# Patient Record
Sex: FEMALE | Race: WHITE | NOT HISPANIC OR LATINO | Employment: OTHER | URBAN - METROPOLITAN AREA
[De-identification: names, ages, dates, MRNs, and addresses within clinical notes are randomized per-mention and may not be internally consistent; named-entity substitution may affect disease eponyms.]

---

## 2017-01-13 ENCOUNTER — TELEPHONE (OUTPATIENT)
Dept: PSYCHIATRY | Facility: CLINIC | Age: 57
End: 2017-01-13

## 2017-01-13 NOTE — TELEPHONE ENCOUNTER
Phone Note    Left message for patient requesting her to reschedule her appointment and to check in.     =============================================================================  Anisha Maciel M.D.  PGY-3 Psychiatry Resident

## 2017-02-15 ENCOUNTER — TELEPHONE (OUTPATIENT)
Dept: PSYCHIATRY | Facility: CLINIC | Age: 57
End: 2017-02-15

## 2017-02-15 DIAGNOSIS — F43.10 POSTTRAUMATIC STRESS DISORDER: ICD-10-CM

## 2017-02-15 DIAGNOSIS — F39 MOOD DISORDER (H): ICD-10-CM

## 2017-02-15 RX ORDER — PRAZOSIN HYDROCHLORIDE 1 MG/1
2 CAPSULE ORAL AT BEDTIME
Qty: 60 CAPSULE | Refills: 0 | Status: SHIPPED
Start: 2017-02-15 | End: 2017-06-27

## 2017-02-15 RX ORDER — QUETIAPINE FUMARATE 25 MG/1
25 TABLET, FILM COATED ORAL AT BEDTIME
Qty: 30 TABLET | Refills: 0 | Status: SHIPPED
Start: 2017-02-15 | End: 2017-06-27

## 2017-02-15 NOTE — TELEPHONE ENCOUNTER
Medication Requested: Prazosin 1 mg caps and Seroquel 25 mg tabs  Last Written RX Date: 11-11-16  Last Pharmacy Fill Date: 1-24-17  Last RX Quantity: 1 mo,   # refills: 2    Last Office Visit: 10-5-16  Recommended RTC: 1 mo  Next Scheduled Office Visit: 3-6-17    Since last Visit: # of CX 0 ,# of NOS 1    Last Visit Recommendations for:  Medications: - Continue Prazosin 1 mg QHS  - Continue Gabapentin 600 mg qAM, 300 mg q1400, 600 mg QHS   - Continue Quetiapine 25 mg QHS    **Prazosin changed to 2 mg at HS on 11-11-16    Labs: 0    Will route to provider due to NOS.        Kathleen M Doege RN Kathleen M Doege RN

## 2017-05-09 ENCOUNTER — TELEPHONE (OUTPATIENT)
Dept: PSYCHIATRY | Facility: CLINIC | Age: 57
End: 2017-05-09

## 2017-05-09 DIAGNOSIS — F39 MOOD DISORDER (H): ICD-10-CM

## 2017-05-09 NOTE — TELEPHONE ENCOUNTER
----- Message from Naila Brown sent at 5/9/2017  9:47 AM CDT -----  Regarding: Refill Request  Pt called requesting a refill on her Gabapentin, enough to get her through until her appt on 5/17. I requested that she contact her pharmacy and pt refused. She stated that she was specifically told to call and request the refill since it has been over 3 months. She verified it should be sent to this pharmacy [facility]/PHARMACY #[id number] - [place], [place] - [address].  Thanks!

## 2017-05-09 NOTE — TELEPHONE ENCOUNTER
Last appt: 11/11/16  RTC: 2 months  Can:  none  No show:  3/6/17, 1/13/17  Pen: 5/17/17    Per last office note:  Continue Gabapentin 600 mg qAM, 300 mg q1400, 600 mg QHS     Per [place]-, 30 d/s Gabapentin last filled: 3/22/17, 2/21/17    Routed to provider for auth to RF per  Protocol

## 2017-05-10 RX ORDER — GABAPENTIN 300 MG/1
CAPSULE ORAL
Qty: 150 CAPSULE | Refills: 0 | Status: SHIPPED | OUTPATIENT
Start: 2017-05-10 | End: 2017-06-14

## 2017-05-10 NOTE — TELEPHONE ENCOUNTER
Anisha Maciel MD Bove, Michelle, RN        Caller: Unspecified (Yesterday,  4:37 PM)                     Ok for 30 d/s         LVM for pt that refill was submitted

## 2017-06-14 ENCOUNTER — TELEPHONE (OUTPATIENT)
Dept: PSYCHIATRY | Facility: CLINIC | Age: 57
End: 2017-06-14

## 2017-06-14 DIAGNOSIS — F39 MOOD DISORDER (H): ICD-10-CM

## 2017-06-14 RX ORDER — GABAPENTIN 300 MG/1
CAPSULE ORAL
Qty: 150 CAPSULE | Refills: 0 | Status: SHIPPED | OUTPATIENT
Start: 2017-06-14 | End: 2017-06-27

## 2017-06-14 NOTE — TELEPHONE ENCOUNTER
Dr. Maciel approves of refill.    LVM for pt with update that refill was submitted.  Reminded of upcoming appt date/time.

## 2017-06-14 NOTE — TELEPHONE ENCOUNTER
----- Message from Tamie Paredes sent at 6/14/2017  8:25 AM CDT -----  Contact: [phone]  Janell/Rosemarie    Caller:  patient  Medication:  gabapentin  [facility] [address]  Have you contacted the pharmacy: yes  How much of medication do you have left:  out  Pending appt: 6/27  Okay to leave VM:  yes

## 2017-06-14 NOTE — TELEPHONE ENCOUNTER
Last appt: 11/11/16  RTC: 2 months  Can:   5/17/17  No show:  3/6/17, 1/13/17  Pen: 6/27/17     Per last office note:  Continue Gabapentin 600 mg qAM, 300 mg q1400, 600 mg QHS     30 d/s Gabapentin last refilled 5/10/17    Routed to provider for auth to RF per RF Protocol, d/t multiple missed appts.

## 2017-06-24 ENCOUNTER — HEALTH MAINTENANCE LETTER (OUTPATIENT)
Age: 57
End: 2017-06-24

## 2017-06-27 ENCOUNTER — OFFICE VISIT (OUTPATIENT)
Dept: PSYCHIATRY | Facility: CLINIC | Age: 57
End: 2017-06-27
Attending: PSYCHIATRY & NEUROLOGY
Payer: MEDICARE

## 2017-06-27 VITALS
SYSTOLIC BLOOD PRESSURE: 131 MMHG | OXYGEN SATURATION: 92 % | BODY MASS INDEX: 24.99 KG/M2 | WEIGHT: 145.6 LBS | HEART RATE: 107 BPM | DIASTOLIC BLOOD PRESSURE: 83 MMHG

## 2017-06-27 DIAGNOSIS — F43.10 POSTTRAUMATIC STRESS DISORDER: ICD-10-CM

## 2017-06-27 DIAGNOSIS — Z92.89 HX OF PSYCHIATRIC CARE: ICD-10-CM

## 2017-06-27 DIAGNOSIS — F41.0 SEVERE ANXIETY WITH PANIC: Primary | ICD-10-CM

## 2017-06-27 DIAGNOSIS — F39 MOOD DISORDER (H): ICD-10-CM

## 2017-06-27 PROCEDURE — 99212 OFFICE O/P EST SF 10 MIN: CPT | Mod: ZF

## 2017-06-27 RX ORDER — GABAPENTIN 100 MG/1
100 CAPSULE ORAL 3 TIMES DAILY PRN
Qty: 90 CAPSULE | Refills: 2 | Status: ON HOLD | OUTPATIENT
Start: 2017-06-27 | End: 2022-03-18

## 2017-06-27 RX ORDER — PRAZOSIN HYDROCHLORIDE 1 MG/1
1 CAPSULE ORAL AT BEDTIME
Qty: 30 CAPSULE | Refills: 2 | Status: ON HOLD | OUTPATIENT
Start: 2017-06-27 | End: 2022-03-18

## 2017-06-27 RX ORDER — QUETIAPINE FUMARATE 25 MG/1
25 TABLET, FILM COATED ORAL AT BEDTIME
Qty: 30 TABLET | Refills: 2 | Status: SHIPPED | OUTPATIENT
Start: 2017-06-27 | End: 2023-08-07

## 2017-06-27 NOTE — PROGRESS NOTES
"PSYCHIATRY CLINIC PROGRESS NOTE   30 minute medication management of Mood Disorder NEC and Chemical Dependency.  The initial DIAG EVAL was 2/27/15.  Date of most recent Transfer Evaluation is 07/08/2016.    INTERIM HISTORY                                                 PSYCH CRITICAL ITEM HISTORY:  psych hosp (<3) and CD: alcohol.     Jolynn Rivera is a 57 year old female who was last seen in clinic on 11/11/16 at which time tried to increase prazosin. The patient reports good treatment adherence.  History was provided by patient who was a good historian.  Since the last visit:  - Was doing well for a long time with mood so didn't come in for visits.  - Panic attacks have been worse recently. In the past prompts to panic attacks were due to hangovers.  - Has been sober for 9 months!  - Continues to find [employer] website to be quite helpful for ongoing sobriety.  - Is going to see Radha on Thursday.  - Has a job at a [employer] that she really enjoys. Doesn't want to lose job.  - Moved since last visit.  - Mom is in the hospital and has had to be more involved which cues her PTSD.   - Daytime prazosin and seroquel have not gone well in the past.   - Increase of prazosin to 2 mg didn't go well, prefers 1 mg.     PSYCH ROS:   PANIC ATTACK: difficult recently - has sx of trouble taking deep breath, palpitations, GI distresss, tremors and extremity paresthesia, difficult to leave her home  DEPRESSION:  depressed mood, difficulty sleeping, poor appetite, feeling down, anhedonia, low energy and poor concentration /memory;  DENIES- suicidal ideation     SUBSTANCE USE:     ALCOHOL-  Sober 9 months     TOBACCO- 1ppd          CAFFEINE- not discussed                      CANNABIS- not discussed     OTHER ILLICIT DRUGS- not discussed    Financial Support- On disability.  Living Situation- [place]ment [place] [facility] with dog and cat.              Children- None      MEDICAL ROS:  Endorses some \"restless legs\" with " Seroquel.     PSYCH and CD Critical Summary Points since July 2016           - July 2016:    Discontinue previously prescribed additional daytime Gabapentin as it was too sedating.   Increase Prazosin to BID dosing to target Sx of anxiety  - August 2016:    Decrease Prazosin to QHS dosing as BID caused dizziness  - September 2016:   Gabapentin increased following ED visit to target daytime sx of anxiety.  - November 2016:   Tried an increase of Prazosin to 2 mg    PAST PSYCH MED TRIALS   see EMR Problem List: Hx of psychiatric care    MEDICAL / SURGICAL HISTORY                                   CARE TEAM:          PCP- Dr. Byrne                    Therapist- Radha Long    Pregnant or breastfeeding:  NO      Contraception- Surgical sterilization    Patient Active Problem List   Diagnosis     Polysubstance abuse     Chronic hepatitis C (H)     COPD (chronic obstructive pulmonary disease) (H)     GERD (gastroesophageal reflux disease)     Mood disorder (H)     Chemical dependency (H)     Posttraumatic stress disorder     Nicotine abuse     Alcohol use disorder, mild, abuse     Hx of psychiatric care       ALLERGY                                Antihistamines, chlorpheniramine-type [alkylamines]; Compazine; Diphenhydramine; Penicillins; Prochlorperazine; Trazodone; and Wasps [hornets]  MEDICATIONS                               Current Outpatient Prescriptions   Medication Sig Dispense Refill     gabapentin (NEURONTIN) 300 MG capsule Take 2 capsules (600 mg) by mouth QAM, Take 1 capsule (300 mg) by mouth Qafternoon, and 2 capsules (600 mg) at bedtime. 150 capsule 0     prazosin (MINIPRESS) 1 MG capsule Take 2 capsules (2 mg) by mouth At Bedtime 60 capsule 0     QUEtiapine (SEROQUEL) 25 MG tablet Take 1 tablet (25 mg) by mouth At Bedtime 30 tablet 0     nicotine polacrilex (NICORETTE) 4 MG gum Place 1 each (4 mg) inside cheek as needed for smoking cessation 270 tablet 1     albuterol (PROAIR HFA, PROVENTIL HFA, VENTOLIN  "HFA) 108 (90 BASE) MCG/ACT inhaler Inhale 2 puffs into the lungs every 6 hours as needed for shortness of breath / dyspnea or wheezing 1 Inhaler 0     omeprazole 20 MG tablet Take 1 tablet (20 mg) by mouth daily Take 30-60 minutes before a meal. (Patient taking differently: Take 20 mg by mouth At Bedtime Take 30-60 minutes before a meal.) 30 tablet 1       VITALS   /83  Pulse 107  Wt 66 kg (145 lb 9.6 oz)  SpO2 92%  BMI 24.99 kg/m2   MENTAL STATUS EXAM                                                             Alertness: alert  and oriented  Appearance: adequately groomed  Behavior/Demeanor: cooperative and pleasant, with good  eye contact  Speech: regular rate and rhythm  Language: intact  Psychomotor: tremulous   Mood:  anxious  Affect: full range; was congruent to mood; was congruent to content  Thought Process/Associations: unremarkable  Thought Content:  Denies suicidal ideation and paranoid ideation  Perception:  Denies hallucinations  Insight: good  Judgment: good  Cognition:  does appear grossly intact; formal cognitive testing was not done    LABS and DATA     PHQ9 TODAY = 20  PHQ-9 SCORE 8/16/2016 10/5/2016 11/11/2016   Total Score - - -   Total Score 6 8 4   Total Score - - -     PSYCHIATRIC DIAGNOSES                                                                                                   Mood disorder NEC (bipolar II vs substance induced mood disorder)  Anxiety NEC (with panic attacks)  R/o Posttraumatic stress disorder  R/o Borderline personality disorder  History of substance use disorders (heroin (IV), cocaine (IV), amphetamines, marijuana, and hallucinogens)    ASSESSMENT                                   Pertinent background:   See most recent Transfer Evaluation as dated above.  Additionally, per prior provider: \"Use Seroquel with caution as higher doses contributed to akathisia (primarily used as a sleep aid and possibly mood stabilizer). Due to past report of akathisia " "(tingling in her shoulders at night) her description of her symptom sounded like it was related a paresthesia rather than the typical akathisia associated with neuroleptic use.\"    TODAY: Jolynn has been dealing with worsening anxiety and panic attacks for the past 2 months. She thinks this was prompted by her own illness, illness in her dog and increased needs of her parents. Would like to make medication changes to target ongoing symptoms. Will restart Prazosin to target sleep difficulties, she had run out of refills on this. Will try long acting Gabapentin in BID dosing to provide a more constant level and better match what she is taking as the afternoon dose is often missed. Additionally, will add 100 mg TID PRN gabapentin for heightened anxiety. In the future may consider a low dose SSRI. Encouraged reconnection with Radha as she has planned for this Thursday. Will f/u in a month.                        Psychotropic drug interactions: None.   Management:  N/A     PLAN                                                                                                       1) PSYCHOTROPIC MEDICATIONS:   Avoid use of BZDs      - Decrease Prazosin to 1 mg QHS       - Switch to Gabapentin encarbil 600 mg BID and gabapentin 100 mg TID PRN anxiety      - Continue Quetiapine 25 mg QHS    2) THERAPY:  Continue DBT with Radha Long    3) LABS NEXT DUE: none       RATING SCALES:    none    4) REFERRALS [CD, medical, other]:  none    5) : none    6) RTC: 1 month    7) CRISIS NUMBERS: Provided routinely in AVS     TREATMENT RISK STATEMENT:  The risks, benefits, alternatives and potential adverse effects have been discussed and are understood by the patient/ patient's guardian. The pt understands the risks of using street drugs or alcohol.  There are no medical contraindications, the pt agrees to treatment with the ability to do so.  The patient understands to call 911 or come to the nearest ED if life threatening or " urgent symptoms present.       RESIDENT:   Anisha Maciel MD    Patient staffed in clinic with Dr. Yadav who will sign the note.  Supervisor is Dr. Munoz.   I saw the patient with the resident, and participated in key portions of the service, including the mental status examination and developing the plan of care. I reviewed key portions of the history with the resident. I agree with the findings and plan as documented in this note. This pt was initially upset with the plan we first made, but calmed considerably when we arrived at the plan of trying extended release gabapentin. At the end of the visit she apologized for getting upset. There were no apparent ill feelings by the end of the appt.  No endorsement of potentially dangerous behavior.    Gill Yadav

## 2017-06-27 NOTE — MR AVS SNAPSHOT
After Visit Summary   6/27/2017    Jolynn Rivera    MRN: [id number]           Patient Information     Date Of Birth          1960        Visit Information        Provider Department      6/27/2017 8:55 AM Anisha Maciel MD Psychiatry Clinic        Today's Diagnoses     Severe anxiety with panic    -  1    Hx of psychiatric care        Mood disorder        Posttraumatic stress disorder          Care Instructions    - Restart Prazosin 1 mg at bedtime  - Gabapentin   - Will try 600 mg extended release twice a day   - Try 100 mg 3 times a day as needed for anxiety            Follow-ups after your visit        Follow-up notes from your care team     Return in about 4 weeks (around 7/25/2017) for 30 MFU.      Your next 10 appointments already scheduled     Jul 18, 2017  8:25 AM CDT   Adult Med Follow UP with Anisha Maciel MD   Psychiatry Clinic ([facility])    [address] [facility] [address] [phone]              Who to contact     Please call your clinic at [phone] to:    Ask questions about your health    Make or cancel appointments    Discuss your medicines    Learn about your test results    Speak to your doctor   If you have compliments or concerns about an experience at your clinic, or if you wish to file a complaint, please contact [facility] Patient Relations at [phone] or email us at [email or web address]         Additional Information About Your Visit        MyChart Information     [employer] gives you secure access to your electronic health record. If you see a primary care provider, you can also send messages to your care team and make appointments. If you have questions, please call your primary care clinic.  If you do not have a primary care provider, please call [phone] and they will assist you.      [employer] is an electronic gateway that provides easy, online  access to your medical records. With [employer], you can request a clinic appointment, read your test results, renew a prescription or communicate with your care team.     To access your existing account, please contact your [facility] or call [phone] for assistance.        Care EveryWhere ID     This is your Care EveryWhere ID. This could be used by other organizations to access your [facility] medical records  [id number]        Your Vitals Were     Pulse Pulse Oximetry BMI (Body Mass Index)             107 92% 24.99 kg/m2          Blood Pressure from Last 3 Encounters:   06/27/17 131/83   11/11/16 133/87   10/05/16 131/80    Weight from Last 3 Encounters:   06/27/17 66 kg (145 lb 9.6 oz)   11/11/16 68.8 kg (151 lb 9.6 oz)   10/05/16 69.6 kg (153 lb 6.4 oz)              Today, you had the following     No orders found for display         Today's Medication Changes          These changes are accurate as of: 6/27/17 11:59 PM.  If you have any questions, ask your nurse or doctor.               Start taking these medicines.        Dose/Directions    gabapentin enacarbil 600 MG tablet   Commonly known as:  HORIZANT   Used for:  Severe anxiety with panic, Mood disorder (H), Posttraumatic stress disorder   Started by:  Anisha Maciel MD        Dose:  600 mg   Take 1 tablet (600 mg) by mouth 2 times daily Do not split, crush or chew tablets.   Quantity:  60 tablet   Refills:  2         These medicines have changed or have updated prescriptions.        Dose/Directions    gabapentin 100 MG capsule   Commonly known as:  NEURONTIN   This may have changed:    - medication strength  - how much to take  - how to take this  - when to take this  - reasons to take this  - additional instructions   Used for:  Severe anxiety with panic   Changed by:  Anisha Maciel MD        Dose:  100 mg   Take 1 capsule (100 mg) by mouth 3 times daily as needed   Quantity:  90 capsule   Refills:  2        omeprazole 20 MG tablet   This may have changed:    - when to take this  - additional instructions   Used for:  GERD (gastroesophageal reflux disease)        Dose:  20 mg   Take 1 tablet (20 mg) by mouth daily Take 30-60 minutes before a meal.   Quantity:  30 tablet   Refills:  1       prazosin 1 MG capsule   Commonly known as:  MINIPRESS   This may have changed:  how much to take   Used for:  Posttraumatic stress disorder   Changed by:  Anisha Maciel MD        Dose:  1 mg   Take 1 capsule (1 mg) by mouth At Bedtime   Quantity:  30 capsule   Refills:  2            Where to get your medicines      These medications were sent to [facility]/pharmacy #[id number] - [place], [place] - [address], [place] [address]     Phone:  [phone]     gabapentin 100 MG capsule    gabapentin enacarbil 600 MG tablet    prazosin 1 MG capsule    QUEtiapine 25 MG tablet                Primary Care Provider Office Phone # Fax #    Yony Byrne [phone]       [facility] [place] [facility] [address]  [facility] [address]        Equal Access to Services     QUENTIN [facility] RegionDODIE AH: Hadii jose ku hadasho Soomaali, waaxda luqadaha, qaybta kaalmada adeegyada, waxay idiin haypetrn rachael duarte . So [facility] [phone].    ATENCIÓN: Si habla español, tiene a alvarez disposición servicios gratuitos de asistencia lingüística. [facility] [phone].    We comply with applicable federal civil rights laws and [place] laws. We do not discriminate on the basis of race, color, national origin, age, disability sex, sexual orientation or gender identity.            Thank you!     Thank you for choosing PSYCHIATRY CLINIC  for your care. Our goal is always to provide you with excellent care. Hearing back from our patients is one way we can continue to improve our services. Please take a few minutes to complete the written survey that you may receive in the mail after your visit with us. Thank you!              Your Updated Medication List - Protect others around you: Learn how to safely use, store and throw away your medicines at www.disposemymeds.org.          This list is accurate as of: 6/27/17 11:59 PM.  Always use your most recent med list.                   Brand Name Dispense Instructions for use Diagnosis    albuterol 108 (90 BASE) MCG/ACT Inhaler    PROAIR HFA/PROVENTIL HFA/VENTOLIN HFA    1 Inhaler    Inhale 2 puffs into the lungs every 6 hours as needed for shortness of breath / dyspnea or wheezing        gabapentin 100 MG capsule    NEURONTIN    90 capsule    Take 1 capsule (100 mg) by mouth 3 times daily as needed    Severe anxiety with panic       gabapentin enacarbil 600 MG tablet    HORIZANT    60 tablet    Take 1 tablet (600 mg) by mouth 2 times daily Do not split, crush or chew tablets.    Severe anxiety with panic, Mood disorder (H), Posttraumatic stress disorder       nicotine polacrilex 4 MG gum    NICORETTE    270 tablet    Place 1 each (4 mg) inside cheek as needed for smoking cessation    Nicotine abuse       omeprazole 20 MG tablet     30 tablet    Take 1 tablet (20 mg) by mouth daily Take 30-60 minutes before a meal.    GERD (gastroesophageal reflux disease)       prazosin 1 MG capsule    MINIPRESS    30 capsule    Take 1 capsule (1 mg) by mouth At Bedtime    Posttraumatic stress disorder       QUEtiapine 25 MG tablet    SEROquel    30 tablet    Take 1 tablet (25 mg) by mouth At Bedtime    Mood disorder (H), Posttraumatic stress disorder

## 2017-06-27 NOTE — NURSING NOTE
Chief Complaint   Patient presents with     Recheck Medication     Mood disorder    Reviewed allergies, smoking status, and pharmacy preference  Obtained weight, blood pressure and heart rate

## 2017-06-27 NOTE — PATIENT INSTRUCTIONS
- Restart Prazosin 1 mg at bedtime  - Gabapentin   - Will try 600 mg extended release twice a day   - Try 100 mg 3 times a day as needed for anxiety

## 2017-06-28 ASSESSMENT — PATIENT HEALTH QUESTIONNAIRE - PHQ9: SUM OF ALL RESPONSES TO PHQ QUESTIONS 1-9: 20

## 2017-06-29 ENCOUNTER — TELEPHONE (OUTPATIENT)
Dept: PSYCHIATRY | Facility: CLINIC | Age: 57
End: 2017-06-29

## 2017-06-29 DIAGNOSIS — F41.1 GAD (GENERALIZED ANXIETY DISORDER): Primary | ICD-10-CM

## 2017-06-29 NOTE — TELEPHONE ENCOUNTER
Rec'd fax from [facility] stating that a PA is needed for pt's Horizant prescription.    Called insurance at [phone] to request PA form.  Was informed that this will be received within 10 mins.

## 2017-07-05 NOTE — TELEPHONE ENCOUNTER
"Rec'd fax from [facility] that states \"no action taken\" as pt is enrolled with Medicare Part D.    Jennifer Alonso Michelle, RN        Phone Number: [phone] (Call me)                     The pt is caller.  States the Gabapentin 10mg was not approved by her insurance so she'd like the old Gabapentin renewed.  States she'll be out of the med by Monday.       [facility] [address] [facility]       LVM for pt with update that msg was rec'd and will be routed to Dr. Maciel, who is out of office until tomorrow, for recommendations.  "

## 2017-07-07 RX ORDER — GABAPENTIN 300 MG/1
CAPSULE ORAL
Qty: 140 CAPSULE | Refills: 1 | Status: ON HOLD | OUTPATIENT
Start: 2017-07-07 | End: 2022-03-18

## 2017-07-07 NOTE — TELEPHONE ENCOUNTER
Phone Note    Called pt re: Gabapentin PA. Will send refill for previous dose of IR Gabapentin until PA for long acting Gabapentin can be approved.    Had to quit her job due to panic which is frustrating. Did have a 2 day lapse on Alcohol and is concerned she will have another lapse due to level of panic.     Plan:  - Sent Rx for Gabapentin 600 mg QAM, 300 mg Qnoon, 600 mg QHS    =============================================================================  Anisha Maciel M.D.  PGY-4 Psychiatry Resident

## 2017-07-07 NOTE — TELEPHONE ENCOUNTER
Called [facility] and was redirected several times for a PA form.  After 16 minute hold, was informed that forms will be faxed to clinic momentarily.

## 2017-07-10 ENCOUNTER — CARE COORDINATION (OUTPATIENT)
Dept: PSYCHIATRY | Facility: CLINIC | Age: 57
End: 2017-07-10

## 2017-07-10 NOTE — PROGRESS NOTES
"Tamie Paredes Michelle, RN        Phone Number: [phone]                     Janell/Rosemarie     Patient is caller. She is following up on the conversation from Friday that she had with the Dr. She would like either a call back from the  or a My Chart message.       See note dated 6/29/17.      Writer contacted pt to discuss.  Pt reports that she is contacting clinic to update provider on her research for an EMDR therapist.  Mentions that she has geographical limitations as she does not do well on buses due to anxiety/panic.  Reports that she was able to find a provider in her neighborhood with [facility], named Mahesh Faria.  Reports that this therapist has experience with trauma, LGBTQ+, and co-occurring disorders.  Reports that she has contacted his clinic and is currently in the process of working with insurance to determine if he is a covered provider.  Pt reports that she is looking into alternative insurance plans.    Also discussed Horizant PA status, pt aware that PA needs to be resubmitted to insurance.  Mentions briefly that she believes Horizant will be more effective as she has \"done better on time-release meds\" in the past.  Writer informed her would keep her updated of PA status.    Pt understands that provider is not in clinic today.  Requests either a call or MyChart follow up with additional EMDR therapy recommendations from Dr. Maciel.  Also wonders if Dr. Maciel has any input on Mahesh Faria.  "

## 2017-07-12 NOTE — TELEPHONE ENCOUNTER
Rec'd fax from [employer] with update that PA for Horizant is approved from 4/13/2017- 7/12/2018.    Called [facility] and they were able to run prescription through insurance.  Will have to order medication in.    LVM for pt with update.    PA forms and response sent to scanning.  Copy temporarily retained in clinic.    Routed to provider as FYCHARLY

## 2017-07-12 NOTE — TELEPHONE ENCOUNTER
PA forms rec'd.  Completed PA and faxed to [facility] at [phone]  Marked as urgent review

## 2017-10-02 DIAGNOSIS — F43.10 POSTTRAUMATIC STRESS DISORDER: ICD-10-CM

## 2017-10-02 RX ORDER — PRAZOSIN HYDROCHLORIDE 1 MG/1
1 CAPSULE ORAL AT BEDTIME
Qty: 30 CAPSULE | Refills: 0 | Status: CANCELLED | OUTPATIENT
Start: 2017-10-02

## 2017-10-02 NOTE — TELEPHONE ENCOUNTER
Medication requested: prozosin  Last refilled: not on refill request but EMR=6/27  Qty: 30 refill x 2      Last seen: 6/27  RTC: 1 mo  Cancel: 3  No-show: 0  Next appt: no future appt    Refill decision: Refill pended and routed to the provider for review/determination due to cancelled appts and no pending appt

## 2017-10-09 NOTE — TELEPHONE ENCOUNTER
I do not approve of this as she is no longer followed in our clinic and has transferred care to a different provider.     Anisha Maciel

## 2019-12-16 ENCOUNTER — HEALTH MAINTENANCE LETTER (OUTPATIENT)
Age: 59
End: 2019-12-16

## 2021-01-15 ENCOUNTER — HEALTH MAINTENANCE LETTER (OUTPATIENT)
Age: 61
End: 2021-01-15

## 2021-09-05 ENCOUNTER — HEALTH MAINTENANCE LETTER (OUTPATIENT)
Age: 61
End: 2021-09-05

## 2021-12-26 ENCOUNTER — HEALTH MAINTENANCE LETTER (OUTPATIENT)
Age: 61
End: 2021-12-26

## 2022-02-20 ENCOUNTER — HEALTH MAINTENANCE LETTER (OUTPATIENT)
Age: 62
End: 2022-02-20

## 2022-03-16 ENCOUNTER — HOSPITAL ENCOUNTER (INPATIENT)
Facility: CLINIC | Age: 62
LOS: 2 days | Discharge: HOME | DRG: 897 | End: 2022-03-18
Attending: EMERGENCY MEDICINE | Admitting: PSYCHIATRY & NEUROLOGY
Payer: MEDICARE

## 2022-03-16 ENCOUNTER — TELEPHONE (OUTPATIENT)
Dept: BEHAVIORAL HEALTH | Facility: CLINIC | Age: 62
End: 2022-03-16

## 2022-03-16 DIAGNOSIS — F10.930 ALCOHOL WITHDRAWAL, UNCOMPLICATED (H): Primary | ICD-10-CM

## 2022-03-16 DIAGNOSIS — J32.9 SINUSITIS, UNSPECIFIED CHRONICITY, UNSPECIFIED LOCATION: ICD-10-CM

## 2022-03-16 DIAGNOSIS — F17.210 CIGARETTE SMOKER: ICD-10-CM

## 2022-03-16 DIAGNOSIS — Z20.822 CONTACT WITH AND (SUSPECTED) EXPOSURE TO COVID-19: ICD-10-CM

## 2022-03-16 DIAGNOSIS — E87.6 HYPOKALEMIA: ICD-10-CM

## 2022-03-16 DIAGNOSIS — K04.7 PERIAPICAL ABSCESS WITHOUT SINUS: ICD-10-CM

## 2022-03-16 LAB
ALBUMIN SERPL-MCNC: 3.3 G/DL (ref 3.4–5)
ALCOHOL BREATH TEST: 0.15 (ref 0–0.01)
ALP SERPL-CCNC: 43 U/L (ref 40–150)
ALT SERPL W P-5'-P-CCNC: 53 U/L (ref 0–50)
AMPHETAMINES UR QL SCN: ABNORMAL
ANION GAP SERPL CALCULATED.3IONS-SCNC: 9 MMOL/L (ref 3–14)
AST SERPL W P-5'-P-CCNC: 60 U/L (ref 0–45)
BARBITURATES UR QL: ABNORMAL
BASOPHILS # BLD AUTO: 0 10E3/UL (ref 0–0.2)
BASOPHILS NFR BLD AUTO: 1 %
BENZODIAZ UR QL: ABNORMAL
BILIRUB SERPL-MCNC: 0.5 MG/DL (ref 0.2–1.3)
BUN SERPL-MCNC: 8 MG/DL (ref 7–30)
CALCIUM SERPL-MCNC: 8.4 MG/DL (ref 8.5–10.1)
CANNABINOIDS UR QL SCN: ABNORMAL
CHLORIDE BLD-SCNC: 107 MMOL/L (ref 94–109)
CO2 SERPL-SCNC: 22 MMOL/L (ref 20–32)
COCAINE UR QL: ABNORMAL
CREAT SERPL-MCNC: 0.66 MG/DL (ref 0.52–1.04)
EOSINOPHIL # BLD AUTO: 0.1 10E3/UL (ref 0–0.7)
EOSINOPHIL NFR BLD AUTO: 1 %
ERYTHROCYTE [DISTWIDTH] IN BLOOD BY AUTOMATED COUNT: 13.5 % (ref 10–15)
GFR SERPL CREATININE-BSD FRML MDRD: >90 ML/MIN/1.73M2
GLUCOSE BLD-MCNC: 101 MG/DL (ref 70–99)
HCT VFR BLD AUTO: 36 % (ref 35–47)
HGB BLD-MCNC: 12.6 G/DL (ref 11.7–15.7)
IMM GRANULOCYTES # BLD: 0 10E3/UL
IMM GRANULOCYTES NFR BLD: 0 %
LYMPHOCYTES # BLD AUTO: 2.4 10E3/UL (ref 0.8–5.3)
LYMPHOCYTES NFR BLD AUTO: 45 %
MAGNESIUM SERPL-MCNC: 2 MG/DL (ref 1.6–2.3)
MCH RBC QN AUTO: 33.9 PG (ref 26.5–33)
MCHC RBC AUTO-ENTMCNC: 35 G/DL (ref 31.5–36.5)
MCV RBC AUTO: 97 FL (ref 78–100)
MONOCYTES # BLD AUTO: 0.6 10E3/UL (ref 0–1.3)
MONOCYTES NFR BLD AUTO: 11 %
NEUTROPHILS # BLD AUTO: 2.2 10E3/UL (ref 1.6–8.3)
NEUTROPHILS NFR BLD AUTO: 42 %
NRBC # BLD AUTO: 0 10E3/UL
NRBC BLD AUTO-RTO: 0 /100
OPIATES UR QL SCN: ABNORMAL
PLATELET # BLD AUTO: 172 10E3/UL (ref 150–450)
POTASSIUM BLD-SCNC: 3.3 MMOL/L (ref 3.4–5.3)
PROT SERPL-MCNC: 6.9 G/DL (ref 6.8–8.8)
RBC # BLD AUTO: 3.72 10E6/UL (ref 3.8–5.2)
SARS-COV-2 RNA RESP QL NAA+PROBE: NEGATIVE
SODIUM SERPL-SCNC: 138 MMOL/L (ref 133–144)
WBC # BLD AUTO: 5.3 10E3/UL (ref 4–11)

## 2022-03-16 PROCEDURE — 80053 COMPREHEN METABOLIC PANEL: CPT | Performed by: EMERGENCY MEDICINE

## 2022-03-16 PROCEDURE — 250N000011 HC RX IP 250 OP 636: Performed by: EMERGENCY MEDICINE

## 2022-03-16 PROCEDURE — 87635 SARS-COV-2 COVID-19 AMP PRB: CPT | Performed by: EMERGENCY MEDICINE

## 2022-03-16 PROCEDURE — 83735 ASSAY OF MAGNESIUM: CPT | Performed by: EMERGENCY MEDICINE

## 2022-03-16 PROCEDURE — 250N000011 HC RX IP 250 OP 636: Performed by: PSYCHIATRY & NEUROLOGY

## 2022-03-16 PROCEDURE — 250N000013 HC RX MED GY IP 250 OP 250 PS 637: Performed by: PSYCHIATRY & NEUROLOGY

## 2022-03-16 PROCEDURE — 80307 DRUG TEST PRSMV CHEM ANLYZR: CPT | Performed by: EMERGENCY MEDICINE

## 2022-03-16 PROCEDURE — C9803 HOPD COVID-19 SPEC COLLECT: HCPCS | Performed by: EMERGENCY MEDICINE

## 2022-03-16 PROCEDURE — 99285 EMERGENCY DEPT VISIT HI MDM: CPT | Performed by: EMERGENCY MEDICINE

## 2022-03-16 PROCEDURE — H2032 ACTIVITY THERAPY, PER 15 MIN: HCPCS

## 2022-03-16 PROCEDURE — HZ2ZZZZ DETOXIFICATION SERVICES FOR SUBSTANCE ABUSE TREATMENT: ICD-10-PCS | Performed by: PSYCHIATRY & NEUROLOGY

## 2022-03-16 PROCEDURE — 250N000013 HC RX MED GY IP 250 OP 250 PS 637: Performed by: EMERGENCY MEDICINE

## 2022-03-16 PROCEDURE — 82075 ASSAY OF BREATH ETHANOL: CPT | Performed by: EMERGENCY MEDICINE

## 2022-03-16 PROCEDURE — 128N000004 HC R&B CD ADULT

## 2022-03-16 PROCEDURE — 85025 COMPLETE CBC W/AUTO DIFF WBC: CPT | Performed by: EMERGENCY MEDICINE

## 2022-03-16 PROCEDURE — 36415 COLL VENOUS BLD VENIPUNCTURE: CPT | Performed by: EMERGENCY MEDICINE

## 2022-03-16 RX ORDER — QUETIAPINE FUMARATE 25 MG/1
25 TABLET, FILM COATED ORAL AT BEDTIME
Status: DISCONTINUED | OUTPATIENT
Start: 2022-03-16 | End: 2022-03-18 | Stop reason: HOSPADM

## 2022-03-16 RX ORDER — LOPERAMIDE HCL 2 MG
2 CAPSULE ORAL 4 TIMES DAILY PRN
Status: DISCONTINUED | OUTPATIENT
Start: 2022-03-16 | End: 2022-03-18 | Stop reason: HOSPADM

## 2022-03-16 RX ORDER — TRAZODONE HYDROCHLORIDE 50 MG/1
50 TABLET, FILM COATED ORAL
Status: DISCONTINUED | OUTPATIENT
Start: 2022-03-16 | End: 2022-03-18 | Stop reason: HOSPADM

## 2022-03-16 RX ORDER — IBUPROFEN 200 MG
200 TABLET ORAL EVERY 6 HOURS PRN
Status: DISCONTINUED | OUTPATIENT
Start: 2022-03-16 | End: 2022-03-18 | Stop reason: HOSPADM

## 2022-03-16 RX ORDER — ESZOPICLONE 2 MG/1
1 TABLET, FILM COATED ORAL AT BEDTIME
Status: ON HOLD | COMMUNITY
Start: 2022-03-03 | End: 2022-03-18

## 2022-03-16 RX ORDER — LEVOTHYROXINE SODIUM 100 UG/1
100 TABLET ORAL EVERY MORNING
COMMUNITY
Start: 2022-01-19

## 2022-03-16 RX ORDER — FOLIC ACID 1 MG/1
1 TABLET ORAL DAILY
Status: DISCONTINUED | OUTPATIENT
Start: 2022-03-16 | End: 2022-03-16

## 2022-03-16 RX ORDER — OMEPRAZOLE 40 MG/1
40 CAPSULE, DELAYED RELEASE ORAL EVERY MORNING
COMMUNITY
Start: 2022-01-12

## 2022-03-16 RX ORDER — GABAPENTIN 600 MG/1
900 TABLET ORAL 3 TIMES DAILY
COMMUNITY
Start: 2021-09-07

## 2022-03-16 RX ORDER — DIAZEPAM 5 MG
5-20 TABLET ORAL EVERY 30 MIN PRN
Status: DISCONTINUED | OUTPATIENT
Start: 2022-03-16 | End: 2022-03-16

## 2022-03-16 RX ORDER — ONDANSETRON 4 MG/1
4 TABLET, ORALLY DISINTEGRATING ORAL ONCE
Status: COMPLETED | OUTPATIENT
Start: 2022-03-16 | End: 2022-03-16

## 2022-03-16 RX ORDER — PROPRANOLOL HYDROCHLORIDE 10 MG/1
10 TABLET ORAL 2 TIMES DAILY
Status: DISCONTINUED | OUTPATIENT
Start: 2022-03-16 | End: 2022-03-18 | Stop reason: HOSPADM

## 2022-03-16 RX ORDER — HYDROXYZINE HYDROCHLORIDE 25 MG/1
25 TABLET, FILM COATED ORAL EVERY 4 HOURS PRN
Status: DISCONTINUED | OUTPATIENT
Start: 2022-03-16 | End: 2022-03-18 | Stop reason: HOSPADM

## 2022-03-16 RX ORDER — MAGNESIUM HYDROXIDE/ALUMINUM HYDROXICE/SIMETHICONE 120; 1200; 1200 MG/30ML; MG/30ML; MG/30ML
30 SUSPENSION ORAL EVERY 4 HOURS PRN
Status: DISCONTINUED | OUTPATIENT
Start: 2022-03-16 | End: 2022-03-18 | Stop reason: HOSPADM

## 2022-03-16 RX ORDER — DIAZEPAM 5 MG
5-20 TABLET ORAL EVERY 30 MIN PRN
Status: DISCONTINUED | OUTPATIENT
Start: 2022-03-16 | End: 2022-03-18 | Stop reason: HOSPADM

## 2022-03-16 RX ORDER — ONDANSETRON 4 MG/1
4 TABLET, ORALLY DISINTEGRATING ORAL PRN
Status: ON HOLD | COMMUNITY
Start: 2021-10-26 | End: 2022-04-22

## 2022-03-16 RX ORDER — GABAPENTIN 600 MG/1
600 TABLET ORAL 3 TIMES DAILY
Status: DISCONTINUED | OUTPATIENT
Start: 2022-03-16 | End: 2022-03-18 | Stop reason: HOSPADM

## 2022-03-16 RX ORDER — FOLIC ACID 1 MG/1
1 TABLET ORAL DAILY
Status: DISCONTINUED | OUTPATIENT
Start: 2022-03-17 | End: 2022-03-18 | Stop reason: HOSPADM

## 2022-03-16 RX ORDER — ATENOLOL 50 MG/1
50 TABLET ORAL DAILY PRN
Status: DISCONTINUED | OUTPATIENT
Start: 2022-03-16 | End: 2022-03-17

## 2022-03-16 RX ORDER — PROPRANOLOL HYDROCHLORIDE 20 MG/1
20 TABLET ORAL 3 TIMES DAILY
COMMUNITY
Start: 2022-01-19

## 2022-03-16 RX ORDER — AMOXICILLIN 250 MG
1 CAPSULE ORAL 2 TIMES DAILY PRN
Status: DISCONTINUED | OUTPATIENT
Start: 2022-03-16 | End: 2022-03-18 | Stop reason: HOSPADM

## 2022-03-16 RX ORDER — MULTIPLE VITAMINS W/ MINERALS TAB 9MG-400MCG
1 TAB ORAL DAILY
Status: DISCONTINUED | OUTPATIENT
Start: 2022-03-16 | End: 2022-03-16

## 2022-03-16 RX ORDER — ACETAMINOPHEN 325 MG/1
650 TABLET ORAL EVERY 4 HOURS PRN
Status: DISCONTINUED | OUTPATIENT
Start: 2022-03-16 | End: 2022-03-18 | Stop reason: HOSPADM

## 2022-03-16 RX ORDER — LEVOTHYROXINE SODIUM 100 UG/1
100 TABLET ORAL DAILY
Status: DISCONTINUED | OUTPATIENT
Start: 2022-03-17 | End: 2022-03-18 | Stop reason: HOSPADM

## 2022-03-16 RX ORDER — ONDANSETRON 4 MG/1
4 TABLET, ORALLY DISINTEGRATING ORAL EVERY 6 HOURS PRN
Status: DISCONTINUED | OUTPATIENT
Start: 2022-03-16 | End: 2022-03-18 | Stop reason: HOSPADM

## 2022-03-16 RX ORDER — CLINDAMYCIN HCL 150 MG
450 CAPSULE ORAL 3 TIMES DAILY
Qty: 90 CAPSULE | Refills: 0 | Status: SHIPPED | OUTPATIENT
Start: 2022-03-16 | End: 2022-03-26

## 2022-03-16 RX ORDER — MULTIPLE VITAMINS W/ MINERALS TAB 9MG-400MCG
1 TAB ORAL DAILY
Status: DISCONTINUED | OUTPATIENT
Start: 2022-03-17 | End: 2022-03-18 | Stop reason: HOSPADM

## 2022-03-16 RX ORDER — POTASSIUM CHLORIDE 20MEQ/15ML
20 LIQUID (ML) ORAL ONCE
Status: COMPLETED | OUTPATIENT
Start: 2022-03-16 | End: 2022-03-16

## 2022-03-16 RX ADMIN — NICOTINE POLACRILEX 4 MG: 2 GUM, CHEWING ORAL at 23:15

## 2022-03-16 RX ADMIN — QUETIAPINE FUMARATE 25 MG: 25 TABLET ORAL at 20:13

## 2022-03-16 RX ADMIN — DIAZEPAM 10 MG: 5 TABLET ORAL at 20:13

## 2022-03-16 RX ADMIN — POTASSIUM CHLORIDE 20 MEQ: 1.5 SOLUTION ORAL at 13:41

## 2022-03-16 RX ADMIN — THIAMINE HCL TAB 100 MG 100 MG: 100 TAB at 15:26

## 2022-03-16 RX ADMIN — ONDANSETRON 4 MG: 4 TABLET, ORALLY DISINTEGRATING ORAL at 14:15

## 2022-03-16 RX ADMIN — NICOTINE POLACRILEX 4 MG: 2 GUM, CHEWING BUCCAL at 14:15

## 2022-03-16 RX ADMIN — GABAPENTIN 600 MG: 600 TABLET, FILM COATED ORAL at 20:13

## 2022-03-16 RX ADMIN — LOPERAMIDE HYDROCHLORIDE 2 MG: 2 CAPSULE ORAL at 18:04

## 2022-03-16 RX ADMIN — ONDANSETRON 4 MG: 4 TABLET, ORALLY DISINTEGRATING ORAL at 20:15

## 2022-03-16 RX ADMIN — FOLIC ACID 1 MG: 1 TABLET ORAL at 15:26

## 2022-03-16 RX ADMIN — PROPRANOLOL HYDROCHLORIDE 10 MG: 10 TABLET ORAL at 20:13

## 2022-03-16 RX ADMIN — MULTIPLE VITAMINS W/ MINERALS TAB 1 TABLET: TAB at 15:26

## 2022-03-16 RX ADMIN — NICOTINE POLACRILEX 4 MG: 2 GUM, CHEWING ORAL at 21:12

## 2022-03-16 RX ADMIN — DIAZEPAM 10 MG: 5 TABLET ORAL at 17:13

## 2022-03-16 ASSESSMENT — ENCOUNTER SYMPTOMS
RHINORRHEA: 0
NECK PAIN: 0
HEADACHES: 0
VOMITING: 0
COUGH: 0
WEAKNESS: 0
DYSURIA: 0
ABDOMINAL PAIN: 0
FREQUENCY: 0
BACK PAIN: 0
FLANK PAIN: 0
HEMATURIA: 0
NUMBNESS: 0
WOUND: 0
DIARRHEA: 0
COLOR CHANGE: 0
CONSTIPATION: 0
LIGHT-HEADEDNESS: 0
PALPITATIONS: 0
NAUSEA: 0
FEVER: 0
SHORTNESS OF BREATH: 0
CONFUSION: 0
CHILLS: 0
FATIGUE: 0
BRUISES/BLEEDS EASILY: 0

## 2022-03-16 ASSESSMENT — ACTIVITIES OF DAILY LIVING (ADL)
DIFFICULTY_EATING/SWALLOWING: NO
TOILETING_ISSUES: NO
DRESSING/BATHING_DIFFICULTY: NO
CHANGE_IN_FUNCTIONAL_STATUS_SINCE_ONSET_OF_CURRENT_ILLNESS/INJURY: NO

## 2022-03-16 NOTE — ED PROVIDER NOTES
ED Provider Note  [facility]      History     Chief Complaint   Patient presents with     Addiction Problem     detox: etoh: 12 pack daily: hx of seizure: last drink .5 hour ago     HPI  Jolynn Rivera is a 61 year old female who has PMH alcohol abuse, alcohol withdrawal seizure, hepatitis C, COPD, anxiety.  Presents with desire to detox from alcohol, drinks a 12 pack per day, last drink was 30 minutes ago.  History of withdrawal seizures; last withdrawal seizure was last week when she was trying to stop drinking, she reports that she was evaluated at [facility] and on chart review they did CT head that showed no acute findings other than possible sinusitis/periapical abscess.  She denies any associated symptoms in regards to this.  In regards to her alcohol use, she notes that over the past 6 months she has had difficulty with relapsing with her alcohol abuse.  She denies any drug use.  She notes a history of PTSD, has baseline anxiety secondary to this but denies any suicidal ideation, homicidal ideation, or hallucinations.  She denies any physical concerns at this time, denies any current symptoms of withdrawal given that her last drink was 30 minutes ago. The patient denies any other physical concerns today.     Past Medical History  Past Medical History:   Diagnosis Date     Anxiety      COPD (chronic obstructive pulmonary disease) (H)      COPD (chronic obstructive pulmonary disease) (H)      Hepatitis C      PTSD (post-traumatic stress disorder)      Seizures (H)     second to ETOH     Substance abuse (H)      Past Surgical History:   Procedure Laterality Date     LAPAROSCOPIC TUBAL LIGATION       ORTHOPEDIC SURGERY      Left knee     TONSILLECTOMY       albuterol (PROAIR HFA, PROVENTIL HFA, VENTOLIN HFA) 108 (90 BASE) MCG/ACT inhaler  gabapentin (NEURONTIN) 100 MG capsule  gabapentin (NEURONTIN) 300 MG capsule  gabapentin enacarbil (HORIZANT) 600 MG tablet  nicotine polacrilex  "(NICORETTE) 4 MG gum  omeprazole 20 MG tablet  prazosin (MINIPRESS) 1 MG capsule  QUEtiapine (SEROQUEL) 25 MG tablet      Allergies   Allergen Reactions     Antihistamines, Chlorpheniramine-Type [Alkylamines]      Compazine      Lock jaw; all medications ending with -zine     Diphenhydramine      Muscle Cramps     Penicillins      Panic attack     Prochlorperazine      Muscle cramps     Trazodone Nausea and Vomiting     \" I ended up falling and feeling like I was drunk\"     Wasps [Hornets]      Swelling      Family History  Family History   Problem Relation Age of Onset     Anxiety Disorder Mother      Asthma Mother      Alcohol/Drug Father      Prostate Cancer Father      Arthritis Father      Alcohol/Drug Brother      Alcohol/Drug Brother      Hypertension Brother      Alcohol/Drug Brother      Depression Brother      Psychotic Disorder Brother      Social History   Social History     Tobacco Use     Smoking status: Current Every Day Smoker     Packs/day: 0.50     Smokeless tobacco: Never Used     Tobacco comment: Starting Chantix October 2014   Substance Use Topics     Alcohol use: Yes     Comment: drinks ETOH frequently; sometimes daily     Drug use: No     Comment: stopped 1986      Past medical history, past surgical history, medications, allergies, family history, and social history were reviewed with the patient. No additional pertinent items.       Review of Systems   Constitutional: Negative for chills, fatigue and fever.   HENT: Negative for congestion, ear pain and rhinorrhea.    Eyes: Negative for visual disturbance.   Respiratory: Negative for cough and shortness of breath.    Cardiovascular: Negative for chest pain and palpitations.   Gastrointestinal: Negative for abdominal pain, constipation, diarrhea, nausea and vomiting.   Genitourinary: Negative for dysuria, flank pain, frequency, hematuria, vaginal bleeding and vaginal discharge.   Musculoskeletal: Negative for back pain and neck pain.   Skin: " Negative for color change, rash and wound.   Allergic/Immunologic: Negative for immunocompromised state.   Neurological: Negative for syncope, weakness, light-headedness, numbness and headaches.   Hematological: Does not bruise/bleed easily.   Psychiatric/Behavioral: Negative for confusion.   All other systems reviewed and are negative.    A complete review of systems was performed with pertinent positives and negatives noted in the HPI, and all other systems negative.    Physical Exam   BP: 116/76  Pulse: 82  Temp: 98.2  F (36.8  C)  Resp: 16  SpO2: 96 %     Physical Exam  Vitals and nursing note reviewed.   Constitutional:       General: She is not in acute distress.     Appearance: She is normal weight. She is not ill-appearing, toxic-appearing or diaphoretic.   HENT:      Head: Normocephalic and atraumatic.      Right Ear: External ear normal.      Left Ear: External ear normal.      Nose: Nose normal.      Mouth/Throat:      Mouth: Mucous membranes are moist.   Eyes:      Extraocular Movements: Extraocular movements intact.      Conjunctiva/sclera: Conjunctivae normal.      Pupils: Pupils are equal, round, and reactive to light.   Cardiovascular:      Rate and Rhythm: Normal rate and regular rhythm.      Pulses: Normal pulses.      Heart sounds: Normal heart sounds. No murmur heard.    No friction rub. No gallop.   Pulmonary:      Effort: Pulmonary effort is normal. No respiratory distress.      Breath sounds: Normal breath sounds. No stridor. No wheezing, rhonchi or rales.   Abdominal:      General: Bowel sounds are normal. There is no distension.      Palpations: Abdomen is soft.      Tenderness: There is no abdominal tenderness. There is no right CVA tenderness, left CVA tenderness, guarding or rebound.   Musculoskeletal:         General: Normal range of motion.      Cervical back: Normal range of motion and neck supple. No rigidity or tenderness.      Right lower leg: No edema.      Left lower leg: No  edema.   Skin:     General: Skin is warm.      Capillary Refill: Capillary refill takes less than 2 seconds.   Neurological:      General: No focal deficit present.      Mental Status: She is alert and oriented to person, place, and time.      Motor: No weakness.      Gait: Gait normal.   Psychiatric:         Mood and Affect: Mood normal.         Behavior: Behavior normal.         ED Course     ED Course as of 03/16/22 1726   Wed Mar 16, 2022   1115 Alcohol Breath Test(!): 0.148   1157 Benzodiazepine Qual Ur(!): Screen Positive   1251 WBC: 5.3   1251 Hemoglobin: 12.6   1251 Platelet Count: 172   1251 Magnesium: 2.0   1251 Sodium: 138   1251 Potassium(!): 3.3   1251 Creatinine: 0.66   1251 Glucose(!): 101   1251 AST(!): 60  On chart review, this is at baseline.    1251 ALT(!): 53  On chart review, this is at baseline.    1257 SARS CoV2 PCR: Negative     Procedures: none.        The medical record was reviewed and interpreted.  Current labs reviewed and interpreted.  Medications   gabapentin (NEURONTIN) tablet 600 mg (has no administration in time range)   levothyroxine (SYNTHROID/LEVOTHROID) tablet 100 mcg (has no administration in time range)   omeprazole (priLOSEC) CR capsule 40 mg (has no administration in time range)   ondansetron (ZOFRAN-ODT) ODT tab 4 mg (has no administration in time range)   propranolol (INDERAL) tablet 10 mg (has no administration in time range)   QUEtiapine (SEROquel) tablet 25 mg (has no administration in time range)   nicotine (NICORETTE) gum 4 mg (has no administration in time range)   loperamide (IMODIUM) capsule 2 mg (has no administration in time range)   ibuprofen (ADVIL/MOTRIN) tablet 200 mg (has no administration in time range)   diazepam (VALIUM) tablet 5-20 mg (10 mg Oral Given 3/16/22 1713)   atenolol (TENORMIN) tablet 50 mg (has no administration in time range)   thiamine (B-1) tablet 100 mg (has no administration in time range)   folic acid (FOLVITE) tablet 1 mg (has no  administration in time range)   multivitamin w/minerals (THERA-VIT-M) tablet 1 tablet (has no administration in time range)   acetaminophen (TYLENOL) tablet 650 mg (has no administration in time range)   alum & mag hydroxide-simethicone (MAALOX) suspension 30 mL (has no administration in time range)   senna-docusate (SENOKOT-S/PERICOLACE) 8.6-50 MG per tablet 1 tablet (has no administration in time range)   traZODone (DESYREL) tablet 50 mg (has no administration in time range)   hydrOXYzine (ATARAX) tablet 25 mg (has no administration in time range)   potassium chloride (KAYCIEL) solution 20 mEq (20 mEq Oral Given 3/16/22 1341)   ondansetron (ZOFRAN-ODT) ODT tab 4 mg (4 mg Oral Given 3/16/22 1415)        Assessments & Plan (with Medical Decision Making)   Nursing notes and vital signs reviewed.     Presentation, exam, and workup is most consistent with alcohol withdrawal, sinusitis/periapical abscess.    Please see HPI, ROS, exam, and ED course above for pertinent history and findings. In short, the patient presented to the ED for evaluation of alcohol intoxication desiring to stop drinking.  Here, currently not having any symptoms of withdrawal on initial presentation, withdrawal protocol was ordered with p.o. Valium.  Labs with slight elevation of AST and ALT at her baseline consistent with her history of alcohol abuse, slight hypokalemia 3.3 given p.o. replacement.    The patient has a history of withdrawal seizures; last withdrawal seizure was last week when she was trying to stop drinking, she reports that she was evaluated at [facility] and on chart review they did CT head that showed no acute findings other than right maxillary sinus mucosal thickening and underlying right maxillary molar periapical abscess, concerning for odontogenic sinusitis. Will send prescription for clindamycin for treatment of periapical abscess to her outpatient pharmacy, given her penicillin allergy, for her to take once discharge from  detox.     Discussed the patient with the detox unit, who will admit the patient for further monitoring, evaluation, and treatment. Rechecked the patient, findings and plan explained to the patient, who consents to admission.     Disposition: The patient was admitted to the detox unit in stable condition.      Final diagnoses:   Alcohol withdrawal, uncomplicated (H)   Sinusitis, unspecified chronicity, unspecified location       --  Marii Hanna MD   [facility] EMERGENCY DEPARTMENT  3/16/2022     Marii Hanna MD  03/16/22 1922

## 2022-03-16 NOTE — PLAN OF CARE
"Goal Outcome Evaluation:      ADMIT: this is a 62 yo female arriving on 3a for detox at 1645. She states she was here 5 years ago.   She drinks up to a 12 pack daily. She gave no triggers. She lives alone with her cat & dog. She states she was sober for a while,  \"2.5 years\".  She is positive for hep C, COPD, anxiety along with some depression.    MSSA was 8 at 1710 for which she received valium 10 mg po.    She has some tremors, elevated systolic BP, nausea.  Will continue to assess and continue WD protocol.  She has contracted for safety & has settled well on the  unit.                "

## 2022-03-16 NOTE — TELEPHONE ENCOUNTER
S:  1 PM  Call from provider in  ED requesting IP Detox for a 60 YO F    B:  Hx of COPD, PTSD, anxiety, mood disorder alcohol use disorder, chronic hep C presented to ED requesting detox.  Patient was sober for unknown length of time, then began drinking  Recently, was admitted to a local hospital for alcohol WD and had WD seizures during stay.  She was discharged a little over a week ago and began drinking again, a 12 pack/beer a day, last drink @ 10 AM today.    Breathalizer at 11 AM  0.148  K-3.3 replaced  Calcium 8.4  AST 60 and ALT 55    Albumin 3.3  Utox POS for Benzos.  Not prescribed  VSS  Had one episode of n/v-resolved with zofran    No urgent medical needs    A:  Voluntary    R:  Patient cleared and ready for behavioral bed placement: Yes     R:  2:45 PM  Dr Quintanilla accepted patient on 3A.  Placed in 3A work queue.  Disposition given to charge nurse and ED updated.

## 2022-03-16 NOTE — PROGRESS NOTES
03/16/22 4969   Patient Belongings   Did you bring any home meds/supplements to the hospital?  No   Patient Belongings other (see comments)   Patient Belongings Remaining with Patient other (see comments)   Patient Belongings Put in Hospital Secure Location (Security or Locker, etc.) other (see comments)   Belongings Search Yes   Clothing Search Yes   Second Staff Jaz Downey     STORAGE BIN:   Coat, backpack, restricted clothes, cosmetics, ipad,  cords, cigarettes, lighter, keys, wallet, gum    MED ROOM BIN:   Cell phone    SECURITY:   Id, ebt, MC, $30  A             Admission:  I am responsible for any personal items that are not sent to the safe or pharmacy.  [facility] is not responsible for loss, theft or damage of any property in my possession.    Signature:  _________________________________ Date: _______  Time: _____                                              Staff Signature:  ____________________________ Date: ________  Time: _____      2nd Staff person, if patient is unable/unwilling to sign:    Signature: ________________________________ Date: ________  Time: _____   Discharge:  [facility] has returned all of my personal belongings:    Signature: _________________________________ Date: ________  Time: _____                                          Staff Signature:  ____________________________ Date: ________  Time: _____

## 2022-03-17 LAB
ALBUMIN SERPL-MCNC: 3.3 G/DL (ref 3.4–5)
ALP SERPL-CCNC: 44 U/L (ref 40–150)
ALT SERPL W P-5'-P-CCNC: 48 U/L (ref 0–50)
ANION GAP SERPL CALCULATED.3IONS-SCNC: 6 MMOL/L (ref 3–14)
AST SERPL W P-5'-P-CCNC: 59 U/L (ref 0–45)
BASOPHILS # BLD AUTO: 0 10E3/UL (ref 0–0.2)
BASOPHILS NFR BLD AUTO: 1 %
BILIRUB SERPL-MCNC: 1 MG/DL (ref 0.2–1.3)
BUN SERPL-MCNC: 7 MG/DL (ref 7–30)
CALCIUM SERPL-MCNC: 8.6 MG/DL (ref 8.5–10.1)
CHLORIDE BLD-SCNC: 107 MMOL/L (ref 94–109)
CHOLEST SERPL-MCNC: 145 MG/DL
CO2 SERPL-SCNC: 26 MMOL/L (ref 20–32)
CREAT SERPL-MCNC: 0.63 MG/DL (ref 0.52–1.04)
EOSINOPHIL # BLD AUTO: 0.1 10E3/UL (ref 0–0.7)
EOSINOPHIL NFR BLD AUTO: 2 %
ERYTHROCYTE [DISTWIDTH] IN BLOOD BY AUTOMATED COUNT: 13 % (ref 10–15)
GFR SERPL CREATININE-BSD FRML MDRD: >90 ML/MIN/1.73M2
GLUCOSE BLD-MCNC: 84 MG/DL (ref 70–99)
HCT VFR BLD AUTO: 36.9 % (ref 35–47)
HDLC SERPL-MCNC: 94 MG/DL
HGB BLD-MCNC: 13.1 G/DL (ref 11.7–15.7)
IMM GRANULOCYTES # BLD: 0 10E3/UL
IMM GRANULOCYTES NFR BLD: 0 %
LDLC SERPL CALC-MCNC: 42 MG/DL
LYMPHOCYTES # BLD AUTO: 2.7 10E3/UL (ref 0.8–5.3)
LYMPHOCYTES NFR BLD AUTO: 57 %
MCH RBC QN AUTO: 33.8 PG (ref 26.5–33)
MCHC RBC AUTO-ENTMCNC: 35.5 G/DL (ref 31.5–36.5)
MCV RBC AUTO: 95 FL (ref 78–100)
MONOCYTES # BLD AUTO: 0.6 10E3/UL (ref 0–1.3)
MONOCYTES NFR BLD AUTO: 12 %
NEUTROPHILS # BLD AUTO: 1.3 10E3/UL (ref 1.6–8.3)
NEUTROPHILS NFR BLD AUTO: 28 %
NONHDLC SERPL-MCNC: 51 MG/DL
NRBC # BLD AUTO: 0 10E3/UL
NRBC BLD AUTO-RTO: 0 /100
PLATELET # BLD AUTO: 144 10E3/UL (ref 150–450)
POTASSIUM BLD-SCNC: 3.3 MMOL/L (ref 3.4–5.3)
PROT SERPL-MCNC: 7 G/DL (ref 6.8–8.8)
RBC # BLD AUTO: 3.88 10E6/UL (ref 3.8–5.2)
SODIUM SERPL-SCNC: 139 MMOL/L (ref 133–144)
T4 FREE SERPL-MCNC: 0.75 NG/DL (ref 0.76–1.46)
TRIGL SERPL-MCNC: 43 MG/DL
TSH SERPL DL<=0.005 MIU/L-ACNC: 12.33 MU/L (ref 0.4–4)
WBC # BLD AUTO: 4.7 10E3/UL (ref 4–11)

## 2022-03-17 PROCEDURE — 99232 SBSQ HOSP IP/OBS MODERATE 35: CPT | Performed by: PHYSICIAN ASSISTANT

## 2022-03-17 PROCEDURE — 99223 1ST HOSP IP/OBS HIGH 75: CPT | Mod: AI | Performed by: PSYCHIATRY & NEUROLOGY

## 2022-03-17 PROCEDURE — 85025 COMPLETE CBC W/AUTO DIFF WBC: CPT | Performed by: PSYCHIATRY & NEUROLOGY

## 2022-03-17 PROCEDURE — 250N000013 HC RX MED GY IP 250 OP 250 PS 637: Performed by: PHYSICIAN ASSISTANT

## 2022-03-17 PROCEDURE — 36415 COLL VENOUS BLD VENIPUNCTURE: CPT | Performed by: PSYCHIATRY & NEUROLOGY

## 2022-03-17 PROCEDURE — 80053 COMPREHEN METABOLIC PANEL: CPT | Performed by: PSYCHIATRY & NEUROLOGY

## 2022-03-17 PROCEDURE — 80061 LIPID PANEL: CPT | Performed by: PSYCHIATRY & NEUROLOGY

## 2022-03-17 PROCEDURE — 250N000013 HC RX MED GY IP 250 OP 250 PS 637: Performed by: PSYCHIATRY & NEUROLOGY

## 2022-03-17 PROCEDURE — 128N000004 HC R&B CD ADULT

## 2022-03-17 PROCEDURE — 84439 ASSAY OF FREE THYROXINE: CPT | Performed by: PSYCHIATRY & NEUROLOGY

## 2022-03-17 PROCEDURE — 84443 ASSAY THYROID STIM HORMONE: CPT | Performed by: PSYCHIATRY & NEUROLOGY

## 2022-03-17 PROCEDURE — 99207 PR CONSULT E&M CHANGED TO INITIAL LEVEL: CPT | Performed by: PHYSICIAN ASSISTANT

## 2022-03-17 RX ORDER — POTASSIUM CHLORIDE 750 MG/1
40 TABLET, EXTENDED RELEASE ORAL ONCE
Status: COMPLETED | OUTPATIENT
Start: 2022-03-17 | End: 2022-03-17

## 2022-03-17 RX ORDER — CLONIDINE HYDROCHLORIDE 0.1 MG/1
0.1 TABLET ORAL 2 TIMES DAILY PRN
Status: DISCONTINUED | OUTPATIENT
Start: 2022-03-17 | End: 2022-03-18 | Stop reason: HOSPADM

## 2022-03-17 RX ADMIN — POTASSIUM CHLORIDE 40 MEQ: 750 TABLET, EXTENDED RELEASE ORAL at 10:42

## 2022-03-17 RX ADMIN — NICOTINE POLACRILEX 4 MG: 2 GUM, CHEWING ORAL at 20:10

## 2022-03-17 RX ADMIN — NICOTINE POLACRILEX 4 MG: 2 GUM, CHEWING ORAL at 07:33

## 2022-03-17 RX ADMIN — GABAPENTIN 600 MG: 600 TABLET, FILM COATED ORAL at 20:10

## 2022-03-17 RX ADMIN — NICOTINE POLACRILEX 4 MG: 2 GUM, CHEWING ORAL at 14:42

## 2022-03-17 RX ADMIN — GABAPENTIN 600 MG: 600 TABLET, FILM COATED ORAL at 09:02

## 2022-03-17 RX ADMIN — GABAPENTIN 600 MG: 600 TABLET, FILM COATED ORAL at 14:40

## 2022-03-17 RX ADMIN — NICOTINE POLACRILEX 4 MG: 2 GUM, CHEWING ORAL at 10:57

## 2022-03-17 RX ADMIN — DIAZEPAM 10 MG: 5 TABLET ORAL at 06:38

## 2022-03-17 RX ADMIN — OMEPRAZOLE 40 MG: 20 CAPSULE, DELAYED RELEASE ORAL at 09:02

## 2022-03-17 RX ADMIN — LEVOTHYROXINE SODIUM 100 MCG: 100 TABLET ORAL at 07:33

## 2022-03-17 RX ADMIN — THIAMINE HCL TAB 100 MG 100 MG: 100 TAB at 09:02

## 2022-03-17 RX ADMIN — PROPRANOLOL HYDROCHLORIDE 10 MG: 10 TABLET ORAL at 20:10

## 2022-03-17 RX ADMIN — NICOTINE POLACRILEX 4 MG: 2 GUM, CHEWING ORAL at 16:35

## 2022-03-17 RX ADMIN — PROPRANOLOL HYDROCHLORIDE 10 MG: 10 TABLET ORAL at 09:02

## 2022-03-17 RX ADMIN — QUETIAPINE FUMARATE 25 MG: 25 TABLET ORAL at 20:43

## 2022-03-17 RX ADMIN — DIAZEPAM 10 MG: 5 TABLET ORAL at 02:42

## 2022-03-17 RX ADMIN — DIAZEPAM 10 MG: 5 TABLET ORAL at 00:20

## 2022-03-17 RX ADMIN — FOLIC ACID 1 MG: 1 TABLET ORAL at 09:02

## 2022-03-17 RX ADMIN — MULTIPLE VITAMINS W/ MINERALS TAB 1 TABLET: TAB at 09:02

## 2022-03-17 NOTE — PROGRESS NOTES
03/16/22 2100   Group Therapy Session   Group Attendance attended group session   Total Time patient participated (minutes) 45   Total # Attendees 8   Group Type recreation   Group Topic Covered coping skills/lifestyle management   Patient Response/Contribution cooperative with task     Pt participated in Therapeutic Recreation group utilizing a group discussion with focus on leisure education and healthy coping strategies. Pt was fully engaged and cooperative in the group discussion and added to the benefits of healthy daily recreation. Pt participated through the entire duration of the group. Pt discussed many healthy interests enjoyed during free time during group discussion.  Pt shared with the group a positive coping strategy that was helpful with sobriety.

## 2022-03-17 NOTE — H&P
"Jolynn Rivera is a 61 year old female    History was provided by MICHELINE who was a good historian.   CHIEF COMPLAINT: Alcohol    HISTORY OF PRESENT ILLNESS:    Patient is a 61-year-old  female with alcoholism and hepatitis C anxiety disorder bipolar affective disorder PTSD disorder.  She has new onset seizures for which she is being worked up.  This is as per the emergency room report    \"    Addiction Problem       detox: etoh: 12 pack daily: hx of seizure: last drink .5 hour ago      HPI  Jolynn Rivera is a 61 year old female who has PMH alcohol abuse, alcohol withdrawal seizure, hepatitis C, COPD, anxiety.  Presents with desire to detox from alcohol, drinks a 12 pack per day, last drink was 30 minutes ago.  History of withdrawal seizures; last withdrawal seizure was last week when she was trying to stop drinking, she reports that she was evaluated at [facility] and on chart review they did CT head that showed no acute findings other than possible sinusitis/periapical abscess.  She denies any associated symptoms in regards to this.  In regards to her alcohol use, she notes that over the past 6 months she has had difficulty with relapsing with her alcohol abuse.  She denies any drug use.  She notes a history of PTSD, has baseline anxiety secondary to this but denies any suicidal ideation, homicidal ideation, or hallucinations.  She denies any physical concerns at this time, denies any current symptoms of withdrawal given that her last drink was 30 minutes ago. The patient denies any other physical concerns today. \"      During my interview with the patient patient reports that this relapse is for 2 weeks she has been drinking 12 pack she was sober for 2 and half years before that  Patient has been using the following substances: alcohol   Started at age19 , became a problem at 40's      Patient has tolerance, withdrawal, progressive use, loss of control, spending more time and more amount than intended. " Patient has made attempts to quit, is experiencing cravings, and reports negative consequences.            Patient does  have a history of seizures.  Patient does not have a history of delirium tremens.           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    Denies thoughts of suicide or harming others.      Denies auditory or visual hallucinations.     Patient smokes 1/2 ppd    Patient denied any gambling    Substance Age first use First became regular or problematic Most recent use            Cannabis none     Cocaine NONE       Stimulants NONE       Opioids NONE       Sedatives NONE       Hallucinogens NONE       Inhalants NONE       Other         OTC drugs NONE       Nicotine         Patient does have a history of overdose.  Patient does have a history of IV use.  Patient does have a history of hepatitis, HIV,  PSYCHIATRIC REVIEW OF SYSTEMS:         Psychiatric Review of Systems:   Depression: feels isolated she reports her depression is better      When she gets depressed, she does not want to leave the house, sleep either goes up or down, energy goes down, motivation goes down, interest goes down.   Denied any suicidal ideation   Olivia:       Denies: sleeplessness, increased goal-directed activities, abrupt increase in energy, pressured speech   impulsiveness, racing thoughts, increased goal-directed activities, pressured speech, increase in energy  Olivia Feeling euphoric,Distractible,Impulsive,Grandiose,Talking excessively,Have energy without sleeping,Mood swings,Irritability  Psychosis:     Denies: visual hallucinations, auditory hallucinations, paranoia  Anxiety: her symptoms antionette are better  Reports: excessive worries that are difficult to control for the past 6 months,   Chronic anxiety , not able to stop worrying impacting sleep, poor conc, irritable , muscle tension fatigue    denies any Panic attacks  Pt has following s/o of anxiety  When she has panic attacks, she  has shortness of breath, she throws up.  She has a fear and she also has agoraphobia    Come out of the blue      PTSD: Patient was exposed to a traumatic event--patient reports her PTSD symptoms are better now  Reports: re-experiencing past trauma, nightmares, trust issues, flashbacks,increased arousal, avoidance of traumatic stimuli, impaired function.  Negative cognition  hypervigilance    OCD:   denies obsessions, patient has compulsions checking, counting symmetry, cleaning, skin picking.    ED:     Denies: restriction, binging, purging.         patient denies :symptoms of attention deficit disorder include a failure to pay attention to detail, a pattern of careless mistakes, a pattern of inattentive listening, a failure to follow through with projects, poor personal organization, losing necessary objects, distractibility, forgetfulness.       patient denies Symptoms of borderline personality disorder include a fear of abandonment, unstable self-image, impulsive behavior, affective instability due to marked reactivity and mood lasting hours dissociative feeling, intense anger, unstable personal relationships, chronic feelings of boredom, periods of intense depressed mood.  Transient stressed related paranoid ideation severe dissociative symptoms              PSYCHIATRIC HISTORY     Previous diagnoses:     Symptoms in relation to substance use (symptoms present without use present absent    Past court commitments: none  SIB /SUICIDE ATTEMPTS NONE  Psych Hosp :2  Outpatient Programs dbt  Inpatient cd trt 4  Out pt cd trt6  Detoxes 12  PAST PSYCH MED TRIALS    seroquel    SOCIAL HISTORY                                                                             Patient is   Patient has no   children  Pt is diabaled  Patient[facility]         Family History:   FAMILY HISTORY:   Family History   Problem Relation Age of Onset     Anxiety Disorder Mother      Asthma Mother      Alcohol/Drug Father       Prostate Cancer Father      Arthritis Father      Alcohol/Drug Brother      Alcohol/Drug Brother      Hypertension Brother      Alcohol/Drug Brother      Depression Brother      Psychotic Disorder Brother          Mother has anxiety.  Father has alcoholism and brother has alcoholism.  Both of them are sober now.  Father's side has significant alcoholism, but majority of them are sober         PTA Medications:     Medications Prior to Admission   Medication Sig Dispense Refill Last Dose     eszopiclone (LUNESTA) 2 MG tablet Take 1 tablet by mouth At Bedtime   3/15/2022 at Unknown time     gabapentin (NEURONTIN) 600 MG tablet Take 600 mg by mouth 3 times daily   3/16/2022 at am; 1 of 3 doses     levothyroxine (SYNTHROID/LEVOTHROID) 100 MCG tablet Take 1 tablet by mouth daily   Past Week at Unknown time     nicotine polacrilex (NICORETTE) 4 MG gum Place 1 each (4 mg) inside cheek as needed for smoking cessation 270 tablet 1 3/16/2022 at Unknown time     omeprazole (PRILOSEC) 40 MG DR capsule Take 1 capsule by mouth daily   3/16/2022 at Unknown time     ondansetron (ZOFRAN-ODT) 4 MG ODT tab Take 4 mg by mouth as needed   3/16/2022 at Unknown time     propranolol (INDERAL) 10 MG tablet Take 1 tablet by mouth 2 times daily   3/16/2022 at am; 1 of 2 doses     QUEtiapine (SEROQUEL) 25 MG tablet Take 1 tablet (25 mg) by mouth At Bedtime 30 tablet 2 3/15/2022 at Unknown time     albuterol (PROAIR HFA, PROVENTIL HFA, VENTOLIN HFA) 108 (90 BASE) MCG/ACT inhaler Inhale 2 puffs into the lungs every 6 hours as needed for shortness of breath / dyspnea or wheezing 1 Inhaler 0      gabapentin (NEURONTIN) 100 MG capsule Take 1 capsule (100 mg) by mouth 3 times daily as needed 90 capsule 2      gabapentin (NEURONTIN) 300 MG capsule Take 600 mg in the morning, take 300 mg in the afternoon, and 600 mg in the evening 140 capsule 1      gabapentin enacarbil (HORIZANT) 600 MG tablet Take 1 tablet (600 mg) by mouth 2 times daily Do not  "split, crush or chew tablets. 60 tablet 2      omeprazole 20 MG tablet Take 1 tablet (20 mg) by mouth daily Take 30-60 minutes before a meal. (Patient taking differently: Take 20 mg by mouth At Bedtime Take 30-60 minutes before a meal.) 30 tablet 1      prazosin (MINIPRESS) 1 MG capsule Take 1 capsule (1 mg) by mouth At Bedtime 30 capsule 2           Allergies:     Allergies   Allergen Reactions     Antihistamines, Chlorpheniramine-Type [Alkylamines]      Compazine      Lock jaw; all medications ending with -zine     Diphenhydramine      Muscle Cramps     Penicillins      Panic attack     Prochlorperazine      Muscle cramps     Trazodone Nausea and Vomiting     \" I ended up falling and feeling like I was drunk\"     Wasps [Hornets]      Swelling           Labs:     Recent Results (from the past 48 hour(s))   Alcohol breath test POCT    Collection Time: 03/16/22 11:14 AM   Result Value Ref Range    Alcohol Breath Test 0.148 (A) 0.00 - 0.01   Drug abuse screen 1 urine (ED)    Collection Time: 03/16/22 11:27 AM   Result Value Ref Range    Amphetamines Urine Screen Negative Screen Negative    Barbiturates Urine Screen Negative Screen Negative    Benzodiazepines Urine Screen Positive (A) Screen Negative    Cannabinoids Urine Screen Negative Screen Negative    Cocaine Urine Screen Negative Screen Negative    Opiates Urine Screen Negative Screen Negative   Comprehensive metabolic panel    Collection Time: 03/16/22 12:23 PM   Result Value Ref Range    Sodium 138 133 - 144 mmol/L    Potassium 3.3 (L) 3.4 - 5.3 mmol/L    Chloride 107 94 - 109 mmol/L    Carbon Dioxide (CO2) 22 20 - 32 mmol/L    Anion Gap 9 3 - 14 mmol/L    Urea Nitrogen 8 7 - 30 mg/dL    Creatinine 0.66 0.52 - 1.04 mg/dL    Calcium 8.4 (L) 8.5 - 10.1 mg/dL    Glucose 101 (H) 70 - 99 mg/dL    Alkaline Phosphatase 43 40 - 150 U/L    AST 60 (H) 0 - 45 U/L    ALT 53 (H) 0 - 50 U/L    Protein Total 6.9 6.8 - 8.8 g/dL    Albumin 3.3 (L) 3.4 - 5.0 g/dL    Bilirubin " Total 0.5 0.2 - 1.3 mg/dL    GFR Estimate >90 >60 mL/min/1.73m2   Asymptomatic COVID-19 Virus (Coronavirus) by PCR Nasopharyngeal    Collection Time: 03/16/22 12:23 PM    Specimen: Nasopharyngeal; Swab   Result Value Ref Range    SARS CoV2 PCR Negative Negative   Magnesium    Collection Time: 03/16/22 12:23 PM   Result Value Ref Range    Magnesium 2.0 1.6 - 2.3 mg/dL   CBC with platelets and differential    Collection Time: 03/16/22 12:23 PM   Result Value Ref Range    WBC Count 5.3 4.0 - 11.0 10e3/uL    RBC Count 3.72 (L) 3.80 - 5.20 10e6/uL    Hemoglobin 12.6 11.7 - 15.7 g/dL    Hematocrit 36.0 35.0 - 47.0 %    MCV 97 78 - 100 fL    MCH 33.9 (H) 26.5 - 33.0 pg    MCHC 35.0 31.5 - 36.5 g/dL    RDW 13.5 10.0 - 15.0 %    Platelet Count 172 150 - 450 10e3/uL    % Neutrophils 42 %    % Lymphocytes 45 %    % Monocytes 11 %    % Eosinophils 1 %    % Basophils 1 %    % Immature Granulocytes 0 %    NRBCs per 100 WBC 0 <1 /100    Absolute Neutrophils 2.2 1.6 - 8.3 10e3/uL    Absolute Lymphocytes 2.4 0.8 - 5.3 10e3/uL    Absolute Monocytes 0.6 0.0 - 1.3 10e3/uL    Absolute Eosinophils 0.1 0.0 - 0.7 10e3/uL    Absolute Basophils 0.0 0.0 - 0.2 10e3/uL    Absolute Immature Granulocytes 0.0 <=0.4 10e3/uL    Absolute NRBCs 0.0 10e3/uL   Comprehensive metabolic panel    Collection Time: 03/17/22  6:22 AM   Result Value Ref Range    Sodium 139 133 - 144 mmol/L    Potassium 3.3 (L) 3.4 - 5.3 mmol/L    Chloride 107 94 - 109 mmol/L    Carbon Dioxide (CO2) 26 20 - 32 mmol/L    Anion Gap 6 3 - 14 mmol/L    Urea Nitrogen 7 7 - 30 mg/dL    Creatinine 0.63 0.52 - 1.04 mg/dL    Calcium 8.6 8.5 - 10.1 mg/dL    Glucose 84 70 - 99 mg/dL    Alkaline Phosphatase 44 40 - 150 U/L    AST 59 (H) 0 - 45 U/L    ALT 48 0 - 50 U/L    Protein Total 7.0 6.8 - 8.8 g/dL    Albumin 3.3 (L) 3.4 - 5.0 g/dL    Bilirubin Total 1.0 0.2 - 1.3 mg/dL    GFR Estimate >90 >60 mL/min/1.73m2   Lipid panel    Collection Time: 03/17/22  6:22 AM   Result Value Ref Range     "Cholesterol 145 <200 mg/dL    Triglycerides 43 <150 mg/dL    Direct Measure HDL 94 >=50 mg/dL    LDL Cholesterol Calculated 42 <=100 mg/dL    Non HDL Cholesterol 51 <130 mg/dL   TSH with free T4 reflex and/or T3 as indicated    Collection Time: 03/17/22  6:22 AM   Result Value Ref Range    TSH 12.33 (H) 0.40 - 4.00 mU/L   CBC with platelets and differential    Collection Time: 03/17/22  6:22 AM   Result Value Ref Range    WBC Count 4.7 4.0 - 11.0 10e3/uL    RBC Count 3.88 3.80 - 5.20 10e6/uL    Hemoglobin 13.1 11.7 - 15.7 g/dL    Hematocrit 36.9 35.0 - 47.0 %    MCV 95 78 - 100 fL    MCH 33.8 (H) 26.5 - 33.0 pg    MCHC 35.5 31.5 - 36.5 g/dL    RDW 13.0 10.0 - 15.0 %    Platelet Count 144 (L) 150 - 450 10e3/uL    % Neutrophils 28 %    % Lymphocytes 57 %    % Monocytes 12 %    % Eosinophils 2 %    % Basophils 1 %    % Immature Granulocytes 0 %    NRBCs per 100 WBC 0 <1 /100    Absolute Neutrophils 1.3 (L) 1.6 - 8.3 10e3/uL    Absolute Lymphocytes 2.7 0.8 - 5.3 10e3/uL    Absolute Monocytes 0.6 0.0 - 1.3 10e3/uL    Absolute Eosinophils 0.1 0.0 - 0.7 10e3/uL    Absolute Basophils 0.0 0.0 - 0.2 10e3/uL    Absolute Immature Granulocytes 0.0 <=0.4 10e3/uL    Absolute NRBCs 0.0 10e3/uL   T4 free    Collection Time: 03/17/22  6:22 AM   Result Value Ref Range    Free T4 0.75 (L) 0.76 - 1.46 ng/dL         BP (!) 169/93   Pulse 60   Temp 97.7  F (36.5  C) (Temporal)   Resp 16   Ht 1.651 m (5' 5\")   Wt 68 kg (150 lb)   SpO2 96%   BMI 24.96 kg/m    Weight is 150 lbs 0 oz  Body mass index is 24.96 kg/m .    Physical Exam:     ROS: 10 point ROS neg other than the symptoms noted above in the HPI.            Past Medical History:   PAST MEDICAL HISTORY:   Past Medical History:   Diagnosis Date     Anxiety      COPD (chronic obstructive pulmonary disease) (H)      COPD (chronic obstructive pulmonary disease) (H)      Hepatitis C      PTSD (post-traumatic stress disorder)      Seizures (H)     second to ETOH     Substance abuse " (H)        PAST SURGICAL HISTORY:   Past Surgical History:   Procedure Laterality Date     LAPAROSCOPIC TUBAL LIGATION       ORTHOPEDIC SURGERY      Left knee     TONSILLECTOMY         -    -           MENTAL STATUS EXAM:      Constitutional: General appearance of patient:  Appearance:  awake, alert, appeared as age stated, adequate groomed and slightly unkempt  Attitude:  cooperative  Eye Contact:  good  Mood:   Euthymic  Affect:  congruent   Speech:  clear, coherent normal rate   Psychomotor Behavior:  no evidence of tardive dyskinesia, dystonia, or tics  Thought Process:  logical, linear and goal oriented  Associations:  no loose associations  Thought Content:  no evidence of psychotic thought and active suicidal ideation present  Denied any active suicidal /homicidation ideation plan intent   Insight:  fair  Judgment:  fair  Oriented to:  time, person, and place  Attention Span and Concentration:  intact  Recent and Remote Memory:  intact  Language:  english with appropriate syntax and vocabulary  Fund of Knowledge: appropriate  Muscle Strength and Tone: normal  Gait and Station: Normal     There are no abnormal or psychotic thoughts, no preoccupations, no overvalued ideas, no rumination, no obsessions, no compulsions, no somatic concerns, no hypochrondriasis, no ideas of reference, and no delusions.  Patient denies homicidal thoughts.   Patient denies suicidal thoughts.  Patient appears to have good judgment and good insight.     Musculoskeletal: Patient shows no abnormalities of motor activity: there is no tremor, no tic, and no dystonia.  There is no apparent muscle atrophy, strength and tone appear normal, and there are no abnormal movements.  Patient has normal gait and stance.    DISCUSSION:         Assessment:       Patient has a biological predisposition with family history positive for mental illness chemical dependency  Psychologically patient is experiencing abusing alcohol she experiences depression  anxiety PTSD is better  Patient has these particular stressors loneliness  Patient has chronic illness exacerbation leading to hospitalization progression as described.     Patient has been unable to stop using drugs in the community due to both physical and psychological symptoms.  Continued use will put the patient at risk for medical and/or psychiatric complications.      Inpatient psychiatric hospitalization is warranted at this time for safety, stabilization, and possible adjustment in medications.       Diagnoses:    Alcohol use disorder severe  Alcohol withdrawal severe  Nicotine abuse  Generalized anxiety disorder  PTSD  Bipolar type II  Seizures          Plan:   Problem list  1#alcohol use disorder severe alcohol withdrawal severe     - MSSA protocol using Valium for management of alcohol withdrawal    - Continue thiamine, folate, and multivitamin daily    MSSA    Eating Disturbances: ate and enjoyed all of it or not applicable  Tremor: 2  Sleep Disturbance: 2  Clouding of Sensorium: no evidence  Hallucinations: 0 - none  Quality of Contact: 0 - awareness of examiner and people around him/her  Agitation: 1 - somewhat more than normal activity  Paroxysmal Sweats: 2  Temperature: 99.5 or below  Pulse: 0 - 69 or below  Total MSSA Score: 8     Patient received 50 mg of Valium since admission  2#patient has elevated blood pressure 172/100 we will contact medicine ASAP  Current Facility-Administered Medications   Medication     acetaminophen (TYLENOL) tablet 650 mg     alum & mag hydroxide-simethicone (MAALOX) suspension 30 mL     diazepam (VALIUM) tablet 5-20 mg     folic acid (FOLVITE) tablet 1 mg     gabapentin (NEURONTIN) tablet 600 mg     hydrOXYzine (ATARAX) tablet 25 mg     ibuprofen (ADVIL/MOTRIN) tablet 200 mg     levothyroxine (SYNTHROID/LEVOTHROID) tablet 100 mcg     loperamide (IMODIUM) capsule 2 mg     multivitamin w/minerals (THERA-VIT-M) tablet 1 tablet     nicotine (NICORETTE) gum 4 mg      omeprazole (priLOSEC) CR capsule 40 mg     ondansetron (ZOFRAN-ODT) ODT tab 4 mg     propranolol (INDERAL) tablet 10 mg     QUEtiapine (SEROquel) tablet 25 mg     senna-docusate (SENOKOT-S/PERICOLACE) 8.6-50 MG per tablet 1 tablet     thiamine (B-1) tablet 100 mg     traZODone (DESYREL) tablet 50 mg     3#patient will continue Seroquel gabapentin for her mood  4#patient is on levothyroxine but does not take her medications consistently she has TSH elevated at 1.33 T4 0.75 we will put internal medicine consult  5#thrombocytopenia platelets of 144  Patient has elevated AST 59 from alcoholism  Patient has hypokalemia potassium is 3.3 we will put internal medicine consult    Patient has absolute neutrophils low at 1.3  - Consider anti-craving medications prior to discharge. Pt willing to review additional information about both naltrexone and Antabuse.    Alcohol withdrawal nausea prn Zofran as needed for nausea     hydroxyzine 25 mg q4h prn for acute anxiety  Trazodone 50 mg at bedtime prn for sleep disturbances       Patient has been unable to stop using drugs in the community due to both physical and psychological symptoms.  Continued use will put the patient at risk for medical and/or psychiatric complications.    I HAVE REVIEWED LABS WITH PT AND TALKED ABOUT RESULTS WITH PT  I HAVE REVIEWED AND SUMMARIZED OLD RECORDS including his medication reconcilation of his home medications  and PDMP   I HAVE SPOKEN WITH RN ABOUT MEDICATIONS AND DETOX SCORES  I HAVE SPOKEN WITH CM ABOUT PTS TREATMENT OPTIONS     Discussed in detail about patient's smoking patient was advised to quit patient was told about the impact of smoking.  Patient's willingness to quit was assessed.  I provided methods and skills for cessation including medication management nicotine gum patch.  Patient did not set a quit date.  Patient is interested in quitting .we discussed pharmacotherapy options .patient agreed to take nicotine gum patch lozenge.  We  discussed behavioral change techniques when craving nicotine including deep breathing drinking glass of water, taking a walk.            Laboratory/Imaging:    Liver Function Studies -   Recent Labs   Lab Test 03/17/22  0622   PROTTOTAL 7.0   ALBUMIN 3.3*   BILITOTAL 1.0   ALKPHOS 44   AST 59*   ALT 48      Last Comprehensive Metabolic Panel:  Sodium   Date Value Ref Range Status   03/17/2022 139 133 - 144 mmol/L Final   09/03/2016 137 133 - 144 mmol/L Final     Potassium   Date Value Ref Range Status   03/17/2022 3.3 (L) 3.4 - 5.3 mmol/L Final   09/03/2016 3.8 3.4 - 5.3 mmol/L Final     Chloride   Date Value Ref Range Status   03/17/2022 107 94 - 109 mmol/L Final   09/03/2016 104 94 - 109 mmol/L Final     Carbon Dioxide   Date Value Ref Range Status   09/03/2016 25 20 - 32 mmol/L Final     Carbon Dioxide (CO2)   Date Value Ref Range Status   03/17/2022 26 20 - 32 mmol/L Final     Anion Gap   Date Value Ref Range Status   03/17/2022 6 3 - 14 mmol/L Final   09/03/2016 8 3 - 14 mmol/L Final     Glucose   Date Value Ref Range Status   03/17/2022 84 70 - 99 mg/dL Final   09/03/2016 86 70 - 99 mg/dL Final     Urea Nitrogen   Date Value Ref Range Status   03/17/2022 7 7 - 30 mg/dL Final   09/03/2016 7 7 - 30 mg/dL Final     Creatinine   Date Value Ref Range Status   03/17/2022 0.63 0.52 - 1.04 mg/dL Final   09/03/2016 0.61 0.52 - 1.04 mg/dL Final     GFR Estimate   Date Value Ref Range Status   03/17/2022 >90 >60 mL/min/1.73m2 Final     Comment:     Effective December 21, 2021 eGFRcr in adults is calculated using the 2021 CKD-EPI creatinine equation which includes age and gender (Temo et al., NEJM, DOI: 10.1056/BMNShd4176239)   09/03/2016 >90  Non  GFR Calc   >60 mL/min/1.7m2 Final     Calcium   Date Value Ref Range Status   03/17/2022 8.6 8.5 - 10.1 mg/dL Final   09/03/2016 8.8 8.5 - 10.1 mg/dL Final     Bilirubin Total   Date Value Ref Range Status   03/17/2022 1.0 0.2 - 1.3 mg/dL Final   09/03/2016 0.4  "0.2 - 1.3 mg/dL Final     Alkaline Phosphatase   Date Value Ref Range Status   03/17/2022 44 40 - 150 U/L Final   09/03/2016 61 40 - 150 U/L Final     ALT   Date Value Ref Range Status   03/17/2022 48 0 - 50 U/L Final   09/03/2016 80 (H) 0 - 50 U/L Final     AST   Date Value Ref Range Status   03/17/2022 59 (H) 0 - 45 U/L Final   09/03/2016 114 (H) 0 - 45 U/L Final                   Medical treatment/interventions:  Medical concerns: As above    - Consults: IM consult placed. Appreciate assistance.     Legal Status: Voluntary     Safety Assessment:   Checks: Status 15  Pt has not required locked seclusion or restraints in the past 24 hours to maintain safety, please refer to RN documentation for further details.    The risks, benefits, alternatives and side effects have been discussed and are understood by the patient.       Patient will be treated in therapeutic milieu with appropriate individual and group therapies as described.  Disposition: Pending clinical stabilization. Pt does  appear interested in COMPLETE DETOX AND DO TRT  Length of stay 3-5 days        \"Much or all of the text in this note was generated through the use of Dragon Dictate voice to text software. Errors in spelling or words which appear to be out of contact are unintentional, may be present due having escaped editing\"     "

## 2022-03-17 NOTE — PROGRESS NOTES
[place] Prescription Drug Control Monitoring Note:      Narx Scores  Narcotic  100  Sedative  251  Stimulant  000  Explanation and Guidance  Overdose Risk Score  000  (Range 000-999)  Explanation and Guidance  State Indicators (0)  Details  RX Graph   Narcotic   Buprenorphine   Sedative   Stimulant   Other  Learn how to use graph  All Prescribers  Prescribers  2 - Lorena Lozoya MD  1 - Luciano RUFF Tracy,  Timeline  03/17  2m  6m  1y  2y  Disclaimer  Morphine Milligram Equivalent Prescribed Over Time  Last 30 Days  Last 60 Days  Last 90 Days  Last 1 Year  Last 2 Years  MME  Timeframe  0  1  2  2/16/22  3/3/22  3/17/22  0  MME per Day Avg.  0  MME per RX  Disclaimer  Lorazepam MgEq (LME) Prescribed Over Time  Last 30 Days  Last 60 Days  Last 90 Days  Last 1 Year  Last 2 Years  LME  Timeframe  0  1  2  2/16/22  3/3/22  3/17/22  0.8  LME Per Day Avg.  11.9  LME mg Per Rx  Disclaimer  Buprenorphine (mg) Prescribed Over Time  Last 30 Days  Last 60 Days  Last 90 Days  Last 1 Year  Last 2 Years  mg  Timeframe  0  1  2  2/16/22  3/3/22  3/17/22  0  mg Per Day Avg.  0  Avg mg Per Rx  Disclaimer  RX Summary  Summary  Total Prescriptions 15  Total Private Pay 0  Total Prescribers 2  Total Pharmacies 2  Opioids* (excluding Buprenorphine)  Current Qty 0  Current MME/day 0.00  30 Day Avg MME/day 0.00  Buprenorphine*  Current Qty 0  Current mg/day 0.00  30 Day Avg mg/day 0.00  RX Summary Expanded  Narcotics (excluding Buprenorphine)  30 Day Avg. MME 0.00  90 Day Avg. MME 0.00  Rx Count/12 Months 0  Prescriber #/6 Months 0  Pharmacy #/6 Months 0  Current Quantity 0  Buprenorphine  30 Day Avg. mg/day 0.00  90 Day Avg. mg/day 0.00  Rx Count/12 Months 0  Prescriber #/6 Months 0  Pharmacy #/6 Months 0  Current Quantity 0  Sedatives  30 Day Avg. LME 0.79  90 Day Avg. LME 0.55  Rx Count/12 Months 11  Prescriber #/6 Months 1  Pharmacy #/6 Months 1  Current Quantity 15  Stimulants  30 Day Avg. mg/day 0.00  90 Day Avg. mg/day 0.00  Rx  Count/12 Months 0  Prescriber #/6 Months 0  Pharmacy #/6 Months 0  Current Quantity 0  Prescriptions  Total: 15  Private Pay: 0  Showing 1-15 of 15 Items View 15 Items  1 of 1   Filled  Written  Sold  ID  Drug  QTY  Days  Prescriber  RX #  Dispenser  Refill  Daily Dose*  Pymt Type     03/03/2022 03/03/2022  1   Eszopiclone 2 Mg Tablet  30.00 30 Ng Wam [id number] Gen (2588) 0/0 0.66 LME [place]  02/07/2022 12/01/2021  1   Eszopiclone 2 Mg Tablet  30.00 30 Ng Wam [id number] Gen (2588) 2/2 0.66 [place]  01/19/2022 09/07/2021  1   Gabapentin 600 Mg Tablet  270.00 90 Ng Wam [id number] Gen (2588) 0/0  [place]  01/12/2022 12/01/2021  1   Eszopiclone 2 Mg Tablet  30.00 30 Ng Wam [id number] Gen (2588) 1/2 0.66 [place]  11/16/2021 09/07/2021  1   Eszopiclone 2 Mg Tablet  30.00 30 Ng Wam [id number] Gen (2588) 2/2 0.66 [place]  10/12/2021 09/07/2021  1   Eszopiclone 2 Mg Tablet  30.00 30 Ng Wam [id number] Gen (2588) 1/2 0.66 [place]  09/09/2021 09/07/2021  1   Eszopiclone 2 Mg Tablet  30.00 30 Ng Wam [id number] Gen (2588) 0/2 0.66 [place]  09/07/2021 04/13/2021  1   Gabapentin 600 Mg Tablet  270.00 90 Ng Wam [id number] Gen (2588) 0/2  [place]  08/16/2021 05/11/2021  1   Eszopiclone 2 Mg Tablet  30.00 30 Ng Wam [id number] Gen (2588) 2/2 0.66 [place]  07/14/2021 05/11/2021  1   Eszopiclone 2 Mg Tablet  30.00 30 Ng Wam [id number] Gen (2588) 1/2 0.66 [place]  07/09/2021 07/09/2021 07/09/2021 2   Gabapentin 300 Mg Capsule  30.00 5 Ro Tiffanie [phone] (2149) 0/0  [place]  06/15/2021 05/11/2021  1   Eszopiclone 2 Mg Tablet  30.00 30 Ng Wam [id number] Gen (2588) 0/2 0.66 [place]  05/10/2021 02/11/2021  1   Eszopiclone 2 Mg Tablet  30.00 30 Ng Wam [id number] Gen (2588) 2/2 0.66 [place]  04/12/2021 11/04/2020  1   Gabapentin 600 Mg Tablet  270.00 90 Ng Wam [id number] Gen (2588) 1/2  [place]  04/12/2021 02/11/2021  1   Eszopiclone 2 Mg Tablet  30.00 30 Ng Wam [id number] Gen (2588) 1/2 0.66  LME [place]  Disclaimer  Showing 1-15 of 15 Items View 15 Items  1 of 1   Providers  Total: 2  Showing 1-2 of 2 Items View 15 Items  1 of 1   Name  Address  City  State  Zipcode  Phone   Lorena Lozoya MD [address] [facility] [address] [phone]  Luciano Molina  Dian[place] Wolffe [facility] [address] [phone]  Showing 1-2 of 2 Items View 15 Items  1 of 1   Pharmacies  Total: 2  Showing 1-2 of 2 Items View 15 Items  1 of 1   Name  Address  City  State  Zipcode  Phone   [employer] [phone]) [phone] [facility] [address] [phone]  [employer] (6055) [address] [facility] [address] [facility] [address] [phone]  Showing 1-2 of 2 Items View 15 Items  1 of 1   The report provided is based upon the search criteria entered and the corresponding data as it has been reported by dispenser(s). If erroneous information is identified or additional information is needed, please contact the dispenser or the prescriber provided on the report. Date Sold signifies the date the prescription was sold (left the pharmacy). The absence of Date Sold does not necessarily indicate the prescription was not dispensed. Fill Date represents the date the medication was filled or prepared by the pharmacy. Note, federal regulation (CFR Title 42: Part 2) requires patient consent prior to releasing certain patient data from federally funded opioid treatment programs (OTPs). As such, controlled substances dispensed from OTPs for medication-assisted treatment may not appear in the [place]  report. Morphine milligram equivalent (MME) conversion factors published by the CDC are used in the MME calculation. Per the CDC, the MME conversion factor is intended only for analytic purposes where prescription data are used to retrospectively calculate daily MME to inform analyses of risks associated with opioid prescribing. This value does not constitute clinical guidance or  recommendations for converting patients from one form of opioid analgesic to another. Per the CDC, the conversion factors for drugs prescribed or provided, as part of medication-assisted treatment for opioid use disorder should not be used to benchmark against MME dosage thresholds meant for opioids prescribed for pain. Buprenorphine products listed in the CDC s MME file do not have an associated conversion factor. Lastly, the CDC notes, in clinical practice, calculating MME for methadone often involves a sliding-scale approach, whereby the conversion factor increases with increasing dose. The conversion factor of 3 for methadone presented in this file could underestimate MME for a given patient. This report contains confidential information, including patient identifiers, and is not a public record. The information on this report must be treated as protected health information and is only to be disclosed to others as authorized by applicable state and Federal regulations.

## 2022-03-17 NOTE — PROGRESS NOTES
"Met with Pt for discharge planning.  Pt reports being heavily involved in [facility] [facility].  She recognizes the importance of keeping \"air in all of her tires\" for balance and acknowledges she became complacent and weary over Covid.  Pt identified many interests and hobbies and supports.  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  She sees an EMDR therapist.  SHe is an avid rock climber and is looking forward to returning to the activity in person.  She requested information for Yoga and LGBTQ recovery meetings and this was provided.  Pt is declining referral to treatment at this time.  Pt reports she has an understanding of how to access treatment if she decides she needs it.  AVS completed.  "

## 2022-03-17 NOTE — PLAN OF CARE
"  Problem: Substance Withdrawal  Goal: Substance Withdrawal  Description: Signs and symptoms of listed problems will be absent or manageable.  3/17/2022 1112 by Katharine Song RN  Outcome: Ongoing, Progressing   Goal Outcome Evaluation:    Patient has been visible in the milieu. She is AOx4. She has been tense and irritable this morning.     MSSA=6 and 5. Patient reported having a baseline tremor. Patient was upset that she wasn't getting valium during the initial check. \"Why am I here if you're not medicating me?\" The assessment process was explained to her and she was accepting.     BP has been elevated during this shift. Scheduled propanolol 10mg was  administered; BP rechecked at 172/100. Patient believed that her blood pressure was up because she was irritated after talking to her doctor. BP was rechecked at 11:45am---158/96 (which is her baseline). Patient plans to buy a BP cuff to monitor her BP more closely at home.    Patient endorsed anxiety 6/10 due to her BP being elevated. Depression rated 6/10.    Appetite is fair: ate about 50% of breakfast and lunch.    We'll continue to monitor.  "

## 2022-03-17 NOTE — CONSULTS
"[facility]  Consult Note - Hospitalist Service  Date of Admission:  3/16/2022  Consult Requested by: Dr. Quintanilla   Reason for Consult: Medical evaluation     Assessment & Plan   Jolynn Rivera is a 61 year old female admitted on 3/16/2022. She has a history significant for alcohol use disorder with hx of withdrawal seizure, HTN, hepatitis C, COPD, anxiety who is admitted to station 3A for detox from alcohol.    # Alcohol use disorder   # Alcohol withdrawal   Hx of withdrawal seizure, last being one week ago. Blood pressures elevated on admission as below. Mild elevation in LFTs as below.    - Management per psychiatry   - Agree with multivitamin, thiamine and folic acid     # Seizure disorder   Patient reports she may be having \"small seizure\" when she is not in withdrawal. Reports last seizure was one week ago 2/2 withdrawal. She notes she is follow up with neurology at [facility] and declined referral to neurology or neuro workup at [facility].   - Follow up with neurology with [facility] as previously planned   - Seizure precautions     # HTN   PTA only on propranolol for blood pressure management. BP elevated on admission, likely secondary to withdrawal.   - Continue propranolol   - Clonidine 0.1 mg BID PRN for SBP > 180 or DBP > 110   - Notify medicine if BP remains > 170/100 on completion of withdrawal     # Sinusitis   # Maxillary molar periapical abscess   CT head at OSH 2/16/22 revealed right maxillary sinus mucosal thickening and underlying right maxillary molar periapical abscess concerning for odontogenic sinusitis. Patient denies any rhinorrhea, sinus pain or headache.  - Prescription for clindamycin sent to patient's outpatient pharmacy per ED physician    #Transaminitis   AST 60, ALT 53, alk phos and bilirubin within normal limits.  LFTs slightly downtrending today. Hx of hep C which patient reports completing treatment for. Patient denies any abdominal pain nausea or " "vomiting.   -Close follow-up with PCP on discharge for repeat CMP    #Hypokalemia  Potassium 3.3 in the ED replaced with 20 mEq.  Repeat potassium today remains 3.3.  -Replaced with 40 mEq K  -Repeat K in AM    #Hypothyroidism  TSH 12.33, free T4 0.75 (mildly low).  PTA on levothyroxine 100 mcg daily.  Patient reports she has not been compliant with levothyroxine.  -Continue levothyroxine 100 mcg daily  -Follow-up with PCP for repeat thyroid function test within 4 weeks    #COPD   No evidence of bronchospasm on exam.   - Continue PTA albuterol PRN     #GERD   - Continue PTA omeprazole 40 mg daily     Medicine will follow up potassium in AM and blood pressures peripherally. Please contact with any other questions or concerns.     YIFAN Lopez  [facility]  Securely message with the Vocera Web Console (learn more here)  Text page via [facility] Paging/Directory       Hospitalist Service    Clinically Significant Risk Factors Present on Admission                     ______________________________________________________________________    Chief Complaint   Alcohol use     History is obtained from the patient and review of medical records.     History of Present Illness   Jolynn Rivera is a 61 year old female who has a history significant for alcohol use disorder with hx of withdrawal seizure, HTN, hepatitis C, COPD, anxiety who is admitted to station 3A for detox from alcohol. Patient reports she has been drinking daily for the last two weeks. Notes during the pandemic she has been drinking more. She reports she has had seizures during withdrawal, last being about one week ago for which she was evaluated at [facility] ED. She feels she may be having \"Small seizures\" outside was withdrawal and is working with her care team at [facility]. Reports she is going to see neurology at [facility]. Denies further discussion about seizures, work up or referral to neurology at [facility]. She reports hx " of COPD which has been well managed recently after starting Zofran for nausea/vomiting. Denies any SOB, cough, wheezing. She also reports hx of hypothyroidism and is non compliant with levothyroxine.     Denies sinus pain or pressure. Denies tooth pain. Denies chest pain, abdominal pain, nausea, vomiting, dysuria, bowel issues, numbness, tingling, rash.       Review of Systems   The 10 point Review of Systems is negative other than noted in the HPI.    Past Medical History    I have reviewed this patient's medical history and updated it with pertinent information if needed.   Past Medical History:   Diagnosis Date     Anxiety      COPD (chronic obstructive pulmonary disease) (H)      COPD (chronic obstructive pulmonary disease) (H)      Hepatitis C      PTSD (post-traumatic stress disorder)      Seizures (H)     second to ETOH     Substance abuse (H)        Past Surgical History   I have reviewed this patient's surgical history and updated it with pertinent information if needed.  Past Surgical History:   Procedure Laterality Date     LAPAROSCOPIC TUBAL LIGATION       ORTHOPEDIC SURGERY      Left knee     TONSILLECTOMY         Social History   I have reviewed this patient's social history and updated it with pertinent information if needed.  Social History     Tobacco Use     Smoking status: Current Every Day Smoker     Packs/day: 0.50     Smokeless tobacco: Never Used     Tobacco comment: Starting Chantix October 2014   Substance Use Topics     Alcohol use: Yes     Comment: drinks ETOH frequently; sometimes daily     Drug use: No     Comment: stopped 1986       Family History   I have reviewed this patient's family history and updated it with pertinent information if needed.  Family History   Problem Relation Age of Onset     Anxiety Disorder Mother      Asthma Mother      Alcohol/Drug Father      Prostate Cancer Father      Arthritis Father      Alcohol/Drug Brother      Alcohol/Drug Brother      Hypertension  Brother      Alcohol/Drug Brother      Depression Brother      Psychotic Disorder Brother        Medications   Medications Prior to Admission   Medication Sig Dispense Refill Last Dose     eszopiclone (LUNESTA) 2 MG tablet Take 1 tablet by mouth At Bedtime   3/15/2022 at Unknown time     gabapentin (NEURONTIN) 600 MG tablet Take 600 mg by mouth 3 times daily   3/16/2022 at am; 1 of 3 doses     levothyroxine (SYNTHROID/LEVOTHROID) 100 MCG tablet Take 1 tablet by mouth daily   Past Week at Unknown time     nicotine polacrilex (NICORETTE) 4 MG gum Place 1 each (4 mg) inside cheek as needed for smoking cessation 270 tablet 1 3/16/2022 at Unknown time     omeprazole (PRILOSEC) 40 MG DR capsule Take 1 capsule by mouth daily   3/16/2022 at Unknown time     ondansetron (ZOFRAN-ODT) 4 MG ODT tab Take 4 mg by mouth as needed   3/16/2022 at Unknown time     propranolol (INDERAL) 10 MG tablet Take 1 tablet by mouth 2 times daily   3/16/2022 at am; 1 of 2 doses     QUEtiapine (SEROQUEL) 25 MG tablet Take 1 tablet (25 mg) by mouth At Bedtime 30 tablet 2 3/15/2022 at Unknown time     albuterol (PROAIR HFA, PROVENTIL HFA, VENTOLIN HFA) 108 (90 BASE) MCG/ACT inhaler Inhale 2 puffs into the lungs every 6 hours as needed for shortness of breath / dyspnea or wheezing 1 Inhaler 0      gabapentin (NEURONTIN) 100 MG capsule Take 1 capsule (100 mg) by mouth 3 times daily as needed 90 capsule 2      gabapentin (NEURONTIN) 300 MG capsule Take 600 mg in the morning, take 300 mg in the afternoon, and 600 mg in the evening 140 capsule 1      gabapentin enacarbil (HORIZANT) 600 MG tablet Take 1 tablet (600 mg) by mouth 2 times daily Do not split, crush or chew tablets. 60 tablet 2      omeprazole 20 MG tablet Take 1 tablet (20 mg) by mouth daily Take 30-60 minutes before a meal. (Patient taking differently: Take 20 mg by mouth At Bedtime Take 30-60 minutes before a meal.) 30 tablet 1      prazosin (MINIPRESS) 1 MG capsule Take 1 capsule (1 mg)  "by mouth At Bedtime 30 capsule 2        Allergies   Allergies   Allergen Reactions     Antihistamines, Chlorpheniramine-Type [Alkylamines]      Compazine      Lock jaw; all medications ending with -zine     Diphenhydramine      Muscle Cramps     Penicillins      Panic attack     Prochlorperazine      Muscle cramps     Trazodone Nausea and Vomiting     \" I ended up falling and feeling like I was drunk\"     Wasps [Hornets]      Swelling        Physical Exam   Vital Signs: Temp: 97.7  F (36.5  C) Temp src: Temporal BP: (!) 169/93 Pulse: 60   Resp: 16 SpO2: 96 % O2 Device: None (Room air)    Weight: 150 lbs 0 oz  GENERAL: Alert and oriened x 3. NAD.   HEENT: Anicteric sclera. PERRL. Mucous membranes moist and without lesions.   CV: RRR. S1, S2. No murmurs appreciated.   RESPIRATORY: Effort normal on RA. Lungs CTAB with no wheezing, rales, rhonchi.   GI: Abdomen soft and non distended, bowel sounds present. No tenderness, rebound, guarding.   MUSCULOSKELETAL: No joint swelling or tenderness. Moves all extremities.   NEUROLOGICAL: No focal deficits. Gait steady.  EXTREMITIES: No peripheral edema. Intact bilateral pedal pulses.   SKIN: No jaundice. No rashes.       Data   Results for orders placed or performed during the hospital encounter of 03/16/22 (from the past 24 hour(s))   Alcohol breath test POCT   Result Value Ref Range    Alcohol Breath Test 0.148 (A) 0.00 - 0.01   Urine Drugs of Abuse Screen    Narrative    The following orders were created for panel order Urine Drugs of Abuse Screen.  Procedure                               Abnormality         Status                     ---------                               -----------         ------                     Drug abuse screen 1 urin...[[id number]]  Abnormal            Final result                 Please view results for these tests on the individual orders.   Drug abuse screen 1 urine (ED)   Result Value Ref Range    Amphetamines Urine Screen Negative Screen " Negative    Barbiturates Urine Screen Negative Screen Negative    Benzodiazepines Urine Screen Positive (A) Screen Negative    Cannabinoids Urine Screen Negative Screen Negative    Cocaine Urine Screen Negative Screen Negative    Opiates Urine Screen Negative Screen Negative   CBC with platelets differential    Narrative    The following orders were created for panel order CBC with platelets differential.  Procedure                               Abnormality         Status                     ---------                               -----------         ------                     CBC with platelets and d...[[id number]]  Abnormal            Final result                 Please view results for these tests on the individual orders.   Comprehensive metabolic panel   Result Value Ref Range    Sodium 138 133 - 144 mmol/L    Potassium 3.3 (L) 3.4 - 5.3 mmol/L    Chloride 107 94 - 109 mmol/L    Carbon Dioxide (CO2) 22 20 - 32 mmol/L    Anion Gap 9 3 - 14 mmol/L    Urea Nitrogen 8 7 - 30 mg/dL    Creatinine 0.66 0.52 - 1.04 mg/dL    Calcium 8.4 (L) 8.5 - 10.1 mg/dL    Glucose 101 (H) 70 - 99 mg/dL    Alkaline Phosphatase 43 40 - 150 U/L    AST 60 (H) 0 - 45 U/L    ALT 53 (H) 0 - 50 U/L    Protein Total 6.9 6.8 - 8.8 g/dL    Albumin 3.3 (L) 3.4 - 5.0 g/dL    Bilirubin Total 0.5 0.2 - 1.3 mg/dL    GFR Estimate >90 >60 mL/min/1.73m2   Asymptomatic COVID-19 Virus (Coronavirus) by PCR Nasopharyngeal    Specimen: Nasopharyngeal; Swab   Result Value Ref Range    SARS CoV2 PCR Negative Negative    Narrative    Testing was performed using the joe  SARS-CoV-2 & Influenza A/B Assay on the joe  [employer]  System.  This test should be ordered for the detection of SARS-COV-2 in individuals who meet SARS-CoV-2 clinical and/or epidemiological criteria. Test performance is unknown in asymptomatic patients.  This test is for in vitro diagnostic use under the FDA EUA for laboratories certified under CLIA to perform moderate and/or high complexity  testing. This test has not been FDA cleared or approved.  A negative test does not rule out the presence of PCR inhibitors in the specimen or target RNA in concentration below the limit of detection for the assay. The possibility of a false negative should be considered if the patient's recent exposure or clinical presentation suggests COVID-19.  [facility] are certified under the Clinical Laboratory Improvement Amendments of 1988 (CLIA-88) as qualified to perform moderate and/or high complexity laboratory testing.   Magnesium   Result Value Ref Range    Magnesium 2.0 1.6 - 2.3 mg/dL   CBC with platelets and differential   Result Value Ref Range    WBC Count 5.3 4.0 - 11.0 10e3/uL    RBC Count 3.72 (L) 3.80 - 5.20 10e6/uL    Hemoglobin 12.6 11.7 - 15.7 g/dL    Hematocrit 36.0 35.0 - 47.0 %    MCV 97 78 - 100 fL    MCH 33.9 (H) 26.5 - 33.0 pg    MCHC 35.0 31.5 - 36.5 g/dL    RDW 13.5 10.0 - 15.0 %    Platelet Count 172 150 - 450 10e3/uL    % Neutrophils 42 %    % Lymphocytes 45 %    % Monocytes 11 %    % Eosinophils 1 %    % Basophils 1 %    % Immature Granulocytes 0 %    NRBCs per 100 WBC 0 <1 /100    Absolute Neutrophils 2.2 1.6 - 8.3 10e3/uL    Absolute Lymphocytes 2.4 0.8 - 5.3 10e3/uL    Absolute Monocytes 0.6 0.0 - 1.3 10e3/uL    Absolute Eosinophils 0.1 0.0 - 0.7 10e3/uL    Absolute Basophils 0.0 0.0 - 0.2 10e3/uL    Absolute Immature Granulocytes 0.0 <=0.4 10e3/uL    Absolute NRBCs 0.0 10e3/uL   CBC with platelets differential    Narrative    The following orders were created for panel order CBC with platelets differential.  Procedure                               Abnormality         Status                     ---------                               -----------         ------                     CBC with platelets and d...[[id number]]  Abnormal            Final result                 Please view results for these tests on the individual orders.   Comprehensive metabolic panel   Result Value  Ref Range    Sodium 139 133 - 144 mmol/L    Potassium 3.3 (L) 3.4 - 5.3 mmol/L    Chloride 107 94 - 109 mmol/L    Carbon Dioxide (CO2) 26 20 - 32 mmol/L    Anion Gap 6 3 - 14 mmol/L    Urea Nitrogen 7 7 - 30 mg/dL    Creatinine 0.63 0.52 - 1.04 mg/dL    Calcium 8.6 8.5 - 10.1 mg/dL    Glucose 84 70 - 99 mg/dL    Alkaline Phosphatase 44 40 - 150 U/L    AST 59 (H) 0 - 45 U/L    ALT 48 0 - 50 U/L    Protein Total 7.0 6.8 - 8.8 g/dL    Albumin 3.3 (L) 3.4 - 5.0 g/dL    Bilirubin Total 1.0 0.2 - 1.3 mg/dL    GFR Estimate >90 >60 mL/min/1.73m2   Lipid panel   Result Value Ref Range    Cholesterol 145 <200 mg/dL    Triglycerides 43 <150 mg/dL    Direct Measure HDL 94 >=50 mg/dL    LDL Cholesterol Calculated 42 <=100 mg/dL    Non HDL Cholesterol 51 <130 mg/dL    Narrative    Cholesterol  Desirable:  <200 mg/dL    Triglycerides  Normal:  Less than 150 mg/dL  Borderline High:  150-199 mg/dL  High:  200-499 mg/dL  Very High:  Greater than or equal to 500 mg/dL    Direct Measure HDL  Female:  Greater than or equal to 50 mg/dL   Male:  Greater than or equal to 40 mg/dL    LDL Cholesterol  Desirable:  <100mg/dL  Above Desirable:  100-129 mg/dL   Borderline High:  130-159 mg/dL   High:  160-189 mg/dL   Very High:  >= 190 mg/dL    Non HDL Cholesterol  Desirable:  130 mg/dL  Above Desirable:  130-159 mg/dL  Borderline High:  160-189 mg/dL  High:  190-219 mg/dL  Very High:  Greater than or equal to 220 mg/dL   TSH with free T4 reflex and/or T3 as indicated   Result Value Ref Range    TSH 12.33 (H) 0.40 - 4.00 mU/L   CBC with platelets and differential   Result Value Ref Range    WBC Count 4.7 4.0 - 11.0 10e3/uL    RBC Count 3.88 3.80 - 5.20 10e6/uL    Hemoglobin 13.1 11.7 - 15.7 g/dL    Hematocrit 36.9 35.0 - 47.0 %    MCV 95 78 - 100 fL    MCH 33.8 (H) 26.5 - 33.0 pg    MCHC 35.5 31.5 - 36.5 g/dL    RDW 13.0 10.0 - 15.0 %    Platelet Count 144 (L) 150 - 450 10e3/uL    % Neutrophils 28 %    % Lymphocytes 57 %    % Monocytes 12 %    %  Eosinophils 2 %    % Basophils 1 %    % Immature Granulocytes 0 %    NRBCs per 100 WBC 0 <1 /100    Absolute Neutrophils 1.3 (L) 1.6 - 8.3 10e3/uL    Absolute Lymphocytes 2.7 0.8 - 5.3 10e3/uL    Absolute Monocytes 0.6 0.0 - 1.3 10e3/uL    Absolute Eosinophils 0.1 0.0 - 0.7 10e3/uL    Absolute Basophils 0.0 0.0 - 0.2 10e3/uL    Absolute Immature Granulocytes 0.0 <=0.4 10e3/uL    Absolute NRBCs 0.0 10e3/uL   T4 free   Result Value Ref Range    Free T4 0.75 (L) 0.76 - 1.46 ng/dL

## 2022-03-17 NOTE — PLAN OF CARE
Behavioral Team Discussion: (3/17/2022)    Continued Stay Criteria/Rationale: Patient admitted for Chemical Use Issues.  Plan: The following services will be provided to the patient; psychiatric assessment, medication management, therapeutic milieu, individual and group support, and skills groups.   Participants: 3A Provider: Dr. Aly Quintanilla MD; 3A RN: Katharine Song, RN; 3A CM's: Cande Erickson  and Jennifer Yanes.  Summary/Recommendation: Providers will assess today for treatment recommendations, discharge planning, and aftercare plans. CM will meet with pt for discharge planning.   Medical/Physical: Per ED note:  PMH alcohol abuse, alcohol withdrawal seizure, hepatitis C, COPD, anxiety.   Precautions:   Behavioral Orders   Procedures     Code 1 - Restrict to Unit     Routine Programming     As clinically indicated     Seizure precautions     Status 15     Every 15 minutes.     Withdrawal precautions     Rationale for change in precautions or plan: N/A  Progress: Initial.

## 2022-03-18 VITALS
TEMPERATURE: 97.5 F | HEIGHT: 65 IN | HEART RATE: 64 BPM | OXYGEN SATURATION: 97 % | WEIGHT: 150 LBS | SYSTOLIC BLOOD PRESSURE: 157 MMHG | DIASTOLIC BLOOD PRESSURE: 97 MMHG | RESPIRATION RATE: 16 BRPM | BODY MASS INDEX: 24.99 KG/M2

## 2022-03-18 LAB
HOLD SPECIMEN: NORMAL
POTASSIUM BLD-SCNC: 3.6 MMOL/L (ref 3.4–5.3)

## 2022-03-18 PROCEDURE — 84132 ASSAY OF SERUM POTASSIUM: CPT | Performed by: PHYSICIAN ASSISTANT

## 2022-03-18 PROCEDURE — 36415 COLL VENOUS BLD VENIPUNCTURE: CPT | Performed by: PHYSICIAN ASSISTANT

## 2022-03-18 PROCEDURE — 99239 HOSP IP/OBS DSCHRG MGMT >30: CPT | Performed by: PSYCHIATRY & NEUROLOGY

## 2022-03-18 PROCEDURE — 250N000013 HC RX MED GY IP 250 OP 250 PS 637: Performed by: PSYCHIATRY & NEUROLOGY

## 2022-03-18 RX ORDER — MULTIPLE VITAMINS W/ MINERALS TAB 9MG-400MCG
1 TAB ORAL DAILY
Qty: 30 TABLET | Refills: 0 | Status: SHIPPED | OUTPATIENT
Start: 2022-03-19 | End: 2022-10-07

## 2022-03-18 RX ORDER — TRAZODONE HYDROCHLORIDE 50 MG/1
50 TABLET, FILM COATED ORAL
Qty: 30 TABLET | Refills: 0 | Status: SHIPPED | OUTPATIENT
Start: 2022-03-18 | End: 2022-03-18

## 2022-03-18 RX ORDER — LANOLIN ALCOHOL/MO/W.PET/CERES
100 CREAM (GRAM) TOPICAL DAILY
Qty: 30 TABLET | Refills: 0 | Status: SHIPPED | OUTPATIENT
Start: 2022-03-19 | End: 2022-10-07

## 2022-03-18 RX ADMIN — OMEPRAZOLE 40 MG: 20 CAPSULE, DELAYED RELEASE ORAL at 08:11

## 2022-03-18 RX ADMIN — GABAPENTIN 600 MG: 600 TABLET, FILM COATED ORAL at 08:11

## 2022-03-18 RX ADMIN — LEVOTHYROXINE SODIUM 100 MCG: 100 TABLET ORAL at 07:01

## 2022-03-18 RX ADMIN — NICOTINE POLACRILEX 4 MG: 2 GUM, CHEWING ORAL at 01:34

## 2022-03-18 RX ADMIN — NICOTINE POLACRILEX 4 MG: 2 GUM, CHEWING ORAL at 06:46

## 2022-03-18 RX ADMIN — FOLIC ACID 1 MG: 1 TABLET ORAL at 08:11

## 2022-03-18 RX ADMIN — NICOTINE POLACRILEX 4 MG: 2 GUM, CHEWING ORAL at 09:15

## 2022-03-18 RX ADMIN — PROPRANOLOL HYDROCHLORIDE 10 MG: 10 TABLET ORAL at 08:11

## 2022-03-18 RX ADMIN — NICOTINE POLACRILEX 4 MG: 2 GUM, CHEWING ORAL at 04:36

## 2022-03-18 RX ADMIN — NICOTINE POLACRILEX 4 MG: 2 GUM, CHEWING ORAL at 11:08

## 2022-03-18 RX ADMIN — MULTIPLE VITAMINS W/ MINERALS TAB 1 TABLET: TAB at 08:11

## 2022-03-18 RX ADMIN — THIAMINE HCL TAB 100 MG 100 MG: 100 TAB at 08:11

## 2022-03-18 NOTE — PLAN OF CARE
Goal Outcome Evaluation:             Patient is out of detox, last had Valium yesterday 3/16 at 06:38 am

## 2022-03-18 NOTE — PLAN OF CARE
Problem: Substance Withdrawal  Goal: Substance Withdrawal  Description: Signs and symptoms of listed problems will be absent or manageable.  Outcome: Ongoing, Progressing     Pt is monitored for alcohol withdrawal, MSSA of 4, 2. Pt was not medicated for withdrawal symptoms this shift. Pt last received valium 3/17 at 06:40.     Pt states she will need to discharge on Friday 3/18 due to her dogsitter being unavailable after 16:00.     Pt endorsed anxiety, seems to perseverate on her blood pressure. Denied SI

## 2022-03-18 NOTE — PLAN OF CARE
Problem: Substance Withdrawal  Goal: Substance Withdrawal  Description: Signs and symptoms of listed problems will be absent or manageable.  Outcome: Adequate for Care Transition   Goal Outcome Evaluation:        Patient is discharged, medications and instructions read and explained to patient. Patient verbalized understanding and signed AVS.  Patient given all her belongings.  Patient escorted by Psych associate Mckinney and left at 13:15

## 2022-03-18 NOTE — DISCHARGE INSTRUCTIONS
Behavioral Discharge Planning and Instructions  THANK YOU FOR CHOOSING THE [facility]  3AW  [phone]    Summary: You were admitted to [facility] on 3/16/22 for detoxification from alcohol.  A medical exam was performed that included lab work. You have met with a  and opted to return to [facility] and working your program.  Please take care and make your recovery a priority, Jolynn!    Recommendation:  If you need more support to maintain sobriety call [phone] to schedule a chemical assessment and follow the recommendations of that assessment.  [facility] ([facility])  [facility] connects people seeking recovery to resources that help foster and sustain long-term recovery.  Whether you are seeking resources for treatment, transportation, housing, job training, education, health care or other pathways to recovery, [facility] is a great place to start.    Phone: [phone].  www.[place]."[employer] (Great listing of all types of recovery and non-recovery related resources)    Main Diagnosis: Per Dr. Aly Quintanilla MD  Alcohol use disorder severe  Alcohol withdrawal severe  Nicotine abuse  Generalized anxiety disorder  PTSD  Bipolar type II  Seizures      Major Treatments, Procedures and Findings:  You were detoxed from alcohol with the Modified Selective Severity Protocol using Valium. You have met with a  to develop a treatment plan for discharge.  You have had labs drawn and a copy of those labs will be sent home with you.  Please bring your lab results with to your follow up doctor appointment.    Symptoms to Report:  If you experience more anxiety, confusion, sleeplessness, deep sadness or thoughts of suicide, notify your treatment team or notify your primary care physician. IF ANY OF THE SYMPTOMS YOU ARE EXPERIENCING ARE A MEDICAL EMERGENCY CALL 911 IMMEDIATELY.     Lifestyle Adjustment: Adjust your lifestyle to get enough sleep,  "relaxation, exercise and  good nutrition. Continue to develop healthy coping skills to decrease stress and promote a sober living environment. Do not use alcohol, illegal drugs or addictive medications other than what is currently prescribed. AA, NA, and  Sponsor are excellent resources for support.     Primary Provider:    Disposition: Home      Facts about COVID19 at www.cdc.gov/COVID19 and www.[place].gov/covid19    Keeping hands clean is one of the most important steps we can take to avoid getting sick and spreading germs to others.  Please wash your hands frequently and lather with soap for at least 20 seconds!    Follow-up Appointment:   Appointment Date/Time:Thursday March 24 th 2022 at 1 pm.   Psychiatrist/Primary Care Giver:Dr. Tomas Brown   Address: [facility] [address], [place] [address]                                                                                                                                             Phone Number: [phone]    Resources:     Resources for on line recovery meetings:      *due to covid-19 AA/NA meetings are being held online*      AA meetings can be found online; search for them at: http://aa-intergroup.org/directory.php  AA meetings via [place] for [place] area can be found online at: https://a[place].org/find-a-meeting/holiday-closings/  NA meetings via [place] for [place] area can be found online at: https://sites.Infogram.com/view/[place]regionofnarcoticsanonymous/home?authuser=2    Www.JournalDoc  has online resources for meeting and recovery care including Podcast \"Let's Talk:Addiction & Recovery Podcasts    Www.[place]rec[employer].org     DISCHARGE RESOURCES:  -SMART Recovery - self management for addiction recovery:  www.smartrecovery.org    -Pathways ~ A Health Crisis Resource & Support Center: " 243.152.4657.  -[facility] [phone]   -[facility] Behavioral Intake 376-098-2945 or 101-202-0803.  -Suicide Awareness Voices of Education (SAVE) (www.save.org): 117-298-MTMJ (1106)  -National Suicide Prevention Line (www.mentalhealthmn.org): 828-174-FWCN (5839)  -National [place] on Mental Illness (www.[place].sera.org): 166.166.4407 or 920-802-2653.  -S[employer]: text the word LIFE to 41471 for immediate support and crisis intervention  -Mental Health Consumer/Survivor Network of [place] (www.mhcsn.net): 511.404.5694 or 234-646-6149  -Mental Health Association of [place] (www.mentalhealth.org): 735.762.9061 or 769-790-0723     -Substance Abuse and Mental Health Services (www.samhsa.gov)  -Harm Reduction Coalition (www. Harmreduction.org)  -www.prescribetoprevent.org or http://prescribetoprevent.org/video  -Poison control 4-191-931-7678   **[place] Opioid Prevention Coalition: www.opioidcoalition.org    Sober Support Group Information:  AA/NA & Sponsor/Support  -Alcoholics Anonymous (www.alcoholics-anonymous.org): for local information 24 hours/day  -AA Intergroup service office in [place] (http://www.aastpaul.org/) 957.916.1867  -AA Intergroup service office in [facility]: 130.323.9046. (http://www.a[place].org/)  -Narcotics Anonymous (www.nam[place].org) (745) 208-1396   **Sober Fun Activities: www.sober-activities.[employer].com/[facility]/[place]          General Medication Instructions:   See your medication sheet(s) for instructions.   Take all medicines as directed.  Make no changes unless your doctor suggests them.   Go to all your doctor visits.  Be sure to have all your required lab tests. This way, your medicines can be refilled on time.  Do not use any drugs not prescribed by your provider.  AA/NA and Sponsors are excellent resources for support  Avoid alcohol.    Any follow up concerns:  Nursing questions call the [facility] [phone]  Medical  Record call [phone]  Outpatient Behavioral Intake call [phone]  LP+ Wait List/Bed Availability call [phone]    The entire treatment team has appreciated the opportunity to work with you Jolynn.  We wish you the best in the future and with your lifelong recovery goals. Please bring this discharge folder with you to all follow up appointments.  It contains your lab results, diagnosis, medication list and discharge recommendations.    THANK YOU FOR CHOOSING THE [facility]

## 2022-03-18 NOTE — PLAN OF CARE
Goal Outcome Evaluation:    Problem: Substance Withdrawal  Goal: Substance Withdrawal  Description: Signs and symptoms of listed problems will be absent or manageable.  Outcome: Ongoing, Progressing     MSSA=4; patient endorsed mild sweats and tremors. Poor sleep; refused sleeping aide. We'll continue to monitor.

## 2022-03-18 NOTE — DISCHARGE SUMMARY
Jolynn Rivera MRN# [id number]   Age: 61 year old YOB: 1960     Date of Admission:  3/16/2022  Date of Discharge:  3/18/2022  Admitting Physician:  Aly Quintanilla MD  Discharge Physician:  Aly Quintanilla MD      DISCHARGE  DX    Alcohol use disorder severe    Nicotine abuse  Generalized anxiety disorder  PTSD  Bipolar type II  Seizures         Event Leading to Hospitalization:     See Admission note by admitting provider for patient encounter. for additional details.          Hospital Course:   PATIENT was admitted to [facility] attending  under DR quintanilla, please review the detailed admit note on 3/17/22   The patient was placed under status 15 (15 minute checks) to ensure patient safety.   MSSA protocol was initiated due to the patient's history of alcohol abuse and concern for withdrawal symptoms.  CBC, BMP and utox obtained.    All outpatient medications were continued    PATIENTdid participate in groups and was visible in the milieu.     The patient's symptoms of alochol withdrawal  improved.     Patients energy motivation , sleep appetite improved.  Pt completed detox . It was un eventful.      Discussed with patient medications for craving.  Spoke with patient about triggers coping skills relapse prevention.    CONSULTS DONE DURING PATIENTS HOSPITALIZATION.  Patient was seen by medicine on date3/17/22    This as per their medical consult  Jolynn Rivera is a 61 year old female admitted on 3/16/2022. She has a history significant for alcohol use disorder with hx of withdrawal seizure, HTN, hepatitis C, COPD, anxiety who is admitted to [facility] for detox from alcohol.     # Alcohol use disorder   # Alcohol withdrawal   Hx of withdrawal seizure, last being one week ago. Blood pressures elevated on admission as below. Mild elevation in LFTs as below.    - Management per psychiatry   - Agree with multivitamin, thiamine and folic acid      # Seizure disorder   Patient reports  "she may be having \"small seizure\" when she is not in withdrawal. Reports last seizure was one week ago 2/2 withdrawal. She notes she is follow up with [place] [facility] and declined referral to neurology or neuro workup at [facility].   - Follow up with neurology with [facility] as previously planned   - Seizure precautions      # HTN   PTA only on propranolol for blood pressure management. BP elevated on admission, likely secondary to withdrawal.   - Continue propranolol   - Clonidine 0.1 mg BID PRN for SBP > 180 or DBP > 110   - Notify medicine if BP remains > 170/100 on completion of withdrawal      # Sinusitis   # Maxillary molar periapical abscess   CT head at OSH 2/16/22 revealed right maxillary sinus mucosal thickening and underlying right maxillary molar periapical abscess concerning for odontogenic sinusitis. Patient denies any rhinorrhea, sinus pain or headache.  - Prescription for clindamycin sent to patient's outpatient pharmacy per ED physician     #Transaminitis   AST 60, ALT 53, alk phos and bilirubin within normal limits.  LFTs slightly downtrending today. Hx of hep C which patient reports completing treatment for. Patient denies any abdominal pain nausea or vomiting.   -Close follow-up with PCP on discharge for repeat CMP     #Hypokalemia  Potassium 3.3 in the ED replaced with 20 mEq.  Repeat potassium today remains 3.3.  -Replaced with 40 mEq K  -Repeat K in AM     #Hypothyroidism  TSH 12.33, free T4 0.75 (mildly low).  PTA on levothyroxine 100 mcg daily.  Patient reports she has not been compliant with levothyroxine.  -Continue levothyroxine 100 mcg daily  -Follow-up with PCP for repeat thyroid function test within 4 weeks     #COPD   No evidence of bronchospasm on exam.   - Continue PTA albuterol PRN      #GERD   - Continue PTA omeprazole 40 mg daily      Medicine will follow up potassium in AM and blood pressures peripherally. Please contact with any other questions or concerns.           Pt was seen by " "cm  As per recommendations from cm  Met with Pt for discharge planning.  Pt reports being heavily involved in [facility].  She recognizes the importance of keeping \"air in all of her tires\" for balance and acknowledges she became complacent and weary over Covid.  Pt identified many interests and hobbies and supports.  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  She sees an EMDR therapist.  SHe is an avid rock climber and is looking forward to returning to the activity in person.  She requested information for Yoga and LGBTQ recovery meetings and this was provided.  Pt is declining referral to treatment at this time.  Pt reports she has an understanding of how to access treatment if she decides she needs it.  AVS completed.                     Labs:reviewed with patient       Recent Results (from the past 48 hour(s))   Alcohol breath test POCT    Collection Time: 03/16/22 11:14 AM   Result Value Ref Range    Alcohol Breath Test 0.148 (A) 0.00 - 0.01   Drug abuse screen 1 urine (ED)    Collection Time: 03/16/22 11:27 AM   Result Value Ref Range    Amphetamines Urine Screen Negative Screen Negative    Barbiturates Urine Screen Negative Screen Negative    Benzodiazepines Urine Screen Positive (A) Screen Negative    Cannabinoids Urine Screen Negative Screen Negative    Cocaine Urine Screen Negative Screen Negative    Opiates Urine Screen Negative Screen Negative   Comprehensive metabolic panel    Collection Time: 03/16/22 12:23 PM   Result Value Ref Range    Sodium 138 133 - 144 mmol/L    Potassium 3.3 (L) 3.4 - 5.3 mmol/L    Chloride 107 94 - 109 mmol/L    Carbon Dioxide (CO2) 22 20 - 32 mmol/L    Anion Gap 9 3 - 14 mmol/L    Urea Nitrogen 8 7 - 30 mg/dL    Creatinine 0.66 0.52 - 1.04 mg/dL    Calcium 8.4 (L) 8.5 - 10.1 mg/dL    Glucose 101 (H) 70 - 99 mg/dL    Alkaline Phosphatase 43 40 - 150 U/L    AST 60 (H) 0 - 45 U/L    ALT 53 (H) 0 - 50 U/L    Protein Total 6.9 " 6.8 - 8.8 g/dL    Albumin 3.3 (L) 3.4 - 5.0 g/dL    Bilirubin Total 0.5 0.2 - 1.3 mg/dL    GFR Estimate >90 >60 mL/min/1.73m2   Asymptomatic COVID-19 Virus (Coronavirus) by PCR Nasopharyngeal    Collection Time: 03/16/22 12:23 PM    Specimen: Nasopharyngeal; Swab   Result Value Ref Range    SARS CoV2 PCR Negative Negative   Magnesium    Collection Time: 03/16/22 12:23 PM   Result Value Ref Range    Magnesium 2.0 1.6 - 2.3 mg/dL   CBC with platelets and differential    Collection Time: 03/16/22 12:23 PM   Result Value Ref Range    WBC Count 5.3 4.0 - 11.0 10e3/uL    RBC Count 3.72 (L) 3.80 - 5.20 10e6/uL    Hemoglobin 12.6 11.7 - 15.7 g/dL    Hematocrit 36.0 35.0 - 47.0 %    MCV 97 78 - 100 fL    MCH 33.9 (H) 26.5 - 33.0 pg    MCHC 35.0 31.5 - 36.5 g/dL    RDW 13.5 10.0 - 15.0 %    Platelet Count 172 150 - 450 10e3/uL    % Neutrophils 42 %    % Lymphocytes 45 %    % Monocytes 11 %    % Eosinophils 1 %    % Basophils 1 %    % Immature Granulocytes 0 %    NRBCs per 100 WBC 0 <1 /100    Absolute Neutrophils 2.2 1.6 - 8.3 10e3/uL    Absolute Lymphocytes 2.4 0.8 - 5.3 10e3/uL    Absolute Monocytes 0.6 0.0 - 1.3 10e3/uL    Absolute Eosinophils 0.1 0.0 - 0.7 10e3/uL    Absolute Basophils 0.0 0.0 - 0.2 10e3/uL    Absolute Immature Granulocytes 0.0 <=0.4 10e3/uL    Absolute NRBCs 0.0 10e3/uL   Comprehensive metabolic panel    Collection Time: 03/17/22  6:22 AM   Result Value Ref Range    Sodium 139 133 - 144 mmol/L    Potassium 3.3 (L) 3.4 - 5.3 mmol/L    Chloride 107 94 - 109 mmol/L    Carbon Dioxide (CO2) 26 20 - 32 mmol/L    Anion Gap 6 3 - 14 mmol/L    Urea Nitrogen 7 7 - 30 mg/dL    Creatinine 0.63 0.52 - 1.04 mg/dL    Calcium 8.6 8.5 - 10.1 mg/dL    Glucose 84 70 - 99 mg/dL    Alkaline Phosphatase 44 40 - 150 U/L    AST 59 (H) 0 - 45 U/L    ALT 48 0 - 50 U/L    Protein Total 7.0 6.8 - 8.8 g/dL    Albumin 3.3 (L) 3.4 - 5.0 g/dL    Bilirubin Total 1.0 0.2 - 1.3 mg/dL    GFR Estimate >90 >60 mL/min/1.73m2   Lipid panel     Collection Time: 03/17/22  6:22 AM   Result Value Ref Range    Cholesterol 145 <200 mg/dL    Triglycerides 43 <150 mg/dL    Direct Measure HDL 94 >=50 mg/dL    LDL Cholesterol Calculated 42 <=100 mg/dL    Non HDL Cholesterol 51 <130 mg/dL   TSH with free T4 reflex and/or T3 as indicated    Collection Time: 03/17/22  6:22 AM   Result Value Ref Range    TSH 12.33 (H) 0.40 - 4.00 mU/L   CBC with platelets and differential    Collection Time: 03/17/22  6:22 AM   Result Value Ref Range    WBC Count 4.7 4.0 - 11.0 10e3/uL    RBC Count 3.88 3.80 - 5.20 10e6/uL    Hemoglobin 13.1 11.7 - 15.7 g/dL    Hematocrit 36.9 35.0 - 47.0 %    MCV 95 78 - 100 fL    MCH 33.8 (H) 26.5 - 33.0 pg    MCHC 35.5 31.5 - 36.5 g/dL    RDW 13.0 10.0 - 15.0 %    Platelet Count 144 (L) 150 - 450 10e3/uL    % Neutrophils 28 %    % Lymphocytes 57 %    % Monocytes 12 %    % Eosinophils 2 %    % Basophils 1 %    % Immature Granulocytes 0 %    NRBCs per 100 WBC 0 <1 /100    Absolute Neutrophils 1.3 (L) 1.6 - 8.3 10e3/uL    Absolute Lymphocytes 2.7 0.8 - 5.3 10e3/uL    Absolute Monocytes 0.6 0.0 - 1.3 10e3/uL    Absolute Eosinophils 0.1 0.0 - 0.7 10e3/uL    Absolute Basophils 0.0 0.0 - 0.2 10e3/uL    Absolute Immature Granulocytes 0.0 <=0.4 10e3/uL    Absolute NRBCs 0.0 10e3/uL   T4 free    Collection Time: 03/17/22  6:22 AM   Result Value Ref Range    Free T4 0.75 (L) 0.76 - 1.46 ng/dL   Potassium    Collection Time: 03/18/22  6:36 AM   Result Value Ref Range    Potassium 3.6 3.4 - 5.3 mmol/L   Extra Purple Top Tube    Collection Time: 03/18/22  6:36 AM   Result Value Ref Range    Hold Specimen           Recent Results (from the past 240 hour(s))   Alcohol breath test POCT    Collection Time: 03/16/22 11:14 AM   Result Value Ref Range    Alcohol Breath Test 0.148 (A) 0.00 - 0.01   Drug abuse screen 1 urine (ED)    Collection Time: 03/16/22 11:27 AM   Result Value Ref Range    Amphetamines Urine Screen Negative Screen Negative    Barbiturates Urine  Screen Negative Screen Negative    Benzodiazepines Urine Screen Positive (A) Screen Negative    Cannabinoids Urine Screen Negative Screen Negative    Cocaine Urine Screen Negative Screen Negative    Opiates Urine Screen Negative Screen Negative   Comprehensive metabolic panel    Collection Time: 03/16/22 12:23 PM   Result Value Ref Range    Sodium 138 133 - 144 mmol/L    Potassium 3.3 (L) 3.4 - 5.3 mmol/L    Chloride 107 94 - 109 mmol/L    Carbon Dioxide (CO2) 22 20 - 32 mmol/L    Anion Gap 9 3 - 14 mmol/L    Urea Nitrogen 8 7 - 30 mg/dL    Creatinine 0.66 0.52 - 1.04 mg/dL    Calcium 8.4 (L) 8.5 - 10.1 mg/dL    Glucose 101 (H) 70 - 99 mg/dL    Alkaline Phosphatase 43 40 - 150 U/L    AST 60 (H) 0 - 45 U/L    ALT 53 (H) 0 - 50 U/L    Protein Total 6.9 6.8 - 8.8 g/dL    Albumin 3.3 (L) 3.4 - 5.0 g/dL    Bilirubin Total 0.5 0.2 - 1.3 mg/dL    GFR Estimate >90 >60 mL/min/1.73m2   Asymptomatic COVID-19 Virus (Coronavirus) by PCR Nasopharyngeal    Collection Time: 03/16/22 12:23 PM    Specimen: Nasopharyngeal; Swab   Result Value Ref Range    SARS CoV2 PCR Negative Negative   Magnesium    Collection Time: 03/16/22 12:23 PM   Result Value Ref Range    Magnesium 2.0 1.6 - 2.3 mg/dL   CBC with platelets and differential    Collection Time: 03/16/22 12:23 PM   Result Value Ref Range    WBC Count 5.3 4.0 - 11.0 10e3/uL    RBC Count 3.72 (L) 3.80 - 5.20 10e6/uL    Hemoglobin 12.6 11.7 - 15.7 g/dL    Hematocrit 36.0 35.0 - 47.0 %    MCV 97 78 - 100 fL    MCH 33.9 (H) 26.5 - 33.0 pg    MCHC 35.0 31.5 - 36.5 g/dL    RDW 13.5 10.0 - 15.0 %    Platelet Count 172 150 - 450 10e3/uL    % Neutrophils 42 %    % Lymphocytes 45 %    % Monocytes 11 %    % Eosinophils 1 %    % Basophils 1 %    % Immature Granulocytes 0 %    NRBCs per 100 WBC 0 <1 /100    Absolute Neutrophils 2.2 1.6 - 8.3 10e3/uL    Absolute Lymphocytes 2.4 0.8 - 5.3 10e3/uL    Absolute Monocytes 0.6 0.0 - 1.3 10e3/uL    Absolute Eosinophils 0.1 0.0 - 0.7 10e3/uL    Absolute  Basophils 0.0 0.0 - 0.2 10e3/uL    Absolute Immature Granulocytes 0.0 <=0.4 10e3/uL    Absolute NRBCs 0.0 10e3/uL   Comprehensive metabolic panel    Collection Time: 03/17/22  6:22 AM   Result Value Ref Range    Sodium 139 133 - 144 mmol/L    Potassium 3.3 (L) 3.4 - 5.3 mmol/L    Chloride 107 94 - 109 mmol/L    Carbon Dioxide (CO2) 26 20 - 32 mmol/L    Anion Gap 6 3 - 14 mmol/L    Urea Nitrogen 7 7 - 30 mg/dL    Creatinine 0.63 0.52 - 1.04 mg/dL    Calcium 8.6 8.5 - 10.1 mg/dL    Glucose 84 70 - 99 mg/dL    Alkaline Phosphatase 44 40 - 150 U/L    AST 59 (H) 0 - 45 U/L    ALT 48 0 - 50 U/L    Protein Total 7.0 6.8 - 8.8 g/dL    Albumin 3.3 (L) 3.4 - 5.0 g/dL    Bilirubin Total 1.0 0.2 - 1.3 mg/dL    GFR Estimate >90 >60 mL/min/1.73m2   Lipid panel    Collection Time: 03/17/22  6:22 AM   Result Value Ref Range    Cholesterol 145 <200 mg/dL    Triglycerides 43 <150 mg/dL    Direct Measure HDL 94 >=50 mg/dL    LDL Cholesterol Calculated 42 <=100 mg/dL    Non HDL Cholesterol 51 <130 mg/dL   TSH with free T4 reflex and/or T3 as indicated    Collection Time: 03/17/22  6:22 AM   Result Value Ref Range    TSH 12.33 (H) 0.40 - 4.00 mU/L   CBC with platelets and differential    Collection Time: 03/17/22  6:22 AM   Result Value Ref Range    WBC Count 4.7 4.0 - 11.0 10e3/uL    RBC Count 3.88 3.80 - 5.20 10e6/uL    Hemoglobin 13.1 11.7 - 15.7 g/dL    Hematocrit 36.9 35.0 - 47.0 %    MCV 95 78 - 100 fL    MCH 33.8 (H) 26.5 - 33.0 pg    MCHC 35.5 31.5 - 36.5 g/dL    RDW 13.0 10.0 - 15.0 %    Platelet Count 144 (L) 150 - 450 10e3/uL    % Neutrophils 28 %    % Lymphocytes 57 %    % Monocytes 12 %    % Eosinophils 2 %    % Basophils 1 %    % Immature Granulocytes 0 %    NRBCs per 100 WBC 0 <1 /100    Absolute Neutrophils 1.3 (L) 1.6 - 8.3 10e3/uL    Absolute Lymphocytes 2.7 0.8 - 5.3 10e3/uL    Absolute Monocytes 0.6 0.0 - 1.3 10e3/uL    Absolute Eosinophils 0.1 0.0 - 0.7 10e3/uL    Absolute Basophils 0.0 0.0 - 0.2 10e3/uL    Absolute  Immature Granulocytes 0.0 <=0.4 10e3/uL    Absolute NRBCs 0.0 10e3/uL   T4 free    Collection Time: 03/17/22  6:22 AM   Result Value Ref Range    Free T4 0.75 (L) 0.76 - 1.46 ng/dL   Potassium    Collection Time: 03/18/22  6:36 AM   Result Value Ref Range    Potassium 3.6 3.4 - 5.3 mmol/L   Extra Purple Top Tube    Collection Time: 03/18/22  6:36 AM   Result Value Ref Range    Hold Specimen              Because this patient meets criteria for an Alcohol Use Disorder, I performed the following brief intervention on the date of this note:              1) Expressed concern that the patient is drinking at unhealthy levels known to increase their risk of alcohol related problems              2) Gave feedback linking alcohol use and health, including personalized feedback explaining how alcohol use can interact with their medical and/or psychiatric problems, and with prescribed medications.              3) Advised patient to abstain.    PT counseled on nicotine cessation and nicotine replacement provided    Discussed with patient many issues of addiction,triggers, relapse, and establishing a solid recovery program.    DISCHARGE MENTAL STATUS EXAMINATION:  The patient is alert, oriented x3.  Good fund of knowledge.  Good use of language.  Recent and remote memory, language, fund of knowledge are all adequate.  Euthymic mood congruent affect  Speech normal rate/rhythm linear tp no loose asso,The patient does not have any active suicidal or homicidal ideation.  Does not have any auditory or visual hallucination.  Fair insight/judgment At this time, the patient was stable to be discharged.        Pt was not determined to not be a danger to himself or others. At the current time of discharge, the patient does not meet criteria for involuntary hospitalization. On the day of discharge, the patient reports that they do not have suicidal or homicidal ideation and would never hurt themselves or others. Steps taken to minimize risk  include: assessing patient s behavior and thought process daily during hospital stay, discharging patient with adequate plan for follow up for mental and physical health and discussing safety plan of returning to the hospital should the patient ever have thoughts of harming themselves or others. Therefore, based on all available evidence including the factors cited above, the patient does not appear to be at imminent risk for self-harm, and is appropriate for outpatient level of care.     Educated about side effects/risk vs benefits /alternative including non treatment.Pt consented to be on medication.     .Total time spent on discharge summary more than 35 min  More than  20 min  planning, coordination of care, medication reconciliation and performance of physical exam on day of discharge.Care was coordinated with unit RN and unit therapist         Review of your medicines      START taking      Dose / Directions   clindamycin 150 MG capsule  Commonly known as: CLEOCIN      Dose: 450 mg  Take 3 capsules (450 mg) by mouth 3 times daily for 10 days  Quantity: 90 capsule  Refills: 0     multivitamin w/minerals tablet      Dose: 1 tablet  Start taking on: March 19, 2022  Take 1 tablet by mouth daily  Quantity: 30 tablet  Refills: 0     thiamine 100 MG tablet  Commonly known as: B-1      Dose: 100 mg  Start taking on: March 19, 2022  Take 1 tablet (100 mg) by mouth daily  Quantity: 30 tablet  Refills: 0     traZODone 50 MG tablet  Commonly known as: DESYREL      Dose: 50 mg  Take 1 tablet (50 mg) by mouth nightly as needed for sleep (may repeat after 60 minutes)  Quantity: 30 tablet  Refills: 0        CONTINUE these medicines which may have CHANGED, or have new prescriptions. If we are uncertain of the size of tablets/capsules you have at home, strength may be listed as something that might have changed.      Dose / Directions   * omeprazole 20 MG tablet  This may have changed: when to take this  Used for: GERD  (gastroesophageal reflux disease)      Dose: 20 mg  Take 1 tablet (20 mg) by mouth daily Take 30-60 minutes before a meal.  Quantity: 30 tablet  Refills: 1     * omeprazole 40 MG DR capsule  Commonly known as: priLOSEC  This may have changed: Another medication with the same name was changed. Make sure you understand how and when to take each.      Dose: 1 capsule  Take 1 capsule by mouth daily  Refills: 0         * This list has 2 medication(s) that are the same as other medications prescribed for you. Read the directions carefully, and ask your doctor or other care provider to review them with you.            CONTINUE these medicines which have NOT CHANGED      Dose / Directions   albuterol 108 (90 Base) MCG/ACT inhaler  Commonly known as: PROAIR HFA/PROVENTIL HFA/VENTOLIN HFA      Dose: 2 puff  Inhale 2 puffs into the lungs every 6 hours as needed for shortness of breath / dyspnea or wheezing  Quantity: 1 Inhaler  Refills: 0     * gabapentin 100 MG capsule  Commonly known as: Neurontin  Used for: Severe anxiety with panic      Dose: 100 mg  Take 1 capsule (100 mg) by mouth 3 times daily as needed  Quantity: 90 capsule  Refills: 2     * gabapentin 300 MG capsule  Commonly known as: Neurontin  Used for: OMARI (generalized anxiety disorder)      Take 600 mg in the morning, take 300 mg in the afternoon, and 600 mg in the evening  Quantity: 140 capsule  Refills: 1     * gabapentin 600 MG tablet  Commonly known as: NEURONTIN      Dose: 600 mg  Take 600 mg by mouth 3 times daily  Refills: 0     levothyroxine 100 MCG tablet  Commonly known as: SYNTHROID/LEVOTHROID      Dose: 1 tablet  Take 1 tablet by mouth daily  Refills: 0     nicotine polacrilex 4 MG gum  Commonly known as: NICORETTE  Used for: Nicotine abuse      Dose: 4 mg  Place 1 each (4 mg) inside cheek as needed for smoking cessation  Quantity: 270 tablet  Refills: 1     ondansetron 4 MG ODT tab  Commonly known as: ZOFRAN-ODT      Dose: 4 mg  Take 4 mg by mouth as  needed  Refills: 0     propranolol 10 MG tablet  Commonly known as: INDERAL      Dose: 1 tablet  Take 1 tablet by mouth 2 times daily  Refills: 0     QUEtiapine 25 MG tablet  Commonly known as: SEROquel  Used for: Mood disorder, Posttraumatic stress disorder      Dose: 25 mg  Take 1 tablet (25 mg) by mouth At Bedtime  Quantity: 30 tablet  Refills: 2         * This list has 3 medication(s) that are the same as other medications prescribed for you. Read the directions carefully, and ask your doctor or other care provider to review them with you.            STOP taking    eszopiclone 2 MG tablet  Commonly known as: LUNESTA        gabapentin enacarbil 600 MG tablet  Commonly known as: HORIZANT        prazosin 1 MG capsule  Commonly known as: MINIPRESS              Where to get your medicines      These medications were sent to [facility] [place], [place] - [address]  [address] [address]    Phone: [phone]     clindamycin 150 MG capsule    multivitamin w/minerals tablet    thiamine 100 MG tablet    traZODone 50 MG tablet          Disposition: home     Facts about COVID19 at www.cdc.gov/COVID19 and www.[place].gov/covid19     Keeping hands clean is one of the most important steps we can take to avoid getting sick and spreading germs to others.  Please wash your hands frequently and lather with soap for at least 20 seconds!     Medical Follow-Up:  Follow-up Appointment:   Appointment Date/Time:Thursday March 24 th 2022 at 1 pm.   Psychiatrist/Primary Care Giver:Dr. Tomas Brown   Address: [facility] [address], [place] [address]                                                                                                                                              Phone Number: [phone]       Treatment  "Follow-Up:[facility]  .        \"Much or all of the text in this note was generated through the use of Dragon Dictate voice to text software. Errors in spelling or words which appear to be out of contact are unintentional, may be present due having escaped editing\"     "

## 2022-03-19 DIAGNOSIS — Z71.89 OTHER SPECIFIED COUNSELING: ICD-10-CM

## 2022-03-21 ENCOUNTER — PATIENT OUTREACH (OUTPATIENT)
Dept: CARE COORDINATION | Facility: CLINIC | Age: 62
End: 2022-03-21
Payer: MEDICARE

## 2022-03-21 NOTE — PROGRESS NOTES
Clinic Care Coordination Contact  [facility]: Post-Discharge Note  SITUATION                                                      Admission:    Admission Date: 03/16/22   Reason for Admission: Alcohol withdrawal, uncomplicated (H)Sinusitis, unspecified chronicity, unspecified location  Discharge:   Discharge Date: 03/18/22  Discharge Diagnosis: Alcohol withdrawal, uncomplicated (H)Sinusitis, unspecified chronicity, unspecified location    BACKGROUND                                                      Per hospital discharge summary and inpatient provider notes:  PATIENT was admitted to Station 3Awith attending  under DR mukherjee, please review the detailed admit note on 3/17/22   The patient was placed under status 15 (15 minute checks) to ensure patient safety.   MSSA protocol was initiated due to the patient's history of alcohol abuse and concern for withdrawal symptoms.  CBC, BMP and utox obtained.     All outpatient medications were continued     PATIENTdid participate in groups and was visible in the milieu.      The patient's symptoms of alochol withdrawal  improved.     Patients energy motivation , sleep appetite improved.  Pt completed detox . It was un eventful.        Discussed with patient medications for craving.  Spoke with patient about triggers coping skills relapse prevention.     CONSULTS DONE DURING PATIENTS HOSPITALIZATION.  Patient was seen by medicine on date3/17/22     This as per their medical consult  Jolynn Rivera is a 61 year old female admitted on 3/16/2022. She has a history significant for alcohol use disorder with hx of withdrawal seizure, HTN, hepatitis C, COPD, anxiety who is admitted to station 3A for detox from alcohol.     # Alcohol use disorder   # Alcohol withdrawal   Hx of withdrawal seizure, last being one week ago. Blood pressures elevated on admission as below. Mild elevation in LFTs as below.    - Management per psychiatry   - Agree with multivitamin, thiamine and  "folic acid      # Seizure disorder   Patient reports she may be having \"small seizure\" when she is not in withdrawal. Reports last seizure was one week ago 2/2 withdrawal. She notes she is follow up with neurology at [facility] and declined referral to neurology or neuro workup at [facility].   - Follow up with neurology with [facility] as previously planned   - Seizure precautions      # HTN   PTA only on propranolol for blood pressure management. BP elevated on admission, likely secondary to withdrawal.   - Continue propranolol   - Clonidine 0.1 mg BID PRN for SBP > 180 or DBP > 110   - Notify medicine if BP remains > 170/100 on completion of withdrawal      # Sinusitis   # Maxillary molar periapical abscess   CT head at OSH 2/16/22 revealed right maxillary sinus mucosal thickening and underlying right maxillary molar periapical abscess concerning for odontogenic sinusitis. Patient denies any rhinorrhea, sinus pain or headache.  - Prescription for clindamycin sent to patient's outpatient pharmacy per ED physician     #Transaminitis   AST 60, ALT 53, alk phos and bilirubin within normal limits.  LFTs slightly downtrending today. Hx of hep C which patient reports completing treatment for. Patient denies any abdominal pain nausea or vomiting.   -Close follow-up with PCP on discharge for repeat CMP     #Hypokalemia  Potassium 3.3 in the ED replaced with 20 mEq.  Repeat potassium today remains 3.3.  -Replaced with 40 mEq K  -Repeat K in AM     #Hypothyroidism  TSH 12.33, free T4 0.75 (mildly low).  PTA on levothyroxine 100 mcg daily.  Patient reports she has not been compliant with levothyroxine.  -Continue levothyroxine 100 mcg daily  -Follow-up with PCP for repeat thyroid function test within 4 weeks     #COPD   No evidence of bronchospasm on exam.   - Continue PTA albuterol PRN      #GERD   - Continue PTA omeprazole 40 mg daily      Medicine will follow up potassium in AM and blood pressures peripherally. Please contact with any " other questions or concerns.     ASSESSMENT      Enrollment  Primary Care Care Coordination Status: Not a Candidate    Discharge Assessment  How are you doing now that you are home?: Pt said she is feeling okay. No current questions or concerns about discharge instructions or anything. Pt is reconnecting with community activities and is feeling a lot better.  How are your symptoms? (Red Flag symptoms escalate to triage hotline per guidelines): Improved  Do you feel your condition is stable enough to be safe at home until your provider visit?: Yes  Does the patient have their discharge instructions? : Yes  Does the patient have questions regarding their discharge instructions? : No  Were you started on any new medications or were there changes to any of your previous medications? : Yes  Does the patient have all of their medications?: Yes  Do you have questions regarding any of your medications? : No  Do you have all of your needed medical supplies or equipment (DME)?  (i.e. oxygen tank, CPAP, cane, etc.): Yes  Discharge follow-up appointment scheduled within 14 calendar days? : Yes  Discharge Follow Up Appointment Date: 04/13/22  Discharge Follow Up Appointment Scheduled with?: Specialty Care Provider              PLAN                                                      Outpatient Plan:  Follow-up Appointment:   Appointment Date/Time:Thursday March 24 th 2022 at 1 pm.   Psychiatrist/Primary Care Giver:Dr. Tomas Brown   Address: [facility]    No future appointments.      For any urgent concerns, please contact our 24 hour nurse triage line: [phone]         COLLIN Eldridge  [phone]  [facility]

## 2022-04-20 ENCOUNTER — TELEPHONE (OUTPATIENT)
Dept: BEHAVIORAL HEALTH | Facility: CLINIC | Age: 62
End: 2022-04-20

## 2022-04-20 ENCOUNTER — HOSPITAL ENCOUNTER (INPATIENT)
Facility: CLINIC | Age: 62
LOS: 2 days | Discharge: HOME | DRG: 897 | End: 2022-04-22
Attending: EMERGENCY MEDICINE | Admitting: PSYCHIATRY & NEUROLOGY
Payer: MEDICARE

## 2022-04-20 DIAGNOSIS — Z11.52 ENCOUNTER FOR SCREENING LABORATORY TESTING FOR SEVERE ACUTE RESPIRATORY SYNDROME CORONAVIRUS 2 (SARS-COV-2): ICD-10-CM

## 2022-04-20 DIAGNOSIS — F10.930 ALCOHOL WITHDRAWAL SYNDROME WITHOUT COMPLICATION (H): ICD-10-CM

## 2022-04-20 LAB
ALBUMIN SERPL-MCNC: 4.1 G/DL (ref 3.4–5)
ALCOHOL BREATH TEST: 0.04 (ref 0–0.01)
ALP SERPL-CCNC: 50 U/L (ref 40–150)
ALT SERPL W P-5'-P-CCNC: 23 U/L (ref 0–50)
AMPHETAMINES UR QL SCN: ABNORMAL
ANION GAP SERPL CALCULATED.3IONS-SCNC: 17 MMOL/L (ref 3–14)
AST SERPL W P-5'-P-CCNC: 36 U/L (ref 0–45)
BARBITURATES UR QL: ABNORMAL
BASOPHILS # BLD AUTO: 0.1 10E3/UL (ref 0–0.2)
BASOPHILS NFR BLD AUTO: 1 %
BENZODIAZ UR QL: ABNORMAL
BILIRUB SERPL-MCNC: 0.6 MG/DL (ref 0.2–1.3)
BUN SERPL-MCNC: 12 MG/DL (ref 7–30)
CALCIUM SERPL-MCNC: 9.6 MG/DL (ref 8.5–10.1)
CANNABINOIDS UR QL SCN: ABNORMAL
CHLORIDE BLD-SCNC: 101 MMOL/L (ref 94–109)
CO2 SERPL-SCNC: 21 MMOL/L (ref 20–32)
COCAINE UR QL: ABNORMAL
CREAT SERPL-MCNC: 0.64 MG/DL (ref 0.52–1.04)
EOSINOPHIL # BLD AUTO: 0 10E3/UL (ref 0–0.7)
EOSINOPHIL NFR BLD AUTO: 0 %
ERYTHROCYTE [DISTWIDTH] IN BLOOD BY AUTOMATED COUNT: 12.4 % (ref 10–15)
GFR SERPL CREATININE-BSD FRML MDRD: >90 ML/MIN/1.73M2
GLUCOSE BLD-MCNC: 75 MG/DL (ref 70–99)
HCT VFR BLD AUTO: 42 % (ref 35–47)
HGB BLD-MCNC: 14.6 G/DL (ref 11.7–15.7)
HOLD SPECIMEN: NORMAL
HOLD SPECIMEN: NORMAL
IMM GRANULOCYTES # BLD: 0 10E3/UL
IMM GRANULOCYTES NFR BLD: 0 %
LYMPHOCYTES # BLD AUTO: 2 10E3/UL (ref 0.8–5.3)
LYMPHOCYTES NFR BLD AUTO: 25 %
MCH RBC QN AUTO: 33.3 PG (ref 26.5–33)
MCHC RBC AUTO-ENTMCNC: 34.8 G/DL (ref 31.5–36.5)
MCV RBC AUTO: 96 FL (ref 78–100)
MONOCYTES # BLD AUTO: 0.9 10E3/UL (ref 0–1.3)
MONOCYTES NFR BLD AUTO: 11 %
NEUTROPHILS # BLD AUTO: 5 10E3/UL (ref 1.6–8.3)
NEUTROPHILS NFR BLD AUTO: 63 %
NRBC # BLD AUTO: 0 10E3/UL
NRBC BLD AUTO-RTO: 0 /100
OPIATES UR QL SCN: ABNORMAL
PLATELET # BLD AUTO: 214 10E3/UL (ref 150–450)
POTASSIUM BLD-SCNC: 3.8 MMOL/L (ref 3.4–5.3)
PROT SERPL-MCNC: 8.6 G/DL (ref 6.8–8.8)
RBC # BLD AUTO: 4.38 10E6/UL (ref 3.8–5.2)
SARS-COV-2 RNA RESP QL NAA+PROBE: NEGATIVE
SODIUM SERPL-SCNC: 139 MMOL/L (ref 133–144)
WBC # BLD AUTO: 7.9 10E3/UL (ref 4–11)

## 2022-04-20 PROCEDURE — 96374 THER/PROPH/DIAG INJ IV PUSH: CPT

## 2022-04-20 PROCEDURE — 80053 COMPREHEN METABOLIC PANEL: CPT | Performed by: EMERGENCY MEDICINE

## 2022-04-20 PROCEDURE — 80307 DRUG TEST PRSMV CHEM ANLYZR: CPT | Performed by: EMERGENCY MEDICINE

## 2022-04-20 PROCEDURE — 96361 HYDRATE IV INFUSION ADD-ON: CPT

## 2022-04-20 PROCEDURE — 250N000013 HC RX MED GY IP 250 OP 250 PS 637: Performed by: PSYCHIATRY & NEUROLOGY

## 2022-04-20 PROCEDURE — 250N000011 HC RX IP 250 OP 636: Performed by: PSYCHIATRY & NEUROLOGY

## 2022-04-20 PROCEDURE — HZ2ZZZZ DETOXIFICATION SERVICES FOR SUBSTANCE ABUSE TREATMENT: ICD-10-PCS | Performed by: EMERGENCY MEDICINE

## 2022-04-20 PROCEDURE — 99285 EMERGENCY DEPT VISIT HI MDM: CPT | Performed by: EMERGENCY MEDICINE

## 2022-04-20 PROCEDURE — 96376 TX/PRO/DX INJ SAME DRUG ADON: CPT

## 2022-04-20 PROCEDURE — 258N000003 HC RX IP 258 OP 636: Performed by: EMERGENCY MEDICINE

## 2022-04-20 PROCEDURE — 99285 EMERGENCY DEPT VISIT HI MDM: CPT | Mod: 25

## 2022-04-20 PROCEDURE — 85025 COMPLETE CBC W/AUTO DIFF WBC: CPT | Performed by: EMERGENCY MEDICINE

## 2022-04-20 PROCEDURE — 250N000013 HC RX MED GY IP 250 OP 250 PS 637: Performed by: EMERGENCY MEDICINE

## 2022-04-20 PROCEDURE — 128N000004 HC R&B CD ADULT

## 2022-04-20 PROCEDURE — U0005 INFEC AGEN DETEC AMPLI PROBE: HCPCS | Performed by: EMERGENCY MEDICINE

## 2022-04-20 PROCEDURE — 82075 ASSAY OF BREATH ETHANOL: CPT

## 2022-04-20 PROCEDURE — C9803 HOPD COVID-19 SPEC COLLECT: HCPCS

## 2022-04-20 PROCEDURE — 36415 COLL VENOUS BLD VENIPUNCTURE: CPT | Performed by: EMERGENCY MEDICINE

## 2022-04-20 PROCEDURE — 250N000011 HC RX IP 250 OP 636: Performed by: EMERGENCY MEDICINE

## 2022-04-20 RX ORDER — FOLIC ACID 1 MG/1
1 TABLET ORAL DAILY
Status: DISCONTINUED | OUTPATIENT
Start: 2022-04-20 | End: 2022-04-20

## 2022-04-20 RX ORDER — OLANZAPINE 10 MG/2ML
10 INJECTION, POWDER, FOR SOLUTION INTRAMUSCULAR 3 TIMES DAILY PRN
Status: DISCONTINUED | OUTPATIENT
Start: 2022-04-20 | End: 2022-04-21

## 2022-04-20 RX ORDER — ACETAMINOPHEN 325 MG/1
650 TABLET ORAL EVERY 4 HOURS PRN
Status: DISCONTINUED | OUTPATIENT
Start: 2022-04-20 | End: 2022-04-22 | Stop reason: HOSPADM

## 2022-04-20 RX ORDER — FOLIC ACID 1 MG/1
1 TABLET ORAL DAILY
Status: DISCONTINUED | OUTPATIENT
Start: 2022-04-20 | End: 2022-04-22 | Stop reason: HOSPADM

## 2022-04-20 RX ORDER — MULTIPLE VITAMINS W/ MINERALS TAB 9MG-400MCG
1 TAB ORAL DAILY
Status: DISCONTINUED | OUTPATIENT
Start: 2022-04-20 | End: 2022-04-22 | Stop reason: HOSPADM

## 2022-04-20 RX ORDER — AMOXICILLIN 250 MG
1 CAPSULE ORAL 2 TIMES DAILY PRN
Status: DISCONTINUED | OUTPATIENT
Start: 2022-04-20 | End: 2022-04-22 | Stop reason: HOSPADM

## 2022-04-20 RX ORDER — ESZOPICLONE 2 MG/1
2 TABLET, FILM COATED ORAL AT BEDTIME
COMMUNITY
End: 2022-04-20

## 2022-04-20 RX ORDER — MULTIPLE VITAMINS W/ MINERALS TAB 9MG-400MCG
1 TAB ORAL DAILY
Status: DISCONTINUED | OUTPATIENT
Start: 2022-04-20 | End: 2022-04-20

## 2022-04-20 RX ORDER — ONDANSETRON 4 MG/1
4 TABLET, ORALLY DISINTEGRATING ORAL ONCE
Status: COMPLETED | OUTPATIENT
Start: 2022-04-20 | End: 2022-04-20

## 2022-04-20 RX ORDER — QUETIAPINE FUMARATE 25 MG/1
25 TABLET, FILM COATED ORAL AT BEDTIME
Status: DISCONTINUED | OUTPATIENT
Start: 2022-04-20 | End: 2022-04-22 | Stop reason: HOSPADM

## 2022-04-20 RX ORDER — ESZOPICLONE 2 MG/1
2 TABLET, FILM COATED ORAL AT BEDTIME
Status: ON HOLD | COMMUNITY
Start: 2022-04-25 | End: 2022-04-22

## 2022-04-20 RX ORDER — DIAZEPAM 5 MG
5 TABLET ORAL ONCE
Status: COMPLETED | OUTPATIENT
Start: 2022-04-20 | End: 2022-04-20

## 2022-04-20 RX ORDER — TRAZODONE HYDROCHLORIDE 50 MG/1
50 TABLET, FILM COATED ORAL
Status: DISCONTINUED | OUTPATIENT
Start: 2022-04-20 | End: 2022-04-22 | Stop reason: HOSPADM

## 2022-04-20 RX ORDER — ESZOPICLONE 2 MG/1
2 TABLET, FILM COATED ORAL AT BEDTIME
Status: DISCONTINUED | OUTPATIENT
Start: 2022-04-25 | End: 2022-04-21

## 2022-04-20 RX ORDER — AMLODIPINE BESYLATE 10 MG/1
10 TABLET ORAL DAILY
Status: DISCONTINUED | OUTPATIENT
Start: 2022-04-20 | End: 2022-04-21

## 2022-04-20 RX ORDER — GABAPENTIN 600 MG/1
600 TABLET ORAL 3 TIMES DAILY
Status: DISCONTINUED | OUTPATIENT
Start: 2022-04-20 | End: 2022-04-22 | Stop reason: HOSPADM

## 2022-04-20 RX ORDER — ACETAMINOPHEN 325 MG/1
650 TABLET ORAL ONCE
Status: COMPLETED | OUTPATIENT
Start: 2022-04-20 | End: 2022-04-20

## 2022-04-20 RX ORDER — ONDANSETRON 4 MG/1
4 TABLET, ORALLY DISINTEGRATING ORAL EVERY 6 HOURS PRN
Status: DISCONTINUED | OUTPATIENT
Start: 2022-04-20 | End: 2022-04-22 | Stop reason: HOSPADM

## 2022-04-20 RX ORDER — SODIUM CHLORIDE 9 MG/ML
INJECTION, SOLUTION INTRAVENOUS CONTINUOUS
Status: DISCONTINUED | OUTPATIENT
Start: 2022-04-20 | End: 2022-04-22 | Stop reason: HOSPADM

## 2022-04-20 RX ORDER — ALBUTEROL SULFATE 90 UG/1
2 AEROSOL, METERED RESPIRATORY (INHALATION) EVERY 6 HOURS PRN
Status: DISCONTINUED | OUTPATIENT
Start: 2022-04-20 | End: 2022-04-22 | Stop reason: HOSPADM

## 2022-04-20 RX ORDER — ATENOLOL 50 MG/1
50 TABLET ORAL DAILY PRN
Status: DISCONTINUED | OUTPATIENT
Start: 2022-04-20 | End: 2022-04-21

## 2022-04-20 RX ORDER — HYDROXYZINE HYDROCHLORIDE 25 MG/1
25 TABLET, FILM COATED ORAL EVERY 4 HOURS PRN
Status: DISCONTINUED | OUTPATIENT
Start: 2022-04-20 | End: 2022-04-22 | Stop reason: HOSPADM

## 2022-04-20 RX ORDER — LOPERAMIDE HCL 2 MG
2 CAPSULE ORAL 4 TIMES DAILY PRN
Status: DISCONTINUED | OUTPATIENT
Start: 2022-04-20 | End: 2022-04-22 | Stop reason: HOSPADM

## 2022-04-20 RX ORDER — OLANZAPINE 10 MG/1
10 TABLET ORAL 3 TIMES DAILY PRN
Status: DISCONTINUED | OUTPATIENT
Start: 2022-04-20 | End: 2022-04-21

## 2022-04-20 RX ORDER — LORAZEPAM 1 MG/1
1-4 TABLET ORAL EVERY 30 MIN PRN
Status: DISCONTINUED | OUTPATIENT
Start: 2022-04-20 | End: 2022-04-21

## 2022-04-20 RX ORDER — LEVOTHYROXINE SODIUM 100 UG/1
100 TABLET ORAL DAILY
Status: DISCONTINUED | OUTPATIENT
Start: 2022-04-20 | End: 2022-04-22 | Stop reason: HOSPADM

## 2022-04-20 RX ORDER — PROPRANOLOL HYDROCHLORIDE 20 MG/1
20 TABLET ORAL 2 TIMES DAILY
Status: DISCONTINUED | OUTPATIENT
Start: 2022-04-20 | End: 2022-04-22 | Stop reason: HOSPADM

## 2022-04-20 RX ORDER — AMLODIPINE BESYLATE 10 MG/1
10 TABLET ORAL DAILY
COMMUNITY
End: 2022-04-22

## 2022-04-20 RX ORDER — MAGNESIUM HYDROXIDE/ALUMINUM HYDROXICE/SIMETHICONE 120; 1200; 1200 MG/30ML; MG/30ML; MG/30ML
30 SUSPENSION ORAL EVERY 4 HOURS PRN
Status: DISCONTINUED | OUTPATIENT
Start: 2022-04-20 | End: 2022-04-22 | Stop reason: HOSPADM

## 2022-04-20 RX ORDER — ONDANSETRON 2 MG/ML
4 INJECTION INTRAMUSCULAR; INTRAVENOUS EVERY 30 MIN PRN
Status: DISCONTINUED | OUTPATIENT
Start: 2022-04-20 | End: 2022-04-20

## 2022-04-20 RX ORDER — DIAZEPAM 5 MG
5-20 TABLET ORAL EVERY 30 MIN PRN
Status: DISCONTINUED | OUTPATIENT
Start: 2022-04-20 | End: 2022-04-20 | Stop reason: ALTCHOICE

## 2022-04-20 RX ADMIN — LORAZEPAM 2 MG: 1 TABLET ORAL at 21:07

## 2022-04-20 RX ADMIN — OMEPRAZOLE 40 MG: 20 CAPSULE, DELAYED RELEASE ORAL at 21:06

## 2022-04-20 RX ADMIN — ONDANSETRON 4 MG: 4 TABLET, ORALLY DISINTEGRATING ORAL at 18:25

## 2022-04-20 RX ADMIN — ONDANSETRON 4 MG: 2 INJECTION INTRAMUSCULAR; INTRAVENOUS at 15:08

## 2022-04-20 RX ADMIN — GABAPENTIN 600 MG: 600 TABLET, FILM COATED ORAL at 21:07

## 2022-04-20 RX ADMIN — DIAZEPAM 10 MG: 5 TABLET ORAL at 14:07

## 2022-04-20 RX ADMIN — DIAZEPAM 10 MG: 5 TABLET ORAL at 12:24

## 2022-04-20 RX ADMIN — ACETAMINOPHEN 650 MG: 325 TABLET ORAL at 15:02

## 2022-04-20 RX ADMIN — DIAZEPAM 5 MG: 5 TABLET ORAL at 16:21

## 2022-04-20 RX ADMIN — NICOTINE POLACRILEX 4 MG: 4 GUM, CHEWING ORAL at 18:32

## 2022-04-20 RX ADMIN — SODIUM CHLORIDE 1000 ML: 9 INJECTION, SOLUTION INTRAVENOUS at 15:46

## 2022-04-20 RX ADMIN — FOLIC ACID 1 MG: 1 TABLET ORAL at 12:24

## 2022-04-20 RX ADMIN — DIAZEPAM 5 MG: 5 TABLET ORAL at 15:18

## 2022-04-20 RX ADMIN — ONDANSETRON 4 MG: 2 INJECTION INTRAMUSCULAR; INTRAVENOUS at 14:07

## 2022-04-20 RX ADMIN — MULTIPLE VITAMINS W/ MINERALS TAB 1 TABLET: TAB at 12:24

## 2022-04-20 RX ADMIN — LORAZEPAM 1 MG: 1 TABLET ORAL at 18:25

## 2022-04-20 RX ADMIN — LOPERAMIDE HYDROCHLORIDE 2 MG: 2 CAPSULE ORAL at 21:06

## 2022-04-20 RX ADMIN — QUETIAPINE FUMARATE 25 MG: 25 TABLET ORAL at 21:07

## 2022-04-20 RX ADMIN — LEVOTHYROXINE SODIUM 100 MCG: 100 TABLET ORAL at 18:32

## 2022-04-20 RX ADMIN — PROPRANOLOL HYDROCHLORIDE 20 MG: 20 TABLET ORAL at 21:07

## 2022-04-20 RX ADMIN — NICOTINE POLACRILEX 4 MG: 4 GUM, CHEWING ORAL at 21:57

## 2022-04-20 RX ADMIN — ONDANSETRON 4 MG: 4 TABLET, ORALLY DISINTEGRATING ORAL at 12:24

## 2022-04-20 RX ADMIN — THIAMINE HCL TAB 100 MG 100 MG: 100 TAB at 12:24

## 2022-04-20 ASSESSMENT — ACTIVITIES OF DAILY LIVING (ADL)
TOILETING_ISSUES: NO
CONCENTRATING,_REMEMBERING_OR_MAKING_DECISIONS_DIFFICULTY: NO
DIFFICULTY_COMMUNICATING: NO
DOING_ERRANDS_INDEPENDENTLY_DIFFICULTY: NO
DRESSING/BATHING_DIFFICULTY: NO
NUMBER_OF_TIMES_PATIENT_HAS_FALLEN_WITHIN_LAST_SIX_MONTHS: 4
FALL_HISTORY_WITHIN_LAST_SIX_MONTHS: YES
CHANGE_IN_FUNCTIONAL_STATUS_SINCE_ONSET_OF_CURRENT_ILLNESS/INJURY: NO
WALKING_OR_CLIMBING_STAIRS_DIFFICULTY: NO
DIFFICULTY_EATING/SWALLOWING: NO

## 2022-04-20 ASSESSMENT — ENCOUNTER SYMPTOMS
FEVER: 0
SHORTNESS OF BREATH: 0
NAUSEA: 1
ABDOMINAL PAIN: 0

## 2022-04-20 NOTE — PLAN OF CARE
Problem: Alcohol Withdrawal  Goal: Alcohol Withdrawal Symptom Control  Outcome: Ongoing, Progressing   Goal Outcome Evaluation:

## 2022-04-20 NOTE — TELEPHONE ENCOUNTER
Pt presents in [facility] ed seeking detox.  B: Pt had etoh seizure in March prior to last detox admit.  Pt has been drinkinig for several weeks daily, states 8 beers daily.  Pt ast use 430am, breathalyzer at 11am was 0.043.  Pt is nauseous, anxious, shakes. No history DT's, no MH symptoms, no acute medical history.   A: etoh detox, calm, coopereative, caring for self.   R: katieuvali/3a CD  Patient cleared and ready for behavioral bed placement: Yes

## 2022-04-20 NOTE — PROGRESS NOTES
"Admission Note:  Patient arrived in the unit at 17:20 from [place] ED. Patient is seeking detox. \"I'm here to detox, [place] [facility] [place] 5:30 in the morning, but they don't have detox, a friend gave me a ride here.\"  Patient has a history of alcohol seizures. History of 4 falls the last 6 months. Seizure pads on floor beside patient's bed.    Patient reports that she has been drinking \"a lot.\" At least 8 beers a day.    At the time of admission, MSSA was 9, Ativan 1 mg PRN given.  At bedtime MSSA: 16,  Ativan 2 mg PRN given.  Recheck after an hour MSSA: 6.    Patient reports anxiety 8/10, depression 8/10 adding, \"I'm frustrated to come back here.\" Patient denies suicidal ideation, \"I never usually have thoughts to kill my self.\"  Patient states \"Feeling agitate with myself for coming back.\"    Patient reports, \"\"I'm padilla, I don't know if this is ok with having a roommate.\"     Nausea, prn Zofran given. Patient has a poor appetite. Currently unable \"to keep anything down.\" Patient can drink soups.    PRN Imodium given for reported diarrhea.     Elevate BP, other vital signs stable, denies pain.    Declined Amlodipine, states, \"It gives me nightmares!\"  Patient does not want trazodone prn at bedtime, says medication should be discontinued.    Staff will continue to monitor patient and support as needed.     04/20/22 2003   Vital Signs   Temp 97.5  F (36.4  C)   Temp src Temporal   Resp 16   Pulse 93   Pulse Rate Source Monitor   BP (!) 164/78   BP Location Left arm     "

## 2022-04-20 NOTE — ED NOTES
Nurse to nurse report given to Mini RN taking over Pt care on 3A.  Transport being arranged to take Pt to the unit.

## 2022-04-20 NOTE — ED NOTES
ED to Behavioral Floor Handoff    SITUATION  Jolynn Rivera is a 62 year old female who speaks English and lives in a home alone The patient arrived in the ED by private car from home with a complaint of Addiction Problem (At [facility] at 530 THIS am.Left there and her friend brought her here for Detox. Last beer 430 AM. Drinks 8 beers a day.)  .The patient's current symptoms started/worsened 1 day(s) ago and during this time the symptoms have remained the same.   In the ED, pt was diagnosed with   Final diagnoses:   None        Initial vitals were: BP: 135/77  Pulse: 93  Temp: 98.6  F (37  C)  Resp: 16  SpO2: 96 %   --------  Is the patient diabetic? No   If yes, last blood glucose? --     If yes, was this treated in the ED? --  --------  Is the patient inebriated (ETOH) No or Impaired on other substances? No  MSSA done? Yes  Last MSSA score: 12    Were withdrawal symptoms treated? N/A  Does the patient have a seizure history? Yes. If yes, date of most recent seizure: a little over a month ago  --------  Is the patient patient experiencing suicidal ideation? denies current or recent suicidal ideation     Homicidal ideation? denies current or recent homicidal ideation or behaviors.    Self-injurious behavior/urges? denies current or recent self injurious behavior or ideation.  ------  Was pt aggressive in the ED No  Was a code called No  Is the pt now cooperative? Yes  -------  Meds given in ED:   Medications   diazepam (VALIUM) tablet 5-20 mg (10 mg Oral Given 4/20/22 1224)   thiamine (B-1) tablet 100 mg (100 mg Oral Given 4/20/22 1224)   folic acid (FOLVITE) tablet 1 mg (1 mg Oral Given 4/20/22 1224)   multivitamin w/minerals (THERA-VIT-M) tablet 1 tablet (1 tablet Oral Given 4/20/22 1224)   ondansetron (ZOFRAN-ODT) ODT tab 4 mg (4 mg Oral Given 4/20/22 1224)      Family present during ED course? No  Family currently present? No    BACKGROUND  Does the patient have a cognitive impairment or developmental disability?  "No  Allergies:   Allergies   Allergen Reactions     Antihistamines, Chlorpheniramine-Type [Alkylamines]      Compazine      Lock jaw; all medications ending with -zine     Diphenhydramine      Muscle Cramps     Penicillins      Panic attack     Prochlorperazine      Muscle cramps     Trazodone Nausea and Vomiting     \" I ended up falling and feeling like I was drunk\"     Wasps [Hornets]      Swelling    .   Social demographics are   Social History     Socioeconomic History     Marital status:    Tobacco Use     Smoking status: Current Every Day Smoker     Packs/day: 0.50     Smokeless tobacco: Never Used     Tobacco comment: Starting Chantix October 2014   Substance and Sexual Activity     Alcohol use: Yes     Comment: drinks ETOH frequently; sometimes daily     Drug use: No     Comment: stopped 1986     Sexual activity: Yes     Birth control/protection: Surgical        ASSESSMENT  Labs results   Labs Ordered and Resulted from Time of ED Arrival to Time of ED Departure   ALCOHOL BREATH TEST POCT - Abnormal       Result Value    Alcohol Breath Test 0.043 (*)    COMPREHENSIVE METABOLIC PANEL   COVID-19 VIRUS (CORONAVIRUS) BY PCR   DRUG ABUSE SCREEN 1 URINE (ED)   CBC WITH PLATELETS AND DIFFERENTIAL      Imaging Studies: No results found for this or any previous visit (from the past 24 hour(s)).   Most recent vital signs /77   Pulse 93   Temp 98.6  F (37  C) (Oral)   Resp 16   SpO2 96%    Abnormal labs/tests/findings requiring intervention:---   Pain control: pt had none  Nausea control: pt had none    RECOMMENDATION  Are any infection precautions needed (MRSA, VRE, etc.)? No If yes, what infection? --  ---  Does the patient have mobility issues? independently. If yes, what device does the pt use? ---  ---  Is patient on 72 hour hold or commitment? No If on 72 hour hold, have hold and rights been given to patient? N/A  Are admitting orders written if after 10 p.m. ?N/A  Tasks needing to be completed: " see orders    Velvet Schmitt RN    [phone]   [phone] [facility] ED

## 2022-04-20 NOTE — ED NOTES
Pt reports that she has a hx of ETOH withdrawal seizures and in the past has been on a ventilator due to the severity of her DT's.

## 2022-04-20 NOTE — ED NOTES
Attempted to call report on Pt and was yovani that a nurse was going to call back at 5pm. ED CN made aware.

## 2022-04-20 NOTE — ED PROVIDER NOTES
[facility] EMERGENCY DEPARTMENT ([facility])    4/20/22          History     Chief Complaint   Patient presents with     Addiction Problem     At [facility] at 530 THIS am.Left there and her friend brought her here for Detox. Last beer 430 AM. Drinks 8 beers a day.     HPI  Jolynn Rivera is a 62 year old female with past medical history significant for alcohol use disorder with hx of withdrawal seizure, HTN, hepatitis C, COPD, anxiety who presents to the ED for evaluation of EtOH. Patient was seen at [facility] and discharged home after oral valium and IVF. Patient states she has been drinking since 4:30am this morning. She states she has been drinking for a couple weeks but has been sober for 5 years out of the last 5 and a half years. States she drinks 8 beers daily. She states she has a history of alcohol withdrawal seizures the last time that she was here for detox which was 3/16/22. The last time she was here she was shaky as well, no hallucinations. Endorses nausea. No SI.  No other drug use.     Past Medical History  Past Medical History:   Diagnosis Date     Anxiety      COPD (chronic obstructive pulmonary disease) (H)      COPD (chronic obstructive pulmonary disease) (H)      Hepatitis C      PTSD (post-traumatic stress disorder)      Seizures (H)     second to ETOH     Substance abuse (H)      Past Surgical History:   Procedure Laterality Date     LAPAROSCOPIC TUBAL LIGATION       ORTHOPEDIC SURGERY      Left knee     TONSILLECTOMY       albuterol (PROAIR HFA, PROVENTIL HFA, VENTOLIN HFA) 108 (90 BASE) MCG/ACT inhaler  gabapentin (NEURONTIN) 600 MG tablet  levothyroxine (SYNTHROID/LEVOTHROID) 100 MCG tablet  multivitamin w/minerals (THERA-VIT-M) tablet  omeprazole 20 MG tablet  ondansetron (ZOFRAN-ODT) 4 MG ODT tab  propranolol (INDERAL) 10 MG tablet  QUEtiapine (SEROQUEL) 25 MG tablet  thiamine (B-1) 100 MG tablet  nicotine polacrilex (NICORETTE) 4 MG gum  omeprazole (PRILOSEC) 40 MG   "capsule      Allergies   Allergen Reactions     Antihistamines, Chlorpheniramine-Type [Alkylamines]      Compazine      Lock jaw; all medications ending with -zine     Diphenhydramine      Muscle Cramps     Penicillins      Panic attack     Prochlorperazine      Muscle cramps     Trazodone Nausea and Vomiting     \" I ended up falling and feeling like I was drunk\"     Wasps [Hornets]      Swelling      Family History  Family History   Problem Relation Age of Onset     Anxiety Disorder Mother      Asthma Mother      Alcohol/Drug Father      Prostate Cancer Father      Arthritis Father      Alcohol/Drug Brother      Alcohol/Drug Brother      Hypertension Brother      Alcohol/Drug Brother      Depression Brother      Psychotic Disorder Brother      Social History   Social History     Tobacco Use     Smoking status: Current Every Day Smoker     Packs/day: 0.50     Smokeless tobacco: Never Used     Tobacco comment: Starting Chantix October 2014   Substance Use Topics     Alcohol use: Yes     Comment: drinks ETOH frequently; sometimes daily     Drug use: No     Comment: stopped 1986      Past medical history, past surgical history, medications, allergies, family history, and social history were reviewed with the patient. No additional pertinent items.       Review of Systems   Constitutional: Negative for fever.   Respiratory: Negative for shortness of breath.    Cardiovascular: Negative for chest pain.   Gastrointestinal: Positive for nausea. Negative for abdominal pain.   Psychiatric/Behavioral: Negative for suicidal ideas.   All other systems reviewed and are negative.    A complete review of systems was performed with pertinent positives and negatives noted in the HPI, and all other systems negative.    Physical Exam   BP: 135/77  Pulse: 93  Temp: 98.6  F (37  C)  Resp: 16  SpO2: 96 %  Physical Exam  Vitals and nursing note reviewed.   Constitutional:       General: She is not in acute distress.     Appearance: She is " well-developed. She is not ill-appearing.   HENT:      Head: Normocephalic and atraumatic.      Right Ear: External ear normal.      Left Ear: External ear normal.      Nose: Nose normal.   Eyes:      Extraocular Movements: Extraocular movements intact.      Conjunctiva/sclera: Conjunctivae normal.   Pulmonary:      Effort: Pulmonary effort is normal. No respiratory distress.   Abdominal:      General: There is no distension.   Musculoskeletal:         General: No swelling or deformity.      Cervical back: Normal range of motion and neck supple.   Skin:     General: Skin is warm and dry.   Neurological:      Mental Status: Mental status is at baseline.      Comments: Alert, oriented   Psychiatric:         Mood and Affect: Mood normal.         Behavior: Behavior normal.      Comments: Anxiety         ED Course     11:28 AM  The patient was seen and examined by Gorge Ibarra DO in Room HW02.     Procedures       Results for orders placed or performed during the hospital encounter of 04/20/22   Comprehensive metabolic panel     Status: Abnormal   Result Value Ref Range    Sodium 139 133 - 144 mmol/L    Potassium 3.8 3.4 - 5.3 mmol/L    Chloride 101 94 - 109 mmol/L    Carbon Dioxide (CO2) 21 20 - 32 mmol/L    Anion Gap 17 (H) 3 - 14 mmol/L    Urea Nitrogen 12 7 - 30 mg/dL    Creatinine 0.64 0.52 - 1.04 mg/dL    Calcium 9.6 8.5 - 10.1 mg/dL    Glucose 75 70 - 99 mg/dL    Alkaline Phosphatase 50 40 - 150 U/L    AST 36 0 - 45 U/L    ALT 23 0 - 50 U/L    Protein Total 8.6 6.8 - 8.8 g/dL    Albumin 4.1 3.4 - 5.0 g/dL    Bilirubin Total 0.6 0.2 - 1.3 mg/dL    GFR Estimate >90 >60 mL/min/1.73m2   Asymptomatic COVID-19 Virus (Coronavirus) by PCR Nasopharyngeal     Status: Normal    Specimen: Nasopharyngeal; Swab   Result Value Ref Range    SARS CoV2 PCR Negative Negative    Narrative    Testing was performed using the joe  SARS-CoV-2 & Influenza A/B Assay on the joe  Paula  System.  This test should be ordered for the  detection of SARS-COV-2 in individuals who meet SARS-CoV-2 clinical and/or epidemiological criteria. Test performance is unknown in asymptomatic patients.  This test is for in vitro diagnostic use under the FDA EUA for laboratories certified under CLIA to perform moderate and/or high complexity testing. This test has not been FDA cleared or approved.  A negative test does not rule out the presence of PCR inhibitors in the specimen or target RNA in concentration below the limit of detection for the assay. The possibility of a false negative should be considered if the patient's recent exposure or clinical presentation suggests COVID-19.  [facility] are certified under the Clinical Laboratory Improvement Amendments of 1988 (CLIA-88) as qualified to perform moderate and/or high complexity laboratory testing.   Drug abuse screen 1 urine (ED)     Status: Abnormal   Result Value Ref Range    Amphetamines Urine Screen Negative Screen Negative    Barbiturates Urine Screen Negative Screen Negative    Benzodiazepines Urine Screen Positive (A) Screen Negative    Cannabinoids Urine Screen Negative Screen Negative    Cocaine Urine Screen Negative Screen Negative    Opiates Urine Screen Negative Screen Negative   CBC with platelets and differential     Status: Abnormal   Result Value Ref Range    WBC Count 7.9 4.0 - 11.0 10e3/uL    RBC Count 4.38 3.80 - 5.20 10e6/uL    Hemoglobin 14.6 11.7 - 15.7 g/dL    Hematocrit 42.0 35.0 - 47.0 %    MCV 96 78 - 100 fL    MCH 33.3 (H) 26.5 - 33.0 pg    MCHC 34.8 31.5 - 36.5 g/dL    RDW 12.4 10.0 - 15.0 %    Platelet Count 214 150 - 450 10e3/uL    % Neutrophils 63 %    % Lymphocytes 25 %    % Monocytes 11 %    % Eosinophils 0 %    % Basophils 1 %    % Immature Granulocytes 0 %    NRBCs per 100 WBC 0 <1 /100    Absolute Neutrophils 5.0 1.6 - 8.3 10e3/uL    Absolute Lymphocytes 2.0 0.8 - 5.3 10e3/uL    Absolute Monocytes 0.9 0.0 - 1.3 10e3/uL    Absolute Eosinophils 0.0 0.0 -  0.7 10e3/uL    Absolute Basophils 0.1 0.0 - 0.2 10e3/uL    Absolute Immature Granulocytes 0.0 <=0.4 10e3/uL    Absolute NRBCs 0.0 10e3/uL   [place] Draw     Status: None    Narrative    The following orders were created for panel order [place] Draw.  Procedure                               Abnormality         Status                     ---------                               -----------         ------                     Extra Blue Top Tube[[id number]]                              Final result               Extra Red Top Tube[[id number]]                               Final result                 Please view results for these tests on the individual orders.   Extra Blue Top Tube     Status: None   Result Value Ref Range    Hold Specimen JIC    Extra Red Top Tube     Status: None   Result Value Ref Range    Hold Specimen JIC    Alcohol breath test POCT     Status: Abnormal   Result Value Ref Range    Alcohol Breath Test 0.043 (A) 0.00 - 0.01   Urine Drugs of Abuse Screen     Status: Abnormal    Narrative    The following orders were created for panel order Urine Drugs of Abuse Screen.  Procedure                               Abnormality         Status                     ---------                               -----------         ------                     Drug abuse screen 1 urin...[[id number]]  Abnormal            Final result                 Please view results for these tests on the individual orders.   CBC with platelets differential     Status: Abnormal    Narrative    The following orders were created for panel order CBC with platelets differential.  Procedure                               Abnormality         Status                     ---------                               -----------         ------                     CBC with platelets and d...[[id number]]  Abnormal            Final result                 Please view results for these tests on the individual orders.              Results for orders placed or  performed during the hospital encounter of 04/20/22   Comprehensive metabolic panel     Status: Abnormal   Result Value Ref Range    Sodium 139 133 - 144 mmol/L    Potassium 3.8 3.4 - 5.3 mmol/L    Chloride 101 94 - 109 mmol/L    Carbon Dioxide (CO2) 21 20 - 32 mmol/L    Anion Gap 17 (H) 3 - 14 mmol/L    Urea Nitrogen 12 7 - 30 mg/dL    Creatinine 0.64 0.52 - 1.04 mg/dL    Calcium 9.6 8.5 - 10.1 mg/dL    Glucose 75 70 - 99 mg/dL    Alkaline Phosphatase 50 40 - 150 U/L    AST 36 0 - 45 U/L    ALT 23 0 - 50 U/L    Protein Total 8.6 6.8 - 8.8 g/dL    Albumin 4.1 3.4 - 5.0 g/dL    Bilirubin Total 0.6 0.2 - 1.3 mg/dL    GFR Estimate >90 >60 mL/min/1.73m2   Asymptomatic COVID-19 Virus (Coronavirus) by PCR Nasopharyngeal     Status: Normal    Specimen: Nasopharyngeal; Swab   Result Value Ref Range    SARS CoV2 PCR Negative Negative    Narrative    Testing was performed using the joe  SARS-CoV-2 & Influenza A/B Assay on the joe  Paula  System.  This test should be ordered for the detection of SARS-COV-2 in individuals who meet SARS-CoV-2 clinical and/or epidemiological criteria. Test performance is unknown in asymptomatic patients.  This test is for in vitro diagnostic use under the FDA EUA for laboratories certified under CLIA to perform moderate and/or high complexity testing. This test has not been FDA cleared or approved.  A negative test does not rule out the presence of PCR inhibitors in the specimen or target RNA in concentration below the limit of detection for the assay. The possibility of a false negative should be considered if the patient's recent exposure or clinical presentation suggests COVID-19.  [facility] are certified under the Clinical Laboratory Improvement Amendments of 1988 (CLIA-88) as qualified to perform moderate and/or high complexity laboratory testing.   Drug abuse screen 1 urine (ED)     Status: Abnormal   Result Value Ref Range    Amphetamines Urine Screen Negative  Screen Negative    Barbiturates Urine Screen Negative Screen Negative    Benzodiazepines Urine Screen Positive (A) Screen Negative    Cannabinoids Urine Screen Negative Screen Negative    Cocaine Urine Screen Negative Screen Negative    Opiates Urine Screen Negative Screen Negative   CBC with platelets and differential     Status: Abnormal   Result Value Ref Range    WBC Count 7.9 4.0 - 11.0 10e3/uL    RBC Count 4.38 3.80 - 5.20 10e6/uL    Hemoglobin 14.6 11.7 - 15.7 g/dL    Hematocrit 42.0 35.0 - 47.0 %    MCV 96 78 - 100 fL    MCH 33.3 (H) 26.5 - 33.0 pg    MCHC 34.8 31.5 - 36.5 g/dL    RDW 12.4 10.0 - 15.0 %    Platelet Count 214 150 - 450 10e3/uL    % Neutrophils 63 %    % Lymphocytes 25 %    % Monocytes 11 %    % Eosinophils 0 %    % Basophils 1 %    % Immature Granulocytes 0 %    NRBCs per 100 WBC 0 <1 /100    Absolute Neutrophils 5.0 1.6 - 8.3 10e3/uL    Absolute Lymphocytes 2.0 0.8 - 5.3 10e3/uL    Absolute Monocytes 0.9 0.0 - 1.3 10e3/uL    Absolute Eosinophils 0.0 0.0 - 0.7 10e3/uL    Absolute Basophils 0.1 0.0 - 0.2 10e3/uL    Absolute Immature Granulocytes 0.0 <=0.4 10e3/uL    Absolute NRBCs 0.0 10e3/uL   [place] Draw     Status: None    Narrative    The following orders were created for panel order [place] Draw.  Procedure                               Abnormality         Status                     ---------                               -----------         ------                     Extra Blue Top Tube[[id number]]                              Final result               Extra Red Top Tube[[id number]]                               Final result                 Please view results for these tests on the individual orders.   Extra Blue Top Tube     Status: None   Result Value Ref Range    Hold Specimen JIC    Extra Red Top Tube     Status: None   Result Value Ref Range    Hold Specimen JIC    Alcohol breath test POCT     Status: Abnormal   Result Value Ref Range    Alcohol Breath Test 0.043 (A) 0.00 - 0.01    Urine Drugs of Abuse Screen     Status: Abnormal    Narrative    The following orders were created for panel order Urine Drugs of Abuse Screen.  Procedure                               Abnormality         Status                     ---------                               -----------         ------                     Drug abuse screen 1 urin...[[id number]]  Abnormal            Final result                 Please view results for these tests on the individual orders.   CBC with platelets differential     Status: Abnormal    Narrative    The following orders were created for panel order CBC with platelets differential.  Procedure                               Abnormality         Status                     ---------                               -----------         ------                     CBC with platelets and d...[[id number]]  Abnormal            Final result                 Please view results for these tests on the individual orders.     Medications   diazepam (VALIUM) tablet 5-20 mg (10 mg Oral Given 4/20/22 1407)   thiamine (B-1) tablet 100 mg (100 mg Oral Given 4/20/22 1224)   folic acid (FOLVITE) tablet 1 mg (1 mg Oral Given 4/20/22 1224)   multivitamin w/minerals (THERA-VIT-M) tablet 1 tablet (1 tablet Oral Given 4/20/22 1224)   ondansetron (ZOFRAN) injection 4 mg (4 mg Intravenous Given 4/20/22 1407)   ondansetron (ZOFRAN-ODT) ODT tab 4 mg (4 mg Oral Given 4/20/22 1224)        Assessments & Plan (with Medical Decision Making)   Patient presents to us with a chief complaint of alcohol intoxication requesting detox.  Patient is not actively suicidal or homicidal and has no significant withdrawal symptoms at this time.  Patient has previously had alcohol withdrawal seizures the most recent one was a few months ago.  No history of DTs.  Previous records were reviewed, labs and vital signs were also reviewed.  Patient is medically cleared and clinical report was given to mental health intake.  We do have a bed  available on the detox unit.  Patient will be placed on the alcohol withdrawal protocol and treated with Valium.    I have reviewed the nursing notes. I have reviewed the findings, diagnosis, plan and need for follow up with the patient.    New Prescriptions    No medications on file       Final diagnoses:   Alcohol withdrawal syndrome without complication (H)     I, Carlotta Jameson, am serving as a trained medical scribe to document services personally performed by Gorge Ibarra DO based on the provider's statements to me on April 20, 2022.  This document has been checked and approved by the attending provider.    I, Gorge Ibarra DO, was physically present and have reviewed and verified the accuracy of this note documented by Carlotta Jameson, medical scribe.      Gorge Ibarra DO      --  Gorge ZHANG [facility] EMERGENCY DEPARTMENT  4/20/2022     Gorge Ibarra DO  04/20/22 1442

## 2022-04-20 NOTE — PROGRESS NOTES
04/20/22 3751   Patient Belongings   Did you bring any home meds/supplements to the hospital?  No   Patient Belongings other (see comments);remains with patient   Patient Belongings Remaining with Patient clothing;glasses;shoes   Patient Belongings Put in Hospital Secure Location (Security or Locker, etc.) cell phone/electronics;clothing;keys;purse/wallet;tote;money (see comment);cash/credit card   Belongings Search Yes   Clothing Search Yes   Second Staff Berto NUNEZ   Comment Items placed in bin, clothing items stayed with patient after search   MED BIN:  Key, cell-phone  SECURITY: [id number]  Debit Card... [id number]  EBT Card.... [id number]  $10 cash     BIN:   Backpack, Hat, Pj pants, wallet, paperwork, bags, book, and jacket     A               Admission:  I am responsible for any personal items that are not sent to the safe or pharmacy.  [facility] is not responsible for loss, theft or damage of any property in my possession.    Signature:  _________________________________ Date: _______  Time: _____                                              Staff Signature:  ____________________________ Date: ________  Time: _____      2nd Staff person, if patient is unable/unwilling to sign:    Signature: ________________________________ Date: ________  Time: _____     Discharge:  [facility] has returned all of my personal belongings:    Signature: _________________________________ Date: ________  Time: _____                                          Staff Signature:  ____________________________ Date: ________  Time: _____

## 2022-04-20 NOTE — ED NOTES
Received report on the Pt from Betty ROSARIO RN. Pt resting in chair and started on IV fluids.  Pt states feeling better since getting the meds for the withdrawal.

## 2022-04-20 NOTE — ED TRIAGE NOTES
At [facility] at 530 THIS am.Left there and her friend brought her here for Detox. Last beer 430 AM. Drinks 8 beers a day. Pt states H/O seizures.

## 2022-04-21 LAB
ANION GAP SERPL CALCULATED.3IONS-SCNC: 10 MMOL/L (ref 3–14)
BUN SERPL-MCNC: 8 MG/DL (ref 7–30)
CALCIUM SERPL-MCNC: 9.1 MG/DL (ref 8.5–10.1)
CHLORIDE BLD-SCNC: 103 MMOL/L (ref 94–109)
CO2 SERPL-SCNC: 22 MMOL/L (ref 20–32)
CREAT SERPL-MCNC: 0.78 MG/DL (ref 0.52–1.04)
GFR SERPL CREATININE-BSD FRML MDRD: 86 ML/MIN/1.73M2
GLUCOSE BLD-MCNC: 147 MG/DL (ref 70–99)
HOLD SPECIMEN: NORMAL
POTASSIUM BLD-SCNC: 3.4 MMOL/L (ref 3.4–5.3)
SODIUM SERPL-SCNC: 135 MMOL/L (ref 133–144)

## 2022-04-21 PROCEDURE — 250N000013 HC RX MED GY IP 250 OP 250 PS 637: Performed by: PSYCHIATRY & NEUROLOGY

## 2022-04-21 PROCEDURE — H2032 ACTIVITY THERAPY, PER 15 MIN: HCPCS

## 2022-04-21 PROCEDURE — 128N000004 HC R&B CD ADULT

## 2022-04-21 PROCEDURE — 36415 COLL VENOUS BLD VENIPUNCTURE: CPT | Performed by: PHYSICIAN ASSISTANT

## 2022-04-21 PROCEDURE — 99223 1ST HOSP IP/OBS HIGH 75: CPT | Mod: AI | Performed by: PSYCHIATRY & NEUROLOGY

## 2022-04-21 PROCEDURE — 250N000013 HC RX MED GY IP 250 OP 250 PS 637: Performed by: EMERGENCY MEDICINE

## 2022-04-21 PROCEDURE — 99221 1ST HOSP IP/OBS SF/LOW 40: CPT | Performed by: PHYSICIAN ASSISTANT

## 2022-04-21 PROCEDURE — 80048 BASIC METABOLIC PNL TOTAL CA: CPT | Performed by: PHYSICIAN ASSISTANT

## 2022-04-21 RX ORDER — DIAZEPAM 5 MG
5-20 TABLET ORAL EVERY 30 MIN PRN
Status: DISCONTINUED | OUTPATIENT
Start: 2022-04-21 | End: 2022-04-22 | Stop reason: HOSPADM

## 2022-04-21 RX ADMIN — LORAZEPAM 1 MG: 1 TABLET ORAL at 00:40

## 2022-04-21 RX ADMIN — THIAMINE HCL TAB 100 MG 100 MG: 100 TAB at 08:01

## 2022-04-21 RX ADMIN — LEVOTHYROXINE SODIUM 100 MCG: 100 TABLET ORAL at 08:01

## 2022-04-21 RX ADMIN — NICOTINE POLACRILEX 4 MG: 4 GUM, CHEWING ORAL at 17:56

## 2022-04-21 RX ADMIN — GABAPENTIN 600 MG: 600 TABLET, FILM COATED ORAL at 20:21

## 2022-04-21 RX ADMIN — NICOTINE POLACRILEX 4 MG: 4 GUM, CHEWING ORAL at 10:23

## 2022-04-21 RX ADMIN — NICOTINE POLACRILEX 4 MG: 4 GUM, CHEWING ORAL at 11:25

## 2022-04-21 RX ADMIN — NICOTINE POLACRILEX 4 MG: 4 GUM, CHEWING ORAL at 04:47

## 2022-04-21 RX ADMIN — NICOTINE POLACRILEX 4 MG: 4 GUM, CHEWING ORAL at 14:57

## 2022-04-21 RX ADMIN — PROPRANOLOL HYDROCHLORIDE 20 MG: 20 TABLET ORAL at 20:20

## 2022-04-21 RX ADMIN — GABAPENTIN 600 MG: 600 TABLET, FILM COATED ORAL at 08:01

## 2022-04-21 RX ADMIN — NICOTINE POLACRILEX 4 MG: 4 GUM, CHEWING ORAL at 14:03

## 2022-04-21 RX ADMIN — OMEPRAZOLE 40 MG: 20 CAPSULE, DELAYED RELEASE ORAL at 08:58

## 2022-04-21 RX ADMIN — NICOTINE POLACRILEX 4 MG: 4 GUM, CHEWING ORAL at 21:22

## 2022-04-21 RX ADMIN — NICOTINE POLACRILEX 4 MG: 4 GUM, CHEWING ORAL at 00:50

## 2022-04-21 RX ADMIN — QUETIAPINE FUMARATE 25 MG: 25 TABLET ORAL at 20:20

## 2022-04-21 RX ADMIN — MULTIPLE VITAMINS W/ MINERALS TAB 1 TABLET: TAB at 08:01

## 2022-04-21 RX ADMIN — NICOTINE POLACRILEX 4 MG: 4 GUM, CHEWING ORAL at 16:07

## 2022-04-21 RX ADMIN — NICOTINE POLACRILEX 4 MG: 4 GUM, CHEWING ORAL at 08:01

## 2022-04-21 RX ADMIN — DIAZEPAM 5 MG: 5 TABLET ORAL at 09:19

## 2022-04-21 RX ADMIN — PROPRANOLOL HYDROCHLORIDE 20 MG: 20 TABLET ORAL at 08:01

## 2022-04-21 RX ADMIN — GABAPENTIN 600 MG: 600 TABLET, FILM COATED ORAL at 13:59

## 2022-04-21 RX ADMIN — NICOTINE POLACRILEX 4 MG: 4 GUM, CHEWING ORAL at 06:09

## 2022-04-21 RX ADMIN — NICOTINE POLACRILEX 4 MG: 4 GUM, CHEWING ORAL at 20:21

## 2022-04-21 RX ADMIN — LORAZEPAM 1 MG: 1 TABLET ORAL at 04:23

## 2022-04-21 RX ADMIN — FOLIC ACID 1 MG: 1 TABLET ORAL at 08:01

## 2022-04-21 NOTE — CONSULTS
[facility]  Consult Note - Hospitalist Service     Date of Admission:  4/20/2022  Consult Requested by: Psychiatry  Reason for Consult: Medical evaluation    Assessment & Plan   Jolynn Rivera is a 62 year old female with PMH alcohol use disorder, Hypothyroidism, Essential HTN, Hepatitis C s/p treatment, COPD, Anxiety, Bipolar disorder and PTSD admitted to [address]  on 4/20/2022 alcohol detox. Internal Medicine is consulted for medical evaluation.     #Acute alcohol withdrawal with history of withdrawal seizure  #Alcohol use disorder   The patient reports she was sober for 2 1/2 years followed by a brief relapse and then sober for 2 years. She relapsed in November 2021 and has been drinking on and off since that time.  Currently she reports drinking  8 beers daily for the past 1-2 weeks. Last drink yesterday. Alcohol breath test on arrival to ED 0.043. MSSA scores since admission range from 8-12.  She has a hx of withdrawal seizures, last occurring March 2022.  She currently denies visual or auditory hallucinations. She has received treatment multiple times in the past and is active in [facility].    - Continue MSSA   - Cont multivitamin, folic acid and thiamine supplementation   - Further management per Psychiatry     #High anion gap metabolic acidosis, resolved   Anion gap mildly elevated at 17. Likely due to alcoholic ketoacidosis. Repeat anion gap level normal.   -Encouraged fluid intake with patient  -Alcohol cessation    #Essential HTN  Recently started on amlodipine but could not tolerate due to nightmares so she stopped taking prior to arrival. Checks BP at home and reports well controlled. Mildly elevated now due to acute withdrawal.   -Cont PTA propanolol  -F/U outpatient after discharge    #Hypothyroidism  -Cont PTA levothyroxine    #COPD  No wheezing on exam. Not on home inhalers    #Anxiety  #Bipolar  #PTSD  -Cont PTA Seroquel      #Hepatitis C s/p  treatment    Medicine will sign off. Recommendations relayed to primary team via this note.  Please feel free to reconsult if any new medical issues or concerns.  Thank you for the opportunity to care for this patient.     The patient's care was discussed with the Bedside Nurse and Patient.    Dominique Meyer PA-C  Internal Medicine [facility] Hospitalist  [facility]  Pager [phone]      ______________________________________________________________________    Chief Complaint   Request for alcohol detox    History obtained from patient and review of previous medical records      History of Present Illness   Jolynn Rivera is a 62 year old female with PMH alcohol use disorder, Hypothyroidism, Essential HTN, Hepatitis C s/p treatment, COPD, Anxiety, Bipolar disorder and PTSD admitted to [address]  on 4/20/2022 alcohol detox. Internal Medicine is consulted for medical evaluation.     The patient reports she was sober for 2 1/2 years followed by a brief relapse and then sober for 2 years after that. She most recently relapsed in November 2021 and has been drinking on and off since that time.  Currently she reports drinking  8 beers daily for the past 1-2 weeks. Last drink yesterday. She has a hx of withdrawal seizures, last occurring March 2022. She has received treatment multiple times in the past and is active in [facility].      Currently she denies visual or auditory hallucinations. She complains of shakiness but has no other complaints. She denies headache, visual changes, shortness of breath, chest pain, abdominal pain, nausea, vomiting or urinary issues. She refused amlodipine this morning which was recently started for her HTN. She reports getting nightmares and stopped taking it prior to arrival. She checks her blood pressure at home and reports it is well controlled.     Review of Systems   The 10 point Review of Systems is negative other than noted in the HPI     Past Medical History    I have  "reviewed this patient's medical history   Past Medical History:   Diagnosis Date     Anxiety      COPD (chronic obstructive pulmonary disease) (H)      COPD (chronic obstructive pulmonary disease) (H)      Hepatitis C      PTSD (post-traumatic stress disorder)      Seizures (H)     second to ETOH     Substance abuse (H)    Hypothyroidism  Bipolar disorder  Essential HTN    Past Surgical History   I have reviewed this patient's surgical history   Past Surgical History:   Procedure Laterality Date     LAPAROSCOPIC TUBAL LIGATION       ORTHOPEDIC SURGERY      Left knee     TONSILLECTOMY         Social History   She lives alone and has a dog. She reports tobacco use of 10 cigarettes per day, down from 3 packs per day. She has alcohol use per HPI and no illicit drug use.     Family History   I have reviewed this patient's family history and updated it with pertinent information if needed.  Family History   Problem Relation Age of Onset     Anxiety Disorder Mother      Asthma Mother      Alcohol/Drug Father      Prostate Cancer Father      Arthritis Father      Alcohol/Drug Brother      Alcohol/Drug Brother      Hypertension Brother      Alcohol/Drug Brother      Depression Brother      Psychotic Disorder Brother        Medications   I have reviewed this patient's current medications    Allergies   Allergies   Allergen Reactions     Antihistamines, Chlorpheniramine-Type [Alkylamines]      Compazine      Lock jaw; all medications ending with -zine     Diphenhydramine      Muscle Cramps     Penicillins      Panic attack     Prochlorperazine      Muscle cramps     Trazodone Nausea and Vomiting     \" I ended up falling and feeling like I was drunk\"     Wasps [Hornets]      Swelling        Physical Exam   Vital Signs: Temp: 97.1  F (36.2  C) Temp src: Temporal BP: (!) 141/91 Pulse: 66   Resp: 16 SpO2: 98 % O2 Device: None (Room air)    Weight: 155 lbs 0 oz    General: Alert and oriented x 3. Appears stated age. NAD. Appears " comfortable   HEENT: Head normocephalic, atraumatic. Anicteric sclera. PERRL. EOMI. Oral mucosa moist. Tongue midline. Pharnyx without exudates. Neck supple. No JVD.  Resp: No signs of respiratory distress. Lungs clear to ausculation bilaterally without rales, rhonchi or wheezes.   Cardiac: RRR. S1 and S2 normal intensity. No murmurs, rubs or gallops.   GI: Abdomen soft, non-tender, non-distended.  Bowel sounds present. No masses, hepatomegaly or splenomegaly.   : No velazco  MSK: No joint tenderness or edema. Moves all extremities.   Neuro: No focal deficits. Gait not tested.   Psych: Appropriate mood and affect.  Derm: Skin warm and dry. No rashes or skin breakdown. No jaundice.   Extremities: No cyanosis, clubbing or edema    Data   Results for orders placed or performed during the hospital encounter of 04/20/22 (from the past 24 hour(s))   Basic metabolic panel   Result Value Ref Range    Sodium 135 133 - 144 mmol/L    Potassium 3.4 3.4 - 5.3 mmol/L    Chloride 103 94 - 109 mmol/L    Carbon Dioxide (CO2) 22 20 - 32 mmol/L    Anion Gap 10 3 - 14 mmol/L    Urea Nitrogen 8 7 - 30 mg/dL    Creatinine 0.78 0.52 - 1.04 mg/dL    Calcium 9.1 8.5 - 10.1 mg/dL    Glucose 147 (H) 70 - 99 mg/dL    GFR Estimate 86 >60 mL/min/1.73m2   Extra Tube    Narrative    The following orders were created for panel order Extra Tube.  Procedure                               Abnormality         Status                     ---------                               -----------         ------                     Extra Purple Top Tube[[id number]]                            Final result                 Please view results for these tests on the individual orders.   Extra Purple Top Tube   Result Value Ref Range    Hold Specimen JI

## 2022-04-21 NOTE — H&P
"Identifying information  Patient is a 61-year-old  female    Chief complaint  Alcohol    This is as per emergency room note      \"   Addiction Problem       At [facility] at 530 THIS am.Left there and her friend brought her here for Detox. Last beer 430 AM. Drinks 8 beers a day.      HPI  Jolynn Rivera is a 62 year old female with past medical history significant for alcohol use disorder with hx of withdrawal seizure, HTN, hepatitis C, COPD, anxiety who presents to the ED for evaluation of EtOH. Patient was seen at [facility] and discharged home after oral valium and IVF. Patient states she has been drinking since 4:30am this morning. She states she has been drinking for a couple weeks but has been sober for 5 years out of the last 5 and a half years. States she drinks 8 beers daily. She states she has a history of alcohol withdrawal seizures the last time that she was here for detox which was 3/16/22. The last time she was here she was shaky as well, no hallucinations. Endorses nausea. No SI.  No other drug use\"      During my interview with the patient patient reports that this relapse is for 2 weeks she has been drinking 12 pack.  Patient went to the emergency room at [facility] yesterday she was observed given IV fluids she was concerned about having a seizure and brought herself here.  She wants to go to treatment  Patient has been using the following substances: alcohol   Started at age19 , became a problem at 40's        Patient has tolerance, withdrawal, progressive use, loss of control, spending more time and more amount than intended. Patient has made attempts to quit, is experiencing cravings, and reports negative consequences.                 Patient does  have a history of seizures.  Patient does not have a history of delirium tremens.              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     Denies thoughts of suicide or harming others.       Denies auditory or visual " "hallucinations.      Patient smokes 1/2 ppd     Patient denied any gambling     Substance Age first use First became regular or problematic Most recent use             Cannabis none       Cocaine NONE       Stimulants NONE       Opioids NONE       Sedatives NONE       Hallucinogens NONE       Inhalants NONE       Other         OTC drugs NONE       Nicotine            Patient does have a history of overdose.  Patient does have a history of IV use.  Patient does have a history of hepatitis, HIV,  PSYCHIATRIC REVIEW OF SYSTEMS:          Psychiatric Review of Systems:   Depression: feels isolated she reports her depression is better       When she gets depressed, she does not want to leave the house, sleep either goes up or down, energy goes down, motivation goes down, interest goes down.   Denied any suicidal ideation   Olivia:         Denies: sleeplessness, increased goal-directed activities, abrupt increase in energy, pressured speech   impulsiveness, racing thoughts, increased goal-directed activities, pressured speech, increase in energy  Olivia Feeling euphoric,Distractible,Impulsive,Grandiose,Talking excessively,Have energy without sleeping,Mood swings,Irritability  Psychosis:     Denies: visual hallucinations, auditory hallucinations, paranoia  Anxiety: her symptoms antionette are better\"chiiled out \"  Reports: excessive worries that are difficult to control for the past 6 months,   Chronic anxiety , not able to stop worrying impacting sleep, poor conc, irritable , muscle tension fatigue     denies any Panic attacks  Pt has following s/o of anxiety  When she has panic attacks, she has shortness of breath, she throws up.  She has a fear and she also has agoraphobia     Come out of the blue       PTSD: Patient was exposed to a traumatic event--patient reports her PTSD symptoms are better now  Reports: re-experiencing past trauma, nightmares, trust issues, flashbacks,increased arousal, avoidance of traumatic stimuli, " impaired function.  Negative cognition  hypervigilance     OCD:   denies obsessions, patient has compulsions checking, counting symmetry, cleaning, skin picking.     ED:      Denies: restriction, binging, purging.            patient denies :symptoms of attention deficit disorder include a failure to pay attention to detail, a pattern of careless mistakes, a pattern of inattentive listening, a failure to follow through with projects, poor personal organization, losing necessary objects, distractibility, forgetfulness.         patient denies Symptoms of borderline personality disorder include a fear of abandonment, unstable self-image, impulsive behavior, affective instability due to marked reactivity and mood lasting hours dissociative feeling, intense anger, unstable personal relationships, chronic feelings of boredom, periods of intense depressed mood.  Transient stressed related paranoid ideation severe dissociative symptoms                  PSYCHIATRIC HISTORY      Previous diagnoses:      Symptoms in relation to substance use (symptoms present without use present absent     Past court commitments: none  SIB /SUICIDE ATTEMPTS NONE  Psych Hosp :2  Outpatient Programs dbt  Inpatient cd trt 4  Out pt cd trt6  Detoxes 12  PAST PSYCH MED TRIALS    seroquel     SOCIAL HISTORY                                                                               Patient is   Patient has no   children  Pt is diabaled  Patient's housing is [facility]          Family History:   FAMILY HISTORY:         Family History   Problem Relation Age of Onset     Anxiety Disorder Mother       Asthma Mother       Alcohol/Drug Father       Prostate Cancer Father       Arthritis Father       Alcohol/Drug Brother       Alcohol/Drug Brother       Hypertension Brother       Alcohol/Drug Brother       Depression Brother       Psychotic Disorder Brother              Mother has anxiety.  Father has alcoholism and brother has alcoholism.  Both of them  are sober now.  Father's side has significant alcoholism, but majority of them are sober        Medical h/o   A 10-point review of systems is reviewed and is negative except for psychiatric symptoms above.       Allergies reviewed  Blood pressure (!) 148/81, pulse 77, temperature 97.4  F (36.3  C), temperature source Temporal, resp. rate 16, weight 70.3 kg (155 lb), SpO2 98 %, not currently breastfeeding.      vitals  Appearance:  awake, alert, appeared as age stated, adequate groomed and slightly unkempt  Attitude:  cooperative  Eye Contact:  good  Mood:   Euthymic  Affect:  congruent   Speech:  clear, coherent normal rate   Psychomotor Behavior:  no evidence of tardive dyskinesia, dystonia, or tics  Thought Process:  logical, linear and goal oriented  Associations:  no loose associations  Thought Content:  no evidence of psychotic thought and active suicidal ideation present  Denied any active suicidal /homicidation ideation plan intent   Insight:  fair  Judgment:  fair  Oriented to:  time, person, and place  Attention Span and Concentration:  intact  Recent and Remote Memory:  intact  Language:  english with appropriate syntax and vocabulary  Fund of Knowledge: appropriate  Muscle Strength and Tone: normal  Gait and Station: Normal    Assessment    Patient has a biological predisposition family history of mental illness chemical dependency psychologically patient is abusing alcohol she does have depression and anxiety PTSD's.  Her stressors include relapse prone  Patient has severe exacerbation of chronic alcoholism  ,  been unable to stop using  in the community due to both physical and psychological symptoms.  Continued use will put the patient at risk for medical and/or psychiatric complications.           Inpatient psychiatric hospitalization is warranted at this time for safety, stabilization, and possible adjustment in medications.    Diagnosis  Alcohol use disorder severe  Alcohol withdrawal  severe  Nicotine abuse  Generalized anxiety disorder  PTSD  Bipolar type II  Seizures      Plan  Alcohol use disorder severe alcohol withdrawal severe  Patient will be detoxed off alcohol using [facility] protocol on Valium  Patient has pulse of 92 blood pressure 157/97 tremor agitation sweats  Patient received 30 mg of diazepam and 3 mg of Ativan    Patient [facility]       Patient has an elevated anion gap 17 we will put internal medicine consult    For her mood patient will continue Seroquel gabapentin    Patient has elevated blood pressure 148/81, she is prescribed amlodipine and she believes that her doctor asked her to stop it  We will ask medicine to evaluate this  She does take propranolol 20 mg twice a day  She was previously on 10 mg twice a day        I HAVE REVIEWED LABS WITH PT AND TALKED ABOUT RESULTS WITH PT  I HAVE REVIEWED AND SUMMARIZED OLD RECORDS including his medication reconcilation of his home medications  and PDMP   I HAVE SPOKEN WITH RN ABOUT MEDICATIONS AND withdrawl SCORES  I HAVE SPOKEN WITH CM ABOUT PTS TREATMETN OPTIONS     Discussed in detail about patient's smoking patient was advised to quit patient was told about the impact of smoking.  Patient's willingness to quit was assessed.  I provided methods and skills for cessation including medication management nicotine gum patch.  Patient did not set a quit date.  Patient is interested in quitting .we discussed pharmacotherapy options .patient agreed to take nicotine gum patch lozenge.  We discussed behavioral change techniques when craving nicotine including deep breathing drinking glass of water, taking a walk.

## 2022-04-21 NOTE — PROGRESS NOTES
Met with Pt and completed assessment.  Pt signed JUANY and referral was made to [facility].  Will follow up with Pt and program tomorrow for scheduling.

## 2022-04-21 NOTE — PROGRESS NOTES
Met with Pt for discharge planning.  Pt is interested in IOP treatment with [facility].  Pt has Medicare and [place].  Options may be limited due to Medicare.  Coached Pt to complete assessment paperwork.  Pt acknowledged.

## 2022-04-21 NOTE — PLAN OF CARE
Problem: Alcohol Withdrawal  Goal: Alcohol Withdrawal Symptom Control  Outcome: Ongoing, Progressing   Goal Outcome Evaluation:      Continues in detox status on alcohol withdrawal protocol. MSSA scores 8 and 5. Medicated x1 with Valium 5 mg. po Appetite good. Fluids taken well. Offers no complaints of physical discomfort. Denies thoughts of self harm. Spent most of the day in TV lounge socializing with peers and staff.Will continue to monitor.

## 2022-04-21 NOTE — PLAN OF CARE
Behavioral Team Discussion: (4/21/2022)    Continued Stay Criteria/Rationale: Patient admitted for alcohol withdrawal and Use Disorder.  Plan: The following services will be provided to the patient; psychiatric assessment, medication management, therapeutic milieu, individual and group support, and skills groups.   Participants: 3A Provider: Dr. Aly Quintanilla MD; 3A RN: Codi Marr, RN; 3A CM's: Cande Erickson .  Summary/Recommendation: Providers will assess today for treatment recommendations, discharge planning, and aftercare plans. CM will meet with pt for discharge planning.   Medical/Physical: hx of withdrawal seizure, HTN, hepatitis C, COPD  Precautions:   Behavioral Orders   Procedures     Code 1 - Restrict to Unit     Routine Programming     As clinically indicated     Status 15     Every 15 minutes.     Withdrawal precautions     Rationale for change in precautions or plan: N/A  Progress: Initial.

## 2022-04-21 NOTE — PLAN OF CARE
The patient was in bed at the beginning of the shift breathing quietly and unlabored. Pt slept 4.75 hours. MASSA score of  8 and 8. Nursing Will continue to monitor.    PRN; Ativan 1 mg  x2, Nicotine gum 4 mg x2

## 2022-04-21 NOTE — PROGRESS NOTES
[place] Prescription Monitoring Program  Jolynn Rivera, 62F  Refine Search  Contact the [facility]  YOB: 1960  Recent Address:  [address] [place] [address]  View Linked Records (2)  Other Tools/Metrics  Narx[facility]  Report generated on 04/21/2022. Report Date Range: 04/21/2021 - 04/21/2022  PDF Report  [place]  Narx Scores  Narcotic  140  Sedative  362  Stimulant  000  Explanation and Guidance  Overdose Risk Score  030  (Range 000-999)  Explanation and Guidance  State Indicators (0)  Details  RX Graph   Narcotic   Buprenorphine   Sedative   Stimulant   Other  Learn how to use graph  All Prescribers  Prescribers  2 - Lorena Lozoya MD  1 - Luciano Molina,  Timeline  04/21  2m  6m  1y  2y  Disclaimer  Morphine Milligram Equivalent Prescribed Over Time  Last 30 Days  Last 60 Days  Last 90 Days  Last 1 Year  Last 2 Years  MME  Timeframe  0  1  2  3/23/22  4/7/22 4/21/22  0  MME per Day Avg.  0  MME per RX  Disclaimer  Lorazepam MgEq (LME) Prescribed Over Time  Last 30 Days  Last 60 Days  Last 90 Days  Last 1 Year  Last 2 Years  LME  Timeframe  0  1  2  3/23/22  4/7/22 4/21/22  0.8  LME Per Day Avg.  11.6  LME mg Per Rx  Disclaimer  Buprenorphine (mg) Prescribed Over Time  Last 30 Days  Last 60 Days  Last 90 Days  Last 1 Year  Last 2 Years  mg  Timeframe  0  1  2  3/23/22  4/7/22  4/21/22  0  mg Per Day Avg.  0  Avg mg Per Rx  Disclaimer  RX Summary  Summary  Total Prescriptions 15  Total Private Pay 0  Total Prescribers 2  Total Pharmacies 2  Opioids* (excluding Buprenorphine)  Current Qty 0  Current MME/day 0.00  30 Day Avg MME/day 0.00  Buprenorphine*  Current Qty 0  Current mg/day 0.00  30 Day Avg mg/day 0.00  RX Summary Expanded  Narcotics (excluding Buprenorphine)  30 Day Avg. MME 0.00  90 Day Avg. MME 0.00  Rx Count/12 Months 0  Prescriber #/6 Months 0  Pharmacy #/6 Months 0  Current Quantity 0  Buprenorphine  30 Day Avg. mg/day 0.00  90 Day Avg.  mg/day 0.00  Rx Count/12 Months 0  Prescriber #/6 Months 0  Pharmacy #/6 Months 0  Current Quantity 0  Sedatives  30 Day Avg. LME 0.77  90 Day Avg. LME 0.77  Rx Count/12 Months 11  Prescriber #/6 Months 1  Pharmacy #/6 Months 1  Current Quantity 5  Stimulants  30 Day Avg. mg/day 0.00  90 Day Avg. mg/day 0.00  Rx Count/12 Months 0  Prescriber #/6 Months 0  Pharmacy #/6 Months 0  Current Quantity 0  Prescriptions  Total: 15  Private Pay: 0  Showing 1-15 of 15 Items View 15 Items  1 of 1   Filled  Written  Sold  ID  Drug  QTY  Days  Prescriber  RX #  Dispenser  Refill  Daily Dose*  Pymt Type     04/12/2022 04/07/2022  1   Gabapentin 600 Mg Tablet  270.00 90 Ng Wam [id number] Gen (2588) 0/0  [place]  03/28/2022 03/25/2022  1   Eszopiclone 2 Mg Tablet  30.00 30 Ng Wam [id number] Gen (2588) 0/0 0.66 [place]  03/03/2022 03/03/2022  1   Eszopiclone 2 Mg Tablet  30.00 30 Ng Wam [id number] Gen (2588) 0/0 0.66 [place]  02/07/2022 12/01/2021  1   Eszopiclone 2 Mg Tablet  30.00 30 Ng Wam [id number] Gen (2588) 2/2 0.66 [place]  01/19/2022 09/07/2021  1   Gabapentin 600 Mg Tablet  270.00 90 Ng Wam [id number] Gen (2588) 0/0  [place]  01/12/2022 12/01/2021  1   Eszopiclone 2 Mg Tablet  30.00 30 Ng Wam [id number] Gen (2588) 1/2 0.66 [place]  11/16/2021 09/07/2021  1   Eszopiclone 2 Mg Tablet  30.00 30 Ng Wam [id number] Gen (2588) 2/2 0.66 [place]  10/12/2021 09/07/2021  1   Eszopiclone 2 Mg Tablet  30.00 30 Ng Wam [id number] Gen (2588) 1/2 0.66 [place]  09/09/2021 09/07/2021  1   Eszopiclone 2 Mg Tablet  30.00 30 Ng Wam [id number] Gen (2588) 0/2 0.66 [place]  09/07/2021 04/13/2021  1   Gabapentin 600 Mg Tablet  270.00 90 Ng Wam [id number] Gen (2588) 0/2  [place]  08/16/2021 05/11/2021  1   Eszopiclone 2 Mg Tablet  30.00 30 Ng [facility] [id number] Gen (2588) 2/2 0.66 [place]  07/14/2021 05/11/2021  1   Eszopiclone 2 Mg Tablet  30.00 30 Ng Wam [id number] Gen (2588) 1/2 0.66  [place]  07/09/2021 07/09/2021 07/09/2021 2   Gabapentin 300 Mg Capsule  30.00 5 Ro [place] [phone] Hen (2149) 0/0  [place]  06/15/2021 05/11/2021  1   Eszopiclone 2 Mg Tablet  30.00 30 Ng [facility] [id number] Gen (2588) 0/2 0.66 [place]  05/10/2021 02/11/2021  1   Eszopiclone 2 Mg Tablet  30.00 30 Ng [facility] [id number] Gen (2588) 2/2 0.66 LME [place]  Disclaimer  Showing 1-15 of 15 Items View 15 Items  1 of 1   Providers  Total: 2  Showing 1-2 of 2 Items View 15 Items  1 of 1   Name  Address  City  State  Zipcode  Phone   Lorena [facility]ozoya MD [address] [facility] [address] [phone]  Luciano Molina  [facility] [address] [phone]  Showing 1-2 of 2 Items View 15 Items  1 of 1   Pharmacies  Total: 2  Showing 1-2 of 2 Items View 15 Items  1 of 1   Name  Address  City  State  Zipcode  Phone   [employer] (2680) [address] [facility] [address] [phone]  [place] [facility] (2668) [address] [facility] [address] [facility] [address] [phone]  Showing 1-2 of 2 Items View 15 Items  1 of 1   The report provided is based upon the search criteria entered and the corresponding data as it has been reported by dispenser(s). If erroneous information is identified or additional information is needed, please contact the dispenser or the prescriber provided on the report. Date Sold signifies the date the prescription was sold (left the pharmacy). The absence of Date Sold does not necessarily indicate the prescription was not dispensed. Fill Date represents the date the medication was filled or prepared by the pharmacy. Note, federal regulation (CFR Title 42: Part 2) requires patient consent prior to releasing certain patient data from federally funded opioid treatment programs (OTPs). As such, controlled substances dispensed from OTPs for medication-assisted treatment may not appear in the [place]  report. Morphine milligram equivalent (MME)  conversion factors published by the CDC are used in the MME calculation. Per the CDC, the MME conversion factor is intended only for analytic purposes where prescription data are used to retrospectively calculate daily MME to inform analyses of risks associated with opioid prescribing. This value does not constitute clinical guidance or recommendations for converting patients from one form of opioid analgesic to another. Per the CDC, the conversion factors for drugs prescribed or provided, as part of medication-assisted treatment for opioid use disorder should not be used to benchmark against MME dosage thresholds meant for opioids prescribed for pain. Buprenorphine products listed in the CDC s MME file do not have an associated conversion factor. Lastly, the CDC notes, in clinical practice, calculating MME for methadone often involves a sliding-scale approach, whereby the conversion factor increases with increasing dose. The conversion factor of 3 for methadone presented in this file could underestimate MME for a given patient. This report contains confidential information, including patient identifiers, and is not a public record. The information on this report must be treated as protected health information and is only to be disclosed to others as authorized by applicable state and Federal regulations.

## 2022-04-21 NOTE — DISCHARGE INSTRUCTIONS
Behavioral Discharge Planning and Instructions  THANK YOU FOR CHOOSING THE [facility]  3A  [phone]    Summary: You were admitted to Station 3A on 4/20/22 for detoxification from alcohol.  A medical exam was performed that included lab work. You have met with a  and opted to pursue IOP treatment with [facility].  Your assessment has been faxed and they are ready to schedule you for an intake.  Please take care and make your recovery a priority, Jolynn!    [facility]  Trauma-informed Intensive Outpatient Program for co-occurring disorders.  The Intensive Outpatient Program at [facility] is a client-centered, trauma-informed, full-service co-occurring programming for individuals struggling with mental health and substance use concerns. We provide the high-quality, confidential and compassionate care for individuals and families facing addiction, co-occurring disorders, eating disorders, and other addictive/compulsive behaviors. Flexible schedules are offered to provide the least disruption possible as you move toward all your recovery goals. For a confidential consultation, please call [phone] and one of our admission specialists can answer any questions you might have.     Intensive Outpatient Programs (IOP):    [place] Location: [address] [place] [address]    [place] Location: [address] [facility], [place] [address]    Phone: [phone]       Fax: [phone]    Main Diagnosis: Per Dr. Aly Quintanilla MD  303.90 (F10.20) Alcohol Use Disorder Severe      Major Treatments, Procedures and Findings:  You were detoxed from alcohol with the Modified Selective Severity Protocol using Valium. You have met with a  to develop a treatment plan for discharge.  You have had labs drawn and a copy of those labs will be sent home with you.  Please bring your lab results with to your follow up doctor  "appointment.    Symptoms to Report:  If you experience more anxiety, confusion, sleeplessness, deep sadness or thoughts of suicide, notify your treatment team or notify your primary care physician. IF ANY OF THE SYMPTOMS YOU ARE EXPERIENCING ARE A MEDICAL EMERGENCY CALL 911 IMMEDIATELY.     Lifestyle Adjustment: Adjust your lifestyle to get enough sleep, relaxation, exercise and  good nutrition. Continue to develop healthy coping skills to decrease stress and promote a sober living environment. Do not use alcohol, illegal drugs or addictive medications other than what is currently prescribed. AA, NA, and  Sponsor are excellent resources for support.     Disposition: Home      Facts about COVID19 at www.cdc.gov/COVID19 and www.[place].gov/covid[place]    Keeping hands clean is one of the most important steps we can take to avoid getting sick and spreading germs to others.  Please wash your hands frequently and lather with soap for at least 20 seconds!    Follow-up Appointment:     Dr. Tomas Brown  [facility] [address], [place] [address]  Phone Number: [phone]    Appointment Date/Time: May 17th 10AM        Resources:     Resources for on line recovery meetings:      *due to covid-19 AA/NA meetings are being held online*      AA meetings can be found online; search for them at: http://aa-intergroup.org/directory.php  AA meetings via [place] for [place] area can be found online at: https://a[place].org/find-a-meeting/holiday-closings/  NA meetings via Z[place]OM for [place] area can be found online at: https://sites.google.com/view/[place]regionofnarcoticsanonymous/home?authuser=2    Www.CurTran  has online resources for meeting and recovery care including Podcast \"Let's Talk:Addiction & Recovery Podcasts    Www.BlueMessaging.org     DISCHARGE RESOURCES:  -SMART Recovery - self management for addiction recovery:  www.smartrecWorld Energy Labsy.org    -Pathways ~ A Health Crisis Resource & Support " Center: 314.246.5899.  -[facility] [phone]   -[facility] [phone] or 396-000-3663.  -Suicide Awareness Voices of Education (SAVE) (www.save.org): 919-612-SHVK (6106)  -National Suicide Prevention Line (www.mentalhealthmn.org): 707-032-SXMO (6831)  -National [place] on Mental Illness (www.[place].sera.org): 914.608.3640 or 122-329-8403.  -Mmix7pota: text the word LIFE to 16773 for immediate support and crisis intervention  -Mental Health Consumer/Survivor Network of [place] (www.mhcsn.net): 705.951.1662 or 283-095-7957  -Mental Health Association of [place] (www.mentalhealth.org): 649.320.7780 or 404-420-4221     -Substance Abuse and Mental Health Services (www.samhsa.gov)  -Harm Reduction Coalition (www. Harmreduction.org)  -www.prescribetoprevent.org or http://prescribetoprevent.org/video  -Poison control 9-285-892-9719   **[place] Opioid Prevention Coalition: www.opioidcoalition.org    Sober Support Group Information:  AA/NA & Sponsor/Support  -Alcoholics Anonymous (www.alcoholics-anonymous.org): for local information 24 hours/day  -AA Intergroup service office in [place] (http://www.aastpaul.org/) 936.280.5298  -AA Intergroup service office in [facility]: 338.318.6582. (http://www.a[place].org/)  -Narcotics Anonymous (www.naminn[place].org) (321) 108-3644   **Sober Fun Activities: www.sober-activities.[place]/[facility]//[facility] Recovery Connection (MRC)  [facility] connects people seeking recovery to resources that help foster and sustain long-term recovery.  Whether you are seeking resources for treatment, transportation, housing, job training, education, health care or other pathways to recovery, [facility] is a great place to start.    Phone: (189) [phone].  www.[facility].org (Great listing of all types of recovery and non-recovery related resources)      General Medication Instructions:   See your medication sheet(s) for instructions.    Take all medicines as directed.  Make no changes unless your doctor suggests them.   Go to all your doctor visits.  Be sure to have all your required lab tests. This way, your medicines can be refilled on time.  Do not use any drugs not prescribed by your provider.  AA/NA and Sponsors are excellent resources for support  Avoid alcohol.    Any follow up concerns:  Nursing questions call the Unit -[facility] [phone]  Medical Record call [phone]  Outpatient Behavioral Intake call [phone]  LP+ Wait List/Bed Availability call [phone]    The entire treatment team has appreciated the opportunity to work with you Jolynn.  We wish you the best in the future and with your lifelong recovery goals. Please bring this discharge folder with you to all follow up appointments.  It contains your lab results, diagnosis, medication list and discharge recommendations.    THANK YOU FOR CHOOSING THE [facility]
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
## 2022-04-22 VITALS
SYSTOLIC BLOOD PRESSURE: 157 MMHG | RESPIRATION RATE: 16 BRPM | WEIGHT: 155 LBS | DIASTOLIC BLOOD PRESSURE: 90 MMHG | TEMPERATURE: 97.7 F | BODY MASS INDEX: 25.79 KG/M2 | OXYGEN SATURATION: 97 % | HEART RATE: 74 BPM

## 2022-04-22 PROCEDURE — 250N000013 HC RX MED GY IP 250 OP 250 PS 637: Performed by: PSYCHIATRY & NEUROLOGY

## 2022-04-22 PROCEDURE — 99239 HOSP IP/OBS DSCHRG MGMT >30: CPT | Performed by: PSYCHIATRY & NEUROLOGY

## 2022-04-22 RX ADMIN — GABAPENTIN 600 MG: 600 TABLET, FILM COATED ORAL at 07:45

## 2022-04-22 RX ADMIN — NICOTINE POLACRILEX 4 MG: 4 GUM, CHEWING ORAL at 06:36

## 2022-04-22 RX ADMIN — NICOTINE POLACRILEX 4 MG: 4 GUM, CHEWING ORAL at 10:19

## 2022-04-22 RX ADMIN — LEVOTHYROXINE SODIUM 100 MCG: 100 TABLET ORAL at 07:45

## 2022-04-22 RX ADMIN — NICOTINE POLACRILEX 4 MG: 4 GUM, CHEWING ORAL at 07:59

## 2022-04-22 RX ADMIN — LOPERAMIDE HYDROCHLORIDE 2 MG: 2 CAPSULE ORAL at 09:46

## 2022-04-22 RX ADMIN — PROPRANOLOL HYDROCHLORIDE 20 MG: 20 TABLET ORAL at 07:44

## 2022-04-22 RX ADMIN — NICOTINE POLACRILEX 4 MG: 4 GUM, CHEWING ORAL at 02:57

## 2022-04-22 RX ADMIN — OMEPRAZOLE 40 MG: 20 CAPSULE, DELAYED RELEASE ORAL at 07:45

## 2022-04-22 NOTE — PLAN OF CARE
Goal Outcome Evaluation:    Plan of Care Reviewed With: patient     Overall Patient Progress: improving       Patient scored less than 8 for greater than 24 hours. She has required no medication for withdrawal for > 24 hours. Patient removed from detox status per unit Protocol.

## 2022-04-22 NOTE — DISCHARGE SUMMARY
"    Jolynn Rivera MRN# [id number]   Age: 62 year old YOB: 1960     Date of Admission:  4/20/2022  Date of Discharge:  4/22/2022  Admitting Physician:  Abdullahi Beltran MD  Discharge Physician:  Aly Quintanilla MD      DISCHARGE  DX      Alcohol use disorder severe    Nicotine abuse  Generalized anxiety disorder  PTSD  Bipolar type II  Seizures         Event Leading to Hospitalization:     See Admission note by admitting provider for patient encounter. for additional details.          Hospital Course:   PATIENT was admitted to [facility] attending  under DR quintanilla, please review the detailed admit note on 4/21/2022   The patient was placed under status 15 (15 minute checks) to ensure patient safety.   MSSA protocol was initiated due to the patient's history of alcohol abuse and concern for withdrawal symptoms.  CBC, BMP and utox obtained.    All outpatient medications were continued    PATIENTdid participate in groups and was visible in the milieu.     The patient's symptoms of alcohol withdrawal improved.     Patients energy motivation , sleep appetite improved.  Pt completed detox . It was un eventful.    Patient signed a neuroleptic consent  Patient makes statements such as  \"My therapist  looks like you, all you people look the same\"  Discussed with patient medications for craving.  Spoke with patient about triggers coping skills relapse prevention.    CONSULTS DONE DURING PATIENTS HOSPITALIZATION.  Patient was seen by medicine on date4/20/22    This as per their medical consult    Assessment & Plan     Jolynn Rivera is a 62 year old female with PMH alcohol use disorder, Hypothyroidism, Essential HTN, Hepatitis C s/p treatment, COPD, Anxiety, Bipolar disorder and PTSD admitted to [facility]  on 4/20/2022 alcohol detox. Internal Medicine is consulted for medical evaluation.      #Acute alcohol withdrawal with history of withdrawal seizure  #Alcohol use disorder   The patient " reports she was sober for 2 1/2 years followed by a brief relapse and then sober for 2 years. She relapsed in November 2021 and has been drinking on and off since that time.  Currently she reports drinking  8 beers daily for the past 1-2 weeks. Last drink yesterday. Alcohol breath test on arrival to ED 0.043. MSSA scores since admission range from 8-12.  She has a hx of withdrawal seizures, last occurring March 2022.  She currently denies visual or auditory hallucinations. She has received treatment multiple times in the past and is active in [facility].    - Continue MSSA   - Cont multivitamin, folic acid and thiamine supplementation   - Further management per Psychiatry      #High anion gap metabolic acidosis, resolved   Anion gap mildly elevated at 17. Likely due to alcoholic ketoacidosis. Repeat anion gap level normal.   -Encouraged fluid intake with patient  -Alcohol cessation     #Essential HTN  Recently started on amlodipine but could not tolerate due to nightmares so she stopped taking prior to arrival. Checks BP at home and reports well controlled. Mildly elevated now due to acute withdrawal.   -Cont PTA propanolol  -F/U outpatient after discharge     #Hypothyroidism  -Cont PTA levothyroxine     #COPD  No wheezing on exam. Not on home inhalers     #Anxiety  #Bipolar  #PTSD  -Cont PTA Seroquel        #Hepatitis C s/p treatment     Medicine will sign off. Recommendations relayed to primary team via this note.  Please feel free to reconsult if any new medical issues or concerns.  Thank you for the opportunity to care for this patient.      The patient's care was discussed with the Bedside Nurse and Patient.        Pt was seen by cm  As per recommendations from cm    Met with Pt and completed assessment.  Pt signed JUANY and referral was made to [facility].  Will follow up with Pt and program tomorrow for scheduling         Labs:reviewed with patient       Recent Results (from the past 48 hour(s))   Alcohol  breath test POCT    Collection Time: 04/20/22 11:04 AM   Result Value Ref Range    Alcohol Breath Test 0.043 (A) 0.00 - 0.01   Comprehensive metabolic panel    Collection Time: 04/20/22 11:55 AM   Result Value Ref Range    Sodium 139 133 - 144 mmol/L    Potassium 3.8 3.4 - 5.3 mmol/L    Chloride 101 94 - 109 mmol/L    Carbon Dioxide (CO2) 21 20 - 32 mmol/L    Anion Gap 17 (H) 3 - 14 mmol/L    Urea Nitrogen 12 7 - 30 mg/dL    Creatinine 0.64 0.52 - 1.04 mg/dL    Calcium 9.6 8.5 - 10.1 mg/dL    Glucose 75 70 - 99 mg/dL    Alkaline Phosphatase 50 40 - 150 U/L    AST 36 0 - 45 U/L    ALT 23 0 - 50 U/L    Protein Total 8.6 6.8 - 8.8 g/dL    Albumin 4.1 3.4 - 5.0 g/dL    Bilirubin Total 0.6 0.2 - 1.3 mg/dL    GFR Estimate >90 >60 mL/min/1.73m2   Asymptomatic COVID-19 Virus (Coronavirus) by PCR Nasopharyngeal    Collection Time: 04/20/22 11:55 AM    Specimen: Nasopharyngeal; Swab   Result Value Ref Range    SARS CoV2 PCR Negative Negative   Drug abuse screen 1 urine (ED)    Collection Time: 04/20/22 11:55 AM   Result Value Ref Range    Amphetamines Urine Screen Negative Screen Negative    Barbiturates Urine Screen Negative Screen Negative    Benzodiazepines Urine Screen Positive (A) Screen Negative    Cannabinoids Urine Screen Negative Screen Negative    Cocaine Urine Screen Negative Screen Negative    Opiates Urine Screen Negative Screen Negative   CBC with platelets and differential    Collection Time: 04/20/22 11:55 AM   Result Value Ref Range    WBC Count 7.9 4.0 - 11.0 10e3/uL    RBC Count 4.38 3.80 - 5.20 10e6/uL    Hemoglobin 14.6 11.7 - 15.7 g/dL    Hematocrit 42.0 35.0 - 47.0 %    MCV 96 78 - 100 fL    MCH 33.3 (H) 26.5 - 33.0 pg    MCHC 34.8 31.5 - 36.5 g/dL    RDW 12.4 10.0 - 15.0 %    Platelet Count 214 150 - 450 10e3/uL    % Neutrophils 63 %    % Lymphocytes 25 %    % Monocytes 11 %    % Eosinophils 0 %    % Basophils 1 %    % Immature Granulocytes 0 %    NRBCs per 100 WBC 0 <1 /100    Absolute Neutrophils 5.0  1.6 - 8.3 10e3/uL    Absolute Lymphocytes 2.0 0.8 - 5.3 10e3/uL    Absolute Monocytes 0.9 0.0 - 1.3 10e3/uL    Absolute Eosinophils 0.0 0.0 - 0.7 10e3/uL    Absolute Basophils 0.1 0.0 - 0.2 10e3/uL    Absolute Immature Granulocytes 0.0 <=0.4 10e3/uL    Absolute NRBCs 0.0 10e3/uL   Extra Blue Top Tube    Collection Time: 04/20/22 11:56 AM   Result Value Ref Range    Hold Specimen JI    Extra Red Top Tube    Collection Time: 04/20/22 11:56 AM   Result Value Ref Range    Hold Specimen [facility]    Basic metabolic panel    Collection Time: 04/21/22  8:58 AM   Result Value Ref Range    Sodium 135 133 - 144 mmol/L    Potassium 3.4 3.4 - 5.3 mmol/L    Chloride 103 94 - 109 mmol/L    Carbon Dioxide (CO2) 22 20 - 32 mmol/L    Anion Gap 10 3 - 14 mmol/L    Urea Nitrogen 8 7 - 30 mg/dL    Creatinine 0.78 0.52 - 1.04 mg/dL    Calcium 9.1 8.5 - 10.1 mg/dL    Glucose 147 (H) 70 - 99 mg/dL    GFR Estimate 86 >60 mL/min/1.73m2   Extra Purple Top Tube    Collection Time: 04/21/22  8:58 AM   Result Value Ref Range    Hold Specimen [facility]          Recent Results (from the past 240 hour(s))   Alcohol breath test POCT    Collection Time: 04/20/22 11:04 AM   Result Value Ref Range    Alcohol Breath Test 0.043 (A) 0.00 - 0.01   Comprehensive metabolic panel    Collection Time: 04/20/22 11:55 AM   Result Value Ref Range    Sodium 139 133 - 144 mmol/L    Potassium 3.8 3.4 - 5.3 mmol/L    Chloride 101 94 - 109 mmol/L    Carbon Dioxide (CO2) 21 20 - 32 mmol/L    Anion Gap 17 (H) 3 - 14 mmol/L    Urea Nitrogen 12 7 - 30 mg/dL    Creatinine 0.64 0.52 - 1.04 mg/dL    Calcium 9.6 8.5 - 10.1 mg/dL    Glucose 75 70 - 99 mg/dL    Alkaline Phosphatase 50 40 - 150 U/L    AST 36 0 - 45 U/L    ALT 23 0 - 50 U/L    Protein Total 8.6 6.8 - 8.8 g/dL    Albumin 4.1 3.4 - 5.0 g/dL    Bilirubin Total 0.6 0.2 - 1.3 mg/dL    GFR Estimate >90 >60 mL/min/1.73m2   Asymptomatic COVID-19 Virus (Coronavirus) by PCR Nasopharyngeal    Collection Time: 04/20/22 11:55 AM     Specimen: Nasopharyngeal; Swab   Result Value Ref Range    SARS CoV2 PCR Negative Negative   Drug abuse screen 1 urine (ED)    Collection Time: 04/20/22 11:55 AM   Result Value Ref Range    Amphetamines Urine Screen Negative Screen Negative    Barbiturates Urine Screen Negative Screen Negative    Benzodiazepines Urine Screen Positive (A) Screen Negative    Cannabinoids Urine Screen Negative Screen Negative    Cocaine Urine Screen Negative Screen Negative    Opiates Urine Screen Negative Screen Negative   CBC with platelets and differential    Collection Time: 04/20/22 11:55 AM   Result Value Ref Range    WBC Count 7.9 4.0 - 11.0 10e3/uL    RBC Count 4.38 3.80 - 5.20 10e6/uL    Hemoglobin 14.6 11.7 - 15.7 g/dL    Hematocrit 42.0 35.0 - 47.0 %    MCV 96 78 - 100 fL    MCH 33.3 (H) 26.5 - 33.0 pg    MCHC 34.8 31.5 - 36.5 g/dL    RDW 12.4 10.0 - 15.0 %    Platelet Count 214 150 - 450 10e3/uL    % Neutrophils 63 %    % Lymphocytes 25 %    % Monocytes 11 %    % Eosinophils 0 %    % Basophils 1 %    % Immature Granulocytes 0 %    NRBCs per 100 WBC 0 <1 /100    Absolute Neutrophils 5.0 1.6 - 8.3 10e3/uL    Absolute Lymphocytes 2.0 0.8 - 5.3 10e3/uL    Absolute Monocytes 0.9 0.0 - 1.3 10e3/uL    Absolute Eosinophils 0.0 0.0 - 0.7 10e3/uL    Absolute Basophils 0.1 0.0 - 0.2 10e3/uL    Absolute Immature Granulocytes 0.0 <=0.4 10e3/uL    Absolute NRBCs 0.0 10e3/uL   Extra Blue Top Tube    Collection Time: 04/20/22 11:56 AM   Result Value Ref Range    Hold Specimen [facility]    Extra Red Top Tube    Collection Time: 04/20/22 11:56 AM   Result Value Ref Range    Hold Specimen [facility]    Basic metabolic panel    Collection Time: 04/21/22  8:58 AM   Result Value Ref Range    Sodium 135 133 - 144 mmol/L    Potassium 3.4 3.4 - 5.3 mmol/L    Chloride 103 94 - 109 mmol/L    Carbon Dioxide (CO2) 22 20 - 32 mmol/L    Anion Gap 10 3 - 14 mmol/L    Urea Nitrogen 8 7 - 30 mg/dL    Creatinine 0.78 0.52 - 1.04 mg/dL    Calcium 9.1 8.5 - 10.1 mg/dL     Glucose 147 (H) 70 - 99 mg/dL    GFR Estimate 86 >60 mL/min/1.73m2   Extra Purple Top Tube    Collection Time: 04/21/22  8:58 AM   Result Value Ref Range    Hold Specimen [facility]             Because this patient meets criteria for an Alcohol Use Disorder, I performed the following brief intervention on the date of this note:              1) Expressed concern that the patient is drinking at unhealthy levels known to increase their risk of alcohol related problems              2) Gave feedback linking alcohol use and health, including personalized feedback explaining how alcohol use can interact with their medical and/or psychiatric problems, and with prescribed medications.              3) Advised patient to abstain.    PT counseled on nicotine cessation and nicotine replacement provided    Discussed with patient many issues of addiction,triggers, relapse, and establishing a solid recovery program.    DISCHARGE MENTAL STATUS EXAMINATION:  The patient is alert, oriented x3.  Good fund of knowledge.  Good use of language.  Recent and remote memory, language, fund of knowledge are all adequate.  Euthymic mood congruent affect  Speech normal rate/rhythm linear tp no loose asso,The patient does not have any active suicidal or homicidal ideation.  Does not have any auditory or visual hallucination.  Fair insight/judgment At this time, the patient was stable to be discharged.        Pt was not determined to not be a danger to himself or others. At the current time of discharge, the patient does not meet criteria for involuntary hospitalization. On the day of discharge, the patient reports that they do not have suicidal or homicidal ideation and would never hurt themselves or others. Steps taken to minimize risk include: assessing patient s behavior and thought process daily during hospital stay, discharging patient with adequate plan for follow up for mental and physical health and discussing safety plan of returning to the  hospital should the patient ever have thoughts of harming themselves or others. Therefore, based on all available evidence including the factors cited above, the patient does not appear to be at imminent risk for self-harm, and is appropriate for outpatient level of care.     Educated about side effects/risk vs benefits /alternative including non treatment.Pt consented to be on medication.     .Total time spent on discharge summary more than 35 min  More than  20 min  planning, coordination of care, medication reconciliation and performance of physical exam on day of discharge.Care was coordinated with unit RN and unit therapist         Review of your medicines      UNREVIEWED medicines. Ask your doctor about these medicines      Dose / Directions   albuterol 108 (90 Base) MCG/ACT inhaler  Commonly known as: PROAIR HFA/PROVENTIL HFA/VENTOLIN HFA      Dose: 2 puff  Inhale 2 puffs into the lungs every 6 hours as needed for shortness of breath / dyspnea or wheezing  Quantity: 1 Inhaler  Refills: 0     amLODIPine 10 MG tablet  Commonly known as: NORVASC      Dose: 10 mg  Take 10 mg by mouth daily  Refills: 0     eszopiclone 2 MG tablet  Commonly known as: LUNESTA  Ask about: Which instructions should I use?      Dose: 2 mg  Start taking on: April 25, 2022  Take 2 mg by mouth At Bedtime  Refills: 0     gabapentin 600 MG tablet  Commonly known as: NEURONTIN      Dose: 600 mg  Take 600 mg by mouth 3 times daily  Refills: 0     levothyroxine 100 MCG tablet  Commonly known as: SYNTHROID/LEVOTHROID      Dose: 100 mcg  Take 100 mcg by mouth daily  Refills: 0     multivitamin w/minerals tablet  Used for: Alcohol withdrawal, uncomplicated (H)      Dose: 1 tablet  Take 1 tablet by mouth daily  Quantity: 30 tablet  Refills: 0     nicotine polacrilex 4 MG gum  Commonly known as: NICORETTE  Used for: Nicotine abuse      Dose: 4 mg  Place 1 each (4 mg) inside cheek as needed for smoking cessation  Quantity: 270 tablet  Refills: 1    "  omeprazole 40 MG DR capsule  Commonly known as: priLOSEC  Ask about: Which instructions should I use?      Dose: 1 capsule  Take 1 capsule by mouth At Bedtime  Refills: 0     ondansetron 4 MG ODT tab  Commonly known as: ZOFRAN-ODT      Dose: 4 mg  Take 4 mg by mouth as needed  Refills: 0     propranolol 20 MG tablet  Commonly known as: INDERAL      Dose: 20 mg  Take 20 mg by mouth 2 times daily  Refills: 0     QUEtiapine 25 MG tablet  Commonly known as: SEROquel  Used for: Mood disorder, Posttraumatic stress disorder      Dose: 25 mg  Take 1 tablet (25 mg) by mouth At Bedtime  Quantity: 30 tablet  Refills: 2     thiamine 100 MG tablet  Commonly known as: B-1  Used for: Alcohol withdrawal, uncomplicated (H)      Dose: 100 mg  Take 1 tablet (100 mg) by mouth daily  Quantity: 30 tablet  Refills: 0             Disposition: home     Facts about COVID19 at www.cdc.gov/COVID19 and www.[place].gov/covid19     Keeping hands clean is one of the most important steps we can take to avoid getting sick and spreading germs to others.  Please wash your hands frequently and lather with soap for at least 20 seconds!     Medical Follow-Up:pcp 5/11/22        Follow-up Appointment:  [facility] outpatient     Dr. [facility] [address], [place] [address]  Phone Number: [phone]     Appointment Date/Time: May 17th 10AM   Treatment Follow-Up:  .  Intensive Outpatient Programs (IOP):     [place] Location: [address] [id number]     [place] Location: [address] [id number]     Phone: [phone]       Fax: [phone]        \"Much or all of the text in this note was generated through the use of Dragon Dictate voice to text software. Errors in spelling or words which appear to be out of contact are unintentional, may be present due having escaped editing\"     "

## 2022-04-22 NOTE — PLAN OF CARE
Problem: Alcohol Withdrawal  Goal: Alcohol Withdrawal Symptom Control  Outcome: Ongoing, Progressing     Pt monitored for alcohol withdrawal, MSSA of 1, 4. Pt did not receive withdrawal medication this shift. Pt last received valium 4/21 at 09:20. Pt would like to discharge on 4/22.    Pt visible in milieu and social with peers. Denied SI

## 2022-04-22 NOTE — PLAN OF CARE
Problem: Plan of Care - These are the overarching goals to be used throughout the patient stay.    Goal: Optimal Comfort and Wellbeing  Outcome: Ongoing, Progressing      Behavioral  Pt appeared sleeping comfortably overnight; breathing was even and unlabored.      Medical  Pt continues in alcohol withdrawal; MSSA  3, no valium administered this shift; Pt last received valium 4/21 @ 0919. Pt has received a total of 35 mg of valium and 5 mg of ativan since admission to the hospital.     No new medical concerns noted.

## 2022-04-22 NOTE — PROGRESS NOTES
Patient discharged to home. Reports being picked up by friend. Patient ecorted off the unit by Hank FRAGA. .        All patient belongings from room, locked bin and security were sent with patient. This RN went over discharge instructions, teachings, patient's labs, and discharge  recommendations with patient. This RN went over patient's prescribed medications. Patient verbalizes and demonstrates understanding of all teachings. Patient denies thoughts of harm towards self or others.  Pt denies any current medical issues at this time. Patient denies any further questions and is now discharged at this time

## 2022-04-23 DIAGNOSIS — Z71.89 OTHER SPECIFIED COUNSELING: ICD-10-CM

## 2022-04-24 ENCOUNTER — PATIENT OUTREACH (OUTPATIENT)
Dept: CARE COORDINATION | Facility: CLINIC | Age: 62
End: 2022-04-24
Payer: MEDICARE

## 2022-04-24 NOTE — PROGRESS NOTES
Clinic Care Coordination Contact  [facility]/Voicemail       Clinical Data: Care Coordinator Outreach  Outreach attempted x 1.  Left message on patient's voicemail with call back information and requested return call.  Plan: Care Coordinator will try to reach patient again in 1-2 business days.    COLLIN Eldridge  [phone]  [facility]

## 2022-04-25 NOTE — PROGRESS NOTES
"Clinic Care Coordination Contact  [facility]: Post-Discharge Note  SITUATION                                                      Admission:    Admission Date: 04/20/22   Reason for Admission: Alcohol use disorder severe  Discharge:   Discharge Date: 04/22/22  Discharge Diagnosis: Alcohol use disorder severe    BACKGROUND                                                      PATIENT was [facility] attending  under DR mukherjee, please review the detailed admit note on 4/21/2022   The patient was placed under status 15 (15 minute checks) to ensure patient safety.   MSSA protocol was initiated due to the patient's history of alcohol abuse and concern for withdrawal symptoms.  CBC, BMP and utox obtained.     All outpatient medications were continued     PATIENTdid participate in groups and was visible in the milieu.      The patient's symptoms of alcohol withdrawal improved.     Patients energy motivation , sleep appetite improved.  Pt completed detox . It was un eventful.     Patient signed a neuroleptic consent  Patient makes statements such as  \"My therapist  looks like you, all you people look the same\"  Discussed with patient medications for craving.  Spoke with patient about triggers coping skills relapse prevention.     CONSULTS DONE DURING PATIENTS HOSPITALIZATION.  Patient was seen by medicine on date4/20/22     This as per their medical consult     Assessment & Plan     Jolynn Rivera is a 62 year old female with PMH alcohol use disorder, Hypothyroidism, Essential HTN, Hepatitis C s/p treatment, COPD, Anxiety, Bipolar disorder and PTSD admitted to [facility]  on 4/20/2022 alcohol detox. Internal Medicine is consulted for medical evaluation.      #Acute alcohol withdrawal with history of withdrawal seizure  #Alcohol use disorder   The patient reports she was sober for 2 1/2 years followed by a brief relapse and then sober for 2 years. She relapsed in November 2021 and has been drinking " on and off since that time.  Currently she reports drinking  8 beers daily for the past 1-2 weeks. Last drink yesterday. Alcohol breath test on arrival to ED 0.043. MSSA scores since admission range from 8-12.  She has a hx of withdrawal seizures, last occurring March 2022.  She currently denies visual or auditory hallucinations. She has received treatment multiple times in the past and is active in [facility].    - Continue MSSA   - Cont multivitamin, folic acid and thiamine supplementation   - Further management per Psychiatry      #High anion gap metabolic acidosis, resolved   Anion gap mildly elevated at 17. Likely due to alcoholic ketoacidosis. Repeat anion gap level normal.   -Encouraged fluid intake with patient  -Alcohol cessation     #Essential HTN  Recently started on amlodipine but could not tolerate due to nightmares so she stopped taking prior to arrival. Checks BP at home and reports well controlled. Mildly elevated now due to acute withdrawal.   -Cont PTA propanolol  -F/U outpatient after discharge     #Hypothyroidism  -Cont PTA levothyroxine     #COPD  No wheezing on exam. Not on home inhalers     #Anxiety  #Bipolar  #PTSD  -Cont PTA Seroquel        #Hepatitis C s/p treatment     Medicine will sign off. Recommendations relayed to primary team via this note.  Please feel free to reconsult if any new medical issues or concerns.  Thank you for the opportunity to care for this patient.      The patient's care was discussed with the Bedside Nurse and Patient.       ASSESSMENT           Discharge Assessment  How are you doing now that you are home?: Patient shares that she is doing well and is looking forward to starting outpatient treatment soon. Patient talked a great deal about some of the things she likes to do that really help her sobriety such as gardening and volunteering at [employer]. Patient thanks  for the call and shares that she had a wonderful experience during her last hospital  visit.  How are your symptoms? (Red Flag symptoms escalate to triage hotline per guidelines): Improved  Do you feel your condition is stable enough to be safe at home until your provider visit?: Yes  Does the patient have their discharge instructions? : Yes  Does the patient have questions regarding their discharge instructions? : No  Were you started on any new medications or were there changes to any of your previous medications? : Yes  Does the patient have all of their medications?: Yes  Do you have questions regarding any of your medications? : No  Do you have all of your needed medical supplies or equipment (DME)?  (i.e. oxygen tank, CPAP, cane, etc.): No - What equipment or supplies are needed?  Discharge follow-up appointment scheduled within 14 calendar days? : Yes  Discharge Follow Up Appointment Date: 05/11/22  Discharge Follow Up Appointment Scheduled with?: Primary Care Provider    Post-op ([facility] Only)  If the patient had a surgery or procedure, do they have any questions for a nurse?: No           PLAN                                                      Outpatient Plan:  Medical Follow-Up:pcp 5/11/22         Follow-up Appointment:  [facility] [address], [place] [address]  Phone Number: [phone]     Appointment Date/Time: May 17th 10AM   Treatment Follow-Up:  .  Intensive Outpatient Programs (IOP):     [place] Location: [address] [place], [place] [id number] [place] [address] Location: [address] [facility], [address] [address]     Phone: [phone]       Fax: [phone]    No future appointments.      For any urgent concerns, please contact our 24 hour nurse triage line: [phone] ([phone])         COLLIN Zarate

## 2022-07-20 ENCOUNTER — HOSPITAL ENCOUNTER (EMERGENCY)
Facility: CLINIC | Age: 62
Discharge: LEFT BEFORE DISCHARGE | DRG: 894 | End: 2022-07-20
Attending: EMERGENCY MEDICINE | Admitting: EMERGENCY MEDICINE
Payer: MEDICARE

## 2022-07-20 VITALS
DIASTOLIC BLOOD PRESSURE: 72 MMHG | TEMPERATURE: 98.5 F | RESPIRATION RATE: 16 BRPM | OXYGEN SATURATION: 98 % | HEART RATE: 77 BPM | SYSTOLIC BLOOD PRESSURE: 121 MMHG

## 2022-07-20 DIAGNOSIS — R79.89 ELEVATED LFTS: ICD-10-CM

## 2022-07-20 DIAGNOSIS — F10.10 ALCOHOL ABUSE: ICD-10-CM

## 2022-07-20 LAB
ALBUMIN SERPL-MCNC: 3.5 G/DL (ref 3.4–5)
ALCOHOL BREATH TEST: 0.23 (ref 0–0.01)
ALP SERPL-CCNC: 50 U/L (ref 40–150)
ALT SERPL W P-5'-P-CCNC: 576 U/L (ref 0–50)
AMPHETAMINES UR QL SCN: NORMAL
ANION GAP SERPL CALCULATED.3IONS-SCNC: 8 MMOL/L (ref 3–14)
AST SERPL W P-5'-P-CCNC: 963 U/L (ref 0–45)
BARBITURATES UR QL: NORMAL
BASOPHILS # BLD AUTO: 0.1 10E3/UL (ref 0–0.2)
BASOPHILS NFR BLD AUTO: 1 %
BENZODIAZ UR QL: NORMAL
BILIRUB SERPL-MCNC: 0.5 MG/DL (ref 0.2–1.3)
BUN SERPL-MCNC: 10 MG/DL (ref 7–30)
CALCIUM SERPL-MCNC: 8.4 MG/DL (ref 8.5–10.1)
CANNABINOIDS UR QL SCN: NORMAL
CHLORIDE BLD-SCNC: 109 MMOL/L (ref 94–109)
CO2 SERPL-SCNC: 25 MMOL/L (ref 20–32)
COCAINE UR QL: NORMAL
CREAT SERPL-MCNC: 0.77 MG/DL (ref 0.52–1.04)
EOSINOPHIL # BLD AUTO: 0.1 10E3/UL (ref 0–0.7)
EOSINOPHIL NFR BLD AUTO: 2 %
ERYTHROCYTE [DISTWIDTH] IN BLOOD BY AUTOMATED COUNT: 12 % (ref 10–15)
GFR SERPL CREATININE-BSD FRML MDRD: 87 ML/MIN/1.73M2
GLUCOSE BLD-MCNC: 99 MG/DL (ref 70–99)
HCT VFR BLD AUTO: 40.6 % (ref 35–47)
HGB BLD-MCNC: 14.6 G/DL (ref 11.7–15.7)
IMM GRANULOCYTES # BLD: 0 10E3/UL
IMM GRANULOCYTES NFR BLD: 0 %
LYMPHOCYTES # BLD AUTO: 2 10E3/UL (ref 0.8–5.3)
LYMPHOCYTES NFR BLD AUTO: 50 %
MCH RBC QN AUTO: 33.6 PG (ref 26.5–33)
MCHC RBC AUTO-ENTMCNC: 36 G/DL (ref 31.5–36.5)
MCV RBC AUTO: 93 FL (ref 78–100)
MONOCYTES # BLD AUTO: 0.5 10E3/UL (ref 0–1.3)
MONOCYTES NFR BLD AUTO: 12 %
NEUTROPHILS # BLD AUTO: 1.4 10E3/UL (ref 1.6–8.3)
NEUTROPHILS NFR BLD AUTO: 35 %
NRBC # BLD AUTO: 0 10E3/UL
NRBC BLD AUTO-RTO: 0 /100
OPIATES UR QL SCN: NORMAL
PLATELET # BLD AUTO: 177 10E3/UL (ref 150–450)
POTASSIUM BLD-SCNC: 3.4 MMOL/L (ref 3.4–5.3)
PROT SERPL-MCNC: 7.4 G/DL (ref 6.8–8.8)
RBC # BLD AUTO: 4.35 10E6/UL (ref 3.8–5.2)
SARS-COV-2 RNA RESP QL NAA+PROBE: NEGATIVE
SODIUM SERPL-SCNC: 142 MMOL/L (ref 133–144)
WBC # BLD AUTO: 4 10E3/UL (ref 4–11)

## 2022-07-20 PROCEDURE — 36415 COLL VENOUS BLD VENIPUNCTURE: CPT | Performed by: EMERGENCY MEDICINE

## 2022-07-20 PROCEDURE — 80307 DRUG TEST PRSMV CHEM ANLYZR: CPT | Performed by: EMERGENCY MEDICINE

## 2022-07-20 PROCEDURE — 82075 ASSAY OF BREATH ETHANOL: CPT | Performed by: EMERGENCY MEDICINE

## 2022-07-20 PROCEDURE — 80053 COMPREHEN METABOLIC PANEL: CPT | Performed by: EMERGENCY MEDICINE

## 2022-07-20 PROCEDURE — 87340 HEPATITIS B SURFACE AG IA: CPT | Performed by: INTERNAL MEDICINE

## 2022-07-20 PROCEDURE — 250N000013 HC RX MED GY IP 250 OP 250 PS 637: Performed by: EMERGENCY MEDICINE

## 2022-07-20 PROCEDURE — 86803 HEPATITIS C AB TEST: CPT | Performed by: INTERNAL MEDICINE

## 2022-07-20 PROCEDURE — 85025 COMPLETE CBC W/AUTO DIFF WBC: CPT | Performed by: EMERGENCY MEDICINE

## 2022-07-20 PROCEDURE — U0003 INFECTIOUS AGENT DETECTION BY NUCLEIC ACID (DNA OR RNA); SEVERE ACUTE RESPIRATORY SYNDROME CORONAVIRUS 2 (SARS-COV-2) (CORONAVIRUS DISEASE [COVID-19]), AMPLIFIED PROBE TECHNIQUE, MAKING USE OF HIGH THROUGHPUT TECHNOLOGIES AS DESCRIBED BY CMS-2020-01-R: HCPCS | Performed by: EMERGENCY MEDICINE

## 2022-07-20 PROCEDURE — 86704 HEP B CORE ANTIBODY TOTAL: CPT | Performed by: INTERNAL MEDICINE

## 2022-07-20 PROCEDURE — 99283 EMERGENCY DEPT VISIT LOW MDM: CPT | Performed by: EMERGENCY MEDICINE

## 2022-07-20 PROCEDURE — C9803 HOPD COVID-19 SPEC COLLECT: HCPCS | Performed by: EMERGENCY MEDICINE

## 2022-07-20 PROCEDURE — 99284 EMERGENCY DEPT VISIT MOD MDM: CPT | Performed by: EMERGENCY MEDICINE

## 2022-07-20 RX ORDER — DIAZEPAM 5 MG
5-20 TABLET ORAL EVERY 30 MIN PRN
Status: DISCONTINUED | OUTPATIENT
Start: 2022-07-20 | End: 2022-07-21 | Stop reason: HOSPADM

## 2022-07-20 RX ADMIN — NICOTINE POLACRILEX 4 MG: 2 GUM, CHEWING BUCCAL at 21:21

## 2022-07-21 ENCOUNTER — HOSPITAL ENCOUNTER (INPATIENT)
Facility: CLINIC | Age: 62
LOS: 1 days | Discharge: LEFT BEFORE DISCHARGE | DRG: 894 | End: 2022-07-22
Attending: FAMILY MEDICINE | Admitting: INTERNAL MEDICINE
Payer: MEDICARE

## 2022-07-21 DIAGNOSIS — R79.89 ELEVATED LFTS: ICD-10-CM

## 2022-07-21 DIAGNOSIS — Z11.52 ENCOUNTER FOR SCREENING LABORATORY TESTING FOR SEVERE ACUTE RESPIRATORY SYNDROME CORONAVIRUS 2 (SARS-COV-2): ICD-10-CM

## 2022-07-21 DIAGNOSIS — R56.9 DRUG WITHDRAWAL SEIZURE WITH COMPLICATION (H): ICD-10-CM

## 2022-07-21 DIAGNOSIS — F10.229 ALCOHOL DEPENDENCE WITH INTOXICATION WITH COMPLICATION (H): ICD-10-CM

## 2022-07-21 DIAGNOSIS — F19.939 DRUG WITHDRAWAL SEIZURE WITH COMPLICATION (H): ICD-10-CM

## 2022-07-21 LAB
ALBUMIN SERPL-MCNC: 3.4 G/DL (ref 3.4–5)
ALCOHOL BREATH TEST: 0.22 (ref 0–0.01)
ALP SERPL-CCNC: 47 U/L (ref 40–150)
ALT SERPL W P-5'-P-CCNC: 422 U/L (ref 0–50)
AMPHETAMINES UR QL SCN: NORMAL
ANION GAP SERPL CALCULATED.3IONS-SCNC: 12 MMOL/L (ref 3–14)
AST SERPL W P-5'-P-CCNC: 451 U/L (ref 0–45)
BARBITURATES UR QL: NORMAL
BENZODIAZ UR QL: NORMAL
BILIRUB SERPL-MCNC: 0.4 MG/DL (ref 0.2–1.3)
BUN SERPL-MCNC: 12 MG/DL (ref 7–30)
CALCIUM SERPL-MCNC: 8.1 MG/DL (ref 8.5–10.1)
CANNABINOIDS UR QL SCN: NORMAL
CHLORIDE BLD-SCNC: 110 MMOL/L (ref 94–109)
CO2 SERPL-SCNC: 20 MMOL/L (ref 20–32)
COCAINE UR QL: NORMAL
CREAT SERPL-MCNC: 0.85 MG/DL (ref 0.52–1.04)
GFR SERPL CREATININE-BSD FRML MDRD: 78 ML/MIN/1.73M2
GLUCOSE BLD-MCNC: 113 MG/DL (ref 70–99)
HCG UR QL: NEGATIVE
LIPASE SERPL-CCNC: 207 U/L (ref 73–393)
MAGNESIUM SERPL-MCNC: 1.8 MG/DL (ref 1.6–2.3)
OPIATES UR QL SCN: NORMAL
PHOSPHATE SERPL-MCNC: 3.8 MG/DL (ref 2.5–4.5)
POTASSIUM BLD-SCNC: 3.6 MMOL/L (ref 3.4–5.3)
PROT SERPL-MCNC: 7.2 G/DL (ref 6.8–8.8)
SARS-COV-2 RNA RESP QL NAA+PROBE: NEGATIVE
SODIUM SERPL-SCNC: 142 MMOL/L (ref 133–144)

## 2022-07-21 PROCEDURE — 96375 TX/PRO/DX INJ NEW DRUG ADDON: CPT

## 2022-07-21 PROCEDURE — 250N000013 HC RX MED GY IP 250 OP 250 PS 637: Performed by: INTERNAL MEDICINE

## 2022-07-21 PROCEDURE — C9803 HOPD COVID-19 SPEC COLLECT: HCPCS

## 2022-07-21 PROCEDURE — 120N000002 HC R&B MED SURG/OB UMMC

## 2022-07-21 PROCEDURE — 96374 THER/PROPH/DIAG INJ IV PUSH: CPT

## 2022-07-21 PROCEDURE — 99222 1ST HOSP IP/OBS MODERATE 55: CPT | Mod: AI | Performed by: INTERNAL MEDICINE

## 2022-07-21 PROCEDURE — 250N000013 HC RX MED GY IP 250 OP 250 PS 637: Performed by: FAMILY MEDICINE

## 2022-07-21 PROCEDURE — 84100 ASSAY OF PHOSPHORUS: CPT | Performed by: INTERNAL MEDICINE

## 2022-07-21 PROCEDURE — 83735 ASSAY OF MAGNESIUM: CPT | Performed by: INTERNAL MEDICINE

## 2022-07-21 PROCEDURE — 80053 COMPREHEN METABOLIC PANEL: CPT | Performed by: FAMILY MEDICINE

## 2022-07-21 PROCEDURE — 36415 COLL VENOUS BLD VENIPUNCTURE: CPT | Performed by: FAMILY MEDICINE

## 2022-07-21 PROCEDURE — 87635 SARS-COV-2 COVID-19 AMP PRB: CPT | Performed by: FAMILY MEDICINE

## 2022-07-21 PROCEDURE — 99285 EMERGENCY DEPT VISIT HI MDM: CPT | Mod: 25

## 2022-07-21 PROCEDURE — 258N000003 HC RX IP 258 OP 636: Performed by: FAMILY MEDICINE

## 2022-07-21 PROCEDURE — 81025 URINE PREGNANCY TEST: CPT | Performed by: FAMILY MEDICINE

## 2022-07-21 PROCEDURE — 83690 ASSAY OF LIPASE: CPT | Performed by: FAMILY MEDICINE

## 2022-07-21 PROCEDURE — HZ2ZZZZ DETOXIFICATION SERVICES FOR SUBSTANCE ABUSE TREATMENT: ICD-10-PCS | Performed by: INTERNAL MEDICINE

## 2022-07-21 PROCEDURE — 99285 EMERGENCY DEPT VISIT HI MDM: CPT | Mod: GC | Performed by: FAMILY MEDICINE

## 2022-07-21 PROCEDURE — 82075 ASSAY OF BREATH ETHANOL: CPT

## 2022-07-21 PROCEDURE — 96361 HYDRATE IV INFUSION ADD-ON: CPT

## 2022-07-21 PROCEDURE — 250N000011 HC RX IP 250 OP 636: Performed by: INTERNAL MEDICINE

## 2022-07-21 PROCEDURE — 96376 TX/PRO/DX INJ SAME DRUG ADON: CPT

## 2022-07-21 PROCEDURE — 80307 DRUG TEST PRSMV CHEM ANLYZR: CPT | Performed by: FAMILY MEDICINE

## 2022-07-21 RX ORDER — PROPRANOLOL HYDROCHLORIDE 20 MG/1
20 TABLET ORAL 2 TIMES DAILY
Status: DISCONTINUED | OUTPATIENT
Start: 2022-07-21 | End: 2022-07-23 | Stop reason: HOSPADM

## 2022-07-21 RX ORDER — PANTOPRAZOLE SODIUM 40 MG/1
40 TABLET, DELAYED RELEASE ORAL
Status: DISCONTINUED | OUTPATIENT
Start: 2022-07-22 | End: 2022-07-23 | Stop reason: HOSPADM

## 2022-07-21 RX ORDER — ONDANSETRON 2 MG/ML
4 INJECTION INTRAMUSCULAR; INTRAVENOUS EVERY 6 HOURS PRN
Status: DISCONTINUED | OUTPATIENT
Start: 2022-07-21 | End: 2022-07-23 | Stop reason: HOSPADM

## 2022-07-21 RX ORDER — QUETIAPINE FUMARATE 25 MG/1
25 TABLET, FILM COATED ORAL AT BEDTIME
Status: DISCONTINUED | OUTPATIENT
Start: 2022-07-21 | End: 2022-07-23 | Stop reason: HOSPADM

## 2022-07-21 RX ORDER — FOLIC ACID 1 MG/1
1 TABLET ORAL DAILY
Status: DISCONTINUED | OUTPATIENT
Start: 2022-07-22 | End: 2022-07-23 | Stop reason: HOSPADM

## 2022-07-21 RX ORDER — ESZOPICLONE 2 MG/1
TABLET, FILM COATED ORAL
COMMUNITY
Start: 2022-06-21 | End: 2022-10-07

## 2022-07-21 RX ORDER — ONDANSETRON 4 MG/1
4 TABLET, ORALLY DISINTEGRATING ORAL EVERY 6 HOURS PRN
Status: DISCONTINUED | OUTPATIENT
Start: 2022-07-21 | End: 2022-07-23 | Stop reason: HOSPADM

## 2022-07-21 RX ORDER — HALOPERIDOL 5 MG/ML
2.5-5 INJECTION INTRAMUSCULAR EVERY 6 HOURS PRN
Status: DISCONTINUED | OUTPATIENT
Start: 2022-07-21 | End: 2022-07-23 | Stop reason: HOSPADM

## 2022-07-21 RX ORDER — DIAZEPAM 10 MG
10 TABLET ORAL EVERY 30 MIN PRN
Status: DISCONTINUED | OUTPATIENT
Start: 2022-07-21 | End: 2022-07-23 | Stop reason: HOSPADM

## 2022-07-21 RX ORDER — OLANZAPINE 5 MG/1
5-10 TABLET, ORALLY DISINTEGRATING ORAL EVERY 6 HOURS PRN
Status: DISCONTINUED | OUTPATIENT
Start: 2022-07-21 | End: 2022-07-23 | Stop reason: HOSPADM

## 2022-07-21 RX ORDER — HYDRALAZINE HYDROCHLORIDE 20 MG/ML
10 INJECTION INTRAMUSCULAR; INTRAVENOUS EVERY 6 HOURS PRN
Status: DISCONTINUED | OUTPATIENT
Start: 2022-07-21 | End: 2022-07-23 | Stop reason: HOSPADM

## 2022-07-21 RX ORDER — FLUMAZENIL 0.1 MG/ML
0.2 INJECTION, SOLUTION INTRAVENOUS
Status: DISCONTINUED | OUTPATIENT
Start: 2022-07-21 | End: 2022-07-23 | Stop reason: HOSPADM

## 2022-07-21 RX ORDER — LIDOCAINE 40 MG/G
CREAM TOPICAL
Status: DISCONTINUED | OUTPATIENT
Start: 2022-07-21 | End: 2022-07-23 | Stop reason: HOSPADM

## 2022-07-21 RX ORDER — DIAZEPAM 5 MG
5-20 TABLET ORAL EVERY 30 MIN PRN
Status: DISCONTINUED | OUTPATIENT
Start: 2022-07-21 | End: 2022-07-21

## 2022-07-21 RX ORDER — ACETAMINOPHEN 325 MG/1
650 TABLET ORAL EVERY 4 HOURS PRN
Status: DISCONTINUED | OUTPATIENT
Start: 2022-07-21 | End: 2022-07-23 | Stop reason: HOSPADM

## 2022-07-21 RX ORDER — GABAPENTIN 600 MG/1
600 TABLET ORAL 3 TIMES DAILY
Status: DISCONTINUED | OUTPATIENT
Start: 2022-07-22 | End: 2022-07-22

## 2022-07-21 RX ORDER — CLONIDINE HYDROCHLORIDE 0.1 MG/1
0.1 TABLET ORAL EVERY 8 HOURS
Status: DISCONTINUED | OUTPATIENT
Start: 2022-07-21 | End: 2022-07-21

## 2022-07-21 RX ORDER — SODIUM CHLORIDE 9 MG/ML
INJECTION, SOLUTION INTRAVENOUS CONTINUOUS
Status: DISCONTINUED | OUTPATIENT
Start: 2022-07-21 | End: 2022-07-23 | Stop reason: HOSPADM

## 2022-07-21 RX ORDER — LEVOTHYROXINE SODIUM 100 UG/1
100 TABLET ORAL DAILY
Status: DISCONTINUED | OUTPATIENT
Start: 2022-07-22 | End: 2022-07-23 | Stop reason: HOSPADM

## 2022-07-21 RX ORDER — DIAZEPAM 10 MG/2ML
5-10 INJECTION, SOLUTION INTRAMUSCULAR; INTRAVENOUS EVERY 30 MIN PRN
Status: DISCONTINUED | OUTPATIENT
Start: 2022-07-21 | End: 2022-07-23 | Stop reason: HOSPADM

## 2022-07-21 RX ORDER — MULTIPLE VITAMINS W/ MINERALS TAB 9MG-400MCG
1 TAB ORAL DAILY
Status: DISCONTINUED | OUTPATIENT
Start: 2022-07-22 | End: 2022-07-23 | Stop reason: HOSPADM

## 2022-07-21 RX ADMIN — SODIUM CHLORIDE 1000 ML: 9 INJECTION, SOLUTION INTRAVENOUS at 21:00

## 2022-07-21 RX ADMIN — QUETIAPINE FUMARATE 25 MG: 25 TABLET ORAL at 22:50

## 2022-07-21 RX ADMIN — SODIUM CHLORIDE: 9 INJECTION, SOLUTION INTRAVENOUS at 22:46

## 2022-07-21 RX ADMIN — NICOTINE POLACRILEX 4 MG: 2 GUM, CHEWING BUCCAL at 20:42

## 2022-07-21 RX ADMIN — ONDANSETRON 4 MG: 2 INJECTION INTRAMUSCULAR; INTRAVENOUS at 23:44

## 2022-07-21 RX ADMIN — PROPRANOLOL HYDROCHLORIDE 20 MG: 20 TABLET ORAL at 23:56

## 2022-07-21 RX ADMIN — NICOTINE POLACRILEX 4 MG: 2 GUM, CHEWING BUCCAL at 22:46

## 2022-07-21 RX ADMIN — DIAZEPAM 5 MG: 5 TABLET ORAL at 22:19

## 2022-07-21 ASSESSMENT — ACTIVITIES OF DAILY LIVING (ADL)
ADLS_ACUITY_SCORE: 37
ADLS_ACUITY_SCORE: 37

## 2022-07-21 NOTE — ED TRIAGE NOTES
Pt looking for detox off of alcohol. Pt states her last drink was around 4pm. Drinks a 6 pack of beer daily for the past two weeks. Pt states she does have a withdrawal seizure history, states last one was a few days ago. Pt states she also does have a history of epilepsy that she is supposed to see a neurologist for. She states she has had 2-3 seizures today, lives alone, so unsure.      Triage Assessment     Row Name 07/21/22 8962       Triage Assessment (Adult)    Airway WDL WDL       Respiratory WDL    Respiratory WDL WDL       Skin Circulation/Temperature WDL    Skin Circulation/Temperature WDL WDL       Cardiac WDL    Cardiac WDL WDL       Peripheral/Neurovascular WDL    Peripheral Neurovascular WDL WDL       Cognitive/Neuro/Behavioral WDL    Cognitive/Neuro/Behavioral WDL WDL

## 2022-07-21 NOTE — ED NOTES
"During med pass pt has irritated tone asking, \"so what am I just doing here if you guys arent doing anything, should I just go home?\" pt informed labs were still pending and the plan was for her to go to detox, pt was asked to clarify. Pt states well, \"well I am just sitting here and you guys aren't doing anything (pointing at IV).\"  Pt then states, \"when I come in I always get valium and IV fluids.\" pt informed pt has not displayed sx of withdraw to warrant valium and at this time does not appear to be needing it. Pt has been sitting up in bed talking to staff making frequent requests for food and snacks and without tremors. Pt then states, \"well then I just want to go home.\"   MD informed.   "

## 2022-07-21 NOTE — ED PROVIDER NOTES
"     [facility] EMERGENCY DEPARTMENT ([facility])  7/21/22    History     Chief Complaint   Patient presents with     Drug / Alcohol Assessment     Looking for detox off of alcohol. Last drink around 4pm, 4-5 beers. Typically drinks a 6 pack of beer daily.     HPI  Jolynn Rivera is a 62 year old female with a past medical history significant for alcohol use disorder with withdrawal seizures, polysubstance abuse, COPD, HTN, hepatitis C s/p treatment, mood disorder, chemical dependency, GERD, who presents to the Emergency Department seeking detox from alcohol.    Per chart review, patient has presented to ED 4 times in the past few days for alcohol use disorder with withdrawal seizures, most recently yesterday. Patients reported \"a bunch\" of withdrawal seizures. When the patient was assessed yesterday they found that her LFTs were elevated. The patient left against medical advice due to not having someone able to take care of her dog. Patient was also seen twice at [facility] 7/19/2022.  Patient returns today stating she is now ready to be admitted she is care of her dog and is wanting to try and stop drinking in order to prevent any further seizures or any other complications from her alcohol use.     Per medical records, patient's last detox was on 4/20/2022-4/22/2022.    Past Medical History  Past Medical History:   Diagnosis Date     Anxiety      COPD (chronic obstructive pulmonary disease) (H)      COPD (chronic obstructive pulmonary disease) (H)      Hepatitis C      PTSD (post-traumatic stress disorder)      Seizures (H)     second to ETOH     Substance abuse (H)      Past Surgical History:   Procedure Laterality Date     LAPAROSCOPIC TUBAL LIGATION       ORTHOPEDIC SURGERY      Left knee     TONSILLECTOMY       eszopiclone (LUNESTA) 2 MG tablet  gabapentin (NEURONTIN) 600 MG tablet  levothyroxine (SYNTHROID/LEVOTHROID) 100 MCG tablet  multivitamin w/minerals (THERA-VIT-M) tablet  nicotine polacrilex " "(NICORETTE) 4 MG gum  omeprazole (PRILOSEC) 40 MG DR capsule  propranolol (INDERAL) 20 MG tablet  QUEtiapine (SEROQUEL) 25 MG tablet  thiamine (B-1) 100 MG tablet      Allergies   Allergen Reactions     Antihistamines, Chlorpheniramine-Type [Alkylamines]      Compazine      Lock jaw; all medications ending with -zine     Diphenhydramine      Muscle Cramps     Penicillins      Panic attack     Prochlorperazine      Muscle cramps     Trazodone Nausea and Vomiting     \" I ended up falling and feeling like I was drunk\"     Wasps [Hornets]      Swelling      Family History  Family History   Problem Relation Age of Onset     Anxiety Disorder Mother      Asthma Mother      Alcohol/Drug Father      Prostate Cancer Father      Arthritis Father      Alcohol/Drug Brother      Alcohol/Drug Brother      Hypertension Brother      Alcohol/Drug Brother      Depression Brother      Psychotic Disorder Brother      Social History   Social History     Tobacco Use     Smoking status: Current Every Day Smoker     Packs/day: 0.50     Smokeless tobacco: Never Used     Tobacco comment: Starting Chantix October 2014   Substance Use Topics     Alcohol use: Yes     Comment: 6 pack of beer daily     Drug use: No     Comment: stopped 1986      Past medical history, past surgical history, medications, allergies, family history, and social history were reviewed with the patient. No additional pertinent items.       Review of Systems  A complete review of systems was performed with pertinent positives and negatives noted in the HPI, and all other systems negative.    Physical Exam   BP: 130/85  Pulse: 65  Temp: 98.4  F (36.9  C)  Resp: 16  Height: 165.1 cm (5' 5\")  Weight: 68 kg (150 lb)  SpO2: 96 %  Physical Exam  Constitutional:       General: She is not in acute distress.     Appearance: She is not diaphoretic.   HENT:      Head: Atraumatic.      Mouth/Throat:      Pharynx: No oropharyngeal exudate.   Eyes:      General: No scleral icterus.     " Pupils: Pupils are equal, round, and reactive to light.   Cardiovascular:      Heart sounds: Normal heart sounds.   Pulmonary:      Effort: No respiratory distress.      Breath sounds: Normal breath sounds.   Abdominal:      General: Bowel sounds are normal.      Palpations: Abdomen is soft.      Tenderness: There is no abdominal tenderness.   Musculoskeletal:         General: No tenderness.   Skin:     General: Skin is warm.      Findings: No rash.   Neurological:      General: No focal deficit present.      Mental Status: She is oriented to person, place, and time.      Sensory: No sensory deficit.      Motor: No weakness.      Coordination: Coordination normal.   Psychiatric:         Mood and Affect: Mood is anxious.         Speech: Speech is slurred.         Behavior: Behavior is cooperative.         Thought Content: Thought content does not include homicidal or suicidal ideation.         ED Course      Procedures    The medical record was reviewed and interpreted.  Current labs reviewed and interpreted.         Results for orders placed or performed during the hospital encounter of 07/21/22   HCG qualitative urine     Status: Normal   Result Value Ref Range    hCG Urine Qualitative Negative Negative   Drug abuse screen 1 urine (ED)     Status: Normal   Result Value Ref Range    Amphetamines Urine Screen Negative Screen Negative    Barbiturates Urine Screen Negative Screen Negative    Benzodiazepines Urine Screen Negative Screen Negative    Cannabinoids Urine Screen Negative Screen Negative    Cocaine Urine Screen Negative Screen Negative    Opiates Urine Screen Negative Screen Negative   Asymptomatic COVID-19 Virus (Coronavirus) by PCR Nasopharyngeal     Status: Normal    Specimen: Nasopharyngeal; Swab   Result Value Ref Range    SARS CoV2 PCR Negative Negative    Narrative    Testing was performed using the joe  SARS-CoV-2 & Influenza A/B Assay on the joe  Paula  System.  This test should be ordered for the  detection of SARS-COV-2 in individuals who meet SARS-CoV-2 clinical and/or epidemiological criteria. Test performance is unknown in asymptomatic patients.  This test is for in vitro diagnostic use under the FDA EUA for laboratories certified under CLIA to perform moderate and/or high complexity testing. This test has not been FDA cleared or approved.  A negative test does not rule out the presence of PCR inhibitors in the specimen or target RNA in concentration below the limit of detection for the assay. The possibility of a false negative should be considered if the patient's recent exposure or clinical presentation suggests COVID-19.  [facility] are certified under the Clinical Laboratory Improvement Amendments of 1988 (CLIA-88) as qualified to perform moderate and/or high complexity laboratory testing.   Comprehensive metabolic panel     Status: Abnormal   Result Value Ref Range    Sodium 142 133 - 144 mmol/L    Potassium 3.6 3.4 - 5.3 mmol/L    Chloride 110 (H) 94 - 109 mmol/L    Carbon Dioxide (CO2) 20 20 - 32 mmol/L    Anion Gap 12 3 - 14 mmol/L    Urea Nitrogen 12 7 - 30 mg/dL    Creatinine 0.85 0.52 - 1.04 mg/dL    Calcium 8.1 (L) 8.5 - 10.1 mg/dL    Glucose 113 (H) 70 - 99 mg/dL    Alkaline Phosphatase 47 40 - 150 U/L     (H) 0 - 45 U/L     (H) 0 - 50 U/L    Protein Total 7.2 6.8 - 8.8 g/dL    Albumin 3.4 3.4 - 5.0 g/dL    Bilirubin Total 0.4 0.2 - 1.3 mg/dL    GFR Estimate 78 >60 mL/min/1.73m2   Lipase     Status: Normal   Result Value Ref Range    Lipase 207 73 - 393 U/L   Alcohol breath test POCT     Status: Abnormal   Result Value Ref Range    Alcohol Breath Test 0.221 (A) 0.00 - 0.01   Urine Drugs of Abuse Screen     Status: Normal    Narrative    The following orders were created for panel order Urine Drugs of Abuse Screen.  Procedure                               Abnormality         Status                     ---------                               -----------          ------                     Drug abuse screen 1 urin...[[id number]]  Normal              Final result                 Please view results for these tests on the individual orders.        Results for orders placed or performed during the hospital encounter of 07/21/22   HCG qualitative urine     Status: Normal   Result Value Ref Range    hCG Urine Qualitative Negative Negative   Drug abuse screen 1 urine (ED)     Status: Normal   Result Value Ref Range    Amphetamines Urine Screen Negative Screen Negative    Barbiturates Urine Screen Negative Screen Negative    Benzodiazepines Urine Screen Negative Screen Negative    Cannabinoids Urine Screen Negative Screen Negative    Cocaine Urine Screen Negative Screen Negative    Opiates Urine Screen Negative Screen Negative   Asymptomatic COVID-19 Virus (Coronavirus) by PCR Nasopharyngeal     Status: Normal    Specimen: Nasopharyngeal; Swab   Result Value Ref Range    SARS CoV2 PCR Negative Negative    Narrative    Testing was performed using the joe  SARS-CoV-2 & Influenza A/B Assay on the joe  Paula  System.  This test should be ordered for the detection of SARS-COV-2 in individuals who meet SARS-CoV-2 clinical and/or epidemiological criteria. Test performance is unknown in asymptomatic patients.  This test is for in vitro diagnostic use under the FDA EUA for laboratories certified under CLIA to perform moderate and/or high complexity testing. This test has not been FDA cleared or approved.  A negative test does not rule out the presence of PCR inhibitors in the specimen or target RNA in concentration below the limit of detection for the assay. The possibility of a false negative should be considered if the patient's recent exposure or clinical presentation suggests COVID-19.  [facility] are certified under the Clinical Laboratory Improvement Amendments of 1988 (CLIA-88) as qualified to perform moderate and/or high complexity laboratory testing.    Comprehensive metabolic panel     Status: Abnormal   Result Value Ref Range    Sodium 142 133 - 144 mmol/L    Potassium 3.6 3.4 - 5.3 mmol/L    Chloride 110 (H) 94 - 109 mmol/L    Carbon Dioxide (CO2) 20 20 - 32 mmol/L    Anion Gap 12 3 - 14 mmol/L    Urea Nitrogen 12 7 - 30 mg/dL    Creatinine 0.85 0.52 - 1.04 mg/dL    Calcium 8.1 (L) 8.5 - 10.1 mg/dL    Glucose 113 (H) 70 - 99 mg/dL    Alkaline Phosphatase 47 40 - 150 U/L     (H) 0 - 45 U/L     (H) 0 - 50 U/L    Protein Total 7.2 6.8 - 8.8 g/dL    Albumin 3.4 3.4 - 5.0 g/dL    Bilirubin Total 0.4 0.2 - 1.3 mg/dL    GFR Estimate 78 >60 mL/min/1.73m2   Lipase     Status: Normal   Result Value Ref Range    Lipase 207 73 - 393 U/L   Alcohol breath test POCT     Status: Abnormal   Result Value Ref Range    Alcohol Breath Test 0.221 (A) 0.00 - 0.01   Urine Drugs of Abuse Screen     Status: Normal    Narrative    The following orders were created for panel order Urine Drugs of Abuse Screen.  Procedure                               Abnormality         Status                     ---------                               -----------         ------                     Drug abuse screen 1 urin...[[id number]]  Normal              Final result                 Please view results for these tests on the individual orders.     Medications   0.9% sodium chloride BOLUS (0 mLs Intravenous Stopped 7/21/22 2204)     Followed by   sodium chloride 0.9% infusion (has no administration in time range)   nicotine (NICORETTE) gum 4 mg (4 mg Buccal Given 7/21/22 2042)   diazepam (VALIUM) tablet 5-20 mg (has no administration in time range)        Assessments & Plan (with Medical Decision Making)       I have reviewed the nursing notes. I have reviewed the findings, diagnosis, plan and need for follow up with the patient.    Patient with history of alcohol dependence and elevated LFTs past history of withdrawal seizures at this time will be admitted medically for further  stabilization and evaluation.  Unfortunately there are no beds available at this moment time and will remain in the emergency room until a medical bed becomes available.    Final diagnoses:   Alcohol dependence with intoxication with complication (H)   Elevated LFTs   Drug withdrawal seizure with complication (H)     IJessi, am serving as a trained medical scribe to document services personally performed by Zander Mcneal MD, based on the provider's statements to me.      IZander MD, was physically present and have reviewed and verified the accuracy of this note documented by Jessi Tracy  --  Zander Mcneal MD  [facility] EMERGENCY DEPARTMENT  7/21/2022     Zander Mcneal MD  07/21/22 0299

## 2022-07-21 NOTE — DISCHARGE INSTRUCTIONS
You have signs of liver disease caused by your alcohol drinking.     You need to stop drinking alcohol to prevent end stage liver disease.     Please return to the ER if you decide you want help with your liver disease or to stop drinking.     Please make an appointment to follow up with GI Clinic (phone: [phone]) in 3-5 days even if entirely better.

## 2022-07-21 NOTE — ED PROVIDER NOTES
"ED Provider Note  [facility] EMERGENCY DEPARTMENT ([facility])    7/20/22          History     Chief Complaint   Patient presents with     Alcohol Problem     Detox from alcohol, last drink one hour ago, drinking beer, has withdrawal seizure history.     HPI  Jolynn Rivera is a 62 year old female with past medical history significant for alcohol use disorder with withdrawal seizures, COPD, HTN, polysubstance abuse, hepatitis C s/p treatment, GERD who presents to the ED seeking detox.  Patient states that at 6:30 PM tonight she woke up with her dog pawing at her.  She states that this dog alerts her of seizures.  She could not remember her dog's name and believes she had a seizure.  Patient states that since November 2021 she has been in and out of detox.  She was sober for 5 years prior to this but states that COVID triggered her to relapse.  She states she has been drinking 6 packs of beer daily for the last 2 weeks.  Her last drink was 1 hour prior to arrival. Yesterday she reports having \"a bunch\" of seizures.  She states the seizures only happen when she drinks.  No other substance use.  No fall or injury.  She states she is in an IOP right now.    Per chart review, yesterday, patient presented to [facility] ED twice reporting seizures.  The first time she reported an alcohol withdrawal seizures at home, received symptomatic management and went home feeling improved, however, she returned reporting another seizure after more EtOH consumption.  She had mildly elevated lactate to 3.4.  She was not tachycardic and had minimal tremors.  Patient received 10 mg p.o. Valium, zofran, and 1 L IV fluid bolus.  She reported she had intensive outpatient therapy of her alcohol use disorder lined up for the next day.  She was comfortable discharging home with strict return precautions.    Per medical records, patient's last detox was at [facility] 4/20/2022 - 4/22/2022.  [facility] protocol " "was initiated.  Anion gap was mildly elevated at 17 likely due to alcoholic ketoacidosis.  Repeat anion gap normal was normal.  Patient's symptoms of alcohol withdrawal improved and she was discharged.   Past Medical History  Past Medical History:   Diagnosis Date     Anxiety      COPD (chronic obstructive pulmonary disease) (H)      COPD (chronic obstructive pulmonary disease) (H)      Hepatitis C      PTSD (post-traumatic stress disorder)      Seizures (H)     second to ETOH     Substance abuse (H)      Past Surgical History:   Procedure Laterality Date     LAPAROSCOPIC TUBAL LIGATION       ORTHOPEDIC SURGERY      Left knee     TONSILLECTOMY       gabapentin (NEURONTIN) 600 MG tablet  levothyroxine (SYNTHROID/LEVOTHROID) 100 MCG tablet  multivitamin w/minerals (THERA-VIT-M) tablet  nicotine polacrilex (NICORETTE) 4 MG gum  omeprazole (PRILOSEC) 40 MG DR capsule  propranolol (INDERAL) 20 MG tablet  QUEtiapine (SEROQUEL) 25 MG tablet  thiamine (B-1) 100 MG tablet      Allergies   Allergen Reactions     Antihistamines, Chlorpheniramine-Type [Alkylamines]      Compazine      Lock jaw; all medications ending with -zine     Diphenhydramine      Muscle Cramps     Penicillins      Panic attack     Prochlorperazine      Muscle cramps     Trazodone Nausea and Vomiting     \" I ended up falling and feeling like I was drunk\"     Wasps [Hornets]      Swelling      Family History  Family History   Problem Relation Age of Onset     Anxiety Disorder Mother      Asthma Mother      Alcohol/Drug Father      Prostate Cancer Father      Arthritis Father      Alcohol/Drug Brother      Alcohol/Drug Brother      Hypertension Brother      Alcohol/Drug Brother      Depression Brother      Psychotic Disorder Brother      Social History   Social History     Tobacco Use     Smoking status: Current Every Day Smoker     Packs/day: 0.50     Smokeless tobacco: Never Used     Tobacco comment: Starting Chantix October 2014   Substance Use Topics     " Alcohol use: Yes     Comment: drinks ETOH frequently; sometimes daily     Drug use: No     Comment: stopped 1986      Past medical history, past surgical history, medications, allergies, family history, and social history were reviewed with the patient. No additional pertinent items.       Review of Systems   All other systems reviewed and are negative.    A complete review of systems was performed with pertinent positives and negatives noted in the HPI, and all other systems negative.    Physical Exam   BP: 134/82  Pulse: 99  Temp: 98.5  F (36.9  C)  Resp: 16  SpO2: 96 %  Physical Exam  Constitutional:       General: She is not in acute distress.     Appearance: She is well-developed. She is not ill-appearing or toxic-appearing.      Comments: Talkative; calm,    HENT:      Head: Normocephalic and atraumatic.      Comments: No signs of trauma  Eyes:      Pupils: Pupils are equal, round, and reactive to light.   Cardiovascular:      Rate and Rhythm: Normal rate and regular rhythm.      Heart sounds: Normal heart sounds.   Pulmonary:      Effort: Pulmonary effort is normal. No respiratory distress.      Breath sounds: Normal breath sounds. No stridor.   Abdominal:      General: There is no distension.      Palpations: Abdomen is soft.      Tenderness: There is no abdominal tenderness. There is no rebound.   Musculoskeletal:         General: No tenderness.      Cervical back: Normal range of motion.   Skin:     General: Skin is warm and dry.   Neurological:      General: No focal deficit present.      Mental Status: She is alert and oriented to person, place, and time.   Psychiatric:         Mood and Affect: Mood normal.         Behavior: Behavior normal.         Thought Content: Thought content normal.         ED Course     8:20 PM  The patient was seen and examined by Bhakti Campo MD in Room hw01.     Procedures       The medical record was reviewed and interpreted.  Current labs reviewed and  interpreted.  Previous labs reviewed and interpreted.       Results for orders placed or performed during the hospital encounter of 07/20/22   Comprehensive metabolic panel     Status: Abnormal   Result Value Ref Range    Sodium 142 133 - 144 mmol/L    Potassium 3.4 3.4 - 5.3 mmol/L    Chloride 109 94 - 109 mmol/L    Carbon Dioxide (CO2) 25 20 - 32 mmol/L    Anion Gap 8 3 - 14 mmol/L    Urea Nitrogen 10 7 - 30 mg/dL    Creatinine 0.77 0.52 - 1.04 mg/dL    Calcium 8.4 (L) 8.5 - 10.1 mg/dL    Glucose 99 70 - 99 mg/dL    Alkaline Phosphatase 50 40 - 150 U/L     (HH) 0 - 45 U/L     (HH) 0 - 50 U/L    Protein Total 7.4 6.8 - 8.8 g/dL    Albumin 3.5 3.4 - 5.0 g/dL    Bilirubin Total 0.5 0.2 - 1.3 mg/dL    GFR Estimate 87 >60 mL/min/1.73m2   Asymptomatic COVID-19 Virus (Coronavirus) by PCR Nasopharyngeal     Status: Normal    Specimen: Nasopharyngeal; Swab   Result Value Ref Range    SARS CoV2 PCR Negative Negative    Narrative    Testing was performed using the joe  SARS-CoV-2 & Influenza A/B Assay on the joe  Paula  System.  This test should be ordered for the detection of SARS-COV-2 in individuals who meet SARS-CoV-2 clinical and/or epidemiological criteria. Test performance is unknown in asymptomatic patients.  This test is for in vitro diagnostic use under the FDA EUA for laboratories certified under CLIA to perform moderate and/or high complexity testing. This test has not been FDA cleared or approved.  A negative test does not rule out the presence of PCR inhibitors in the specimen or target RNA in concentration below the limit of detection for the assay. The possibility of a false negative should be considered if the patient's recent exposure or clinical presentation suggests COVID-19.  [facility] are certified under the Clinical Laboratory Improvement Amendments of 1988 (CLIA-88) as qualified to perform moderate and/or high complexity laboratory testing.   CBC with platelets  and differential     Status: Abnormal   Result Value Ref Range    WBC Count 4.0 4.0 - 11.0 10e3/uL    RBC Count 4.35 3.80 - 5.20 10e6/uL    Hemoglobin 14.6 11.7 - 15.7 g/dL    Hematocrit 40.6 35.0 - 47.0 %    MCV 93 78 - 100 fL    MCH 33.6 (H) 26.5 - 33.0 pg    MCHC 36.0 31.5 - 36.5 g/dL    RDW 12.0 10.0 - 15.0 %    Platelet Count 177 150 - 450 10e3/uL    % Neutrophils 35 %    % Lymphocytes 50 %    % Monocytes 12 %    % Eosinophils 2 %    % Basophils 1 %    % Immature Granulocytes 0 %    NRBCs per 100 WBC 0 <1 /100    Absolute Neutrophils 1.4 (L) 1.6 - 8.3 10e3/uL    Absolute Lymphocytes 2.0 0.8 - 5.3 10e3/uL    Absolute Monocytes 0.5 0.0 - 1.3 10e3/uL    Absolute Eosinophils 0.1 0.0 - 0.7 10e3/uL    Absolute Basophils 0.1 0.0 - 0.2 10e3/uL    Absolute Immature Granulocytes 0.0 <=0.4 10e3/uL    Absolute NRBCs 0.0 10e3/uL   Drug abuse screen 1 urine (ED)     Status: Normal   Result Value Ref Range    Amphetamines Urine Screen Negative Screen Negative    Barbiturates Urine Screen Negative Screen Negative    Benzodiazepines Urine Screen Negative Screen Negative    Cannabinoids Urine Screen Negative Screen Negative    Cocaine Urine Screen Negative Screen Negative    Opiates Urine Screen Negative Screen Negative   Alcohol breath test POCT     Status: Abnormal   Result Value Ref Range    Alcohol Breath Test 0.226 (A) 0.00 - 0.01   CBC with platelets differential     Status: Abnormal    Narrative    The following orders were created for panel order CBC with platelets differential.  Procedure                               Abnormality         Status                     ---------                               -----------         ------                     CBC with platelets and d...[[id number]]  Abnormal            Final result                 Please view results for these tests on the individual orders.   Urine Drugs of Abuse Screen     Status: Normal    Narrative    The following orders were created for panel order Urine  Drugs of Abuse Screen.  Procedure                               Abnormality         Status                     ---------                               -----------         ------                     Drug abuse screen 1 urin...[[id number]]  Normal              Final result                 Please view results for these tests on the individual orders.     Medications - No data to display         Results for orders placed or performed during the hospital encounter of 07/20/22   Alcohol breath test POCT     Status: Abnormal   Result Value Ref Range    Alcohol Breath Test 0.226 (A) 0.00 - 0.01     Medications - No data to display     Assessments & Plan (with Medical Decision Making)   Patient is a 62-year-old female who presents to the ER seeking detox admission.  Patient was initially placed on the 3 a list to get a detox admitted bed.  We check labs and found patient have severely elevated LFTs.  Patient's AST is elevated to almost thousand.  I told the patient that I recommend she go to a medical bed since her severe elevation of her AST and ALT.  Patient however said that she has no one to take care of her dog and wants to be discharged home.  Patient said that she did not want to be admitted to either the hospital for a detox bed or for medical bed.  Patient says that her LFTs have been similarly elevated in the past she feels safe going home with these numbers.  Patient was watched in the ER for several hours.  She is clinically sober.  Since patient does not want to be admitted patient will be discharged AGAINST MEDICAL ADVICE and I recommend she come in due to her severely elevated LFTs.  Patient most recently was seen at [facility] twice yesterday.  Evaluated the patient but did not check any liver function test.  Patient is recent LFTs was in June and they were normal at that time.  Patient however has been drinking substantially sounds possible that she developed alcoholic hepatitis is causing her is to be  significantly high.  Patient recommended return to the hospital if her symptoms worsen or if he changes her mind and wants to be admitted.    I have reviewed the nursing notes. I have reviewed the findings, diagnosis, plan and need for follow up with the patient.    New Prescriptions    No medications on file       Final diagnoses:   Elevated LFTs   Alcohol abuse     I, Carlotta Jameson, am serving as a trained medical scribe to document services personally performed by Bhakti Campo MD based on the provider's statements to me on July 20, 2022.  This document has been checked and approved by the attending provider.    I, Bhakti Campo MD, was physically present and have reviewed and verified the accuracy of this note documented by Carlotta Jameson, medical scribe.      Bhakti Campo MD      --  Bhakti Campo  [facility] EMERGENCY DEPARTMENT  7/20/2022     Bhakti Campo MD  07/21/22 0033

## 2022-07-22 ENCOUNTER — APPOINTMENT (OUTPATIENT)
Dept: ULTRASOUND IMAGING | Facility: CLINIC | Age: 62
DRG: 894 | End: 2022-07-22
Attending: INTERNAL MEDICINE
Payer: MEDICARE

## 2022-07-22 VITALS
SYSTOLIC BLOOD PRESSURE: 170 MMHG | HEIGHT: 65 IN | DIASTOLIC BLOOD PRESSURE: 88 MMHG | TEMPERATURE: 98.5 F | HEART RATE: 51 BPM | WEIGHT: 150 LBS | RESPIRATION RATE: 18 BRPM | OXYGEN SATURATION: 98 % | BODY MASS INDEX: 24.99 KG/M2

## 2022-07-22 LAB
ALBUMIN SERPL-MCNC: 3.3 G/DL (ref 3.4–5)
ALP SERPL-CCNC: 48 U/L (ref 40–150)
ALT SERPL W P-5'-P-CCNC: 287 U/L (ref 0–50)
ANION GAP SERPL CALCULATED.3IONS-SCNC: 6 MMOL/L (ref 3–14)
AST SERPL W P-5'-P-CCNC: 209 U/L (ref 0–45)
BILIRUB SERPL-MCNC: 1.3 MG/DL (ref 0.2–1.3)
BUN SERPL-MCNC: 11 MG/DL (ref 7–30)
CALCIUM SERPL-MCNC: 8.5 MG/DL (ref 8.5–10.1)
CHLORIDE BLD-SCNC: 108 MMOL/L (ref 94–109)
CO2 SERPL-SCNC: 25 MMOL/L (ref 20–32)
CREAT SERPL-MCNC: 0.95 MG/DL (ref 0.52–1.04)
GFR SERPL CREATININE-BSD FRML MDRD: 68 ML/MIN/1.73M2
GLUCOSE BLD-MCNC: 103 MG/DL (ref 70–99)
HBV CORE AB SERPL QL IA: NONREACTIVE
HBV SURFACE AG SERPL QL IA: NONREACTIVE
HCV AB SERPL QL IA: REACTIVE
POTASSIUM BLD-SCNC: 3.7 MMOL/L (ref 3.4–5.3)
PROT SERPL-MCNC: 7 G/DL (ref 6.8–8.8)
SODIUM SERPL-SCNC: 139 MMOL/L (ref 133–144)

## 2022-07-22 PROCEDURE — 250N000013 HC RX MED GY IP 250 OP 250 PS 637: Performed by: INTERNAL MEDICINE

## 2022-07-22 PROCEDURE — 80053 COMPREHEN METABOLIC PANEL: CPT | Performed by: INTERNAL MEDICINE

## 2022-07-22 PROCEDURE — 36415 COLL VENOUS BLD VENIPUNCTURE: CPT | Performed by: INTERNAL MEDICINE

## 2022-07-22 PROCEDURE — 250N000013 HC RX MED GY IP 250 OP 250 PS 637: Performed by: FAMILY MEDICINE

## 2022-07-22 PROCEDURE — 250N000013 HC RX MED GY IP 250 OP 250 PS 637: Performed by: STUDENT IN AN ORGANIZED HEALTH CARE EDUCATION/TRAINING PROGRAM

## 2022-07-22 PROCEDURE — 76705 ECHO EXAM OF ABDOMEN: CPT

## 2022-07-22 PROCEDURE — 99233 SBSQ HOSP IP/OBS HIGH 50: CPT | Performed by: INTERNAL MEDICINE

## 2022-07-22 PROCEDURE — 87522 HEPATITIS C REVRS TRNSCRPJ: CPT | Performed by: INTERNAL MEDICINE

## 2022-07-22 PROCEDURE — 250N000011 HC RX IP 250 OP 636: Performed by: INTERNAL MEDICINE

## 2022-07-22 RX ORDER — ESZOPICLONE 2 MG/1
2 TABLET, FILM COATED ORAL AT BEDTIME
Status: ON HOLD | COMMUNITY
Start: 2022-06-07 | End: 2022-10-10

## 2022-07-22 RX ORDER — GABAPENTIN 600 MG/1
600 TABLET ORAL 3 TIMES DAILY
Status: DISCONTINUED | OUTPATIENT
Start: 2022-07-22 | End: 2022-07-23 | Stop reason: HOSPADM

## 2022-07-22 RX ORDER — ESZOPICLONE 1 MG/1
2 TABLET, FILM COATED ORAL
Status: DISCONTINUED | OUTPATIENT
Start: 2022-07-22 | End: 2022-07-23 | Stop reason: HOSPADM

## 2022-07-22 RX ADMIN — DIAZEPAM 10 MG: 10 TABLET ORAL at 02:16

## 2022-07-22 RX ADMIN — DIAZEPAM 10 MG: 10 TABLET ORAL at 11:07

## 2022-07-22 RX ADMIN — NICOTINE POLACRILEX 4 MG: 2 GUM, CHEWING BUCCAL at 08:43

## 2022-07-22 RX ADMIN — GABAPENTIN 600 MG: 600 TABLET, FILM COATED ORAL at 08:33

## 2022-07-22 RX ADMIN — DIAZEPAM 10 MG: 10 TABLET ORAL at 08:54

## 2022-07-22 RX ADMIN — NICOTINE POLACRILEX 4 MG: 2 GUM, CHEWING BUCCAL at 20:10

## 2022-07-22 RX ADMIN — PROPRANOLOL HYDROCHLORIDE 20 MG: 20 TABLET ORAL at 20:11

## 2022-07-22 RX ADMIN — THIAMINE HCL TAB 100 MG 100 MG: 100 TAB at 08:32

## 2022-07-22 RX ADMIN — GABAPENTIN 600 MG: 600 TABLET, FILM COATED ORAL at 00:55

## 2022-07-22 RX ADMIN — NICOTINE POLACRILEX 4 MG: 2 GUM, CHEWING BUCCAL at 14:16

## 2022-07-22 RX ADMIN — PROPRANOLOL HYDROCHLORIDE 20 MG: 20 TABLET ORAL at 08:32

## 2022-07-22 RX ADMIN — NICOTINE POLACRILEX 4 MG: 2 GUM, CHEWING BUCCAL at 03:06

## 2022-07-22 RX ADMIN — NICOTINE POLACRILEX 4 MG: 2 GUM, CHEWING BUCCAL at 16:33

## 2022-07-22 RX ADMIN — NICOTINE POLACRILEX 4 MG: 2 GUM, CHEWING BUCCAL at 00:31

## 2022-07-22 RX ADMIN — MULTIPLE VITAMINS W/ MINERALS TAB 1 TABLET: TAB at 08:33

## 2022-07-22 RX ADMIN — DIAZEPAM 10 MG: 10 TABLET ORAL at 00:55

## 2022-07-22 RX ADMIN — DIAZEPAM 10 MG: 10 TABLET ORAL at 00:19

## 2022-07-22 RX ADMIN — FOLIC ACID 1 MG: 1 TABLET ORAL at 08:33

## 2022-07-22 RX ADMIN — ONDANSETRON 4 MG: 2 INJECTION INTRAMUSCULAR; INTRAVENOUS at 10:20

## 2022-07-22 RX ADMIN — OLANZAPINE 10 MG: 5 TABLET, ORALLY DISINTEGRATING ORAL at 04:26

## 2022-07-22 RX ADMIN — NICOTINE POLACRILEX 4 MG: 2 GUM, CHEWING BUCCAL at 12:19

## 2022-07-22 RX ADMIN — LEVOTHYROXINE SODIUM 100 MCG: 100 TABLET ORAL at 08:39

## 2022-07-22 RX ADMIN — NICOTINE POLACRILEX 4 MG: 2 GUM, CHEWING BUCCAL at 22:27

## 2022-07-22 RX ADMIN — PANTOPRAZOLE SODIUM 40 MG: 40 TABLET, DELAYED RELEASE ORAL at 08:32

## 2022-07-22 RX ADMIN — GABAPENTIN 600 MG: 600 TABLET, FILM COATED ORAL at 20:11

## 2022-07-22 RX ADMIN — GABAPENTIN 600 MG: 600 TABLET, FILM COATED ORAL at 14:13

## 2022-07-22 RX ADMIN — ESZOPICLONE 2 MG: 1 TABLET, FILM COATED ORAL at 01:49

## 2022-07-22 RX ADMIN — DIAZEPAM 10 MG: 5 INJECTION, SOLUTION INTRAMUSCULAR; INTRAVENOUS at 03:53

## 2022-07-22 RX ADMIN — PANTOPRAZOLE SODIUM 40 MG: 40 TABLET, DELAYED RELEASE ORAL at 16:32

## 2022-07-22 RX ADMIN — QUETIAPINE FUMARATE 25 MG: 25 TABLET ORAL at 22:26

## 2022-07-22 ASSESSMENT — ACTIVITIES OF DAILY LIVING (ADL)
ADLS_ACUITY_SCORE: 37

## 2022-07-22 NOTE — H&P
[facility]    History and Physical - Hospitalist Service, GOLD TEAM        Date of Admission:  7/21/2022    Assessment & Plan   A: Patient is 63 y/o woman who has alcohol dependence, hypertension, PTSD, anxiety and COPD. Patient presented with alcohol withdrawal.    P:  1.) Alcohol dependence with alcohol withdrawal: Patient to be on CIWA protocol. Chemical dependency to see. Holding lunesta for now.  2.) Hypertension: IV hydralazine for now. Will resume home antihypertensives once medication list is confirmed.  3.) Hypothyroidism: Patient on levothyroxine.  4.) Nicotine dependence, cigarette smoking, ongoing: Nicotine gum as needed.  5.) Anxiety: Patient on propranolol.  6.) COPD: Albuterol as needed.  7.) GERD: Patient on omeprazole.  8.) PTSD: Patient on seroquel.   9.) Transaminitis secondary to alcohol, improving: Will recheck labs tomorrow.  10.) Hypocalcemia: Labs to be rechecked tomorrow.   11.) Patient presents with alcohol withdrawal and will need close monitoring on CIWA protocol given past history of alcohol withdrawal seizures. I anticipate that patient will require at least 2 midnights of inpatient hospital services.      Baudilio Toribio MD  Hospitalist Service, GOLD TEAM   [facility]  Securely message with the Vocera Web Console (learn more here)  Text page via [employer] Paging/Directory   Please see signed in provider for up to date coverage information    ______________________________________________________________________    Chief Complaint     Alcohol withdrawal    History of Present Illness   Patient is 63 y/o woman who has alcohol dependence, hypertension, PTSD, anxiety and COPD. Patient presented yesterday with alcohol withdrawal but left against medical advice as she needed to ask someone to take care of her dog. Patient has now returned.    Patient stated that she drinks about 4-6 beers a day and stated  that her last drink was about 1500 hrs. Patient noted some sweats at present. Patient reported having a headache. Patient noted no hallucinations today but stated that she had hallucinations yesterday evening. Patient reportedly has had alcohol withdrawal seizures in the past.    Patient notes no fever and no chills. Patient notes nausea but no vomiting. Patient notes feeling anxious. Patient notes no other problems at this time.    Review of Systems    - 10 point review of systems unremarkable aside from what was mentioned in HPI    Past Medical History    I have reviewed this patient's medical history and updated it with pertinent information if needed.   Past Medical History:   Diagnosis Date     Anxiety      COPD (chronic obstructive pulmonary disease) (H)      COPD (chronic obstructive pulmonary disease) (H)      Hepatitis C      PTSD (post-traumatic stress disorder)      Seizures (H)     second to ETOH     Substance abuse (H)    - Hypertension  - GERD  - Hypothyroidism    Past Surgical History   I have reviewed this patient's surgical history and updated it with pertinent information if needed.  Past Surgical History:   Procedure Laterality Date     LAPAROSCOPIC TUBAL LIGATION       ORTHOPEDIC SURGERY      Left knee     TONSILLECTOMY         Social History   I have reviewed this patient's social history and updated it with pertinent information if needed.  Social History     Tobacco Use     Smoking status: Current Every Day Smoker     Packs/day: 0.50     Smokeless tobacco: Never Used     Tobacco comment: Starting Chantix October 2014   Substance Use Topics     Alcohol use: Yes     Comment: 6 pack of beer daily     Drug use: No     Comment: stopped 1986       Family History   I have reviewed this patient's family history and updated it with pertinent information if needed.  Family History   Problem Relation Age of Onset     Anxiety Disorder Mother      Asthma Mother      Alcohol/Drug Father      Prostate Cancer  "Father      Arthritis Father      Alcohol/Drug Brother      Alcohol/Drug Brother      Hypertension Brother      Alcohol/Drug Brother      Depression Brother      Psychotic Disorder Brother        Prior to Admission Medications   Prior to Admission Medications   Prescriptions Last Dose Informant Patient Reported? Taking?   QUEtiapine (SEROQUEL) 25 MG tablet 7/20/2022 at pm Self No Yes   Sig: Take 1 tablet (25 mg) by mouth At Bedtime   eszopiclone (LUNESTA) 2 MG tablet 7/20/2022 at pm Self Yes Yes   gabapentin (NEURONTIN) 600 MG tablet 7/21/2022 at 1/3 Self Yes Yes   Sig: Take 600 mg by mouth 3 times daily   levothyroxine (SYNTHROID/LEVOTHROID) 100 MCG tablet 7/21/2022 at am Self Yes Yes   Sig: Take 100 mcg by mouth daily   multivitamin w/minerals (THERA-VIT-M) tablet Unknown at Unknown time Self No Yes   Sig: Take 1 tablet by mouth daily   nicotine polacrilex (NICORETTE) 4 MG gum 7/20/2022 at pm Self No Yes   Sig: Place 1 each (4 mg) inside cheek as needed for smoking cessation   omeprazole (PRILOSEC) 40 MG DR capsule 7/21/2022 at am Self Yes Yes   Sig: Take 1 capsule by mouth At Bedtime   propranolol (INDERAL) 20 MG tablet 7/21/2022 at 1/2 Self Yes Yes   Sig: Take 20 mg by mouth 2 times daily   thiamine (B-1) 100 MG tablet 7/20/2022 at am Self No Yes   Sig: Take 1 tablet (100 mg) by mouth daily      Facility-Administered Medications: None     Allergies   Allergies   Allergen Reactions     Antihistamines, Chlorpheniramine-Type [Alkylamines]      Compazine      Lock jaw; all medications ending with -zine     Diphenhydramine      Muscle Cramps     Penicillins      Panic attack     Prochlorperazine      Muscle cramps     Trazodone Nausea and Vomiting     \" I ended up falling and feeling like I was drunk\"     Wasps [Hornets]      Swelling        Physical Exam   Vital Signs: Temp: 98.4  F (36.9  C) Temp src: Oral BP: 130/85 Pulse: 65   Resp: 16 SpO2: 96 % O2 Device: None (Room air)    Weight: 150 lbs 0 oz    General: " Patient comfortable, NAD. Slight tremor present.  Eyes: No scleral icterus or conjunctival injection.  Cardiovascular: Heart RRR, S1 S2 w/o murmurs. Legs nonswollen.  Lungs: Breath sounds present. No crackles/wheeze heard.  Gastrointestinal: Abdomen soft, nontender.  Skin: Warm, dry.  Musculoskeletal: No visible joint swelling or erythema.  Neuro: Cranial nerves II-XII grossly intact.  Psych: Affect pleasant.    Data   Labs noted.   Sodium 142; Chloride 110; Creatinine 0.85; Calcium 8.1  ; ; Total bilirubin 0.4

## 2022-07-22 NOTE — ED NOTES
Writer contacted and informed the Hospitalist team about patient's reporting she had reaction from Clonidine in the past. Medications discontinued.

## 2022-07-22 NOTE — ED NOTES
Patient informed about 10 min ago that MD will most likely not be able to review the Ultrasound results with her until tomorrow. Patient is not happy--angry, and considering leaving.

## 2022-07-22 NOTE — PROGRESS NOTES
7/22/2022      CD Consult acknowledged.  If pt is admitted to a hospital unit over the weekend, GIRMA team will meet with pt Monday 7/25.   If pt discharges this weekend, pt can call Mental Health and Addiction Services Line: [phone] and make an appt through [facility] for an evaluation.    BRITTNEE Lacy  Phone: [phone]  Email: [email or web address]

## 2022-07-22 NOTE — PROGRESS NOTES
[facility]    Medicine Progress Note - Hospitalist Service, GOLD TEAM 18    Date of Admission:  7/21/2022    Assessment & Plan          Patient is 61 y/o woman who has alcohol dependence, hypertension, PTSD, anxiety and COPD. . Patient presented day prior to admission  with alcohol withdrawal but left against medical advice as she needed to ask someone to take care of her dog. Patient has now returned..        Alcohol use disorder, severe, dependence with alcohol withdrawal  - continue  CIWA protocol, vitamins   - history of alcohol withdrawal seizure    -US 7/22 shows hepatomegaly has simple cyst no mass mentioned  -Recommend Esophageal/Gastric Varices screening and  Repeat EGD every 1-2 years based on ongoing alcohol use as well as yearly   Hepatocellular carcinoma screen      Elevated LFT   - ALT up to 576  improving , t bili normal, check serum acetaminophen , hepatitis B C  US as numbers seem to be too high just to be explained by alcohol  - states has history hepatitis C and was treated about 5 years ago  , hepatitis C Ab + , checj HCV RNA   - hepatitis B negative   - no biliary ductal dilation, liver US as above     GERD  - Patient on omeprazole.     Hypertension  - suspect due to withdrawal, has not tolerated clonidine in past, PRN IV hydralazine for now    COPD  - Albuterol as needed.     Hypothyroidism   continue levothyroxine.    Nicotine dependence  - nicotine replacement as needed.    Generalized Anxiety  PTSD , bipolar dosprder   -continue  Propranolol   - continue seroquel  - follow up  As out patient  .     Hypocalcemia   - mild,        Diet:  as tolerated   DVT Prophylaxis: ambulate   Morales Catheter: Not present  Central Lines: None  Cardiac Monitoring: None  Code Status: Full Code      Disposition Plan    Discharge  Once LFT better and shows no signs of withdrawal      The patient's care was discussed with  Er RN .    Crys Up,  MD  Hospitalist Service, GOLD TEAM 18  [facility]  Securely message with the [employer] Web Console (learn more here)  Text page via [facility] Paging/Directory   Please see signed in provider for up to date coverage information      Clinically Significant Risk Factors Present on Admission          # Hypocalcemia: Ca = 8.1 mg/dL (Ref range: 8.5 - 10.1 mg/dL) and/or iCa = N/A on admission, will replace as needed                 ______________________________________________________________________    Interval History      Doing ok, still having some withdrawal, eating fine, no nausea vomiting no chest pain  No shortness of breath      Data reviewed today: I reviewed all medications, new labs and imaging results over the last 24 hours.     Physical Exam   Vital Signs: Temp: 98.6  F (37  C) Temp src: Oral BP: 129/76 Pulse: 70   Resp: 18 SpO2: 95 % O2 Device: None (Room air)    Weight: 150 lbs 0 oz     General appearence: awake alert in  no apparent distress    HEENT: EOMI, PEARLA, sclera nonicteric,  moist,  mucus membranes,   NECK : supple  RESPIRATORY: lungs clear to auscultation bilateral,  no wheezing or crackles   CARDIOVASCULAR:S1 S2 regular rate and rhythm, no rubs gallops or murmurs appreciated  GASTROINTESTINAL:soft, non-distended , non-tender , + bowel sounds, no masses felt   SKIN: warm and dry, no mottling noted   NEUROLOGIC; awake alert and oriented, no focal deficits found  EXTREMITIES: no clubbing, cyanosis or edema , moves all extremity, good pedal pulses   MUSCULOSKELETAL: without deformity       Data   Recent Labs   Lab 07/21/22 2058 07/20/22 2038   WBC  --  4.0   HGB  --  14.6   MCV  --  93   PLT  --  177    142   POTASSIUM 3.6 3.4   CHLORIDE 110* 109   CO2 20 25   BUN 12 10   CR 0.85 0.77   ANIONGAP 12 8   ELVIRA 8.1* 8.4*   * 99   ALBUMIN 3.4 3.5   PROTTOTAL 7.2 7.4   BILITOTAL 0.4 0.5   ALKPHOS 47 50   * 576*   * 963*   LIPASE 207  --       Recent Results (from the past 24 hour(s))   US Abdomen Limited ([facility] only)    Narrative    ULTRASOUND ABDOMEN LIMITED  7/22/2022 9:28 AM    CLINICAL HISTORY: Elevated LFT, history of alcohol use.    TECHNIQUE: Limited abdominal ultrasound.    COMPARISON: None.    FINDINGS:  GALLBLADDER: The gallbladder is normal. No gallstones, wall  thickening, or pericholecystic fluid. Negative sonographic Canseco's  sign.    BILE DUCTS: There is no biliary dilatation. The common duct measures 4  mm.    LIVER: Hepatomegaly measuring 18 cm. Simple cyst is 0.6 cm at the  right liver.    RIGHT KIDNEY: No hydronephrosis.    PANCREAS: The visualized portions of the pancreas are normal.    No ascites.      Impression    IMPRESSION:  1.  No acute abnormality. No gallstones. Negative sonographic Canseco  sign. No biliary dilatation.  2.  Hepatomegaly.    KASSIDY FRENCH MD         SYSTEM ID:  [id number]

## 2022-07-22 NOTE — PROGRESS NOTES
Clonidine ordered for withdrawal, patient reports previous reaction. Discontinued medication.    She is also requesting her home gabapentin and lunesta which were ordered.     Amy Stallworth MD  Internal Medicine-Pediatrics   [facility]

## 2022-07-23 NOTE — DISCHARGE SUMMARY
[facility]  Hospitalist Discharge Summary      Date of Admission:  21-Jul-2022  Date Patient Left Against Medical Advice:  22-Jul-2022  Discharging Provider: Baudilio Toribio MD    Discharge Diagnoses   1.) Alcohol dependence with alcohol withdrawal  2.) Hypertension  3.) Hypothyroidism  4.) Nicotine dependence, cigarette smoking, ongoing  5.) Anxiety  6.) COPD  7.) PTSD  8.) GERD  9.) Transaminitis secondary to alcohol  10.) Hypocalcemia  11.) Bipolar disorder      Discharge Disposition   Patient left against medical advice on 22-Jul-2022    Hospital Course   Patient is 63 y/o woman who has alcohol dependence, hypertension, PTSD, anxiety and COPD. Patient presented with alcohol withdrawal and was also found to have transaminitis secondary to alcohol. Patient was started on CIWA protocol. Transaminitis was being monitored and was starting to improve. Patient asked to leave against medical advice on 22-Jul-2022 and expressed understanding of the risks of doing so.    Consultations This Hospital Stay   CHEMICAL DEPENDENCY IP CONSULT    Code Status   Full Code      Baudilio Toribio MD  [facility] EMERGENCY DEPARTMENT  [address] [address]  Phone: [phone]  Fax: [phone]  ______________________________________________________________________      Primary Care Physician   Stephanie Marin    Discharge Orders   No discharge procedures on file.    Significant Results and Procedures   - None    Discharge Medications   Current Discharge Medication List      CONTINUE these medications which have NOT CHANGED    Details   !! eszopiclone (LUNESTA) 2 MG tablet Take 2 mg by mouth      !! eszopiclone (LUNESTA) 2 MG tablet       gabapentin (NEURONTIN) 600 MG tablet Take 600 mg by mouth 3 times daily      levothyroxine (SYNTHROID/LEVOTHROID) 100 MCG tablet Take 100 mcg by mouth daily      multivitamin w/minerals (THERA-VIT-M) tablet Take 1 tablet by mouth  "daily  Qty: 30 tablet, Refills: 0    Associated Diagnoses: Alcohol withdrawal, uncomplicated (H)      nicotine polacrilex (NICORETTE) 4 MG gum Place 1 each (4 mg) inside cheek as needed for smoking cessation  Qty: 270 tablet, Refills: 1    Associated Diagnoses: Nicotine abuse      omeprazole (PRILOSEC) 40 MG DR capsule Take 1 capsule by mouth At Bedtime      propranolol (INDERAL) 20 MG tablet Take 20 mg by mouth 2 times daily      QUEtiapine (SEROQUEL) 25 MG tablet Take 1 tablet (25 mg) by mouth At Bedtime  Qty: 30 tablet, Refills: 2    Associated Diagnoses: Mood disorder (H); Posttraumatic stress disorder      thiamine (B-1) 100 MG tablet Take 1 tablet (100 mg) by mouth daily  Qty: 30 tablet, Refills: 0    Associated Diagnoses: Alcohol withdrawal, uncomplicated (H)       !! - Potential duplicate medications found. Please discuss with provider.        Allergies   Allergies   Allergen Reactions     Clonidine Unknown     Antihistamines, Chlorpheniramine-Type [Alkylamines]      Compazine      Lock jaw; all medications ending with -zine     Diphenhydramine      Muscle Cramps     Penicillins      Panic attack     Prochlorperazine      Muscle cramps     Trazodone Nausea and Vomiting     \" I ended up falling and feeling like I was drunk\"     Wasps [Hornets]      Swelling      "

## 2022-07-25 LAB — HCV RNA SERPL NAA+PROBE-ACNC: NOT DETECTED IU/ML

## 2022-10-07 ENCOUNTER — TELEPHONE (OUTPATIENT)
Dept: BEHAVIORAL HEALTH | Facility: CLINIC | Age: 62
End: 2022-10-07

## 2022-10-07 ENCOUNTER — HOSPITAL ENCOUNTER (INPATIENT)
Facility: CLINIC | Age: 62
LOS: 3 days | Discharge: HOME | DRG: 897 | End: 2022-10-10
Attending: EMERGENCY MEDICINE | Admitting: PSYCHIATRY & NEUROLOGY
Payer: MEDICARE

## 2022-10-07 DIAGNOSIS — F10.230 ALCOHOL DEPENDENCE WITH UNCOMPLICATED WITHDRAWAL (H): ICD-10-CM

## 2022-10-07 DIAGNOSIS — F10.229 ALCOHOL DEPENDENCE WITH INTOXICATION WITH COMPLICATION (H): Primary | ICD-10-CM

## 2022-10-07 DIAGNOSIS — F10.930 ALCOHOL WITHDRAWAL SYNDROME WITHOUT COMPLICATION (H): ICD-10-CM

## 2022-10-07 DIAGNOSIS — Z11.52 ENCOUNTER FOR SCREENING LABORATORY TESTING FOR SEVERE ACUTE RESPIRATORY SYNDROME CORONAVIRUS 2 (SARS-COV-2): ICD-10-CM

## 2022-10-07 LAB
ALBUMIN SERPL-MCNC: 3.8 G/DL (ref 3.4–5)
ALCOHOL BREATH TEST: 0.11 (ref 0–0.01)
ALP SERPL-CCNC: 65 U/L (ref 40–150)
ALT SERPL W P-5'-P-CCNC: 83 U/L (ref 0–50)
ANION GAP SERPL CALCULATED.3IONS-SCNC: 13 MMOL/L (ref 3–14)
AST SERPL W P-5'-P-CCNC: 269 U/L (ref 0–45)
BASOPHILS # BLD AUTO: 0 10E3/UL (ref 0–0.2)
BASOPHILS NFR BLD AUTO: 1 %
BILIRUB SERPL-MCNC: 0.6 MG/DL (ref 0.2–1.3)
BUN SERPL-MCNC: 9 MG/DL (ref 7–30)
CALCIUM SERPL-MCNC: 8.1 MG/DL (ref 8.5–10.1)
CHLORIDE BLD-SCNC: 106 MMOL/L (ref 94–109)
CO2 SERPL-SCNC: 21 MMOL/L (ref 20–32)
CREAT SERPL-MCNC: 0.49 MG/DL (ref 0.52–1.04)
EOSINOPHIL # BLD AUTO: 0 10E3/UL (ref 0–0.7)
EOSINOPHIL NFR BLD AUTO: 0 %
ERYTHROCYTE [DISTWIDTH] IN BLOOD BY AUTOMATED COUNT: 13.6 % (ref 10–15)
GFR SERPL CREATININE-BSD FRML MDRD: >90 ML/MIN/1.73M2
GLUCOSE BLD-MCNC: 65 MG/DL (ref 70–99)
HCT VFR BLD AUTO: 42.1 % (ref 35–47)
HGB BLD-MCNC: 14.3 G/DL (ref 11.7–15.7)
HOLD SPECIMEN: NORMAL
HOLD SPECIMEN: NORMAL
IMM GRANULOCYTES # BLD: 0 10E3/UL
IMM GRANULOCYTES NFR BLD: 0 %
LYMPHOCYTES # BLD AUTO: 1 10E3/UL (ref 0.8–5.3)
LYMPHOCYTES NFR BLD AUTO: 24 %
MCH RBC QN AUTO: 33.7 PG (ref 26.5–33)
MCHC RBC AUTO-ENTMCNC: 34 G/DL (ref 31.5–36.5)
MCV RBC AUTO: 99 FL (ref 78–100)
MONOCYTES # BLD AUTO: 0.4 10E3/UL (ref 0–1.3)
MONOCYTES NFR BLD AUTO: 9 %
NEUTROPHILS # BLD AUTO: 2.6 10E3/UL (ref 1.6–8.3)
NEUTROPHILS NFR BLD AUTO: 66 %
NRBC # BLD AUTO: 0 10E3/UL
NRBC BLD AUTO-RTO: 0 /100
PLATELET # BLD AUTO: 169 10E3/UL (ref 150–450)
POTASSIUM BLD-SCNC: 3.8 MMOL/L (ref 3.4–5.3)
PROT SERPL-MCNC: 8.1 G/DL (ref 6.8–8.8)
RBC # BLD AUTO: 4.24 10E6/UL (ref 3.8–5.2)
SARS-COV-2 RNA RESP QL NAA+PROBE: NEGATIVE
SODIUM SERPL-SCNC: 140 MMOL/L (ref 133–144)
WBC # BLD AUTO: 4 10E3/UL (ref 4–11)

## 2022-10-07 PROCEDURE — 82040 ASSAY OF SERUM ALBUMIN: CPT | Performed by: EMERGENCY MEDICINE

## 2022-10-07 PROCEDURE — 258N000003 HC RX IP 258 OP 636: Performed by: EMERGENCY MEDICINE

## 2022-10-07 PROCEDURE — 250N000011 HC RX IP 250 OP 636: Performed by: EMERGENCY MEDICINE

## 2022-10-07 PROCEDURE — 250N000013 HC RX MED GY IP 250 OP 250 PS 637: Performed by: PSYCHIATRY & NEUROLOGY

## 2022-10-07 PROCEDURE — 250N000013 HC RX MED GY IP 250 OP 250 PS 637: Performed by: EMERGENCY MEDICINE

## 2022-10-07 PROCEDURE — 250N000011 HC RX IP 250 OP 636: Performed by: PSYCHIATRY & NEUROLOGY

## 2022-10-07 PROCEDURE — 99285 EMERGENCY DEPT VISIT HI MDM: CPT | Performed by: EMERGENCY MEDICINE

## 2022-10-07 PROCEDURE — 96372 THER/PROPH/DIAG INJ SC/IM: CPT | Performed by: EMERGENCY MEDICINE

## 2022-10-07 PROCEDURE — 99285 EMERGENCY DEPT VISIT HI MDM: CPT | Mod: 25 | Performed by: EMERGENCY MEDICINE

## 2022-10-07 PROCEDURE — 96361 HYDRATE IV INFUSION ADD-ON: CPT | Performed by: EMERGENCY MEDICINE

## 2022-10-07 PROCEDURE — 128N000004 HC R&B CD ADULT

## 2022-10-07 PROCEDURE — 96375 TX/PRO/DX INJ NEW DRUG ADDON: CPT | Performed by: EMERGENCY MEDICINE

## 2022-10-07 PROCEDURE — U0005 INFEC AGEN DETEC AMPLI PROBE: HCPCS | Performed by: EMERGENCY MEDICINE

## 2022-10-07 PROCEDURE — 96374 THER/PROPH/DIAG INJ IV PUSH: CPT | Performed by: EMERGENCY MEDICINE

## 2022-10-07 PROCEDURE — 999N000285 HC STATISTIC VASC ACCESS LAB DRAW WITH PIV START

## 2022-10-07 PROCEDURE — 82075 ASSAY OF BREATH ETHANOL: CPT | Performed by: EMERGENCY MEDICINE

## 2022-10-07 PROCEDURE — 80053 COMPREHEN METABOLIC PANEL: CPT | Performed by: EMERGENCY MEDICINE

## 2022-10-07 PROCEDURE — 999N000127 HC STATISTIC PERIPHERAL IV START W US GUIDANCE

## 2022-10-07 PROCEDURE — C9803 HOPD COVID-19 SPEC COLLECT: HCPCS | Performed by: EMERGENCY MEDICINE

## 2022-10-07 PROCEDURE — 85025 COMPLETE CBC W/AUTO DIFF WBC: CPT | Performed by: EMERGENCY MEDICINE

## 2022-10-07 PROCEDURE — 96376 TX/PRO/DX INJ SAME DRUG ADON: CPT | Performed by: EMERGENCY MEDICINE

## 2022-10-07 RX ORDER — ONDANSETRON 4 MG/1
4 TABLET, ORALLY DISINTEGRATING ORAL EVERY 6 HOURS PRN
Status: DISCONTINUED | OUTPATIENT
Start: 2022-10-07 | End: 2022-10-07

## 2022-10-07 RX ORDER — METOCLOPRAMIDE HYDROCHLORIDE 5 MG/ML
10 INJECTION INTRAMUSCULAR; INTRAVENOUS ONCE
Status: COMPLETED | OUTPATIENT
Start: 2022-10-07 | End: 2022-10-07

## 2022-10-07 RX ORDER — PROPRANOLOL HYDROCHLORIDE 20 MG/1
20 TABLET ORAL 3 TIMES DAILY
Status: DISCONTINUED | OUTPATIENT
Start: 2022-10-07 | End: 2022-10-10 | Stop reason: HOSPADM

## 2022-10-07 RX ORDER — LORAZEPAM 1 MG/1
1-4 TABLET ORAL EVERY 30 MIN PRN
Status: DISCONTINUED | OUTPATIENT
Start: 2022-10-07 | End: 2022-10-10 | Stop reason: HOSPADM

## 2022-10-07 RX ORDER — SODIUM CHLORIDE 9 MG/ML
INJECTION, SOLUTION INTRAVENOUS CONTINUOUS
Status: DISCONTINUED | OUTPATIENT
Start: 2022-10-07 | End: 2022-10-08

## 2022-10-07 RX ORDER — ESZOPICLONE 2 MG/1
2 TABLET, FILM COATED ORAL AT BEDTIME
Status: DISCONTINUED | OUTPATIENT
Start: 2022-10-07 | End: 2022-10-08

## 2022-10-07 RX ORDER — ONDANSETRON 2 MG/ML
4 INJECTION INTRAMUSCULAR; INTRAVENOUS EVERY 30 MIN PRN
Status: DISCONTINUED | OUTPATIENT
Start: 2022-10-07 | End: 2022-10-07

## 2022-10-07 RX ORDER — FOLIC ACID 1 MG/1
1 TABLET ORAL DAILY
Status: DISCONTINUED | OUTPATIENT
Start: 2022-10-07 | End: 2022-10-10 | Stop reason: HOSPADM

## 2022-10-07 RX ORDER — ATENOLOL 50 MG/1
50 TABLET ORAL DAILY PRN
Status: DISCONTINUED | OUTPATIENT
Start: 2022-10-07 | End: 2022-10-10 | Stop reason: HOSPADM

## 2022-10-07 RX ORDER — NALTREXONE HYDROCHLORIDE 50 MG/1
50 TABLET, FILM COATED ORAL DAILY
Status: ON HOLD | COMMUNITY
End: 2022-10-10

## 2022-10-07 RX ORDER — ONDANSETRON 4 MG/1
4 TABLET, ORALLY DISINTEGRATING ORAL EVERY 6 HOURS PRN
Status: DISCONTINUED | OUTPATIENT
Start: 2022-10-07 | End: 2022-10-10 | Stop reason: HOSPADM

## 2022-10-07 RX ORDER — GABAPENTIN 600 MG/1
600 TABLET ORAL 3 TIMES DAILY
Status: DISCONTINUED | OUTPATIENT
Start: 2022-10-07 | End: 2022-10-10 | Stop reason: HOSPADM

## 2022-10-07 RX ORDER — MULTIPLE VITAMINS W/ MINERALS TAB 9MG-400MCG
1 TAB ORAL DAILY
Status: DISCONTINUED | OUTPATIENT
Start: 2022-10-07 | End: 2022-10-10 | Stop reason: HOSPADM

## 2022-10-07 RX ORDER — QUETIAPINE FUMARATE 25 MG/1
25 TABLET, FILM COATED ORAL AT BEDTIME
Status: DISCONTINUED | OUTPATIENT
Start: 2022-10-07 | End: 2022-10-10 | Stop reason: HOSPADM

## 2022-10-07 RX ORDER — LORAZEPAM 2 MG/ML
2 INJECTION INTRAMUSCULAR ONCE
Status: COMPLETED | OUTPATIENT
Start: 2022-10-07 | End: 2022-10-07

## 2022-10-07 RX ORDER — PROPRANOLOL HYDROCHLORIDE 20 MG/1
20 TABLET ORAL 2 TIMES DAILY
Status: DISCONTINUED | OUTPATIENT
Start: 2022-10-07 | End: 2022-10-07

## 2022-10-07 RX ORDER — AMLODIPINE BESYLATE 5 MG/1
5 TABLET ORAL DAILY
COMMUNITY
End: 2022-10-07

## 2022-10-07 RX ORDER — ONDANSETRON 4 MG/1
4 TABLET, ORALLY DISINTEGRATING ORAL EVERY 8 HOURS PRN
Status: ON HOLD | COMMUNITY
End: 2023-04-15

## 2022-10-07 RX ORDER — LEVOTHYROXINE SODIUM 100 UG/1
100 TABLET ORAL DAILY
Status: DISCONTINUED | OUTPATIENT
Start: 2022-10-07 | End: 2022-10-10 | Stop reason: HOSPADM

## 2022-10-07 RX ORDER — LORAZEPAM 2 MG/ML
1 INJECTION INTRAMUSCULAR ONCE
Status: COMPLETED | OUTPATIENT
Start: 2022-10-07 | End: 2022-10-07

## 2022-10-07 RX ORDER — DIAZEPAM 5 MG
5-20 TABLET ORAL EVERY 30 MIN PRN
Status: DISCONTINUED | OUTPATIENT
Start: 2022-10-07 | End: 2022-10-07

## 2022-10-07 RX ADMIN — SODIUM CHLORIDE 1000 ML: 9 INJECTION, SOLUTION INTRAVENOUS at 10:14

## 2022-10-07 RX ADMIN — DIAZEPAM 10 MG: 5 TABLET ORAL at 14:46

## 2022-10-07 RX ADMIN — ESZOPICLONE 2 MG: 2 TABLET, FILM COATED ORAL at 23:09

## 2022-10-07 RX ADMIN — LORAZEPAM 1 MG: 2 INJECTION INTRAMUSCULAR; INTRAVENOUS at 10:15

## 2022-10-07 RX ADMIN — METOCLOPRAMIDE HYDROCHLORIDE 10 MG: 5 INJECTION INTRAMUSCULAR; INTRAVENOUS at 15:02

## 2022-10-07 RX ADMIN — LORAZEPAM 2 MG: 1 TABLET ORAL at 20:41

## 2022-10-07 RX ADMIN — MULTIPLE VITAMINS W/ MINERALS TAB 1 TABLET: TAB at 20:32

## 2022-10-07 RX ADMIN — LORAZEPAM 2 MG: 2 INJECTION INTRAMUSCULAR at 15:03

## 2022-10-07 RX ADMIN — ONDANSETRON 4 MG: 2 INJECTION INTRAMUSCULAR; INTRAVENOUS at 14:45

## 2022-10-07 RX ADMIN — SODIUM CHLORIDE: 9 INJECTION, SOLUTION INTRAVENOUS at 12:53

## 2022-10-07 RX ADMIN — DIAZEPAM 10 MG: 5 TABLET ORAL at 11:26

## 2022-10-07 RX ADMIN — PROPRANOLOL HYDROCHLORIDE 20 MG: 20 TABLET ORAL at 20:31

## 2022-10-07 RX ADMIN — OMEPRAZOLE 40 MG: 20 CAPSULE, DELAYED RELEASE ORAL at 14:46

## 2022-10-07 RX ADMIN — METOCLOPRAMIDE HYDROCHLORIDE 10 MG: 5 INJECTION INTRAMUSCULAR; INTRAVENOUS at 16:54

## 2022-10-07 RX ADMIN — THIAMINE HCL TAB 100 MG 100 MG: 100 TAB at 20:31

## 2022-10-07 RX ADMIN — GABAPENTIN 600 MG: 600 TABLET, FILM COATED ORAL at 20:31

## 2022-10-07 RX ADMIN — DIAZEPAM 10 MG: 5 TABLET ORAL at 16:14

## 2022-10-07 RX ADMIN — OMEPRAZOLE 40 MG: 20 CAPSULE, DELAYED RELEASE ORAL at 23:03

## 2022-10-07 RX ADMIN — ONDANSETRON 4 MG: 2 INJECTION INTRAMUSCULAR; INTRAVENOUS at 13:25

## 2022-10-07 RX ADMIN — PROPRANOLOL HYDROCHLORIDE 20 MG: 20 TABLET ORAL at 15:04

## 2022-10-07 RX ADMIN — FOLIC ACID 1 MG: 1 TABLET ORAL at 20:32

## 2022-10-07 RX ADMIN — LEVOTHYROXINE SODIUM 100 MCG: 100 TABLET ORAL at 18:28

## 2022-10-07 RX ADMIN — ONDANSETRON 4 MG: 2 INJECTION INTRAMUSCULAR; INTRAVENOUS at 10:15

## 2022-10-07 RX ADMIN — QUETIAPINE FUMARATE 25 MG: 25 TABLET ORAL at 23:03

## 2022-10-07 RX ADMIN — DIAZEPAM 10 MG: 5 TABLET ORAL at 12:54

## 2022-10-07 RX ADMIN — ONDANSETRON 4 MG: 4 TABLET, ORALLY DISINTEGRATING ORAL at 20:22

## 2022-10-07 ASSESSMENT — ACTIVITIES OF DAILY LIVING (ADL)
ADLS_ACUITY_SCORE: 57
CONCENTRATING,_REMEMBERING_OR_MAKING_DECISIONS_DIFFICULTY: NO
ADLS_ACUITY_SCORE: 37
WALKING_OR_CLIMBING_STAIRS_DIFFICULTY: YES
DOING_ERRANDS_INDEPENDENTLY_DIFFICULTY: NO
TOILETING_ISSUES: NO
ADLS_ACUITY_SCORE: 37
CHANGE_IN_FUNCTIONAL_STATUS_SINCE_ONSET_OF_CURRENT_ILLNESS/INJURY: NO
FALL_HISTORY_WITHIN_LAST_SIX_MONTHS: YES
ADLS_ACUITY_SCORE: 54
NUMBER_OF_TIMES_PATIENT_HAS_FALLEN_WITHIN_LAST_SIX_MONTHS: 4
WALKING_OR_CLIMBING_STAIRS: AMBULATION DIFFICULTY, REQUIRES EQUIPMENT
EQUIPMENT_CURRENTLY_USED_AT_HOME: WALKER, ROLLING
ADLS_ACUITY_SCORE: 37
DRESSING/BATHING_DIFFICULTY: NO
TRANSFERRING: 0-->INDEPENDENT
ADLS_ACUITY_SCORE: 54
TRANSFERRING: 0-->INDEPENDENT
DIFFICULTY_EATING/SWALLOWING: NO
WEAR_GLASSES_OR_BLIND: YES
ADLS_ACUITY_SCORE: 37

## 2022-10-07 ASSESSMENT — ENCOUNTER SYMPTOMS
NAUSEA: 1
SEIZURES: 1
TREMORS: 1
VOMITING: 0

## 2022-10-07 NOTE — PHARMACY-ADMISSION MEDICATION HISTORY
Admission Medication History Completed by Pharmacy    See [facility] Admission Navigator for prior to admission medications.     Medication History Sources:     Jolynn    Dispense report    [facility] Everywhere    Changes made to PTA medication list (reason):    Added: ondansetron ODT, naltrexone    Deleted: multivitamin, thiamine    Changed: propranolol -> 20 mg PO TID    Additional Information:    Jolynn verified her home medications.    Dispense report:  o Eszopiclone 2 mg last filled 09/22/2022 for quantity 30 for 30 day supply  o Gabapentin 600 mg last filled 09/22/2022 for quantity 270 for 90 day supply    Prior to Admission medications    Medication Sig Last Dose Taking? Auth Provider Long Term End Date   eszopiclone (LUNESTA) 2 MG tablet Take 2 mg by mouth At Bedtime 10/6/2022 at Unknown time Yes Reported, Patient No    gabapentin (NEURONTIN) 600 MG tablet Take 600 mg by mouth 3 times daily 10/6/2022 at Unknown time Yes Reported, Patient Yes    levothyroxine (SYNTHROID/LEVOTHROID) 100 MCG tablet Take 100 mcg by mouth daily 10/6/2022 at Unknown time Yes Reported, Patient Yes    naltrexone (DEPADE/REVIA) 50 MG tablet Take 50 mg by mouth daily Past Month at Unknown time Yes Unknown, Entered By History Yes    nicotine polacrilex (NICORETTE) 4 MG gum Place 1 each (4 mg) inside cheek as needed for smoking cessation  Yes Anisha Maciel MD     omeprazole (PRILOSEC) 40 MG DR capsule Take 40 mg by mouth At Bedtime 10/6/2022 at Unknown time Yes Reported, Patient     ondansetron (ZOFRAN ODT) 4 MG ODT tab Take 4 mg by mouth every 8 hours as needed for nausea  Yes Unknown, Entered By History     propranolol (INDERAL) 20 MG tablet Take 20 mg by mouth 3 times daily 10/6/2022 at Unknown time Yes Reported, Patient Yes    QUEtiapine (SEROQUEL) 25 MG tablet Take 1 tablet (25 mg) by mouth At Bedtime 10/6/2022 at Unknown time Yes Gill Yadav MD Yes       The information provided in this note is only as  accurate as the sources available at the time of the update(s).    Date completed: 10/07/22    Medication history completed by: Mary Jesus RP

## 2022-10-07 NOTE — TELEPHONE ENCOUNTER
Patient cleared and ready for behavioral bed placement: Yes     S:  12:48 PM Stacey presented 62/F at [facility] for Detox    B:  Pt requesting ETOH Detox.  Pt reports to normally drinking about 6-8 beers daily for a long time.  Pt reports to increasing to approximately a half a bottle a gin daily.  Pt reports trigger being her Mom and cat  and other people dying.  Pt reports posible Hx of seizures but no dts.    Pt reports last drink at midnight.  Pt went to [facility] ED and they gave Valium but no detox beds so came to [facility].      Pt is on MSSA and getting Valium.  Pt denies substances.  Pt reports passive SI and depression.  Medically cleared    Covid - Negative  ETOH - .109  Utox - Ordered    AST 0 - 45 U/L 269 High      ALT 0 - 50 U/L 83 High      A:  Voluntary    R:  3A/Veluvali/CD    Added to the worklist    3:10 PM Reminded [facility] to get Utox    5:13 PM Paged unit 3A on call Provider    5:18 PM Luisito approved Pt for 3A/Veluvali/CD    Updated worklist; did Indicia and added to the admit board    5:23 PM Updated 3A    5:26 PMUpdated [facility]

## 2022-10-07 NOTE — ED NOTES
Pt is currently resting in the recliner chair comfortably. Pt is not tremorous and is dozing on/off.

## 2022-10-07 NOTE — ED TRIAGE NOTES
"Patient comes in today for detox. States that she drinks beer and gin, last drink was at midnight. When asked how much she drinks, she stated \"I usually just drink all day\". Reports having history of withdrawal seizures, states that she thinks she had one a few days ago, unable to recall if it was a seizure but she woke up on the floor with bruises on her face. Bruises on face still present.     "

## 2022-10-07 NOTE — ED PROVIDER NOTES
ED Provider Note  [facility]      History     Chief Complaint   Patient presents with     Alcohol Problem     The history is provided by the patient and medical records.     Jolynn Rivera is a 62 year old female with history of alcohol use disorder, withdrawal seizures, bipolar disorder, PTSD, and OMARI presenting to the ED seeking alcohol detox. 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 She states that this was due to the fact that most of the things that she did to stay sober were closed during the pandemic and she did not replace them. She was drinking 6-8 beers daily, but in the past 2 weeks both her mother and cat have  and her best friend and dog are now dying which have led her to drinking gin again. She is drinking approximately a 750 mL bottle every 2 days. Last drink was at 12 am. She denies any coingestions. She is now feeling very nauseous and tremulous. She denies vomiting. She has a history of withdrawal seizures. She is unsure of the last one. She is noted to have bruising on the forehead and chin and states that she believes that she may have had a seizure, but has no recollection of having a seizure or falling. She reports having passive suicidal ideations. This is the first time she has felt this way.     Past Medical History  Past Medical History:   Diagnosis Date     Anxiety      COPD (chronic obstructive pulmonary disease) (H)      COPD (chronic obstructive pulmonary disease) (H)      Hepatitis C      PTSD (post-traumatic stress disorder)      Seizures (H)     second to ETOH     Substance abuse (H)      Past Surgical History:   Procedure Laterality Date     LAPAROSCOPIC TUBAL LIGATION       ORTHOPEDIC SURGERY      Left knee     TONSILLECTOMY       eszopiclone (LUNESTA) 2 MG tablet  eszopiclone (LUNESTA) 2 MG tablet  gabapentin (NEURONTIN) 600 MG tablet  levothyroxine (SYNTHROID/LEVOTHROID)  "100 MCG tablet  multivitamin w/minerals (THERA-VIT-M) tablet  nicotine polacrilex (NICORETTE) 4 MG gum  omeprazole (PRILOSEC) 40 MG DR capsule  propranolol (INDERAL) 20 MG tablet  QUEtiapine (SEROQUEL) 25 MG tablet  thiamine (B-1) 100 MG tablet      Allergies   Allergen Reactions     Clonidine Unknown     Antihistamines, Chlorpheniramine-Type [Alkylamines]      Compazine      Lock jaw; all medications ending with -zine     Diphenhydramine      Muscle Cramps     Penicillins      Panic attack     Prochlorperazine      Muscle cramps     Trazodone Nausea and Vomiting     \" I ended up falling and feeling like I was drunk\"     Wasps [Hornets]      Swelling      Family History  Family History   Problem Relation Age of Onset     Anxiety Disorder Mother      Asthma Mother      Alcohol/Drug Father      Prostate Cancer Father      Arthritis Father      Alcohol/Drug Brother      Alcohol/Drug Brother      Hypertension Brother      Alcohol/Drug Brother      Depression Brother      Psychotic Disorder Brother      Social History   Social History     Tobacco Use     Smoking status: Current Every Day Smoker     Packs/day: 0.50     Smokeless tobacco: Never Used     Tobacco comment: Starting Chantix October 2014   Substance Use Topics     Alcohol use: Yes     Comment: 6 pack of beer daily     Drug use: No     Comment: stopped 1986      Past medical history, past surgical history, medications, allergies, family history, and social history were reviewed with the patient. No additional pertinent items.       Review of Systems   Gastrointestinal: Positive for nausea. Negative for vomiting.   Neurological: Positive for tremors and seizures (possible reported per pt).   Psychiatric/Behavioral: Positive for suicidal ideas.   All other systems reviewed and are negative.    A complete review of systems was performed with pertinent positives and negatives noted in the HPI, and all other systems negative.    Physical Exam   BP: (!) 155/93  Pulse: " 81  Temp: 97.2  F (36.2  C)  Resp: 18  Weight: 69.3 kg (152 lb 12.8 oz)  SpO2: 95 %  Physical Exam  Vitals and nursing note reviewed.   Constitutional:       General: She is not in acute distress.     Appearance: She is well-developed. She is ill-appearing.   HENT:      Head: Normocephalic and atraumatic.      Right Ear: External ear normal.      Left Ear: External ear normal.      Nose: Nose normal.   Eyes:      Extraocular Movements: Extraocular movements intact.      Conjunctiva/sclera: Conjunctivae normal.   Pulmonary:      Effort: Pulmonary effort is normal. No respiratory distress.   Abdominal:      General: There is no distension.   Musculoskeletal:         General: No swelling or deformity.      Cervical back: Normal range of motion and neck supple.   Skin:     General: Skin is warm and dry.   Neurological:      Mental Status: Mental status is at baseline.      Comments: Alert, oriented, tremulous   Psychiatric:         Behavior: Behavior normal.      Comments: Anxious         ED Course      Procedures        Results for orders placed or performed during the hospital encounter of 10/07/22   Comprehensive metabolic panel     Status: Abnormal   Result Value Ref Range    Sodium 140 133 - 144 mmol/L    Potassium 3.8 3.4 - 5.3 mmol/L    Chloride 106 94 - 109 mmol/L    Carbon Dioxide (CO2) 21 20 - 32 mmol/L    Anion Gap 13 3 - 14 mmol/L    Urea Nitrogen 9 7 - 30 mg/dL    Creatinine 0.49 (L) 0.52 - 1.04 mg/dL    Calcium 8.1 (L) 8.5 - 10.1 mg/dL    Glucose 65 (L) 70 - 99 mg/dL    Alkaline Phosphatase 65 40 - 150 U/L     (H) 0 - 45 U/L    ALT 83 (H) 0 - 50 U/L    Protein Total 8.1 6.8 - 8.8 g/dL    Albumin 3.8 3.4 - 5.0 g/dL    Bilirubin Total 0.6 0.2 - 1.3 mg/dL    GFR Estimate >90 >60 mL/min/1.73m2   Asymptomatic COVID-19 Virus (Coronavirus) by PCR Nasopharyngeal     Status: Normal    Specimen: Nasopharyngeal; Swab   Result Value Ref Range    SARS CoV2 PCR Negative Negative    Narrative    Testing was  performed using the Xpert Xpress SARS-CoV-2 Assay on the   Cepheid Gene-Xpert Instrument Systems. Additional information about   this Emergency Use Authorization (EUA) assay can be found via the Lab   Guide. This test should be ordered for the detection of SARS-CoV-2 in   individuals who meet SARS-CoV-2 clinical and/or epidemiological   criteria. Test performance is unknown in asymptomatic patients. This   test is for in vitro diagnostic use under the FDA EUA for   laboratories certified under CLIA to perform high complexity testing.   This test has not been FDA cleared or approved. A negative result   does not rule out the presence of PCR inhibitors in the specimen or   target RNA in concentration below the limit of detection for the   assay. The possibility of a false negative should be considered if   the patient's recent exposure or clinical presentation suggests   COVID-19. This test was validated by the [facility]. This laboratory is certified under the Clinical Laboratory Improvement Amendments of 1988 (CLIA-88) as qualified to perform high complexity laboratory testing.     CBC with platelets and differential     Status: Abnormal   Result Value Ref Range    WBC Count 4.0 4.0 - 11.0 10e3/uL    RBC Count 4.24 3.80 - 5.20 10e6/uL    Hemoglobin 14.3 11.7 - 15.7 g/dL    Hematocrit 42.1 35.0 - 47.0 %    MCV 99 78 - 100 fL    MCH 33.7 (H) 26.5 - 33.0 pg    MCHC 34.0 31.5 - 36.5 g/dL    RDW 13.6 10.0 - 15.0 %    Platelet Count 169 150 - 450 10e3/uL    % Neutrophils 66 %    % Lymphocytes 24 %    % Monocytes 9 %    % Eosinophils 0 %    % Basophils 1 %    % Immature Granulocytes 0 %    NRBCs per 100 WBC 0 <1 /100    Absolute Neutrophils 2.6 1.6 - 8.3 10e3/uL    Absolute Lymphocytes 1.0 0.8 - 5.3 10e3/uL    Absolute Monocytes 0.4 0.0 - 1.3 10e3/uL    Absolute Eosinophils 0.0 0.0 - 0.7 10e3/uL    Absolute Basophils 0.0 0.0 - 0.2 10e3/uL    Absolute Immature Granulocytes 0.0 <=0.4  10e3/uL    Absolute NRBCs 0.0 10e3/uL   Extra Tube     Status: None    Narrative    The following orders were created for panel order Extra Tube.  Procedure                               Abnormality         Status                     ---------                               -----------         ------                     Extra Blue Top Tube[[id number]]                              Final result               Extra Red Top Tube[[id number]]                               Final result                 Please view results for these tests on the individual orders.   Extra Blue Top Tube     Status: None   Result Value Ref Range    Hold Specimen JIC    Extra Red Top Tube     Status: None   Result Value Ref Range    Hold Specimen JIC    Alcohol breath test POCT     Status: Abnormal   Result Value Ref Range    Alcohol Breath Test 0.109 (A) 0.00 - 0.01   CBC with platelets differential     Status: Abnormal    Narrative    The following orders were created for panel order CBC with platelets differential.  Procedure                               Abnormality         Status                     ---------                               -----------         ------                     CBC with platelets and d...[[id number]]  Abnormal            Final result                 Please view results for these tests on the individual orders.     Medications   0.9% sodium chloride BOLUS (0 mLs Intravenous Stopped 10/7/22 1245)     Followed by   sodium chloride 0.9% infusion ( Intravenous New Bag 10/7/22 1253)   ondansetron (ZOFRAN) injection 4 mg (4 mg Intravenous Given 10/7/22 1015)   diazepam (VALIUM) tablet 5-20 mg (10 mg Oral Given 10/7/22 1254)   propranolol (INDERAL) tablet 20 mg (has no administration in time range)   LORazepam (ATIVAN) injection 1 mg (1 mg Intravenous Given 10/7/22 1015)        Assessments & Plan (with Medical Decision Making)   Patient presents to us with a chief complaint of alcohol intoxication requesting detox.  She is  currently in alcohol withdrawal.  She has suicidal thoughts but no active suicidal plan.  Previous records were reviewed, labs and vital signs were also reviewed.  Patient is medically cleared and clinical report was given to mental health intake.  We do have a bed available on the detox unit.  Patient will be placed on the alcohol withdrawal protocol and treated with Valium.      I have reviewed the nursing notes. I have reviewed the findings, diagnosis, plan and need for follow up with the patient.    New Prescriptions    No medications on file       Final diagnoses:   Alcohol withdrawal syndrome without complication (H)   I, Jenna García, am serving as a trained medical scribe to document services personally performed by Gorge Ibarra DO, based on the provider's statements to me.     I, Gorge Ibarra DO, was physically present and have reviewed and verified the accuracy of this note documented by Jenna García.      --  Gorge Ibarra DO  [facility] EMERGENCY DEPARTMENT  10/7/2022     Gorge Ibarra DO  10/07/22 1256

## 2022-10-08 LAB
ALBUMIN SERPL-MCNC: 3.5 G/DL (ref 3.4–5)
ALP SERPL-CCNC: 60 U/L (ref 40–150)
ALT SERPL W P-5'-P-CCNC: 64 U/L (ref 0–50)
ANION GAP SERPL CALCULATED.3IONS-SCNC: 10 MMOL/L (ref 3–14)
AST SERPL W P-5'-P-CCNC: 142 U/L (ref 0–45)
BASOPHILS # BLD AUTO: 0 10E3/UL (ref 0–0.2)
BASOPHILS NFR BLD AUTO: 1 %
BILIRUB SERPL-MCNC: 1.1 MG/DL (ref 0.2–1.3)
BUN SERPL-MCNC: 5 MG/DL (ref 7–30)
CALCIUM SERPL-MCNC: 8 MG/DL (ref 8.5–10.1)
CHLORIDE BLD-SCNC: 103 MMOL/L (ref 94–109)
CHOLEST SERPL-MCNC: 179 MG/DL
CO2 SERPL-SCNC: 24 MMOL/L (ref 20–32)
CREAT SERPL-MCNC: 0.64 MG/DL (ref 0.52–1.04)
EOSINOPHIL # BLD AUTO: 0 10E3/UL (ref 0–0.7)
EOSINOPHIL NFR BLD AUTO: 1 %
ERYTHROCYTE [DISTWIDTH] IN BLOOD BY AUTOMATED COUNT: 13.4 % (ref 10–15)
FOLATE SERPL-MCNC: 18.4 NG/ML (ref 4.6–34.8)
GFR SERPL CREATININE-BSD FRML MDRD: >90 ML/MIN/1.73M2
GGT SERPL-CCNC: 93 U/L (ref 0–40)
GLUCOSE BLD-MCNC: 82 MG/DL (ref 70–99)
HBA1C MFR BLD: 5.6 % (ref 0–5.6)
HCT VFR BLD AUTO: 37.7 % (ref 35–47)
HDLC SERPL-MCNC: 113 MG/DL
HGB BLD-MCNC: 13.1 G/DL (ref 11.7–15.7)
IMM GRANULOCYTES # BLD: 0 10E3/UL
IMM GRANULOCYTES NFR BLD: 1 %
LDLC SERPL CALC-MCNC: 52 MG/DL
LYMPHOCYTES # BLD AUTO: 1.3 10E3/UL (ref 0.8–5.3)
LYMPHOCYTES NFR BLD AUTO: 36 %
MCH RBC QN AUTO: 33.6 PG (ref 26.5–33)
MCHC RBC AUTO-ENTMCNC: 34.7 G/DL (ref 31.5–36.5)
MCV RBC AUTO: 97 FL (ref 78–100)
MONOCYTES # BLD AUTO: 0.5 10E3/UL (ref 0–1.3)
MONOCYTES NFR BLD AUTO: 14 %
NEUTROPHILS # BLD AUTO: 1.7 10E3/UL (ref 1.6–8.3)
NEUTROPHILS NFR BLD AUTO: 47 %
NONHDLC SERPL-MCNC: 66 MG/DL
NRBC # BLD AUTO: 0 10E3/UL
NRBC BLD AUTO-RTO: 0 /100
PLATELET # BLD AUTO: 145 10E3/UL (ref 150–450)
POTASSIUM BLD-SCNC: 3.1 MMOL/L (ref 3.4–5.3)
PROT SERPL-MCNC: 7.5 G/DL (ref 6.8–8.8)
RBC # BLD AUTO: 3.9 10E6/UL (ref 3.8–5.2)
SODIUM SERPL-SCNC: 137 MMOL/L (ref 133–144)
T4 FREE SERPL-MCNC: 0.83 NG/DL (ref 0.76–1.46)
TRIGL SERPL-MCNC: 69 MG/DL
TSH SERPL DL<=0.005 MIU/L-ACNC: 7.55 MU/L (ref 0.4–4)
VIT B12 SERPL-MCNC: 476 PG/ML (ref 232–1245)
WBC # BLD AUTO: 3.6 10E3/UL (ref 4–11)

## 2022-10-08 PROCEDURE — 250N000013 HC RX MED GY IP 250 OP 250 PS 637: Performed by: EMERGENCY MEDICINE

## 2022-10-08 PROCEDURE — 82607 VITAMIN B-12: CPT | Performed by: PSYCHIATRY & NEUROLOGY

## 2022-10-08 PROCEDURE — 80061 LIPID PANEL: CPT | Performed by: PSYCHIATRY & NEUROLOGY

## 2022-10-08 PROCEDURE — 85025 COMPLETE CBC W/AUTO DIFF WBC: CPT | Performed by: PSYCHIATRY & NEUROLOGY

## 2022-10-08 PROCEDURE — 250N000011 HC RX IP 250 OP 636: Performed by: PSYCHIATRY & NEUROLOGY

## 2022-10-08 PROCEDURE — 82746 ASSAY OF FOLIC ACID SERUM: CPT | Performed by: PSYCHIATRY & NEUROLOGY

## 2022-10-08 PROCEDURE — 250N000013 HC RX MED GY IP 250 OP 250 PS 637: Performed by: PSYCHIATRY & NEUROLOGY

## 2022-10-08 PROCEDURE — 80053 COMPREHEN METABOLIC PANEL: CPT | Performed by: PSYCHIATRY & NEUROLOGY

## 2022-10-08 PROCEDURE — 83036 HEMOGLOBIN GLYCOSYLATED A1C: CPT | Performed by: PSYCHIATRY & NEUROLOGY

## 2022-10-08 PROCEDURE — 250N000013 HC RX MED GY IP 250 OP 250 PS 637: Performed by: PHYSICIAN ASSISTANT

## 2022-10-08 PROCEDURE — HZ2ZZZZ DETOXIFICATION SERVICES FOR SUBSTANCE ABUSE TREATMENT: ICD-10-PCS | Performed by: PSYCHIATRY & NEUROLOGY

## 2022-10-08 PROCEDURE — 99222 1ST HOSP IP/OBS MODERATE 55: CPT | Performed by: PHYSICIAN ASSISTANT

## 2022-10-08 PROCEDURE — 84439 ASSAY OF FREE THYROXINE: CPT | Performed by: PSYCHIATRY & NEUROLOGY

## 2022-10-08 PROCEDURE — 82977 ASSAY OF GGT: CPT | Performed by: PSYCHIATRY & NEUROLOGY

## 2022-10-08 PROCEDURE — 36415 COLL VENOUS BLD VENIPUNCTURE: CPT | Performed by: PSYCHIATRY & NEUROLOGY

## 2022-10-08 PROCEDURE — 128N000004 HC R&B CD ADULT

## 2022-10-08 PROCEDURE — 99221 1ST HOSP IP/OBS SF/LOW 40: CPT | Mod: AI | Performed by: PSYCHIATRY & NEUROLOGY

## 2022-10-08 PROCEDURE — 84443 ASSAY THYROID STIM HORMONE: CPT | Performed by: PSYCHIATRY & NEUROLOGY

## 2022-10-08 RX ORDER — HYDRALAZINE HCL 10 MG
5 TABLET ORAL 4 TIMES DAILY PRN
Status: DISCONTINUED | OUTPATIENT
Start: 2022-10-08 | End: 2022-10-10 | Stop reason: HOSPADM

## 2022-10-08 RX ORDER — ALBUTEROL SULFATE 90 UG/1
2 AEROSOL, METERED RESPIRATORY (INHALATION) EVERY 6 HOURS PRN
Status: DISCONTINUED | OUTPATIENT
Start: 2022-10-08 | End: 2022-10-10 | Stop reason: HOSPADM

## 2022-10-08 RX ORDER — CALCIUM CARBONATE 500 MG/1
500 TABLET, CHEWABLE ORAL 3 TIMES DAILY PRN
Status: DISCONTINUED | OUTPATIENT
Start: 2022-10-08 | End: 2022-10-10 | Stop reason: HOSPADM

## 2022-10-08 RX ORDER — LOPERAMIDE HCL 2 MG
2 CAPSULE ORAL 4 TIMES DAILY PRN
Status: DISCONTINUED | OUTPATIENT
Start: 2022-10-08 | End: 2022-10-10 | Stop reason: HOSPADM

## 2022-10-08 RX ORDER — POTASSIUM CHLORIDE 20MEQ/15ML
20 LIQUID (ML) ORAL ONCE
Status: COMPLETED | OUTPATIENT
Start: 2022-10-08 | End: 2022-10-08

## 2022-10-08 RX ADMIN — PROPRANOLOL HYDROCHLORIDE 20 MG: 20 TABLET ORAL at 20:12

## 2022-10-08 RX ADMIN — GABAPENTIN 600 MG: 600 TABLET, FILM COATED ORAL at 14:07

## 2022-10-08 RX ADMIN — THIAMINE HCL TAB 100 MG 100 MG: 100 TAB at 08:24

## 2022-10-08 RX ADMIN — PROPRANOLOL HYDROCHLORIDE 20 MG: 20 TABLET ORAL at 14:07

## 2022-10-08 RX ADMIN — MULTIPLE VITAMINS W/ MINERALS TAB 1 TABLET: TAB at 11:55

## 2022-10-08 RX ADMIN — POTASSIUM CHLORIDE 20 MEQ: 20 SOLUTION ORAL at 11:55

## 2022-10-08 RX ADMIN — LORAZEPAM 2 MG: 1 TABLET ORAL at 20:12

## 2022-10-08 RX ADMIN — OMEPRAZOLE 40 MG: 20 CAPSULE, DELAYED RELEASE ORAL at 21:02

## 2022-10-08 RX ADMIN — PROPRANOLOL HYDROCHLORIDE 20 MG: 20 TABLET ORAL at 08:24

## 2022-10-08 RX ADMIN — NICOTINE POLACRILEX 4 MG: 4 GUM, CHEWING ORAL at 16:10

## 2022-10-08 RX ADMIN — FOLIC ACID 1 MG: 1 TABLET ORAL at 08:24

## 2022-10-08 RX ADMIN — LORAZEPAM 2 MG: 1 TABLET ORAL at 11:55

## 2022-10-08 RX ADMIN — QUETIAPINE FUMARATE 25 MG: 25 TABLET ORAL at 21:02

## 2022-10-08 RX ADMIN — GABAPENTIN 600 MG: 600 TABLET, FILM COATED ORAL at 20:12

## 2022-10-08 RX ADMIN — GABAPENTIN 600 MG: 600 TABLET, FILM COATED ORAL at 08:24

## 2022-10-08 RX ADMIN — LORAZEPAM 2 MG: 1 TABLET ORAL at 00:44

## 2022-10-08 RX ADMIN — CALCIUM CARBONATE 600 MG (1,500 MG)-VITAMIN D3 400 UNIT TABLET 1 TABLET: at 17:49

## 2022-10-08 RX ADMIN — NICOTINE POLACRILEX 4 MG: 4 GUM, CHEWING ORAL at 20:12

## 2022-10-08 RX ADMIN — NICOTINE POLACRILEX 4 MG: 4 GUM, CHEWING ORAL at 19:18

## 2022-10-08 RX ADMIN — LORAZEPAM 2 MG: 1 TABLET ORAL at 04:14

## 2022-10-08 RX ADMIN — ONDANSETRON 4 MG: 4 TABLET, ORALLY DISINTEGRATING ORAL at 02:44

## 2022-10-08 RX ADMIN — LORAZEPAM 2 MG: 1 TABLET ORAL at 16:10

## 2022-10-08 RX ADMIN — NICOTINE POLACRILEX 4 MG: 4 GUM, CHEWING ORAL at 17:31

## 2022-10-08 RX ADMIN — NICOTINE POLACRILEX 4 MG: 4 GUM, CHEWING ORAL at 14:12

## 2022-10-08 RX ADMIN — LEVOTHYROXINE SODIUM 100 MCG: 100 TABLET ORAL at 08:24

## 2022-10-08 RX ADMIN — LORAZEPAM 2 MG: 1 TABLET ORAL at 08:25

## 2022-10-08 RX ADMIN — CALCIUM CARBONATE 600 MG (1,500 MG)-VITAMIN D3 400 UNIT TABLET 1 TABLET: at 11:08

## 2022-10-08 ASSESSMENT — ACTIVITIES OF DAILY LIVING (ADL)
ADLS_ACUITY_SCORE: 48
ADLS_ACUITY_SCORE: 54
HYGIENE/GROOMING: INDEPENDENT
ADLS_ACUITY_SCORE: 54
ADLS_ACUITY_SCORE: 48
ADLS_ACUITY_SCORE: 54
ORAL_HYGIENE: INDEPENDENT
ADLS_ACUITY_SCORE: 48
ADLS_ACUITY_SCORE: 54
ADLS_ACUITY_SCORE: 54
ADLS_ACUITY_SCORE: 48
ADLS_ACUITY_SCORE: 54
DRESS: INDEPENDENT

## 2022-10-08 NOTE — PLAN OF CARE
"Problem: Alcohol Withdrawal  Goal: Alcohol Withdrawal Symptom Control  Outcome: Ongoing, Progressing     MSSAs were 11 and 11; total of 4mg of ativan was administered. Patient was sweaty, nauseous, and tremulous. Zofran 4mg ODT was administered at 0244.    At around 6am, patient informed staff that she has been having diarrhea \"hourly.\" Day shift staff was informed; Imodium to be ordered.    Patient slept for a total of 4.5 hours.    We'll continue to monitor.  "
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
## 2022-10-08 NOTE — PLAN OF CARE
Problem: Alcohol Withdrawal  Goal: Alcohol Withdrawal Symptom Control  Outcome: Ongoing, Progressing   Goal Outcome Evaluation:  Patient continues in detox status on alcohol withdrawal protocols. MSSA scores 10 and 7. Medicated x2 with Ativan 2mg. Appetite fair. Fluids encouraged. Offers no complaints of physical discomfort. Denies thoughts of self harm. Ambulates with assist of walker. Will continue to monitor.

## 2022-10-08 NOTE — CONSULTS
[facility]  Consult Note - Hospitalist Service  Date of Admission:  10/7/2022  Consult Requested by: Rosy Barrios MD  Reason for Consult: new patient    Assessment & Plan   Jolynn Rivera is a 62 year old female admitted on 10/7/2022. She has a PMH significant for alcohol dependence, HTN, hypothyroid, PTSD, anxiety, tobacco use disorder and COPD; she presented to the ED 10/7 seeking alcohol detox.      Alcohol use disorder  Alcohol withdrawal  Reports a long history of remission, ~5 years. Has been struggling for a few months, bad over past week d/t numerous stressors. Has plan in place for ongoing outpatient management.  -Management per primary psychiatry team    Hypertension  Previously on amlodipine 5mg daily, stopped 2/2 having nightmares. Propranolol was increased by PCP.  -Continue PTA propranolol  -Hydralazine 5mg PO QID PRN SBP >160    COPD  Does not use inhalers PTA, reports they increase her anxiety. Able to go rock climbing, ride her bike and garden without difficulties.  -Albuterol Inhaler QID PRN SOB/wheezing    GERD  -Continue PTA omeprazole 40mg daily  -TUMS TID PRN    Hypokalemia  K 3.1 this AM, reports h/o issues w/ hypokalemia.  -KCl 20mEq PO x1  -Recheck BMP in AM    Elevated LFTs, improving   (269), ALT 64 (83), GGT 93. Likely 2/2 alcohol use.  -Repeat LFTs in AM    Hypocalcemia  Ca 8.0 (8.1). Appears chronic, was present on previous admission for detox 7/2022.  -Should be on daily calcium + Vitamin D supplementation (ordered)  -BMP in AM    Hypothyroidism  TSH 7.55 on admission, FT4 0.83  -Continue PTA levothyroxine 100mcg    Anxiety  PTSD  On propranolol, Seroquel PTA  -Management per primary team    Tobacco use disorder  -Nicorette gum PRN    Unsteady gait  Likely 2/2 EtOH and withdrawal.  -Walker for ambulation on the unit  -Recommend outpatient PT after discharge if persists       The patient's care was discussed with the  "Bedside Nurse and Patient.    Primary team will be notified of the above via this note.     Thank you for involving the hospital medicine service in the care of this patient. Please do not hesitate to contact us with an questions or concerns.      GREG SOOD PA M [facility]  Securely message with the Vocera Web Console (learn more here)  Text page via [facility] Paging/Directory       Hospitalist Service    Clinically Significant Risk Factors Present on Admission          # Hypocalcemia: Ca = 8.0 mg/dL (Ref range: 8.5 - 10.1 mg/dL) and/or iCa = N/A on admission, will replace as needed                 ______________________________________________________________________    Chief Complaint   Alcohol detox    History is obtained from the patient and electronic health record    History of Present Illness   Jolynn Rivera is a 62 year old female who  has a PMH significant for alcohol dependence, HTN, hypothyroid, PTSD, anxiety, tobacco use disorder and COPD; she presented to the ED 10/7 seeking alcohol detox. Patient reports a period of sobriety x 5 years prior to last November. She reports having multiple social stressors in the past week which exacerbated the issue. She has numerous outpatient resources in place and knows she needs to make a plan for \"if/then\" situations. She notes her gait has been really unsteady since she came in, better today. She is using a walker for support; she does not normally require the use of mobility aids. She is wondering about PT for help with her gait. She has no additional concerns at this time.    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 She states that this was due to the fact that most of the things that she did to stay sober were closed during the pandemic and she did not replace them. She was drinking 6-8 beers daily, but in the past 2 " weeks both her mother and cat have  and her best friend and dog are now dying which have led her to drinking gin again. She is drinking approximately a 750 mL bottle every 2 days. Last drink was at 12 am. She denies any coingestions. She is now feeling very nauseous and tremulous. She denies vomiting. She has a history of withdrawal seizures. She is unsure of the last one. She is noted to have bruising on the forehead and chin and states that she believes that she may have had a seizure, but has no recollection of having a seizure or falling. She reports having passive suicidal ideations. This is the first time she has felt this way.     She was previously hospitalized at [facility] -2022 for alcohol withdrawal.    Review of Systems   The 10 point Review of Systems is negative other than noted in the HPI or here.     Past Medical History    I have reviewed this patient's medical history and updated it with pertinent information if needed.   Past Medical History:   Diagnosis Date     Anxiety      COPD (chronic obstructive pulmonary disease) (H)      COPD (chronic obstructive pulmonary disease) (H)      Hepatitis C      PTSD (post-traumatic stress disorder)      Seizures (H)     second to ETOH     Substance abuse (H)        Past Surgical History   I have reviewed this patient's surgical history and updated it with pertinent information if needed.  Past Surgical History:   Procedure Laterality Date     LAPAROSCOPIC TUBAL LIGATION       ORTHOPEDIC SURGERY      Left knee     TONSILLECTOMY         Social History   I have reviewed this patient's social history and updated it with pertinent information if needed.  Social History     Tobacco Use     Smoking status: Current Every Day Smoker     Packs/day: 0.50     Smokeless tobacco: Never Used     Tobacco comment: Starting Chantix 2014   Substance Use Topics     Alcohol use: Yes     Comment: 6 pack of beer daily     Drug use: No     Comment: stopped  "1986       Family History   I have reviewed this patient's family history and updated it with pertinent information if needed.  Family History   Problem Relation Age of Onset     Anxiety Disorder Mother      Asthma Mother      Alcohol/Drug Father      Prostate Cancer Father      Arthritis Father      Alcohol/Drug Brother      Alcohol/Drug Brother      Hypertension Brother      Alcohol/Drug Brother      Depression Brother      Psychotic Disorder Brother        Medications   Medications Prior to Admission   Medication Sig Dispense Refill Last Dose     eszopiclone (LUNESTA) 2 MG tablet Take 2 mg by mouth At Bedtime   10/6/2022 at Unknown time     gabapentin (NEURONTIN) 600 MG tablet Take 600 mg by mouth 3 times daily   10/6/2022 at Unknown time     levothyroxine (SYNTHROID/LEVOTHROID) 100 MCG tablet Take 100 mcg by mouth daily   10/6/2022 at Unknown time     naltrexone (DEPADE/REVIA) 50 MG tablet Take 50 mg by mouth daily   Past Month at Unknown time     nicotine polacrilex (NICORETTE) 4 MG gum Place 1 each (4 mg) inside cheek as needed for smoking cessation 270 tablet 1      omeprazole (PRILOSEC) 40 MG DR capsule Take 40 mg by mouth At Bedtime   10/6/2022 at Unknown time     ondansetron (ZOFRAN ODT) 4 MG ODT tab Take 4 mg by mouth every 8 hours as needed for nausea        propranolol (INDERAL) 20 MG tablet Take 20 mg by mouth 3 times daily   10/6/2022 at Unknown time     QUEtiapine (SEROQUEL) 25 MG tablet Take 1 tablet (25 mg) by mouth At Bedtime 30 tablet 2 10/6/2022 at Unknown time       Allergies   Allergies   Allergen Reactions     Clonidine Unknown     Antihistamines, Chlorpheniramine-Type [Alkylamines]      Compazine      Lock jaw; all medications ending with -zine     Diphenhydramine      Muscle Cramps     Penicillins      Panic attack     Prochlorperazine      Muscle cramps     Trazodone Nausea and Vomiting     \" I ended up falling and feeling like I was drunk\"     Wasps [Hornets]      Swelling        Physical " Exam   Vital Signs: Temp: 97.6  F (36.4  C) Temp src: Temporal BP: (!) 178/96 Pulse: 75   Resp: 16 SpO2: 99 % O2 Device: None (Room air)    Weight: 152 lbs 12.8 oz    General: Pleasant, nontoxic appearance, no acute distress   Eyes: PERRL, EOMI, sclerae nonicteric   ENT: Oral mucosa pink and moist   Respiratory: Normal effort on room air, no audible wheezing or cough  Cardiovascular: Extremities warm and well-perfused, no edema  Skin: Warm, dry, no pallor, no rash   Musculoskeletal: Moves all extremities equally, no tenderness, no swelling  Neurologic: Alert and oriented x 4, CN II-XII grossly intact, no focal weakness, speech normal   Psychiatric: Appropriate mood and affect. Thoughts logical and goal-oriented       Data   Results for orders placed or performed during the hospital encounter of 10/07/22 (from the past 24 hour(s))   CBC with platelets differential    Narrative    The following orders were created for panel order CBC with platelets differential.  Procedure                               Abnormality         Status                     ---------                               -----------         ------                     CBC with platelets and d...[[id number]]  Abnormal            Final result                 Please view results for these tests on the individual orders.   GGT   Result Value Ref Range    GGT 93 (H) 0 - 40 U/L   Comprehensive metabolic panel   Result Value Ref Range    Sodium 137 133 - 144 mmol/L    Potassium 3.1 (L) 3.4 - 5.3 mmol/L    Chloride 103 94 - 109 mmol/L    Carbon Dioxide (CO2) 24 20 - 32 mmol/L    Anion Gap 10 3 - 14 mmol/L    Urea Nitrogen 5 (L) 7 - 30 mg/dL    Creatinine 0.64 0.52 - 1.04 mg/dL    Calcium 8.0 (L) 8.5 - 10.1 mg/dL    Glucose 82 70 - 99 mg/dL    Alkaline Phosphatase 60 40 - 150 U/L     (H) 0 - 45 U/L    ALT 64 (H) 0 - 50 U/L    Protein Total 7.5 6.8 - 8.8 g/dL    Albumin 3.5 3.4 - 5.0 g/dL    Bilirubin Total 1.1 0.2 - 1.3 mg/dL    GFR Estimate >90 >60  mL/min/1.73m2   Hemoglobin A1c   Result Value Ref Range    Hemoglobin A1C 5.6 0.0 - 5.6 %   Lipid panel   Result Value Ref Range    Cholesterol 179 <200 mg/dL    Triglycerides 69 <150 mg/dL    Direct Measure  >=50 mg/dL    LDL Cholesterol Calculated 52 <=100 mg/dL    Non HDL Cholesterol 66 <130 mg/dL    Narrative    Cholesterol  Desirable:  <200 mg/dL    Triglycerides  Normal:  Less than 150 mg/dL  Borderline High:  150-199 mg/dL  High:  200-499 mg/dL  Very High:  Greater than or equal to 500 mg/dL    Direct Measure HDL  Female:  Greater than or equal to 50 mg/dL   Male:  Greater than or equal to 40 mg/dL    LDL Cholesterol  Desirable:  <100mg/dL  Above Desirable:  100-129 mg/dL   Borderline High:  130-159 mg/dL   High:  160-189 mg/dL   Very High:  >= 190 mg/dL    Non HDL Cholesterol  Desirable:  130 mg/dL  Above Desirable:  130-159 mg/dL  Borderline High:  160-189 mg/dL  High:  190-219 mg/dL  Very High:  Greater than or equal to 220 mg/dL   TSH with free T4 reflex and/or T3 as indicated   Result Value Ref Range    TSH 7.55 (H) 0.40 - 4.00 mU/L   Vitamin B12   Result Value Ref Range    Vitamin B12 476 232 - 1,245 pg/mL   Folate   Result Value Ref Range    Folic Acid 18.4 4.6 - 34.8 ng/mL   CBC with platelets and differential   Result Value Ref Range    WBC Count 3.6 (L) 4.0 - 11.0 10e3/uL    RBC Count 3.90 3.80 - 5.20 10e6/uL    Hemoglobin 13.1 11.7 - 15.7 g/dL    Hematocrit 37.7 35.0 - 47.0 %    MCV 97 78 - 100 fL    MCH 33.6 (H) 26.5 - 33.0 pg    MCHC 34.7 31.5 - 36.5 g/dL    RDW 13.4 10.0 - 15.0 %    Platelet Count 145 (L) 150 - 450 10e3/uL    % Neutrophils 47 %    % Lymphocytes 36 %    % Monocytes 14 %    % Eosinophils 1 %    % Basophils 1 %    % Immature Granulocytes 1 %    NRBCs per 100 WBC 0 <1 /100    Absolute Neutrophils 1.7 1.6 - 8.3 10e3/uL    Absolute Lymphocytes 1.3 0.8 - 5.3 10e3/uL    Absolute Monocytes 0.5 0.0 - 1.3 10e3/uL    Absolute Eosinophils 0.0 0.0 - 0.7 10e3/uL    Absolute Basophils  0.0 0.0 - 0.2 10e3/uL    Absolute Immature Granulocytes 0.0 <=0.4 10e3/uL    Absolute NRBCs 0.0 10e3/uL   T4 free   Result Value Ref Range    Free T4 0.83 0.76 - 1.46 ng/dL

## 2022-10-08 NOTE — PLAN OF CARE
Problem: Plan of Care - These are the overarching goals to be used throughout the patient stay.    Goal: Absence of Hospital-Acquired Illness or Injury  Intervention: Identify and Manage Fall Risk  Recent Flowsheet Documentation  Taken 10/7/2022 2003 by Tai March, RN  Safety Promotion/Fall Prevention: activity supervised   Goal Outcome Evaluation:    Plan of Care Reviewed With: patient     WANDER Rivera is a 62 year old year old female with a chief complaint of Alcohol Problem        S = Situation:   Pt came to the ED for detox after drink approximately 750 ml alcohol every two days and last drink was on 12 am today. Pt mom and cat recently passed away and her best friend and dog actively dying. Pt stated she had suicide thought when drinking but without a plan.            B  = Background:   History of alcohol use disorder, withdrawal seizures, bipolar disorder, PTSD, and OMARI presenting to the ED seeking alcohol detox. 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 She states that this was due to the fact that most of the things that she did to stay sober were closed during the pandemic and she did not replace them.       A  =  Assessment:   Pt admitted into [address] [place] [address] at 1830. Pt alert and oriented x4, able to make needs known and cooperated with assessment. Pt presently denies all mental health psych symptoms and contracted for safety. Pt was nauseated and received zofran with effectiveness and scored 12 for MSSA and received 2 mg ativan. Pt scoring high on tremors and presently using rolling walker. Normal breathing pattern and denies nor expressed shortness of breath and vital stable except BP which is elevated.  BP (!) 147/93 (BP Location: Left arm, Patient Position: Supine)   Pulse 84   Temp 98.8  F (37.1  C) (Oral)   Resp 18   Wt 69.3 kg (152 lb 12.8 oz)   SpO2 97%   BMI 25.43 kg/m        R =   Request or  Recommendation:   Internal medicine and  follow up. Psychiatry following pt and plan for pt to go to treatment after discharge.

## 2022-10-08 NOTE — PROGRESS NOTES
10/07/22 1908   Patient Belongings   Did you bring any home meds/supplements to the hospital?  No   Patient Belongings other (see comments);locker   Patient Belongings Put in Hospital Secure Location (Security or Locker, etc.) other (see comments)   Belongings Search Yes   Clothing Search Yes   Second Staff Rashad Link   Comment see note   [facility]  Medical Room Bin: Ipad, Iphone, wallet, keys   A             Admission:  I am responsible for any personal items that are not sent to the safe or pharmacy.  [facility] is not responsible for loss, theft or damage of any property in my possession.  Signature:  _________________________________ Date: _______  Time: _____                                              Staff Signature:  ____________________________ Date: ________  Time: _____      2nd Staff person, if patient is unable/unwilling to sign:    Signature: ________________________________ Date: ________  Time: _____   Discharge:  [facility] has returned all of my personal belongings:  Signature: _________________________________ Date: ________  Time: _____                                          Staff Signature:  ____________________________ Date: ________  Time: _____

## 2022-10-09 LAB
ALBUMIN SERPL-MCNC: 3.6 G/DL (ref 3.4–5)
ALP SERPL-CCNC: 60 U/L (ref 40–150)
ALT SERPL W P-5'-P-CCNC: 59 U/L (ref 0–50)
ANION GAP SERPL CALCULATED.3IONS-SCNC: 7 MMOL/L (ref 3–14)
AST SERPL W P-5'-P-CCNC: 112 U/L (ref 0–45)
BILIRUB DIRECT SERPL-MCNC: 0.3 MG/DL (ref 0–0.2)
BILIRUB SERPL-MCNC: 1.1 MG/DL (ref 0.2–1.3)
BUN SERPL-MCNC: 1 MG/DL (ref 7–30)
CALCIUM SERPL-MCNC: 8.9 MG/DL (ref 8.5–10.1)
CHLORIDE BLD-SCNC: 100 MMOL/L (ref 94–109)
CO2 SERPL-SCNC: 23 MMOL/L (ref 20–32)
CREAT SERPL-MCNC: 0.54 MG/DL (ref 0.52–1.04)
GFR SERPL CREATININE-BSD FRML MDRD: >90 ML/MIN/1.73M2
GLUCOSE BLD-MCNC: 114 MG/DL (ref 70–99)
POTASSIUM BLD-SCNC: 3.2 MMOL/L (ref 3.4–5.3)
PROT SERPL-MCNC: 8.2 G/DL (ref 6.8–8.8)
SODIUM SERPL-SCNC: 130 MMOL/L (ref 133–144)

## 2022-10-09 PROCEDURE — 250N000013 HC RX MED GY IP 250 OP 250 PS 637: Performed by: EMERGENCY MEDICINE

## 2022-10-09 PROCEDURE — 250N000013 HC RX MED GY IP 250 OP 250 PS 637: Performed by: PHYSICIAN ASSISTANT

## 2022-10-09 PROCEDURE — 250N000013 HC RX MED GY IP 250 OP 250 PS 637: Performed by: PSYCHIATRY & NEUROLOGY

## 2022-10-09 PROCEDURE — H2035 A/D TX PROGRAM, PER HOUR: HCPCS | Mod: HQ

## 2022-10-09 PROCEDURE — 36415 COLL VENOUS BLD VENIPUNCTURE: CPT | Performed by: PHYSICIAN ASSISTANT

## 2022-10-09 PROCEDURE — 80053 COMPREHEN METABOLIC PANEL: CPT | Performed by: PHYSICIAN ASSISTANT

## 2022-10-09 PROCEDURE — 128N000004 HC R&B CD ADULT

## 2022-10-09 PROCEDURE — H2032 ACTIVITY THERAPY, PER 15 MIN: HCPCS

## 2022-10-09 PROCEDURE — 82248 BILIRUBIN DIRECT: CPT | Performed by: PHYSICIAN ASSISTANT

## 2022-10-09 RX ORDER — POTASSIUM CHLORIDE 1500 MG/1
40 TABLET, EXTENDED RELEASE ORAL ONCE
Status: COMPLETED | OUTPATIENT
Start: 2022-10-09 | End: 2022-10-09

## 2022-10-09 RX ADMIN — GABAPENTIN 600 MG: 600 TABLET, FILM COATED ORAL at 08:11

## 2022-10-09 RX ADMIN — LORAZEPAM 2 MG: 1 TABLET ORAL at 20:23

## 2022-10-09 RX ADMIN — OMEPRAZOLE 40 MG: 20 CAPSULE, DELAYED RELEASE ORAL at 22:16

## 2022-10-09 RX ADMIN — POTASSIUM CHLORIDE 40 MEQ: 1500 TABLET, EXTENDED RELEASE ORAL at 11:53

## 2022-10-09 RX ADMIN — NICOTINE POLACRILEX 4 MG: 4 GUM, CHEWING ORAL at 10:27

## 2022-10-09 RX ADMIN — NICOTINE POLACRILEX 4 MG: 4 GUM, CHEWING ORAL at 04:42

## 2022-10-09 RX ADMIN — LORAZEPAM 2 MG: 1 TABLET ORAL at 12:01

## 2022-10-09 RX ADMIN — MULTIPLE VITAMINS W/ MINERALS TAB 1 TABLET: TAB at 11:53

## 2022-10-09 RX ADMIN — LEVOTHYROXINE SODIUM 100 MCG: 100 TABLET ORAL at 08:11

## 2022-10-09 RX ADMIN — NICOTINE POLACRILEX 4 MG: 4 GUM, CHEWING ORAL at 10:14

## 2022-10-09 RX ADMIN — NICOTINE POLACRILEX 4 MG: 4 GUM, CHEWING ORAL at 18:16

## 2022-10-09 RX ADMIN — GABAPENTIN 600 MG: 600 TABLET, FILM COATED ORAL at 20:23

## 2022-10-09 RX ADMIN — QUETIAPINE FUMARATE 25 MG: 25 TABLET ORAL at 22:16

## 2022-10-09 RX ADMIN — GABAPENTIN 600 MG: 600 TABLET, FILM COATED ORAL at 14:08

## 2022-10-09 RX ADMIN — LOPERAMIDE HYDROCHLORIDE 2 MG: 2 CAPSULE ORAL at 10:26

## 2022-10-09 RX ADMIN — LORAZEPAM 2 MG: 1 TABLET ORAL at 16:47

## 2022-10-09 RX ADMIN — Medication 5 MG: at 22:16

## 2022-10-09 RX ADMIN — CALCIUM CARBONATE 600 MG (1,500 MG)-VITAMIN D3 400 UNIT TABLET 1 TABLET: at 18:01

## 2022-10-09 RX ADMIN — NICOTINE POLACRILEX 4 MG: 4 GUM, CHEWING ORAL at 20:23

## 2022-10-09 RX ADMIN — PROPRANOLOL HYDROCHLORIDE 20 MG: 20 TABLET ORAL at 08:11

## 2022-10-09 RX ADMIN — PROPRANOLOL HYDROCHLORIDE 20 MG: 20 TABLET ORAL at 14:08

## 2022-10-09 RX ADMIN — THIAMINE HCL TAB 100 MG 100 MG: 100 TAB at 08:11

## 2022-10-09 RX ADMIN — NICOTINE POLACRILEX 4 MG: 4 GUM, CHEWING ORAL at 08:29

## 2022-10-09 RX ADMIN — LORAZEPAM 2 MG: 1 TABLET ORAL at 08:11

## 2022-10-09 RX ADMIN — NICOTINE POLACRILEX 4 MG: 4 GUM, CHEWING ORAL at 14:09

## 2022-10-09 RX ADMIN — PROPRANOLOL HYDROCHLORIDE 20 MG: 20 TABLET ORAL at 20:23

## 2022-10-09 RX ADMIN — NICOTINE POLACRILEX 4 MG: 4 GUM, CHEWING ORAL at 07:18

## 2022-10-09 RX ADMIN — NICOTINE POLACRILEX 4 MG: 4 GUM, CHEWING ORAL at 12:01

## 2022-10-09 RX ADMIN — FOLIC ACID 1 MG: 1 TABLET ORAL at 08:11

## 2022-10-09 RX ADMIN — CALCIUM CARBONATE 600 MG (1,500 MG)-VITAMIN D3 400 UNIT TABLET 1 TABLET: at 08:11

## 2022-10-09 ASSESSMENT — ACTIVITIES OF DAILY LIVING (ADL)
ADLS_ACUITY_SCORE: 48
DRESS: INDEPENDENT
ADLS_ACUITY_SCORE: 48
ORAL_HYGIENE: INDEPENDENT
ADLS_ACUITY_SCORE: 48
DRESS: INDEPENDENT
ADLS_ACUITY_SCORE: 48
HYGIENE/GROOMING: INDEPENDENT
ADLS_ACUITY_SCORE: 48
ADLS_ACUITY_SCORE: 48
HYGIENE/GROOMING: INDEPENDENT
ADLS_ACUITY_SCORE: 48
ORAL_HYGIENE: INDEPENDENT
ADLS_ACUITY_SCORE: 48

## 2022-10-09 NOTE — PLAN OF CARE
10/09/22 1408   Group Therapy Session   Group Attendance attended group session   Time Session Began 1315   Time Session Ended 1350   Total Time (minutes) 35   Total # Attendees 6   Group Type psychotherapeutic   Group Topic Covered coping skills/lifestyle management   Group Session Detail Group participated in a structured therapeutic group with a focus on insight, positive change, choices and problem solving via questions and verbal interactions.   Patient Response/Contribution cooperative with task;discussed personal experience with topic   Patient Participation Detail Pt shared her disappointment in herself for relapse, not living her best life. Also shared the recent death of her mom and its impact on her.

## 2022-10-09 NOTE — PROGRESS NOTES
CLINICAL NUTRITION SERVICES - ASSESSMENT NOTE     Nutrition Prescription    RECOMMENDATIONS FOR MDs/PROVIDERS TO ORDER:  None at this time    Malnutrition Status:    Unable to assess d/t lack of NFPE - RD covering remotely    Recommendations already ordered by Registered Dietitian (RD):  Will provide Ensure enlive BID (any flavor) with breakfast and dinner  RD put in order for standing scale weight as able - update - order in to weigh pt every Tues, Thurs, and Sat - did not put in another order.     Future/Additional Recommendations:  Will follow to monitor oral intake, weight changes, and nutritional supplement tolerance     REASON FOR ASSESSMENT  Jolynn Rivera is a/an 62 year old female assessed by the dietitian for: identified at risk via screening criteria     MST score of 4 related to recent weight loss of 24-33 lbs d/t poor appetite    NUTRITION HISTORY  Pt admitted with alcohol withdrawal and is seeking alcohol detox. 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 Felt that she did not replace coping skills that she used while sober that were unavailable during COVID.     Drinks approx. A 750 mL bottle every 2 days. Reports drinking a 12 pack and gin each day in her 40s. Was feeling nauseous and tremulous upon admission but denied any vomiting. States she may have had a seizure, but no recollection of falling - bruising present on forehead. Also recalls passive suicidal ideations - first time she has felt this.      Dxhx of withdrawal seizures, alcohol use disorder, bipolar disorder, PTSD, OMARI    CURRENT NUTRITION ORDERS  Diet: Orders Placed This Encounter      Regular Diet Adult    Intake/Tolerance: Eating about 50% of meals, appetite is marginal, adequate fluid per chart review of RN note. No intake recorded in flow sheets.    LABS  Labs reviewed - sodium low, potassium low    MEDICATIONS  Medications reviewed - folvite, multivit,  "thiamine    ANTHROPOMETRICS  Height: 165.1 cm (5' 5\")  Most Recent Weight: 69.3 kg (152 lb 12.8 oz) - method unknown  IBW: 125 lbs  ADJ BW: 132 lbs  BMI: Overweight BMI 25-29.9  Weight History:   Wt Readings from Last 10 Encounters:   10/07/22 69.3 kg (152 lb 12.8 oz)   07/21/22 68 kg (150 lb)   04/20/22 70.3 kg (155 lb)   03/16/22 68 kg (150 lb)   06/27/17 66 kg (145 lb 9.6 oz)   11/11/16 68.8 kg (151 lb 9.6 oz)   10/05/16 69.6 kg (153 lb 6.4 oz)   09/25/16 68.9 kg (152 lb)   09/03/16 68 kg (150 lb)   08/16/16 68 kg (150 lb)   No recent weight loss noted. Current weight may be pulled from previous weight as method is unknown.     Dosing Weight: 60 kg using ADJ BW    ASSESSED NUTRITION NEEDS  Estimated Energy Needs: 1,500-1,800 kcals/day (25 - 30 kcals/kg)  Justification: Increased needs and Repletion  Estimated Protein Needs: 60-72 grams protein/day (1 - 1.2 grams of pro/kg)  Justification: Repletion  Estimated Fluid Needs: 1,800 mL/day (30 mL/kg)   Justification: Maintenance    PHYSICAL FINDINGS  See malnutrition section below.  Unable to determine physical appearance d/t RD covering remotely    MALNUTRITION  % Intake: Decreased intake does not meet criteria  % Weight Loss: None noted - may be error  Subcutaneous Fat Loss: Unable to assess  Muscle Loss: Unable to assess  Fluid Accumulation/Edema: None noted  Malnutrition Diagnosis: Patient does not meet two of the established criteria necessary for diagnosing malnutrition but is at risk for malnutrition    NUTRITION DIAGNOSIS  Inadequate oral intake related to poor appetite as evidenced by self-reported weight loss and intake of 50% during admission    INTERVENTIONS  Implementation  Nutrition Education: Will be provided if education needs arise   Collaboration with other providers  Medical food supplement therapy - will provide Ensure enlive (any flavor) BID with breakfast and dinner    Order for standing scale weight as pt is able - see update above, no order put " in.    Goals  Patient to consume % of nutritionally adequate meal trays TID, or the equivalent with supplements/snacks  Pt weight will remain stable during admission  Pt will tolerate nutritional supplement     Monitoring/Evaluation  Progress toward goals will be monitored and evaluated per protocol.    Ksenia Alba RD, LD  Clinical Dietitian  Office: [phone]  Weekend pager: [phone]

## 2022-10-09 NOTE — PROGRESS NOTES
10/09/22 1700   Group Therapy Session   Group Attendance attended group session   Time Session Began 1645   Time Session Ended 1735   Total Time (minutes) 45   Total # Attendees 14   Group Type recreation   Group Topic Covered coping skills/lifestyle management   Group Session Detail healthy coping skills   Patient Response/Contribution cooperative with task   Patient Participation Detail Pt participated in Therapeutic Recreation group with focus on leisure education and acquisition of knowledge and skills. Pt was fully engaged and cooperative in group recreational intervention; leisure inventory. Pt was focused on the written portion for the first part of group and then contributed to the group discussion following. Pt discussed several recreational interests and positive coping skills that are healthy outlets. Pt mood was calm and was appropriate with interactions.

## 2022-10-09 NOTE — PLAN OF CARE
Problem: Alcohol Withdrawal  Goal: Alcohol Withdrawal Symptom Control  Outcome: Progressing   Goal Outcome Evaluation:  Patient continues in detox status on alcohol withdrawal protocols. MSSA scores 8 and 8.Medicated x2 with Ativan 2 mg. Appetite good. Fluids encouraged. Denies thoughts of self harm. Social with peers on unit. Offers no complaints of physical discomfort. Ambulates with assist of walker. Will continue to monitor.

## 2022-10-09 NOTE — PLAN OF CARE
Problem: Alcohol Withdrawal  Goal: Alcohol Withdrawal Symptom Control  Outcome: Progressing   Goal Outcome Evaluation:    Plan of Care Reviewed With: patient        Pt continues in withdrawal.  She has a considerable tremor, improved but marginal appetite, she is drinking fluids and is still experiencing periods of chills and diaphoresis.  Pt has expressed concern about her ability to sleep.  Pt reports that she has so many problems with antihistamines, trazodone et cetera that has great difficulty sleeping without the lunesta.  Pt reports that she would like to get back to her life where she was connected to people in recovery, went to meetings with friends and was more generally a social person.  Pt reports that when she is drinking she isolates herself and becomes even more disconnected  Will continue to assist with discharge planning.

## 2022-10-09 NOTE — PROGRESS NOTES
Brief Medicine Note:    IM following up on CMP which notes stable LFTs, ongoing mild hypokalemia (3.2), and new hyponatremia with Na 130 (137). Appears as if appetite remained low 10/8  - Encourage oral intake, repeat BMP 10/10. May need further workup if Na not normalized  - 40 mEq Kdur. Repeat BMP 10/10  - Follow up OP for LFTs with PCP  - Medicine will continue to follow    TONIE CervantesC  Internal Medicine [facility]  [phone]

## 2022-10-09 NOTE — PLAN OF CARE
Problem: Alcohol Withdrawal  Goal: Alcohol Withdrawal Symptom Control  Outcome: Ongoing, Progressing   Goal Outcome Evaluation:    Plan of Care Reviewed With: patient      Pt has been awake and alert all evening.  Her appetite is marginal, eating about 50% of her meals, her fluid intake is adequate> 500 mls this evening.  Pt is uses the walker most of the time, she makes short trips < ten steps to tables in her surrounding area without it.  She reports tripping in her room on the seizure mat and catching her elbow on the window box or she would have fell to the ground.  Pt has a bell at her bedside and is instructed to use it once again.  She says that she does better and feels safer when she is up using the walker.  Pt continues in detox.  She has a significant tremor, her temperature is variable and has had multiple complaints of feeling either too warm or too cool.  Pt expresses concern over sleep--she would like to taker her lunestra.  Pt is allergic to multiple antihistamines and larger doses of seroquel cause restless legs--she isn't able to take ropinerol either.   Will continue to monitor withdrawal.   Assist with discharge planning.

## 2022-10-09 NOTE — PLAN OF CARE
Problem: Alcohol Withdrawal  Goal: Alcohol Withdrawal Symptom Control  Outcome: Progressing     MSSA=6 and 7; pt is tremulous. She appeared to be startled when she was woken up for her first assessment. She was unable to fall back asleep. She was given sleepy time tea. We'll continue to monitor.

## 2022-10-10 VITALS
HEART RATE: 87 BPM | DIASTOLIC BLOOD PRESSURE: 94 MMHG | TEMPERATURE: 98.6 F | OXYGEN SATURATION: 100 % | WEIGHT: 152.8 LBS | BODY MASS INDEX: 25.43 KG/M2 | SYSTOLIC BLOOD PRESSURE: 142 MMHG | RESPIRATION RATE: 18 BRPM

## 2022-10-10 LAB
ANION GAP SERPL CALCULATED.3IONS-SCNC: 5 MMOL/L (ref 3–14)
BUN SERPL-MCNC: 4 MG/DL (ref 7–30)
CALCIUM SERPL-MCNC: 9.2 MG/DL (ref 8.5–10.1)
CHLORIDE BLD-SCNC: 102 MMOL/L (ref 94–109)
CO2 SERPL-SCNC: 29 MMOL/L (ref 20–32)
CREAT SERPL-MCNC: 0.59 MG/DL (ref 0.52–1.04)
GFR SERPL CREATININE-BSD FRML MDRD: >90 ML/MIN/1.73M2
GLUCOSE BLD-MCNC: 99 MG/DL (ref 70–99)
POTASSIUM BLD-SCNC: 3.6 MMOL/L (ref 3.4–5.3)
SODIUM SERPL-SCNC: 136 MMOL/L (ref 133–144)

## 2022-10-10 PROCEDURE — 250N000013 HC RX MED GY IP 250 OP 250 PS 637: Performed by: EMERGENCY MEDICINE

## 2022-10-10 PROCEDURE — 80048 BASIC METABOLIC PNL TOTAL CA: CPT | Performed by: PHYSICIAN ASSISTANT

## 2022-10-10 PROCEDURE — 36415 COLL VENOUS BLD VENIPUNCTURE: CPT | Performed by: PHYSICIAN ASSISTANT

## 2022-10-10 PROCEDURE — 99239 HOSP IP/OBS DSCHRG MGMT >30: CPT | Performed by: PSYCHIATRY & NEUROLOGY

## 2022-10-10 PROCEDURE — 250N000013 HC RX MED GY IP 250 OP 250 PS 637: Performed by: PSYCHIATRY & NEUROLOGY

## 2022-10-10 PROCEDURE — 250N000013 HC RX MED GY IP 250 OP 250 PS 637: Performed by: PHYSICIAN ASSISTANT

## 2022-10-10 PROCEDURE — 99207 PR NO BILLABLE SERVICE THIS VISIT: CPT

## 2022-10-10 PROCEDURE — H2035 A/D TX PROGRAM, PER HOUR: HCPCS | Mod: HQ

## 2022-10-10 RX ORDER — LANOLIN ALCOHOL/MO/W.PET/CERES
100 CREAM (GRAM) TOPICAL DAILY
Qty: 30 TABLET | Refills: 0 | Status: SHIPPED | OUTPATIENT
Start: 2022-10-11

## 2022-10-10 RX ORDER — MULTIPLE VITAMINS W/ MINERALS TAB 9MG-400MCG
1 TAB ORAL DAILY
Qty: 30 TABLET | Refills: 0 | Status: SHIPPED | OUTPATIENT
Start: 2022-10-11

## 2022-10-10 RX ADMIN — NICOTINE POLACRILEX 4 MG: 4 GUM, CHEWING ORAL at 14:00

## 2022-10-10 RX ADMIN — NICOTINE POLACRILEX 4 MG: 4 GUM, CHEWING ORAL at 18:04

## 2022-10-10 RX ADMIN — CALCIUM CARBONATE 600 MG (1,500 MG)-VITAMIN D3 400 UNIT TABLET 1 TABLET: at 08:29

## 2022-10-10 RX ADMIN — THIAMINE HCL TAB 100 MG 100 MG: 100 TAB at 08:29

## 2022-10-10 RX ADMIN — GABAPENTIN 600 MG: 600 TABLET, FILM COATED ORAL at 14:00

## 2022-10-10 RX ADMIN — PROPRANOLOL HYDROCHLORIDE 20 MG: 20 TABLET ORAL at 14:00

## 2022-10-10 RX ADMIN — PROPRANOLOL HYDROCHLORIDE 20 MG: 20 TABLET ORAL at 08:32

## 2022-10-10 RX ADMIN — LOPERAMIDE HYDROCHLORIDE 2 MG: 2 CAPSULE ORAL at 09:07

## 2022-10-10 RX ADMIN — CALCIUM CARBONATE 600 MG (1,500 MG)-VITAMIN D3 400 UNIT TABLET 1 TABLET: at 18:03

## 2022-10-10 RX ADMIN — NICOTINE POLACRILEX 4 MG: 4 GUM, CHEWING ORAL at 06:41

## 2022-10-10 RX ADMIN — NICOTINE POLACRILEX 4 MG: 4 GUM, CHEWING ORAL at 16:02

## 2022-10-10 RX ADMIN — LEVOTHYROXINE SODIUM 100 MCG: 100 TABLET ORAL at 08:29

## 2022-10-10 RX ADMIN — NICOTINE POLACRILEX 4 MG: 4 GUM, CHEWING ORAL at 10:46

## 2022-10-10 RX ADMIN — GABAPENTIN 600 MG: 600 TABLET, FILM COATED ORAL at 08:29

## 2022-10-10 RX ADMIN — FOLIC ACID 1 MG: 1 TABLET ORAL at 08:29

## 2022-10-10 RX ADMIN — NICOTINE POLACRILEX 4 MG: 4 GUM, CHEWING ORAL at 08:32

## 2022-10-10 RX ADMIN — MULTIPLE VITAMINS W/ MINERALS TAB 1 TABLET: TAB at 12:29

## 2022-10-10 RX ADMIN — NICOTINE POLACRILEX 4 MG: 4 GUM, CHEWING ORAL at 04:34

## 2022-10-10 RX ADMIN — NICOTINE POLACRILEX 4 MG: 4 GUM, CHEWING ORAL at 12:31

## 2022-10-10 ASSESSMENT — ACTIVITIES OF DAILY LIVING (ADL)
ADLS_ACUITY_SCORE: 48
HYGIENE/GROOMING: INDEPENDENT
ADLS_ACUITY_SCORE: 48

## 2022-10-10 NOTE — PROGRESS NOTES
"Met with pt to discuss discharge planning. Pt reports a goal to return home, engage with her sober supports including [employer], a dual-diagnosis support group through [facility], her therapist, and her psychiatrist. Pt reports she went to [place] after her last 3A admission in April 2022 but it was all \"[id number]\". Pt open to information on treatment programs only, but wants older-adult groups only. Pt given info on [facility] and [facility] with Flory Gaitan. Pt also given information on [facility] and [facility] at her request. Pt given a letter confirming her hospital admission as pt was not able to pay her rent while here and is concerned about being penalized.     AVS updated.     Annette Fernando, LPCC, LADC   "

## 2022-10-10 NOTE — PLAN OF CARE
10/10/22 1800   Group Therapy Session   Group Attendance attended group session   Time Session Began 0445   Time Session Ended 0545   Total Time (minutes) 60   Total # Attendees 7   Group Type expressive therapy   Group Topic Covered balanced lifestyle   Patient Response/Contribution able to recall/repeat info presented;cooperative with task;listened actively   Patient Participation Detail See Note     Pt independently attended topic group today and was cooperative.  Pt demonstrated a good understanding of the topic covered and good insight into their own specific issues related to topic. Pt reports her main concern moving forward is finding a sober group of people to surround herself with.    Pt was appropriately social with peers and staff. Pt's affect was appropriate to situation and attention span was good.  Pt will continue to be encouraged to attend groups for ongoing assessment and to work toward goals identified on plan of care.

## 2022-10-10 NOTE — PLAN OF CARE
"  Problem: Alcohol Withdrawal  Goal: Alcohol Withdrawal Symptom Control  Outcome: Progressing   Goal Outcome Evaluation:       [facility] and ate her breakfast. She was medication compliant. Through out the shift pt asked when she could leave. Pt stated \"I have to leave and take care of a rent issue.\" Pt was given her phone to try and resolve the issue. Pt was relieved to find out that she could discharge at 8:24pm. This afternoon pt ate her lunch and socialized with select peers. During check in pt denies SI, SIB, AH, VH, and HI. Will continue to monitor.                   "

## 2022-10-10 NOTE — PLAN OF CARE
Problem: Alcohol Withdrawal  Goal: Alcohol Withdrawal Symptom Control  Outcome: Progressing     Patient slept for a total of 6.5 hours.    MSSA's were 4 and 6. Patient continues to be tremulous, but denied all other withdrawal symptoms.    We'll continue to monitor.

## 2022-10-10 NOTE — PLAN OF CARE
Behavioral Team Discussion: (10/10/2022)    Continued Stay Criteria/Rationale: Patient admitted for alcohol withdrawal, complicated.  Plan: The following services will be provided to the patient; psychiatric assessment, medication management, therapeutic milieu, individual and group support, and skills groups.   Participants: 3A Provider: Dr. Aly Quintanilla MD; 3A RN: Suzie Herndon RN; 3A CM's: Annette Fernando.  Summary/Recommendation: Providers will assess today for treatment recommendations, discharge planning, and aftercare plans. CM will meet with pt for discharge planning.   Medical/Physical: Internal medicine consult completed 10/8/22.  Precautions:   Behavioral Orders   Procedures     Code 1 - Restrict to Unit     Discontinue 1:1 attendant for suicide risk     Order Specific Question:   I have performed an in person assessment of the patient     Answer:   Based on this assessment the patient no longer requires a one on one attendant at this point in time.     Order Specific Question:   Rationale     Answer:   Patient States able to remain safe in hospital     Fall precautions     Routine Programming     As clinically indicated     Seizure precautions     Status 15     Every 15 minutes.     Withdrawal precautions     Rationale for change in precautions or plan: N/A  Progress: Improving.    ASAM Dimension Scale Ratings:  Dimension 1: 2 Client has some difficulty tolerating and coping with withdrawal discomfort. Client's intoxication may be severe, but responds to support and treatment such that the client does not immediately endanger self or others. Client displays moderate signs and symptoms with moderate risk of severe withdrawal.  Dimension 2: 2 Client has difficulty tolerating and coping with physical problems or has other biomedical problems that interfere with recovery and treatment. Client neglects or does not seek care for serious biomedical problems.  Dimension 3: 2 Client has difficulty with  impulse control and lacks coping skills. Client has thoughts of suicide or harm to others without means; however, the thoughts may interfere with participation in some treatment activities. Client has difficulty functioning in significant life areas. Client has moderate symptoms of emotional, behavioral, or cognitive problems. Client is able to participate in most treatment activities.  Dimension 4: 1 Client is motivated with active reinforcement, to explore treatment and strategies for change, but ambivalent about illness or need for change.  Dimension 5: 3 Client has poor recognition and understanding of relapse and recidivism issues and displays moderately high vulnerability for further substance use or mental health problems. Client has few coping skills and rarely applies coping skills.  Dimension 6: 3 Client is not engaged in structured, meaningful activity and the client's peers, family, significant other, and living environment are unsupportive, or there is significant criminal justice system involvement.     PT SEEN   AGREE WITH ASSESSMENT AND PLAN

## 2022-10-10 NOTE — DISCHARGE INSTRUCTIONS
Behavioral Discharge Planning and Instructions  THANK YOU FOR CHOOSING [facility]  3A  [phone]    Summary: You were admitted to [facility] on 10/7/2022 for detoxification from alcohol.  A medical exam was performed that included lab work. You have met with a  and opted to return home, continue working with your recovery supports.  Please take care and make your recovery a daily priority, Jolynn!  It was a pleasure working with you and the entire treatment team here wishes you the very best in your recovery!     Recommendation:  Remain abstinent from all mood-altering chemicals except those prescribed by a medical provider. If problems persist, consider obtaining a chemical dependency assessment (call [phone] to schedule a virtual assessment with [facility]) and following all recommendations.      Main Diagnoses:  Per Dr. Aly Quintanilla MD;  303.90 (F10.20) Alcohol Use Disorder Severe    Major Treatments, Procedures and Findings:  You were treated for alcohol detoxification using ***. As an outpatient you will be prescribed ***, please take this medication as prescribed until it is gone. You did not complete a chemical dependency assessment. You had labs drawn and those results were reviewed with you. Please take a copy of your lab work with you to your next primary care provider appointment.    Symptoms to Report:  If you experience more anxiety, confusion, sleeplessness, deep sadness or thoughts of suicide, notify your treatment team or notify your primary care provider. IF ANY OF THE SYMPTOMS YOU ARE EXPERIENCING ARE A MEDICAL EMERGENCY CALL 911 IMMEDIATELY.     Lifestyle Adjustment: Adjust your lifestyle to get enough sleep, relaxation, exercise and good nutrition. Continue to develop healthy coping skills to decrease stress and promote a sober living environment. Do not use mood altering substances including alcohol, illegal drugs or addictive medications other than what is  "currently prescribed.     Disposition: Home    [facility]  https://refugerecovery.org/  \"[facility] is a practice, a process, a set of tools, a treatment and a path to healing addiction and the suffering caused by addiction. The main inspiration and guiding philosophy for the [facility] program are the teachings of Anais Shakeel, a man who lived in [facility] twenty-five hundred years ago. Anais was a radical psychologist and spiritual revolutionary. Through his own efforts and practices he came to understand why human beings experience and cause so much suffering. Eventually he came to understand and experience a way of living that ended all forms of addiction. He did this through a practice and process that includes meditation, wise actions, and compassion. After freeing himself from the suffering caused by craving, he spent the rest of his life teaching others how to live a life of well-being and freedom, a life free from suffering. [facility] recognizes a non-theistic approach to spiritual practice. Our program does not ask anyone to believe anything, only to trust the process and to do the hard work of recovery. Our program is a systematic approach to treating and recovering from all forms of addiction using the traditional Roman Catholic practices of the Four Noble Truths and the Eightfold Path. It has been our experience, that when sincerely practiced, the program will ensure a full recovery from addiction and a lifelong sense of well-being and happiness.\"  [facility] Meeting: Tuesday, 7:30 pm to 8:30 pm @ [facility]  Address: [address] [place] [id number]  Please use the back door.  [email or web address]     [facility]  About: \"[facility] is a peer-led movement and community that is unified by our trust in the potential of each of us to recover and find freedom from the suffering of addiction. We believe that the traditional " "Oriental orthodox teachings, often referred to as the Dillan, offer a powerful approach to healing from addiction and living a life of true freedom. We believe that recovery means empowerment, and we support each other as partners walking the path together. We believe that recovery is rooted in finding our own inner wisdom and individual journeys.\"  Contact for [facility] In-Person Meeting information: [email or web address]    Facts about COVID19 at www.cdc.gov/COVID19 and www.[place].gov/covid19    Keeping hands clean is one of the most important steps we can take to avoid getting sick and spreading germs to others.  Please wash your hands frequently and lather with soap for at least 20 seconds!    Follow-up Appointment:   Appointment Date/Time: ***    Psychiatrist/Primary Care Giver: ***    Address: ***    Phone Number: ***      Recovery apps for your phone to locate current in person and zoom recovery meetings  [place] - meeting mahnaz  AA  - meeting mahnaz  Meeting guide - meeting mahnaz  Quick NA meeting - meeting mahnza  Estela- has various apps    Resources:  *due to covid-19 most AA/NA meetings are being held online however some are in-person or a hybrid combination please check online to verify*  Need Support Now? If you or someone you know is struggling or in crisis, help is available. Call or text [employer] or chat [employer]  AA meetings search for them at: https://aa-intergroup.org (worldwide meeting listings)  AA meetings for [place] area can be found online at: https://a[place].org (click local online meetings listings)  NA meetings for [place] area can be found online at: https://www.nam[place].org  (click find a meeting)  AA and NA Sponsors are excellent resources for support and you can find one at any support group meeting.   Alcoholics Anonymous (https://aa.org/): for information 24 hours/day  AA Intergroup service office in [place] (http://www.aastpa[place].org/) [phone]  AA Intergroup service office in " [facility]: [phone]. (http://www.a[place].org/)  Narcotics Anonymous (www.nam[place].org) (264) 242-1046  https://aafairviewriverside.org/meetings  SMART Recovery - self management for addiction recovery:  www.smartrecovery.org  Pathways ~ A Health Crisis Resource & Support Center:  638.563.9394.  https://prescribetoprevent.org/patient-education/videos/  http://www.harmreduction.org  [facility] [phone]  Support Group:  AA/NA and Sponsor/support.  National [place] on Mental Illness (www.[place].sera.org): 230.389.9737 or 753-866-2773.  Alcoholics Anonymous (www.alcoholics-anonymous.org): Check your phone book for your local chapter.  Suicide Awareness Voices of Education (SAVE) (www.save.org): 834-376-OHIT (3397)  National Suicide Prevention Line (www.mentalhealthmn.org): 762-135-SMKR (2156)  Mental Health Consumer/Survivor Network of [place] (www.mhcsn.net): 141.986.2685 or 511-428-1242  Mental Health Association of [place] (www.mentalhealth.org): 987.563.9780 or 576-416-2180   Substance Abuse and Mental Health Services (www.samhsa.gov)  [place] Opioid Prevention Coalition: www.opioidcoalition.org    [place] Recovery Connection ([facility])  [facility] connects people seeking recovery to resources that help foster and sustain long-term recovery.  Whether you are seeking resources for treatment, transportation, housing, job training, education, health care or other pathways to recovery, [facility] is a great place to start.  [phone].  www.[place].org    Great Pod casts for nutrition and wellness  Listen on Apple Podcasts  Dishing Up Nutrition   [employer]   Nutrition       Understand the connection between what you eat and how you feel. Hosted by licensed nutritionists and dietitians from [employer] we share practical, real-life solutions for healthier living through nutrition.     General Medication Instructions:   See your medication sheet(s) for  instructions.   Take all medications as prescribed.  Make no changes unless your primary care provider suggests them.   Go to all your primary care provider visits.  Be sure to have all your required lab tests. This way, your medicines can be refilled on time.  Do not use any forms of alcohol.    Please Note:  If you have any questions at anytime after you are discharged please call [facility] detox unit 3AW at [phone].  [facility], Behavioral Intake [phone]  Medical Records call [phone]  Outpatient Behavioral Intake call [phone]  LP+ Wait List/Bed Availability call [phone]    Please remember to take all of your behavioral discharge planning and lab paperwork to any follow up appointments, it contains your lab results, diagnosis, medication list and discharge recommendations.      THANK YOU FOR CHOOSING [facility]

## 2022-10-10 NOTE — PROGRESS NOTES
Brief Medicine Note:     Medicine is following peripherally for hypokalemia, hyponatremia.     # Hypokalemia-resolved  Trends:  Potassium   Date Value Ref Range Status   10/10/2022 3.6 3.4 - 5.3 mmol/L Final   10/09/2022 3.2 (L) 3.4 - 5.3 mmol/L Final   10/08/2022 3.1 (L) 3.4 - 5.3 mmol/L Final   09/03/2016 3.8 3.4 - 5.3 mmol/L Final   11/03/2015 3.8 3.4 - 5.3 mmol/L Final   10/18/2015 3.7 3.4 - 5.3 mmol/L Final   Thought to be d/t alcohol intake/poor appetite. Resolved.  - No need to clinically monitor.  - Encourage good intake    # Hyponatremia-resolved  Trends:  Sodium   Date Value Ref Range Status   10/10/2022 136 133 - 144 mmol/L Final   10/09/2022 130 (L) 133 - 144 mmol/L Final   10/08/2022 137 133 - 144 mmol/L Final   09/03/2016 137 133 - 144 mmol/L Final   11/03/2015 140 133 - 144 mmol/L Final   10/18/2015 141 133 - 144 mmol/L Final   Thought to be d/t alcohol intake/poor appetite. Resolved.  - No need to clinically monitor.  - Encourage good intake    Discharge recommendations: Check CMP in 1-2 weeks OP to assess Na/K/LFTs.     Medicine will sign off, please contact us for any acute changes.     Malina Amador, CNP, APRN  Internal Medicine MEGGAN Hospitalist  [facility]

## 2022-10-10 NOTE — DISCHARGE SUMMARY
Jolynn Rivera MRN# [id number]   Age: 62 year old YOB: 1960     Date of Admission:  10/7/2022  Date of Discharge:  10/10/2022  Admitting Physician:  Aly Quintanilla MD  Discharge Physician:  Aly Quintanilla MD      DISCHARGE  DX    Alcohol use disorder severe         Event Leading to Hospitalization:     See Admission note by admitting provider for patient encounter. for additional details.          Hospital Course:   PATIENT was admitted to [facility] attending  under Rosy Dumont MD    please review the detailed admit note on 10/8/2022   The patient was placed under status 15 (15 minute checks) to ensure patient safety.   MSSA protocol was initiated due to the patient's history of alcohol abuse and concern for withdrawal symptoms.    All outpatient medications were continued    PATIENTdid participate in groups and was visible in the milieu.     The patient's symptoms of alcohol withdrawal  improved.     Patients energy motivation , sleep appetite improved.  Pt completed detox . It was un eventful.      Discussed with patient medications for craving.  Spoke with patient about triggers coping skills relapse prevention.    CONSULTS DONE DURING PATIENTS HOSPITALIZATION.  Patient was seen by medicine on date    This as per their medical consult    Jolynn Rivera is a 62 year old female admitted on 10/7/2022. She has a PMH significant for alcohol dependence, HTN, hypothyroid, PTSD, anxiety, tobacco use disorder and COPD; she presented to the ED 10/7 seeking alcohol detox.        Alcohol use disorder  Alcohol withdrawal  Reports a long history of remission, ~5 years. Has been struggling for a few months, bad over past week d/t numerous stressors. Has plan in place for ongoing outpatient management.  -Management per primary psychiatry team     Hypertension  Previously on amlodipine 5mg daily, stopped 2/2 having nightmares. Propranolol was increased by PCP.  -Continue PTA  propranolol  -Hydralazine 5mg PO QID PRN SBP >160     COPD  Does not use inhalers PTA, reports they increase her anxiety. Able to go rock climbing, ride her bike and garden without difficulties.  -Albuterol Inhaler QID PRN SOB/wheezing     GERD  -Continue PTA omeprazole 40mg daily  -TUMS TID PRN     Hypokalemia  K 3.1 this AM, reports h/o issues w/ hypokalemia.  -KCl 20mEq PO x1  -Recheck BMP in AM     Elevated LFTs, improving   (269), ALT 64 (83), GGT 93. Likely 2/2 alcohol use.  -Repeat LFTs in AM     Hypocalcemia  Ca 8.0 (8.1). Appears chronic, was present on previous admission for detox 7/2022.  -Should be on daily calcium + Vitamin D supplementation (ordered)  -BMP in AM     Hypothyroidism  TSH 7.55 on admission, FT4 0.83  -Continue PTA levothyroxine 100mcg     Anxiety  PTSD  On propranolol, Seroquel PTA  -Management per primary team     Tobacco use disorder  -Nicorette gum PRN     Unsteady gait  Likely 2/2 EtOH and withdrawal.  -Walker for ambulation on the unit  -Recommend outpatient PT after discharge if persists   pt seen 10/9/22   IM following up on CMP which notes stable LFTs, ongoing mild hypokalemia (3.2), and new hyponatremia with Na 130 (137). Appears as if appetite remained low 10/8  - Encourage oral intake, repeat BMP 10/10. May need further workup if Na not normalized  - 40 mEq Kdur. Repeat BMP 10/10  - Follow up OP for LFTs with PCP  - Medicine will continue to follow     The patient's care was discussed with the Bedside Nurse and Patient.     Primary team will be notified of the above via this note.     Thank you for involving the hospital medicine service in the care of this patient. Please do not hesitate to contact us with an questions or concerns.     Medicine is following peripherally for hypokalemia, hyponatremia.      # Hypokalemia-resolved  Trends:        Potassium   Date Value Ref Range Status   10/10/2022 3.6 3.4 - 5.3 mmol/L Final   10/09/2022 3.2 (L) 3.4 - 5.3 mmol/L Final    10/08/2022 3.1 (L) 3.4 - 5.3 mmol/L Final   09/03/2016 3.8 3.4 - 5.3 mmol/L Final   11/03/2015 3.8 3.4 - 5.3 mmol/L Final   10/18/2015 3.7 3.4 - 5.3 mmol/L Final   Thought to be d/t alcohol intake/poor appetite. Resolved.  - No need to clinically monitor.  - Encourage good intake     # Hyponatremia-resolved  Trends:        Sodium   Date Value Ref Range Status   10/10/2022 136 133 - 144 mmol/L Final   10/09/2022 130 (L) 133 - 144 mmol/L Final   10/08/2022 137 133 - 144 mmol/L Final   09/03/2016 137 133 - 144 mmol/L Final   11/03/2015 140 133 - 144 mmol/L Final   10/18/2015 141 133 - 144 mmol/L Final   Thought to be d/t alcohol intake/poor appetite. Resolved.  - No need to clinically monitor.  - Encourage good intake     Discharge recommendations: Check CMP in 1-2 weeks OP to assess Na/K/LFTs.      Medicine will sign off, please contact us for any acute changes.             CLINICAL NUTRITION SERVICES - ASSESSMENT NOTE      Nutrition Prescription     RECOMMENDATIONS FOR MDs/PROVIDERS TO ORDER:  None at this time     Malnutrition Status:    Unable to assess d/t lack of NFPE - RD covering remotely     Recommendations already ordered by Registered Dietitian (RD):  Will provide Ensure enlive BID (any flavor) with breakfast and dinner  RD put in order for standing scale weight as able - update - order in to weigh pt every Tues, Thurs, and Sat - did not put in another order.      Future/Additional Recommendations:  Will follow to monitor oral intake, weight changes, and nutritional supplement tolerance            Labs:reviewed with patient       Recent Results (from the past 48 hour(s))   Basic metabolic panel    Collection Time: 10/09/22  8:23 AM   Result Value Ref Range    Sodium 130 (L) 133 - 144 mmol/L    Potassium 3.2 (L) 3.4 - 5.3 mmol/L    Chloride 100 94 - 109 mmol/L    Carbon Dioxide (CO2) 23 20 - 32 mmol/L    Anion Gap 7 3 - 14 mmol/L    Urea Nitrogen 1 (L) 7 - 30 mg/dL    Creatinine 0.54 0.52 - 1.04 mg/dL     Calcium 8.9 8.5 - 10.1 mg/dL    Glucose 114 (H) 70 - 99 mg/dL    GFR Estimate >90 >60 mL/min/1.73m2   Hepatic panel    Collection Time: 10/09/22  8:23 AM   Result Value Ref Range    Bilirubin Total 1.1 0.2 - 1.3 mg/dL    Bilirubin Direct 0.3 (H) 0.0 - 0.2 mg/dL    Protein Total 8.2 6.8 - 8.8 g/dL    Albumin 3.6 3.4 - 5.0 g/dL    Alkaline Phosphatase 60 40 - 150 U/L     (H) 0 - 45 U/L    ALT 59 (H) 0 - 50 U/L   Basic metabolic panel    Collection Time: 10/10/22  6:58 AM   Result Value Ref Range    Sodium 136 133 - 144 mmol/L    Potassium 3.6 3.4 - 5.3 mmol/L    Chloride 102 94 - 109 mmol/L    Carbon Dioxide (CO2) 29 20 - 32 mmol/L    Anion Gap 5 3 - 14 mmol/L    Urea Nitrogen 4 (L) 7 - 30 mg/dL    Creatinine 0.59 0.52 - 1.04 mg/dL    Calcium 9.2 8.5 - 10.1 mg/dL    Glucose 99 70 - 99 mg/dL    GFR Estimate >90 >60 mL/min/1.73m2         Recent Results (from the past 240 hour(s))   Alcohol breath test POCT    Collection Time: 10/07/22  9:09 AM   Result Value Ref Range    Alcohol Breath Test 0.109 (A) 0.00 - 0.01   Comprehensive metabolic panel    Collection Time: 10/07/22 10:00 AM   Result Value Ref Range    Sodium 140 133 - 144 mmol/L    Potassium 3.8 3.4 - 5.3 mmol/L    Chloride 106 94 - 109 mmol/L    Carbon Dioxide (CO2) 21 20 - 32 mmol/L    Anion Gap 13 3 - 14 mmol/L    Urea Nitrogen 9 7 - 30 mg/dL    Creatinine 0.49 (L) 0.52 - 1.04 mg/dL    Calcium 8.1 (L) 8.5 - 10.1 mg/dL    Glucose 65 (L) 70 - 99 mg/dL    Alkaline Phosphatase 65 40 - 150 U/L     (H) 0 - 45 U/L    ALT 83 (H) 0 - 50 U/L    Protein Total 8.1 6.8 - 8.8 g/dL    Albumin 3.8 3.4 - 5.0 g/dL    Bilirubin Total 0.6 0.2 - 1.3 mg/dL    GFR Estimate >90 >60 mL/min/1.73m2   CBC with platelets and differential    Collection Time: 10/07/22 10:00 AM   Result Value Ref Range    WBC Count 4.0 4.0 - 11.0 10e3/uL    RBC Count 4.24 3.80 - 5.20 10e6/uL    Hemoglobin 14.3 11.7 - 15.7 g/dL    Hematocrit 42.1 35.0 - 47.0 %    MCV 99 78 - 100 fL    MCH 33.7  (H) 26.5 - 33.0 pg    MCHC 34.0 31.5 - 36.5 g/dL    RDW 13.6 10.0 - 15.0 %    Platelet Count 169 150 - 450 10e3/uL    % Neutrophils 66 %    % Lymphocytes 24 %    % Monocytes 9 %    % Eosinophils 0 %    % Basophils 1 %    % Immature Granulocytes 0 %    NRBCs per 100 WBC 0 <1 /100    Absolute Neutrophils 2.6 1.6 - 8.3 10e3/uL    Absolute Lymphocytes 1.0 0.8 - 5.3 10e3/uL    Absolute Monocytes 0.4 0.0 - 1.3 10e3/uL    Absolute Eosinophils 0.0 0.0 - 0.7 10e3/uL    Absolute Basophils 0.0 0.0 - 0.2 10e3/uL    Absolute Immature Granulocytes 0.0 <=0.4 10e3/uL    Absolute NRBCs 0.0 10e3/uL   Extra Blue Top Tube    Collection Time: 10/07/22 10:00 AM   Result Value Ref Range    Hold Specimen [facility]    Extra Red Top Tube    Collection Time: 10/07/22 10:00 AM   Result Value Ref Range    Hold Specimen [facility]    Asymptomatic COVID-19 Virus (Coronavirus) by PCR Nasopharyngeal    Collection Time: 10/07/22 10:23 AM    Specimen: Nasopharyngeal; Swab   Result Value Ref Range    SARS CoV2 PCR Negative Negative   GGT    Collection Time: 10/08/22  5:28 AM   Result Value Ref Range    GGT 93 (H) 0 - 40 U/L   Comprehensive metabolic panel    Collection Time: 10/08/22  5:28 AM   Result Value Ref Range    Sodium 137 133 - 144 mmol/L    Potassium 3.1 (L) 3.4 - 5.3 mmol/L    Chloride 103 94 - 109 mmol/L    Carbon Dioxide (CO2) 24 20 - 32 mmol/L    Anion Gap 10 3 - 14 mmol/L    Urea Nitrogen 5 (L) 7 - 30 mg/dL    Creatinine 0.64 0.52 - 1.04 mg/dL    Calcium 8.0 (L) 8.5 - 10.1 mg/dL    Glucose 82 70 - 99 mg/dL    Alkaline Phosphatase 60 40 - 150 U/L     (H) 0 - 45 U/L    ALT 64 (H) 0 - 50 U/L    Protein Total 7.5 6.8 - 8.8 g/dL    Albumin 3.5 3.4 - 5.0 g/dL    Bilirubin Total 1.1 0.2 - 1.3 mg/dL    GFR Estimate >90 >60 mL/min/1.73m2   Hemoglobin A1c    Collection Time: 10/08/22  5:28 AM   Result Value Ref Range    Hemoglobin A1C 5.6 0.0 - 5.6 %   Lipid panel    Collection Time: 10/08/22  5:28 AM   Result Value Ref Range    Cholesterol 179 <200  mg/dL    Triglycerides 69 <150 mg/dL    Direct Measure  >=50 mg/dL    LDL Cholesterol Calculated 52 <=100 mg/dL    Non HDL Cholesterol 66 <130 mg/dL   TSH with free T4 reflex and/or T3 as indicated    Collection Time: 10/08/22  5:28 AM   Result Value Ref Range    TSH 7.55 (H) 0.40 - 4.00 mU/L   Vitamin B12    Collection Time: 10/08/22  5:28 AM   Result Value Ref Range    Vitamin B12 476 232 - 1,245 pg/mL   Folate    Collection Time: 10/08/22  5:28 AM   Result Value Ref Range    Folic Acid 18.4 4.6 - 34.8 ng/mL   CBC with platelets and differential    Collection Time: 10/08/22  5:28 AM   Result Value Ref Range    WBC Count 3.6 (L) 4.0 - 11.0 10e3/uL    RBC Count 3.90 3.80 - 5.20 10e6/uL    Hemoglobin 13.1 11.7 - 15.7 g/dL    Hematocrit 37.7 35.0 - 47.0 %    MCV 97 78 - 100 fL    MCH 33.6 (H) 26.5 - 33.0 pg    MCHC 34.7 31.5 - 36.5 g/dL    RDW 13.4 10.0 - 15.0 %    Platelet Count 145 (L) 150 - 450 10e3/uL    % Neutrophils 47 %    % Lymphocytes 36 %    % Monocytes 14 %    % Eosinophils 1 %    % Basophils 1 %    % Immature Granulocytes 1 %    NRBCs per 100 WBC 0 <1 /100    Absolute Neutrophils 1.7 1.6 - 8.3 10e3/uL    Absolute Lymphocytes 1.3 0.8 - 5.3 10e3/uL    Absolute Monocytes 0.5 0.0 - 1.3 10e3/uL    Absolute Eosinophils 0.0 0.0 - 0.7 10e3/uL    Absolute Basophils 0.0 0.0 - 0.2 10e3/uL    Absolute Immature Granulocytes 0.0 <=0.4 10e3/uL    Absolute NRBCs 0.0 10e3/uL   T4 free    Collection Time: 10/08/22  5:28 AM   Result Value Ref Range    Free T4 0.83 0.76 - 1.46 ng/dL   Basic metabolic panel    Collection Time: 10/09/22  8:23 AM   Result Value Ref Range    Sodium 130 (L) 133 - 144 mmol/L    Potassium 3.2 (L) 3.4 - 5.3 mmol/L    Chloride 100 94 - 109 mmol/L    Carbon Dioxide (CO2) 23 20 - 32 mmol/L    Anion Gap 7 3 - 14 mmol/L    Urea Nitrogen 1 (L) 7 - 30 mg/dL    Creatinine 0.54 0.52 - 1.04 mg/dL    Calcium 8.9 8.5 - 10.1 mg/dL    Glucose 114 (H) 70 - 99 mg/dL    GFR Estimate >90 >60 mL/min/1.73m2    Hepatic panel    Collection Time: 10/09/22  8:23 AM   Result Value Ref Range    Bilirubin Total 1.1 0.2 - 1.3 mg/dL    Bilirubin Direct 0.3 (H) 0.0 - 0.2 mg/dL    Protein Total 8.2 6.8 - 8.8 g/dL    Albumin 3.6 3.4 - 5.0 g/dL    Alkaline Phosphatase 60 40 - 150 U/L     (H) 0 - 45 U/L    ALT 59 (H) 0 - 50 U/L   Basic metabolic panel    Collection Time: 10/10/22  6:58 AM   Result Value Ref Range    Sodium 136 133 - 144 mmol/L    Potassium 3.6 3.4 - 5.3 mmol/L    Chloride 102 94 - 109 mmol/L    Carbon Dioxide (CO2) 29 20 - 32 mmol/L    Anion Gap 5 3 - 14 mmol/L    Urea Nitrogen 4 (L) 7 - 30 mg/dL    Creatinine 0.59 0.52 - 1.04 mg/dL    Calcium 9.2 8.5 - 10.1 mg/dL    Glucose 99 70 - 99 mg/dL    GFR Estimate >90 >60 mL/min/1.73m2            Because this patient meets criteria for an Alcohol Use Disorder, I performed the following brief intervention on the date of this note:              1) Expressed concern that the patient is drinking at unhealthy levels known to increase their risk of alcohol related problems              2) Gave feedback linking alcohol use and health, including personalized feedback explaining how alcohol use can interact with their medical and/or psychiatric problems, and with prescribed medications.              3) Advised patient to abstain.    PT counseled on nicotine cessation and nicotine replacement provided    Discussed with patient many issues of addiction,triggers, relapse, and establishing a solid recovery program.    DISCHARGE MENTAL STATUS EXAMINATION:  The patient is alert, oriented x3.  Good fund of knowledge.  Good use of language.  Recent and remote memory, language, fund of knowledge are all adequate.  Euthymic mood congruent affect  Speech normal rate/rhythm linear tp no loose asso,The patient does not have any active suicidal or homicidal ideation.  Does not have any auditory or visual hallucination.  Fair insight/judgment At this time, the patient was stable to be  discharged.        Pt was not determined to not be a danger to himself or others. At the current time of discharge, the patient does not meet criteria for involuntary hospitalization. On the day of discharge, the patient reports that they do not have suicidal or homicidal ideation and would never hurt themselves or others. Steps taken to minimize risk include: assessing patient s behavior and thought process daily during hospital stay, discharging patient with adequate plan for follow up for mental and physical health and discussing safety plan of returning to the hospital should the patient ever have thoughts of harming themselves or others. Therefore, based on all available evidence including the factors cited above, the patient does not appear to be at imminent risk for self-harm, and is appropriate for outpatient level of care.     Educated about side effects/risk vs benefits /alternative including non treatment.Pt consented to be on medication.     .Total time spent on discharge summary more than 35 min  More than  20 min  planning, coordination of care, medication reconciliation and performance of physical exam on day of discharge.Care was coordinated with unit RN and unit therapist         Review of your medicines      UNREVIEWED medicines. Ask your doctor about these medicines      Dose / Directions   eszopiclone 2 MG tablet  Commonly known as: LUNESTA  Ask about: Which instructions should I use?      Dose: 2 mg  Take 2 mg by mouth At Bedtime  Refills: 0     gabapentin 600 MG tablet  Commonly known as: NEURONTIN      Dose: 600 mg  Take 600 mg by mouth 3 times daily  Refills: 0     levothyroxine 100 MCG tablet  Commonly known as: SYNTHROID/LEVOTHROID      Dose: 100 mcg  Take 100 mcg by mouth daily  Refills: 0     naltrexone 50 MG tablet  Commonly known as: DEPADE/REVIA      Dose: 50 mg  Take 50 mg by mouth daily  Refills: 0     nicotine polacrilex 4 MG gum  Commonly known as: NICORETTE  Used for: Nicotine  "abuse      Dose: 4 mg  Place 1 each (4 mg) inside cheek as needed for smoking cessation  Quantity: 270 tablet  Refills: 1     omeprazole 40 MG DR capsule  Commonly known as: priLOSEC      Dose: 40 mg  Take 40 mg by mouth At Bedtime  Refills: 0     ondansetron 4 MG ODT tab  Commonly known as: ZOFRAN ODT      Dose: 4 mg  Take 4 mg by mouth every 8 hours as needed for nausea  Refills: 0     propranolol 20 MG tablet  Commonly known as: INDERAL      Dose: 20 mg  Take 20 mg by mouth 3 times daily  Refills: 0     QUEtiapine 25 MG tablet  Commonly known as: SEROquel  Used for: Mood disorder, Posttraumatic stress disorder      Dose: 25 mg  Take 1 tablet (25 mg) by mouth At Bedtime  Quantity: 30 tablet  Refills: 2             Disposition: Patient going home   Facts about COVID19 at www.cdc.gov/COVID19 and www.[place].gov/covid19     Keeping hands clean is one of the most important steps we can take to avoid getting sick and spreading germs to others.  Please wash your hands frequently and lather with soap for at least 20 seconds!     Medical Follow-Up:dr chapin 10/12/22       Treatment Follow-Up:[facility] recovery meeting   .        \"Much or all of the text in this note was generated through the use of Dragon Dictate voice to text software. Errors in spelling or words which appear to be out of contact are unintentional, may be present due having escaped editing\"     "

## 2022-10-11 ENCOUNTER — PATIENT OUTREACH (OUTPATIENT)
Dept: CARE COORDINATION | Facility: CLINIC | Age: 62
End: 2022-10-11

## 2022-10-11 NOTE — PROGRESS NOTES
"Pt was taken out of detox at 2030. MSSA score 4. No valium needed. Has not needed any valium since 10/9  2023.   Pt denies all withdrawal symptoms. No tremors from withdrawal, no sweats or nausea. Ate dinner. Socialized with peers.   Discharge education done. Pt verbalized understanding. All her belongings returned. Stats she will get home via [employer], declined help. \" I have I on my phone. I can set it up.\"     Discharged at about 2045.  "

## 2022-10-11 NOTE — PROGRESS NOTES
Clinic Care Coordination Contact  [facility]: Post-Discharge Note  SITUATION                                                      Admission:    Admission Date: 10/07/22   Reason for Admission: alcohol dependence  Discharge:   Discharge Date: 10/10/22  Discharge Diagnosis: alcohol dependence    BACKGROUND                                                    PATIENT was admitted to [facility] attending  under Rosy Dumont MD    please review the detailed admit note on 10/8/2022   The patient was placed under status 15 (15 minute checks) to ensure patient safety.   MSSA protocol was initiated due to the patient's history of alcohol abuse and concern for withdrawal symptoms.     All outpatient medications were continued     PATIENTdid participate in groups and was visible in the milieu.      The patient's symptoms of alcohol withdrawal  improved.     Patients energy motivation , sleep appetite improved.  Pt completed detox . It was un eventful.        Discussed with patient medications for craving.  Spoke with patient about triggers coping skills relapse prevention.     CONSULTS DONE DURING PATIENTS HOSPITALIZATION.  Patient was seen by medicine on date     This as per their medical consult     Jolynn Rivera is a 62 year old female admitted on 10/7/2022. She has a PMH significant for alcohol dependence, HTN, hypothyroid, PTSD, anxiety, tobacco use disorder and COPD; she presented to the ED 10/7 seeking alcohol detox.        Alcohol use disorder  Alcohol withdrawal  Reports a long history of remission, ~5 years. Has been struggling for a few months, bad over past week d/t numerous stressors. Has plan in place for ongoing outpatient management.  -Management per primary psychiatry team     Hypertension  Previously on amlodipine 5mg daily, stopped 2/2 having nightmares. Propranolol was increased by PCP.  -Continue PTA propranolol  -Hydralazine 5mg PO QID PRN SBP >160     COPD  Does not use inhalers  PTA, reports they increase her anxiety. Able to go rock climbing, ride her bike and garden without difficulties.  -Albuterol Inhaler QID PRN SOB/wheezing     GERD  -Continue PTA omeprazole 40mg daily  -TUMS TID PRN     Hypokalemia  K 3.1 this AM, reports h/o issues w/ hypokalemia.  -KCl 20mEq PO x1  -Recheck BMP in AM     Elevated LFTs, improving   (269), ALT 64 (83), GGT 93. Likely 2/2 alcohol use.  -Repeat LFTs in AM  ASSESSMENT           Discharge Assessment  How are your symptoms? (Red Flag symptoms escalate to triage hotline per guidelines): Improved  Do you feel your condition is stable enough to be safe at home until your provider visit?: Yes  Does the patient have their discharge instructions? : Yes                  PLAN                                                      Outpatient Plan:    Medical Follow-Up:dr chapin 10/12/22        Treatment Follow-Up:[facility]   .      No future appointments.      For any urgent concerns, please contact our 24 hour nurse triage line: [phone] ([phone])         COLLIN Zarate

## 2022-10-23 ENCOUNTER — HEALTH MAINTENANCE LETTER (OUTPATIENT)
Age: 62
End: 2022-10-23

## 2022-12-09 ENCOUNTER — HOSPITAL ENCOUNTER (INPATIENT)
Facility: CLINIC | Age: 62
LOS: 2 days | Discharge: HOME | DRG: 897 | End: 2022-12-11
Attending: FAMILY MEDICINE | Admitting: PSYCHIATRY & NEUROLOGY
Payer: MEDICARE

## 2022-12-09 DIAGNOSIS — F10.220 ALCOHOL DEPENDENCE WITH UNCOMPLICATED INTOXICATION (H): ICD-10-CM

## 2022-12-09 DIAGNOSIS — Z11.52 ENCOUNTER FOR SCREENING LABORATORY TESTING FOR SEVERE ACUTE RESPIRATORY SYNDROME CORONAVIRUS 2 (SARS-COV-2): ICD-10-CM

## 2022-12-09 DIAGNOSIS — F10.229 ALCOHOL DEPENDENCE WITH INTOXICATION WITH COMPLICATION (H): ICD-10-CM

## 2022-12-09 LAB
ALBUMIN SERPL-MCNC: 4.5 G/DL (ref 3.4–5)
ALCOHOL BREATH TEST: 0.27 (ref 0–0.01)
ALP SERPL-CCNC: 54 U/L (ref 40–150)
ALT SERPL W P-5'-P-CCNC: 33 U/L (ref 0–50)
AMPHETAMINES UR QL SCN: NORMAL
ANION GAP SERPL CALCULATED.3IONS-SCNC: 13 MMOL/L (ref 3–14)
AST SERPL W P-5'-P-CCNC: 53 U/L (ref 0–45)
BARBITURATES UR QL: NORMAL
BASOPHILS # BLD AUTO: 0 10E3/UL (ref 0–0.2)
BASOPHILS NFR BLD AUTO: 0 %
BENZODIAZ UR QL: NORMAL
BILIRUB SERPL-MCNC: 0.6 MG/DL (ref 0.2–1.3)
BUN SERPL-MCNC: 6 MG/DL (ref 7–30)
CALCIUM SERPL-MCNC: 9 MG/DL (ref 8.5–10.1)
CANNABINOIDS UR QL SCN: NORMAL
CHLORIDE BLD-SCNC: 102 MMOL/L (ref 94–109)
CO2 SERPL-SCNC: 20 MMOL/L (ref 20–32)
COCAINE UR QL: NORMAL
CREAT SERPL-MCNC: 0.56 MG/DL (ref 0.52–1.04)
EOSINOPHIL # BLD AUTO: 0 10E3/UL (ref 0–0.7)
EOSINOPHIL NFR BLD AUTO: 0 %
ERYTHROCYTE [DISTWIDTH] IN BLOOD BY AUTOMATED COUNT: 12.9 % (ref 10–15)
GFR SERPL CREATININE-BSD FRML MDRD: >90 ML/MIN/1.73M2
GLUCOSE BLD-MCNC: 113 MG/DL (ref 70–99)
HCT VFR BLD AUTO: 39.5 % (ref 35–47)
HGB BLD-MCNC: 14.5 G/DL (ref 11.7–15.7)
IMM GRANULOCYTES # BLD: 0 10E3/UL
IMM GRANULOCYTES NFR BLD: 0 %
LIPASE SERPL-CCNC: 196 U/L (ref 73–393)
LYMPHOCYTES # BLD AUTO: 2.9 10E3/UL (ref 0.8–5.3)
LYMPHOCYTES NFR BLD AUTO: 41 %
MCH RBC QN AUTO: 33.6 PG (ref 26.5–33)
MCHC RBC AUTO-ENTMCNC: 36.7 G/DL (ref 31.5–36.5)
MCV RBC AUTO: 92 FL (ref 78–100)
MONOCYTES # BLD AUTO: 0.8 10E3/UL (ref 0–1.3)
MONOCYTES NFR BLD AUTO: 12 %
NEUTROPHILS # BLD AUTO: 3.2 10E3/UL (ref 1.6–8.3)
NEUTROPHILS NFR BLD AUTO: 47 %
NRBC # BLD AUTO: 0 10E3/UL
NRBC BLD AUTO-RTO: 0 /100
OPIATES UR QL SCN: NORMAL
PLATELET # BLD AUTO: 178 10E3/UL (ref 150–450)
POTASSIUM BLD-SCNC: 3.6 MMOL/L (ref 3.4–5.3)
PROT SERPL-MCNC: 8.4 G/DL (ref 6.8–8.8)
RBC # BLD AUTO: 4.31 10E6/UL (ref 3.8–5.2)
SARS-COV-2 RNA RESP QL NAA+PROBE: NEGATIVE
SODIUM SERPL-SCNC: 135 MMOL/L (ref 133–144)
WBC # BLD AUTO: 7 10E3/UL (ref 4–11)

## 2022-12-09 PROCEDURE — C9803 HOPD COVID-19 SPEC COLLECT: HCPCS

## 2022-12-09 PROCEDURE — 99285 EMERGENCY DEPT VISIT HI MDM: CPT | Performed by: FAMILY MEDICINE

## 2022-12-09 PROCEDURE — 250N000011 HC RX IP 250 OP 636: Performed by: PSYCHIATRY & NEUROLOGY

## 2022-12-09 PROCEDURE — 128N000004 HC R&B CD ADULT

## 2022-12-09 PROCEDURE — 250N000013 HC RX MED GY IP 250 OP 250 PS 637: Performed by: PSYCHIATRY & NEUROLOGY

## 2022-12-09 PROCEDURE — 250N000013 HC RX MED GY IP 250 OP 250 PS 637: Performed by: FAMILY MEDICINE

## 2022-12-09 PROCEDURE — HZ2ZZZZ DETOXIFICATION SERVICES FOR SUBSTANCE ABUSE TREATMENT: ICD-10-PCS | Performed by: FAMILY MEDICINE

## 2022-12-09 PROCEDURE — 83690 ASSAY OF LIPASE: CPT | Performed by: FAMILY MEDICINE

## 2022-12-09 PROCEDURE — U0005 INFEC AGEN DETEC AMPLI PROBE: HCPCS | Performed by: EMERGENCY MEDICINE

## 2022-12-09 PROCEDURE — 36415 COLL VENOUS BLD VENIPUNCTURE: CPT | Performed by: FAMILY MEDICINE

## 2022-12-09 PROCEDURE — 82075 ASSAY OF BREATH ETHANOL: CPT

## 2022-12-09 PROCEDURE — 85004 AUTOMATED DIFF WBC COUNT: CPT | Performed by: FAMILY MEDICINE

## 2022-12-09 PROCEDURE — 80053 COMPREHEN METABOLIC PANEL: CPT | Performed by: FAMILY MEDICINE

## 2022-12-09 PROCEDURE — 80307 DRUG TEST PRSMV CHEM ANLYZR: CPT | Performed by: EMERGENCY MEDICINE

## 2022-12-09 PROCEDURE — 99285 EMERGENCY DEPT VISIT HI MDM: CPT | Mod: 25

## 2022-12-09 RX ORDER — QUETIAPINE FUMARATE 25 MG/1
25 TABLET, FILM COATED ORAL AT BEDTIME
Status: DISCONTINUED | OUTPATIENT
Start: 2022-12-09 | End: 2022-12-11 | Stop reason: HOSPADM

## 2022-12-09 RX ORDER — LOPERAMIDE HCL 2 MG
2 CAPSULE ORAL 4 TIMES DAILY PRN
Status: DISCONTINUED | OUTPATIENT
Start: 2022-12-09 | End: 2022-12-11 | Stop reason: HOSPADM

## 2022-12-09 RX ORDER — MULTIPLE VITAMINS W/ MINERALS TAB 9MG-400MCG
1 TAB ORAL DAILY
Status: DISCONTINUED | OUTPATIENT
Start: 2022-12-09 | End: 2022-12-11 | Stop reason: HOSPADM

## 2022-12-09 RX ORDER — PANTOPRAZOLE SODIUM 40 MG/1
40 TABLET, DELAYED RELEASE ORAL AT BEDTIME
Status: DISCONTINUED | OUTPATIENT
Start: 2022-12-09 | End: 2022-12-11 | Stop reason: HOSPADM

## 2022-12-09 RX ORDER — LEVOTHYROXINE SODIUM 100 UG/1
100 TABLET ORAL DAILY
Status: DISCONTINUED | OUTPATIENT
Start: 2022-12-09 | End: 2022-12-11 | Stop reason: HOSPADM

## 2022-12-09 RX ORDER — AMOXICILLIN 250 MG
1 CAPSULE ORAL 2 TIMES DAILY PRN
Status: DISCONTINUED | OUTPATIENT
Start: 2022-12-09 | End: 2022-12-11 | Stop reason: HOSPADM

## 2022-12-09 RX ORDER — ATENOLOL 50 MG/1
50 TABLET ORAL DAILY PRN
Status: DISCONTINUED | OUTPATIENT
Start: 2022-12-09 | End: 2022-12-09

## 2022-12-09 RX ORDER — PROPRANOLOL HYDROCHLORIDE 20 MG/1
20 TABLET ORAL 3 TIMES DAILY
Status: DISCONTINUED | OUTPATIENT
Start: 2022-12-09 | End: 2022-12-11 | Stop reason: HOSPADM

## 2022-12-09 RX ORDER — GABAPENTIN 100 MG/1
100 CAPSULE ORAL EVERY 6 HOURS PRN
Status: DISCONTINUED | OUTPATIENT
Start: 2022-12-09 | End: 2022-12-09

## 2022-12-09 RX ORDER — ACETAMINOPHEN 325 MG/1
650 TABLET ORAL EVERY 4 HOURS PRN
Status: DISCONTINUED | OUTPATIENT
Start: 2022-12-09 | End: 2022-12-11 | Stop reason: HOSPADM

## 2022-12-09 RX ORDER — MAGNESIUM HYDROXIDE/ALUMINUM HYDROXICE/SIMETHICONE 120; 1200; 1200 MG/30ML; MG/30ML; MG/30ML
30 SUSPENSION ORAL EVERY 4 HOURS PRN
Status: DISCONTINUED | OUTPATIENT
Start: 2022-12-09 | End: 2022-12-11 | Stop reason: HOSPADM

## 2022-12-09 RX ORDER — DIAZEPAM 5 MG
5-20 TABLET ORAL EVERY 30 MIN PRN
Status: DISCONTINUED | OUTPATIENT
Start: 2022-12-09 | End: 2022-12-11 | Stop reason: HOSPADM

## 2022-12-09 RX ORDER — FOLIC ACID 1 MG/1
1 TABLET ORAL DAILY
Status: DISCONTINUED | OUTPATIENT
Start: 2022-12-09 | End: 2022-12-11 | Stop reason: HOSPADM

## 2022-12-09 RX ORDER — LANOLIN ALCOHOL/MO/W.PET/CERES
3 CREAM (GRAM) TOPICAL
Status: DISCONTINUED | OUTPATIENT
Start: 2022-12-09 | End: 2022-12-09

## 2022-12-09 RX ORDER — ONDANSETRON 4 MG/1
4 TABLET, ORALLY DISINTEGRATING ORAL EVERY 6 HOURS PRN
Status: DISCONTINUED | OUTPATIENT
Start: 2022-12-09 | End: 2022-12-11 | Stop reason: HOSPADM

## 2022-12-09 RX ORDER — DIAZEPAM 5 MG
5-20 TABLET ORAL EVERY 30 MIN PRN
Status: DISCONTINUED | OUTPATIENT
Start: 2022-12-09 | End: 2022-12-09

## 2022-12-09 RX ORDER — GABAPENTIN 600 MG/1
600 TABLET ORAL 3 TIMES DAILY
Status: DISCONTINUED | OUTPATIENT
Start: 2022-12-09 | End: 2022-12-11 | Stop reason: HOSPADM

## 2022-12-09 RX ADMIN — NICOTINE POLACRILEX 2 MG: 2 GUM, CHEWING BUCCAL at 17:01

## 2022-12-09 RX ADMIN — PANTOPRAZOLE SODIUM 40 MG: 40 TABLET, DELAYED RELEASE ORAL at 22:09

## 2022-12-09 RX ADMIN — DIAZEPAM 10 MG: 5 TABLET ORAL at 20:08

## 2022-12-09 RX ADMIN — Medication 5 MG: at 22:09

## 2022-12-09 RX ADMIN — LEVOTHYROXINE SODIUM 100 MCG: 100 TABLET ORAL at 20:59

## 2022-12-09 RX ADMIN — NICOTINE POLACRILEX 2 MG: 2 GUM, CHEWING BUCCAL at 15:08

## 2022-12-09 RX ADMIN — ONDANSETRON 4 MG: 4 TABLET, ORALLY DISINTEGRATING ORAL at 20:08

## 2022-12-09 RX ADMIN — DIAZEPAM 10 MG: 5 TABLET ORAL at 16:47

## 2022-12-09 RX ADMIN — QUETIAPINE FUMARATE 25 MG: 25 TABLET ORAL at 22:09

## 2022-12-09 RX ADMIN — PROPRANOLOL HYDROCHLORIDE 20 MG: 20 TABLET ORAL at 20:59

## 2022-12-09 RX ADMIN — NICOTINE POLACRILEX 4 MG: 4 GUM, CHEWING BUCCAL at 20:28

## 2022-12-09 RX ADMIN — NICOTINE POLACRILEX 4 MG: 4 GUM, CHEWING BUCCAL at 22:42

## 2022-12-09 RX ADMIN — GABAPENTIN 600 MG: 600 TABLET, FILM COATED ORAL at 20:59

## 2022-12-09 ASSESSMENT — ACTIVITIES OF DAILY LIVING (ADL)
ORAL_HYGIENE: INDEPENDENT
CONCENTRATING,_REMEMBERING_OR_MAKING_DECISIONS_DIFFICULTY: NO
ADLS_ACUITY_SCORE: 48
CHANGE_IN_FUNCTIONAL_STATUS_SINCE_ONSET_OF_CURRENT_ILLNESS/INJURY: NO
HEARING_DIFFICULTY_OR_DEAF: NO
ADLS_ACUITY_SCORE: 37
DIFFICULTY_COMMUNICATING: NO
ADLS_ACUITY_SCORE: 63
DOING_ERRANDS_INDEPENDENTLY_DIFFICULTY: NO
FALL_HISTORY_WITHIN_LAST_SIX_MONTHS: YES
WALKING_OR_CLIMBING_STAIRS_DIFFICULTY: NO
HYGIENE/GROOMING: INDEPENDENT
NUMBER_OF_TIMES_PATIENT_HAS_FALLEN_WITHIN_LAST_SIX_MONTHS: 4
TOILETING_ISSUES: NO
ADLS_ACUITY_SCORE: 38
LAUNDRY: WITH SUPERVISION
VISION_MANAGEMENT: GLASSES
DRESS: INDEPENDENT
DIFFICULTY_EATING/SWALLOWING: NO
DRESSING/BATHING_DIFFICULTY: NO
ADLS_ACUITY_SCORE: 48
WEAR_GLASSES_OR_BLIND: YES

## 2022-12-09 NOTE — ED PROVIDER NOTES
[facility] EMERGENCY DEPARTMENT ([facility])    12/09/22      ED PROVIDER NOTE  ED 16A  History     Chief Complaint   Patient presents with     Drug / Alcohol Assessment     Seeking detox off of alcohol, drinks about a 6 pack of beer and 1/2 pint  of whiskey a day. Last withdrawal seizure 2 days ago     The history is provided by the patient and medical records.     Jolynn Rivera is a 62 year old female with history of bipolar disorder, PTSD, nightmares, hypertension, hypothyroidism, COPD, alcohol use disorder who presents seeking detox from alcohol.  She has been drinking approximately a sixpack of beer and half a pint of whiskey daily, sometimes more. She has anxiety, passive suicidal ideation. No active suicidal ideation. She notes losing a friend and this is very distressing for her.  She notes prior history of alcohol withdrawal seizures, last withdrawal seizure was 2 days ago.  No history of delirium tremens. She is here voluntarily.     Epic records reviewed, she had prior attempted detox here in October having relapsed after a long period of remission (5 years).  She also had abused heroin when she was in her 20s, is sober from this.    Past Medical History  Past Medical History:   Diagnosis Date     Anxiety      COPD (chronic obstructive pulmonary disease) (H)      COPD (chronic obstructive pulmonary disease) (H)      Hepatitis C      PTSD (post-traumatic stress disorder)      Seizures (H)     second to ETOH     Substance abuse (H)      Past Surgical History:   Procedure Laterality Date     LAPAROSCOPIC TUBAL LIGATION       ORTHOPEDIC SURGERY      Left knee     TONSILLECTOMY       calcium carbonate 600 mg-vitamin D 400 units (CALTRATE) 600-400 MG-UNIT per tablet  gabapentin (NEURONTIN) 600 MG tablet  levothyroxine (SYNTHROID/LEVOTHROID) 100 MCG tablet  melatonin 5 MG tablet  multivitamin w/minerals (THERA-VIT-M) tablet  nicotine polacrilex (NICORETTE) 4 MG gum  omeprazole (PRILOSEC) 40 MG   "capsule  ondansetron (ZOFRAN ODT) 4 MG ODT tab  propranolol (INDERAL) 20 MG tablet  QUEtiapine (SEROQUEL) 25 MG tablet  thiamine (B-1) 100 MG tablet      Allergies   Allergen Reactions     Clonidine Unknown     Antihistamines, Chlorpheniramine-Type [Alkylamines]      Compazine      Lock jaw; all medications ending with -zine     Diphenhydramine      Muscle Cramps     Penicillins      Panic attack     Prochlorperazine      Muscle cramps     Trazodone Nausea and Vomiting     \" I ended up falling and feeling like I was drunk\"     Wasps [Hornets]      Swelling      Family History  Family History   Problem Relation Age of Onset     Anxiety Disorder Mother      Asthma Mother      Alcohol/Drug Father      Prostate Cancer Father      Arthritis Father      Alcohol/Drug Brother      Alcohol/Drug Brother      Hypertension Brother      Alcohol/Drug Brother      Depression Brother      Psychotic Disorder Brother      Social History   Social History     Tobacco Use     Smoking status: Every Day     Packs/day: 0.50     Types: Cigarettes     Smokeless tobacco: Never     Tobacco comments:     Starting Chantix October 2014   Substance Use Topics     Alcohol use: Yes     Comment: 6 pack of beer daily and 1/2 pint of whiskey     Drug use: No     Comment: stopped 1986      Past medical history, past surgical history, medications, allergies, family history, and social history were reviewed with the patient. No additional pertinent items.       Review of Systems   All other systems reviewed and are negative.    A complete review of systems was performed with pertinent positives and negatives noted in the HPI, and all other systems negative.    Physical Exam   BP: 100/71  Pulse: 76  Temp: 98  F (36.7  C)  Resp: 16  Weight: 68 kg (150 lb)  SpO2: 95 %  Physical Exam  Constitutional:       General: She is not in acute distress.     Appearance: She is not diaphoretic.   HENT:      Head: Atraumatic.      Mouth/Throat:      Pharynx: No " oropharyngeal exudate.   Eyes:      General: No scleral icterus.     Pupils: Pupils are equal, round, and reactive to light.   Cardiovascular:      Heart sounds: Normal heart sounds.   Pulmonary:      Effort: No respiratory distress.      Breath sounds: Normal breath sounds.   Abdominal:      General: Bowel sounds are normal.      Palpations: Abdomen is soft.      Tenderness: There is no abdominal tenderness.   Musculoskeletal:         General: No tenderness.   Skin:     General: Skin is warm.      Findings: No rash.       ED Course      Procedures    The medical record was reviewed and interpreted.  Current labs reviewed and interpreted.       Results for orders placed or performed during the hospital encounter of 12/09/22   Asymptomatic COVID-19 Virus (Coronavirus) by PCR Nasopharyngeal     Status: Normal    Specimen: Nasopharyngeal; Swab   Result Value Ref Range    SARS CoV2 PCR Negative Negative    Narrative    Testing was performed using the Xpert Xpress SARS-CoV-2 Assay on the Cepheid Gene-Xpert Instrument Systems. Additional information about this Emergency Use Authorization (EUA) assay can be found via the Lab Guide. This test should be ordered for the detection of SARS-CoV-2 in individuals who meet SARS-CoV-2 clinical and/or epidemiological criteria as well as from individuals without symptoms or other reasons to suspect COVID-19. Test performance for asymptomatic patients has only been established in anterior nasal swab specimens. This test is for in vitro diagnostic use under the FDA EUA for laboratories certified under CLIA to perform high complexity testing. This test has not been FDA cleared or approved. A negative result does not rule out the presence of PCR inhibitors in the specimen or target RNA concentration below the limit of detection for the assay. The possibility of a false negative should be considered if the patient's recent exposure or clinical presentation suggests COVID-19. This test was  validated by the [facility]. This laboratory is certified under the Clinical Laboratory Improvement Amendments (CLIA) as qualified to perform high complexity laboratory testing.     Comprehensive metabolic panel     Status: Abnormal   Result Value Ref Range    Sodium 135 133 - 144 mmol/L    Potassium 3.6 3.4 - 5.3 mmol/L    Chloride 102 94 - 109 mmol/L    Carbon Dioxide (CO2) 20 20 - 32 mmol/L    Anion Gap 13 3 - 14 mmol/L    Urea Nitrogen 6 (L) 7 - 30 mg/dL    Creatinine 0.56 0.52 - 1.04 mg/dL    Calcium 9.0 8.5 - 10.1 mg/dL    Glucose 113 (H) 70 - 99 mg/dL    Alkaline Phosphatase 54 40 - 150 U/L    AST 53 (H) 0 - 45 U/L    ALT 33 0 - 50 U/L    Protein Total 8.4 6.8 - 8.8 g/dL    Albumin 4.5 3.4 - 5.0 g/dL    Bilirubin Total 0.6 0.2 - 1.3 mg/dL    GFR Estimate >90 >60 mL/min/1.73m2   Lipase     Status: Normal   Result Value Ref Range    Lipase 196 73 - 393 U/L   CBC with platelets and differential     Status: Abnormal   Result Value Ref Range    WBC Count 7.0 4.0 - 11.0 10e3/uL    RBC Count 4.31 3.80 - 5.20 10e6/uL    Hemoglobin 14.5 11.7 - 15.7 g/dL    Hematocrit 39.5 35.0 - 47.0 %    MCV 92 78 - 100 fL    MCH 33.6 (H) 26.5 - 33.0 pg    MCHC 36.7 (H) 31.5 - 36.5 g/dL    RDW 12.9 10.0 - 15.0 %    Platelet Count 178 150 - 450 10e3/uL    % Neutrophils 47 %    % Lymphocytes 41 %    % Monocytes 12 %    % Eosinophils 0 %    % Basophils 0 %    % Immature Granulocytes 0 %    NRBCs per 100 WBC 0 <1 /100    Absolute Neutrophils 3.2 1.6 - 8.3 10e3/uL    Absolute Lymphocytes 2.9 0.8 - 5.3 10e3/uL    Absolute Monocytes 0.8 0.0 - 1.3 10e3/uL    Absolute Eosinophils 0.0 0.0 - 0.7 10e3/uL    Absolute Basophils 0.0 0.0 - 0.2 10e3/uL    Absolute Immature Granulocytes 0.0 <=0.4 10e3/uL    Absolute NRBCs 0.0 10e3/uL   Drug abuse screen 1 urine (ED)     Status: Normal   Result Value Ref Range    Amphetamines Urine Screen Negative Screen Negative    Barbiturates Urine Screen Negative Screen Negative     Benzodiazepines Urine Screen Negative Screen Negative    Cannabinoids Urine Screen Negative Screen Negative    Cocaine Urine Screen Negative Screen Negative    Opiates Urine Screen Negative Screen Negative   Alcohol breath test POCT     Status: Abnormal   Result Value Ref Range    Alcohol Breath Test 0.274 (A) 0.00 - 0.01   Urine Drugs of Abuse Screen     Status: Normal    Narrative    The following orders were created for panel order Urine Drugs of Abuse Screen.  Procedure                               Abnormality         Status                     ---------                               -----------         ------                     Drug abuse screen 1 urin...[[id number]]  Normal              Final result                 Please view results for these tests on the individual orders.   CBC with platelets differential     Status: Abnormal    Narrative    The following orders were created for panel order CBC with platelets differential.  Procedure                               Abnormality         Status                     ---------                               -----------         ------                     CBC with platelets and d...[[id number]]  Abnormal            Final result                 Please view results for these tests on the individual orders.     Medications   diazepam (VALIUM) tablet 5-20 mg (10 mg Oral Given 12/9/22 1647)   nicotine (NICORETTE) gum 2 mg (2 mg Buccal Given 12/9/22 1701)        Assessments & Plan (with Medical Decision Making)     On [facility] with valium. Labwork is normal. Breathalyzer is 0.274.   I have reviewed the nursing notes. I have reviewed the findings, diagnosis, plan and need for follow up with the patient.    Patient with chronic alcohol dependence acute intoxication with history of previous withdrawal syndrome symptoms including possible seizure I believe that she needs medically supervised detox and will be admitted to station 3A     Final diagnoses:   Alcohol dependence with  intoxication with complication (H)       --  Zander Mcneal MD  [facility] EMERGENCY DEPARTMENT  12/9/2022     Zander Mcneal MD  12/09/22 [id number]

## 2022-12-09 NOTE — ED TRIAGE NOTES
Seeking detox off of alcohol, drinks about a 6 pack of beer and 1/2 pint  of whiskey a day. Last withdrawal seizure 2 days ago.     Triage Assessment     Row Name 12/09/22 132       Triage Assessment (Adult)    Airway WDL WDL       Respiratory WDL    Respiratory WDL WDL       Skin Circulation/Temperature WDL    Skin Circulation/Temperature WDL WDL       Cardiac WDL    Cardiac WDL WDL       Peripheral/Neurovascular WDL    Peripheral Neurovascular WDL WDL       Cognitive/Neuro/Behavioral WDL    Cognitive/Neuro/Behavioral WDL WDL

## 2022-12-09 NOTE — PHARMACY-ADMISSION MEDICATION HISTORY
Admission Medication History Completed by Pharmacy    See [facility] Admission Navigator for prior to admission medications.     Medication History Sources:     Jolynn Mays report    [facility] discharge summary 10/10/2022    Changes made to PTA medication list (reason):    Added: None    Deleted: None    Changed: None    Additional Information:    Jolynn verified her home medications.    Prior to Admission medications    Medication Sig Last Dose Taking? Auth Provider Long Term End Date   calcium carbonate 600 mg-vitamin D 400 units (CALTRATE) 600-400 MG-UNIT per tablet Take 1 tablet by mouth 2 times daily (with meals)  Yes Aly Quintanilla MD     gabapentin (NEURONTIN) 600 MG tablet Take 600 mg by mouth 3 times daily  Yes Reported, Patient Yes    levothyroxine (SYNTHROID/LEVOTHROID) 100 MCG tablet Take 100 mcg by mouth daily  Yes Reported, Patient Yes    melatonin 5 MG tablet Take 1 tablet (5 mg) by mouth nightly as needed for sleep  Yes Aly Quintanilla MD     multivitamin w/minerals (THERA-VIT-M) tablet Take 1 tablet by mouth daily  Yes Aly Quintanilla MD     nicotine polacrilex (NICORETTE) 4 MG gum Place 1 each (4 mg) inside cheek as needed for smoking cessation  Yes Anisha Maciel MD     omeprazole (PRILOSEC) 40 MG DR capsule Take 40 mg by mouth At Bedtime  Yes Reported, Patient     ondansetron (ZOFRAN ODT) 4 MG ODT tab Take 4 mg by mouth every 8 hours as needed for nausea  Yes Unknown, Entered By History     propranolol (INDERAL) 20 MG tablet Take 20 mg by mouth 3 times daily  Yes Reported, Patient Yes    QUEtiapine (SEROQUEL) 25 MG tablet Take 1 tablet (25 mg) by mouth At Bedtime  Yes Gill Yadav MD Yes    thiamine (B-1) 100 MG tablet Take 1 tablet (100 mg) by mouth daily  Yes Aly Quintanilla MD        The information provided in this note is only as accurate as the sources available at the time of the update(s).    Date completed: 12/09/22    Medication history completed  by: Mary Jesus [facility]

## 2022-12-10 LAB — GGT SERPL-CCNC: 35 U/L (ref 0–40)

## 2022-12-10 PROCEDURE — 99222 1ST HOSP IP/OBS MODERATE 55: CPT | Mod: AI | Performed by: PSYCHIATRY & NEUROLOGY

## 2022-12-10 PROCEDURE — 250N000011 HC RX IP 250 OP 636: Performed by: PSYCHIATRY & NEUROLOGY

## 2022-12-10 PROCEDURE — 82977 ASSAY OF GGT: CPT | Performed by: PSYCHIATRY & NEUROLOGY

## 2022-12-10 PROCEDURE — 36415 COLL VENOUS BLD VENIPUNCTURE: CPT | Performed by: PSYCHIATRY & NEUROLOGY

## 2022-12-10 PROCEDURE — H2035 A/D TX PROGRAM, PER HOUR: HCPCS | Mod: HQ

## 2022-12-10 PROCEDURE — 250N000013 HC RX MED GY IP 250 OP 250 PS 637: Performed by: PSYCHIATRY & NEUROLOGY

## 2022-12-10 PROCEDURE — 99232 SBSQ HOSP IP/OBS MODERATE 35: CPT | Performed by: STUDENT IN AN ORGANIZED HEALTH CARE EDUCATION/TRAINING PROGRAM

## 2022-12-10 PROCEDURE — 128N000004 HC R&B CD ADULT

## 2022-12-10 RX ORDER — HYDRALAZINE HYDROCHLORIDE 10 MG/1
10 TABLET, FILM COATED ORAL EVERY 6 HOURS PRN
Status: DISCONTINUED | OUTPATIENT
Start: 2022-12-10 | End: 2022-12-11 | Stop reason: HOSPADM

## 2022-12-10 RX ADMIN — NICOTINE POLACRILEX 4 MG: 4 GUM, CHEWING BUCCAL at 10:52

## 2022-12-10 RX ADMIN — NICOTINE POLACRILEX 4 MG: 4 GUM, CHEWING BUCCAL at 18:07

## 2022-12-10 RX ADMIN — NICOTINE POLACRILEX 8 MG: 4 GUM, CHEWING BUCCAL at 21:14

## 2022-12-10 RX ADMIN — NICOTINE POLACRILEX 4 MG: 4 GUM, CHEWING BUCCAL at 08:28

## 2022-12-10 RX ADMIN — PANTOPRAZOLE SODIUM 40 MG: 40 TABLET, DELAYED RELEASE ORAL at 21:10

## 2022-12-10 RX ADMIN — NICOTINE POLACRILEX 4 MG: 4 GUM, CHEWING BUCCAL at 12:12

## 2022-12-10 RX ADMIN — NICOTINE POLACRILEX 4 MG: 4 GUM, CHEWING BUCCAL at 16:22

## 2022-12-10 RX ADMIN — GABAPENTIN 600 MG: 600 TABLET, FILM COATED ORAL at 08:18

## 2022-12-10 RX ADMIN — ONDANSETRON 4 MG: 4 TABLET, ORALLY DISINTEGRATING ORAL at 05:13

## 2022-12-10 RX ADMIN — LOPERAMIDE HYDROCHLORIDE 2 MG: 2 CAPSULE ORAL at 09:39

## 2022-12-10 RX ADMIN — NICOTINE POLACRILEX 4 MG: 4 GUM, CHEWING BUCCAL at 20:13

## 2022-12-10 RX ADMIN — THIAMINE HCL TAB 100 MG 100 MG: 100 TAB at 08:18

## 2022-12-10 RX ADMIN — FOLIC ACID 1 MG: 1 TABLET ORAL at 08:18

## 2022-12-10 RX ADMIN — PROPRANOLOL HYDROCHLORIDE 20 MG: 20 TABLET ORAL at 14:11

## 2022-12-10 RX ADMIN — QUETIAPINE FUMARATE 25 MG: 25 TABLET ORAL at 21:10

## 2022-12-10 RX ADMIN — NICOTINE POLACRILEX 4 MG: 4 GUM, CHEWING BUCCAL at 05:13

## 2022-12-10 RX ADMIN — GABAPENTIN 600 MG: 600 TABLET, FILM COATED ORAL at 14:11

## 2022-12-10 RX ADMIN — PROPRANOLOL HYDROCHLORIDE 20 MG: 20 TABLET ORAL at 19:03

## 2022-12-10 RX ADMIN — DIAZEPAM 10 MG: 5 TABLET ORAL at 05:50

## 2022-12-10 RX ADMIN — GABAPENTIN 600 MG: 600 TABLET, FILM COATED ORAL at 19:03

## 2022-12-10 RX ADMIN — Medication 5 MG: at 21:10

## 2022-12-10 RX ADMIN — LEVOTHYROXINE SODIUM 100 MCG: 100 TABLET ORAL at 08:18

## 2022-12-10 RX ADMIN — MULTIPLE VITAMINS W/ MINERALS TAB 1 TABLET: TAB at 08:18

## 2022-12-10 RX ADMIN — NICOTINE POLACRILEX 4 MG: 4 GUM, CHEWING BUCCAL at 14:12

## 2022-12-10 RX ADMIN — NICOTINE POLACRILEX 4 MG: 4 GUM, CHEWING BUCCAL at 19:03

## 2022-12-10 RX ADMIN — PROPRANOLOL HYDROCHLORIDE 20 MG: 20 TABLET ORAL at 08:18

## 2022-12-10 RX ADMIN — DIAZEPAM 10 MG: 5 TABLET ORAL at 01:32

## 2022-12-10 ASSESSMENT — ACTIVITIES OF DAILY LIVING (ADL)
ORAL_HYGIENE: INDEPENDENT
ADLS_ACUITY_SCORE: 48
LAUNDRY: WITH SUPERVISION
LAUNDRY: WITH SUPERVISION
ORAL_HYGIENE: INDEPENDENT
ADLS_ACUITY_SCORE: 48
HYGIENE/GROOMING: INDEPENDENT
DRESS: INDEPENDENT
HYGIENE/GROOMING: INDEPENDENT
ADLS_ACUITY_SCORE: 48
DRESS: INDEPENDENT
ADLS_ACUITY_SCORE: 48
ADLS_ACUITY_SCORE: 48

## 2022-12-10 NOTE — H&P
"    Chief Complaint:     \"Alcohol\"        HPI:     62 female with alcohol dependence.    Patient first used alcohol age 18 and she quickly advanced to daily use. Her longest sobriety was 5 years until 2021. Since then she has had several detox admission last year and this year    Patient has in treatment numerous times in the past. Her most recently here around 2 months ago    Patient also has a remote history of heroin use but she has not used drugs in 35 years.    Patient reports that she drinks a 6 pack of beer and a half pint of gin a day. She has had withdrawal seizures in the past and states that she had one 2 days ago.     Patient denies depression today and denies suicidal or homicidal thoughts and is now showing evidence of psychosis or kim    According to ER report by Zander Mcneal MD  [facility] EMERGENCY DEPARTMENT  12/9/2022  Jolynn Rivera is a 62 year old female with history of bipolar disorder, PTSD, nightmares, hypertension, hypothyroidism, COPD, alcohol use disorder who presents seeking detox from alcohol.  She has been drinking approximately a sixpack of beer and half a pint of whiskey daily, sometimes more. She has anxiety, passive suicidal ideation. No active suicidal ideation. She notes losing a friend and this is very distressing for her.  She notes prior history of alcohol withdrawal seizures, last withdrawal seizure was 2 days ago.  No history of delirium tremens. She is here voluntarily.      Epic records reviewed, she had prior attempted detox here in October having relapsed after a long period of remission (5 years).  She also had abused heroin when she was in her 20s, is sober from this.                 Past Psychiatric History:     Patient states she is diagnosed with PTSD and bipolar 2 disorder. She has one mental health admission many years ago. She currently has a therapist and psychiatrist is Dr. Macdonald at [facility]. She is currently not on psychotropic " medications except for Gabapentin.              Past Medical History:   PAST MEDICAL HISTORY:   Past Medical History:   Diagnosis Date     Anxiety      COPD (chronic obstructive pulmonary disease) (H)      COPD (chronic obstructive pulmonary disease) (H)      Hepatitis C      PTSD (post-traumatic stress disorder)      Seizures (H)     second to ETOH     Substance abuse (H)      Patient has history of osteoarthritis    PAST SURGICAL HISTORY:   Past Surgical History:   Procedure Laterality Date     LAPAROSCOPIC TUBAL LIGATION       ORTHOPEDIC SURGERY      Left knee     TONSILLECTOMY               Family History:   FAMILY HISTORY:   Family History   Problem Relation Age of Onset     Anxiety Disorder Mother      Asthma Mother      Alcohol/Drug Father      Prostate Cancer Father      Arthritis Father      Alcohol/Drug Brother      Alcohol/Drug Brother      Hypertension Brother      Alcohol/Drug Brother      Depression Brother      Psychotic Disorder Brother            Social History:   Please see the full psychosocial profile from the clinical treatment coordinator.   SOCIAL HISTORY:   Social History     Tobacco Use     Smoking status: Every Day     Packs/day: 0.50     Types: Cigarettes     Smokeless tobacco: Never     Tobacco comments:     Starting Chantix October 2014   Substance Use Topics     Alcohol use: Yes     Comment: 6 pack of beer daily and 1/2 pint of whiskey     Patient lives alone in rented apartment  Patient survives on social security disability  Patient is  and has no current romantic involvement  She has no children  She has regular communication with her father         PTA Medications:     Medications Prior to Admission   Medication Sig Dispense Refill Last Dose     calcium carbonate 600 mg-vitamin D 400 units (CALTRATE) 600-400 MG-UNIT per tablet Take 1 tablet by mouth 2 times daily (with meals) 60 tablet 0 12/8/2022 at PM     gabapentin (NEURONTIN) 600 MG tablet Take 600 mg by mouth 3 times  "daily   12/8/2022 at PM     levothyroxine (SYNTHROID/LEVOTHROID) 100 MCG tablet Take 100 mcg by mouth daily   12/8/2022 at AM     melatonin 5 MG tablet Take 1 tablet (5 mg) by mouth nightly as needed for sleep 30 tablet 0 12/8/2022 at PM     multivitamin w/minerals (THERA-VIT-M) tablet Take 1 tablet by mouth daily 30 tablet 0 12/8/2022 at AM     nicotine polacrilex (NICORETTE) 4 MG gum Place 1 each (4 mg) inside cheek as needed for smoking cessation 270 tablet 1 12/9/2022     omeprazole (PRILOSEC) 40 MG DR capsule Take 40 mg by mouth At Bedtime   12/8/2022 at AM     ondansetron (ZOFRAN ODT) 4 MG ODT tab Take 4 mg by mouth every 8 hours as needed for nausea   Past Week     propranolol (INDERAL) 20 MG tablet Take 20 mg by mouth 3 times daily   12/8/2022 at PM     QUEtiapine (SEROQUEL) 25 MG tablet Take 1 tablet (25 mg) by mouth At Bedtime 30 tablet 2 12/8/2022 at PM     thiamine (B-1) 100 MG tablet Take 1 tablet (100 mg) by mouth daily 30 tablet 0 12/8/2022 at AM            Current Medications:       folic acid  1 mg Oral Daily     gabapentin  600 mg Oral TID     levothyroxine  100 mcg Oral Daily     multivitamin w/minerals  1 tablet Oral Daily     pantoprazole  40 mg Oral At Bedtime     propranolol  20 mg Oral TID     QUEtiapine  25 mg Oral At Bedtime     thiamine  100 mg Oral Daily     acetaminophen, alum & mag hydroxide-simethicone, diazepam, hydrALAZINE, loperamide, melatonin, nicotine polacrilex, ondansetron, senna-docusate         Allergies:     Allergies   Allergen Reactions     Clonidine Unknown     Antihistamines, Chlorpheniramine-Type [Alkylamines]      Compazine      Lock jaw; all medications ending with -zine     Diphenhydramine      Muscle Cramps     Penicillins      Panic attack     Prochlorperazine      Muscle cramps     Trazodone Nausea and Vomiting     \" I ended up falling and feeling like I was drunk\"     Wasps [Hornets]      Swelling           Labs:     Recent Results (from the past 48 hour(s)) "   Alcohol breath test POCT    Collection Time: 12/09/22  1:32 PM   Result Value Ref Range    Alcohol Breath Test 0.274 (A) 0.00 - 0.01   Asymptomatic COVID-19 Virus (Coronavirus) by PCR Nasopharyngeal    Collection Time: 12/09/22  1:48 PM    Specimen: Nasopharyngeal; Swab   Result Value Ref Range    SARS CoV2 PCR Negative Negative   Comprehensive metabolic panel    Collection Time: 12/09/22  1:56 PM   Result Value Ref Range    Sodium 135 133 - 144 mmol/L    Potassium 3.6 3.4 - 5.3 mmol/L    Chloride 102 94 - 109 mmol/L    Carbon Dioxide (CO2) 20 20 - 32 mmol/L    Anion Gap 13 3 - 14 mmol/L    Urea Nitrogen 6 (L) 7 - 30 mg/dL    Creatinine 0.56 0.52 - 1.04 mg/dL    Calcium 9.0 8.5 - 10.1 mg/dL    Glucose 113 (H) 70 - 99 mg/dL    Alkaline Phosphatase 54 40 - 150 U/L    AST 53 (H) 0 - 45 U/L    ALT 33 0 - 50 U/L    Protein Total 8.4 6.8 - 8.8 g/dL    Albumin 4.5 3.4 - 5.0 g/dL    Bilirubin Total 0.6 0.2 - 1.3 mg/dL    GFR Estimate >90 >60 mL/min/1.73m2   Lipase    Collection Time: 12/09/22  1:56 PM   Result Value Ref Range    Lipase 196 73 - 393 U/L   CBC with platelets and differential    Collection Time: 12/09/22  1:56 PM   Result Value Ref Range    WBC Count 7.0 4.0 - 11.0 10e3/uL    RBC Count 4.31 3.80 - 5.20 10e6/uL    Hemoglobin 14.5 11.7 - 15.7 g/dL    Hematocrit 39.5 35.0 - 47.0 %    MCV 92 78 - 100 fL    MCH 33.6 (H) 26.5 - 33.0 pg    MCHC 36.7 (H) 31.5 - 36.5 g/dL    RDW 12.9 10.0 - 15.0 %    Platelet Count 178 150 - 450 10e3/uL    % Neutrophils 47 %    % Lymphocytes 41 %    % Monocytes 12 %    % Eosinophils 0 %    % Basophils 0 %    % Immature Granulocytes 0 %    NRBCs per 100 WBC 0 <1 /100    Absolute Neutrophils 3.2 1.6 - 8.3 10e3/uL    Absolute Lymphocytes 2.9 0.8 - 5.3 10e3/uL    Absolute Monocytes 0.8 0.0 - 1.3 10e3/uL    Absolute Eosinophils 0.0 0.0 - 0.7 10e3/uL    Absolute Basophils 0.0 0.0 - 0.2 10e3/uL    Absolute Immature Granulocytes 0.0 <=0.4 10e3/uL    Absolute NRBCs 0.0 10e3/uL   Drug abuse  "screen 1 urine (ED)    Collection Time: 12/09/22  3:02 PM   Result Value Ref Range    Amphetamines Urine Screen Negative Screen Negative    Barbiturates Urine Screen Negative Screen Negative    Benzodiazepines Urine Screen Negative Screen Negative    Cannabinoids Urine Screen Negative Screen Negative    Cocaine Urine Screen Negative Screen Negative    Opiates Urine Screen Negative Screen Negative   GGT    Collection Time: 12/10/22  7:00 AM   Result Value Ref Range    GGT 35 0 - 40 U/L          Physical Exam:     /79   Pulse 71   Temp 97.6  F (36.4  C) (Temporal)   Resp 16   Ht 1.651 m (5' 5\")   Wt 68 kg (150 lb)   SpO2 95%   BMI 24.96 kg/m    Weight is 150 lbs 0 oz  Body mass index is 24.96 kg/m .    Physical Exam:  Gen: No acute distress  HEENT: EOMI, no nystagmus or scleral icterus, moist mucous membranes  Skin: No diaphoresis or rash  Full physical exam to be done by Internal Medicine         Physical ROS:   The remainder of 10-point review of systems was negative except as noted in HPI.    Review of Systems is otherwise negative including HEENT, CV, Respiratory, GI, , Musculoskeletal, Neurologic, Dermatologic, Endocrine, Immunological, Constitutional systems             Mental Status Exam:     Mental Status  Patient is casually dressed  Hygiene good  Speech fluent  Thought Process logical  Thought Content:  No suicidal ideation,    No homicidal ideation,   No ideas of reference,    No loose associations,    No auditory hallucinations,     No visual hallucinations   No delusions  Psychomotor: No agitation or slowing  Cognition:  Alert and oriented to time place and person  Attention good  Concentration good  Memory normal including recent and remote memory  Mood:  euthymic  Affect: mood congruent  Judgement: normal  Eye contact good  Cooperation good  Language normal  Fund of knowledge normal  Musculoskeletal normal gait with no abnormal movements         Diagnoses:   Alcohol dependence with " intoxication with complication (H)    Patient Active Problem List   Diagnosis     Polysubstance abuse (H)     Chronic hepatitis C (H)     COPD (chronic obstructive pulmonary disease) (H)     GERD (gastroesophageal reflux disease)     Mood disorder (H)     Chemical dependency (H)     Posttraumatic stress disorder     Nicotine abuse     Alcohol use disorder, mild, abuse     Hx of psychiatric care     Alcohol withdrawal, uncomplicated (H)     Sinusitis, unspecified chronicity, unspecified location     Alcohol withdrawal syndrome without complication (H)     Elevated LFTs     Drug withdrawal seizure with complication (H)     Alcohol dependence with intoxication with complication (H)              Assessment:     Patient admitted for alcohol detox         Plan:         Medication:    folic acid  1 mg Oral Daily     gabapentin  600 mg Oral TID     levothyroxine  100 mcg Oral Daily     multivitamin w/minerals  1 tablet Oral Daily     pantoprazole  40 mg Oral At Bedtime     propranolol  20 mg Oral TID     QUEtiapine  25 mg Oral At Bedtime     thiamine  100 mg Oral Daily     Valium detox protocal      Consults: Hospitalist will be consulted to manage medical issues      Multidisciplinary Interventions:  to gather collateral information, coordinate care with outpatient providers and begin follow up planning      Disposition: home with follow up        More than 30 minutes spent on this visit with more than 50% time spent on coordination of care with staff, reviewing medical record, psychoeducation, providing supportive therapy regarding coping with chronic mental illness, entering orders and preparing documentation for the visit    Scott Alex MD    Initial Certification I certify that the inpatient psychiatric facility admission was medically necessary for treatment which could reasonably be expected to improve the patient s condition.        I estimate 3 days of hospitalization is necessary for proper  treatment of the patient. My plans for post-hospital care this patient are home with follow up and outpatient CD treatment     Scott Alex MD     -     12/10/2022     -

## 2022-12-10 NOTE — PLAN OF CARE
Goal Outcome Evaluation:       Reported x4 loose stools since after breakfast. Encouraged patient to notify a staff to assess the stool when she goes again. Imodium PRN was given. Patient denied any cramp or burning sensation. Staff will monitor her.

## 2022-12-10 NOTE — PROGRESS NOTES
Case Management:     Writer checked-in and introduced role to pt's care and how to assist pt during their stay. Inquired with pt about what they are needing during their stay and what they are considering for their aftercare plans. Pt processed how she has a lot of support and has a understanding on her aftercare plans. Pt processed that she is getting CADI waiver and has a CADI CM, discussed that her CADI CM is setting her up with a  CM through [facility]. Pt dicussed that she has an IHS worker that meets with her 8 hrs a week and takes her out on outings. Pt shared that she also does EMDR therapy and has a pyschiatrist that are very supportive. Pt shared that she has had almost close to 6 years of sobriety. Pt elaborates that she has had a string of losses in the past couple of months and has been struggling to how to cope with the grief and loss. Pt shared that she is apart of [facility] program which has been a huge support. Pt denied needing further CM support at this time and will continue with her current providers knowing that she will be starting more support services through the [facility].

## 2022-12-10 NOTE — PLAN OF CARE
"Pt scored MSSA 9, 7 this shift. Pt received a total of 10 mg valium 1 time this shift.   Pt endorsed anxiety 6/10 and depression 8/10. Pt denied SI and SIB - Pt contracts for safety. Pt denied pain.   Pleasant, cooperative, anxious. Pt visible in milieu - engaged with peers, watching TV.  Pt reports nausea and had 1 emesis this shift. PRN zofran 4 mg given at 2008.   Pt is compliant with scheduled medications. Pt requested PRN nicotine gum 4 mg 2 times this shift. Pt requested PRN melatonin 5 mg at 2209 for sleep aid.   Latest VS: Blood pressure (!) 143/83, pulse 75, temperature 97.7  F (36.5  C), temperature source Temporal, resp. rate 16, height 1.651 m (5' 5\"), weight 68 kg (150 lb), SpO2 94 %, not currently breastfeeding.   Precautions: seizure, falls  Will continue to monitor.     Problem: Plan of Care - These are the overarching goals to be used throughout the patient stay.    Goal: Plan of Care Review  12/9/2022 2042 by Otf Figueroa RN  Outcome: Progressing     Problem: Plan of Care - These are the overarching goals to be used throughout the patient stay.    Goal: Optimal Comfort and Wellbeing  Outcome: Progressing     Problem: Adult Behavioral Health Plan of Care  Goal: Develops/Participates in Therapeutic [place] to Support Successful Transition  Intervention: Foster Therapeutic [place]  Recent Flowsheet Documentation  Taken 12/9/2022 2002 by Otf Figueroa RN  Trust Relationship/Rapport:    care explained    choices provided    emotional support provided    empathic listening provided    questions answered    reassurance provided    thoughts/feelings acknowledged    questions encouraged     Problem: Alcohol Withdrawal  Goal: Alcohol Withdrawal Symptom Control  12/9/2022 2233 by Otf Figueroa RN  Outcome: Progressing     "

## 2022-12-10 NOTE — PROGRESS NOTES
12/09/22 1931   Patient Belongings   Did you bring any home meds/supplements to the hospital?  No   Patient Belongings other (see comments)  (storage bin, med room bin)   Belongings Search Yes   Clothing Search Yes   Second Staff TAM Vanessa   Storage bin: backpack, coat, tobacco, 3 lighters, keys, computer tablet  Med room bin: cell phone, wallet, env # [id number]: $1 cash, mastercard, check # [phone]  A               Admission:  I am responsible for any personal items that are not sent to the safe or pharmacy.  [facility] is not responsible for loss, theft or damage of any property in my possession.    Signature:  _________________________________ Date: _______  Time: _____                                              Staff Signature:  ____________________________ Date: ________  Time: _____      2nd Staff person, if patient is unable/unwilling to sign:    Signature: ________________________________ Date: ________  Time: _____     Discharge:  [facility] has returned all of my personal belongings:    Signature: _________________________________ Date: ________  Time: _____                                          Staff Signature:  ____________________________ Date: ________  Time: _____

## 2022-12-10 NOTE — PLAN OF CARE
Problem: Adult Behavioral Health Plan of Care  Goal: Optimized Coping Skills in Response to Life Stressors  Outcome: Progressing     Problem: Alcohol Withdrawal  Goal: Alcohol Withdrawal Symptom Control  Outcome: Progressing   Goal Outcome Evaluation:    Plan of Care Reviewed With: patient        Patient reported a good night sleep, was out and visible on the unit. She is social with peers and pleasant at approach. Patient reported anxiety 5/10, stated that her best anxiety med is propranolol which is scheduled. She took her medications, ate her meals and appeared appropriate on the unit. Patient stated that she will be discharging home tomorrow morning to take care of her pets.  Vital signs were checked and documented, Blood pressure was a little elevated at noon 143/86. Patient reported it was within her normal range mostly.  Patient denied depression, si/hi/hallucinations and verbalized understanding of education on withdrawal symptoms.   Patient reported x4 loose stools after breakfast, Imodium was given with good effect. Staff will monitor for loose stools.

## 2022-12-10 NOTE — CONSULTS
[facility]  Internal Medicine Consult     Jolynn Rivera MRN# [id number]   Age: 62 year old YOB: 1960     Date of Admission: 12/9/2022  Date of Consult: 12/10/2022    Primary Care Provider: Cris Ref-Primary, Physician    Requesting Service: Behavioral Health - Aly Quintanilla MD  Reason for Consult: General Medical Evaluation      SUBJECTIVE   CC:   Alcohol Withdrawal    Assessment and Plan/Recommendations:     Jolynn Rivera is a 62 year old female with PMHx significant for significant for alcohol dependence, HTN, hypothyroid, PTSD, anxiety, tobacco use disorder and COPD. Last admitted 10/7 to detox unit. She presented for EtOH detox and was admitted to station 3A.     # Alcohol withdrawal, hx of alcohol use disorder   # Alcohol withdrawal seizure history   MSSA 4 this shift.  + hx of withdrawal seizures, last occurring 2 days PTA. She reports getting frequent withdrawal seizures. Blood EtOH on arrival was 0.274. She reports having multiple avenues of support following discharge.   - Continue MSSA   - Folvite, multi-vites, thiamine supplementation   - Further management per Psychiatry   - Seizures precautions     - Continue PTA gabapentin for seizure prophylaxis      # Hypertension  - Continue PTA propranolol  - Hydralazine 10mg PO QID PRN SBP >170     # COPD  Does not use inhalers PTA, reports they increase her anxiety.   - Albuterol Inhaler QID PRN if she develops SOB/wheezing      # GERD  -Continue PTA omeprazole 40mg daily  -TUMS TID PRN      # Elevated AST, mild   AST 53, remaining LFTs normal. GGT normal.   - No need to repeat      # Hypothyroidism  -Continue PTA levothyroxine 100mcg     # Anxiety  # PTSD  On propranolol, Seroquel PTA  -Management per primary team     # Tobacco use disorder  -Nicorette gum PRN     Unsteady gait  Likely 2/2 EtOH and withdrawal.  - Walker if needed for ambulation on the unit  - Recommend outpatient PT after discharge if  persists       Currently, medically stable and internal medicine will sign off. Please contact if future questions or concerns arise. Thank you for the opportunity to be a part of this patient's care.        Kamaljit Lott PA-C  Internal Medicine [facility] Hospitalist  Page job code [phone] (), [phone] or [phone] ([facility] and )  Text paging via [employer] is appreciated  December 10, 2022         HPI:    Jolynn Rivera is a 62 year old female with PMHx significant for significant for alcohol dependence, HTN, hypothyroid, PTSD, anxiety, tobacco use disorder and COPD. She presented to the ED seeking EtOH detox. She reports drinking a pint of whisky and a 6 pack of beer daily, sometimes more. Last admitted to detox unit in October. She had a withdrawal seizure 2 days PTA. In the ED, VSS. Blood EtOH level was 0.272. CBC and CMP relatively unremarkable besides minimal AST elevation. Lipase normal. She was admitted to station 3A for detox.      On my discussion with the pt she reports feeling mostly fine this morning, says she would go home today if she could and will not stay past tomorrow. She reports prior 5 year period of sobriety but has relapsed and had a rough few years since COVID hit. She reports long hx of mental health issues including PTSD.     Pt otherwise denies fevers, chills or night sweats. Denies chest pain, SOB, or palpitations. Denies abdominal pain, vomiting, diarrhea, or change to bowel habits. No dysuria or flank pain.         Past Medical History:     Past Medical History:   Diagnosis Date     Anxiety      COPD (chronic obstructive pulmonary disease) (H)      COPD (chronic obstructive pulmonary disease) (H)      Hepatitis C      PTSD (post-traumatic stress disorder)      Seizures (H)     second to ETOH     Substance abuse (H)         Reviewed and updated in [employer].     Past Surgical History:      Past Surgical History:   Procedure Laterality Date     LAPAROSCOPIC TUBAL LIGATION       ORTHOPEDIC SURGERY   "    Left knee     TONSILLECTOMY           Social History:     Social History     Tobacco Use     Smoking status: Every Day     Packs/day: 0.50     Types: Cigarettes     Smokeless tobacco: Never     Tobacco comments:     Starting Chantix October 2014   Substance Use Topics     Alcohol use: Yes     Comment: 6 pack of beer daily and 1/2 pint of whiskey     Drug use: No     Comment: stopped 1986        Family History:     Family History   Problem Relation Age of Onset     Anxiety Disorder Mother      Asthma Mother      Alcohol/Drug Father      Prostate Cancer Father      Arthritis Father      Alcohol/Drug Brother      Alcohol/Drug Brother      Hypertension Brother      Alcohol/Drug Brother      Depression Brother      Psychotic Disorder Brother          Allergies:     Allergies   Allergen Reactions     Clonidine Unknown     Antihistamines, Chlorpheniramine-Type [Alkylamines]      Compazine      Lock jaw; all medications ending with -zine     Diphenhydramine      Muscle Cramps     Penicillins      Panic attack     Prochlorperazine      Muscle cramps     Trazodone Nausea and Vomiting     \" I ended up falling and feeling like I was drunk\"     Wasps [Hornets]      Swelling          Medications:   Reviewed. Please see MAR     Review of Systems:   10 point ROS of systems including Constitutional, Eyes, Respiratory, Cardiovascular, Gastroenterology, Genitourinary, Integumentary, Muscularskeletal, Psychiatric were all negative except for pertinent positives noted in my HPI.    OBJECTIVE   Physical Exam:   Vitals were reviewed  Blood pressure 138/79, pulse 71, temperature 97.6  F (36.4  C), temperature source Temporal, resp. rate 16, height 1.651 m (5' 5\"), weight 68 kg (150 lb), SpO2 95 %, not currently breastfeeding.  General: Alert and oriented x3. Somewhat tremulous    HEENT: Anicteric sclera, MMM  Cardiovascular: RRR, S1S2. No murmur noted  Lungs: CTAB without wheezing or crackles   GI: Abdomen soft, non-tender without " rebound or guarding. + Bowel sounds.  Vascular: No peripheral edema, distal pulses palpable  Neurologic: No focal deficits, CN II-XII grossly intact  Skin: No jaundice, rashes, or lesions        Data:        Lab Results   Component Value Date     12/09/2022     09/03/2016    Lab Results   Component Value Date    CHLORIDE 102 12/09/2022    CHLORIDE 104 09/03/2016    Lab Results   Component Value Date    BUN 6 12/09/2022    BUN 7 09/03/2016      Lab Results   Component Value Date    POTASSIUM 3.6 12/09/2022    POTASSIUM 3.8 09/03/2016    Lab Results   Component Value Date    CO2 20 12/09/2022    CO2 25 09/03/2016    Lab Results   Component Value Date    CR 0.56 12/09/2022    CR 0.61 09/03/2016        Lab Results   Component Value Date    WBC 7.0 12/09/2022    HGB 14.5 12/09/2022    HCT 39.5 12/09/2022    MCV 92 12/09/2022     12/09/2022     Lab Results   Component Value Date    WBC 7.0 12/09/2022

## 2022-12-10 NOTE — PLAN OF CARE
"STEPHANEJOHN Rivera is a 62 year old female    S = Situation:     Pt voluntarily admitted from [place] ED seeking alcohol detox    B  = Background:     Pt has history of past admissions for detox.   Pt states drinking 6 pack of beer and 0.5 - 1 pint of gin daily. Pt unable to determine for how long - Pt states \"on and off\". Pt does report history of drinking started at age 19 y/o.  Pt endorses history of withdrawal seizures and reports last episode was 2 days ago. Pt denies history of delirium tremens.   Pt has recent history of passive SI with no plan that typically arises when drinking. Pt denies current SI and SIB - Pt contracts to safety.  Labs: ALT 33, AST 53  Alcohol Breath Test: 0.274  UTOX negative, COVID negative    A  =  Assessment:     Pt scored MSSA 9 and received 10 mg of valium one time upon admission.   Pt endorses nausea and had 1 emesis during admission interview.  Pt endorses anxiety 6/10 and depression 8/10. Pt currently denies SI and SIB - Pt contracts to safety. Pt denies pain.   Pt verbalized she is dealing with grief due to recent, multiple deaths in her personal life.   Medical Concerns: Pt reports a history of PTSD, hypothyroidism, tinnitus, and COPD. Pt reports allergies to trazodone. Pt also reported she has a stress-test scheduled through her primary care provider.   PTA medications reviewed with Pt.     R =   Request or Recommendation:     MSSA protocol with valium.  Pt to meet with psychiatry, internal medicine, and case management.   Precautions: fall, seizure  "

## 2022-12-11 VITALS
WEIGHT: 150 LBS | OXYGEN SATURATION: 98 % | TEMPERATURE: 97.3 F | DIASTOLIC BLOOD PRESSURE: 103 MMHG | RESPIRATION RATE: 16 BRPM | SYSTOLIC BLOOD PRESSURE: 160 MMHG | BODY MASS INDEX: 24.99 KG/M2 | HEIGHT: 65 IN | HEART RATE: 71 BPM

## 2022-12-11 PROCEDURE — 250N000013 HC RX MED GY IP 250 OP 250 PS 637: Performed by: PSYCHIATRY & NEUROLOGY

## 2022-12-11 PROCEDURE — 99238 HOSP IP/OBS DSCHRG MGMT 30/<: CPT | Performed by: PSYCHIATRY & NEUROLOGY

## 2022-12-11 RX ORDER — BUPRENORPHINE 2 MG/1
2 TABLET SUBLINGUAL
Status: CANCELLED | OUTPATIENT
Start: 2022-12-11 | End: 2022-12-12

## 2022-12-11 RX ORDER — BUPRENORPHINE 2 MG/1
2 TABLET SUBLINGUAL 4 TIMES DAILY
Status: CANCELLED | OUTPATIENT
Start: 2022-12-12 | End: 2022-12-12

## 2022-12-11 RX ADMIN — NICOTINE POLACRILEX 4 MG: 4 GUM, CHEWING BUCCAL at 05:10

## 2022-12-11 RX ADMIN — FOLIC ACID 1 MG: 1 TABLET ORAL at 08:13

## 2022-12-11 RX ADMIN — MULTIPLE VITAMINS W/ MINERALS TAB 1 TABLET: TAB at 08:13

## 2022-12-11 RX ADMIN — NICOTINE POLACRILEX 4 MG: 4 GUM, CHEWING BUCCAL at 08:13

## 2022-12-11 RX ADMIN — PROPRANOLOL HYDROCHLORIDE 20 MG: 20 TABLET ORAL at 08:12

## 2022-12-11 RX ADMIN — THIAMINE HCL TAB 100 MG 100 MG: 100 TAB at 08:13

## 2022-12-11 RX ADMIN — NICOTINE POLACRILEX 4 MG: 4 GUM, CHEWING BUCCAL at 03:48

## 2022-12-11 RX ADMIN — LEVOTHYROXINE SODIUM 100 MCG: 100 TABLET ORAL at 08:13

## 2022-12-11 RX ADMIN — GABAPENTIN 600 MG: 600 TABLET, FILM COATED ORAL at 08:12

## 2022-12-11 RX ADMIN — NICOTINE POLACRILEX 4 MG: 4 GUM, CHEWING BUCCAL at 06:06

## 2022-12-11 RX ADMIN — NICOTINE POLACRILEX 4 MG: 4 GUM, CHEWING BUCCAL at 09:43

## 2022-12-11 ASSESSMENT — ACTIVITIES OF DAILY LIVING (ADL)
LAUNDRY: WITH SUPERVISION
ADLS_ACUITY_SCORE: 48
DRESS: INDEPENDENT
ADLS_ACUITY_SCORE: 48
ADLS_ACUITY_SCORE: 48
HYGIENE/GROOMING: INDEPENDENT
ORAL_HYGIENE: INDEPENDENT
ADLS_ACUITY_SCORE: 48

## 2022-12-11 NOTE — PROGRESS NOTES
12/10/22 1800   Group Therapy Session   Group Attendance attended group session   Time Session Began 1645   Time Session Ended 1735   Total Time (minutes) 55   Total # Attendees 7   Group Type psychotherapeutic   Group Topic Covered disease of addiction/choices in recovery;relaxation techniques   Group Session Detail Mindfulness and self compassion   Patient Response/Contribution cooperative with task;discussed personal experience with topic   Mood fine. She was short and cynical at first but was a positive participant,. She stayed after group to talk to writer about books she likes and mindfulness.

## 2022-12-11 NOTE — DISCHARGE SUMMARY
"    Chief Complaint:     \"Alcohol\"      Hospital Course:   Patient was admitted. She used Valium for detox and tolerated this well. Today she is no longer having withdrawal symptoms 24 hours after her last dose.    Patient plans to follow up with her psychiatrist and therapist.    Today patient denies suicidal or homicidal thoughts and has no evidence of risk of harm to self or others.      HPI:     62 female with alcohol dependence.    Patient first used alcohol age 18 and she quickly advanced to daily use. Her longest sobriety was 5 years until 2021. Since then she has had several detox admission last year and this year    Patient has in treatment numerous times in the past. Her most recently here around 2 months ago    Patient also has a remote history of heroin use but she has not used drugs in 35 years.    Patient reports that she drinks a 6 pack of beer and a half pint of gin a day. She has had withdrawal seizures in the past and states that she had one 2 days ago.     Patient denies depression today and denies suicidal or homicidal thoughts and is now showing evidence of psychosis or kim    According to ER report by Zander Mcneal MD  [facility] EMERGENCY DEPARTMENT  12/9/2022  Jolynn Rivera is a 62 year old female with history of bipolar disorder, PTSD, nightmares, hypertension, hypothyroidism, COPD, alcohol use disorder who presents seeking detox from alcohol.  She has been drinking approximately a sixpack of beer and half a pint of whiskey daily, sometimes more. She has anxiety, passive suicidal ideation. No active suicidal ideation. She notes losing a friend and this is very distressing for her.  She notes prior history of alcohol withdrawal seizures, last withdrawal seizure was 2 days ago.  No history of delirium tremens. She is here voluntarily.      Epic records reviewed, she had prior attempted detox here in October having relapsed after a long period of remission (5 years).  " She also had abused heroin when she was in her 20s, is sober from this.                 Past Psychiatric History:     Patient states she is diagnosed with PTSD and bipolar 2 disorder. She has one mental health admission many years ago. She currently has a therapist and psychiatrist is Dr. Macdonald at [facility]. She is currently not on psychotropic medications except for Gabapentin.              Past Medical History:   PAST MEDICAL HISTORY:   Past Medical History:   Diagnosis Date     Anxiety      COPD (chronic obstructive pulmonary disease) (H)      COPD (chronic obstructive pulmonary disease) (H)      Hepatitis C      PTSD (post-traumatic stress disorder)      Seizures (H)     second to ETOH     Substance abuse (H)      Patient has history of osteoarthritis    PAST SURGICAL HISTORY:   Past Surgical History:   Procedure Laterality Date     LAPAROSCOPIC TUBAL LIGATION       ORTHOPEDIC SURGERY      Left knee     TONSILLECTOMY               Family History:   FAMILY HISTORY:   Family History   Problem Relation Age of Onset     Anxiety Disorder Mother      Asthma Mother      Alcohol/Drug Father      Prostate Cancer Father      Arthritis Father      Alcohol/Drug Brother      Alcohol/Drug Brother      Hypertension Brother      Alcohol/Drug Brother      Depression Brother      Psychotic Disorder Brother            Social History:   Please see the full psychosocial profile from the clinical treatment coordinator.   SOCIAL HISTORY:   Social History     Tobacco Use     Smoking status: Every Day     Packs/day: 0.50     Types: Cigarettes     Smokeless tobacco: Never     Tobacco comments:     Starting Chantix October 2014   Substance Use Topics     Alcohol use: Yes     Comment: 6 pack of beer daily and 1/2 pint of whiskey     Patient lives alone in rented apartment  Patient survives on social security disability  Patient is  and has no current romantic involvement  She has no children  She has regular communication with  her father         PTA Medications:     Medications Prior to Admission   Medication Sig Dispense Refill Last Dose     calcium carbonate 600 mg-vitamin D 400 units (CALTRATE) 600-400 MG-UNIT per tablet Take 1 tablet by mouth 2 times daily (with meals) 60 tablet 0 12/8/2022 at PM     gabapentin (NEURONTIN) 600 MG tablet Take 600 mg by mouth 3 times daily   12/8/2022 at PM     levothyroxine (SYNTHROID/LEVOTHROID) 100 MCG tablet Take 100 mcg by mouth daily   12/8/2022 at AM     melatonin 5 MG tablet Take 1 tablet (5 mg) by mouth nightly as needed for sleep 30 tablet 0 12/8/2022 at PM     multivitamin w/minerals (THERA-VIT-M) tablet Take 1 tablet by mouth daily 30 tablet 0 12/8/2022 at AM     nicotine polacrilex (NICORETTE) 4 MG gum Place 1 each (4 mg) inside cheek as needed for smoking cessation 270 tablet 1 12/9/2022     omeprazole (PRILOSEC) 40 MG DR capsule Take 40 mg by mouth At Bedtime   12/8/2022 at AM     ondansetron (ZOFRAN ODT) 4 MG ODT tab Take 4 mg by mouth every 8 hours as needed for nausea   Past Week     propranolol (INDERAL) 20 MG tablet Take 20 mg by mouth 3 times daily   12/8/2022 at PM     QUEtiapine (SEROQUEL) 25 MG tablet Take 1 tablet (25 mg) by mouth At Bedtime 30 tablet 2 12/8/2022 at PM     thiamine (B-1) 100 MG tablet Take 1 tablet (100 mg) by mouth daily 30 tablet 0 12/8/2022 at AM            Current Medications:       folic acid  1 mg Oral Daily     gabapentin  600 mg Oral TID     levothyroxine  100 mcg Oral Daily     multivitamin w/minerals  1 tablet Oral Daily     pantoprazole  40 mg Oral At Bedtime     propranolol  20 mg Oral TID     QUEtiapine  25 mg Oral At Bedtime     thiamine  100 mg Oral Daily     acetaminophen, alum & mag hydroxide-simethicone, diazepam, hydrALAZINE, loperamide, melatonin, nicotine polacrilex, ondansetron, senna-docusate         Allergies:     Allergies   Allergen Reactions     Clonidine Unknown     Antihistamines, Chlorpheniramine-Type [Alkylamines]      Compazine   "    Lock jaw; all medications ending with -zine     Diphenhydramine      Muscle Cramps     Penicillins      Panic attack     Prochlorperazine      Muscle cramps     Trazodone Nausea and Vomiting     \" I ended up falling and feeling like I was drunk\"     Wasps [Hornets]      Swelling           Labs:     Recent Results (from the past 48 hour(s))   Alcohol breath test POCT    Collection Time: 12/09/22  1:32 PM   Result Value Ref Range    Alcohol Breath Test 0.274 (A) 0.00 - 0.01   Asymptomatic COVID-19 Virus (Coronavirus) by PCR Nasopharyngeal    Collection Time: 12/09/22  1:48 PM    Specimen: Nasopharyngeal; Swab   Result Value Ref Range    SARS CoV2 PCR Negative Negative   Comprehensive metabolic panel    Collection Time: 12/09/22  1:56 PM   Result Value Ref Range    Sodium 135 133 - 144 mmol/L    Potassium 3.6 3.4 - 5.3 mmol/L    Chloride 102 94 - 109 mmol/L    Carbon Dioxide (CO2) 20 20 - 32 mmol/L    Anion Gap 13 3 - 14 mmol/L    Urea Nitrogen 6 (L) 7 - 30 mg/dL    Creatinine 0.56 0.52 - 1.04 mg/dL    Calcium 9.0 8.5 - 10.1 mg/dL    Glucose 113 (H) 70 - 99 mg/dL    Alkaline Phosphatase 54 40 - 150 U/L    AST 53 (H) 0 - 45 U/L    ALT 33 0 - 50 U/L    Protein Total 8.4 6.8 - 8.8 g/dL    Albumin 4.5 3.4 - 5.0 g/dL    Bilirubin Total 0.6 0.2 - 1.3 mg/dL    GFR Estimate >90 >60 mL/min/1.73m2   Lipase    Collection Time: 12/09/22  1:56 PM   Result Value Ref Range    Lipase 196 73 - 393 U/L   CBC with platelets and differential    Collection Time: 12/09/22  1:56 PM   Result Value Ref Range    WBC Count 7.0 4.0 - 11.0 10e3/uL    RBC Count 4.31 3.80 - 5.20 10e6/uL    Hemoglobin 14.5 11.7 - 15.7 g/dL    Hematocrit 39.5 35.0 - 47.0 %    MCV 92 78 - 100 fL    MCH 33.6 (H) 26.5 - 33.0 pg    MCHC 36.7 (H) 31.5 - 36.5 g/dL    RDW 12.9 10.0 - 15.0 %    Platelet Count 178 150 - 450 10e3/uL    % Neutrophils 47 %    % Lymphocytes 41 %    % Monocytes 12 %    % Eosinophils 0 %    % Basophils 0 %    % Immature Granulocytes 0 %    NRBCs " "per 100 WBC 0 <1 /100    Absolute Neutrophils 3.2 1.6 - 8.3 10e3/uL    Absolute Lymphocytes 2.9 0.8 - 5.3 10e3/uL    Absolute Monocytes 0.8 0.0 - 1.3 10e3/uL    Absolute Eosinophils 0.0 0.0 - 0.7 10e3/uL    Absolute Basophils 0.0 0.0 - 0.2 10e3/uL    Absolute Immature Granulocytes 0.0 <=0.4 10e3/uL    Absolute NRBCs 0.0 10e3/uL   Drug abuse screen 1 urine (ED)    Collection Time: 12/09/22  3:02 PM   Result Value Ref Range    Amphetamines Urine Screen Negative Screen Negative    Barbiturates Urine Screen Negative Screen Negative    Benzodiazepines Urine Screen Negative Screen Negative    Cannabinoids Urine Screen Negative Screen Negative    Cocaine Urine Screen Negative Screen Negative    Opiates Urine Screen Negative Screen Negative   GGT    Collection Time: 12/10/22  7:00 AM   Result Value Ref Range    GGT 35 0 - 40 U/L          Physical Exam:     BP (!) 167/105 (BP Location: Left arm)   Pulse 65   Temp 97.3  F (36.3  C) (Temporal)   Resp 16   Ht 1.651 m (5' 5\")   Wt 68 kg (150 lb)   SpO2 98%   BMI 24.96 kg/m    Weight is 150 lbs 0 oz  Body mass index is 24.96 kg/m .    Physical Exam:  Gen: No acute distress  HEENT: EOMI, no nystagmus or scleral icterus, moist mucous membranes  Skin: No diaphoresis or rash  Full physical exam to be done by Internal Medicine         Physical ROS:   The remainder of 10-point review of systems was negative except as noted in HPI.    Review of Systems is otherwise negative including HEENT, CV, Respiratory, GI, , Musculoskeletal, Neurologic, Dermatologic, Endocrine, Immunological, Constitutional systems             Mental Status Exam:     Mental Status  Patient is casually dressed  Hygiene good  Speech fluent  Thought Process logical  Thought Content:  No suicidal ideation,    No homicidal ideation,   No ideas of reference,    No loose associations,    No auditory hallucinations,     No visual hallucinations   No delusions  Psychomotor: No agitation or slowing  Cognition:  " Alert and oriented to time place and person  Attention good  Concentration good  Memory normal including recent and remote memory  Mood:  euthymic  Affect: mood congruent  Judgement: normal  Eye contact good  Cooperation good  Language normal  Fund of knowledge normal  Musculoskeletal normal gait with no abnormal movements         Diagnoses:   Alcohol dependence with intoxication with complication (H)    Patient Active Problem List   Diagnosis     Polysubstance abuse (H)     Chronic hepatitis C (H)     COPD (chronic obstructive pulmonary disease) (H)     GERD (gastroesophageal reflux disease)     Mood disorder (H)     Chemical dependency (H)     Posttraumatic stress disorder     Nicotine abuse     Alcohol use disorder, mild, abuse     Hx of psychiatric care     Alcohol withdrawal, uncomplicated (H)     Sinusitis, unspecified chronicity, unspecified location     Alcohol withdrawal syndrome without complication (H)     Elevated LFTs     Drug withdrawal seizure with complication (H)     Alcohol dependence with intoxication with complication (H)              Assessment:     Patient admitted for alcohol detox. She is safe for discharge today         Plan:         Medication:    folic acid  1 mg Oral Daily     gabapentin  600 mg Oral TID     levothyroxine  100 mcg Oral Daily     multivitamin w/minerals  1 tablet Oral Daily     pantoprazole  40 mg Oral At Bedtime     propranolol  20 mg Oral TID     QUEtiapine  25 mg Oral At Bedtime     thiamine  100 mg Oral Daily         Disposition: home with psychiatric follow  up        More than 25 minutes spent on this visit with more than 50% time spent on coordination of care with staff, reviewing medical record, psychoeducation, providing supportive therapy regarding coping with chronic mental illness, entering orders and preparing documentation for the visit      Scott Alex MD

## 2022-12-11 NOTE — PROGRESS NOTES
Pt had score of 2 at 0400. Pt did not require medication for alcohol withdrawal at that time. Pt slept through the NOC shift and did not appear on the unit.

## 2022-12-11 NOTE — PLAN OF CARE
"Pt scored MSSA 2, 3. Pt did not receive withdrawal medications this shift.   Pt endorsed anxiety. Pt denied SI and SIB. Pt denied pain.   Pleasant, cooperative, anxious. Pt visible in the milieu - engaged in group and with peers, playing cards, watching TV.   Pt visible for dinner. Pt denies nausea and emesis.  Pt compliant with scheduled medications. Pt requested PRN nicotine gum 4 mg 5 times this shift. Pt requested PRN melatonin 5 mg for sleep aid at 2110.  Latest Vital Signs: Blood pressure 132/83, pulse 78, temperature 97.6  F (36.4  C), temperature source Temporal, resp. rate 16, height 1.651 m (5' 5\"), weight 68 kg (150 lb), SpO2 97 %, not currently breastfeeding.  Precautions: falls, seizures  Will continue to monitor.     Problem: Plan of Care - These are the overarching goals to be used throughout the patient stay.    Goal: Plan of Care Review  Outcome: Progressing    Problem: Plan of Care - These are the overarching goals to be used throughout the patient stay.    Goal: Optimal Comfort and Wellbeing  Outcome: Progressing     Problem: Adult Behavioral Health Plan of Care  Goal: Optimized Coping Skills in Response to Life Stressors  Outcome: Progressing     Problem: Adult Behavioral Health Plan of Care  Goal: Develops/Participates in Therapeutic [place] to Support Successful Transition  Outcome: Progressing     Problem: Alcohol Withdrawal  Goal: Alcohol Withdrawal Symptom Control  Outcome: Progressing     "

## 2022-12-11 NOTE — PROGRESS NOTES
Patient has been scoring low 2-3 on MSSA over 24 hours. Had last valium dose 12/10/2022 at 0550. She has no symptoms of witdrawal at this time. Patient is taken out of detox protocol this morning.

## 2022-12-11 NOTE — PLAN OF CARE
"Goal Outcome Evaluation:    Plan of Care Reviewed With: patient        Patient reported frustration concerning her pets at the start of shift. Patient was irritable, with increasing anxiety, but declined to rate it, because \"it will stop me from discharging\". /99, patient was educated on discharge process after she's off detox protocol. She had breakfast, morning meds and B/P rechecked 167/105, no PRN was given. She denied loose stools, and any seizure activities during this hospital stay.  Patient denied depression,si/hi/hallucinations, pain and contracted for safety.    Discharge order was placed and patient verbalized understanding of her discharge instructions, has enough medications at home. B/P rechecked walking patient out 160/103.She contracted for safety.   Patient received all her belonging, including her cell phone. She signed discharge documents and was escorted down the exit door for .           "

## 2022-12-11 NOTE — DISCHARGE INSTRUCTIONS
Behavioral Discharge Planning and Instructions  THANK YOU FOR CHOOSING THE [facility]  3A  [phone]    Summary: You were admitted to Station 3A on *** for detoxification from alcohol.  A medical exam was performed that included lab work. You have met with a  and opted to in treatment options and additional resources. .  Please take care and make your recovery a priority!    Main Diagnosis: Per Dr. Scott Alex MD;  303.90 (F10.20) Alcohol Use Disorder Severe  Alcohol dependence with intoxication with complication (H)    Recommendation:  Jolynn is recommended to continue to engage in her current established providers via [facility] through the county, EMDR, [facility], working with her IHS worker and [employer]. Jolynn is recommended to get a Mental Health  and know that is in the process. Jolynn is recommended to engage more in the community. Jolynn is also recommended to engage in work to help address and process her grief and loss.       Disposition: Back to home with established services.       Primary Provider: Dr. Jha on Wednesday 11 th January, 2023 @ 07:20  [facility] in [place], [place]  Address: [address] [place] [address] [address], [place] [address]  Phone: [phone]    Psychiatry Follow-up:  You are aware you should make a follow up appointment with your psychiatrist for your mental health concerns and medication management        Major Treatments, Procedures and Findings:  You have withdrawn from alcohol using the MSSA protocol with Valium.  You have met with a  to develop a treatment plan for discharge.  You have had labs drawn and those results have been reviewed with you. Please take a copy of your lab work with you to your next primary care physician appointment.    Symptoms to Report:  If you experience more anxiety, confusion, sleeplessness, deep sadness or thoughts of suicide, notify your  treatment team or notify your primary care physician. IF ANY OF THE SYMPTOMS YOU ARE EXPERIENCING ARE A MEDICAL EMERGENCY CALL 911 IMMEDIATELY.     If you or someone you know is struggling or in crisis, help is available.  Call or text 628 or chat  at [employer]    Lifestyle Adjustment:     Health Action Plan:  1.Create a daily schedule  2. Eat Healthy  3. Plan Enjoyable Sober Activities  4. Use Problem Solving Skills and Deal with Issues as they Arise.   5. Be Physically Active  6. Take your medications as prescribed  7. Get enough restful sleep  8. Practice Relaxation  9. Spend time with Supportive People  10. No use of alcohol, illegal drugs or addictive medications other than what is currently prescribed.   11.AA, NA Sponsor are excellent resources for support  12. Explore how  you can utilize spirituality in your recovery            General Medication Instructions:   See your medication sheet(s) for instructions.   Take all medicines as directed.  Make no changes unless your doctor suggests them.       DISCHARGE RESOURCES:  Facts about COVID19 at www.cdc.gov/COVID19 and www.[place].gov/covid19    Keeping hands clean is one of the most important steps we can take to avoid getting sick and spreading germs to others.  Please wash your hands frequently and lather with soap for at least 20 seconds!    Recovery apps for your phone to locate current in person and zoom recovery meetings  [employer] - meeting mahnaz  AA  - meeting mahnaz  Meeting guide - meeting mahnaz  Quick NA meeting - meeting mahnaz  Estela- has various apps    Great Pod casts for nutrition and wellness  Listen on Apple Podcasts  Dishing Up Nutrition   [employer]   Nutrition       Understand the connection between what you eat and how you feel. Hosted by licensed nutritionists and dietitians from [employer] we share practical, real-life solutions for healthier living through nutrition.     Resources:  "  Resources for on line recovery meetings:  *due to covid-19 AA/NA meetings are being held online*  AA meetings can be found online; search for them at: http://aa-intergroup.org/directory.php  AA meetings via Z[place]OM for [place] area can be found online at: https://a[place].org/find-a-meeting/holiday-closings/  NA meetings via ZOOM for [place] area can be found online at: https://sites.Arkadin.com/view/[place]regionofnarcoticsanonymous/home?authuser=2  [employer]  has online resources for meeting and recovery care including Podcast \"Let's Talk:Addiction & Recovery Podcasts  Www.Oxynade.org   -SMART Recovery - self management for addiction recovery:  www.smartrecCromoUp.org    -Pathways ~ A Health Crisis Resource & Support Center: 971.555.3351.  -[facility] [phone]   -[facility] Behavioral Intake 540-624-6039 or 322-116-6895.  -Suicide Awareness Voices of Education (SAVE) (www.save.org): 473-014-IZMQ (3622)  -National Suicide Prevention Line (www.mentalhealthmn.org): 636-714-XNFE (5292)  -National [place] on Mental Illness (www.[place].sera.org): 646.393.3965 or 069-631-5688.  -Loqq5vlsg: text the word LIFE to 09410 for immediate support and crisis intervention  -Mental Health Consumer/Survivor Network of [place] (www.mhcsn.net): 148.186.9807 or 661-087-2967  -Mental Health Association of [place] (www.mentalhealth.org): 114.489.1406 or 284-566-6166     -Substance Abuse and Mental Health Services (www.samhsa.gov)  -Harm Reduction Coalition (www. Harmreduction.org)  -www.prescribetoprevent.org or http://prescribetoprevent.org/video  -Poison control 6-379-275-3308   **[place] Opioid Prevention Coalition: www.opioidcoalition.org    Sober Support Group Information:  AA/NA & Sponsor/Support  -Alcoholics Anonymous (www.alcoholics-anonymous.org): for local information 24 hours/day  -AA Intergroup service office in [place] (http://www.aastpaul.org/) 830.760.8257  -AA Intergroup service office in " [facility]: [phone]. (http://www.a[place].org/)  -Narcotics Anonymous (www.nam[place].org) (257) 819-5491   **Sober Fun Activities: www.sober-activities.Affibody/[facility]//[facility] Recovery Connection ([facility])  [facility] connects people seeking recovery to resources that help foster and sustain long-term recovery.  Whether you are seeking resources for treatment, transportation, housing, job training, education, health care or other pathways to recovery, [facility] is a great place to start.    Phone: 221.605.8919.  www.[place]Pigit (Great listing of all types of recovery and non-recovery related resources)    Any follow up concerns:  Nursing questions call the [address] [phone]  Medical Record call [phone]  Outpatient Behavioral Intake call [phone]  LP+ Wait List/Bed Availability call [phone]    The entire treatment team has appreciated the opportunity to work with you.  We wish you the best in the future and with your lifelong recovery goals. Please bring this discharge folder with you to all follow up appointments.  It contains your lab results, diagnosis, medication list and discharge recommendations.    THANK YOU FOR CHOOSING THE [facility]

## 2022-12-12 ENCOUNTER — PATIENT OUTREACH (OUTPATIENT)
Dept: CARE COORDINATION | Facility: CLINIC | Age: 62
End: 2022-12-12

## 2022-12-12 NOTE — PROGRESS NOTES
"Clinic Care Coordination Contact  [facility]: Post-Discharge Note  SITUATION                                                      Admission:    Admission Date: 12/09/22   Reason for Admission: \"Alcohol\"  Discharge:   Discharge Date: 12/11/22  Discharge Diagnosis: \"Alcohol\"    BACKGROUND                                                      Patient was admitted. She used Valium for detox and tolerated this well. Today she is no longer having withdrawal symptoms 24 hours after her last dose.     Patient plans to follow up with her psychiatrist and therapist.     Today patient denies suicidal or homicidal thoughts and has no evidence of risk of harm to self or others.    ASSESSMENT           Discharge Assessment  How are you doing now that you are home?: Patient shares that she is doing well, but has a little bit of an upset stomach this morning. Waiting on a refill for zofran. Meeting with ILS worker today. Declines needing additional resources at this time. No other questions or concerns/  How are your symptoms? (Red Flag symptoms escalate to triage hotline per guidelines): Improved  Do you feel your condition is stable enough to be safe at home until your provider visit?: Yes  Does the patient have their discharge instructions? : Yes  Does the patient have questions regarding their discharge instructions? : No  Were you started on any new medications or were there changes to any of your previous medications? : Yes  Does the patient have all of their medications?: Yes  Do you have questions regarding any of your medications? : No  Do you have all of your needed medical supplies or equipment (DME)?  (i.e. oxygen tank, CPAP, cane, etc.): Yes  Discharge follow-up appointment scheduled within 14 calendar days? : Yes  Discharge Follow Up Appointment Date: 12/12/22  Discharge Follow Up Appointment Scheduled with?: Specialty Care Provider (ILS)    Post-op ([facility] CTA Only)  If the patient had a surgery or procedure, do " "they have any questions for a nurse?: No    Post-op (Clinicians Only)  Did the patient have surgery or a procedure: No  Fever: No  Chills: No        PLAN                                                      Outpatient Plan:  Follow Up and recommended labs and tests  As arranged by  and instructed by physician  JAN 11 2023  New ED/Hospital Follow Up with  Abhijeet MARTINEZ NP  Wednesday Jan 11, 2023 9:20 AM  Please plan to arrive at the clinic at your  \"Arrive By\" time for your appointment.  Our late policy will be enforced based on  this time.  [address]  [address]  [facility] [place]  [address]  [phone]    Future Appointments   Date Time Provider Department Center   1/11/2023  9:40 AM Abhijeet Kidd NP [facility]         For any urgent concerns, please contact our 24 hour nurse triage line: [phone])         COLLIN Zarate                "

## 2023-02-09 ENCOUNTER — HOSPITAL ENCOUNTER (INPATIENT)
Facility: CLINIC | Age: 63
LOS: 2 days | Discharge: HOME | DRG: 897 | End: 2023-02-11
Attending: EMERGENCY MEDICINE | Admitting: STUDENT IN AN ORGANIZED HEALTH CARE EDUCATION/TRAINING PROGRAM
Payer: MEDICARE

## 2023-02-09 DIAGNOSIS — E87.29 ALCOHOLIC KETOACIDOSIS: ICD-10-CM

## 2023-02-09 DIAGNOSIS — Z11.52 ENCOUNTER FOR SCREENING LABORATORY TESTING FOR SEVERE ACUTE RESPIRATORY SYNDROME CORONAVIRUS 2 (SARS-COV-2): ICD-10-CM

## 2023-02-09 DIAGNOSIS — F10.229 ALCOHOL DEPENDENCE WITH INTOXICATION WITH COMPLICATION (H): ICD-10-CM

## 2023-02-09 LAB
ALBUMIN SERPL BCG-MCNC: 4.8 G/DL (ref 3.5–5.2)
ALCOHOL BREATH TEST: 0.15 (ref 0–0.01)
ALP SERPL-CCNC: 56 U/L (ref 35–104)
ALT SERPL W P-5'-P-CCNC: 27 U/L (ref 10–35)
AMPHETAMINES UR QL SCN: NORMAL
ANION GAP SERPL CALCULATED.3IONS-SCNC: 31 MMOL/L (ref 7–15)
AST SERPL W P-5'-P-CCNC: 59 U/L (ref 10–35)
B-OH-BUTYR SERPL-MCNC: 3.2 MMOL/L
BARBITURATES UR QL SCN: NORMAL
BASOPHILS # BLD AUTO: 0 10E3/UL (ref 0–0.2)
BASOPHILS NFR BLD AUTO: 1 %
BENZODIAZ UR QL SCN: NORMAL
BILIRUB SERPL-MCNC: 0.5 MG/DL
BUN SERPL-MCNC: 10.3 MG/DL (ref 8–23)
BZE UR QL SCN: NORMAL
CALCIUM SERPL-MCNC: 9.5 MG/DL (ref 8.8–10.2)
CANNABINOIDS UR QL SCN: NORMAL
CHLORIDE SERPL-SCNC: 94 MMOL/L (ref 98–107)
CREAT SERPL-MCNC: 0.7 MG/DL (ref 0.51–0.95)
DEPRECATED HCO3 PLAS-SCNC: 14 MMOL/L (ref 22–29)
EOSINOPHIL # BLD AUTO: 0 10E3/UL (ref 0–0.7)
EOSINOPHIL NFR BLD AUTO: 0 %
ERYTHROCYTE [DISTWIDTH] IN BLOOD BY AUTOMATED COUNT: 13.4 % (ref 10–15)
GFR SERPL CREATININE-BSD FRML MDRD: >90 ML/MIN/1.73M2
GLUCOSE SERPL-MCNC: 79 MG/DL (ref 70–99)
HCT VFR BLD AUTO: 45.2 % (ref 35–47)
HGB BLD-MCNC: 15.8 G/DL (ref 11.7–15.7)
IMM GRANULOCYTES # BLD: 0 10E3/UL
IMM GRANULOCYTES NFR BLD: 0 %
LACTATE SERPL-SCNC: 7.1 MMOL/L (ref 0.7–2)
LYMPHOCYTES # BLD AUTO: 2.4 10E3/UL (ref 0.8–5.3)
LYMPHOCYTES NFR BLD AUTO: 35 %
MCH RBC QN AUTO: 33 PG (ref 26.5–33)
MCHC RBC AUTO-ENTMCNC: 35 G/DL (ref 31.5–36.5)
MCV RBC AUTO: 94 FL (ref 78–100)
MONOCYTES # BLD AUTO: 0.6 10E3/UL (ref 0–1.3)
MONOCYTES NFR BLD AUTO: 9 %
NEUTROPHILS # BLD AUTO: 3.8 10E3/UL (ref 1.6–8.3)
NEUTROPHILS NFR BLD AUTO: 55 %
NRBC # BLD AUTO: 0 10E3/UL
NRBC BLD AUTO-RTO: 0 /100
OPIATES UR QL SCN: NORMAL
PLATELET # BLD AUTO: 249 10E3/UL (ref 150–450)
POTASSIUM SERPL-SCNC: 3.4 MMOL/L (ref 3.4–5.3)
PROT SERPL-MCNC: 9 G/DL (ref 6.4–8.3)
RBC # BLD AUTO: 4.79 10E6/UL (ref 3.8–5.2)
SARS-COV-2 RNA RESP QL NAA+PROBE: NEGATIVE
SODIUM SERPL-SCNC: 139 MMOL/L (ref 136–145)
WBC # BLD AUTO: 6.9 10E3/UL (ref 4–11)

## 2023-02-09 PROCEDURE — 250N000013 HC RX MED GY IP 250 OP 250 PS 637: Performed by: EMERGENCY MEDICINE

## 2023-02-09 PROCEDURE — 99291 CRITICAL CARE FIRST HOUR: CPT | Performed by: EMERGENCY MEDICINE

## 2023-02-09 PROCEDURE — 250N000011 HC RX IP 250 OP 636: Performed by: EMERGENCY MEDICINE

## 2023-02-09 PROCEDURE — 85014 HEMATOCRIT: CPT | Performed by: FAMILY MEDICINE

## 2023-02-09 PROCEDURE — 80053 COMPREHEN METABOLIC PANEL: CPT | Performed by: EMERGENCY MEDICINE

## 2023-02-09 PROCEDURE — 36415 COLL VENOUS BLD VENIPUNCTURE: CPT | Performed by: FAMILY MEDICINE

## 2023-02-09 PROCEDURE — U0005 INFEC AGEN DETEC AMPLI PROBE: HCPCS | Performed by: EMERGENCY MEDICINE

## 2023-02-09 PROCEDURE — 82075 ASSAY OF BREATH ETHANOL: CPT | Performed by: EMERGENCY MEDICINE

## 2023-02-09 PROCEDURE — 82010 KETONE BODYS QUAN: CPT | Performed by: EMERGENCY MEDICINE

## 2023-02-09 PROCEDURE — 85025 COMPLETE CBC W/AUTO DIFF WBC: CPT | Performed by: EMERGENCY MEDICINE

## 2023-02-09 PROCEDURE — 120N000002 HC R&B MED SURG/OB UMMC

## 2023-02-09 PROCEDURE — 83605 ASSAY OF LACTIC ACID: CPT | Performed by: EMERGENCY MEDICINE

## 2023-02-09 PROCEDURE — C9803 HOPD COVID-19 SPEC COLLECT: HCPCS | Performed by: EMERGENCY MEDICINE

## 2023-02-09 PROCEDURE — 80307 DRUG TEST PRSMV CHEM ANLYZR: CPT | Performed by: EMERGENCY MEDICINE

## 2023-02-09 PROCEDURE — 99285 EMERGENCY DEPT VISIT HI MDM: CPT | Mod: 25 | Performed by: EMERGENCY MEDICINE

## 2023-02-09 PROCEDURE — 258N000003 HC RX IP 258 OP 636: Performed by: EMERGENCY MEDICINE

## 2023-02-09 PROCEDURE — 36415 COLL VENOUS BLD VENIPUNCTURE: CPT | Performed by: EMERGENCY MEDICINE

## 2023-02-09 RX ORDER — DIAZEPAM 5 MG
5-20 TABLET ORAL EVERY 30 MIN PRN
Status: DISCONTINUED | OUTPATIENT
Start: 2023-02-09 | End: 2023-02-10

## 2023-02-09 RX ORDER — SODIUM CHLORIDE 9 MG/ML
INJECTION, SOLUTION INTRAVENOUS CONTINUOUS
Status: DISCONTINUED | OUTPATIENT
Start: 2023-02-10 | End: 2023-02-10

## 2023-02-09 RX ORDER — ONDANSETRON 2 MG/ML
4 INJECTION INTRAMUSCULAR; INTRAVENOUS EVERY 30 MIN PRN
Status: DISCONTINUED | OUTPATIENT
Start: 2023-02-09 | End: 2023-02-10

## 2023-02-09 RX ORDER — SODIUM CHLORIDE 9 MG/ML
INJECTION, SOLUTION INTRAVENOUS CONTINUOUS
Status: DISCONTINUED | OUTPATIENT
Start: 2023-02-09 | End: 2023-02-10

## 2023-02-09 RX ADMIN — SODIUM CHLORIDE 1000 ML: 9 INJECTION, SOLUTION INTRAVENOUS at 23:05

## 2023-02-09 RX ADMIN — ONDANSETRON 4 MG: 2 INJECTION INTRAMUSCULAR; INTRAVENOUS at 23:05

## 2023-02-09 RX ADMIN — DIAZEPAM 10 MG: 5 TABLET ORAL at 21:51

## 2023-02-09 ASSESSMENT — ACTIVITIES OF DAILY LIVING (ADL): ADLS_ACUITY_SCORE: 37

## 2023-02-10 LAB
ALBUMIN SERPL BCG-MCNC: 3.9 G/DL (ref 3.5–5.2)
ANION GAP SERPL CALCULATED.3IONS-SCNC: 13 MMOL/L (ref 7–15)
ATRIAL RATE - MUSE: 104 BPM
BUN SERPL-MCNC: 10.4 MG/DL (ref 8–23)
CALCIUM SERPL-MCNC: 8.4 MG/DL (ref 8.8–10.2)
CHLORIDE SERPL-SCNC: 99 MMOL/L (ref 98–107)
CREAT SERPL-MCNC: 0.6 MG/DL (ref 0.51–0.95)
DEPRECATED HCO3 PLAS-SCNC: 23 MMOL/L (ref 22–29)
DIASTOLIC BLOOD PRESSURE - MUSE: NORMAL MMHG
GFR SERPL CREATININE-BSD FRML MDRD: >90 ML/MIN/1.73M2
GLUCOSE SERPL-MCNC: 101 MG/DL (ref 70–99)
INTERPRETATION ECG - MUSE: NORMAL
LACTATE SERPL-SCNC: 0.8 MMOL/L (ref 0.7–2)
LACTATE SERPL-SCNC: 3.2 MMOL/L (ref 0.7–2)
LACTATE SERPL-SCNC: 5 MMOL/L (ref 0.7–2)
MAGNESIUM SERPL-MCNC: 1.7 MG/DL (ref 1.7–2.3)
P AXIS - MUSE: 59 DEGREES
PHOSPHATE SERPL-MCNC: 2.2 MG/DL (ref 2.5–4.5)
POTASSIUM SERPL-SCNC: 3.9 MMOL/L (ref 3.4–5.3)
POTASSIUM SERPL-SCNC: 3.9 MMOL/L (ref 3.4–5.3)
PR INTERVAL - MUSE: 162 MS
QRS DURATION - MUSE: 70 MS
QT - MUSE: 360 MS
QTC - MUSE: 473 MS
R AXIS - MUSE: -1 DEGREES
SODIUM SERPL-SCNC: 135 MMOL/L (ref 136–145)
SYSTOLIC BLOOD PRESSURE - MUSE: NORMAL MMHG
T AXIS - MUSE: 52 DEGREES
VENTRICULAR RATE- MUSE: 104 BPM

## 2023-02-10 PROCEDURE — 99207 PR APP CREDIT; MD BILLING SHARED VISIT: CPT | Performed by: PHYSICIAN ASSISTANT

## 2023-02-10 PROCEDURE — 120N000002 HC R&B MED SURG/OB UMMC

## 2023-02-10 PROCEDURE — 36415 COLL VENOUS BLD VENIPUNCTURE: CPT | Performed by: EMERGENCY MEDICINE

## 2023-02-10 PROCEDURE — 250N000013 HC RX MED GY IP 250 OP 250 PS 637: Performed by: STUDENT IN AN ORGANIZED HEALTH CARE EDUCATION/TRAINING PROGRAM

## 2023-02-10 PROCEDURE — 250N000009 HC RX 250: Performed by: STUDENT IN AN ORGANIZED HEALTH CARE EDUCATION/TRAINING PROGRAM

## 2023-02-10 PROCEDURE — 250N000011 HC RX IP 250 OP 636: Performed by: STUDENT IN AN ORGANIZED HEALTH CARE EDUCATION/TRAINING PROGRAM

## 2023-02-10 PROCEDURE — 258N000003 HC RX IP 258 OP 636: Performed by: EMERGENCY MEDICINE

## 2023-02-10 PROCEDURE — 36415 COLL VENOUS BLD VENIPUNCTURE: CPT | Performed by: STUDENT IN AN ORGANIZED HEALTH CARE EDUCATION/TRAINING PROGRAM

## 2023-02-10 PROCEDURE — 83605 ASSAY OF LACTIC ACID: CPT | Performed by: PHYSICIAN ASSISTANT

## 2023-02-10 PROCEDURE — 258N000003 HC RX IP 258 OP 636: Performed by: STUDENT IN AN ORGANIZED HEALTH CARE EDUCATION/TRAINING PROGRAM

## 2023-02-10 PROCEDURE — 250N000013 HC RX MED GY IP 250 OP 250 PS 637: Performed by: EMERGENCY MEDICINE

## 2023-02-10 PROCEDURE — 250N000013 HC RX MED GY IP 250 OP 250 PS 637: Performed by: PHYSICIAN ASSISTANT

## 2023-02-10 PROCEDURE — 83735 ASSAY OF MAGNESIUM: CPT | Performed by: STUDENT IN AN ORGANIZED HEALTH CARE EDUCATION/TRAINING PROGRAM

## 2023-02-10 PROCEDURE — 84132 ASSAY OF SERUM POTASSIUM: CPT | Performed by: PHYSICIAN ASSISTANT

## 2023-02-10 PROCEDURE — 80069 RENAL FUNCTION PANEL: CPT | Performed by: STUDENT IN AN ORGANIZED HEALTH CARE EDUCATION/TRAINING PROGRAM

## 2023-02-10 PROCEDURE — 36415 COLL VENOUS BLD VENIPUNCTURE: CPT | Performed by: PHYSICIAN ASSISTANT

## 2023-02-10 PROCEDURE — 99223 1ST HOSP IP/OBS HIGH 75: CPT | Mod: AI | Performed by: STUDENT IN AN ORGANIZED HEALTH CARE EDUCATION/TRAINING PROGRAM

## 2023-02-10 PROCEDURE — 250N000011 HC RX IP 250 OP 636: Performed by: EMERGENCY MEDICINE

## 2023-02-10 PROCEDURE — 83605 ASSAY OF LACTIC ACID: CPT | Performed by: EMERGENCY MEDICINE

## 2023-02-10 PROCEDURE — HZ2ZZZZ DETOXIFICATION SERVICES FOR SUBSTANCE ABUSE TREATMENT: ICD-10-PCS | Performed by: STUDENT IN AN ORGANIZED HEALTH CARE EDUCATION/TRAINING PROGRAM

## 2023-02-10 RX ORDER — QUETIAPINE FUMARATE 25 MG/1
25 TABLET, FILM COATED ORAL ONCE
Status: COMPLETED | OUTPATIENT
Start: 2023-02-10 | End: 2023-02-10

## 2023-02-10 RX ORDER — PROPRANOLOL HYDROCHLORIDE 10 MG/1
20 TABLET ORAL 3 TIMES DAILY
Status: DISCONTINUED | OUTPATIENT
Start: 2023-02-10 | End: 2023-02-10

## 2023-02-10 RX ORDER — GABAPENTIN 300 MG/1
600 CAPSULE ORAL ONCE
Status: COMPLETED | OUTPATIENT
Start: 2023-02-10 | End: 2023-02-10

## 2023-02-10 RX ORDER — PANTOPRAZOLE SODIUM 40 MG/1
40 TABLET, DELAYED RELEASE ORAL 2 TIMES DAILY
Status: DISCONTINUED | OUTPATIENT
Start: 2023-02-10 | End: 2023-02-11 | Stop reason: HOSPADM

## 2023-02-10 RX ORDER — OLANZAPINE 2.5 MG/1
2.5 TABLET, FILM COATED ORAL EVERY 6 HOURS PRN
Status: DISCONTINUED | OUTPATIENT
Start: 2023-02-10 | End: 2023-02-11 | Stop reason: HOSPADM

## 2023-02-10 RX ORDER — DIAZEPAM 10 MG
10 TABLET ORAL EVERY 30 MIN PRN
Status: DISCONTINUED | OUTPATIENT
Start: 2023-02-10 | End: 2023-02-11 | Stop reason: HOSPADM

## 2023-02-10 RX ORDER — QUETIAPINE FUMARATE 25 MG/1
25 TABLET, FILM COATED ORAL AT BEDTIME
Status: DISCONTINUED | OUTPATIENT
Start: 2023-02-10 | End: 2023-02-11 | Stop reason: HOSPADM

## 2023-02-10 RX ORDER — MIRTAZAPINE 15 MG/1
15 TABLET, FILM COATED ORAL AT BEDTIME
Status: DISCONTINUED | OUTPATIENT
Start: 2023-02-10 | End: 2023-02-10

## 2023-02-10 RX ORDER — PROPRANOLOL HYDROCHLORIDE 20 MG/1
20 TABLET ORAL 3 TIMES DAILY
Status: DISCONTINUED | OUTPATIENT
Start: 2023-02-10 | End: 2023-02-11 | Stop reason: HOSPADM

## 2023-02-10 RX ORDER — FLUMAZENIL 0.1 MG/ML
0.2 INJECTION, SOLUTION INTRAVENOUS
Status: DISCONTINUED | OUTPATIENT
Start: 2023-02-10 | End: 2023-02-10

## 2023-02-10 RX ORDER — ONDANSETRON 2 MG/ML
4 INJECTION INTRAMUSCULAR; INTRAVENOUS EVERY 6 HOURS PRN
Status: DISCONTINUED | OUTPATIENT
Start: 2023-02-10 | End: 2023-02-11 | Stop reason: HOSPADM

## 2023-02-10 RX ORDER — FLUMAZENIL 0.1 MG/ML
0.2 INJECTION, SOLUTION INTRAVENOUS
Status: DISCONTINUED | OUTPATIENT
Start: 2023-02-10 | End: 2023-02-11 | Stop reason: HOSPADM

## 2023-02-10 RX ORDER — MIRTAZAPINE 15 MG/1
15 TABLET, FILM COATED ORAL AT BEDTIME
Status: DISCONTINUED | OUTPATIENT
Start: 2023-02-10 | End: 2023-02-11 | Stop reason: HOSPADM

## 2023-02-10 RX ORDER — LEVOTHYROXINE SODIUM 50 UG/1
100 TABLET ORAL DAILY
Status: DISCONTINUED | OUTPATIENT
Start: 2023-02-10 | End: 2023-02-11 | Stop reason: HOSPADM

## 2023-02-10 RX ORDER — DIAZEPAM 10 MG/2ML
5-10 INJECTION, SOLUTION INTRAMUSCULAR; INTRAVENOUS EVERY 30 MIN PRN
Status: DISCONTINUED | OUTPATIENT
Start: 2023-02-10 | End: 2023-02-11 | Stop reason: HOSPADM

## 2023-02-10 RX ORDER — MIRTAZAPINE 15 MG/1
1 TABLET, FILM COATED ORAL AT BEDTIME
Status: ON HOLD | COMMUNITY
Start: 2023-01-18 | End: 2023-04-15

## 2023-02-10 RX ORDER — GABAPENTIN 600 MG/1
600 TABLET ORAL 3 TIMES DAILY
Status: DISCONTINUED | OUTPATIENT
Start: 2023-02-10 | End: 2023-02-11 | Stop reason: HOSPADM

## 2023-02-10 RX ORDER — MULTIVITAMIN,THERAPEUTIC
1 TABLET ORAL DAILY
Status: DISCONTINUED | OUTPATIENT
Start: 2023-02-10 | End: 2023-02-11 | Stop reason: HOSPADM

## 2023-02-10 RX ORDER — ONDANSETRON 4 MG/1
4 TABLET, FILM COATED ORAL EVERY 6 HOURS PRN
Status: DISCONTINUED | OUTPATIENT
Start: 2023-02-10 | End: 2023-02-11 | Stop reason: HOSPADM

## 2023-02-10 RX ORDER — LIDOCAINE 40 MG/G
CREAM TOPICAL
Status: DISCONTINUED | OUTPATIENT
Start: 2023-02-10 | End: 2023-02-11 | Stop reason: HOSPADM

## 2023-02-10 RX ADMIN — ALCOHOL 1 TABLET: 70.47 GEL TOPICAL at 10:24

## 2023-02-10 RX ADMIN — ONDANSETRON 4 MG: 4 TABLET ORAL at 19:58

## 2023-02-10 RX ADMIN — QUETIAPINE FUMARATE 25 MG: 25 TABLET ORAL at 01:32

## 2023-02-10 RX ADMIN — GABAPENTIN 600 MG: 600 TABLET, FILM COATED ORAL at 14:26

## 2023-02-10 RX ADMIN — PROPRANOLOL HYDROCHLORIDE 20 MG: 20 TABLET ORAL at 14:26

## 2023-02-10 RX ADMIN — PANTOPRAZOLE SODIUM 40 MG: 40 TABLET, DELAYED RELEASE ORAL at 07:52

## 2023-02-10 RX ADMIN — Medication 5 MG: at 02:15

## 2023-02-10 RX ADMIN — NICOTINE POLACRILEX 2 MG: 2 GUM, CHEWING ORAL at 04:36

## 2023-02-10 RX ADMIN — NICOTINE POLACRILEX 4 MG: 4 GUM, CHEWING BUCCAL at 18:05

## 2023-02-10 RX ADMIN — DIAZEPAM 10 MG: 5 TABLET ORAL at 04:28

## 2023-02-10 RX ADMIN — GABAPENTIN 600 MG: 600 TABLET, FILM COATED ORAL at 19:58

## 2023-02-10 RX ADMIN — NICOTINE POLACRILEX 4 MG: 4 GUM, CHEWING BUCCAL at 22:09

## 2023-02-10 RX ADMIN — THIAMINE HCL TAB 100 MG 100 MG: 100 TAB at 07:52

## 2023-02-10 RX ADMIN — LEVOTHYROXINE SODIUM 100 MCG: 100 TABLET ORAL at 07:54

## 2023-02-10 RX ADMIN — NICOTINE POLACRILEX 2 MG: 2 GUM, CHEWING ORAL at 12:35

## 2023-02-10 RX ADMIN — DIAZEPAM 10 MG: 5 TABLET ORAL at 10:34

## 2023-02-10 RX ADMIN — PROPRANOLOL HYDROCHLORIDE 20 MG: 20 TABLET ORAL at 07:54

## 2023-02-10 RX ADMIN — Medication 600 MG: at 18:05

## 2023-02-10 RX ADMIN — PANTOPRAZOLE SODIUM 40 MG: 40 TABLET, DELAYED RELEASE ORAL at 19:59

## 2023-02-10 RX ADMIN — PROPRANOLOL HYDROCHLORIDE 20 MG: 20 TABLET ORAL at 19:59

## 2023-02-10 RX ADMIN — Medication 5 MG: at 20:10

## 2023-02-10 RX ADMIN — NICOTINE POLACRILEX 4 MG: 4 GUM, CHEWING BUCCAL at 16:13

## 2023-02-10 RX ADMIN — SODIUM CHLORIDE: 9 INJECTION, SOLUTION INTRAVENOUS at 00:25

## 2023-02-10 RX ADMIN — BISMUTH SUBSALICYLATE 15 ML: 525 LIQUID ORAL at 10:25

## 2023-02-10 RX ADMIN — DIAZEPAM 10 MG: 5 TABLET ORAL at 00:19

## 2023-02-10 RX ADMIN — NICOTINE POLACRILEX 4 MG: 4 GUM, CHEWING BUCCAL at 20:10

## 2023-02-10 RX ADMIN — DIAZEPAM 10 MG: 5 TABLET ORAL at 07:52

## 2023-02-10 RX ADMIN — SODIUM PHOSPHATE, MONOBASIC, MONOHYDRATE AND SODIUM PHOSPHATE, DIBASIC, ANHYDROUS 9 MMOL: 276; 142 INJECTION, SOLUTION INTRAVENOUS at 10:23

## 2023-02-10 RX ADMIN — QUETIAPINE FUMARATE 25 MG: 25 TABLET ORAL at 22:09

## 2023-02-10 RX ADMIN — DIAZEPAM 10 MG: 5 TABLET ORAL at 02:15

## 2023-02-10 RX ADMIN — NICOTINE POLACRILEX 2 MG: 2 GUM, CHEWING ORAL at 10:34

## 2023-02-10 RX ADMIN — NICOTINE POLACRILEX 2 MG: 2 GUM, CHEWING ORAL at 02:15

## 2023-02-10 RX ADMIN — GABAPENTIN 600 MG: 300 CAPSULE ORAL at 01:32

## 2023-02-10 RX ADMIN — Medication 600 MG: at 10:24

## 2023-02-10 RX ADMIN — MIRTAZAPINE 15 MG: 15 TABLET, FILM COATED ORAL at 22:09

## 2023-02-10 RX ADMIN — ONDANSETRON 4 MG: 2 INJECTION INTRAMUSCULAR; INTRAVENOUS at 00:25

## 2023-02-10 RX ADMIN — GABAPENTIN 600 MG: 600 TABLET, FILM COATED ORAL at 07:52

## 2023-02-10 RX ADMIN — NICOTINE POLACRILEX 4 MG: 4 GUM, CHEWING BUCCAL at 14:38

## 2023-02-10 ASSESSMENT — ACTIVITIES OF DAILY LIVING (ADL)
WEAR_GLASSES_OR_BLIND: YES
ADLS_ACUITY_SCORE: 37
TOILETING_ISSUES: NO
CONCENTRATING,_REMEMBERING_OR_MAKING_DECISIONS_DIFFICULTY: NO
ADLS_ACUITY_SCORE: 37
CHANGE_IN_FUNCTIONAL_STATUS_SINCE_ONSET_OF_CURRENT_ILLNESS/INJURY: NO
DOING_ERRANDS_INDEPENDENTLY_DIFFICULTY: YES
ADLS_ACUITY_SCORE: 37
FALL_HISTORY_WITHIN_LAST_SIX_MONTHS: YES
ADLS_ACUITY_SCORE: 24
WALKING_OR_CLIMBING_STAIRS_DIFFICULTY: NO
ADLS_ACUITY_SCORE: 37
DRESSING/BATHING_DIFFICULTY: NO
ADLS_ACUITY_SCORE: 24
ADLS_ACUITY_SCORE: 37
ADLS_ACUITY_SCORE: 37
DIFFICULTY_EATING/SWALLOWING: NO
ADLS_ACUITY_SCORE: 24
NUMBER_OF_TIMES_PATIENT_HAS_FALLEN_WITHIN_LAST_SIX_MONTHS: 3
ADLS_ACUITY_SCORE: 37

## 2023-02-10 NOTE — H&P
[facility]    History and Physical - Hospitalist Service, GOLD TEAM        Date of Admission:  2/9/2023    Assessment & Plan      Jolynn Rivera is a 62-year-old F with a history of ETOH use disorder complicated by withdrawal seizures, COPD, PTSD, and tobacco use who presented to the ED on 2/9 for ETOH detox, admitted to medicine for management of severe anion gap metabolic acidosis secondary to ETOH ketoacidosis and lactic acidosis.    # Severe ETOH use disorder complicated by history of withdrawal seizures  # ETOH ketoacidosis  # Lactic acidosis secondary to ETOH withdrawal  # Anion gap metabolic acidosis  Last drink was ~2000 on 2/9. Lactate was 7.1 on admission, down to 5.0 after 1L NS.   - Now on 2nd liter of NS, followed by mIVF; follow up lactic acid ordered  - Renal panel and magnesium levels in AM  - MSSA protocol with PRN diazepam   - Continue PTA gabapentin 600mg TID  - PRN ondansetron and olanzapine ordered for nausea (has allergies to compazine, Phenergan); EKG ordered to get a baseline QTc  - Telemetry ordered  - Continue PTA thiamine daily  - Seizure precautions    CHRONIC MEDICAL PROBLEMS  # Hypothyroidism - continue PTA levothyroxine 100mcg daily  # Anxiety, PTSD, insomnia - continue PTA mirtazapine 15mg at bedtime, melatonin at bedtime PRN, quetiapine 25mg at bedtime, propranolol 20mg TID  # GERD - continue PTA pantoprazole 40mg BID  # HCV s/p treatment with confirmed cure - s/p 8w of Harvoni in 2015     Diet: Combination Diet Regular Diet Adult    DVT Prophylaxis: Ambulate every shift  Morales Catheter: Not present  Lines: None     Cardiac Monitoring: None  Code Status: Full Code      Disposition Plan      Expected Discharge Date: 02/13/2023                  Norma Farias MD  Hospitalist Service, GOLD TEAM   M [facility]  Securely message with [employer] (more info)  Text page via [employer] Paging/Directory   See  signed in provider for up to date coverage information    ______________________________________________________________________    Chief Complaint   ETOH withdrawal    History is obtained from the patient    History of Present Illness   Jolynn Rivera is a 62-year-old F with a history of COPD, anxiety, and alcohol use disorder complicated by withdrawal seizures who presents to the ED on 2/9 for detox.    Jolynn reports a ~5 year period of sobriety, but she's relapsed a few time since the onset of the COVID pandemic because of disruptions to the things (like rock climbing) that prevented her from drinking. Her most recent relapsed stated ~3 weeks ago-- she usually drinks beer. In the past 3 days, she's finished an entire bottle of gin. Last drink was ~2000 on 2/9 before presenting to the ED. Jolynn is motivated to quit and to return to the activities she loves.     On arrival to the ED, Jolynn was afebrile and hemodynamically stable. She was tachycardiac to 120 and mildly hypertensive. Saturating well on RA. Labs were significant for an anion gap metabolic acidosis with positive ketones and a lactate of 7.1. ETOH was 0.153 by breathalyzer. Jolynn is being admitted to medicine for management of severe ETOH ketoacidosis and severe lactic acidosis likely secondary to ETOH withdrawal.       Past Medical History    ETOH use disorder complicated by withdrawal seizures  HCV infection s/p treatment (2015)  GERD  PTSD  Tobacco use     Past Surgical History   Past Surgical History:   Procedure Laterality Date     LAPAROSCOPIC TUBAL LIGATION       ORTHOPEDIC SURGERY      Left knee     TONSILLECTOMY         Prior to Admission Medications   Prior to Admission Medications   Prescriptions Last Dose Informant Patient Reported? Taking?   QUEtiapine (SEROQUEL) 25 MG tablet 2/9/2023 Self No Yes   Sig: Take 1 tablet (25 mg) by mouth At Bedtime   calcium carbonate 600 mg-vitamin D 400 units (CALTRATE) 600-400 MG-UNIT per tablet  2/9/2023  No Yes   Sig: Take 1 tablet by mouth 2 times daily (with meals)   gabapentin (NEURONTIN) 600 MG tablet 2/9/2023 Self Yes Yes   Sig: Take 600 mg by mouth 3 times daily   levothyroxine (SYNTHROID/LEVOTHROID) 100 MCG tablet 2/9/2023 Self Yes Yes   Sig: Take 100 mcg by mouth daily   melatonin 5 MG tablet 2/9/2023  No Yes   Sig: Take 1 tablet (5 mg) by mouth nightly as needed for sleep   mirtazapine (REMERON) 15 MG tablet 2/9/2023  Yes Yes   Sig: Take 1 tablet by mouth At Bedtime   multivitamin w/minerals (THERA-VIT-M) tablet 2/9/2023  No Yes   Sig: Take 1 tablet by mouth daily   nicotine polacrilex (NICORETTE) 4 MG gum 2/9/2023 Self No Yes   Sig: Place 1 each (4 mg) inside cheek as needed for smoking cessation   omeprazole (PRILOSEC) 40 MG DR capsule 2/9/2023 Self Yes Yes   Sig: Take 40 mg by mouth At Bedtime   ondansetron (ZOFRAN ODT) 4 MG ODT tab 2/9/2023  Yes Yes   Sig: Take 4 mg by mouth every 8 hours as needed for nausea   propranolol (INDERAL) 20 MG tablet 2/9/2023 Self Yes Yes   Sig: Take 20 mg by mouth 3 times daily   thiamine (B-1) 100 MG tablet 2/9/2023  No Yes   Sig: Take 1 tablet (100 mg) by mouth daily      Facility-Administered Medications: None        Social History   Lives with her dog and cat. Smokes ~10 cigarettes a day (previously ~3 packs per day). ETOH use per HPI.     Physical Exam   Vital Signs: Temp: 98.3  F (36.8  C) Temp src: Oral BP: (!) 156/85 Pulse: 105   Resp: 18 SpO2: 95 % O2 Device: None (Room air)    Weight: 0 lbs 0 oz    GENERAL: The patient is awake and alert, appropriate, pleasant and cooperative. No dysarthria is noted. Tremulous and slightly sweaty.  HEAD: Atraumatic, normocephalic. Pupils are equal, round and reactive to light. Sclerae are white without injection or icterus.  NOSE: Without deformity, bleeding or discharge.   LUNGS: Clear to auscultation bilaterally. No rales or rhonchi are appreciated-- intermittent scattered wheezes in basilar lung fields. Good air  movement is auscultated in all 4 lung fields.  HEART: Regular rate and rhythm. No murmur, rubs, or gallops noted. No S3, S4 or rub is auscultated.   SKIN: No rashes. No jaundice. Pink and warm with good turgor. No erythema or nodules noted.  PSYCHIATRIC: The patient is oriented x4. Mood and affect are appropriate. No dysarthria is noted. Remote memory is intact. Judgment and insight appear normal.     Data     I have personally reviewed the following data over the past 24 hrs:    6.9  \   15.8 (H)   / 249     139 94 (L) 10.3 /  79   3.4 14 (L) 0.70 \       ALT: 27 AST: 59 (H) AP: 56 TBILI: 0.5   ALB: 4.8 TOT PROTEIN: 9.0 (H) LIPASE: N/A       Procal: N/A CRP: N/A Lactic Acid: 5.0 (HH)

## 2023-02-10 NOTE — ED TRIAGE NOTES
Pt reports she is looking for detox, agreeable to admit if needed. Pt's last drink was tonight. Pt has been drinking for 3 weeks daily. Pt drinking about 1/4 bottle of gin and 6 pack of beer per day. Pt is awake and alert, calm and cooperative with staff. Pt reports she is feeling anxious, sitting in wheelchair. Pt is pleasant.      Triage Assessment     Row Name 02/09/23 5989       Triage Assessment (Adult)    Airway WDL WDL       Respiratory WDL    Respiratory WDL WDL       Skin Circulation/Temperature WDL    Skin Circulation/Temperature WDL WDL       Cardiac WDL    Cardiac WDL WDL       Peripheral/Neurovascular WDL    Peripheral Neurovascular WDL WDL       Cognitive/Neuro/Behavioral WDL    Cognitive/Neuro/Behavioral WDL X    Mood/Behavior hyperactive (agitated, impulsive)

## 2023-02-10 NOTE — PROGRESS NOTES
[facility]    Medicine Progress Note - Hospitalist Service, GOLD TEAM 20    Date of Admission:  2/9/2023    Assessment & Plan   Jolynn Rivera is a 62 year old female with a history of alcohol use disorder complicated by withdrawal seizures, COPD, PTSD, and tobacco use who presented to the ED on 2/9 for alcohol detox. Admitted to medicine for management of severe anion gap metabolic acidosis secondary to alcoholic ketoacidosis and lactic acidosis.      #Severe Alcohol Use Disorder c/b Hx of Withdrawal Seizures  #Lactic Acidosis 2/2 Dehydration, Resolved  #Anion Gap Metabolic Acidosis, Resolved   #Alcoholic Ketoacidosis, Resolved  Drinking 8 beers and 1/3 bottle of hard liquor x 3 weeks PTA. Last drink was ~2000 on 2/9. Lactate 7.1, Bicarb 14, AG 31, Ketones 3.2 on admission, normalized after IVF. Last withdrawal seizure a few months ago.  - Switch from [facility] to [facility] protocol with Diazepam (last dose 1034 on 2/10)  - Continue PTA Gabapentin 600 mg TID, MVI, and Thiamine   - PRN Zofran and Zyprexa for nausea (allergic to Compazine, Phenergan); baseline QTc 473 ms  - Discontinue telemetry and IVF  - Seizure precautions  - Not interested in CD treatment. Has outpatient resources and sobriety plan in place.  - CMP, Mg, and Phos in AM    #Hyponatremia: Na 139 on admit --> 135 on 2/10. Received >2L IVF. Adequate PO intake per RN.  - Discontinue IVF & encourage PO fluids  - Trend in AM    #Hypophosphatemia: Phos 2.2 on 2/10.   - Replace per protocol    #Hypocalcemia: Lowest Ca = 8.4 mg/dL in last 2 days, will monitor and replace as appropriate.    #Tobacco Use: Increase Nicotine gum to 4 mg q 1 PRN per pt request. Encourage cessation.     Chronic Stable Medical Issues  #Hypothyroidism - Continue PTA Levothyroxine.  #Anxiety, PTSD, Insomnia - Continue PTA Mirtazapine, Melatonin, Seroquel, and Propranolol.  #GERD - Continue home PPI.  #HCV s/p treatment with confirmed cure - s/p 8w  of Harvoni in 2015     Diet: Regular     DVT Prophylaxis: Mechanical/Ambulation  Morales Catheter: Not present  Lines: None     Cardiac Monitoring: None  Code Status: Full Code      Disposition Plan   Expected Discharge: 02/11/2023 to home           YIFAN Odonnell  Hospitalist Service, GOLD TEAM 20  M [facility]  Securely message with [employer] (more info)  Text page via [employer] Paging/Directory   See signed in provider for up to date coverage information  ______________________________________________________________________    Interval History   Feeling much better. Current withdrawal symptoms include nausea, diaphoresis, and tremors. Reports diarrhea which she attributes to hospital food. Complains of back soreness which is c/w her baseline osteoarthritis pain. Denies chest pain, SOB, vomiting, abdominal pain, or dysuria.    Physical Exam   Vital Signs: Temp: 97.4  F (36.3  C) Temp src: Oral BP: 129/76 Pulse: 92   Resp: 18 SpO2: 96 % O2 Device: None (Room air)    Weight: 155 lbs 0 oz    General: Awake. Sitting up in bed. NAD.  HEENT: Anicteric sclera. MMM.  CV: RRR.  Respiratory: Normal effort on RA. Lungs CTAB.  GI: Abdomen is soft, non-tender, and non-distended. Bowel sounds present.  Extremities: No peripheral edema. Warm and well perfused.  Neuro: Tremulous. A&O x 3. Moves all extremities.   Skin: No rashes or jaundice on exposed areas.    50 MINUTES SPENT BY ME on the date of service doing chart review, history, exam, documentation & further activities per the note.     Data     I have personally reviewed the following data over the past 24 hrs:    6.9  \   15.8 (H)   / 249     135 (L) 99 10.4 /  101 (H)   3.9 23 0.60 \       ALT: 27 AST: 59 (H) AP: 56 TBILI: 0.5   ALB: 3.9 TOT PROTEIN: 9.0 (H) LIPASE: N/A       Procal: N/A CRP: N/A Lactic Acid: 0.8

## 2023-02-10 NOTE — ED TRIAGE NOTES
Pt arrives via EMS, who states pt is having ETOH withdrawals, last drink 1900. Pt reports drinking a 12 pack today. Pt reports she called EMS because she felt like she was going to have a seizure.    BG 78    20g IV R AC

## 2023-02-10 NOTE — ED PROVIDER NOTES
"ED Provider Note  [facility]      History     Chief Complaint   Patient presents with     Alcohol Intoxication     HPI  Jolynn Rivera is a 62 year old female who presents seeking detox.  Patient states she relapsed on alcohol 2 weeks ago and has been drinking a 12 pack daily.  She does have a history of withdrawal symptoms including shakes DTs and has had a seizure in the past.  No other drug use.  No SI or HI.  She did have pneumonia that resolved 1 week ago.  Last drink was earlier this morning.  No other symptoms noted.    Past Medical History  Past Medical History:   Diagnosis Date     Anxiety      COPD (chronic obstructive pulmonary disease) (H)      COPD (chronic obstructive pulmonary disease) (H)      Hepatitis C      PTSD (post-traumatic stress disorder)      Seizures (H)     second to ETOH     Substance abuse (H)      Past Surgical History:   Procedure Laterality Date     LAPAROSCOPIC TUBAL LIGATION       ORTHOPEDIC SURGERY      Left knee     TONSILLECTOMY       calcium carbonate 600 mg-vitamin D 400 units (CALTRATE) 600-400 MG-UNIT per tablet  gabapentin (NEURONTIN) 600 MG tablet  levothyroxine (SYNTHROID/LEVOTHROID) 100 MCG tablet  melatonin 5 MG tablet  multivitamin w/minerals (THERA-VIT-M) tablet  nicotine polacrilex (NICORETTE) 4 MG gum  omeprazole (PRILOSEC) 40 MG DR capsule  ondansetron (ZOFRAN ODT) 4 MG ODT tab  propranolol (INDERAL) 20 MG tablet  QUEtiapine (SEROQUEL) 25 MG tablet  thiamine (B-1) 100 MG tablet      Allergies   Allergen Reactions     Clonidine Unknown     Antihistamines, Chlorpheniramine-Type [Alkylamines]      Compazine      Lock jaw; all medications ending with -zine     Diphenhydramine      Muscle Cramps     Penicillins      Panic attack     Prochlorperazine      Muscle cramps     Trazodone Nausea and Vomiting     \" I ended up falling and feeling like I was drunk\"     Wasps [Hornets]      Swelling      Family History  Family History   Problem " Relation Age of Onset     Anxiety Disorder Mother      Asthma Mother      Alcohol/Drug Father      Prostate Cancer Father      Arthritis Father      Alcohol/Drug Brother      Alcohol/Drug Brother      Hypertension Brother      Alcohol/Drug Brother      Depression Brother      Psychotic Disorder Brother      Social History   Social History     Tobacco Use     Smoking status: Every Day     Packs/day: 0.50     Types: Cigarettes     Smokeless tobacco: Never     Tobacco comments:     Starting Chantix October 2014   Substance Use Topics     Alcohol use: Yes     Comment: 6 pack of beer daily and 1/2 pint of whiskey     Drug use: No     Comment: stopped 1986      Past medical history, past surgical history, medications, allergies, family history, and social history were reviewed with the patient. No additional pertinent items.      A medically appropriate review of systems was performed with pertinent positives and negatives noted in the HPI, and all other systems negative.    Physical Exam   BP: (!) 134/90  Pulse: 120  Temp: 97.9  F (36.6  C)  Resp: 18  SpO2: 95 %  Physical Exam  Vitals and nursing note reviewed.   Constitutional:       General: She is not in acute distress.     Appearance: She is well-developed. She is not diaphoretic.   HENT:      Head: Normocephalic and atraumatic.      Mouth/Throat:      Pharynx: No oropharyngeal exudate.   Eyes:      General: No scleral icterus.        Right eye: No discharge.         Left eye: No discharge.      Pupils: Pupils are equal, round, and reactive to light.   Cardiovascular:      Rate and Rhythm: Regular rhythm. Tachycardia present.      Heart sounds: Normal heart sounds. No murmur heard.    No friction rub. No gallop.   Pulmonary:      Effort: Pulmonary effort is normal. No respiratory distress.      Breath sounds: Normal breath sounds. No wheezing.   Chest:      Chest wall: No tenderness.   Abdominal:      General: Bowel sounds are normal. There is no distension.       Palpations: Abdomen is soft.      Tenderness: There is no abdominal tenderness.   Musculoskeletal:         General: No tenderness or deformity. Normal range of motion.      Cervical back: Normal range of motion and neck supple.   Skin:     General: Skin is warm and dry.      Coloration: Skin is not pale.      Findings: No erythema or rash.   Neurological:      Mental Status: She is alert and oriented to person, place, and time.      Cranial Nerves: No cranial nerve deficit.      Motor: Tremor present.   Psychiatric:         Behavior: Behavior is cooperative.         Thought Content: Thought content does not include homicidal or suicidal ideation.           ED Course, Procedures, & Data      Procedures               Critical Care time was 30 minutes for this patient excluding procedures.       Results for orders placed or performed during the hospital encounter of 02/09/23   Comprehensive metabolic panel     Status: Abnormal   Result Value Ref Range    Sodium 139 136 - 145 mmol/L    Potassium 3.4 3.4 - 5.3 mmol/L    Chloride 94 (L) 98 - 107 mmol/L    Carbon Dioxide (CO2) 14 (L) 22 - 29 mmol/L    Anion Gap 31 (H) 7 - 15 mmol/L    Urea Nitrogen 10.3 8.0 - 23.0 mg/dL    Creatinine 0.70 0.51 - 0.95 mg/dL    Calcium 9.5 8.8 - 10.2 mg/dL    Glucose 79 70 - 99 mg/dL    Alkaline Phosphatase 56 35 - 104 U/L    AST 59 (H) 10 - 35 U/L    ALT 27 10 - 35 U/L    Protein Total 9.0 (H) 6.4 - 8.3 g/dL    Albumin 4.8 3.5 - 5.2 g/dL    Bilirubin Total 0.5 <=1.2 mg/dL    GFR Estimate >90 >60 mL/min/1.73m2   Asymptomatic COVID-19 Virus (Coronavirus) by PCR Nasopharyngeal     Status: Normal    Specimen: Nasopharyngeal; Swab   Result Value Ref Range    SARS CoV2 PCR Negative Negative    Narrative    Testing was performed using the Xpert Xpress SARS-CoV-2 Assay on the Cepheid Gene-Xpert Instrument Systems. Additional information about this Emergency Use Authorization (EUA) assay can be found via the Lab Guide. This test should be ordered  for the detection of SARS-CoV-2 in individuals who meet SARS-CoV-2 clinical and/or epidemiological criteria as well as from individuals without symptoms or other reasons to suspect COVID-19. Test performance for asymptomatic patients has only been established in anterior nasal swab specimens. This test is for in vitro diagnostic use under the FDA EUA for laboratories certified under CLIA to perform high complexity testing. This test has not been FDA cleared or approved. A negative result does not rule out the presence of PCR inhibitors in the specimen or target RNA concentration below the limit of detection for the assay. The possibility of a false negative should be considered if the patient's recent exposure or clinical presentation suggests COVID-19. This test was validated by the [facility]. This laboratory is certified under the Clinical Laboratory Improvement Amendments (CLIA) as qualified to perform high complexity laboratory testing.     CBC with platelets and differential     Status: Abnormal   Result Value Ref Range    WBC Count 6.9 4.0 - 11.0 10e3/uL    RBC Count 4.79 3.80 - 5.20 10e6/uL    Hemoglobin 15.8 (H) 11.7 - 15.7 g/dL    Hematocrit 45.2 35.0 - 47.0 %    MCV 94 78 - 100 fL    MCH 33.0 26.5 - 33.0 pg    MCHC 35.0 31.5 - 36.5 g/dL    RDW 13.4 10.0 - 15.0 %    Platelet Count 249 150 - 450 10e3/uL    % Neutrophils 55 %    % Lymphocytes 35 %    % Monocytes 9 %    % Eosinophils 0 %    % Basophils 1 %    % Immature Granulocytes 0 %    NRBCs per 100 WBC 0 <1 /100    Absolute Neutrophils 3.8 1.6 - 8.3 10e3/uL    Absolute Lymphocytes 2.4 0.8 - 5.3 10e3/uL    Absolute Monocytes 0.6 0.0 - 1.3 10e3/uL    Absolute Eosinophils 0.0 0.0 - 0.7 10e3/uL    Absolute Basophils 0.0 0.0 - 0.2 10e3/uL    Absolute Immature Granulocytes 0.0 <=0.4 10e3/uL    Absolute NRBCs 0.0 10e3/uL   Lactic acid whole blood     Status: Abnormal   Result Value Ref Range    Lactic Acid 7.1 (HH) 0.7 - 2.0  mmol/L   Ketone Beta-Hydroxybutyrate Quantitative     Status: Abnormal   Result Value Ref Range    Ketone (Beta-Hydroxybutyrate) Quantitative 3.20 (HH) <=0.30 mmol/L   Drug abuse screen 1 urine (ED)     Status: Normal   Result Value Ref Range    Amphetamines Urine Screen Negative Screen Negative    Barbituates Urine Screen Negative Screen Negative    Benzodiazepine Urine Screen Negative Screen Negative    Cannabinoids Urine Screen Negative Screen Negative    Cocaine Urine Screen Negative Screen Negative    Opiates Urine Screen Negative Screen Negative   Alcohol breath test POCT     Status: Abnormal   Result Value Ref Range    Alcohol Breath Test 0.153 (A) 0.00 - 0.01   CBC with platelets differential     Status: Abnormal    Narrative    The following orders were created for panel order CBC with platelets differential.  Procedure                               Abnormality         Status                     ---------                               -----------         ------                     CBC with platelets and d...[[id number]]  Abnormal            Final result                 Please view results for these tests on the individual orders.   Urine Drugs of Abuse Screen     Status: Normal    Narrative    The following orders were created for panel order Urine Drugs of Abuse Screen.  Procedure                               Abnormality         Status                     ---------                               -----------         ------                     Drug abuse screen 1 urin...[[id number]]  Normal              Final result                 Please view results for these tests on the individual orders.     Medications   diazepam (VALIUM) tablet 5-20 mg (10 mg Oral Given 2/10/23 0019)   ondansetron (ZOFRAN) injection 4 mg (4 mg Intravenous Given 2/10/23 0025)   0.9% sodium chloride BOLUS (1,000 mLs Intravenous New Bag 2/9/23 7959)     Followed by   sodium chloride 0.9% infusion ( Intravenous New Bag 2/10/23 0025)   0.9%  sodium chloride BOLUS (has no administration in time range)     Followed by   sodium chloride 0.9% infusion (has no administration in time range)     Labs Ordered and Resulted from Time of ED Arrival to Time of ED Departure   COMPREHENSIVE METABOLIC PANEL - Abnormal       Result Value    Sodium 139      Potassium 3.4      Chloride 94 (*)     Carbon Dioxide (CO2) 14 (*)     Anion Gap 31 (*)     Urea Nitrogen 10.3      Creatinine 0.70      Calcium 9.5      Glucose 79      Alkaline Phosphatase 56      AST 59 (*)     ALT 27      Protein Total 9.0 (*)     Albumin 4.8      Bilirubin Total 0.5      GFR Estimate >90     CBC WITH PLATELETS AND DIFFERENTIAL - Abnormal    WBC Count 6.9      RBC Count 4.79      Hemoglobin 15.8 (*)     Hematocrit 45.2      MCV 94      MCH 33.0      MCHC 35.0      RDW 13.4      Platelet Count 249      % Neutrophils 55      % Lymphocytes 35      % Monocytes 9      % Eosinophils 0      % Basophils 1      % Immature Granulocytes 0      NRBCs per 100 WBC 0      Absolute Neutrophils 3.8      Absolute Lymphocytes 2.4      Absolute Monocytes 0.6      Absolute Eosinophils 0.0      Absolute Basophils 0.0      Absolute Immature Granulocytes 0.0      Absolute NRBCs 0.0     LACTIC ACID WHOLE BLOOD - Abnormal    Lactic Acid 7.1 (*)    KETONE BETA-HYDROXYBUTYRATE QUANTITATIVE, RAPID - Abnormal    Ketone (Beta-Hydroxybutyrate) Quantitative 3.20 (*)    ALCOHOL BREATH TEST POCT - Abnormal    Alcohol Breath Test 0.153 (*)    COVID-19 VIRUS (CORONAVIRUS) BY PCR - Normal    SARS CoV2 PCR Negative     DRUG ABUSE SCREEN 1 URINE (ED) - Normal    Amphetamines Urine Screen Negative      Barbituates Urine Screen Negative      Benzodiazepine Urine Screen Negative      Cannabinoids Urine Screen Negative      Cocaine Urine Screen Negative      Opiates Urine Screen Negative     LACTIC ACID WHOLE BLOOD     No orders to display          Medical Decision Making  The patient's presentation is strongly suggestive of an acute  illness with systemic symptoms.    The patient's evaluation involved:  review of external note(s) from 3+ sources (see separate area of note for details)  ordering and/or review of 3+ test(s) in this encounter (see separate area of note for details)  discussion of management or test interpretation with another health professional (Internal Medicine)    The patient's management involved prescription drug management (including medications given in the ED), drug therapy requiring intensive monitoring (Benzodiazepines for alcohol withdrawal) and a decision regarding hospitalization.      Assessment & Plan    This 62-year-old female who presents seeking detox.  Patient relapsed on alcohol 3 weeks ago and has been drinking heavily since that time.  Last drink was earlier today.  On exam patient is tremulous and tachycardic.  Alcohol level was 0.153.  Lab work showed a CO2 of 14 and an anion gap of 31.  Additional lab work was obtained and patient was found to have a lactic acid of 7.1 and ketones of 3.2.  Presentation is consistent with alcoholic ketoacidosis.  Patient was given IV fluids.  She was given ondansetron for nausea and started on MSSA protocol. We will admit for further monitoring work-up and treatment.    I have reviewed the nursing notes. I have reviewed the findings, diagnosis, plan and need for follow up with the patient.    New Prescriptions    No medications on file       Final diagnoses:   Alcohol dependence with intoxication with complication (H)   Alcoholic ketoacidosis       John Caicedo DO  [facility] EMERGENCY DEPARTMENT  2/9/2023     John Caicedo DO  02/10/23 0053

## 2023-02-11 VITALS
WEIGHT: 155 LBS | HEART RATE: 77 BPM | RESPIRATION RATE: 16 BRPM | DIASTOLIC BLOOD PRESSURE: 92 MMHG | HEIGHT: 65 IN | OXYGEN SATURATION: 92 % | TEMPERATURE: 98.1 F | SYSTOLIC BLOOD PRESSURE: 149 MMHG | BODY MASS INDEX: 25.83 KG/M2

## 2023-02-11 LAB
ALBUMIN SERPL BCG-MCNC: 4.5 G/DL (ref 3.5–5.2)
ALP SERPL-CCNC: 50 U/L (ref 35–104)
ALT SERPL W P-5'-P-CCNC: 24 U/L (ref 10–35)
ANION GAP SERPL CALCULATED.3IONS-SCNC: 11 MMOL/L (ref 7–15)
AST SERPL W P-5'-P-CCNC: NORMAL U/L
BILIRUB SERPL-MCNC: 1.1 MG/DL
BUN SERPL-MCNC: 9.7 MG/DL (ref 8–23)
CALCIUM SERPL-MCNC: 9.5 MG/DL (ref 8.8–10.2)
CHLORIDE SERPL-SCNC: 98 MMOL/L (ref 98–107)
CREAT SERPL-MCNC: 0.63 MG/DL (ref 0.51–0.95)
DEPRECATED HCO3 PLAS-SCNC: 27 MMOL/L (ref 22–29)
ERYTHROCYTE [DISTWIDTH] IN BLOOD BY AUTOMATED COUNT: 13.3 % (ref 10–15)
GFR SERPL CREATININE-BSD FRML MDRD: >90 ML/MIN/1.73M2
GLUCOSE SERPL-MCNC: 83 MG/DL (ref 70–99)
HCT VFR BLD AUTO: 43.9 % (ref 35–47)
HGB BLD-MCNC: 14.7 G/DL (ref 11.7–15.7)
MAGNESIUM SERPL-MCNC: 1.9 MG/DL (ref 1.7–2.3)
MCH RBC QN AUTO: 32.5 PG (ref 26.5–33)
MCHC RBC AUTO-ENTMCNC: 33.5 G/DL (ref 31.5–36.5)
MCV RBC AUTO: 97 FL (ref 78–100)
PHOSPHATE SERPL-MCNC: 3.3 MG/DL (ref 2.5–4.5)
PLATELET # BLD AUTO: 202 10E3/UL (ref 150–450)
POTASSIUM SERPL-SCNC: 3.8 MMOL/L (ref 3.4–5.3)
PROT SERPL-MCNC: 8 G/DL (ref 6.4–8.3)
RBC # BLD AUTO: 4.53 10E6/UL (ref 3.8–5.2)
SODIUM SERPL-SCNC: 136 MMOL/L (ref 136–145)
WBC # BLD AUTO: 4.3 10E3/UL (ref 4–11)

## 2023-02-11 PROCEDURE — 250N000013 HC RX MED GY IP 250 OP 250 PS 637: Performed by: PHYSICIAN ASSISTANT

## 2023-02-11 PROCEDURE — 250N000013 HC RX MED GY IP 250 OP 250 PS 637: Performed by: STUDENT IN AN ORGANIZED HEALTH CARE EDUCATION/TRAINING PROGRAM

## 2023-02-11 PROCEDURE — 99239 HOSP IP/OBS DSCHRG MGMT >30: CPT | Performed by: INTERNAL MEDICINE

## 2023-02-11 PROCEDURE — 83735 ASSAY OF MAGNESIUM: CPT | Performed by: PHYSICIAN ASSISTANT

## 2023-02-11 PROCEDURE — 84100 ASSAY OF PHOSPHORUS: CPT | Performed by: STUDENT IN AN ORGANIZED HEALTH CARE EDUCATION/TRAINING PROGRAM

## 2023-02-11 PROCEDURE — 36415 COLL VENOUS BLD VENIPUNCTURE: CPT | Performed by: PHYSICIAN ASSISTANT

## 2023-02-11 PROCEDURE — 84155 ASSAY OF PROTEIN SERUM: CPT | Performed by: PHYSICIAN ASSISTANT

## 2023-02-11 PROCEDURE — 85027 COMPLETE CBC AUTOMATED: CPT | Performed by: PHYSICIAN ASSISTANT

## 2023-02-11 RX ADMIN — PANTOPRAZOLE SODIUM 40 MG: 40 TABLET, DELAYED RELEASE ORAL at 07:40

## 2023-02-11 RX ADMIN — NICOTINE POLACRILEX 4 MG: 4 GUM, CHEWING BUCCAL at 10:28

## 2023-02-11 RX ADMIN — NICOTINE POLACRILEX 4 MG: 4 GUM, CHEWING BUCCAL at 08:15

## 2023-02-11 RX ADMIN — PROPRANOLOL HYDROCHLORIDE 20 MG: 20 TABLET ORAL at 07:40

## 2023-02-11 RX ADMIN — NICOTINE POLACRILEX 4 MG: 4 GUM, CHEWING BUCCAL at 00:44

## 2023-02-11 RX ADMIN — GABAPENTIN 600 MG: 600 TABLET, FILM COATED ORAL at 07:40

## 2023-02-11 RX ADMIN — Medication 600 MG: at 07:40

## 2023-02-11 RX ADMIN — THIAMINE HCL TAB 100 MG 100 MG: 100 TAB at 07:40

## 2023-02-11 RX ADMIN — LEVOTHYROXINE SODIUM 100 MCG: 100 TABLET ORAL at 07:40

## 2023-02-11 RX ADMIN — ALCOHOL 1 TABLET: 70.47 GEL TOPICAL at 07:40

## 2023-02-11 ASSESSMENT — ACTIVITIES OF DAILY LIVING (ADL)
ADLS_ACUITY_SCORE: 24

## 2023-02-11 NOTE — PLAN OF CARE
Discharge summary    Pt has been discharged home,AVS discussed and given to patient  Has no IV line  Pt took along all her belongings and will be driven home by her H.I.S worker.  She left the facility @11:50am

## 2023-02-11 NOTE — PLAN OF CARE
A & O x 4- seizure precautions in place due to past withdrawals for ETOH  CIWA scores: 1945 (8) refused medication, 2145 (4), 0056 (5)  Skin CDI- wearing multiple layers of clothing.  Had mild anxiety throughout the shift - refused medication  No pain, lost IV access @0045  Good output and intake for night shift    Labs: 2/10 Lactic Acid 5.0; 0.8, Na 135, Ca 8.4, Phos 2.2, and 2/9 Alcohol Breath Test 0.153    Gave Nicorette Gum 4 mg x 4 for shift; Tolerated other PO medications well

## 2023-02-11 NOTE — PLAN OF CARE
End of shift Summary: See flowsheet for VS and detail assessments.    Patient is an admission of the shift @ 1800 from ED  Alert and oriented X 4  Denied pain,SOB or dizziness  Independent in room   Protocols for admission observed  Pt came with no meds  Seizure pads ordered,for seizure precaution  Denied Pain SOB or chest Pain  CIWA score 6  RN managed phosphorus  Left PIV saline locked    Plan: Continue with POC

## 2023-02-11 NOTE — DISCHARGE SUMMARY
[facility]  Hospitalist Discharge Summary      Date of Admission:  2/9/2023  Date of Discharge:  2/11/2023  Discharging Provider: Aline Lan MD  Discharge Service: Hospitalist Service, GOLD TEAM 16    Discharge Diagnoses     #Severe Alcohol Use Disorder c/b Hx of Withdrawal Seizures  #Lactic Acidosis 2/2 Dehydration, Resolved  #Anion Gap Metabolic Acidosis, Resolved   #Alcoholic Ketoacidosis, Resolved  #Hyponatremia:  #Hypophosphatemia      #Hypocalcemia  #Tobacco Use: I       #Hypothyroidism -    #Anxiety   #PTSD  # Insomnia -   #GERD - Continue home PPI.  #HCV s/p treatment with confirmed cure -    Hospital Course   Jolynn Rivera is a 62 year old female with a history of alcohol use disorder complicated by withdrawal seizures, COPD, PTSD, and tobacco use who presented to the ED on 2/9 for alcohol detox. Admitted to medicine for management of severe anion gap metabolic acidosis secondary to alcoholic ketoacidosis and lactic acidosis.      #Severe Alcohol Use Disorder c/b Hx of Withdrawal Seizures  #Lactic Acidosis 2/2 Dehydration, Resolved  #Anion Gap Metabolic Acidosis, Resolved   #Alcoholic Ketoacidosis, Resolved  Drinking 8 beers and 1/3 bottle of hard liquor x 3 weeks PTA. Last drink was ~2000 on 2/9. Lactate 7.1, Bicarb 14, AG 31, Ketones 3.2 on admission, normalized after IVF. Last withdrawal seizure a few months ago.  - Switch from MSSA to CIWA protocol with Diazepam (last dose 1034 on 2/10)  - Continue PTA Gabapentin 600 mg TID, MVI, and Thiamine   - PRN Zofran and Zyprexa for nausea (allergic to Compazine, Phenergan); baseline QTc 473 ms  - Discontinue telemetry and IVF  - Seizure precautions  - Not interested in CD treatment. Has outpatient resources and sobriety plan in place.  Consulted SW and CD both , but patient opted not to wait for them .  In addition she has a  who came to pick her up from hospital, and was in room with her  and she  said she has a CADI worker and some one who calls her  Daily at 6 pm to check on her safety     Dicussed with bedside RN and charge RN on 2/11      #Hyponatremia: Na 139 on admit --> 135 on 2/10. Received >2L IVF. Adequate PO intake per RN.  - Discontinue IVF & encourage PO fluids      #Hypophosphatemia: Phos 2.2 on 2/10.   - Replaced    #Hypocalcemia: Lowest Ca = 8.4 mg/dL in last 2 days, will monitor and replace as appropriate.    #Tobacco Use: Increase Nicotine gum to 4 mg q 1 PRN per pt request. Encourage cessation.     Chronic Stable Medical Issues  #Hypothyroidism - Continue PTA Levothyroxine.  #Anxiety, PTSD, Insomnia - Continue PTA Mirtazapine, Melatonin, Seroquel, and Propranolol.  #GERD - Continue home PPI.  #HCV s/p treatment with confirmed cure - s/p 8w of Harvoni in 2015         Consultations This Hospital Stay   CHEMICAL DEPENDENCY IP CONSULT  CARE MANAGEMENT / SOCIAL WORK IP CONSULT    Code Status   Full Code    Time Spent on this Encounter   I, Aline Lan MD, personally saw the patient today and spent greater than 30 minutes discharging this patient.       Aline Lan MD  [facility] MED SURG  [facility] [facility] [address]  Phone: [phone]  Fax: [phone]  ______________________________________________________________________    Physical Exam   Vital Signs: Temp: 98.1  F (36.7  C) Temp src: Oral BP: (!) 149/92 Pulse: 77   Resp: 16 SpO2: 92 % O2 Device: None (Room air)    Weight: 155 lbs 0 oz  General Appearance: Alert and oriented , dressed up to discharge . Tremors which she says are baseline and due to tardive dyskinesia   Respiratory: clear BL   Cardiovascular: RRR, no m/r/g  GI: soft non tender , bs positive  Skin: no rash noted   Other: Alert and oriented . No pronator drift   Interactive   Walking in halways        Primary Care Physician   Physician No Ref-Primary    Discharge Orders      Reason for your hospital stay    You were admitted for  acute alcohol intoxication and withdrawal . You were given fluids and benzodiazapine to help with withdrawal .   You should refrain from drinking any alcohol and follow up with your Counsellor     Activity    Your activity upon discharge: activity as tolerated     Adult [facility] Follow-up and recommended labs and tests    Follow up with primary care provider, Physician No Ref-Primary, within 7 days for hospital follow- up.   Follow up with Counsellor as advised       Appointments on [place] and/or [facility] (with [facility] or [facility] provider or service). Call [phone] if you haven't heard regarding these appointments within 7 days of discharge.     Diet    Follow this diet upon discharge: Orders Placed This Encounter      Combination Diet Regular Diet Adult       Significant Results and Procedures   Most Recent 3 CBC's:Recent Labs   Lab Test 02/11/23  0634 02/09/23 2105 12/09/22  1356   WBC 4.3 6.9 7.0   HGB 14.7 15.8* 14.5   MCV 97 94 92    249 178     Most Recent 3 BMP's:Recent Labs   Lab Test 02/11/23  0634 02/10/23  0819 02/10/23  0600 02/09/23  2105     --  135* 139   POTASSIUM 3.8 3.9 3.9 3.4   CHLORIDE 98  --  99 94*   CO2 27  --  23 14*   BUN 9.7  --  10.4 10.3   CR 0.63  --  0.60 0.70   ANIONGAP 11  --  13 31*   ELVIRA 9.5  --  8.4* 9.5   GLC 83  --  101* 79     Most Recent 2 LFT's:Recent Labs   Lab Test 02/11/23  0634 02/09/23 2105 12/09/22  1356   AST  --  59* 53*   ALT 24 27 33   ALKPHOS 50 56 54   BILITOTAL 1.1 0.5 0.6       Discharge Medications   Current Discharge Medication List      CONTINUE these medications which have NOT CHANGED    Details   aspirin (ASA) 325 MG EC tablet Take 325 mg by mouth every 6 hours as needed for moderate pain (4-6)      calcium carbonate 600 mg-vitamin D 400 units (CALTRATE) 600-400 MG-UNIT per tablet Take 1 tablet by mouth 2 times daily (with meals)  Qty: 60 tablet, Refills: 0    Associated Diagnoses: Alcohol dependence with intoxication with  "complication (H)      gabapentin (NEURONTIN) 600 MG tablet Take 600 mg by mouth 3 times daily      levothyroxine (SYNTHROID/LEVOTHROID) 100 MCG tablet Take 100 mcg by mouth daily      melatonin 5 MG tablet Take 1 tablet (5 mg) by mouth nightly as needed for sleep  Qty: 30 tablet, Refills: 0    Associated Diagnoses: Alcohol dependence with intoxication with complication (H)      multivitamin w/minerals (THERA-VIT-M) tablet Take 1 tablet by mouth daily  Qty: 30 tablet, Refills: 0    Comments: Pt has supply  Associated Diagnoses: Alcohol dependence with intoxication with complication (H)      nicotine polacrilex (NICORETTE) 4 MG gum Place 1 each (4 mg) inside cheek as needed for smoking cessation  Qty: 270 tablet, Refills: 1    Associated Diagnoses: Nicotine abuse      omeprazole (PRILOSEC) 40 MG DR capsule Take 40 mg by mouth At Bedtime      ondansetron (ZOFRAN ODT) 4 MG ODT tab Take 4 mg by mouth every 8 hours as needed for nausea      propranolol (INDERAL) 20 MG tablet Take 20 mg by mouth 3 times daily      QUEtiapine (SEROQUEL) 25 MG tablet Take 1 tablet (25 mg) by mouth At Bedtime  Qty: 30 tablet, Refills: 2    Associated Diagnoses: Mood disorder (H); Posttraumatic stress disorder      thiamine (B-1) 100 MG tablet Take 1 tablet (100 mg) by mouth daily  Qty: 30 tablet, Refills: 0    Associated Diagnoses: Alcohol dependence with intoxication with complication (H)      mirtazapine (REMERON) 15 MG tablet Take 1 tablet by mouth At Bedtime           Allergies   Allergies   Allergen Reactions     Clonidine Unknown     Antihistamines, Chlorpheniramine-Type [Alkylamines]      Compazine      Lock jaw; all medications ending with -zine     Diphenhydramine      Muscle Cramps     Penicillins      Panic attack     Prochlorperazine      Muscle cramps     Trazodone Nausea and Vomiting     \" I ended up falling and feeling like I was drunk\"     Wasps [Hornets]      Swelling      "

## 2023-02-13 ENCOUNTER — PATIENT OUTREACH (OUTPATIENT)
Dept: CARE COORDINATION | Facility: CLINIC | Age: 63
End: 2023-02-13
Payer: MEDICARE

## 2023-02-13 NOTE — PROGRESS NOTES
Clinic Care Coordination Contact  [facility]: Post-Discharge Note  SITUATION                                                      Admission:    Admission Date: 02/09/23   Reason for Admission: #Severe Alcohol Use Disorder c/b Hx of Withdrawal Seizures  #Lactic Acidosis 2/2 Dehydration, Resolved  #Anion Gap Metabolic Acidosis, Resolved   #Alcoholic Ketoacidosis, Resolved  #Hyponatremia:  #Hypophosphatemia  Discharge:   Discharge Date: 02/11/23  Discharge Diagnosis: #Severe Alcohol Use Disorder c/b Hx of Withdrawal Seizures  #Lactic Acidosis 2/2 Dehydration, Resolved  #Anion Gap Metabolic Acidosis, Resolved   #Alcoholic Ketoacidosis, Resolved  #Hyponatremia:  #Hypophosphatemia    BACKGROUND                                                      Per hospital discharge summary and inpatient provider notes:    Jolynn Rivera is a 62-year-old F with a history of COPD, anxiety, and alcohol use disorder complicated by withdrawal seizures who presents to the ED on 2/9 for detox.     Jolynn reports a ~5 year period of sobriety, but she's relapsed a few time since the onset of the COVID pandemic because of disruptions to the things (like rock climbing) that prevented her from drinking. Her most recent relapsed stated ~3 weeks ago-- she usually drinks beer. In the past 3 days, she's finished an entire bottle of gin. Last drink was ~2000 on 2/9 before presenting to the ED. Jolynn is motivated to quit and to return to the activities she loves.      On arrival to the ED, Jolynn was afebrile and hemodynamically stable. She was tachycardiac to 120 and mildly hypertensive. Saturating well on RA. Labs were significant for an anion gap metabolic acidosis with positive ketones and a lactate of 7.1. ETOH was 0.153 by breathalyzer. Jolynn is being admitted to medicine for management of severe ETOH ketoacidosis and severe lactic acidosis likely secondary to ETOH withdrawal.     ASSESSMENT      Discharge Assessment  How are you doing  "now that you are home?: \"Doing good\"  How are your symptoms? (Red Flag symptoms escalate to triage hotline per guidelines): Improved  Do you feel your condition is stable enough to be safe at home until your provider visit?: Yes  Does the patient have their discharge instructions? : Yes  Does the patient have questions regarding their discharge instructions? : No  Were you started on any new medications or were there changes to any of your previous medications? : No  Does the patient have all of their medications?: Yes  Do you have questions regarding any of your medications? : No  Do you have all of your needed medical supplies or equipment (DME)?  (i.e. oxygen tank, CPAP, cane, etc.): Yes  Discharge follow-up appointment scheduled within 14 calendar days? : No  Is patient agreeable to assistance with scheduling? : No    Post-op (IRAM CTA Only)  If the patient had a surgery or procedure, do they have any questions for a nurse?: No    PLAN                                                      Outpatient Plan:    Follow up with primary care provider, Physician No Ref-Primary, within 7 days for hospital follow- up.   Follow up with Counsellor as advised         Appointments on [place] and/or [facility] (with [facility] or [facility] provider or service). Call [phone] if you haven't heard regarding these appointments within 7 days of discharge.    No future appointments.      For any urgent concerns, please contact our 24 hour nurse triage line: [phone] ([phone])         IRAM Cortez  [phone]  [facility]    "

## 2023-04-02 ENCOUNTER — HEALTH MAINTENANCE LETTER (OUTPATIENT)
Age: 63
End: 2023-04-02

## 2023-04-15 ENCOUNTER — HOSPITAL ENCOUNTER (INPATIENT)
Facility: CLINIC | Age: 63
LOS: 2 days | Discharge: HOME | DRG: 897 | End: 2023-04-17
Attending: EMERGENCY MEDICINE | Admitting: PSYCHIATRY & NEUROLOGY
Payer: MEDICARE

## 2023-04-15 ENCOUNTER — TELEPHONE (OUTPATIENT)
Dept: BEHAVIORAL HEALTH | Facility: CLINIC | Age: 63
End: 2023-04-15

## 2023-04-15 DIAGNOSIS — Z11.52 ENCOUNTER FOR SCREENING LABORATORY TESTING FOR SEVERE ACUTE RESPIRATORY SYNDROME CORONAVIRUS 2 (SARS-COV-2): ICD-10-CM

## 2023-04-15 DIAGNOSIS — F32.89 OTHER DEPRESSION: ICD-10-CM

## 2023-04-15 DIAGNOSIS — F10.229 ALCOHOL DEPENDENCE WITH INTOXICATION WITH COMPLICATION (H): ICD-10-CM

## 2023-04-15 DIAGNOSIS — R56.9 ALCOHOL WITHDRAWAL SEIZURE WITHOUT COMPLICATION (H): ICD-10-CM

## 2023-04-15 DIAGNOSIS — F10.930 ALCOHOL WITHDRAWAL SEIZURE WITHOUT COMPLICATION (H): ICD-10-CM

## 2023-04-15 PROBLEM — F10.20 ALCOHOL DEPENDENCE (H): Status: ACTIVE | Noted: 2023-04-15

## 2023-04-15 LAB
ALBUMIN SERPL BCG-MCNC: 4.9 G/DL (ref 3.5–5.2)
ALBUMIN UR-MCNC: 50 MG/DL
ALCOHOL BREATH TEST: 0.12 (ref 0–0.01)
ALP SERPL-CCNC: 64 U/L (ref 35–104)
ALT SERPL W P-5'-P-CCNC: 18 U/L (ref 10–35)
AMPHETAMINES UR QL SCN: ABNORMAL
ANION GAP SERPL CALCULATED.3IONS-SCNC: 26 MMOL/L (ref 7–15)
APPEARANCE UR: CLEAR
AST SERPL W P-5'-P-CCNC: 35 U/L (ref 10–35)
BARBITURATES UR QL SCN: ABNORMAL
BASOPHILS # BLD AUTO: 0 10E3/UL (ref 0–0.2)
BASOPHILS NFR BLD AUTO: 0 %
BENZODIAZ UR QL SCN: ABNORMAL
BILIRUB SERPL-MCNC: 0.4 MG/DL
BILIRUB UR QL STRIP: NEGATIVE
BUN SERPL-MCNC: 10.2 MG/DL (ref 8–23)
BZE UR QL SCN: ABNORMAL
CALCIUM SERPL-MCNC: 9.8 MG/DL (ref 8.8–10.2)
CANNABINOIDS UR QL SCN: ABNORMAL
CHLORIDE SERPL-SCNC: 89 MMOL/L (ref 98–107)
CHOLEST SERPL-MCNC: 201 MG/DL
COLOR UR AUTO: YELLOW
CREAT SERPL-MCNC: 0.73 MG/DL (ref 0.51–0.95)
DEPRECATED HCO3 PLAS-SCNC: 20 MMOL/L (ref 22–29)
EOSINOPHIL # BLD AUTO: 0 10E3/UL (ref 0–0.7)
EOSINOPHIL NFR BLD AUTO: 0 %
ERYTHROCYTE [DISTWIDTH] IN BLOOD BY AUTOMATED COUNT: 12.1 % (ref 10–15)
GFR SERPL CREATININE-BSD FRML MDRD: >90 ML/MIN/1.73M2
GGT SERPL-CCNC: 15 U/L (ref 5–36)
GLUCOSE SERPL-MCNC: 110 MG/DL (ref 70–99)
GLUCOSE UR STRIP-MCNC: NEGATIVE MG/DL
HCT VFR BLD AUTO: 45.1 % (ref 35–47)
HDLC SERPL-MCNC: 100 MG/DL
HGB BLD-MCNC: 16.3 G/DL (ref 11.7–15.7)
HGB UR QL STRIP: NEGATIVE
HYALINE CASTS: 13 /LPF
IMM GRANULOCYTES # BLD: 0 10E3/UL
IMM GRANULOCYTES NFR BLD: 0 %
KETONES UR STRIP-MCNC: 80 MG/DL
LDLC SERPL CALC-MCNC: 90 MG/DL
LEUKOCYTE ESTERASE UR QL STRIP: ABNORMAL
LYMPHOCYTES # BLD AUTO: 2.8 10E3/UL (ref 0.8–5.3)
LYMPHOCYTES NFR BLD AUTO: 24 %
MAGNESIUM SERPL-MCNC: 1.7 MG/DL (ref 1.7–2.3)
MCH RBC QN AUTO: 32.1 PG (ref 26.5–33)
MCHC RBC AUTO-ENTMCNC: 36.1 G/DL (ref 31.5–36.5)
MCV RBC AUTO: 89 FL (ref 78–100)
MONOCYTES # BLD AUTO: 0.6 10E3/UL (ref 0–1.3)
MONOCYTES NFR BLD AUTO: 5 %
MUCOUS THREADS #/AREA URNS LPF: PRESENT /LPF
NEUTROPHILS # BLD AUTO: 8.1 10E3/UL (ref 1.6–8.3)
NEUTROPHILS NFR BLD AUTO: 71 %
NITRATE UR QL: NEGATIVE
NONHDLC SERPL-MCNC: 101 MG/DL
NRBC # BLD AUTO: 0 10E3/UL
NRBC BLD AUTO-RTO: 0 /100
OPIATES UR QL SCN: ABNORMAL
PH UR STRIP: 5.5 [PH] (ref 5–7)
PLATELET # BLD AUTO: 280 10E3/UL (ref 150–450)
POTASSIUM SERPL-SCNC: 3.6 MMOL/L (ref 3.4–5.3)
PROT SERPL-MCNC: 8.9 G/DL (ref 6.4–8.3)
RBC # BLD AUTO: 5.07 10E6/UL (ref 3.8–5.2)
RBC URINE: <1 /HPF
SARS-COV-2 RNA RESP QL NAA+PROBE: NEGATIVE
SODIUM SERPL-SCNC: 135 MMOL/L (ref 136–145)
SP GR UR STRIP: 1.02 (ref 1–1.03)
SQUAMOUS EPITHELIAL: 2 /HPF
TRANSITIONAL EPI: <1 /HPF
TRIGL SERPL-MCNC: 56 MG/DL
TSH SERPL DL<=0.005 MIU/L-ACNC: 0.3 UIU/ML (ref 0.3–4.2)
UROBILINOGEN UR STRIP-MCNC: NORMAL MG/DL
WBC # BLD AUTO: 11.6 10E3/UL (ref 4–11)
WBC URINE: 6 /HPF

## 2023-04-15 PROCEDURE — 80061 LIPID PANEL: CPT | Performed by: CLINICAL NURSE SPECIALIST

## 2023-04-15 PROCEDURE — 82977 ASSAY OF GGT: CPT | Performed by: CLINICAL NURSE SPECIALIST

## 2023-04-15 PROCEDURE — 250N000013 HC RX MED GY IP 250 OP 250 PS 637: Performed by: PHYSICIAN ASSISTANT

## 2023-04-15 PROCEDURE — 99285 EMERGENCY DEPT VISIT HI MDM: CPT | Performed by: EMERGENCY MEDICINE

## 2023-04-15 PROCEDURE — 85025 COMPLETE CBC W/AUTO DIFF WBC: CPT | Performed by: EMERGENCY MEDICINE

## 2023-04-15 PROCEDURE — 83036 HEMOGLOBIN GLYCOSYLATED A1C: CPT | Performed by: PSYCHIATRY & NEUROLOGY

## 2023-04-15 PROCEDURE — 36415 COLL VENOUS BLD VENIPUNCTURE: CPT | Performed by: EMERGENCY MEDICINE

## 2023-04-15 PROCEDURE — C9803 HOPD COVID-19 SPEC COLLECT: HCPCS

## 2023-04-15 PROCEDURE — 82075 ASSAY OF BREATH ETHANOL: CPT

## 2023-04-15 PROCEDURE — 250N000013 HC RX MED GY IP 250 OP 250 PS 637: Performed by: EMERGENCY MEDICINE

## 2023-04-15 PROCEDURE — 81003 URINALYSIS AUTO W/O SCOPE: CPT | Performed by: EMERGENCY MEDICINE

## 2023-04-15 PROCEDURE — 258N000003 HC RX IP 258 OP 636: Performed by: EMERGENCY MEDICINE

## 2023-04-15 PROCEDURE — 128N000004 HC R&B CD ADULT

## 2023-04-15 PROCEDURE — 250N000011 HC RX IP 250 OP 636: Performed by: CLINICAL NURSE SPECIALIST

## 2023-04-15 PROCEDURE — 90791 PSYCH DIAGNOSTIC EVALUATION: CPT

## 2023-04-15 PROCEDURE — 84443 ASSAY THYROID STIM HORMONE: CPT | Performed by: CLINICAL NURSE SPECIALIST

## 2023-04-15 PROCEDURE — U0003 INFECTIOUS AGENT DETECTION BY NUCLEIC ACID (DNA OR RNA); SEVERE ACUTE RESPIRATORY SYNDROME CORONAVIRUS 2 (SARS-COV-2) (CORONAVIRUS DISEASE [COVID-19]), AMPLIFIED PROBE TECHNIQUE, MAKING USE OF HIGH THROUGHPUT TECHNOLOGIES AS DESCRIBED BY CMS-2020-01-R: HCPCS | Performed by: EMERGENCY MEDICINE

## 2023-04-15 PROCEDURE — 250N000011 HC RX IP 250 OP 636: Performed by: EMERGENCY MEDICINE

## 2023-04-15 PROCEDURE — 250N000013 HC RX MED GY IP 250 OP 250 PS 637: Performed by: CLINICAL NURSE SPECIALIST

## 2023-04-15 PROCEDURE — 36415 COLL VENOUS BLD VENIPUNCTURE: CPT | Performed by: CLINICAL NURSE SPECIALIST

## 2023-04-15 PROCEDURE — 96372 THER/PROPH/DIAG INJ SC/IM: CPT | Performed by: EMERGENCY MEDICINE

## 2023-04-15 PROCEDURE — 83735 ASSAY OF MAGNESIUM: CPT | Performed by: EMERGENCY MEDICINE

## 2023-04-15 PROCEDURE — 250N000009 HC RX 250: Performed by: EMERGENCY MEDICINE

## 2023-04-15 PROCEDURE — 80307 DRUG TEST PRSMV CHEM ANLYZR: CPT | Performed by: EMERGENCY MEDICINE

## 2023-04-15 PROCEDURE — 80053 COMPREHEN METABOLIC PANEL: CPT | Performed by: EMERGENCY MEDICINE

## 2023-04-15 PROCEDURE — HZ2ZZZZ DETOXIFICATION SERVICES FOR SUBSTANCE ABUSE TREATMENT: ICD-10-PCS | Performed by: PSYCHIATRY & NEUROLOGY

## 2023-04-15 PROCEDURE — 99285 EMERGENCY DEPT VISIT HI MDM: CPT | Mod: 25

## 2023-04-15 RX ORDER — ACETAMINOPHEN 325 MG/1
650 TABLET ORAL EVERY 4 HOURS PRN
Status: DISCONTINUED | OUTPATIENT
Start: 2023-04-15 | End: 2023-04-17 | Stop reason: HOSPADM

## 2023-04-15 RX ORDER — LORAZEPAM 1 MG/1
1-4 TABLET ORAL EVERY 30 MIN PRN
Status: DISCONTINUED | OUTPATIENT
Start: 2023-04-15 | End: 2023-04-15

## 2023-04-15 RX ORDER — AMOXICILLIN 250 MG
1 CAPSULE ORAL 2 TIMES DAILY PRN
Status: DISCONTINUED | OUTPATIENT
Start: 2023-04-15 | End: 2023-04-17 | Stop reason: HOSPADM

## 2023-04-15 RX ORDER — FOLIC ACID 1 MG/1
1 TABLET ORAL DAILY
Status: DISCONTINUED | OUTPATIENT
Start: 2023-04-15 | End: 2023-04-17 | Stop reason: HOSPADM

## 2023-04-15 RX ORDER — PANTOPRAZOLE SODIUM 40 MG/1
40 TABLET, DELAYED RELEASE ORAL
Status: DISCONTINUED | OUTPATIENT
Start: 2023-04-16 | End: 2023-04-17 | Stop reason: HOSPADM

## 2023-04-15 RX ORDER — DIAZEPAM 10 MG/2ML
5 INJECTION, SOLUTION INTRAMUSCULAR; INTRAVENOUS ONCE
Status: COMPLETED | OUTPATIENT
Start: 2023-04-15 | End: 2023-04-15

## 2023-04-15 RX ORDER — TRAZODONE HYDROCHLORIDE 50 MG/1
50 TABLET, FILM COATED ORAL
Status: DISCONTINUED | OUTPATIENT
Start: 2023-04-15 | End: 2023-04-15

## 2023-04-15 RX ORDER — QUETIAPINE FUMARATE 25 MG/1
25 TABLET, FILM COATED ORAL AT BEDTIME
Status: DISCONTINUED | OUTPATIENT
Start: 2023-04-15 | End: 2023-04-15

## 2023-04-15 RX ORDER — LEVOTHYROXINE SODIUM 100 UG/1
100 TABLET ORAL
Status: DISCONTINUED | OUTPATIENT
Start: 2023-04-15 | End: 2023-04-17 | Stop reason: HOSPADM

## 2023-04-15 RX ORDER — ATENOLOL 50 MG/1
50 TABLET ORAL DAILY PRN
Status: DISCONTINUED | OUTPATIENT
Start: 2023-04-15 | End: 2023-04-16

## 2023-04-15 RX ORDER — LOPERAMIDE HCL 2 MG
2 CAPSULE ORAL 4 TIMES DAILY PRN
Status: DISCONTINUED | OUTPATIENT
Start: 2023-04-15 | End: 2023-04-17 | Stop reason: HOSPADM

## 2023-04-15 RX ORDER — ONDANSETRON 4 MG/1
4 TABLET, ORALLY DISINTEGRATING ORAL EVERY 6 HOURS PRN
Status: DISCONTINUED | OUTPATIENT
Start: 2023-04-15 | End: 2023-04-17 | Stop reason: HOSPADM

## 2023-04-15 RX ORDER — PROPRANOLOL HYDROCHLORIDE 20 MG/1
20 TABLET ORAL 3 TIMES DAILY
Status: DISCONTINUED | OUTPATIENT
Start: 2023-04-15 | End: 2023-04-17 | Stop reason: HOSPADM

## 2023-04-15 RX ORDER — MULTIPLE VITAMINS W/ MINERALS TAB 9MG-400MCG
1 TAB ORAL DAILY
Status: DISCONTINUED | OUTPATIENT
Start: 2023-04-15 | End: 2023-04-17 | Stop reason: HOSPADM

## 2023-04-15 RX ORDER — LEVOTHYROXINE SODIUM 100 UG/1
100 TABLET ORAL
Status: DISCONTINUED | OUTPATIENT
Start: 2023-04-16 | End: 2023-04-15

## 2023-04-15 RX ORDER — DIAZEPAM 5 MG
5-20 TABLET ORAL EVERY 30 MIN PRN
Status: DISCONTINUED | OUTPATIENT
Start: 2023-04-15 | End: 2023-04-17 | Stop reason: HOSPADM

## 2023-04-15 RX ORDER — MULTIPLE VITAMINS W/ MINERALS TAB 9MG-400MCG
1 TAB ORAL DAILY
Status: DISCONTINUED | OUTPATIENT
Start: 2023-04-15 | End: 2023-04-15

## 2023-04-15 RX ORDER — MAGNESIUM HYDROXIDE/ALUMINUM HYDROXICE/SIMETHICONE 120; 1200; 1200 MG/30ML; MG/30ML; MG/30ML
30 SUSPENSION ORAL EVERY 4 HOURS PRN
Status: DISCONTINUED | OUTPATIENT
Start: 2023-04-15 | End: 2023-04-17 | Stop reason: HOSPADM

## 2023-04-15 RX ORDER — ONDANSETRON 2 MG/ML
4 INJECTION INTRAMUSCULAR; INTRAVENOUS ONCE
Status: COMPLETED | OUTPATIENT
Start: 2023-04-15 | End: 2023-04-15

## 2023-04-15 RX ORDER — GABAPENTIN 300 MG/1
300 CAPSULE ORAL 3 TIMES DAILY
Status: DISCONTINUED | OUTPATIENT
Start: 2023-04-15 | End: 2023-04-16

## 2023-04-15 RX ORDER — HYDROXYZINE HYDROCHLORIDE 25 MG/1
25 TABLET, FILM COATED ORAL EVERY 4 HOURS PRN
Status: DISCONTINUED | OUTPATIENT
Start: 2023-04-15 | End: 2023-04-17 | Stop reason: HOSPADM

## 2023-04-15 RX ORDER — FOLIC ACID 1 MG/1
1 TABLET ORAL DAILY
Status: DISCONTINUED | OUTPATIENT
Start: 2023-04-15 | End: 2023-04-15

## 2023-04-15 RX ADMIN — DIAZEPAM 5 MG: 5 INJECTION, SOLUTION INTRAMUSCULAR; INTRAVENOUS at 12:47

## 2023-04-15 RX ADMIN — LORAZEPAM 1 MG: 1 TABLET ORAL at 09:16

## 2023-04-15 RX ADMIN — LEVOTHYROXINE SODIUM 100 MCG: 100 TABLET ORAL at 22:03

## 2023-04-15 RX ADMIN — GABAPENTIN 300 MG: 300 CAPSULE ORAL at 19:55

## 2023-04-15 RX ADMIN — LORAZEPAM 1 MG: 1 TABLET ORAL at 12:04

## 2023-04-15 RX ADMIN — NICOTINE POLACRILEX 4 MG: 2 GUM, CHEWING BUCCAL at 18:44

## 2023-04-15 RX ADMIN — THIAMINE HCL TAB 100 MG 100 MG: 100 TAB at 09:50

## 2023-04-15 RX ADMIN — LORAZEPAM 2 MG: 1 TABLET ORAL at 13:55

## 2023-04-15 RX ADMIN — ONDANSETRON 4 MG: 2 INJECTION INTRAMUSCULAR; INTRAVENOUS at 11:02

## 2023-04-15 RX ADMIN — FOLIC ACID 1 MG: 1 TABLET ORAL at 09:50

## 2023-04-15 RX ADMIN — ONDANSETRON 4 MG: 4 TABLET, ORALLY DISINTEGRATING ORAL at 18:44

## 2023-04-15 RX ADMIN — SODIUM CHLORIDE 1000 ML: 9 INJECTION, SOLUTION INTRAVENOUS at 12:41

## 2023-04-15 RX ADMIN — LORAZEPAM 2 MG: 1 TABLET ORAL at 16:55

## 2023-04-15 RX ADMIN — PROPRANOLOL HYDROCHLORIDE 20 MG: 20 TABLET ORAL at 19:55

## 2023-04-15 RX ADMIN — MULTIPLE VITAMINS W/ MINERALS TAB 1 TABLET: TAB at 09:50

## 2023-04-15 RX ADMIN — Medication 5 MG: at 22:03

## 2023-04-15 RX ADMIN — DIAZEPAM 10 MG: 5 TABLET ORAL at 18:44

## 2023-04-15 RX ADMIN — NICOTINE POLACRILEX 4 MG: 4 GUM, CHEWING BUCCAL at 15:51

## 2023-04-15 RX ADMIN — SODIUM CHLORIDE 1000 ML: 9 INJECTION, SOLUTION INTRAVENOUS at 13:54

## 2023-04-15 RX ADMIN — DIAZEPAM 5 MG: 5 TABLET ORAL at 20:55

## 2023-04-15 RX ADMIN — SODIUM BICARBONATE 40 MG: 84 INJECTION, SOLUTION INTRAVENOUS at 09:00

## 2023-04-15 ASSESSMENT — ACTIVITIES OF DAILY LIVING (ADL)
PATIENT'S_PREFERRED_MEANS_OF_COMMUNICATION: VERBAL
ORAL_HYGIENE: INDEPENDENT
ADLS_ACUITY_SCORE: 37
DIFFICULTY_EATING/SWALLOWING: NO
DRESSING/BATHING_DIFFICULTY: NO
HEARING_DIFFICULTY_OR_DEAF: YES
CHANGE_IN_FUNCTIONAL_STATUS_SINCE_ONSET_OF_CURRENT_ILLNESS/INJURY: NO
WALKING_OR_CLIMBING_STAIRS_DIFFICULTY: NO
DESCRIBE_HEARING_LOSS: RINGING IN EARS
TOILETING_ISSUES: NO
HYGIENE/GROOMING: INDEPENDENT
ADLS_ACUITY_SCORE: 37
DIFFICULTY_COMMUNICATING: NO
VISION_MANAGEMENT: PT WEARING GLASSES
ADLS_ACUITY_SCORE: 37
THE_FOLLOWING_AIDS_WERE_PROVIDED;: PATIENT DECLINED OFFER OF AUXILIARY AIDS
DRESS: INDEPENDENT
WERE_AUXILIARY_AIDS_OFFERED?: YES
CONCENTRATING,_REMEMBERING_OR_MAKING_DECISIONS_DIFFICULTY: NO
ADLS_ACUITY_SCORE: 37
FALL_HISTORY_WITHIN_LAST_SIX_MONTHS: YES
NUMBER_OF_TIMES_PATIENT_HAS_FALLEN_WITHIN_LAST_SIX_MONTHS: 2
ADLS_ACUITY_SCORE: 46
LAUNDRY: WITH SUPERVISION
ADLS_ACUITY_SCORE: 46
ADLS_ACUITY_SCORE: 46
ADLS_ACUITY_SCORE: 37
WEAR_GLASSES_OR_BLIND: YES
DOING_ERRANDS_INDEPENDENTLY_DIFFICULTY: YES

## 2023-04-15 ASSESSMENT — COLUMBIA-SUICIDE SEVERITY RATING SCALE - C-SSRS
2. HAVE YOU ACTUALLY HAD ANY THOUGHTS OF KILLING YOURSELF?: NO
1. HAVE YOU WISHED YOU WERE DEAD OR WISHED YOU COULD GO TO SLEEP AND NOT WAKE UP?: YES
1. IN THE PAST MONTH, HAVE YOU WISHED YOU WERE DEAD OR WISHED YOU COULD GO TO SLEEP AND NOT WAKE UP?: YES
REASONS FOR IDEATION PAST MONTH: DOES NOT APPLY
TOTAL  NUMBER OF INTERRUPTED ATTEMPTS LIFETIME: NO
ATTEMPT LIFETIME: NO
TOTAL  NUMBER OF ABORTED OR SELF INTERRUPTED ATTEMPTS LIFETIME: NO

## 2023-04-15 NOTE — ED NOTES
Bed: ED07  Expected date: 4/15/23  Expected time: 7:10 AM  Means of arrival: Ambulance ([facility])  Comments:  63F, ETOH withdrawal, possible seizure

## 2023-04-15 NOTE — ED PROVIDER NOTES
ED Provider Note  [facility]      History     Chief Complaint   Patient presents with     Drug / Alcohol Assessment     Drank 1 bottle of wine and some beers last night, believes she had an unwitnessed seizure, found self on ground this AM     HPI  Jolynn Rivera is a 63 year old female who has a long history of alcohol dependence.  States she relapsed a few weeks ago and has been drinking regularly, wine and beer.  States last night she was drinking, sometime early this morning, she woke up on the floor and was confused.  Her dog was near her, she did not initially recognize her dog, and then gradually her confusion cleared.  She was incontinent of urine.  Patient suspects that she likely had an alcohol withdrawal seizure, she has had them in the past.  Currently, states she does not feel well in general, no nausea, vomiting, chest pain, shortness of breath, abdominal pain, feels a little bit shaky.  Patient denies any other drug use.  She feels very depressed, denies suicidal ideation, homicidal ideation, auditory or visual hallucinations.    Past Medical History  Past Medical History:   Diagnosis Date     Anxiety      COPD (chronic obstructive pulmonary disease) (H)      COPD (chronic obstructive pulmonary disease) (H)      Hepatitis C      PTSD (post-traumatic stress disorder)      Seizures (H)     second to ETOH     Substance abuse (H)      Past Surgical History:   Procedure Laterality Date     LAPAROSCOPIC TUBAL LIGATION       ORTHOPEDIC SURGERY      Left knee     TONSILLECTOMY       aspirin (ASA) 325 MG EC tablet  calcium carbonate 600 mg-vitamin D 400 units (CALTRATE) 600-400 MG-UNIT per tablet  gabapentin (NEURONTIN) 600 MG tablet  levothyroxine (SYNTHROID/LEVOTHROID) 100 MCG tablet  melatonin 5 MG tablet  mirtazapine (REMERON) 15 MG tablet  multivitamin w/minerals (THERA-VIT-M) tablet  nicotine polacrilex (NICORETTE) 4 MG gum  omeprazole (PRILOSEC) 40 MG DR capsule  ondansetron  "(ZOFRAN ODT) 4 MG ODT tab  propranolol (INDERAL) 20 MG tablet  QUEtiapine (SEROQUEL) 25 MG tablet  thiamine (B-1) 100 MG tablet      Allergies   Allergen Reactions     Clonidine Unknown     Antihistamines, Chlorpheniramine-Type [Alkylamines]      Compazine      Lock jaw; all medications ending with -zine     Diphenhydramine      Muscle Cramps     Penicillins      Panic attack     Prochlorperazine      Muscle cramps     Trazodone Nausea and Vomiting     \" I ended up falling and feeling like I was drunk\"     Wasps [Hornets]      Swelling      Family History  Family History   Problem Relation Age of Onset     Anxiety Disorder Mother      Asthma Mother      Alcohol/Drug Father      Prostate Cancer Father      Arthritis Father      Alcohol/Drug Brother      Alcohol/Drug Brother      Hypertension Brother      Alcohol/Drug Brother      Depression Brother      Psychotic Disorder Brother      Social History   Social History     Tobacco Use     Smoking status: Every Day     Packs/day: 0.50     Types: Cigarettes     Smokeless tobacco: Never     Tobacco comments:     Starting Chantix October 2014   Substance Use Topics     Alcohol use: Yes     Comment: 1 bottle of wine and some beers     Drug use: No     Comment: stopped 1986      Past medical history, past surgical history, medications, allergies, family history, and social history were reviewed with the patient. No additional pertinent items.      A medically appropriate review of systems was performed with pertinent positives and negatives noted in the HPI, and all other systems negative.    Physical Exam   BP: (!) 135/100  Pulse: 87  Temp: 98  F (36.7  C)  Resp: 16  SpO2: 98 %  Physical Exam  GEN:  Alert, well developed, no acute distress  HEENT:  PERRL, EOMI, Mucous membranes are moist.   Cardio:  RRR, no murmur, radial pulses equal bilaterally  PULM:  Lungs clear, good air movement, no wheezes, rales   Abd:  Soft, normal bowel sounds, no focal tenderness  Back exam:  No " CVA tenderness  Musculoskeletal:  normal range of motion, no lower extremity swelling or calf tenderness  Neuro:  Alert and oriented X3, Follows commands, moving all extremities spontaneously   Skin:  Warm, dry   Psychiatric: Normal affect, reports feeling very depressed, no suicidal ideation, homicidal ideation, no auditory or visual hallucinations    ED Course, Procedures, & Data      Procedures       Patient was seen by the mental health  because she is reporting significant depression, has been somewhat inconsistent about reporting suicidal ideation.  She told me she does have suicidal ideation, but does not have any intent or plan.  Told the  she does not have suicidal ideation.  She was able to contract for safety, was discontinued from the one-to-one.  Labs were reviewed by me and results are shown below.  Patient did have some nausea in the ED and was given IV Zofran, IV fluids.  She has been able to tolerate oral meds.     Results for orders placed or performed during the hospital encounter of 04/15/23   Comprehensive metabolic panel     Status: Abnormal   Result Value Ref Range    Sodium 135 (L) 136 - 145 mmol/L    Potassium 3.6 3.4 - 5.3 mmol/L    Chloride 89 (L) 98 - 107 mmol/L    Carbon Dioxide (CO2) 20 (L) 22 - 29 mmol/L    Anion Gap 26 (H) 7 - 15 mmol/L    Urea Nitrogen 10.2 8.0 - 23.0 mg/dL    Creatinine 0.73 0.51 - 0.95 mg/dL    Calcium 9.8 8.8 - 10.2 mg/dL    Glucose 110 (H) 70 - 99 mg/dL    Alkaline Phosphatase 64 35 - 104 U/L    AST 35 10 - 35 U/L    ALT 18 10 - 35 U/L    Protein Total 8.9 (H) 6.4 - 8.3 g/dL    Albumin 4.9 3.5 - 5.2 g/dL    Bilirubin Total 0.4 <=1.2 mg/dL    GFR Estimate >90 >60 mL/min/1.73m2   Magnesium     Status: Normal   Result Value Ref Range    Magnesium 1.7 1.7 - 2.3 mg/dL   Asymptomatic COVID-19 Virus (Coronavirus) by PCR Nasopharyngeal     Status: Normal    Specimen: Nasopharyngeal; Swab   Result Value Ref Range    SARS CoV2 PCR Negative Negative     Narrative    Testing was performed using the Xpert Xpress SARS-CoV-2 Assay on the Cepheid Gene-Xpert Instrument Systems. Additional information about this Emergency Use Authorization (EUA) assay can be found via the Lab Guide. This test should be ordered for the detection of SARS-CoV-2 in individuals who meet SARS-CoV-2 clinical and/or epidemiological criteria as well as from individuals without symptoms or other reasons to suspect COVID-19. Test performance for asymptomatic patients has only been established in anterior nasal swab specimens. This test is for in vitro diagnostic use under the FDA EUA for laboratories certified under CLIA to perform high complexity testing. This test has not been FDA cleared or approved. A negative result does not rule out the presence of PCR inhibitors in the specimen or target RNA concentration below the limit of detection for the assay. The possibility of a false negative should be considered if the patient's recent exposure or clinical presentation suggests COVID-19. This test was validated by the [facility]. This laboratory is certified under the Clinical Laboratory Improvement Amendments (CLIA) as qualified to perform high complexity laboratory testing.     CBC with platelets and differential     Status: Abnormal   Result Value Ref Range    WBC Count 11.6 (H) 4.0 - 11.0 10e3/uL    RBC Count 5.07 3.80 - 5.20 10e6/uL    Hemoglobin 16.3 (H) 11.7 - 15.7 g/dL    Hematocrit 45.1 35.0 - 47.0 %    MCV 89 78 - 100 fL    MCH 32.1 26.5 - 33.0 pg    MCHC 36.1 31.5 - 36.5 g/dL    RDW 12.1 10.0 - 15.0 %    Platelet Count 280 150 - 450 10e3/uL    % Neutrophils 71 %    % Lymphocytes 24 %    % Monocytes 5 %    % Eosinophils 0 %    % Basophils 0 %    % Immature Granulocytes 0 %    NRBCs per 100 WBC 0 <1 /100    Absolute Neutrophils 8.1 1.6 - 8.3 10e3/uL    Absolute Lymphocytes 2.8 0.8 - 5.3 10e3/uL    Absolute Monocytes 0.6 0.0 - 1.3 10e3/uL    Absolute  Eosinophils 0.0 0.0 - 0.7 10e3/uL    Absolute Basophils 0.0 0.0 - 0.2 10e3/uL    Absolute Immature Granulocytes 0.0 <=0.4 10e3/uL    Absolute NRBCs 0.0 10e3/uL   Alcohol breath test POCT     Status: Abnormal   Result Value Ref Range    Alcohol Breath Test 0.120 (A) 0.00 - 0.01   CBC with platelets differential     Status: Abnormal    Narrative    The following orders were created for panel order CBC with platelets differential.  Procedure                               Abnormality         Status                     ---------                               -----------         ------                     CBC with platelets and d...[[id number]]  Abnormal            Final result                 Please view results for these tests on the individual orders.   Urine Drugs of Abuse Screen     Status: None ()    Narrative    The following orders were created for panel order Urine Drugs of Abuse Screen.  Procedure                               Abnormality         Status                     ---------                               -----------         ------                     Drug abuse screen 1 urin...[[id number]]                                                   Please view results for these tests on the individual orders.     Medications   LORazepam (ATIVAN) tablet 1-4 mg (2 mg Oral $Given 4/15/23 1355)   thiamine (B-1) tablet 100 mg (100 mg Oral $Given 4/15/23 0950)   folic acid (FOLVITE) tablet 1 mg (1 mg Oral $Given 4/15/23 0950)   multivitamin w/minerals (THERA-VIT-M) tablet 1 tablet (1 tablet Oral $Given 4/15/23 0950)   pantoprazole (PROTONIX) 2 mg/mL suspension 40 mg (40 mg Oral $Given 4/15/23 0900)   ondansetron (ZOFRAN) injection 4 mg (4 mg Intramuscular $Given 4/15/23 1102)   0.9% sodium chloride BOLUS (0 mLs Intravenous Stopped 4/15/23 1341)   diazepam (VALIUM) injection 5 mg (5 mg Intravenous $Given 4/15/23 1247)   0.9% sodium chloride BOLUS (0 mLs Intravenous Stopped 4/15/23 1551)   nicotine polacrilex (NICORETTE)  gum 4 mg (4 mg Buccal $Given 4/15/23 6870)     Labs Ordered and Resulted from Time of ED Arrival to Time of ED Departure   COMPREHENSIVE METABOLIC PANEL - Abnormal       Result Value    Sodium 135 (*)     Potassium 3.6      Chloride 89 (*)     Carbon Dioxide (CO2) 20 (*)     Anion Gap 26 (*)     Urea Nitrogen 10.2      Creatinine 0.73      Calcium 9.8      Glucose 110 (*)     Alkaline Phosphatase 64      AST 35      ALT 18      Protein Total 8.9 (*)     Albumin 4.9      Bilirubin Total 0.4      GFR Estimate >90     CBC WITH PLATELETS AND DIFFERENTIAL - Abnormal    WBC Count 11.6 (*)     RBC Count 5.07      Hemoglobin 16.3 (*)     Hematocrit 45.1      MCV 89      MCH 32.1      MCHC 36.1      RDW 12.1      Platelet Count 280      % Neutrophils 71      % Lymphocytes 24      % Monocytes 5      % Eosinophils 0      % Basophils 0      % Immature Granulocytes 0      NRBCs per 100 WBC 0      Absolute Neutrophils 8.1      Absolute Lymphocytes 2.8      Absolute Monocytes 0.6      Absolute Eosinophils 0.0      Absolute Basophils 0.0      Absolute Immature Granulocytes 0.0      Absolute NRBCs 0.0     ALCOHOL BREATH TEST POCT - Abnormal    Alcohol Breath Test 0.120 (*)    MAGNESIUM - Normal    Magnesium 1.7     COVID-19 VIRUS (CORONAVIRUS) BY PCR - Normal    SARS CoV2 PCR Negative     ROUTINE UA WITH MICROSCOPIC REFLEX TO CULTURE   DRUG ABUSE SCREEN 1 URINE (ED)     No orders to display          Critical care was not performed.     Medical Decision Making  The patient's presentation was of high complexity (an acute health issue posing potential threat to life or bodily function).    The patient's evaluation involved:  ordering and/or review of 3+ test(s) in this encounter (see separate area of note for details)    The patient's management necessitated high risk (a decision regarding hospitalization).      Assessment & Plan    Patient presents with alcohol withdrawal, likely had withdrawal seizure today.  Has a known history of  alcohol withdrawal seizures.  She was placed on the MSSA protocol, treated with IV and oral benzodiazepines.  Heart rate has been well controlled, she is maintained normal mental status.  Did have some nausea, was treated with IV Zofran, IV fluids.  Is tolerating oral medications at this time.  Will be admitted to detox.  Is not suicidal at this time, however, likely will need further mental health care.  No acute medical concerns other than the alcohol withdrawal.    I have reviewed the nursing notes. I have reviewed the findings, diagnosis, plan and need for follow up with the patient.    New Prescriptions    No medications on file       Final diagnoses:   Other depression   Alcohol dependence with intoxication with complication (H)   Alcohol withdrawal seizure without complication (H)       Katharine Land  [facility] EMERGENCY DEPARTMENT  4/15/2023     Katharine Land MD  04/15/23 9904

## 2023-04-15 NOTE — ED TRIAGE NOTES
Pt presents to the ED via EMS. Pt calm and cooperative. Pt believes she had an unwitnessed seizure this morning, found herself on the ground this morning when waking up. Pt alert and orientated en route. Pt unsure if she is looking for detox, due to having some animals at home.      Triage Assessment     Row Name 04/15/23 0733       Triage Assessment (Adult)    Airway WDL WDL       Respiratory WDL    Respiratory WDL WDL       Skin Circulation/Temperature WDL    Skin Circulation/Temperature WDL WDL       Cardiac WDL    Cardiac WDL WDL       Peripheral/Neurovascular WDL    Peripheral Neurovascular WDL WDL       Cognitive/Neuro/Behavioral WDL    Cognitive/Neuro/Behavioral WDL WDL       [place] Coma Scale    Best Eye Response 4-->(E4) spontaneous    Best Motor Response 6-->(M6) obeys commands    Best Verbal Response 5-->(V5) oriented    [place] Coma Scale Score 15

## 2023-04-15 NOTE — CONSULTS
Diagnostic Evaluation Consultation  Crisis Assessment    Patient was assessed: In Person  Patient location: [facility] [place] [facility] [place]  Was a release of information signed: Yes. Providers included on the release: [facility] & Therapist       Referral Data and Chief Complaint  Jolynn is a 63  year old, no pronouns indicated, and presents to the ED via EMS. Patient is referred to the ED by self. Patient is presenting to the ED for the following concerns: Alcohol dependence     Informed Consent and Assessment Methods     Patient is her own guardian. Writer met with patient and explained the crisis assessment process, including applicable information disclosures and limits to confidentiality, assessed understanding of the process, and obtained consent to proceed with the assessment. Patient was observed to be able to participate in the assessment as evidenced by their agreement.. Assessment methods included conducting a formal interview with patient, review of medical records, collaboration with medical staff, and obtaining relevant collateral information from family and community providers when available..     Over the course of this crisis assessment provided reassurance, offered validation, engaged patient in problem solving and disposition planning and worked with patient on safety and aftercare planning. Patient's response to interventions was positive.     Summary of Patient Situation  Patient presents to the Emergency Department by ambulance after patient called 911. She woke up this morning on the floor. Patient drank approximately 10 beers and a bottle of wine last night. Patient reports that she might have had an unwitnessed seizure last night. She woke up this morning, unable to know what is real and feeling disorganized. Patient did not recognize her own dog when she woke-up this morning. Patient s confusion gradually cleared up. Patient is currently coherent, organized in speech and thoughts.      Patient drinks alcohol daily, including alcohol and beer. Patient currently endorses anxiety, shaking, rocking, feeling nauseous and depressed. Patient is agreeable to detox for alcohol but worries about not having someone to care for her cat and dog. Patient had a lengthy period of sobriety about 5 years ago, and since then, she has been unsuccessful to remain sober from alcohol. Patient denies other drug use.     Patient reports fluctuating moods, including depressed, sad, grieving the loss of loved ones in . Other days patient engages in biking, volunteering and doing other activities she likes.   Patient lost her best friend, her mother and her cat who all  in .  Patient experienced fleeting suicidal thoughts earlier today. Patient currently denies suicidal thoughts and plan.     Patient reports other stressors, including the delay in  setting up CADI waivered services for her. Some of the services would be a LYFT non-emergency medical transportation to therapy sessions. Patient struggles with being social and stays to herself most of the time. Through LYFT, she will attend in-person therapy sessions, allowing her to get out of the house.     Patient is medication compliant. On good days, the patient starts her day with practicing Orthodoxy Voodoo believes, including reading a gratitude list and meditation. 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 Patient participates in Individual therapy e/o week and attends a weekly Co-occurring (MICD) group. Patient reports being involved in SMART recovery program and resources. Patient is currently being treated with EMDR for trauma related issues.     Brief Psychosocial History  Patient lives by herself, with 2 animals, dog and cat. Patient was previously employed in non-profit organization. Patient plans on starting graduate school this .   Patient is a practicing Orthodoxy.        Significant Clinical History  Patient has a current mental health diagnosis of Bipolar disorder, most recent episode depressed, Primary Dx;   Generalized anxiety disorder; PTSD (post-traumatic stress disorder); Severe alcohol use disorder.   Patient medical record shows a significant number of visits to the ED for detox due to alcohol intoxication and withdrawal. Patient has a hx of significant number of CD treatment, and specifically detox.   Patient denies hx of civil commitment and walters. Patient has hx of one previous mental health admission. Patient denies hx of suicide and self-harm.      Collateral Information   Patient declined collateral sources.     Risk Assessment  [place] Suicide Severity Rating Scale Full Clinical Version: 4/15/2023   Suicidal Ideation  1. Wish to be Dead (Lifetime): Yes  Wish to be Dead Description (Lifetime): fleeting wishes/thoughts to be dead  1. Wish to be Dead (Past 1 Month): Yes  Wish to be Dead Description (Past 1 Month): fleeting wishes/thoughts to be dead (fleeting wishes/thoughts to be dead)  2. Non-Specific Active Suicidal Thoughts (Lifetime): No  Intensity of Ideation  Most Severe Ideation Rating (Lifetime): 2  Description of Most Severe Ideation (Lifetime): fleeting wishes/thoughts to be dead (fleeting wishes/thoughts to be dead)  Most Severe Ideation Rating (Past 1 Month): 2  Frequency (Lifetime): Less than once a week  Frequency (Past 1 Month): Once a week  Duration (Lifetime): Fleeting, few seconds or minutes  Duration (Past 1 Month): Fleeting, few seconds or minutes  Controllability (Lifetime): Can control thoughts with little difficulty  Controllability (Past 1 Month): Can control thoughts with little difficulty  Deterrents (Lifetime): Does not apply  Deterrents (Past 1 Month): Does not apply  Reasons for Ideation (Lifetime):  (alcohol use, depression, stressors with CADI services)  Reasons for Ideation (Past 1 Month): Does not apply  Suicidal Behavior  Actual  Attempt (Lifetime): No  Has subject engaged in non-suicidal self-injurious behavior? (Lifetime): No  Interrupted Attempts (Lifetime): No  Aborted or Self-Interrupted Attempt (Lifetime): No  C-SSRS Risk (Lifetime/Recent)  Calculated C-SSRS Risk Score (Lifetime/Recent): Low Risk    [place] Suicide Severity Rating Scale Since Last Contact: 4/15/2023    Validity of evaluation is not impacted by presenting factors during interview: Patient is coherent, and displays only moderate withdrawal symptoms.   Comments regarding subjective versus objective responses to [place] tool: n.a   Environmental or Psychosocial Events: suicide bereavement, loss of relationship due to divorce/separation, helplessness/hopelessness and social isolation  Chronic Risk Factors: current/history of substance abuse.    Warning Signs: increasing substance use or abuse and dramatic changes in mood  Protective Factors: good treatment engagement, sense of importance of health and wellness, help seeking and cultural, spiritual , or Restorationism beliefs associated with meaning and value in life  Interpretation of Risk Scoring, Risk Mitigation Interventions and Safety Plan:  Patient denies a hx of suicide. Patient's worsening alcohol impairs her ability to function.     Does the patient have thoughts of harming others? No     Is the patient engaging in sexually inappropriate behavior?  no     Current Substance Abuse     Is there recent substance abuse? Yes, daily alcohol use     Was a urine drug screen or blood alcohol level obtained: Yes breathylizer reading upon arrival to ED 0.120        Mental Status Exam     Affect: Appropriate   Appearance: Disheveled    Attention Span/Concentration: Attentive  Eye Contact: Engaged   Fund of Knowledge: Appropriate    Language /Speech Content: Fluent   Language /Speech Volume: Normal    Language /Speech Rate/Productions: Normal    Recent Memory: Intact   Remote Memory: Intact   Mood: Anxious, Depressed and Sad     Orientation to Person: Yes    Orientation to Place: Yes   Orientation to Time of Day: Yes    Orientation to Date: Yes    Situation (Do they understand why they are here?): Yes    Psychomotor Behavior: Normal    Thought Content: Clear   Thought Form: Intact      History of commitment: No       Medication  calcium carbonate 600 mg-vitamin D 400 units (CALTRATE  gabapentin (NEURONTIN) 600 MG tablet  QUEtiapine (SEROQUEL) 25 MG tablet  thiamine (B-1) 100 MG tablet  nicotine polacrilex (NICORETTE) 4 MG gum  melatonin 5 MG tablet  Psychotropic medications: Medication changes made in the last two weeks:  NO      Current Care Team    Primary Care Provider: Unknown provider on [facility]  Psychiatrist: Dr Alex   Therapist: Miya Golden, [facility]  [phone] (Work)  [facility]    [address] [place] [address]  [phone]    CADI : Patient does not have current contact information available     Diagnosis    296.51 Bipolar I Disorder Current or Most Recent Episode Depressed, Mild   309.81 (F43.10) Posttraumatic Stress Disorder (includes Posttraumatic Stress Disorder for Children 6 Years and Younger)  Without dissociative symptoms - primary   300.02 (F41.1) Generalized Anxiety Disorder - primary   Alcohol Abuse Disorder     Clinical Summary and Substantiation of Recommendations    Patient presents to the Emergency Department with alcohol abuse. The patient continued drinking large amounts of alcohol (more than 4 beers and a large bottle of wine) on Friday night. Patient drinks alcohol on a daily basis. Patient is currently experiencing withdrawal symptoms, but is able to engage in this assessment and medical treatment. Patient's alcohol use has worsened over the past couple of weeks. Patient struggles to control her alcohol intake once started, and struggles with withdrawal and seizures.   Patient endorses mental health symptoms including anxiety, depression while  grieving the loss of loved ones. Patient denies suicidal thoughts and plan. Patient is medication compliant, participates in weekly co-occurring therapy group and e/o week in individual therapy.   Admission to Inpatient Level of Care is indicated due to:  Substance Abuses Treatment: Detox.    1. Patient risk of severity of behavioral health disorder is appropriate to proposed level of care as indicated by:    Imminent Risk of Harm:   Not Applicable   And/or:  Behavioral health disorder is present and appropriate for inpatient care with both of the following:     Severe psychiatric, behavioral or other comorbid conditions are appropriate for management at inpatient mental health as indicated by at least one of the following:   o Comorbid substance use disorder and Impaired impulse control, judgement, or insight    Severe dysfunction in daily living is present as indicated by at least one of the following:   o Other evidence of severe dysfunction    2. Inpatient mental health services are necessary to meet patient needs and at least one of the following:  Detox only     3. Situation and expectations are appropriate for inpatient care, as indicated by one of the following:   Voluntary treatment at lower level of care is not feasible and Patient management/treatment at lower level of care is not feasible or is inappropriate    Disposition    Recommended disposition:   Substance Abuse Treatment:  Detox     Reviewed case and recommendations with attending provider. Attending Name: Katharine Land MD       Attending concurs with disposition: Yes       Patient and/or validated legal guardian concurs with disposition: Yes       Final disposition:  Substance Abuse Treatment:  Detox    Outpatient Details (if applicable):   Aftercare plan and appointments placed in the AVS and provided to patient: Yes. Given to patient by RN    Was lethal means counseling provided as a part of aftercare planning? N.A        Assessment  Details    Patient interview started at: 9:50 AM and completed at: 9:45 AM     Total duration spent on the patient case in minutes:  1.0 hrs     CPT code(s) utilized: 57546 - Psychotherapy for Crisis - 60 (30-74*) min       CLAUDIA Munroe, LIC, Psychotherapist  [facility] - Triage & Transition Services  Callback: [phone]    APPOINTMENTS    Patient is transferring to [facility] today, 4/15/2023    Patient has follow-up individual therapy: [facility]  [phone] (Work)  [facility]    [address] [place] [address]  [address] [place] [address]  [phone]      Aftercare Plan  If I am feeling unsafe or I am in a crisis, I will:   Contact my established care providers   Call the National Suicide Prevention Lifeline: 988  Go to the nearest emergency room   Call 911     Warning signs that I or other people might notice when a crisis is developing for me: When I'm starting to have thoughts about social injustice, lots of thoughts, pacing, being anxious & worried; having urges to drink alcohol    Things I am able to do on my own to cope or help me feel better: Reading, biking, walking     Things that I am able to do with others to cope or help me better:  Join and talk to others via online chat group for persons in Recovery    Things I can use or do for distraction:  Reading, writing.     Changes I can make to support my mental health and wellness:  Medications, talk to therapist; Attend the co-occurring group every week    People in my life that I can ask for help:  Trusted online person in Recovery Group     Your [facility] has a mental health crisis team you can call 24/7: [facility] Mobile Crisis  [phone]     Other things that are important when I'm in crisis:  Reading, music    Additional resources and information:  Talk to persons in recovery; focus on my well-being       Crisis Lines  Crisis Text Line  Text 860508  You will be connected with a  "trained live crisis counselor to provide support.    Por gautam, texto  JEANNETTE a 866878 o texto a 442-AYUDAME en WhatsApp    [employer] (LGBTQ Youth Crisis Line)  0.732.672.4863  text START to 553-501      Community Resources  Fast Tracker  Linking people to mental health and substance use disorder resources  [place]     [place] Mental [facility] Warm Line  Peer to peer support  Monday thru Saturday, 12 pm to 10 pm  718.426.5954 or 5.032.846.3013  Text \"Support\" to 92291    National [place] on Mental Illness (MARITZA)  834.901.8581 or 1.888.MARITZA.HELPS      Mental Health Apps  My3  https://[employer].org/    VirtualHopeBox  https://[employer]/apps/virtual-hope-box/      Additional Information  Today you were seen by a licensed mental health professional through Triage and Transition services, [facility]iders ([facility])  for a crisis assessment in the Emergency Department at [facility].  It is recommended that you follow up with your established providers (psychiatrist, mental health therapist, and/or primary care doctor - as relevant) as soon as possible. Coordinators from [facility] will be calling you in the next 24-48 hours to ensure that you have the resources you need.  You can also contact [facility] coordinators directly at [phone]. You may have been scheduled for or offered an appointment with a mental health provider. [facility] maintains an extensive network of licensed behavioral health providers to connect patients with the services they need.  We do not charge providers a fee to participate in our referral network.  We match patients with providers based on a patient's specific needs, insurance coverage, and location.  Our first effort will be to refer you to a provider within your care system, and will utilize providers outside your care system as needed.                      "

## 2023-04-15 NOTE — PHARMACY-ADMISSION MEDICATION HISTORY
"Admission Medication History    Admission medication history is complete. The information provided in this note is only as accurate as the sources available at the time of the update.    Information Source(s):     Patient    Dispense Report    Pertinent Information:     Patient-reported medications are consistent with fill history    Patient reports that she stopped taking her quetiapine about a week ago, as she was experience increasing restless leg symptoms from this medications.     Patient reports that she is taking vitamin D and calcium supplements daily, but is unsure of the strength of this medications.     Changes made to PTA medication list:    Added:     Patient is taking, per patient:  o Calcium PO  o Vitamin D3 PO    Deleted:     Patient no longer taking, per patient:  o Aspirin 325 mg tablet  o Calcium carbonate 600 mg-vitamin D 400 units tablet  o Mirtazapine 15 mg tablet  o Nicotine 4 mg gum  o Ondansetron 4 mg ODT tablet    Changed:     Marked as \"patient not taking\", per patient:  o Quetiapine 50 mg tablet    Allergies reviewed with patient and updates made in EHR.    Medication History Completed By: Deidre Figueroa, Student Pharmacist  4/15/2023 5:54 PM    PTA Med List   Medication Sig Last Dose     CALCIUM PO Take by mouth daily      Cholecalciferol (VITAMIN D3 PO) Take by mouth daily 4/14/2023     gabapentin (NEURONTIN) 600 MG tablet Take 600 mg by mouth 3 times daily 4/14/2023 at PM     levothyroxine (SYNTHROID/LEVOTHROID) 100 MCG tablet Take 100 mcg by mouth daily 4/14/2023 at AM     melatonin 5 MG tablet Take 1 tablet (5 mg) by mouth nightly as needed for sleep 4/14/2023 at PM     multivitamin w/minerals (THERA-VIT-M) tablet Take 1 tablet by mouth daily 4/14/2023     omeprazole (PRILOSEC) 40 MG DR capsule Take 40 mg by mouth At Bedtime 4/14/2023 at PM     propranolol (INDERAL) 20 MG tablet Take 20 mg by mouth 3 times daily 4/14/2023 at PM     thiamine (B-1) 100 MG tablet Take 1 tablet (100 mg) " by mouth daily 4/14/2023

## 2023-04-15 NOTE — TELEPHONE ENCOUNTER
S: [facility] , Provider Anita calling at 10:57 AM with clinical on a 63 year old/Female presenting for alcohol detox and SI,no plan or intent    Pt reports dx of Bipolar, OMARI and PTSD and heroin hx years ago no med concerns. Pt was sober for a couple years, relapsing after a friend passed. Pt woke up on the floor, incontinent and did not recognize her dog this morning.     B: Pt presents for ETOH detox.   Currently reports drinking wine and beer daily.    Patient reports last use was this morning, but does not remember when.    Pt LEOBARDO: 0.12  Pt  denies hx of DT  Pt  endorses hx of seizures. Last seizure: Possible seizure this morning.  Pt endorsing the following symptoms of withdrawal: Shaky and Nausea  MSSA Score: 12    Pt denies acute mental health or medical concerns.   Pt denies other drug use: Reports using heroin years ago.    Does Pt have a detox care plan in [facility]? No  Does pt present with specific needs, assistive devices, or exclusionary criteria? None  Is the patient ambulating, eating and drinking in the ED? yes    A: Pt meets criteria to be presented for IP detox admission. Patient is voluntary  COVID: Not yet ordered, Intake to request lab   Utox: Ordered, not yet collected  CMP: Abnormalities: Chloride 89  CBC: Abnormalities: WBC 11.6; Hemoglobin 16.3       R: Patient cleared and ready for behavioral bed placement: Yes    Presenting for admission to /MICD

## 2023-04-15 NOTE — DISCHARGE INSTRUCTIONS
Behavioral Discharge Planning and Instructions  THANK YOU FOR CHOOSING THE [facility]  3A  [phone]    Summary: You were admitted to Station 3A on 4.15.23 for detoxification from Alcohol.  A medical exam was performed that included lab work. You have met with a  and opted to discharge home.  Please take care and make your recovery a priority, Jolynn!    Recommendation:  Return home and follow all recommendations. Utilize resources listed below and develop positive support system within the community. Stay in contact with [facility] at [phone]    Main Diagnosis: Per {3AMD!:918208}  {Substance Related Use Disorders:712063}    Major Treatments, Procedures and Findings:  You have withdrawn from ***  using ***.  You have met with a  to develop a treatment plan for discharge.  You have had labs drawn and those results have been reviewed with you. Please take a copy of your lab work with you to your next primary care physician appointment.  ***Major Treatments, Procedures and Findings:  You were detoxed from alcohol with the Modified Selective Severity Protocol using Valium. You have met with a  to develop a treatment plan for discharge.  You have had labs drawn and a copy of those labs will be sent home with you.  Please bring your lab results with to your follow up doctor appointment.    Symptoms to Report:  If you experience more anxiety, confusion, sleeplessness, deep sadness or thoughts of suicide, notify your treatment team or notify your primary care physician. IF ANY OF THE SYMPTOMS YOU ARE EXPERIENCING ARE A MEDICAL EMERGENCY CALL 911 IMMEDIATELY.     If you or someone you know is struggling or in crisis, help is available.  Call or text 162 or chat  at [employer]Gaadi[employer]    Lifestyle Adjustment: Adjust your lifestyle to get enough sleep, relaxation, exercise and  good nutrition. Continue to develop healthy coping skills to decrease  "stress and promote a sober living environment. Do not use alcohol, illegal drugs or addictive medications other than what is currently prescribed. AA, NA, and  Sponsor are excellent resources for support.     Primary Provider:    Disposition: Return Home      Facts about COVID19 at www.cdc.gov/COVID19 and www.[place].gov/covid19    Keeping hands clean is one of the most important steps we can take to avoid getting sick and spreading germs to others.  Please wash your hands frequently and lather with soap for at least 20 seconds!    Follow-up Appointment:   May 4, 2023 with Miya [facility]  [phone] (Work)  Mental Health Clinic    [address] [place] [address]  [phone]      Resources:     Resources for on line recovery meetings:  AA meetings can be found online; search for them at: http://aa-intergroup.org/directory.php  AA meetings via ZOOM for [place] area can be found online at: https://a[place].org/find-a-meeting/holiday-closings/  NA meetings via [place]OM for [place] area can be found online at: https://sites.[employer].com/view/mnregionofnarcoticsanonymous/home?authuser=2    Www.InRiver  has online resources for meeting and recovery care including Podcast \"Let's Talk:Addiction & Recovery Podcasts    Www.mnrecWhirlpool.org     DISCHARGE RESOURCES:  -SMART Recovery - self management for addiction recovery:  www.smartrecovery.org    -Pathways ~ A Health Crisis Resource & Support Center: 792.199.8300.  -[facility] [phone]   -[facility] Behavioral Intake 994-536-6728 or 058-318-3433.  -Suicide Awareness Voices of Education (SAVE) (www.save.org): 312-562-WDMN (9964)  -National Suicide Prevention Line (www.mentalhealthmn.org): 025-556-JMNR (1900)  -National [place] on Mental Illness (www.[place].sera.org): 479.126.5277 or 272-426-3929.  -Siuv8lnzs: text the word LIFE to 06561 for immediate support and crisis intervention  -Mental Health " "Consumer/Survivor Network of [place] (www.mhcsn.net): 483.672.6375 or 248-267-7810  -Mental Health Association of [place] (www.mentalhealth.org): 644.897.7041 or 859-430-9650     -Substance Abuse and Mental Health Services (www.samhsa.gov)  -Harm Reduction Coalition (www. Harmreduction.org)  -www.prescribetoprevent.org or http://prescribetoprevent.org/video  -Poison control 0-943-273-2989   **[place] Opioid Prevention Coalition: www.opioidcoalition.org    Sober Support Group Information:  AA/NA & Sponsor/Support  -Alcoholics Anonymous (www.alcoholics-anonymous.org): for local information 24 hours/day  -AA Intergroup service office in [place] (http://www.aastpaul.org/) 457.614.3297  -AA Intergroup service office in [facility]: 794.998.8673. (http://www.aamin[place].org/)  -Narcotics Anonymous (www.naminn[place].org) (262) 966-8111   **Sober Fun Activities: www.soberPage Foundryactivities.[employer]./[facility]//[facility] Recovery Connection (MRC)  [facility] connects people seeking recovery to resources that help foster and sustain long-term recovery.  Whether you are seeking resources for treatment, transportation, housing, job training, education, health care or other pathways to recovery, [facility] is a great place to start.    Phone: (930) 224-2748.  www.[facility] Hospital[facility]OM[facility].org (Great listing of all types of recovery and non-recovery related resources)    Crisis Lines  Crisis Text Line  Text 897734  You will be connected with a trained live crisis counselor to provide support.    Por espanol, texto  JEANNETTE a 025859 o texto a 442-AYUDAME en WhatsApp    [employer] (LGBTQ Youth Crisis Line)  1.679.452.9925  text START to 783-158      Community Resources  Fast Tracker  Linking people to mental health and substance use disorder resources  fasttrackSportboomn.org     [place] Mental Health Warm Line  Peer to peer support  Monday thru Saturday, 12 pm to 10 pm  903.150.1865 or 5.583.919.7234  Text \"Support\" to 13354    National " [place] on Mental Illness (MARITZA)  971.435.1830 or 1.888.MARITZA.HELPS      Mental Health Apps  My3  https://[employer].org/    VirtualHopeBox  https://SkyPhrase/apps/virtual-hope-box/      General Medication Instructions:   See your medication sheet(s) for instructions.   Take all medicines as directed.  Make no changes unless your doctor suggests them.   Go to all your doctor visits.  Be sure to have all your required lab tests. This way, your medicines can be refilled on time.  Do not use any drugs not prescribed by your provider.  AA/NA and Sponsors are excellent resources for support  Avoid alcohol.    Any follow up concerns:  Nursing questions call the [address] [phone]  Medical Record call [phone]  Outpatient Behavioral Intake call [phone]  LP+ Wait List/Bed Availability call [phone]    The entire treatment team has appreciated the opportunity to work with you Jolynn.  We wish you the best in the future and with your lifelong recovery goals. Please bring this discharge folder with you to all follow up appointments.  It contains your lab results, diagnosis, medication list and discharge recommendations.    THANK YOU FOR CHOOSING THE [facility]

## 2023-04-15 NOTE — PROGRESS NOTES
04/15/23 1856   Patient Belongings   Did you bring any home meds/supplements to the hospital?  No   Patient Belongings other (see comments)   Belongings Search Yes   Clothing Search Yes   Second Staff Kyra Powers     STORAGE BIN:   Backpack, tobacco, keys, hat,  cords    MED  ROOM BIN:   Cell phone, tablet  A             Admission:  I am responsible for any personal items that are not sent to the safe or pharmacy.  [facility] is not responsible for loss, theft or damage of any property in my possession.    Signature:  _________________________________ Date: _______  Time: _____                                              Staff Signature:  ____________________________ Date: ________  Time: _____      2nd Staff person, if patient is unable/unwilling to sign:    Signature: ________________________________ Date: ________  Time: _____   Discharge:  [facility] has returned all of my personal belongings:    Signature: _________________________________ Date: ________  Time: _____                                          Staff Signature:  ____________________________ Date: ________  Time: _____

## 2023-04-15 NOTE — TELEPHONE ENCOUNTER
R:  1:08 PM: Intake paged Dr. Naegele to present Pt.    1:16 PM: Intake presented Pt to Dr. Naegele. Pt has been accepted to 3A. Work list has been updated.    1:24 PM: Pt placed in que. Intake called 3A to inform CRN of disposition. CRN will have nursing call down to the ED. Current acuity in station is high.    1:26 PM: Intake gave disposition for Nurse working with Pt. Unable to speak with someone at this time.    1:45 PM: Indicia completed. Pt added to PPS admit board.    1:48 PM: Intake called CRN for disposition and to inform them that Pt has been placed in queue and station will call when ready.

## 2023-04-15 NOTE — ED NOTES
Pt states she thinks she had seizure this AM. Pt started drinking again a few weeks ago per pt. 1 bottle of wine and some beers last night. . Pt states she found herself up on the ground this morning when she woke up. 1 oral 4mg tablet of zofran en route, denies it helping.

## 2023-04-16 LAB
ALBUMIN SERPL BCG-MCNC: 4.3 G/DL (ref 3.5–5.2)
ALP SERPL-CCNC: 52 U/L (ref 35–104)
ALT SERPL W P-5'-P-CCNC: 17 U/L (ref 10–35)
ANION GAP SERPL CALCULATED.3IONS-SCNC: 12 MMOL/L (ref 7–15)
AST SERPL W P-5'-P-CCNC: 32 U/L (ref 10–35)
BASOPHILS # BLD AUTO: 0 10E3/UL (ref 0–0.2)
BASOPHILS NFR BLD AUTO: 1 %
BILIRUB SERPL-MCNC: 1 MG/DL
BUN SERPL-MCNC: 9.3 MG/DL (ref 8–23)
CALCIUM SERPL-MCNC: 8.9 MG/DL (ref 8.8–10.2)
CHLORIDE SERPL-SCNC: 95 MMOL/L (ref 98–107)
CREAT SERPL-MCNC: 0.83 MG/DL (ref 0.51–0.95)
DEPRECATED HCO3 PLAS-SCNC: 26 MMOL/L (ref 22–29)
EOSINOPHIL # BLD AUTO: 0 10E3/UL (ref 0–0.7)
EOSINOPHIL NFR BLD AUTO: 0 %
ERYTHROCYTE [DISTWIDTH] IN BLOOD BY AUTOMATED COUNT: 12.4 % (ref 10–15)
GFR SERPL CREATININE-BSD FRML MDRD: 79 ML/MIN/1.73M2
GLUCOSE SERPL-MCNC: 109 MG/DL (ref 70–99)
HBA1C MFR BLD: 5.9 %
HCT VFR BLD AUTO: 36.3 % (ref 35–47)
HGB BLD-MCNC: 13.1 G/DL (ref 11.7–15.7)
IMM GRANULOCYTES # BLD: 0 10E3/UL
IMM GRANULOCYTES NFR BLD: 0 %
LYMPHOCYTES # BLD AUTO: 2.4 10E3/UL (ref 0.8–5.3)
LYMPHOCYTES NFR BLD AUTO: 34 %
MCH RBC QN AUTO: 33.4 PG (ref 26.5–33)
MCHC RBC AUTO-ENTMCNC: 36.1 G/DL (ref 31.5–36.5)
MCV RBC AUTO: 93 FL (ref 78–100)
MONOCYTES # BLD AUTO: 1.1 10E3/UL (ref 0–1.3)
MONOCYTES NFR BLD AUTO: 16 %
NEUTROPHILS # BLD AUTO: 3.5 10E3/UL (ref 1.6–8.3)
NEUTROPHILS NFR BLD AUTO: 49 %
NRBC # BLD AUTO: 0 10E3/UL
NRBC BLD AUTO-RTO: 0 /100
PLATELET # BLD AUTO: 214 10E3/UL (ref 150–450)
POTASSIUM SERPL-SCNC: 3.4 MMOL/L (ref 3.4–5.3)
PROT SERPL-MCNC: 7.6 G/DL (ref 6.4–8.3)
RBC # BLD AUTO: 3.92 10E6/UL (ref 3.8–5.2)
SODIUM SERPL-SCNC: 133 MMOL/L (ref 136–145)
WBC # BLD AUTO: 7.1 10E3/UL (ref 4–11)

## 2023-04-16 PROCEDURE — 250N000013 HC RX MED GY IP 250 OP 250 PS 637: Performed by: CLINICAL NURSE SPECIALIST

## 2023-04-16 PROCEDURE — 36415 COLL VENOUS BLD VENIPUNCTURE: CPT | Performed by: PSYCHIATRY & NEUROLOGY

## 2023-04-16 PROCEDURE — 85025 COMPLETE CBC W/AUTO DIFF WBC: CPT | Performed by: PSYCHIATRY & NEUROLOGY

## 2023-04-16 PROCEDURE — 250N000013 HC RX MED GY IP 250 OP 250 PS 637: Performed by: PHYSICIAN ASSISTANT

## 2023-04-16 PROCEDURE — G0177 OPPS/PHP; TRAIN & EDUC SERV: HCPCS

## 2023-04-16 PROCEDURE — 250N000013 HC RX MED GY IP 250 OP 250 PS 637: Performed by: EMERGENCY MEDICINE

## 2023-04-16 PROCEDURE — 99223 1ST HOSP IP/OBS HIGH 75: CPT | Mod: AI | Performed by: PSYCHIATRY & NEUROLOGY

## 2023-04-16 PROCEDURE — 99222 1ST HOSP IP/OBS MODERATE 55: CPT | Performed by: PHYSICIAN ASSISTANT

## 2023-04-16 PROCEDURE — 80053 COMPREHEN METABOLIC PANEL: CPT | Performed by: PSYCHIATRY & NEUROLOGY

## 2023-04-16 PROCEDURE — 250N000013 HC RX MED GY IP 250 OP 250 PS 637: Performed by: PSYCHIATRY & NEUROLOGY

## 2023-04-16 PROCEDURE — 128N000004 HC R&B CD ADULT

## 2023-04-16 RX ORDER — GABAPENTIN 300 MG/1
600 CAPSULE ORAL 3 TIMES DAILY
Status: DISCONTINUED | OUTPATIENT
Start: 2023-04-16 | End: 2023-04-17 | Stop reason: HOSPADM

## 2023-04-16 RX ADMIN — NICOTINE POLACRILEX 2 MG: 2 GUM, CHEWING BUCCAL at 08:21

## 2023-04-16 RX ADMIN — GABAPENTIN 300 MG: 300 CAPSULE ORAL at 08:21

## 2023-04-16 RX ADMIN — NICOTINE POLACRILEX 4 MG: 2 GUM, CHEWING BUCCAL at 15:20

## 2023-04-16 RX ADMIN — LEVOTHYROXINE SODIUM 100 MCG: 100 TABLET ORAL at 06:38

## 2023-04-16 RX ADMIN — NICOTINE POLACRILEX 4 MG: 2 GUM, CHEWING BUCCAL at 12:15

## 2023-04-16 RX ADMIN — NICOTINE POLACRILEX 4 MG: 2 GUM, CHEWING BUCCAL at 06:26

## 2023-04-16 RX ADMIN — PROPRANOLOL HYDROCHLORIDE 20 MG: 20 TABLET ORAL at 08:21

## 2023-04-16 RX ADMIN — Medication 5 MG: at 20:10

## 2023-04-16 RX ADMIN — NICOTINE POLACRILEX 4 MG: 2 GUM, CHEWING BUCCAL at 09:42

## 2023-04-16 RX ADMIN — MULTIPLE VITAMINS W/ MINERALS TAB 1 TABLET: TAB at 08:21

## 2023-04-16 RX ADMIN — GABAPENTIN 600 MG: 300 CAPSULE ORAL at 20:10

## 2023-04-16 RX ADMIN — PROPRANOLOL HYDROCHLORIDE 20 MG: 20 TABLET ORAL at 13:35

## 2023-04-16 RX ADMIN — GABAPENTIN 600 MG: 300 CAPSULE ORAL at 13:35

## 2023-04-16 RX ADMIN — NICOTINE POLACRILEX 4 MG: 2 GUM, CHEWING BUCCAL at 02:43

## 2023-04-16 RX ADMIN — FOLIC ACID 1 MG: 1 TABLET ORAL at 08:21

## 2023-04-16 RX ADMIN — PROPRANOLOL HYDROCHLORIDE 20 MG: 20 TABLET ORAL at 20:10

## 2023-04-16 RX ADMIN — NICOTINE POLACRILEX 4 MG: 2 GUM, CHEWING BUCCAL at 22:47

## 2023-04-16 RX ADMIN — NICOTINE POLACRILEX 4 MG: 2 GUM, CHEWING BUCCAL at 13:37

## 2023-04-16 RX ADMIN — THIAMINE HCL TAB 100 MG 100 MG: 100 TAB at 08:21

## 2023-04-16 RX ADMIN — NICOTINE POLACRILEX 4 MG: 2 GUM, CHEWING BUCCAL at 17:55

## 2023-04-16 RX ADMIN — PANTOPRAZOLE SODIUM 40 MG: 40 TABLET, DELAYED RELEASE ORAL at 06:38

## 2023-04-16 RX ADMIN — NICOTINE POLACRILEX 4 MG: 2 GUM, CHEWING BUCCAL at 18:56

## 2023-04-16 RX ADMIN — ALUMINUM HYDROXIDE, MAGNESIUM HYDROXIDE, AND SIMETHICONE 30 ML: 200; 200; 20 SUSPENSION ORAL at 09:42

## 2023-04-16 RX ADMIN — NICOTINE POLACRILEX 4 MG: 2 GUM, CHEWING BUCCAL at 21:32

## 2023-04-16 ASSESSMENT — ACTIVITIES OF DAILY LIVING (ADL)
ADLS_ACUITY_SCORE: 46
DRESS: INDEPENDENT
ORAL_HYGIENE: INDEPENDENT
LAUNDRY: WITH SUPERVISION
ADLS_ACUITY_SCORE: 46
DRESS: INDEPENDENT
ADLS_ACUITY_SCORE: 46
ORAL_HYGIENE: INDEPENDENT
ADLS_ACUITY_SCORE: 46
HYGIENE/GROOMING: INDEPENDENT
ADLS_ACUITY_SCORE: 46
LAUNDRY: WITH SUPERVISION
ADLS_ACUITY_SCORE: 46
HYGIENE/GROOMING: INDEPENDENT
ADLS_ACUITY_SCORE: 46

## 2023-04-16 NOTE — PLAN OF CARE
"Pt scored MSSA 6, 2, 3. Pt did not receive medications for withdrawal this shift.   UPDATE: Pt has not scored MSSA 8 or above in over 24 hours. Pt has not received valium for withdrawal in over 24 hours. Per unit protocol, Pt is now considered out of detox.  Pt endorsed improving anxiety. Pt endorsed improving depression - Pt rated 6/10. Pt denied SI and SIB. Pt denied pain. Pt alert. Pt independent with steady gait.  Pt presents with flat affect, anxious, cooperative. Pt visible in the [employer] - card games, engaged with peers, attending group.   Pt is hoping to discharge tomorrow.  Pt reports improving appetite. Pt denies nausea. Pt reports she ate about 75% of dinner.   Pt compliant with scheduled medications. Pt received PRN nicotine gum 4 mg 4 times this shift.   At 1612, /93 and HR 70. Latest VS: Blood pressure (!) 154/90, pulse 72, temperature 99.1  F (37.3  C), temperature source Oral, resp. rate 16, height 1.626 m (5' 4\"), weight 65.8 kg (145 lb), SpO2 95 %, not currently breastfeeding.  Precautions: seizure    Problem: Adult Behavioral Health Plan of Care  Goal: Plan of Care Review  Outcome: Progressing  Flowsheets (Taken 4/16/2023 1619)  Patient Agreement with Plan of Care: agrees     Problem: Adult Behavioral Health Plan of Care  Goal: Optimized Coping Skills in Response to Life Stressors  Outcome: Progressing     Problem: Adult Behavioral Health Plan of Care  Goal: Develops/Participates in Therapeutic [place] to Support Successful Transition  Outcome: Progressing     Problem: Alcohol Withdrawal  Goal: Alcohol Withdrawal Symptom Control  Outcome: Progressing     Problem: Suicidal Behavior  Goal: Suicidal Behavior is Absent or Managed  Outcome: Progressing     "

## 2023-04-16 NOTE — PLAN OF CARE
Problem: Alcohol Withdrawal  Goal: Alcohol Withdrawal Symptom Control  Outcome: Progressing     Problem: Sleep Disturbance  Goal: Adequate Sleep/Rest  Outcome: Progressing   Goal Outcome Evaluation:  The Pt slept for 4 hours, and her MSSA scores were 7 and 6; she received no Valium this night. The fifteen minutes safety checks are in progress with no related incidence, and she is on seizure precaution with no seizure activity. She c/o difficulty going to sleep despite taking melatonin earlier. She declined any other sleep aid saying she is allergic to many medications. She received nicotine gum twice.

## 2023-04-16 NOTE — PLAN OF CARE
"Goal Outcome Evaluation:    Plan of Care Reviewed With: patient                Patient was flat, blunted and \"crabby\" at the start of shift this morning regarding her dose of gabapente which was decreased on admission. She was educated on the admit protocol, explained that a provider will meet with her as soon as possible today to review her medications. She understood and felt better until the on-call provider saw her and updated her PTA medications.  Patient is worried about her pets care at home.   MSSA 4 & 1, no PRN was given per parameter.  "

## 2023-04-16 NOTE — PROGRESS NOTES
Case Management:     Writer checked-in and introduced role to pt's care and how to assist pt during their stay. Inquired with pt about what they are needing during their stay and what they are considering for their aftercare plans. Pt processed that her biggest luis a is the poor communication and follow through with her CADI. Pt notes that she finally got set up with a CADI worker Tanisha through [facility]. Notes that the CADI is supposed to help with an  IHS worker to help support her in the community to help her with being more engaged in the community noting that is a big part of helping her stay sober. Pt notes that she still is going to individual therapy to do EMDR every other week, started a co-occurring group therapy every Thursday. Continues with her Psychiatry. Pt notes that she also got set up with a  and will also start volunteering at her peer support group and engaging in activities with [place] sober community. Pt notes that she is doing all she can and reiterates not having the full support and services/funding through her CADI is a barrier. Pt notes that this past relapse lasted about a couple of weeks noting drinking 10 Hamms beers daily. Processed with pt about CM to follow up with the CADI worker to understand the barriers with CADI services. Pt signed JUANY'S for [facility] (for coordination with her psychiatry and therapy team) and for [facility].    CM Faxed over ROIS to [facility] and [facility]      CM to follow up with pt tomorrow and with CADI worker and further discuss aftercare services.     Cintia Davis, [facility], [facility]

## 2023-04-16 NOTE — CONSULTS
[facility]  Consult Note - Hospitalist Service  Date of Admission:  4/15/2023  Consult Requested by: Psychiatry  Reason for Consult: detox medical co-management    Chief Complaint   Medical co-management during detox    History of Present Illness   Jolynn Rivera is a 63 year old female who has a past medical history of alcohol abuse with multiple recent admissions for detox, COPD, PTSD, tobacco use, hepatitis C status posttreatment, GERD, depression and anxiety, hypothyroidism who was admitted to inpatient detox on 4/15/2023.  Internal medicine was consulted to help comanage her medical issues while going through detox.    Ms. Rivera currently denies any chest pain, shortness of breath, fevers, chills, abdominal pain, nausea or vomiting.  She feels extremely fatigued and a little shaky.  She reports having seizures during prior withdrawals but has not had any thus far.  She was able to eat breakfast and lunch this afternoon but has not been drinking very much water.  She seems dedicated to becoming sober despite her multiple relapses.  Her vital signs include temperature 98.6  F, heart rate 87, /79 and she is 95% on room air.  Lab work includes BMP, CBC that revealed no acute abnormalities.  We discussed her plan of care, the MSSA program with Valium and she had no further questions or concerns.    Assessment & Plan   # Alcohol intoxication with withdrawal  # History of seizures while going through withdrawal  # Depression and anxiety  -Per primary psychiatry team  -Continue with MSSA protocol using Valium  -Continue multivitamin with minerals, folic acid and thiamine replacement  -Continue gabapentin 600 mg a mouth 3 times daily, propranolol 20 mg a mouth 3 times daily with Atarax as needed    #Hypothyroidism  -Continue levothyroxine 100 mcg by mouth once daily    #GERD  -Continue Protonix 40 mg a mouth once daily    #Hepatitis C status posttreatment with  confirm cure  -She completed 8 weeks of Harvoni in 2015    *Medicine will sign off at this time. Please do not hesitate to contact us back with any questions or concerns*      YIFAN Hernandez  Hospitalist Service  Securely message with [employer] (more info)  Text page via [facility] Paging/Directory   ______________________________________________________________________    Past Medical History    Past Medical History:   Diagnosis Date     Anxiety      COPD (chronic obstructive pulmonary disease) (H)      COPD (chronic obstructive pulmonary disease) (H)      Hepatitis C      PTSD (post-traumatic stress disorder)      Seizures (H)     second to ETOH     Substance abuse (H)        Past Surgical History   Past Surgical History:   Procedure Laterality Date     LAPAROSCOPIC TUBAL LIGATION       ORTHOPEDIC SURGERY      Left knee     TONSILLECTOMY         Medications   Current Facility-Administered Medications   Medication     acetaminophen (TYLENOL) tablet 650 mg     alum & mag hydroxide-simethicone (MAALOX) suspension 30 mL     diazepam (VALIUM) tablet 5-20 mg     folic acid (FOLVITE) tablet 1 mg     gabapentin (NEURONTIN) capsule 600 mg     hydrOXYzine (ATARAX) tablet 25 mg     levothyroxine (SYNTHROID/LEVOTHROID) tablet 100 mcg     loperamide (IMODIUM) capsule 2 mg     melatonin tablet 5 mg     multivitamin w/minerals (THERA-VIT-M) tablet 1 tablet     nicotine (NICORETTE) gum 2-4 mg     ondansetron (ZOFRAN ODT) ODT tab 4 mg     pantoprazole (PROTONIX) EC tablet 40 mg     propranolol (INDERAL) tablet 20 mg     senna-docusate (SENOKOT-S/PERICOLACE) 8.6-50 MG per tablet 1 tablet     thiamine (B-1) tablet 100 mg      Review of Systems    The 10 point Review of Systems is negative other than noted in the HPI or here.      Physical Exam   Vital Signs: Temp: 98.6  F (37  C) Temp src: Oral BP: 131/79 Pulse: 87   Resp: 16 SpO2: 95 % O2 Device: None (Room air)    Weight: 145 lbs 0 oz    General Appearance: 63-year-old female  resting comfortably at the edge of her bed slightly anxious, denies pain  Respiratory: Breathing comfortably on room air, crackles present at bilateral bases, no no wheezing or rhonchi auscultated bilaterally  Cardiovascular: Regular rate and rhythm, no appreciable murmurs rubs or gallops  GI: Bowel sounds active, soft, nontender palpation throughout  Skin: Warm, dry, no open sores lesions or ulcerations      Data   Recent Labs   Lab 04/16/23  1017 04/15/23  0801   WBC 7.1 11.6*   HGB 13.1 16.3*   MCV 93 89    280   * 135*   POTASSIUM 3.4 3.6   CHLORIDE 95* 89*   CO2 26 20*   BUN 9.3 10.2   CR 0.83 0.73   ANIONGAP 12 26*   ELVIRA 8.9 9.8   * 110*   ALBUMIN 4.3 4.9   PROTTOTAL 7.6 8.9*   BILITOTAL 1.0 0.4   ALKPHOS 52 64   ALT 17 18   AST 32 35

## 2023-04-16 NOTE — PROGRESS NOTES
SPIRITUAL HEALTH SERVICES  SPIRITUAL ASSESSMENT Progress Note  [facility] ([facility] 3AFH CD   ON-CALL VISIT    REFERRAL SOURCE: Routine request at admission.     I introduced [facility] to patient and assessed for needs. Patient is Restorationist and finds mindfulness particularly helpful and supportive to her sobriety.  She is interested in finding a community that practices a particular type of mindfulness.  She has utilized online resources but knows that gathering in a community will be more supportive to her sobriety.     She is familiar with the Restorationist meditation centers in the [place]. I shared that I am not familiar with additional communities beyond what she is already aware of. She will continue to explore options on her own. She has no additional needs today.    PLAN: Unit  will be notified of visit.     Ricardo Urbina    Pager [phone]    [facility] remains available 24/7 for emergent requests/referrals, either by having the switchboard page the on-call  or by entering an ASAP/STAT consult in Epic (this will also page the on-call ).
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
## 2023-04-16 NOTE — PLAN OF CARE
"WANDER Rivera is a 63 year old female    S = Situation:     Pt voluntarily admitted from [facility] seeking alcohol detox.     B  = Background:     Pt reports drinking 10 beers daily in 4-5 day stretches for \"a couple weeks\" - Pt reports she might drink Gin some days too. Pt reports last drink was last night.   Pt endorses history of withdrawal seizures. Pt reports her last seizure was yesterday - Pt reports she woke up on the floor and was incontinent with urine. Pt denies hitting her head. Pt denies history of delirium tremens.   Pt reports cigarette use. Pt reports she has been sober from heroin for 35 years. Pt reports IV drug use. Pt denied other chemical use.   ALT 18, AST 35  COVID negative  UTOX positive for amphetamine and benzodiazepines. Alcohol breath test: 0.120  Pt treated for withdrawal in the ED - Pt received ativan and valium, per MAR report. Pt received bolus 0.9% sodium chloride IV infusions in the ED, per MAR report.     A  =  Assessment:     Pt scored MSSA 11 - Pt received 10 mg valium. Pt scored MSSA 8 - Pt received 5 mg valium. Pt received a total of 15 mg of valium for symptoms of withdrawal this shift.  Pt endorsed anxiety. Pt endorsed \"bad\" depression. Pt denied SI and SIB. Pt denied pain. Pt alert. Pt independent with steady gait.  Pt presents with flat affect, anxious, irritable, cooperative. Pt visible in the milieu - puzzles, watching TV, card games.  Pt reports nausea. Pt received PRN zofran 4 mg at 1844. Pt reports she was able to eat \"some\" of her dinner.  Pt compliant with scheduled medications. Pt received PRN nicotine gum 4 mg 1 time this shift. Pt received PRN melatonin 5 mg at 2203 for sleep aid.  Vitals: On-call psychiatrist notified by writer over the telephone at 1900 of elevated HR -  and /81. Latest VS: Blood pressure (!) 138/90, pulse 94, temperature 98.1  F (36.7  C), temperature source Temporal, resp. rate 16, height 1.626 m (5' 4\"), weight 65.8 " kg (145 lb), SpO2 95 %, not currently breastfeeding.    Medical Concerns: Pt reports hypothyroidism, GERD, arthritis, and history of hepatitis C. Pt reports hearing difficulty due to ringing in her left ear - Pt denied axillary aids - Pt able to communicate needs. Pt denies other medical concerns - Pt denied diabetes, chronic heart failure, and asthma.   Mental Health History: Pt reports PTSD.   PTA medications reviewed with Pt.   Pt reports she utilized IHS assistance in the past. Pt reports she completed a CADI waiver.    Writer spoke with internal medicine provider, Jayne Davis, over the telephone at 2110. UA resulted abnormal and Na and Cl low in ED - Provider verbalized no further interventions at this time - internal medicine to follow-up during consult tomorrow.     R =   Request or Recommendation:     Pt on MSSA protocol with valium.   Pt to meet with psychiatry, internal medicine, and case management.   Precautions: seizure

## 2023-04-17 VITALS
RESPIRATION RATE: 16 BRPM | SYSTOLIC BLOOD PRESSURE: 151 MMHG | BODY MASS INDEX: 24.75 KG/M2 | DIASTOLIC BLOOD PRESSURE: 85 MMHG | HEIGHT: 64 IN | HEART RATE: 65 BPM | TEMPERATURE: 97.3 F | OXYGEN SATURATION: 96 % | WEIGHT: 145 LBS

## 2023-04-17 PROCEDURE — 250N000013 HC RX MED GY IP 250 OP 250 PS 637: Performed by: CLINICAL NURSE SPECIALIST

## 2023-04-17 PROCEDURE — 250N000013 HC RX MED GY IP 250 OP 250 PS 637: Performed by: PSYCHIATRY & NEUROLOGY

## 2023-04-17 PROCEDURE — 250N000013 HC RX MED GY IP 250 OP 250 PS 637: Performed by: EMERGENCY MEDICINE

## 2023-04-17 PROCEDURE — 250N000011 HC RX IP 250 OP 636: Performed by: CLINICAL NURSE SPECIALIST

## 2023-04-17 PROCEDURE — 250N000013 HC RX MED GY IP 250 OP 250 PS 637: Performed by: PHYSICIAN ASSISTANT

## 2023-04-17 PROCEDURE — 99239 HOSP IP/OBS DSCHRG MGMT >30: CPT | Performed by: PSYCHIATRY & NEUROLOGY

## 2023-04-17 RX ADMIN — THIAMINE HCL TAB 100 MG 100 MG: 100 TAB at 08:42

## 2023-04-17 RX ADMIN — ONDANSETRON 4 MG: 4 TABLET, ORALLY DISINTEGRATING ORAL at 05:49

## 2023-04-17 RX ADMIN — LEVOTHYROXINE SODIUM 100 MCG: 100 TABLET ORAL at 07:11

## 2023-04-17 RX ADMIN — NICOTINE POLACRILEX 4 MG: 2 GUM, CHEWING BUCCAL at 11:43

## 2023-04-17 RX ADMIN — MULTIPLE VITAMINS W/ MINERALS TAB 1 TABLET: TAB at 08:41

## 2023-04-17 RX ADMIN — GABAPENTIN 600 MG: 300 CAPSULE ORAL at 13:22

## 2023-04-17 RX ADMIN — PANTOPRAZOLE SODIUM 40 MG: 40 TABLET, DELAYED RELEASE ORAL at 07:11

## 2023-04-17 RX ADMIN — FOLIC ACID 1 MG: 1 TABLET ORAL at 08:42

## 2023-04-17 RX ADMIN — PROPRANOLOL HYDROCHLORIDE 20 MG: 20 TABLET ORAL at 08:41

## 2023-04-17 RX ADMIN — GABAPENTIN 600 MG: 300 CAPSULE ORAL at 08:42

## 2023-04-17 RX ADMIN — NICOTINE POLACRILEX 4 MG: 2 GUM, CHEWING BUCCAL at 10:13

## 2023-04-17 RX ADMIN — NICOTINE POLACRILEX 4 MG: 2 GUM, CHEWING BUCCAL at 07:11

## 2023-04-17 RX ADMIN — NICOTINE POLACRILEX 4 MG: 2 GUM, CHEWING BUCCAL at 13:22

## 2023-04-17 RX ADMIN — NICOTINE POLACRILEX 4 MG: 2 GUM, CHEWING BUCCAL at 08:41

## 2023-04-17 RX ADMIN — PROPRANOLOL HYDROCHLORIDE 20 MG: 20 TABLET ORAL at 13:22

## 2023-04-17 RX ADMIN — NICOTINE POLACRILEX 4 MG: 2 GUM, CHEWING BUCCAL at 06:04

## 2023-04-17 ASSESSMENT — ACTIVITIES OF DAILY LIVING (ADL)
ADLS_ACUITY_SCORE: 46

## 2023-04-17 NOTE — PLAN OF CARE
"   04/16/23 1959   Group Therapy Session   Group Attendance attended group session   Time Session Began 1645   Time Session Ended 1730   Total Time (minutes) 45   Total # Attendees 7   Group Type psychotherapeutic;psychoeducation   Group Topic Covered balanced lifestyle;coping skills/lifestyle management;disease of addiction/choices in recovery   Group Session Detail Gratitude reflection exercise (\"Blessings of Life\") for developing positive regard for self & others, healthy socialization skills, emotional & psychological awareness, growth mindset, and overall nervous system regulation.    Patient Response/Contribution cooperative with task;discussed personal experience with topic   Patient Participation Detail Full participation in exercise & discussion through listening and turn taking. Able to identify 5-7 items on several categories of gratitude. Forthcoming in her gratitude for having a \"pretty cool life when [I] I don't drink.\"        "

## 2023-04-17 NOTE — PROGRESS NOTES
Patient was out in the lounge for the most part of the shift. She denied pain, anxiety, dep, si/hi/hallucinations. She contracted for safety.   Patient was discharged to home. She had recently refilled her medications,so she does not need any new discharge med. She verbalized understanding of her discharge instructions and meds. She signed out her belonging and was escorted to the exit door at about 1:45pm.   She contracted for safety.

## 2023-04-17 NOTE — PLAN OF CARE
Problem: Sleep Disturbance  Goal: Adequate Sleep/Rest  Outcome: Progressing   Goal Outcome Evaluation:  The Pt has detoxed, and she slept for 6.25 hours. The fifteen minutes safety checks are in progress with no related incidence. Pt took Zofran once.

## 2023-04-17 NOTE — PROGRESS NOTES
Called [facility] at [phone] and left a voicemail for the supervisor Mireya. Requested an immediate call back from either her or the CADI worker Tanisha due to the patient being discharged soon.

## 2023-04-17 NOTE — DISCHARGE SUMMARY
Jolynn Rivera MRN# [id number]   Age: 63 year old YOB: 1960     Date of Admission:  4/15/2023  Date of Discharge:  4/17/2023  Admitting Physician:  Aly Quintanilla MD  Discharge Physician:  Aly Quintanilla MD      DISCHARGE  DX  Alcohol use disorder, severe, dependence Polysubstance abuse  Hx of IVDU  Bipolar disorder, type 2, in partial remission per chart, most recent episode depressed  OMARI  PTSD  Hypothyroidism   Hyponatremia  High anion gap metabolic acidosis   GERD  Hep C s/p treatment in 2015           Event Leading to Hospitalization:     See Admission note by admitting provider for patient encounter. for additional details.          Hospital Course:   PATIENT was admitted to [facility] attending  under Jessica Gonzalez MD veluvali, please review the detailed admit note on 4/16/23   The patient was placed under status 15 (15 minute checks) to ensure patient safety.   MSSA protocol was initiated due to the patient's history of alcohol abuse and concern for withdrawal symptoms.  CBC, BMP and utox obtained.    All outpatient medications were continued    PATIENTdid participate in groups and was visible in the milieu.     The patient's symptoms of withdrawal improved.     Patients energy motivation , sleep appetite improved.  Pt completed detox . It was un eventful.        Spoke with patient about triggers coping skills relapse prevention.    CONSULTS DONE DURING PATIENTS HOSPITALIZATION.  Patient was seen by medicine on date4/16/23    This as per their medical consult  History of Present Illness   Jolynn Rivera is a 63 year old female who has a past medical history of alcohol abuse with multiple recent admissions for detox, COPD, PTSD, tobacco use, hepatitis C status posttreatment, GERD, depression and anxiety, hypothyroidism who was admitted to inpatient detox on 4/15/2023.  Internal medicine was consulted to help comanage her medical issues while going through  detox.     Ms. Rivera currently denies any chest pain, shortness of breath, fevers, chills, abdominal pain, nausea or vomiting.  She feels extremely fatigued and a little shaky.  She reports having seizures during prior withdrawals but has not had any thus far.  She was able to eat breakfast and lunch this afternoon but has not been drinking very much water.  She seems dedicated to becoming sober despite her multiple relapses.  Her vital signs include temperature 98.6  F, heart rate 87, /79 and she is 95% on room air.  Lab work includes BMP, CBC that revealed no acute abnormalities.  We discussed her plan of care, the MSSA program with Valium and she had no further questions or concerns.        Assessment & Plan  # Alcohol intoxication with withdrawal  # History of seizures while going through withdrawal  # Depression and anxiety  -Per primary psychiatry team  -Continue with [facility] protocol using Valium  -Continue multivitamin with minerals, folic acid and thiamine replacement  -Continue gabapentin 600 mg a mouth 3 times daily, propranolol 20 mg a mouth 3 times daily with Atarax as needed     #Hypothyroidism  -Continue levothyroxine 100 mcg by mouth once daily     #GERD  -Continue Protonix 40 mg a mouth once daily     #Hepatitis C status posttreatment with confirm cure  -She completed 8 weeks of Harvoni in 2015              Pt was seen by cm  As per recommendations from cm    Writer checked-in and introduced role to pt's care and how to assist pt during their stay. Inquired with pt about what they are needing during their stay and what they are considering for their aftercare plans. Pt processed that her biggest luis a is the poor communication and follow through with her CADI. Pt notes that she finally got set up with a CADI worker Tanisha through [email or web address]. Notes that the CADI is supposed to help with an  S worker to help support her in the community to help her with being more engaged in the community  noting that is a big part of helping her stay sober. Pt notes that she still is going to individual therapy to do EMDR every other week, started a co-occurring group therapy every Thursday. Continues with her Psychiatry. Pt notes that she also got set up with a  and will also start volunteering at her peer support group and engaging in activities with [place] sober community. Pt notes that she is doing all she can and reiterates not having the full support and services/funding through her CADI is a barrier. Pt notes that this past relapse lasted about a couple of weeks noting drinking 10 Hamms beers daily. Processed with pt about CM to follow up with the CADI worker to understand the barriers with CADI services. Pt signed JUANY'S for [facility] (for coordination with her psychiatry and therapy team) and for [facility].     CM Faxed over ROIS to [facility] and [facility]        CM to follow up with pt tomorrow and with CADI worker and further discuss aftercare services.          Labs:reviewed with patient       Recent Results (from the past 48 hour(s))   UA with Microscopic reflex to Culture    Collection Time: 04/15/23  5:22 PM    Specimen: Urine, Clean Catch   Result Value Ref Range    Color Urine Yellow Colorless, Straw, Light Yellow, Yellow    Appearance Urine Clear Clear    Glucose Urine Negative Negative mg/dL    Bilirubin Urine Negative Negative    Ketones Urine 80 (A) Negative mg/dL    Specific Gravity Urine 1.023 1.003 - 1.035    Blood Urine Negative Negative    pH Urine 5.5 5.0 - 7.0    Protein Albumin Urine 50 (A) Negative mg/dL    Urobilinogen Urine Normal Normal, 2.0 mg/dL    Nitrite Urine Negative Negative    Leukocyte Esterase Urine Trace (A) Negative    Mucus Urine Present (A) None Seen /LPF    RBC Urine <1 <=2 /HPF    WBC Urine 6 (H) <=5 /HPF    Squamous Epithelials Urine 2 (H) <=1 /HPF    Transitional Epithelials Urine <1 <=1 /HPF    Hyaline Casts Urine 13 (H) <=2 /LPF   Drug  abuse screen 1 urine (ED)    Collection Time: 04/15/23  5:22 PM   Result Value Ref Range    Amphetamines Urine Screen Positive (A) Screen Negative    Barbituates Urine Screen Negative Screen Negative    Benzodiazepine Urine Screen Positive (A) Screen Negative    Cannabinoids Urine Screen Negative Screen Negative    Cocaine Urine Screen Negative Screen Negative    Opiates Urine Screen Negative Screen Negative   GGT    Collection Time: 04/15/23 10:18 PM   Result Value Ref Range    GGT 15 5 - 36 U/L   Comprehensive metabolic panel    Collection Time: 04/16/23 10:17 AM   Result Value Ref Range    Sodium 133 (L) 136 - 145 mmol/L    Potassium 3.4 3.4 - 5.3 mmol/L    Chloride 95 (L) 98 - 107 mmol/L    Carbon Dioxide (CO2) 26 22 - 29 mmol/L    Anion Gap 12 7 - 15 mmol/L    Urea Nitrogen 9.3 8.0 - 23.0 mg/dL    Creatinine 0.83 0.51 - 0.95 mg/dL    Calcium 8.9 8.8 - 10.2 mg/dL    Glucose 109 (H) 70 - 99 mg/dL    Alkaline Phosphatase 52 35 - 104 U/L    AST 32 10 - 35 U/L    ALT 17 10 - 35 U/L    Protein Total 7.6 6.4 - 8.3 g/dL    Albumin 4.3 3.5 - 5.2 g/dL    Bilirubin Total 1.0 <=1.2 mg/dL    GFR Estimate 79 >60 mL/min/1.73m2   CBC with platelets and differential    Collection Time: 04/16/23 10:17 AM   Result Value Ref Range    WBC Count 7.1 4.0 - 11.0 10e3/uL    RBC Count 3.92 3.80 - 5.20 10e6/uL    Hemoglobin 13.1 11.7 - 15.7 g/dL    Hematocrit 36.3 35.0 - 47.0 %    MCV 93 78 - 100 fL    MCH 33.4 (H) 26.5 - 33.0 pg    MCHC 36.1 31.5 - 36.5 g/dL    RDW 12.4 10.0 - 15.0 %    Platelet Count 214 150 - 450 10e3/uL    % Neutrophils 49 %    % Lymphocytes 34 %    % Monocytes 16 %    % Eosinophils 0 %    % Basophils 1 %    % Immature Granulocytes 0 %    NRBCs per 100 WBC 0 <1 /100    Absolute Neutrophils 3.5 1.6 - 8.3 10e3/uL    Absolute Lymphocytes 2.4 0.8 - 5.3 10e3/uL    Absolute Monocytes 1.1 0.0 - 1.3 10e3/uL    Absolute Eosinophils 0.0 0.0 - 0.7 10e3/uL    Absolute Basophils 0.0 0.0 - 0.2 10e3/uL    Absolute Immature  Granulocytes 0.0 <=0.4 10e3/uL    Absolute NRBCs 0.0 10e3/uL         Recent Results (from the past 240 hour(s))   Comprehensive metabolic panel    Collection Time: 04/15/23  8:01 AM   Result Value Ref Range    Sodium 135 (L) 136 - 145 mmol/L    Potassium 3.6 3.4 - 5.3 mmol/L    Chloride 89 (L) 98 - 107 mmol/L    Carbon Dioxide (CO2) 20 (L) 22 - 29 mmol/L    Anion Gap 26 (H) 7 - 15 mmol/L    Urea Nitrogen 10.2 8.0 - 23.0 mg/dL    Creatinine 0.73 0.51 - 0.95 mg/dL    Calcium 9.8 8.8 - 10.2 mg/dL    Glucose 110 (H) 70 - 99 mg/dL    Alkaline Phosphatase 64 35 - 104 U/L    AST 35 10 - 35 U/L    ALT 18 10 - 35 U/L    Protein Total 8.9 (H) 6.4 - 8.3 g/dL    Albumin 4.9 3.5 - 5.2 g/dL    Bilirubin Total 0.4 <=1.2 mg/dL    GFR Estimate >90 >60 mL/min/1.73m2   Magnesium    Collection Time: 04/15/23  8:01 AM   Result Value Ref Range    Magnesium 1.7 1.7 - 2.3 mg/dL   CBC with platelets and differential    Collection Time: 04/15/23  8:01 AM   Result Value Ref Range    WBC Count 11.6 (H) 4.0 - 11.0 10e3/uL    RBC Count 5.07 3.80 - 5.20 10e6/uL    Hemoglobin 16.3 (H) 11.7 - 15.7 g/dL    Hematocrit 45.1 35.0 - 47.0 %    MCV 89 78 - 100 fL    MCH 32.1 26.5 - 33.0 pg    MCHC 36.1 31.5 - 36.5 g/dL    RDW 12.1 10.0 - 15.0 %    Platelet Count 280 150 - 450 10e3/uL    % Neutrophils 71 %    % Lymphocytes 24 %    % Monocytes 5 %    % Eosinophils 0 %    % Basophils 0 %    % Immature Granulocytes 0 %    NRBCs per 100 WBC 0 <1 /100    Absolute Neutrophils 8.1 1.6 - 8.3 10e3/uL    Absolute Lymphocytes 2.8 0.8 - 5.3 10e3/uL    Absolute Monocytes 0.6 0.0 - 1.3 10e3/uL    Absolute Eosinophils 0.0 0.0 - 0.7 10e3/uL    Absolute Basophils 0.0 0.0 - 0.2 10e3/uL    Absolute Immature Granulocytes 0.0 <=0.4 10e3/uL    Absolute NRBCs 0.0 10e3/uL   Lipid panel reflex to direct LDL    Collection Time: 04/15/23  8:01 AM   Result Value Ref Range    Cholesterol 201 (H) <200 mg/dL    Triglycerides 56 <150 mg/dL    Direct Measure  >=50 mg/dL    LDL  Cholesterol Calculated 90 <=100 mg/dL    Non HDL Cholesterol 101 <130 mg/dL   TSH with free T4 reflex    Collection Time: 04/15/23  8:01 AM   Result Value Ref Range    TSH 0.30 0.30 - 4.20 uIU/mL   Hemoglobin A1c    Collection Time: 04/15/23  8:01 AM   Result Value Ref Range    Hemoglobin A1C 5.9 (H) <5.7 %   Asymptomatic COVID-19 Virus (Coronavirus) by PCR Nasopharyngeal    Collection Time: 04/15/23  8:02 AM    Specimen: Nasopharyngeal; Swab   Result Value Ref Range    SARS CoV2 PCR Negative Negative   Alcohol breath test POCT    Collection Time: 04/15/23  8:21 AM   Result Value Ref Range    Alcohol Breath Test 0.120 (A) 0.00 - 0.01   UA with Microscopic reflex to Culture    Collection Time: 04/15/23  5:22 PM    Specimen: Urine, Clean Catch   Result Value Ref Range    Color Urine Yellow Colorless, Straw, Light Yellow, Yellow    Appearance Urine Clear Clear    Glucose Urine Negative Negative mg/dL    Bilirubin Urine Negative Negative    Ketones Urine 80 (A) Negative mg/dL    Specific Gravity Urine 1.023 1.003 - 1.035    Blood Urine Negative Negative    pH Urine 5.5 5.0 - 7.0    Protein Albumin Urine 50 (A) Negative mg/dL    Urobilinogen Urine Normal Normal, 2.0 mg/dL    Nitrite Urine Negative Negative    Leukocyte Esterase Urine Trace (A) Negative    Mucus Urine Present (A) None Seen /LPF    RBC Urine <1 <=2 /HPF    WBC Urine 6 (H) <=5 /HPF    Squamous Epithelials Urine 2 (H) <=1 /HPF    Transitional Epithelials Urine <1 <=1 /HPF    Hyaline Casts Urine 13 (H) <=2 /LPF   Drug abuse screen 1 urine (ED)    Collection Time: 04/15/23  5:22 PM   Result Value Ref Range    Amphetamines Urine Screen Positive (A) Screen Negative    Barbituates Urine Screen Negative Screen Negative    Benzodiazepine Urine Screen Positive (A) Screen Negative    Cannabinoids Urine Screen Negative Screen Negative    Cocaine Urine Screen Negative Screen Negative    Opiates Urine Screen Negative Screen Negative   GGT    Collection Time: 04/15/23  10:18 PM   Result Value Ref Range    GGT 15 5 - 36 U/L   Comprehensive metabolic panel    Collection Time: 04/16/23 10:17 AM   Result Value Ref Range    Sodium 133 (L) 136 - 145 mmol/L    Potassium 3.4 3.4 - 5.3 mmol/L    Chloride 95 (L) 98 - 107 mmol/L    Carbon Dioxide (CO2) 26 22 - 29 mmol/L    Anion Gap 12 7 - 15 mmol/L    Urea Nitrogen 9.3 8.0 - 23.0 mg/dL    Creatinine 0.83 0.51 - 0.95 mg/dL    Calcium 8.9 8.8 - 10.2 mg/dL    Glucose 109 (H) 70 - 99 mg/dL    Alkaline Phosphatase 52 35 - 104 U/L    AST 32 10 - 35 U/L    ALT 17 10 - 35 U/L    Protein Total 7.6 6.4 - 8.3 g/dL    Albumin 4.3 3.5 - 5.2 g/dL    Bilirubin Total 1.0 <=1.2 mg/dL    GFR Estimate 79 >60 mL/min/1.73m2   CBC with platelets and differential    Collection Time: 04/16/23 10:17 AM   Result Value Ref Range    WBC Count 7.1 4.0 - 11.0 10e3/uL    RBC Count 3.92 3.80 - 5.20 10e6/uL    Hemoglobin 13.1 11.7 - 15.7 g/dL    Hematocrit 36.3 35.0 - 47.0 %    MCV 93 78 - 100 fL    MCH 33.4 (H) 26.5 - 33.0 pg    MCHC 36.1 31.5 - 36.5 g/dL    RDW 12.4 10.0 - 15.0 %    Platelet Count 214 150 - 450 10e3/uL    % Neutrophils 49 %    % Lymphocytes 34 %    % Monocytes 16 %    % Eosinophils 0 %    % Basophils 1 %    % Immature Granulocytes 0 %    NRBCs per 100 WBC 0 <1 /100    Absolute Neutrophils 3.5 1.6 - 8.3 10e3/uL    Absolute Lymphocytes 2.4 0.8 - 5.3 10e3/uL    Absolute Monocytes 1.1 0.0 - 1.3 10e3/uL    Absolute Eosinophils 0.0 0.0 - 0.7 10e3/uL    Absolute Basophils 0.0 0.0 - 0.2 10e3/uL    Absolute Immature Granulocytes 0.0 <=0.4 10e3/uL    Absolute NRBCs 0.0 10e3/uL            Because this patient meets criteria for an Alcohol Use Disorder, I performed the following brief intervention on the date of this note:              1) Expressed concern that the patient is drinking at unhealthy levels known to increase their risk of alcohol related problems              2) Gave feedback linking alcohol use and health, including personalized feedback explaining  how alcohol use can interact with their medical and/or psychiatric problems, and with prescribed medications.              3) Advised patient to abstain.    PT counseled on nicotine cessation and nicotine replacement provided    Counseled the patient on the importance of having a recovery program in addition to medication to manage recovery.  Components include avoiding isolating, having willingness to change, avoiding triggers and managing cravings. Encouraged having some type of sober network and practicing honesty with trusted support person(s).     Discussed with patient many issues of addiction,triggers, relapse, and establishing a solid recovery program.    DISCHARGE MENTAL STATUS EXAMINATION:  The patient is alert, oriented x3.  Good fund of knowledge.  Good use of language.  Recent and remote memory, language, fund of knowledge are all adequate.  Euthymic mood congruent affect  Speech normal rate/rhythm linear tp no loose asso,The patient does not have any active suicidal or homicidal ideation.  Does not have any auditory or visual hallucination.  Fair insight/judgment At this time, the patient was stable to be discharged.        Pt was not determined to not be a danger to himself or others. At the current time of discharge, the patient does not meet criteria for involuntary hospitalization. On the day of discharge, the patient reports that they do not have suicidal or homicidal ideation and would never hurt themselves or others. Steps taken to minimize risk include: assessing patient s behavior and thought process daily during hospital stay, discharging patient with adequate plan for follow up for mental and physical health and discussing safety plan of returning to the hospital should the patient ever have thoughts of harming themselves or others. Therefore, based on all available evidence including the factors cited above, the patient does not appear to be at imminent risk for self-harm, and is  appropriate for outpatient level of care.     Educated about side effects/risk vs benefits /alternative including non treatment.Pt consented to be on medication.     .Total time spent on discharge summary more than 35 min  More than  20 min  planning, coordination of care, medication reconciliation and performance of physical exam on day of discharge.Care was coordinated with unit RN and unit therapist         Review of your medicines      UNREVIEWED medicines. Ask your doctor about these medicines      Dose / Directions   QUEtiapine 25 MG tablet  Commonly known as: SEROquel  Used for: Mood disorder, Posttraumatic stress disorder      Dose: 25 mg  Take 1 tablet (25 mg) by mouth At Bedtime  Quantity: 30 tablet  Refills: 2        CONTINUE these medicines which have NOT CHANGED      Dose / Directions   CALCIUM PO      Take by mouth daily  Refills: 0     gabapentin 600 MG tablet  Commonly known as: NEURONTIN      Dose: 600 mg  Take 600 mg by mouth 3 times daily  Refills: 0     levothyroxine 100 MCG tablet  Commonly known as: SYNTHROID/LEVOTHROID      Dose: 100 mcg  Take 100 mcg by mouth daily  Refills: 0     melatonin 5 MG tablet  Used for: Alcohol dependence with intoxication with complication (H)      Dose: 5 mg  Take 1 tablet (5 mg) by mouth nightly as needed for sleep  Quantity: 30 tablet  Refills: 0     multivitamin w/minerals tablet  Used for: Alcohol dependence with intoxication with complication (H)      Dose: 1 tablet  Take 1 tablet by mouth daily  Quantity: 30 tablet  Refills: 0     omeprazole 40 MG DR capsule  Commonly known as: priLOSEC      Dose: 40 mg  Take 40 mg by mouth At Bedtime  Refills: 0     propranolol 20 MG tablet  Commonly known as: INDERAL      Dose: 20 mg  Take 20 mg by mouth 3 times daily  Refills: 0     thiamine 100 MG tablet  Commonly known as: B-1  Used for: Alcohol dependence with intoxication with complication (H)      Dose: 100 mg  Take 1 tablet (100 mg) by mouth daily  Quantity: 30  "tablet  Refills: 0     VITAMIN D3 PO      Take by mouth daily  Refills: 0             Disposition: home     Facts about COVID19 at www.cdc.gov/COVID19 and www.[place].gov/covid19     Keeping hands clean is one of the most important steps we can take to avoid getting sick and spreading germs to others.  Please wash your hands frequently and lather with soap for at least 20 seconds!     Medical Follow-Up:dr simmons 4/19/23       Treatment Follow-Up: [facility] /individual therapy to do EMDR every other week, started a co-occurring group therapy every Thursday.  .        \"Much or all of the text in this note was generated through the use of Dragon Dictate voice to text software. Errors in spelling or words which appear to be out of contact are unintentional, may be present due having escaped editing\"     "

## 2023-04-17 NOTE — PLAN OF CARE
Behavioral Team Discussion: (4/17/2023)    Continued Stay Criteria/Rationale: Patient admitted for Alcohol  Use Disorder.  Plan: The following services will be provided to the patient; psychiatric assessment, medication management, therapeutic milieu, individual and group support, and skills groups.   Participants: 3A Provider: Dr. Aly Quintanilla MD; 3A RN: Bruce Escobar, RN; 3A CM's: Marika Bhandari.  Summary/Recommendation: Providers will assess today for treatment recommendations, discharge planning, and aftercare plans. CM will meet with pt for discharge planning.   Medical/Physical: Patient   Precautions:   Behavioral Orders   Procedures     Code 1 - Restrict to Unit     Discontinue 1:1 attendant for suicide risk     Order Specific Question:   I have performed an in person assessment of the patient     Answer:   Based on this assessment the patient no longer requires a one on one attendant at this point in time.     Order Specific Question:   Rationale     Answer:   Patient States able to remain safe in hospital     Routine Programming     As clinically indicated     Seizure precautions     Status 15     Every 15 minutes.     Withdrawal precautions     Rationale for change in precautions or plan: N/A  Progress: Initial.    ASAM Dimension Scale Ratings:  Dimension 1: 3 Client tolerates and rahul with withdrawal discomfort poorly. Client has severe intoxication, such that the client endangers self or others, or intoxication has not abated with less intensive levels of services. Client displays severe signs and symptoms; or risk of severe, but manageable withdrawal; or withdrawal worsening despite detox at less intensive level.  Dimension 2: 2 Client has difficulty tolerating and coping with physical problems or has other biomedical problems that interfere with recovery and treatment. Client neglects or does not seek care for serious biomedical problems.  Dimension 3: 3 Client has a severe lack of impulse  control and coping skills. Client has frequent thoughts of suicide or harm to others including a plan and the means to carry out the plan. In addition, the client is severely impaired in significant life areas and has severe symptoms of emotional, behavioral, or cognitive problems that interfere with the client ability to participate in treatment activities.  Dimension 4: 3 Client displays inconsistent compliance, minimal awareness of either the client's addiction or mental disorder, and is minimally cooperative.  Dimension 5: 3 Client has poor recognition and understanding of relapse and recidivism issues and displays moderately high vulnerability for further substance use or mental health problems. Client has few coping skills and rarely applies coping skills.  Dimension 6: 3 Client is not engaged in structured, meaningful activity and the client's peers, family, significant other, and living environment are unsupportive, or there is significant criminal justice system involvement.               PT SEEN   AGREE WITH ASSESSMENT AND PLAN

## 2023-04-19 ENCOUNTER — PATIENT OUTREACH (OUTPATIENT)
Dept: CARE COORDINATION | Facility: CLINIC | Age: 63
End: 2023-04-19
Payer: MEDICARE

## 2023-04-19 NOTE — PROGRESS NOTES
[facility]    Background: Transitional Care Management program identified per system criteria and reviewed by [facility] team for possible outreach.    Assessment: Upon chart review, [facility] Team member will not proceed with patient outreach related to this episode of Transitional Care Management program due to reason below:    Patient has a follow up appointment with an appropriate provider today for hospital discharge    Plan: Transitional Care Management episode addressed appropriately per reason noted above.      COLLIN Kiran  [facility], [facility]    *Connected Care Resource Team does NOT follow patient ongoing. Referrals are identified based on internal discharge reports and the outreach is to ensure patient has an understanding of their discharge instructions.

## 2023-05-27 ENCOUNTER — HOSPITAL ENCOUNTER (EMERGENCY)
Facility: CLINIC | Age: 63
Discharge: HOME | DRG: 897 | End: 2023-05-27
Attending: STUDENT IN AN ORGANIZED HEALTH CARE EDUCATION/TRAINING PROGRAM | Admitting: STUDENT IN AN ORGANIZED HEALTH CARE EDUCATION/TRAINING PROGRAM
Payer: MEDICARE

## 2023-05-27 ENCOUNTER — TELEPHONE (OUTPATIENT)
Dept: BEHAVIORAL HEALTH | Facility: CLINIC | Age: 63
End: 2023-05-27

## 2023-05-27 VITALS
OXYGEN SATURATION: 95 % | TEMPERATURE: 97.9 F | WEIGHT: 159 LBS | SYSTOLIC BLOOD PRESSURE: 109 MMHG | DIASTOLIC BLOOD PRESSURE: 71 MMHG | BODY MASS INDEX: 29.26 KG/M2 | HEIGHT: 62 IN | RESPIRATION RATE: 16 BRPM | HEART RATE: 77 BPM

## 2023-05-27 DIAGNOSIS — F10.920 ALCOHOLIC INTOXICATION WITHOUT COMPLICATION (H): ICD-10-CM

## 2023-05-27 LAB
ALBUMIN SERPL BCG-MCNC: 4.3 G/DL (ref 3.5–5.2)
ALCOHOL BREATH TEST: 0.27 (ref 0–0.01)
ALP SERPL-CCNC: 47 U/L (ref 35–104)
ALT SERPL W P-5'-P-CCNC: 19 U/L (ref 10–35)
AMPHETAMINES UR QL SCN: NORMAL
ANION GAP SERPL CALCULATED.3IONS-SCNC: 14 MMOL/L (ref 7–15)
AST SERPL W P-5'-P-CCNC: 33 U/L (ref 10–35)
BARBITURATES UR QL SCN: NORMAL
BASOPHILS # BLD AUTO: 0.1 10E3/UL (ref 0–0.2)
BASOPHILS NFR BLD AUTO: 1 %
BENZODIAZ UR QL SCN: NORMAL
BILIRUB SERPL-MCNC: 0.2 MG/DL
BUN SERPL-MCNC: 7 MG/DL (ref 8–23)
BZE UR QL SCN: NORMAL
CALCIUM SERPL-MCNC: 9 MG/DL (ref 8.8–10.2)
CANNABINOIDS UR QL SCN: NORMAL
CHLORIDE SERPL-SCNC: 98 MMOL/L (ref 98–107)
CREAT SERPL-MCNC: 0.58 MG/DL (ref 0.51–0.95)
DEPRECATED HCO3 PLAS-SCNC: 23 MMOL/L (ref 22–29)
EOSINOPHIL # BLD AUTO: 0 10E3/UL (ref 0–0.7)
EOSINOPHIL NFR BLD AUTO: 1 %
ERYTHROCYTE [DISTWIDTH] IN BLOOD BY AUTOMATED COUNT: 13.2 % (ref 10–15)
GFR SERPL CREATININE-BSD FRML MDRD: >90 ML/MIN/1.73M2
GLUCOSE SERPL-MCNC: 92 MG/DL (ref 70–99)
HCT VFR BLD AUTO: 37.2 % (ref 35–47)
HGB BLD-MCNC: 13.3 G/DL (ref 11.7–15.7)
IMM GRANULOCYTES # BLD: 0 10E3/UL
IMM GRANULOCYTES NFR BLD: 0 %
LIPASE SERPL-CCNC: 46 U/L (ref 13–60)
LYMPHOCYTES # BLD AUTO: 3.1 10E3/UL (ref 0.8–5.3)
LYMPHOCYTES NFR BLD AUTO: 51 %
MCH RBC QN AUTO: 33.2 PG (ref 26.5–33)
MCHC RBC AUTO-ENTMCNC: 35.8 G/DL (ref 31.5–36.5)
MCV RBC AUTO: 93 FL (ref 78–100)
MONOCYTES # BLD AUTO: 1 10E3/UL (ref 0–1.3)
MONOCYTES NFR BLD AUTO: 16 %
NEUTROPHILS # BLD AUTO: 1.9 10E3/UL (ref 1.6–8.3)
NEUTROPHILS NFR BLD AUTO: 31 %
NRBC # BLD AUTO: 0 10E3/UL
NRBC BLD AUTO-RTO: 0 /100
OPIATES UR QL SCN: NORMAL
PLATELET # BLD AUTO: 292 10E3/UL (ref 150–450)
POTASSIUM SERPL-SCNC: 3.6 MMOL/L (ref 3.4–5.3)
PROT SERPL-MCNC: 7.6 G/DL (ref 6.4–8.3)
RBC # BLD AUTO: 4.01 10E6/UL (ref 3.8–5.2)
SARS-COV-2 RNA RESP QL NAA+PROBE: NEGATIVE
SODIUM SERPL-SCNC: 135 MMOL/L (ref 136–145)
WBC # BLD AUTO: 6.1 10E3/UL (ref 4–11)

## 2023-05-27 PROCEDURE — 87635 SARS-COV-2 COVID-19 AMP PRB: CPT | Performed by: STUDENT IN AN ORGANIZED HEALTH CARE EDUCATION/TRAINING PROGRAM

## 2023-05-27 PROCEDURE — 96360 HYDRATION IV INFUSION INIT: CPT | Performed by: STUDENT IN AN ORGANIZED HEALTH CARE EDUCATION/TRAINING PROGRAM

## 2023-05-27 PROCEDURE — 99284 EMERGENCY DEPT VISIT MOD MDM: CPT | Performed by: STUDENT IN AN ORGANIZED HEALTH CARE EDUCATION/TRAINING PROGRAM

## 2023-05-27 PROCEDURE — 258N000003 HC RX IP 258 OP 636: Performed by: STUDENT IN AN ORGANIZED HEALTH CARE EDUCATION/TRAINING PROGRAM

## 2023-05-27 PROCEDURE — 99283 EMERGENCY DEPT VISIT LOW MDM: CPT | Mod: 25 | Performed by: STUDENT IN AN ORGANIZED HEALTH CARE EDUCATION/TRAINING PROGRAM

## 2023-05-27 PROCEDURE — 80307 DRUG TEST PRSMV CHEM ANLYZR: CPT | Performed by: STUDENT IN AN ORGANIZED HEALTH CARE EDUCATION/TRAINING PROGRAM

## 2023-05-27 PROCEDURE — 36415 COLL VENOUS BLD VENIPUNCTURE: CPT | Performed by: STUDENT IN AN ORGANIZED HEALTH CARE EDUCATION/TRAINING PROGRAM

## 2023-05-27 PROCEDURE — 85025 COMPLETE CBC W/AUTO DIFF WBC: CPT | Performed by: STUDENT IN AN ORGANIZED HEALTH CARE EDUCATION/TRAINING PROGRAM

## 2023-05-27 PROCEDURE — 82075 ASSAY OF BREATH ETHANOL: CPT | Performed by: STUDENT IN AN ORGANIZED HEALTH CARE EDUCATION/TRAINING PROGRAM

## 2023-05-27 PROCEDURE — 250N000013 HC RX MED GY IP 250 OP 250 PS 637: Performed by: STUDENT IN AN ORGANIZED HEALTH CARE EDUCATION/TRAINING PROGRAM

## 2023-05-27 PROCEDURE — 80053 COMPREHEN METABOLIC PANEL: CPT | Performed by: STUDENT IN AN ORGANIZED HEALTH CARE EDUCATION/TRAINING PROGRAM

## 2023-05-27 PROCEDURE — C9803 HOPD COVID-19 SPEC COLLECT: HCPCS | Performed by: STUDENT IN AN ORGANIZED HEALTH CARE EDUCATION/TRAINING PROGRAM

## 2023-05-27 PROCEDURE — 83690 ASSAY OF LIPASE: CPT | Performed by: STUDENT IN AN ORGANIZED HEALTH CARE EDUCATION/TRAINING PROGRAM

## 2023-05-27 RX ORDER — OLANZAPINE 5 MG/1
5-10 TABLET, ORALLY DISINTEGRATING ORAL EVERY 6 HOURS PRN
Status: CANCELLED | OUTPATIENT
Start: 2023-05-27

## 2023-05-27 RX ORDER — DIAZEPAM 5 MG
5-20 TABLET ORAL EVERY 30 MIN PRN
Status: DISCONTINUED | OUTPATIENT
Start: 2023-05-27 | End: 2023-05-28 | Stop reason: HOSPADM

## 2023-05-27 RX ORDER — HALOPERIDOL 5 MG/ML
2.5-5 INJECTION INTRAMUSCULAR EVERY 6 HOURS PRN
Status: CANCELLED | OUTPATIENT
Start: 2023-05-27

## 2023-05-27 RX ORDER — MULTIPLE VITAMINS W/ MINERALS TAB 9MG-400MCG
1 TAB ORAL DAILY
Status: DISCONTINUED | OUTPATIENT
Start: 2023-05-28 | End: 2023-05-28 | Stop reason: HOSPADM

## 2023-05-27 RX ORDER — POLYETHYLENE GLYCOL 3350 17 G/17G
17 POWDER, FOR SOLUTION ORAL DAILY PRN
Status: CANCELLED | OUTPATIENT
Start: 2023-05-27

## 2023-05-27 RX ORDER — SODIUM CHLORIDE 9 MG/ML
INJECTION, SOLUTION INTRAVENOUS CONTINUOUS
Status: DISCONTINUED | OUTPATIENT
Start: 2023-05-27 | End: 2023-05-28 | Stop reason: HOSPADM

## 2023-05-27 RX ORDER — ALCOHOL 70.47 ML/100ML
1 GEL TOPICAL DAILY
Status: CANCELLED | OUTPATIENT
Start: 2023-05-28

## 2023-05-27 RX ORDER — FOLIC ACID 1 MG/1
1 TABLET ORAL DAILY
Status: CANCELLED | OUTPATIENT
Start: 2023-05-28

## 2023-05-27 RX ORDER — ACETAMINOPHEN 325 MG/1
650 TABLET ORAL EVERY 4 HOURS PRN
Status: CANCELLED | OUTPATIENT
Start: 2023-05-27

## 2023-05-27 RX ORDER — GABAPENTIN 300 MG/1
600 CAPSULE ORAL 3 TIMES DAILY
Status: CANCELLED | OUTPATIENT
Start: 2023-05-28

## 2023-05-27 RX ORDER — FOLIC ACID 1 MG/1
1 TABLET ORAL DAILY
Status: DISCONTINUED | OUTPATIENT
Start: 2023-05-28 | End: 2023-05-28 | Stop reason: HOSPADM

## 2023-05-27 RX ORDER — LEVOTHYROXINE SODIUM 100 UG/1
100 TABLET ORAL
Status: CANCELLED | OUTPATIENT
Start: 2023-05-28

## 2023-05-27 RX ORDER — LORAZEPAM 1 MG/1
1-2 TABLET ORAL EVERY 30 MIN PRN
Status: CANCELLED | OUTPATIENT
Start: 2023-05-27

## 2023-05-27 RX ORDER — QUETIAPINE FUMARATE 25 MG/1
25 TABLET, FILM COATED ORAL AT BEDTIME
Status: CANCELLED | OUTPATIENT
Start: 2023-05-27

## 2023-05-27 RX ORDER — MAGNESIUM HYDROXIDE/ALUMINUM HYDROXICE/SIMETHICONE 120; 1200; 1200 MG/30ML; MG/30ML; MG/30ML
30 SUSPENSION ORAL EVERY 4 HOURS PRN
Status: CANCELLED | OUTPATIENT
Start: 2023-05-27

## 2023-05-27 RX ORDER — HYDROXYZINE HYDROCHLORIDE 25 MG/1
25 TABLET, FILM COATED ORAL EVERY 4 HOURS PRN
Status: CANCELLED | OUTPATIENT
Start: 2023-05-27

## 2023-05-27 RX ORDER — FLUMAZENIL 0.1 MG/ML
0.2 INJECTION, SOLUTION INTRAVENOUS
Status: CANCELLED | OUTPATIENT
Start: 2023-05-27

## 2023-05-27 RX ORDER — ESZOPICLONE 3 MG/1
3 TABLET, FILM COATED ORAL AT BEDTIME
COMMUNITY
Start: 2023-04-19

## 2023-05-27 RX ORDER — LORAZEPAM 2 MG/ML
1-2 INJECTION INTRAMUSCULAR EVERY 30 MIN PRN
Status: CANCELLED | OUTPATIENT
Start: 2023-05-27

## 2023-05-27 RX ADMIN — SODIUM CHLORIDE 1000 ML: 900 INJECTION, SOLUTION INTRAVENOUS at 21:27

## 2023-05-27 RX ADMIN — DIAZEPAM 10 MG: 5 TABLET ORAL at 21:25

## 2023-05-27 ASSESSMENT — ACTIVITIES OF DAILY LIVING (ADL)
ADLS_ACUITY_SCORE: 37
ADLS_ACUITY_SCORE: 37

## 2023-05-27 NOTE — ED TRIAGE NOTES
Alcohol Problem Pt seeking detox from etoh. Last drink 1 hr PTA. Drinks about 12 beers daily. Denies drug use. Endorses hx withdrawal seizures, states that she had 3 unwitnessed seizures last night.         Triage Assessment     Row Name 05/27/23 2378       Triage Assessment (Adult)    Airway WDL WDL       Respiratory WDL    Respiratory WDL WDL       Skin Circulation/Temperature WDL    Skin Circulation/Temperature WDL WDL       Cardiac WDL    Cardiac WDL WDL       Peripheral/Neurovascular WDL    Peripheral Neurovascular WDL WDL       Cognitive/Neuro/Behavioral WDL    Cognitive/Neuro/Behavioral WDL WDL

## 2023-05-28 NOTE — CONSULTS
Name: Jolynn Rivrea   : 1960  Date: 2023  Time: 10:22pm  Location of patient: [facility] ED  Location of doctor: [facility]  Spoke with: Glendy  HPI: The patient is a 63-year-old female with a past psychiatric history of bipolar disorder and alcohol use disorder who presents voluntary requesting alcohol detoxification. She noted a history of DTs and withdrawal seizures, noting a possible seizure last night though unwitnessed. Her last alcoholic beverage was at 3pm today and she noted drinking 12 beers daily for the past couple of weeks. Her alcohol breath test on presentation was 0.276 but all other labs noncontributory. She denies any SI, HI, or AVH. She s prescribed psychiatric medications and it s unclear her compliance level. She has a medical history of hypothyroidism.   Plan: Voluntary admission to behavioral health (detox)

## 2023-05-28 NOTE — TELEPHONE ENCOUNTER
S: [facility] , Provider Heath calling at 8:24pm with clinical on a 63 year old/Female presenting for alcohol detox.     B: Pt presents for ETOH detox.   Currently reports drinking 12 pack of beer in the last few weeks. Her last drink was at 3pm.  She reports a hx of w/d seizures and a possible seizure overnight. No witnesses and labs did not show otherwise.  ED MD states that didn't think Pt had w/d seizure last night.  Pt denies drug use.   Pt has underlining bipolar but nothing acute. Pt denies any chronic illness.  Pt is walking, talking, eating, and drinking.    Labs look ok.  Pt is medically cleared.  Vol.  Calm and cooperative.     Patient reports last use was at 3pm.  Pt BAC: 0.272 at 6:54pm  Pt  denies hx of DT  Pt  endorses hx of seizures. Last seizure: N/A  Pt endorsing the following symptoms of withdrawal: Shaky, nausea  MSSA Score: still intoxicated    Pt denies acute mental health or medical concerns.   Pt denies other drug use: None Amount/frequency: N/A    Does Pt have a detox care plan in [facility]? No  Does pt present with specific needs, assistive devices, or exclusionary criteria? None  Is the patient ambulating, eating and drinking in the ED? Yes    A: Pt meets criteria to be presented for IP detox admission. Patient is voluntary  COVID: Negative  Utox: Negative  CMP: WNL  CBC: WNL  HCG: N/A     R: Patient cleared and ready for behavioral bed placement: Yes    Presenting for admission to 3A/CD     10:20pm- On call psychiatrist from [facility] accepts for 3A/[place].

## 2023-05-28 NOTE — DISCHARGE INSTRUCTIONS
You were seen today for alcohol intoxication.  We had a detox bed for you but you elected to leave.  Please return with any worsening withdrawal symptoms.  Follow-up with your regular doctor.

## 2023-05-29 ENCOUNTER — HOSPITAL ENCOUNTER (INPATIENT)
Facility: CLINIC | Age: 63
LOS: 2 days | Discharge: HOME | DRG: 897 | End: 2023-05-31
Attending: EMERGENCY MEDICINE | Admitting: PSYCHIATRY & NEUROLOGY
Payer: MEDICARE

## 2023-05-29 DIAGNOSIS — Z11.52 ENCOUNTER FOR SCREENING LABORATORY TESTING FOR COVID-19 VIRUS: ICD-10-CM

## 2023-05-29 DIAGNOSIS — R03.0 ELEVATED BLOOD PRESSURE READING WITHOUT DIAGNOSIS OF HYPERTENSION: ICD-10-CM

## 2023-05-29 DIAGNOSIS — S90.859A: Primary | ICD-10-CM

## 2023-05-29 DIAGNOSIS — F10.239 ALCOHOL DEPENDENCE WITH WITHDRAWAL (H): ICD-10-CM

## 2023-05-29 DIAGNOSIS — F10.229 ALCOHOL DEPENDENCE WITH INTOXICATION WITH COMPLICATION (H): ICD-10-CM

## 2023-05-29 LAB
ALBUMIN SERPL BCG-MCNC: 4.5 G/DL (ref 3.5–5.2)
ALCOHOL BREATH TEST: 0.21 (ref 0–0.01)
ALP SERPL-CCNC: 51 U/L (ref 35–104)
ALT SERPL W P-5'-P-CCNC: 22 U/L (ref 10–35)
AMPHETAMINES UR QL SCN: ABNORMAL
ANION GAP SERPL CALCULATED.3IONS-SCNC: 16 MMOL/L (ref 7–15)
AST SERPL W P-5'-P-CCNC: 44 U/L (ref 10–35)
BARBITURATES UR QL SCN: ABNORMAL
BASOPHILS # BLD AUTO: 0 10E3/UL (ref 0–0.2)
BASOPHILS NFR BLD AUTO: 1 %
BENZODIAZ UR QL SCN: ABNORMAL
BILIRUB SERPL-MCNC: 0.3 MG/DL
BUN SERPL-MCNC: 7.8 MG/DL (ref 8–23)
BZE UR QL SCN: ABNORMAL
CALCIUM SERPL-MCNC: 9.1 MG/DL (ref 8.8–10.2)
CANNABINOIDS UR QL SCN: ABNORMAL
CHLORIDE SERPL-SCNC: 101 MMOL/L (ref 98–107)
CREAT SERPL-MCNC: 0.56 MG/DL (ref 0.51–0.95)
DEPRECATED HCO3 PLAS-SCNC: 23 MMOL/L (ref 22–29)
EOSINOPHIL # BLD AUTO: 0 10E3/UL (ref 0–0.7)
EOSINOPHIL NFR BLD AUTO: 0 %
ERYTHROCYTE [DISTWIDTH] IN BLOOD BY AUTOMATED COUNT: 13.2 % (ref 10–15)
GFR SERPL CREATININE-BSD FRML MDRD: >90 ML/MIN/1.73M2
GGT SERPL-CCNC: 19 U/L (ref 5–36)
GLUCOSE SERPL-MCNC: 114 MG/DL (ref 70–99)
HCT VFR BLD AUTO: 39.7 % (ref 35–47)
HGB BLD-MCNC: 14.3 G/DL (ref 11.7–15.7)
IMM GRANULOCYTES # BLD: 0 10E3/UL
IMM GRANULOCYTES NFR BLD: 0 %
LYMPHOCYTES # BLD AUTO: 2.1 10E3/UL (ref 0.8–5.3)
LYMPHOCYTES NFR BLD AUTO: 35 %
MCH RBC QN AUTO: 33.4 PG (ref 26.5–33)
MCHC RBC AUTO-ENTMCNC: 36 G/DL (ref 31.5–36.5)
MCV RBC AUTO: 93 FL (ref 78–100)
MONOCYTES # BLD AUTO: 0.6 10E3/UL (ref 0–1.3)
MONOCYTES NFR BLD AUTO: 10 %
NEUTROPHILS # BLD AUTO: 3.3 10E3/UL (ref 1.6–8.3)
NEUTROPHILS NFR BLD AUTO: 54 %
NRBC # BLD AUTO: 0 10E3/UL
NRBC BLD AUTO-RTO: 0 /100
OPIATES UR QL SCN: ABNORMAL
PLATELET # BLD AUTO: 289 10E3/UL (ref 150–450)
POTASSIUM SERPL-SCNC: 3.9 MMOL/L (ref 3.4–5.3)
PROT SERPL-MCNC: 7.9 G/DL (ref 6.4–8.3)
RBC # BLD AUTO: 4.28 10E6/UL (ref 3.8–5.2)
SARS-COV-2 RNA RESP QL NAA+PROBE: NEGATIVE
SODIUM SERPL-SCNC: 140 MMOL/L (ref 136–145)
TSH SERPL DL<=0.005 MIU/L-ACNC: 0.31 UIU/ML (ref 0.3–4.2)
TSH SERPL DL<=0.005 MIU/L-ACNC: 0.31 UIU/ML (ref 0.3–4.2)
WBC # BLD AUTO: 6.1 10E3/UL (ref 4–11)

## 2023-05-29 PROCEDURE — C9803 HOPD COVID-19 SPEC COLLECT: HCPCS | Performed by: EMERGENCY MEDICINE

## 2023-05-29 PROCEDURE — 84443 ASSAY THYROID STIM HORMONE: CPT | Performed by: PSYCHIATRY & NEUROLOGY

## 2023-05-29 PROCEDURE — 96374 THER/PROPH/DIAG INJ IV PUSH: CPT | Performed by: EMERGENCY MEDICINE

## 2023-05-29 PROCEDURE — 36415 COLL VENOUS BLD VENIPUNCTURE: CPT | Performed by: EMERGENCY MEDICINE

## 2023-05-29 PROCEDURE — 128N000004 HC R&B CD ADULT

## 2023-05-29 PROCEDURE — 84443 ASSAY THYROID STIM HORMONE: CPT

## 2023-05-29 PROCEDURE — 96361 HYDRATE IV INFUSION ADD-ON: CPT | Performed by: EMERGENCY MEDICINE

## 2023-05-29 PROCEDURE — 99285 EMERGENCY DEPT VISIT HI MDM: CPT | Mod: 25 | Performed by: EMERGENCY MEDICINE

## 2023-05-29 PROCEDURE — 85025 COMPLETE CBC W/AUTO DIFF WBC: CPT | Performed by: EMERGENCY MEDICINE

## 2023-05-29 PROCEDURE — 250N000013 HC RX MED GY IP 250 OP 250 PS 637: Performed by: PSYCHIATRY & NEUROLOGY

## 2023-05-29 PROCEDURE — 99222 1ST HOSP IP/OBS MODERATE 55: CPT

## 2023-05-29 PROCEDURE — 250N000011 HC RX IP 250 OP 636: Performed by: EMERGENCY MEDICINE

## 2023-05-29 PROCEDURE — 99223 1ST HOSP IP/OBS HIGH 75: CPT | Mod: AI | Performed by: PSYCHIATRY & NEUROLOGY

## 2023-05-29 PROCEDURE — HZ2ZZZZ DETOXIFICATION SERVICES FOR SUBSTANCE ABUSE TREATMENT: ICD-10-PCS | Performed by: PSYCHIATRY & NEUROLOGY

## 2023-05-29 PROCEDURE — 82977 ASSAY OF GGT: CPT | Performed by: PSYCHIATRY & NEUROLOGY

## 2023-05-29 PROCEDURE — 250N000011 HC RX IP 250 OP 636: Performed by: PSYCHIATRY & NEUROLOGY

## 2023-05-29 PROCEDURE — 82075 ASSAY OF BREATH ETHANOL: CPT | Performed by: EMERGENCY MEDICINE

## 2023-05-29 PROCEDURE — 87635 SARS-COV-2 COVID-19 AMP PRB: CPT | Performed by: EMERGENCY MEDICINE

## 2023-05-29 PROCEDURE — 80053 COMPREHEN METABOLIC PANEL: CPT | Performed by: EMERGENCY MEDICINE

## 2023-05-29 PROCEDURE — 258N000003 HC RX IP 258 OP 636: Performed by: EMERGENCY MEDICINE

## 2023-05-29 PROCEDURE — 250N000013 HC RX MED GY IP 250 OP 250 PS 637: Performed by: EMERGENCY MEDICINE

## 2023-05-29 PROCEDURE — 99285 EMERGENCY DEPT VISIT HI MDM: CPT | Performed by: EMERGENCY MEDICINE

## 2023-05-29 PROCEDURE — 80307 DRUG TEST PRSMV CHEM ANLYZR: CPT | Performed by: EMERGENCY MEDICINE

## 2023-05-29 PROCEDURE — H2035 A/D TX PROGRAM, PER HOUR: HCPCS | Mod: HQ

## 2023-05-29 PROCEDURE — 96375 TX/PRO/DX INJ NEW DRUG ADDON: CPT | Performed by: EMERGENCY MEDICINE

## 2023-05-29 RX ORDER — DIAZEPAM 5 MG
5-20 TABLET ORAL EVERY 30 MIN PRN
Status: DISCONTINUED | OUTPATIENT
Start: 2023-05-29 | End: 2023-05-31 | Stop reason: HOSPADM

## 2023-05-29 RX ORDER — MAGNESIUM HYDROXIDE/ALUMINUM HYDROXICE/SIMETHICONE 120; 1200; 1200 MG/30ML; MG/30ML; MG/30ML
30 SUSPENSION ORAL EVERY 4 HOURS PRN
Status: DISCONTINUED | OUTPATIENT
Start: 2023-05-29 | End: 2023-05-31 | Stop reason: HOSPADM

## 2023-05-29 RX ORDER — GABAPENTIN 600 MG/1
600 TABLET ORAL 3 TIMES DAILY
Status: DISCONTINUED | OUTPATIENT
Start: 2023-05-29 | End: 2023-05-31 | Stop reason: HOSPADM

## 2023-05-29 RX ORDER — ONDANSETRON 2 MG/ML
4 INJECTION INTRAMUSCULAR; INTRAVENOUS ONCE
Status: COMPLETED | OUTPATIENT
Start: 2023-05-29 | End: 2023-05-29

## 2023-05-29 RX ORDER — VITAMIN B COMPLEX
25 TABLET ORAL DAILY
COMMUNITY
End: 2024-01-23

## 2023-05-29 RX ORDER — PROPRANOLOL HYDROCHLORIDE 20 MG/1
20 TABLET ORAL 3 TIMES DAILY
Status: DISCONTINUED | OUTPATIENT
Start: 2023-05-29 | End: 2023-05-31 | Stop reason: HOSPADM

## 2023-05-29 RX ORDER — HYDROXYZINE HYDROCHLORIDE 25 MG/1
25 TABLET, FILM COATED ORAL EVERY 4 HOURS PRN
Status: DISCONTINUED | OUTPATIENT
Start: 2023-05-29 | End: 2023-05-31 | Stop reason: HOSPADM

## 2023-05-29 RX ORDER — LEVOTHYROXINE SODIUM 100 UG/1
100 TABLET ORAL DAILY
Status: DISCONTINUED | OUTPATIENT
Start: 2023-05-29 | End: 2023-05-31 | Stop reason: HOSPADM

## 2023-05-29 RX ORDER — ONDANSETRON 4 MG/1
4 TABLET, ORALLY DISINTEGRATING ORAL EVERY 6 HOURS PRN
Status: DISCONTINUED | OUTPATIENT
Start: 2023-05-29 | End: 2023-05-31 | Stop reason: HOSPADM

## 2023-05-29 RX ORDER — FOLIC ACID 1 MG/1
1 TABLET ORAL DAILY
Status: DISCONTINUED | OUTPATIENT
Start: 2023-05-29 | End: 2023-05-31 | Stop reason: HOSPADM

## 2023-05-29 RX ORDER — ESZOPICLONE 3 MG/1
3 TABLET, FILM COATED ORAL AT BEDTIME
Status: CANCELLED | OUTPATIENT
Start: 2023-05-29

## 2023-05-29 RX ORDER — OLANZAPINE 10 MG/2ML
10 INJECTION, POWDER, FOR SOLUTION INTRAMUSCULAR 3 TIMES DAILY PRN
Status: DISCONTINUED | OUTPATIENT
Start: 2023-05-29 | End: 2023-05-31 | Stop reason: HOSPADM

## 2023-05-29 RX ORDER — METOCLOPRAMIDE HYDROCHLORIDE 5 MG/ML
5 INJECTION INTRAMUSCULAR; INTRAVENOUS ONCE
Status: COMPLETED | OUTPATIENT
Start: 2023-05-29 | End: 2023-05-29

## 2023-05-29 RX ORDER — ACETAMINOPHEN 325 MG/1
650 TABLET ORAL EVERY 4 HOURS PRN
Status: DISCONTINUED | OUTPATIENT
Start: 2023-05-29 | End: 2023-05-31 | Stop reason: HOSPADM

## 2023-05-29 RX ORDER — OLANZAPINE 10 MG/1
10 TABLET ORAL 3 TIMES DAILY PRN
Status: DISCONTINUED | OUTPATIENT
Start: 2023-05-29 | End: 2023-05-31 | Stop reason: HOSPADM

## 2023-05-29 RX ORDER — LOPERAMIDE HCL 2 MG
2 CAPSULE ORAL 4 TIMES DAILY PRN
Status: DISCONTINUED | OUTPATIENT
Start: 2023-05-29 | End: 2023-05-31 | Stop reason: HOSPADM

## 2023-05-29 RX ORDER — SODIUM CHLORIDE 9 MG/ML
INJECTION, SOLUTION INTRAVENOUS
Status: DISCONTINUED
Start: 2023-05-29 | End: 2023-05-29 | Stop reason: HOSPADM

## 2023-05-29 RX ORDER — QUETIAPINE FUMARATE 25 MG/1
25 TABLET, FILM COATED ORAL AT BEDTIME
Status: DISCONTINUED | OUTPATIENT
Start: 2023-05-29 | End: 2023-05-31 | Stop reason: HOSPADM

## 2023-05-29 RX ORDER — SODIUM CHLORIDE 9 MG/ML
INJECTION, SOLUTION INTRAVENOUS CONTINUOUS
Status: DISCONTINUED | OUTPATIENT
Start: 2023-05-29 | End: 2023-05-29

## 2023-05-29 RX ORDER — LAMOTRIGINE 25 MG/1
25 TABLET ORAL DAILY
COMMUNITY
End: 2023-08-07

## 2023-05-29 RX ORDER — AMOXICILLIN 250 MG
1 CAPSULE ORAL 2 TIMES DAILY PRN
Status: DISCONTINUED | OUTPATIENT
Start: 2023-05-29 | End: 2023-05-31 | Stop reason: HOSPADM

## 2023-05-29 RX ORDER — OLANZAPINE 5 MG/1
5 TABLET, ORALLY DISINTEGRATING ORAL ONCE
Status: COMPLETED | OUTPATIENT
Start: 2023-05-29 | End: 2023-05-29

## 2023-05-29 RX ADMIN — NICOTINE POLACRILEX 2 MG: 2 GUM, CHEWING ORAL at 22:06

## 2023-05-29 RX ADMIN — NICOTINE POLACRILEX 2 MG: 2 GUM, CHEWING ORAL at 21:06

## 2023-05-29 RX ADMIN — NICOTINE POLACRILEX 2 MG: 2 GUM, CHEWING ORAL at 12:31

## 2023-05-29 RX ADMIN — PROPRANOLOL HYDROCHLORIDE 20 MG: 20 TABLET ORAL at 13:19

## 2023-05-29 RX ADMIN — NICOTINE POLACRILEX 2 MG: 2 GUM, CHEWING ORAL at 13:29

## 2023-05-29 RX ADMIN — DIAZEPAM 10 MG: 5 TABLET ORAL at 05:16

## 2023-05-29 RX ADMIN — DIAZEPAM 10 MG: 5 TABLET ORAL at 16:28

## 2023-05-29 RX ADMIN — FOLIC ACID 1 MG: 1 TABLET ORAL at 07:35

## 2023-05-29 RX ADMIN — HYDROXYZINE HYDROCHLORIDE 25 MG: 25 TABLET, FILM COATED ORAL at 13:19

## 2023-05-29 RX ADMIN — NICOTINE POLACRILEX 2 MG: 2 GUM, CHEWING ORAL at 16:03

## 2023-05-29 RX ADMIN — DIAZEPAM 10 MG: 5 TABLET ORAL at 03:53

## 2023-05-29 RX ADMIN — Medication 5 MG: at 22:03

## 2023-05-29 RX ADMIN — GABAPENTIN 600 MG: 600 TABLET, FILM COATED ORAL at 07:35

## 2023-05-29 RX ADMIN — DIAZEPAM 10 MG: 5 TABLET ORAL at 13:31

## 2023-05-29 RX ADMIN — GABAPENTIN 600 MG: 600 TABLET, FILM COATED ORAL at 20:18

## 2023-05-29 RX ADMIN — QUETIAPINE FUMARATE 25 MG: 25 TABLET ORAL at 22:03

## 2023-05-29 RX ADMIN — DIAZEPAM 15 MG: 5 TABLET ORAL at 02:29

## 2023-05-29 RX ADMIN — NICOTINE POLACRILEX 2 MG: 2 GUM, CHEWING ORAL at 18:03

## 2023-05-29 RX ADMIN — SODIUM CHLORIDE: 9 INJECTION, SOLUTION INTRAVENOUS at 03:58

## 2023-05-29 RX ADMIN — PROPRANOLOL HYDROCHLORIDE 20 MG: 20 TABLET ORAL at 20:18

## 2023-05-29 RX ADMIN — DIAZEPAM 10 MG: 5 TABLET ORAL at 07:35

## 2023-05-29 RX ADMIN — METOCLOPRAMIDE HYDROCHLORIDE 5 MG: 5 INJECTION INTRAMUSCULAR; INTRAVENOUS at 04:38

## 2023-05-29 RX ADMIN — LEVOTHYROXINE SODIUM 100 MCG: 100 TABLET ORAL at 12:06

## 2023-05-29 RX ADMIN — OMEPRAZOLE 40 MG: 20 CAPSULE, DELAYED RELEASE ORAL at 07:35

## 2023-05-29 RX ADMIN — OLANZAPINE 5 MG: 5 TABLET, ORALLY DISINTEGRATING ORAL at 06:32

## 2023-05-29 RX ADMIN — DIAZEPAM 10 MG: 5 TABLET ORAL at 09:10

## 2023-05-29 RX ADMIN — METOCLOPRAMIDE HYDROCHLORIDE 5 MG: 5 INJECTION INTRAMUSCULAR; INTRAVENOUS at 06:28

## 2023-05-29 RX ADMIN — ONDANSETRON 4 MG: 4 TABLET, ORALLY DISINTEGRATING ORAL at 12:31

## 2023-05-29 RX ADMIN — SODIUM CHLORIDE: 9 INJECTION, SOLUTION INTRAVENOUS at 06:40

## 2023-05-29 RX ADMIN — ONDANSETRON 4 MG: 2 INJECTION INTRAMUSCULAR; INTRAVENOUS at 03:54

## 2023-05-29 RX ADMIN — NICOTINE POLACRILEX 2 MG: 2 GUM, CHEWING ORAL at 19:49

## 2023-05-29 RX ADMIN — GABAPENTIN 600 MG: 600 TABLET, FILM COATED ORAL at 13:29

## 2023-05-29 RX ADMIN — SODIUM CHLORIDE 1000 ML: 9 INJECTION, SOLUTION INTRAVENOUS at 03:00

## 2023-05-29 RX ADMIN — THIAMINE HCL TAB 100 MG 100 MG: 100 TAB at 07:35

## 2023-05-29 RX ADMIN — PROPRANOLOL HYDROCHLORIDE 20 MG: 20 TABLET ORAL at 07:35

## 2023-05-29 ASSESSMENT — ACTIVITIES OF DAILY LIVING (ADL)
CHANGE_IN_FUNCTIONAL_STATUS_SINCE_ONSET_OF_CURRENT_ILLNESS/INJURY: NO
CONCENTRATING,_REMEMBERING_OR_MAKING_DECISIONS_DIFFICULTY: NO
DRESSING/BATHING_DIFFICULTY: NO
DIFFICULTY_COMMUNICATING: NO
ADLS_ACUITY_SCORE: 37
WALKING_OR_CLIMBING_STAIRS_DIFFICULTY: NO
CHANGE_IN_FUNCTIONAL_STATUS_SINCE_ONSET_OF_CURRENT_ILLNESS/INJURY: NO
DIFFICULTY_EATING/SWALLOWING: NO
ADLS_ACUITY_SCORE: 42
FALL_HISTORY_WITHIN_LAST_SIX_MONTHS: YES
NUMBER_OF_TIMES_PATIENT_HAS_FALLEN_WITHIN_LAST_SIX_MONTHS: 1
WEAR_GLASSES_OR_BLIND: YES
ADLS_ACUITY_SCORE: 42
DOING_ERRANDS_INDEPENDENTLY_DIFFICULTY: NO
ADLS_ACUITY_SCORE: 42
DIFFICULTY_EATING/SWALLOWING: NO
HYGIENE/GROOMING: INDEPENDENT
TOILETING_ISSUES: NO
DRESS: INDEPENDENT
ADLS_ACUITY_SCORE: 42
ADLS_ACUITY_SCORE: 42
ORAL_HYGIENE: INDEPENDENT
CONCENTRATING,_REMEMBERING_OR_MAKING_DECISIONS_DIFFICULTY: NO
WALKING_OR_CLIMBING_STAIRS_DIFFICULTY: NO
HEARING_DIFFICULTY_OR_DEAF: NO
ADLS_ACUITY_SCORE: 37
ADLS_ACUITY_SCORE: 42
DRESSING/BATHING_DIFFICULTY: NO
ADLS_ACUITY_SCORE: 37
ADLS_ACUITY_SCORE: 42
DIFFICULTY_COMMUNICATING: NO
DOING_ERRANDS_INDEPENDENTLY_DIFFICULTY: NO
WEAR_GLASSES_OR_BLIND: YES
HEARING_DIFFICULTY_OR_DEAF: NO
ADLS_ACUITY_SCORE: 37
FALL_HISTORY_WITHIN_LAST_SIX_MONTHS: YES
TOILETING_ISSUES: NO
NUMBER_OF_TIMES_PATIENT_HAS_FALLEN_WITHIN_LAST_SIX_MONTHS: 1

## 2023-05-29 ASSESSMENT — LIFESTYLE VARIABLES
AUDIT-C TOTAL SCORE: 12
SKIP TO QUESTIONS 9-10: 0

## 2023-05-29 NOTE — ED PROVIDER NOTES
"  ED Provider Note  [facility]      History     Chief Complaint   Patient presents with     Alcohol Problem     Pt seeking detox from [facility]oh. Last drink 1 hr PTA. Drinks about 12 beers daily. Denies drug use. Endorses hx withdrawal seizures, states that she had 3 unwitnessed seizures last night.      Suicidal     SI, no plan or intent. Able to contract for safety.     HPI  Jolynn Rivera is a 63 year old female with history of polysubstance abuse, alcohol withdrawal and withdrawal seizures who presents requesting detox from alcohol.  Reports she has been drinking 12 beers daily with some alcohol for the past few weeks.  Denies any additional drug use.  There was initially some report of 3 unwitnessed seizures last night, but patient does not remember what happened.  On further questioning with me, patient had some vague thoughts of wanting to die but no suicidal ideation.  Able to contract for safety.  Denies any fevers, chills, nausea, vomiting, chest pain, shortness of breath or other symptoms at this time.  Last drink was at 3 PM just before arrival.       Physical Exam   BP: 127/79  Pulse: 75  Temp: 98.4  F (36.9  C)  Resp: 18  Height: 157.5 cm (5' 2\")  Weight: 72.1 kg (159 lb)  SpO2: 94 %  Physical Exam  General: no acute distress. Appears stated age.   HENT: MMM, no oropharyngeal lesions  Eyes: PERRL, normal sclerae  Neck: non-tender, supple  Cardio: Regular rate. Regular rhythm. Extremities well perfused  Resp: Normal work of breathing, normal respiratory rate.  Chest/Back: no visual signs of trauma, no CVA tenderness  Abdomen: no tenderness, non-distended, no rebound, no guarding  Neuro: alert and fully oriented. CN II-XII grossly intact. Grossly normal strength and sensation in all extremities.   MSK: no deformities. Grossly normal ROM in extremities.   Integumentary/Skin: no rash visualized, normal color  Psych: normal affect, normal behavior      ED Course, Procedures, & Data    "   Procedures          Mental Health Risk Assessment      PSS-3    Date and Time Over the past 2 weeks have you felt down, depressed, or hopeless? Over the past 2 weeks have you had thoughts of killing yourself? Have you ever attempted to kill yourself? When did this last happen? User   05/27/23 1819 yes yes no --       C-SSRS ([place])    Date and Time Q1 Wished to be Dead (Past Month) Q2 Suicidal Thoughts (Past Month) Q3 Suicidal Thought Method Q4 Suicidal Intent without Specific Plan Q5 Suicide Intent with Specific Plan Q6 Suicide Behavior (Lifetime) Within the Past 3 Months? RETIRED: Level of Risk per Screen Screening Not Complete User   05/27/23 1819 yes yes no no no no -- -- --                 Item Assessment   Suicidal Ideation Wish to be dead   Plan none   Intent none   Suicidal or self-harm behaviors none   Risk Factors Substance abuse or dependence   Protective Factors Identified reasons for living              Results for orders placed or performed during the hospital encounter of 05/27/23   Asymptomatic COVID-19 Virus (Coronavirus) by PCR Nose     Status: Normal    Specimen: Nose; Swab   Result Value Ref Range    SARS CoV2 PCR Negative Negative    Narrative    Testing was performed using the Xpert Xpress SARS-CoV-2 Assay on the Cepheid Gene-Xpert Instrument Systems. Additional information about this Emergency Use Authorization (EUA) assay can be found via the Lab Guide. This test should be ordered for the detection of SARS-CoV-2 in individuals who meet SARS-CoV-2 clinical and/or epidemiological criteria as well as from individuals without symptoms or other reasons to suspect COVID-19. Test performance for asymptomatic patients has only been established in anterior nasal swab specimens. This test is for in vitro diagnostic use under the FDA EUA for laboratories certified under CLIA to perform high complexity testing. This test has not been FDA cleared or approved. A negative result does not rule out  the presence of PCR inhibitors in the specimen or target RNA concentration below the limit of detection for the assay. The possibility of a false negative should be considered if the patient's recent exposure or clinical presentation suggests COVID-19. This test was validated by the [facility]. This laboratory is certified under the Clinical Laboratory Improvement Amendments (CLIA) as qualified to perform high complexity laboratory testing.     Comprehensive metabolic panel     Status: Abnormal   Result Value Ref Range    Sodium 135 (L) 136 - 145 mmol/L    Potassium 3.6 3.4 - 5.3 mmol/L    Chloride 98 98 - 107 mmol/L    Carbon Dioxide (CO2) 23 22 - 29 mmol/L    Anion Gap 14 7 - 15 mmol/L    Urea Nitrogen 7.0 (L) 8.0 - 23.0 mg/dL    Creatinine 0.58 0.51 - 0.95 mg/dL    Calcium 9.0 8.8 - 10.2 mg/dL    Glucose 92 70 - 99 mg/dL    Alkaline Phosphatase 47 35 - 104 U/L    AST 33 10 - 35 U/L    ALT 19 10 - 35 U/L    Protein Total 7.6 6.4 - 8.3 g/dL    Albumin 4.3 3.5 - 5.2 g/dL    Bilirubin Total 0.2 <=1.2 mg/dL    GFR Estimate >90 >60 mL/min/1.73m2   Lipase     Status: Normal   Result Value Ref Range    Lipase 46 13 - 60 U/L   Drug abuse screen 1 urine (ED)     Status: Normal   Result Value Ref Range    Amphetamines Urine Screen Negative Screen Negative    Barbituates Urine Screen Negative Screen Negative    Benzodiazepine Urine Screen Negative Screen Negative    Cannabinoids Urine Screen Negative Screen Negative    Cocaine Urine Screen Negative Screen Negative    Opiates Urine Screen Negative Screen Negative   CBC with platelets and differential     Status: Abnormal   Result Value Ref Range    WBC Count 6.1 4.0 - 11.0 10e3/uL    RBC Count 4.01 3.80 - 5.20 10e6/uL    Hemoglobin 13.3 11.7 - 15.7 g/dL    Hematocrit 37.2 35.0 - 47.0 %    MCV 93 78 - 100 fL    MCH 33.2 (H) 26.5 - 33.0 pg    MCHC 35.8 31.5 - 36.5 g/dL    RDW 13.2 10.0 - 15.0 %    Platelet Count 292 150 - 450 10e3/uL    %  Neutrophils 31 %    % Lymphocytes 51 %    % Monocytes 16 %    % Eosinophils 1 %    % Basophils 1 %    % Immature Granulocytes 0 %    NRBCs per 100 WBC 0 <1 /100    Absolute Neutrophils 1.9 1.6 - 8.3 10e3/uL    Absolute Lymphocytes 3.1 0.8 - 5.3 10e3/uL    Absolute Monocytes 1.0 0.0 - 1.3 10e3/uL    Absolute Eosinophils 0.0 0.0 - 0.7 10e3/uL    Absolute Basophils 0.1 0.0 - 0.2 10e3/uL    Absolute Immature Granulocytes 0.0 <=0.4 10e3/uL    Absolute NRBCs 0.0 10e3/uL   Alcohol breath test POCT     Status: Abnormal   Result Value Ref Range    Alcohol Breath Test 0.272 (A) 0.00 - 0.01   Urine Drugs of Abuse Screen     Status: Normal    Narrative    The following orders were created for panel order Urine Drugs of Abuse Screen.  Procedure                               Abnormality         Status                     ---------                               -----------         ------                     Drug abuse screen 1 urin...[[id number]]  Normal              Final result                 Please view results for these tests on the individual orders.   CBC with platelets differential     Status: Abnormal    Narrative    The following orders were created for panel order CBC with platelets differential.  Procedure                               Abnormality         Status                     ---------                               -----------         ------                     CBC with platelets and d...[[id number]]  Abnormal            Final result                 Please view results for these tests on the individual orders.     Medications   0.9% sodium chloride BOLUS (0 mLs Intravenous Stopped 5/27/23 2682)     Labs Ordered and Resulted from Time of ED Arrival to Time of ED Departure   COMPREHENSIVE METABOLIC PANEL - Abnormal       Result Value    Sodium 135 (*)     Potassium 3.6      Chloride 98      Carbon Dioxide (CO2) 23      Anion Gap 14      Urea Nitrogen 7.0 (*)     Creatinine 0.58      Calcium 9.0      Glucose 92       Alkaline Phosphatase 47      AST 33      ALT 19      Protein Total 7.6      Albumin 4.3      Bilirubin Total 0.2      GFR Estimate >90     CBC WITH PLATELETS AND DIFFERENTIAL - Abnormal    WBC Count 6.1      RBC Count 4.01      Hemoglobin 13.3      Hematocrit 37.2      MCV 93      MCH 33.2 (*)     MCHC 35.8      RDW 13.2      Platelet Count 292      % Neutrophils 31      % Lymphocytes 51      % Monocytes 16      % Eosinophils 1      % Basophils 1      % Immature Granulocytes 0      NRBCs per 100 WBC 0      Absolute Neutrophils 1.9      Absolute Lymphocytes 3.1      Absolute Monocytes 1.0      Absolute Eosinophils 0.0      Absolute Basophils 0.1      Absolute Immature Granulocytes 0.0      Absolute NRBCs 0.0     ALCOHOL BREATH TEST POCT - Abnormal    Alcohol Breath Test 0.272 (*)    COVID-19 VIRUS (CORONAVIRUS) BY PCR - Normal    SARS CoV2 PCR Negative     LIPASE - Normal    Lipase 46     DRUG ABUSE SCREEN 1 URINE (ED) - Normal    Amphetamines Urine Screen Negative      Barbituates Urine Screen Negative      Benzodiazepine Urine Screen Negative      Cannabinoids Urine Screen Negative      Cocaine Urine Screen Negative      Opiates Urine Screen Negative       No orders to display          Critical care was not performed.     Medical Decision Making  The patient's presentation was of high complexity (an acute health issue posing potential threat to life or bodily function).    The patient's evaluation involved:  review of external note(s) from 1 sources (see separate area of note for details)  ordering and/or review of 3+ test(s) in this encounter (see separate area of note for details)  strong consideration of a test (see separate area of note for details) that was ultimately deferred  discussion of management or test interpretation with another health professional (see separate area of note for details)    The patient's management necessitated high risk (a decision regarding hospitalization).      Assessment & Plan     Jolynn Rivera is a 63-year-old female with past medical history of polysubstance abuse and alcohol withdrawal presenting requesting detox from alcohol.  She arrives still intoxicated to a 0.272.  She has no medical concerns at this time.  We will perform screening labs and reserve her bed on the detox unit.  No signs of alcohol withdrawal at this time.  Patient requested fluids and gave 1 L.  Started on MSSA protocol.    CBC, CMP and lipase were within normal limits.  UDS was negative.  She did receive 10 mg of Valium based on MSSA score.  She had a bed on the detox unit, however patient changed her mind and demanded to leave.  She was felt to be decisional at this time.  Asked again about suicidal ideation and patient denied and was able to contract for safety.  Encouraged her to return if she changed her mind and wanted to be admitted.  Encouraged close follow-up with her primary provider.  Discussed return precautions for new or worsening symptoms.    I have reviewed the nursing notes. I have reviewed the findings, diagnosis, plan and need for follow up with the patient.    Discharge Medication List as of 5/27/2023 10:40 PM          Final diagnoses:   Alcoholic intoxication without complication (H)       Deidre Joe MD  [facility] EMERGENCY DEPARTMENT  5/27/2023     Deidre Joe MD  05/28/23 3811

## 2023-05-29 NOTE — PROGRESS NOTES
05/29/23 1038   Patient Belongings   Did you bring any home meds/supplements to the hospital?  No   Patient Belongings other (see comments)   Belongings Search Yes   Clothing Search Yes   Second Staff Mary nunez/strings in storage  Cell in med room  [facility] in [facility]               Admission:  I am responsible for any personal items that are not sent to the safe or pharmacy.  [facility] is not responsible for loss, theft or damage of any property in my possession.    Signature:  _________________________________ Date: _______  Time: _____                                              Staff Signature:  ____________________________ Date: ________  Time: _____      2nd Staff person, if patient is unable/unwilling to sign:    Signature: ________________________________ Date: ________  Time: _____     Discharge:  [facility] has returned all of my personal belongings:    Signature: _________________________________ Date: ________  Time: _____                                          Staff Signature:  ____________________________ Date: ________  Time: _____
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
## 2023-05-29 NOTE — CONSULTS
"  [facility]  Internal Medicine Consult     Jolynn Rivera MRN# [id number]   Age: 63 year old YOB: 1960     Date of Admission: 5/29/2023  Date of Consult: 5/29/2023    Primary Care Provider: No Ref-Primary, Physician    Requesting Service: Behavioral Health - Darren Fermin MD  Reason for Consult: General Medical Evaluation      SUBJECTIVE   CC:   \"I don't want to talk\"   Assessment and Plan/Recommendations:     Jolynn Rivera is a 63 year old woman with a history of COPD, alcohol dependence, alcohol withdrawal seizures, hypothyroidism, prediabetes, GERD, PTSD. Pt was admitted on 5/29/2023 to  for medical management of withdrawal from alcohol.    #Alcohol withdrawal, hx of alcohol use disorder   Pt drinks a case of beer per day.   -MSSA 16 this shift. Endorses a hx of withdrawal seizures, last occurring possibly yesterday. Not currently on AEDs.    -continue MSSA   -folvite, multi-vites, thiamine supplementation   -further management per Psychiatry     #COPD  Note for spiriva prescription coverage noted. Unclear if patient has ever needed inhalers.  -CTM    #Hypothyroidism  Diagnosed 3/17/22, at that time TSH 12.33. TSH 7.55 10/8/2022. Currently TSH 0.31.  -continue PTA levothyroxine 100 mcg    #Prediabetes  Hb A1c 5.9 4/15/2023. Blood sugar this admission 114.   -continue to encourage lifestyle changes- healthy eating, moderate exercise, weight loss    #Elevated blood pressure   Elevated blood pressure in the setting of acute alcohol withdrawal. Expect blood pressures will trend down with cessation from alcohol and treatment for withdrawal. Denies chest pain, headache, blurry vision, palpitations, fatigue, dyspnea. No history of high blood pressure outside of episodes of alcohol withdrawal  -monitor and would only treat with anti-hypertensive if blood pressures persistently > 140/90    #Anion gap metabolic acidosis  Anion gap 16. Associated with significant alcohol " "ingestion and dehydration.   -alcohol cessation  -encourage fluid intake    #GERD  -continue home PPI    #PTSD  -management per psychiatry    Currently, medically stable and internal medicine will sign off. Please contact if future questions or concerns arise. Thank you for the opportunity to be a part of this patient's care.      BILL Meyer CNP  Internal Medicine [facility] Hospitalist  Page job code [phone] (3B), [phone] (3A), or [phone] ([facility] and )  Text paging via [employer] is appreciated  May 29, 2023         HPI:   Jolynn Rivera is a 63 year old woman with a history of COPD, alcohol dependence, alcohol withdrawal seizures, hypothyroidism, prediabetes, GERD, PTSD. Pt was admitted on 5/29/2023 to  for medical management of withdrawal from alcohol.  Pt drinks a case of beer per day.   Pt refused to speak to writer about medical history. Stated, she already told \"that eduardo\". Denies immediate concerns. Notify Medicine with changes.        Past Medical History:     Past Medical History:   Diagnosis Date     Anxiety      COPD (chronic obstructive pulmonary disease) (H)      COPD (chronic obstructive pulmonary disease) (H)      Hepatitis C      PTSD (post-traumatic stress disorder)      Seizures (H)     second to ETOH     Substance abuse (H)         Reviewed and updated in [employer].     Past Surgical History:      Past Surgical History:   Procedure Laterality Date     LAPAROSCOPIC TUBAL LIGATION       ORTHOPEDIC SURGERY      Left knee     TONSILLECTOMY           Social History:     Social History     Tobacco Use     Smoking status: Every Day     Packs/day: 0.50     Types: Cigarettes     Smokeless tobacco: Never     Tobacco comments:     Starting Chantix October 2014   Substance Use Topics     Alcohol use: Yes     Comment: 12-15 cans per day     Drug use: No     Comment: stopped 1986        Family History:     Family History   Problem Relation Age of Onset     Anxiety Disorder Mother      Asthma Mother      Alcohol/Drug " "Father      Prostate Cancer Father      Arthritis Father      Alcohol/Drug Brother      Alcohol/Drug Brother      Hypertension Brother      Alcohol/Drug Brother      Depression Brother      Psychotic Disorder Brother          Allergies:     Allergies   Allergen Reactions     Amlodipine      Other reaction(s): Nightmares     Bee Venom      Other reaction(s): Edema     Clonidine Unknown     Prednisone Anxiety     Patient stated that prednisone causes \"bad panic attacks\".  Patient stated that prednisone causes \"bad panic attacks\".  Patient stated that prednisone causes \"bad panic attacks\".       Antihistamines, Chlorpheniramine-Type [Antihistamines, Chlorpheniramine-Type]      Compazine      Lock jaw; all medications ending with -zine     Diphenhydramine      Muscle Cramps     Penicillins      Panic attack     Prochlorperazine      Muscle cramps     Trazodone Nausea and Vomiting     \" I ended up falling and feeling like I was drunk\"     Wasps [Hornets]      Swelling          Medications:   Reviewed. Please see MAR     Review of Systems:   10 point ROS of systems including Constitutional, Eyes, Respiratory, Cardiovascular, Gastroenterology, Genitourinary, Integumentary, Muscularskeletal, Psychiatric were all negative except for pertinent positives noted in my HPI.    OBJECTIVE   Physical Exam:   Vitals were reviewed  Blood pressure 139/76, pulse 74, temperature 97.5  F (36.4  C), temperature source Temporal, resp. rate 16, height 1.626 m (5' 4\"), weight 72.7 kg (160 lb 4.4 oz), SpO2 95 %, not currently breastfeeding.  General: Alert and oriented x3, anxious, restless, moderate distress   Skin: No jaundice, rashes, or lesions        Data:        Lab Results   Component Value Date     05/29/2023     09/03/2016    Lab Results   Component Value Date    CHLORIDE 101 05/29/2023    CHLORIDE 102 12/09/2022    CHLORIDE 104 09/03/2016    Lab Results   Component Value Date    BUN 7.8 05/29/2023    BUN 6 12/09/2022    " BUN 7 2016      Lab Results   Component Value Date    POTASSIUM 3.9 2023    POTASSIUM 3.6 2022    POTASSIUM 3.8 2016    Lab Results   Component Value Date    CO2 23 2023    CO2 20 2022    CO2 25 2016    Lab Results   Component Value Date    CR 0.56 2023    CR 0.61 2016        Lab Results   Component Value Date    WBC 6.1 2023    HGB 14.3 2023    HCT 39.7 2023    MCV 93 2023     2023     Lab Results   Component Value Date    WBC 6.1 2023           Medical Decision Makin MINUTES SPENT BY ME on the date of service doing chart review, history, exam, documentation & further activities per the note.

## 2023-05-29 NOTE — ED TRIAGE NOTES
Pt. states last drink was 2 hours ago, states had 3 seizure yesterday.  Usually drinks all day everyday.  30 packs every 2 days.  Pt. states having suicidal thoughts, no plan.  No HI.  Looking for detox.     Triage Assessment     Row Name 05/29/23 0207       Triage Assessment (Adult)    Airway WDL WDL       Respiratory WDL    Respiratory WDL WDL       Skin Circulation/Temperature WDL    Skin Circulation/Temperature WDL WDL       Cardiac WDL    Cardiac WDL WDL       Peripheral/Neurovascular WDL    Peripheral Neurovascular WDL WDL       Cognitive/Neuro/Behavioral WDL    Cognitive/Neuro/Behavioral WDL WDL

## 2023-05-29 NOTE — PROGRESS NOTES
"   05/29/23 1800   Group Therapy Session   Group Attendance attended group session   Time Session Began 1645   Time Session Ended 1735   Total Time (minutes) 50   Total # Attendees 8   Group Type psychotherapeutic   Group Topic Covered disease of addiction/choices in recovery   Group Session Detail Process   Patient Response/Contribution did not discuss personal experience     Moo reported as \" no mark, in detox.\"  "

## 2023-05-29 NOTE — PHARMACY-ADMISSION MEDICATION HISTORY
Pharmacist Admission Medication History    Admission medication history is complete. The information provided in this note is only as accurate as the sources available at the time of the update.    Medication reconciliation/reorder completed by provider prior to medication history? yes    Information Source(s): patient and fill history via in-person    Pertinent Information:     Patient's report consistent with fill records, all refills up to date except propranolol last filled in January for a 90-day supply.      Changes made to PTA medication list:    Added: lamotrigine (prescribed 4/19 but pt hasn't started yet), incruse ellipta inhaler (prescribed 5/23 but hasn't picked up from pharmacy yet)    Deleted: None    Changed: None    Allergies reviewed with patient and updates made in EHR: no    Prior to Admission medications    Medication Sig Last Dose       eszopiclone (LUNESTA) 3 MG tablet Take 1 tablet by mouth At Bedtime     5/28/2023       gabapentin (NEURONTIN) 600 MG tablet Take 600 mg by mouth 3 times daily     5/28/2023       lamoTRIgine (LAMICTAL) 25 MG tablet Take 25 mg by mouth daily hasn't started           levothyroxine (SYNTHROID/LEVOTHROID) 100 MCG tablet     Take 100 mcg by mouth daily 5/28/2023       melatonin 5 MG tablet Take 1 tablet (5 mg) by mouth nightly as needed for sleep     5/28/2023       multivitamin w/minerals (THERA-VIT-M) tablet     Take 1 tablet by mouth daily 5/28/2023       omeprazole (PRILOSEC) 40 MG DR capsule     Take 40 mg by mouth daily 5/28/2023       propranolol (INDERAL) 20 MG tablet Take 20 mg by mouth 3 times daily     5/28/2023       QUEtiapine (SEROQUEL) 25 MG tablet Take 1 tablet (25 mg) by mouth At Bedtime     5/28/2023       thiamine (B-1) 100 MG tablet Take 1 tablet (100 mg) by mouth daily     5/28/2023       umeclidinium (INCRUSE ELLIPTA) 62.5 MCG/ACT inhaler Inhale 1 puff into the lungs daily     hasn't started       Vitamin D3 (VITAMIN D, CHOLECALCIFEROL,) 25 mcg  (1000 units) tablet     Take 25 mcg by mouth daily 5/28/2023                     Selena Enciso, Pharm.D., [facility]  Behavioral Health Inpatient Pharmacist  [facility] ([facility])  Phone: *[phone] ([email or web address] or [phone]

## 2023-05-29 NOTE — ED PROVIDER NOTES
"ED Provider Note  [facility]      History     Chief Complaint   Patient presents with     Withdrawal     HPI  Jolynn Rivera is a 63 year old female who has a past medical history of COPD, alcohol dependence, alcohol withdrawal seizures presenting with request for detox.  She says she has had seizures in the past, thinks she may have had one yesterday but she is not sure.  Does not think she has had a seizure today.  Drinks before coming in.  She drinks a case of beer per day.  She reports she has had delirium tremens in the past.  No current hallucinations.  No current suicidal ideation but she is very depressed.  She wants to quit.  Denies any other physical discomfort such as abdominal pain, nausea, vomiting, headaches.  Denies other drug use.    Past Medical History  Past Medical History:   Diagnosis Date     Anxiety      COPD (chronic obstructive pulmonary disease) (H)      COPD (chronic obstructive pulmonary disease) (H)      Hepatitis C      PTSD (post-traumatic stress disorder)      Seizures (H)     second to ETOH     Substance abuse (H)      Past Surgical History:   Procedure Laterality Date     LAPAROSCOPIC TUBAL LIGATION       ORTHOPEDIC SURGERY      Left knee     TONSILLECTOMY       CALCIUM PO  Cholecalciferol (VITAMIN D3 PO)  eszopiclone (LUNESTA) 3 MG tablet  gabapentin (NEURONTIN) 600 MG tablet  levothyroxine (SYNTHROID/LEVOTHROID) 100 MCG tablet  melatonin 5 MG tablet  multivitamin w/minerals (THERA-VIT-M) tablet  omeprazole (PRILOSEC) 40 MG DR capsule  propranolol (INDERAL) 20 MG tablet  QUEtiapine (SEROQUEL) 25 MG tablet  thiamine (B-1) 100 MG tablet      Allergies   Allergen Reactions     Amlodipine      Other reaction(s): Nightmares     Bee Venom      Other reaction(s): Edema     Clonidine Unknown     Prednisone Anxiety     Patient stated that prednisone causes \"bad panic attacks\".  Patient stated that prednisone causes \"bad panic attacks\".  Patient stated that " "prednisone causes \"bad panic attacks\".       Antihistamines, Chlorpheniramine-Type [Antihistamines, Chlorpheniramine-Type]      Compazine      Lock jaw; all medications ending with -zine     Diphenhydramine      Muscle Cramps     Penicillins      Panic attack     Prochlorperazine      Muscle cramps     Trazodone Nausea and Vomiting     \" I ended up falling and feeling like I was drunk\"     Wasps [Hornets]      Swelling      Family History  Family History   Problem Relation Age of Onset     Anxiety Disorder Mother      Asthma Mother      Alcohol/Drug Father      Prostate Cancer Father      Arthritis Father      Alcohol/Drug Brother      Alcohol/Drug Brother      Hypertension Brother      Alcohol/Drug Brother      Depression Brother      Psychotic Disorder Brother      Social History   Social History     Tobacco Use     Smoking status: Every Day     Packs/day: 0.50     Types: Cigarettes     Smokeless tobacco: Never     Tobacco comments:     Starting Chantix October 2014   Substance Use Topics     Alcohol use: Yes     Comment: 12 beers daily     Drug use: No     Comment: stopped 1986         A medically appropriate review of systems was performed with pertinent positives and negatives noted in the HPI, and all other systems negative.    Physical Exam      Physical Exam  Physical Exam   Constitutional: oriented to person, place, and time. appears well-developed and well-nourished.   HENT:   Head: Normocephalic and atraumatic.   Neck: Normal range of motion.   Pulmonary/Chest: Effort normal. No respiratory distress.   Cardiac: No murmurs, rubs, gallops. RRR.  Abdominal: Abdomen soft, nontender, nondistended. No rebound tenderness.  MSK: Long bones without deformity or evidence of trauma  Neurological: alert and oriented to person, place, and time. Mild tremor. Tongue fasciculations present. Gait stable.  Skin: Skin is warm and dry.   Psychiatric:  normal mood and affect.  behavior is normal. Thought content normal. "       ED Course, Procedures, & Data      Procedures              No results found for any visits on 05/29/23.  Medications   diazepam (VALIUM) tablet 5-20 mg (has no administration in time range)   thiamine (B-1) tablet 100 mg (has no administration in time range)   folic acid (FOLVITE) tablet 1 mg (has no administration in time range)   0.9% sodium chloride BOLUS (has no administration in time range)     Followed by   sodium chloride 0.9% infusion (has no administration in time range)     Labs Ordered and Resulted from Time of ED Arrival to Time of ED Departure - No data to display  No orders to display          Critical care was not performed.     Medical Decision Making  The patient's presentation was of high complexity (an acute health issue posing potential threat to life or bodily function).    The patient's evaluation involved:  review of external note(s) from 1 sources (prior ED note)  ordering and/or review of 3+ test(s) in this encounter (see separate area of note for details)  discussion of management or test interpretation with another health professional (behavioral health intake)    The patient's management necessitated high risk (a decision regarding hospitalization).      Assessment & Plan    MDM  Patient presenting with request for detox.  Here she does have symptoms of mild withdrawal at this point.  We will treat her with benzodiazepines per protocol.  We will get labs, COVID and urine drug screen for intake to go to detox.  Patient has no medical complaints today.  Vitals are stable.    I have reviewed the nursing notes. I have reviewed the findings, diagnosis, plan and need for follow up with the patient.    New Prescriptions    No medications on file       Final diagnoses:   Alcohol dependence with intoxication with complication (H)       Rock Schuster  [facility] EMERGENCY DEPARTMENT  5/29/2023     Rock Schuster MD  05/29/23 0209

## 2023-05-29 NOTE — PLAN OF CARE
Problem: Substance Withdrawal  Goal: Substance Withdrawal  Description: Signs and symptoms of listed problems will be absent or manageable.  Outcome: Progressing  Intervention: Substance Withdrawal  Recent Flowsheet Documentation  Taken 5/29/2023 1104 by Siddharth Roche RN  Substance Withdrawal Interventions:    interventions implemented as appropriate    monitor substance withdrawal process    seizure precautions    encourage nutrition and hydration    maintain safety precautions    monitor need to revise level of observation    maintain safe secure environment    assess medication adherance    assess patient response to medication  Taken 5/29/2023 1048 by Siddharth Roche RN  Substance Withdrawal Interventions:    interventions implemented as appropriate    monitor substance withdrawal process    seizure precautions    encourage nutrition and hydration    maintain safety precautions    monitor need to revise level of observation    maintain safe secure environment    assess medication adherance    assess patient response to medication    assist patient in completing CD Evaluation / Rule 25 Evaluation    other (see comment)  Intervention: Social and Therapeutic Interv (Substance Withdrawal)  Recent Flowsheet Documentation  Taken 5/29/2023 1104 by Siddharth Roche RN  Social and Therapeutic Interventions (Substance Withdrawal): encourage socialization with peers  Taken 5/29/2023 1048 by Siddharth Roche RN  Social and Therapeutic Interventions (Substance Withdrawal):    encourage socialization with peers    encourage effective boundaries with peers    encourage participation in therapeutic groups and milieu activities   Goal Outcome Evaluation:  ADMISSION TO 3 A DETOX UNIT  DATA: Writer met with the patient upon arrival to the unit. Clothing and security checks were completed; belongings inventory performed by PA staff. Writer established a therapeutic relationship based on trust.   Pt presents voluntarily  "for detoxification from alcohol. Pt arrived to the ER early this am. Pt was started with Valium withdrawal protocol at 0200. Upon arrival to the unit at 1015 am, pt had a total of 55 mg of Valium per protocol assessment. Most recent dosing at 0910 Valium 10 mg po.   Upon arrival to the unit patient presented steady gait, speech normal on volume, rate an content. participation in interview appropriate. Pt able to stay on topic.   Pt affect flat. Pt reports mood as depressed. Pt reports fleeing suicidal ideation. Pt denies a plan to harm self and states is able to maintain safety on the unit.   Withdrawal: noted minimal tremor to upper extremities. No tongue tremor. Pt appeared calm during the interview. Pt discussing her history of detox, number of treatments and pt current outpatient resources which are appropriate and well structured.   Pt reports has been drinking beer as first choice. 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 Pt drank most recently last night. A case of beer a day for several weeks.   Withdrawal history: pt reports has experienced hallucinations, seizures and Dts in the past. Recent contributing stressors: mother passed away in November 2022. Her Cat passed away as well.   Support system: patient's father. JUANY signed. Other supports: therapist, groups with peers and therapy staff   Goals: to achieve sobriety; take a course at the [facility]. Pt reports has a benefit of nearly 100% tuition free due to age. Perhaps participate in the SMART recovery: focus is DBT/CBT skills.   Fall history: pt reports a recent fall several months ago. Pt reports was intoxicated at home and fell in the tub. Pt will remain on a fall risk while hospitalized.   Seizures: a history of a seizure. Patient will remain on seizure precautions.   Withdrawal protocol MSSA; Valium dosing.   Most recent vital signs: /76   Pulse 74   Temp 97.5  F (36.4  C) (Temporal)   Resp 16   Ht 1.626 m (5' 4\")   Wt 72.7 " "kg (160 lb 4.4 oz)   SpO2 95%   BMI 27.51 kg/m       Action: Admission orders released. MSSA scores 0900 16.  MSSA scores 1030: 7 (some nausea, Zofran administered. Nicotine following. VSS. Pt was able to tolerate most of lunch despite nausea. MSSA score 1245: 13 BP (!) 157/88   Pulse 80   Temp 98.6  F (37  C) (Oral)   Resp 16   Ht 1.626 m (5' 4\")   Wt 72.7 kg (160 lb 4.4 oz)   SpO2 95%   BMI 27.51 kg/m    Valium 10 mg po administered.    Response: Continue with withdrawal protocol and precautions.      Patient was able to participate effectively during the admission process. Consent for care established.                  "

## 2023-05-30 LAB
ANION GAP SERPL CALCULATED.3IONS-SCNC: 10 MMOL/L (ref 7–15)
BUN SERPL-MCNC: 10.8 MG/DL (ref 8–23)
CALCIUM SERPL-MCNC: 9.2 MG/DL (ref 8.8–10.2)
CHLORIDE SERPL-SCNC: 104 MMOL/L (ref 98–107)
CREAT SERPL-MCNC: 0.67 MG/DL (ref 0.51–0.95)
DEPRECATED HCO3 PLAS-SCNC: 24 MMOL/L (ref 22–29)
GFR SERPL CREATININE-BSD FRML MDRD: >90 ML/MIN/1.73M2
GLUCOSE SERPL-MCNC: 100 MG/DL (ref 70–99)
POTASSIUM SERPL-SCNC: 3.7 MMOL/L (ref 3.4–5.3)
SODIUM SERPL-SCNC: 138 MMOL/L (ref 136–145)

## 2023-05-30 PROCEDURE — 128N000004 HC R&B CD ADULT

## 2023-05-30 PROCEDURE — 250N000009 HC RX 250

## 2023-05-30 PROCEDURE — 80048 BASIC METABOLIC PNL TOTAL CA: CPT | Performed by: PSYCHIATRY & NEUROLOGY

## 2023-05-30 PROCEDURE — 250N000013 HC RX MED GY IP 250 OP 250 PS 637: Performed by: PSYCHIATRY & NEUROLOGY

## 2023-05-30 PROCEDURE — 99239 HOSP IP/OBS DSCHRG MGMT >30: CPT | Performed by: PSYCHIATRY & NEUROLOGY

## 2023-05-30 PROCEDURE — 99232 SBSQ HOSP IP/OBS MODERATE 35: CPT

## 2023-05-30 PROCEDURE — 36415 COLL VENOUS BLD VENIPUNCTURE: CPT | Performed by: PSYCHIATRY & NEUROLOGY

## 2023-05-30 PROCEDURE — H2032 ACTIVITY THERAPY, PER 15 MIN: HCPCS

## 2023-05-30 PROCEDURE — 250N000013 HC RX MED GY IP 250 OP 250 PS 637

## 2023-05-30 RX ORDER — GINSENG 100 MG
CAPSULE ORAL 2 TIMES DAILY
Status: DISCONTINUED | OUTPATIENT
Start: 2023-05-30 | End: 2023-05-31 | Stop reason: HOSPADM

## 2023-05-30 RX ORDER — HYDRALAZINE HYDROCHLORIDE 10 MG/1
10 TABLET, FILM COATED ORAL ONCE
Status: COMPLETED | OUTPATIENT
Start: 2023-05-30 | End: 2023-05-30

## 2023-05-30 RX ORDER — GINSENG 100 MG
CAPSULE ORAL 2 TIMES DAILY
Qty: 28 G | Refills: 0 | Status: SHIPPED | OUTPATIENT
Start: 2023-05-30 | End: 2023-08-30

## 2023-05-30 RX ADMIN — NICOTINE POLACRILEX 4 MG: 4 GUM, CHEWING BUCCAL at 11:03

## 2023-05-30 RX ADMIN — HYDRALAZINE HYDROCHLORIDE 10 MG: 10 TABLET ORAL at 11:31

## 2023-05-30 RX ADMIN — HYDROXYZINE HYDROCHLORIDE 25 MG: 25 TABLET, FILM COATED ORAL at 20:16

## 2023-05-30 RX ADMIN — GABAPENTIN 600 MG: 600 TABLET, FILM COATED ORAL at 14:06

## 2023-05-30 RX ADMIN — NICOTINE POLACRILEX 2 MG: 2 GUM, CHEWING ORAL at 06:35

## 2023-05-30 RX ADMIN — LEVOTHYROXINE SODIUM 100 MCG: 100 TABLET ORAL at 08:25

## 2023-05-30 RX ADMIN — PROPRANOLOL HYDROCHLORIDE 20 MG: 20 TABLET ORAL at 08:25

## 2023-05-30 RX ADMIN — OMEPRAZOLE 40 MG: 20 CAPSULE, DELAYED RELEASE ORAL at 08:24

## 2023-05-30 RX ADMIN — NICOTINE POLACRILEX 4 MG: 4 GUM, CHEWING BUCCAL at 22:11

## 2023-05-30 RX ADMIN — GABAPENTIN 600 MG: 600 TABLET, FILM COATED ORAL at 08:25

## 2023-05-30 RX ADMIN — PROPRANOLOL HYDROCHLORIDE 20 MG: 20 TABLET ORAL at 20:16

## 2023-05-30 RX ADMIN — Medication 5 MG: at 20:19

## 2023-05-30 RX ADMIN — BACITRACIN: 500 OINTMENT TOPICAL at 20:23

## 2023-05-30 RX ADMIN — NICOTINE POLACRILEX 4 MG: 4 GUM, CHEWING BUCCAL at 18:04

## 2023-05-30 RX ADMIN — GABAPENTIN 600 MG: 600 TABLET, FILM COATED ORAL at 20:16

## 2023-05-30 RX ADMIN — QUETIAPINE FUMARATE 25 MG: 25 TABLET ORAL at 20:19

## 2023-05-30 RX ADMIN — BACITRACIN: 500 OINTMENT TOPICAL at 10:42

## 2023-05-30 RX ADMIN — NICOTINE POLACRILEX 4 MG: 4 GUM, CHEWING BUCCAL at 14:45

## 2023-05-30 RX ADMIN — NICOTINE POLACRILEX 4 MG: 4 GUM, CHEWING BUCCAL at 08:43

## 2023-05-30 RX ADMIN — ACETAMINOPHEN 650 MG: 325 TABLET ORAL at 04:56

## 2023-05-30 RX ADMIN — DIAZEPAM 10 MG: 5 TABLET ORAL at 04:56

## 2023-05-30 RX ADMIN — THIAMINE HCL TAB 100 MG 100 MG: 100 TAB at 08:25

## 2023-05-30 RX ADMIN — FOLIC ACID 1 MG: 1 TABLET ORAL at 08:25

## 2023-05-30 RX ADMIN — PROPRANOLOL HYDROCHLORIDE 20 MG: 20 TABLET ORAL at 14:06

## 2023-05-30 RX ADMIN — NICOTINE POLACRILEX 4 MG: 4 GUM, CHEWING BUCCAL at 19:56

## 2023-05-30 RX ADMIN — NICOTINE POLACRILEX 4 MG: 4 GUM, CHEWING BUCCAL at 16:30

## 2023-05-30 ASSESSMENT — ACTIVITIES OF DAILY LIVING (ADL)
ADLS_ACUITY_SCORE: 42
ADLS_ACUITY_SCORE: 42
ADLS_ACUITY_SCORE: 43
ADLS_ACUITY_SCORE: 42
ADLS_ACUITY_SCORE: 43
ADLS_ACUITY_SCORE: 42
ADLS_ACUITY_SCORE: 42
ADLS_ACUITY_SCORE: 43
HYGIENE/GROOMING: INDEPENDENT
ADLS_ACUITY_SCORE: 43
ORAL_HYGIENE: INDEPENDENT
DRESS: INDEPENDENT
ADLS_ACUITY_SCORE: 43

## 2023-05-30 NOTE — PROGRESS NOTES
.  Behavioral Team Discussion: (5/30/2023)    Continued Stay Criteria/Rationale: Patient admitted for Alcohol Use Disorder.  Plan: The following services will be provided to the patient; psychiatric assessment, medication management, therapeutic milieu, individual and group support, and skills groups.   Participants: 3A Provider: Dr. Jessica Haskins MD; 3A RN: Yoko Alex RN; 3A CM's: Justin YOON [facility].  Summary/Recommendation: Providers will assess today for treatment recommendations, discharge planning, and aftercare plans. Justin YOON [facility] CM will meet with pt for discharge planning.   Medical/Physical: Pt. Is still in Detox and is not thinking about treatment at this time   Precautions:   Behavioral Orders   Procedures     Code 1 - Restrict to Unit     Routine Programming     As clinically indicated     Seizure precautions     Status 15     Every 15 minutes.     Withdrawal precautions     Rationale for change in precautions or plan: N/A  Progress: No Change.    ASAM Dimension Scale Ratings:  Dimension 1: 1 Client can tolerate and cope with withdrawal discomfort. The client displays mild to moderate intoxication or signs and symptoms interfering with daily functioning but does not immediately endanger self or others. Client poses minimal risk of severe withdrawal.  Dimension 2: 1 Client tolerates and rahul with physical discomfort and is able to get the services that the client needs.  Dimension 3: 2 Client has difficulty with impulse control and lacks coping skills. Client has thoughts of suicide or harm to others without means; however, the thoughts may interfere with participation in some treatment activities. Client has difficulty functioning in significant life areas. Client has moderate symptoms of emotional, behavioral, or cognitive problems. Client is able to participate in most treatment activities.  Dimension 4: 3 Client displays inconsistent compliance, minimal awareness of either the  client's addiction or mental disorder, and is minimally cooperative.  Dimension 5: 4 No awareness of the negative impact of mental health problems or substance abuse. No coping skills to arrest mental health or addiction illnesses, or prevent relapse.  Dimension 6: 4 Client has (A) Chronically antagonistic significant other, living environment, family, peer group or long-term criminal justice involvement that is harmful to recovery or treatment progress, or (B) Client has an actively antagonistic significant other, family, work, or living environment with immediate threat to the client's safety and well-being.

## 2023-05-30 NOTE — PROGRESS NOTES
Triage & Transition Services, [address]     Insurance: Medicare    DOC: Alcohol     Detox Status: IN    SUDs Assessment Status: No need at this time.     JUANY: Therapist Miya Mckeon [phone], CADI Worker- Giovani [phone]    Living Situation: Lives alone with pets.    Encounter: Patient appears to have good insight to resources and help that works for her sobriety and lifestyle. Patient has CADI Waver, CADI worker, long term therapist and recovery resources through [facility]. Patient is completely against AA and the guide they follow but makes statements about attending just to remain on a sober path. Patient has interests in SMART Recovery chat rooms, [email or web address] ball and attends a gym for rock climbing.     Collateral: Writer will attempt to reach out to CADI Worker and Therapist for more information. No answer, voicemail left to call back.      Current Plan: Detox Only. Remain with services through [facility].    Jennifer Balderas  Case Management/Coordinator   Triage & Transition Services - Mental Health and Addiction Service Line  [address] - Adult Inpatient Addiction Psychiatry Unit

## 2023-05-30 NOTE — PLAN OF CARE
"  Problem: Plan of Care - These are the overarching goals to be used throughout the patient stay.    Goal: Patient-Specific Goal (Individualized)  Description: You can add care plan individualizations to a care plan. Examples of Individualization might be:  \"Parent requests to be called daily at 9am for status\", \"I have a hard time hearing out of my right ear\", or \"Do not touch me to wake me up as it startles me\".  Outcome: Progressing   Goal Outcome Evaluation:    Plan of Care Reviewed With: patient        Pt hyperverbal , flat and depressed, states she was sober for a while until she got frustrated with pursuing supports such as [facility] waver and started drinking a case of beer daily for the last 3 weeks, she denies SI/HI, was enrolled in [facility] but stopped attending regularly when she relapsed, pt is alert, c/o discomfort in the ball of her Rt foot, from glass/Splinter? IM updated, order to apply Bacitracin to area, pt has no redness, swelling and is able to ambulate without difficulty, she is med compliant, MSSA 4/3,  Denies most of the withdrawal symptoms, except for a slight tremor and generalized anxiety, Blood pressure this am 167/93, pt on scheduled, propanolol, blood pressure after med administration trending up 170/92, will inform IM, pt denies SI/HI, adequate intake noted.     Blood pressure trending down, Hydralazine administered x1 for elevated blood pressure,  On scheduled propanolol with slight effect, pt in lounge, social with peers playing cards, appears anxious and tense but denies anxiety, afraid to take additional meds,  Due to allergies to many medication, pt education given regarding med and HTN, pt agreed to take med.  Pt made a PCP appointment for after discharge for June 5th at 12.40pm and is looking forward to discharging tomorrow when out of detox, she has lift program for transportation.         "

## 2023-05-30 NOTE — DISCHARGE INSTRUCTIONS
Behavioral Discharge Planning and Instructions  THANK YOU FOR CHOOSING [facility]  3A  [phone]    Summary: You were admitted to [facility] on 5/29/23 for detoxification from Alcohol.  A medical exam was performed that included lab work. You have met with a   and opted to discharge to home.  Please take care and make your recovery a daily priority, Jolynn!  It was a pleasure working with you and the entire treatment team here wishes you the very best in your recovery!     Recommendation:  Please follow any and all recommendations and needed and required. Please seek a CD Assessment if needed to enter a higher level of treatment. Please follow up with [facility] Please reach out to your therapist to get a follow up appointment.     Main Diagnoses:  Per Dr Haskins  Alcohol use disorder, severe, dependence with withdrawal with complication of history of DTs and seizures   Suicidal ideation, passive in ED, denies on current interview   Bipolar disorder, type 2, in partial remission, most recent episode depressed   OMARI  PTSD  Elevated AST  Hypothyroidism  High anion gap metabolic acidosis  GERD  Hx of IVDU and Hep C s/p treatment in 2015  Prediabetes  Elevated BP  COPD      Symptoms to Report:  If you experience more anxiety, confusion, sleeplessness, deep sadness or thoughts of suicide, notify your treatment team or notify your primary care provider. IF ANY OF THE SYMPTOMS YOU ARE EXPERIENCING ARE A MEDICAL EMERGENCY CALL 911 IMMEDIATELY.     Lifestyle Adjustment: Adjust your lifestyle to get enough sleep, relaxation, exercise and good nutrition. Continue to develop healthy coping skills to decrease stress and promote a sober living environment. Do not use mood altering substances including alcohol, illegal drugs or addictive medications other than what is currently prescribed.     Disposition: Home.     Facts about COVID19 at www.cdc.gov/COVID19 and www.[place].gov/covid19    Keeping hands clean  is one of the most important steps we can take to avoid getting sick and spreading germs to others.  Please wash your hands frequently and lather with soap for at least 20 seconds!    Follow-up Appointment:    [facility] [place]    [address]    [address]    [place] [address]    [phone]   Elizabet Talbot APRN, CNP    [address] [place] [address]    [phone] (Work)    [phone] (Fax)     Appointment made with above provider for Monday June 5th at 12.40pm    If needed higher level of care we talked about you contacting [facility]. Please follow through if you need. It appears this place is closed. Please call the number listed as a back up for [facility]    [facility]  [address], [place] [address]  Phone: [phone]  Fax: [phone]    Therapy Appointment  [facility]  June 7th at 9am-Diana Holliday will help you get a follow up appointment next week at your visit.       Recovery apps for your phone to locate current in person and zoom recovery meetings  [place] - meeting mahnaz  AA  - meeting mahnaz  Meeting guide - meeting mahnaz  Quick NA meeting - meeting mahnaz  Estela- has various apps    Resources:  due to covid-19 most AA/NA meetings are being held online however some are in-person or a hybrid combination please check online to verify*  Need Support Now? If you or someone you know is struggling or in crisis, help is available. Call or text 038 or chat [employer].org  AA meetings search for them at: https://aa-intergroup.org (worldwide meeting listings)  AA meetings for [place] area can be found online at: https://a[place].org (click local online meetings listings)  NA meetings for [place] area can be found online at: https://www.nam[place].org  (click find a meeting)  AA and NA Sponsors are excellent resources for support and you can find one at any support group meeting.   Alcoholics Anonymous (https://aa.org/): for  information 24 hours/day  AA Intergroup service office in [place] (http://www.aastpaul.org/) [phone]  AA Intergroup service office in [facility]: [phone]. (http://www.a[place].org/)  Narcotics Anonymous (www.nam[place].org) (607) 839-2650  https://aafairviewriverside.org/meetings  SMART Recovery - self management for addiction recovery:  www.smartreceyetoky.org  Pathways ~ A Health Crisis Resource & Support Center:  218.702.7658.  https://prescribetoprevent.org/patient-education/videos/  http://www.harmreduction.org  [facility] [place] 142-645-2648  Support Group:  CHASE/ROXY and Sponsor/support.  National [place] on Mental Illness (www.[place].sera.org): 783.268.2984 or 130-135-1107.  Alcoholics Anonymous (www.alcoholics-anonymous.org): Check your phone book for your local chapter.  Suicide Awareness Voices of Education (SAVE) (www.save.org): 164-548-MYIV (7190)  National Suicide Prevention Line (www.mentalhealthmn.org): 135-643-APFN (1695)  Mental Health Consumer/Survivor Network of [place] (www.mhcsn.net): 946.329.5206 or 471-697-5454  Mental Health Association of [place] (www.mentalhealth.org): 445.908.4405 or 180-811-1182   Substance Abuse and Mental Health Services (www.samhsa.gov)  [place] Opioid Prevention Coalition: www.opioidcoalition.org    [place] Recovery Connection (MRC)  [facility] connects people seeking recovery to resources that help foster and sustain long-term recovery.  Whether you are seeking resources for treatment, transportation, housing, job training, education, health care or other pathways to recovery, [facility] is a great place to start.  377.434.7618.  www.Pinchd.org    Great Pod casts for nutrition and wellness  Listen on Apple Podcasts  Dishing Up Nutrition   [employer]   Nutrition       Understand the connection between what you eat and how you feel. Hosted by licensed nutritionists and dietitians from [employer] we share  practical, real-life solutions for healthier living through nutrition.     General Medication Instructions:   See your medication sheet(s) for instructions.   Take all medications as prescribed.  Make no changes unless your primary care provider suggests them.   Go to all your primary care provider visits.  Be sure to have all your required lab tests. This way, your medicines can be refilled on time.  Do not use any forms of alcohol.    Please Note:  If you have any questions at anytime after you are discharged please call [facility] detox unit 3AW at [phone].  [facility], Behavioral Intake [phone]  Medical Records call [phone]  Outpatient Behavioral Intake call [phone]  LP+ Wait List/Bed Availability call [phone]    Please remember to take all of your behavioral discharge planning and lab paperwork to any follow up appointments, it contains your lab results, diagnosis, medication list and discharge recommendations.      THANK YOU FOR CHOOSING [facility]

## 2023-05-30 NOTE — PROGRESS NOTES
"Brief Medicine Note    Contacted by nursing regarding object in bottom of right foot.     Today's vital signs, medications, and nursing notes were reviewed.    Laboratory and Imaging studies reviewed in the results review section of Epic. Pertinent studies are as below:         BP (!) 166/80 (BP Location: Left arm, Patient Position: Standing, Cuff Size: Adult Regular)   Pulse 60   Temp 97.3  F (36.3  C) (Temporal)   Resp 16   Ht 1.626 m (5' 4\")   Wt 72.7 kg (160 lb 4.4 oz)   SpO2 96%   BMI 27.51 kg/m    General: A&O. NAD.         A/P:    Splinter-right foot  Pt thinks she has a piece of glass in her right foot. States she could feel it poking out but was not allowed to use a tweezers. On exam, there appears to be a splinter just below the surface of the plantar right foot, below the ball of the foot. Cleaned area with iodine and attempted to remove with tweezers. Scant amount of blood. Splinter too deep to remove with tweezers. No redness, heat, minimal pain. No s/s of infection.   -Bacitracin twice daily until pain has resolved or splinter is gone  -Ice to area as needed    COPD  Patient following closely with primary doctor.  States she is in the process of having insurance approval for Spiriva inhaler.  Reviewed notes, noted that Spiriva would not be covered at this point and discussed Incruse Ellipta usage.  Patient has follow-up video appoint with her doctor in the next coming week and will discuss.    Hypertension  Patient does not wish to start on antihypertensive medication at this time.  She is following closely with her primary doctor.  She feels her blood pressure is currently elevated because of her PTSD and she is in a locked unit which is causing anxiety.  -hydralazine 10 mg x 1 dose for elevated BP    BILL Meyer CNP  Internal Medicine MEGGAN Hospitalist  Page job code 4250 (3B), 1070 (3A), or 1480 ([facility] and 4A)  Text paging via [employer] is appreciated  May 30, 2023      " none

## 2023-05-30 NOTE — PLAN OF CARE
Problem: Adult Behavioral Health Plan of Care  Goal: Plan of Care Review  Outcome: Progressing  Flowsheets (Taken 5/29/2023 2759)  Patient Agreement with Plan of Care: agrees     Problem: Substance Withdrawal  Goal: Substance Withdrawal  Description: Signs and symptoms of listed problems will be absent or manageable.  Outcome: Progressing   Goal Outcome Evaluation:    Plan of Care Reviewed With: patient      The patient was noticeable this evening but took a nap after supper. She was sociable and attended the first group activity. The patient maintained a calm and bright affect throughout the evening. She denied SI/HI, AVHs,  decreased appetite, and constipation but endorsed anxiety, depression, poor sleep pattern, and chronic generalized arthritis pain. Her depression is more than her anxiety. Pt declined pain meds. Pt took her medications and had no adverse reactions reported. Her MSSA scores were 8 and 5. She took Valium 10 mg once. At HS, she took prn melatonin. She chewed Nicotine several times.

## 2023-05-30 NOTE — PLAN OF CARE
"  Problem: Substance Withdrawal  Goal: Substance Withdrawal  Description: Signs and symptoms of listed problems will be absent or manageable.  Outcome: Progressing    Patient endorse anxiety 3/10, depression 5/10, denies SI/HI, no A/V hallucinations. Denies pain. Denies intervention. Visibile in the unit. Pleasant and cooperative.  Contract for safety. Patient is very engaging and like to talk about herself and plans towards her discharge. Patient says one of her biggest motivation is starting a graduate program at age 63 at $10 per credit. Food and fluid intake was great, will continue monitoring.  BP (!) 152/81   Pulse 66   Temp 97.9  F (36.6  C) (Oral)   Resp 16   Ht 1.626 m (5' 4\")   Wt 72.7 kg (160 lb 4.4 oz)   SpO2 96%   BMI 27.51 kg/m    "

## 2023-05-30 NOTE — PROGRESS NOTES
05/30/23 1700   Group Therapy Session   Group Attendance attended group session   Time Session Began 1645   Time Session Ended 1735   Total Time (minutes) 45   Total # Attendees 6   Group Type recreation   Group Topic Covered coping skills/lifestyle management   Group Session Detail healthy coping skills   Patient Response/Contribution cooperative with task   Patient Participation Detail Pt participated in Therapeutic Recreation group with focus on leisure education and acquisition of knowledge and skills. Pt was fully engaged and cooperative in group recreational intervention; leisure inventory. Pt was focused on the written portion for the first part of group and then contributed to the group discussion following. Pt discussed several recreational interests and positive coping skills that are healthy outlets. Pt mood was calm and was appropriate with interactions

## 2023-05-30 NOTE — DISCHARGE SUMMARY
"Psychiatric Discharge Summary    Jolynn Rivera MRN# [id number]   Age: 63 year old YOB: 1960     Date of Admission:  5/29/2023  Date of Discharge:  5/31/2023  1:31 PM  Admitting Physician:  Jessica Haskins DO  Discharge Physician:  Jessica Haskins DO         Event Leading to Hospitalization:   Jolynn is a 63 year old female with history of polysubstance use, bipolar disorder, anxiety and PTSD who presented to ED seeking detox from alcohol.      Chart review completed including ED notes, outpatient notes including primary care, behavioral health and ED visits and recent admissions including admission to this unit in April.      Per ED physician note: Jolynn Rivera is a 63 year old female who has a past medical history of COPD, alcohol dependence, alcohol withdrawal seizures presenting with request for detox.  She says she has had seizures in the past, thinks she may have had one yesterday but she is not sure.  Does not think she has had a seizure today.  Drinks before coming in.  She drinks a case of beer per day.  She reports she has had delirium tremens in the past.  No current hallucinations.  No current suicidal ideation but she is very depressed.  She wants to quit.  Denies any other physical discomfort such as abdominal pain, nausea, vomiting, headaches.  Denies other drug use.     Upon interview: Patient confirms some of the information above, reports that she is struggling with her alcohol use ever since the pandemic, drinking over 15 beers per day for the past several weeks.  Denies other substance use.  Reports she believes that she had a seizure a couple nights ago and also some \"mini ones\" yesterday.  Also having some visual hallucinations of colors in the past day and states that this tends to happen when she is going through withdrawal.  She has tolerance, drinking more than intended, difficulty cutting down, cravings.  Currently feeling nauseated, having some anxiety, feeling " "restless with some sweating.  Has not been able to eat or drink much today.  Reports that her mood is \"hopeful\" and denies having any suicidal thoughts now, denies having any thoughts of harming others.  Denies any other psychosis symptoms outside of VH already mentioned.  Some anxiety.  Reports has been tolerating her medications.  Reports that she does not sleep well, understands that Lunesta will not be prescribed up on the unit, has some concerns that the Seroquel she takes at bedtime for sleep causes possible side effects but is also wanting to continue with this medication with admission to the unit.  She will let staff know should she experience any worsening side effects with this medication.  Denies having any other acute medical concerns.  She reports her goals for hospitalization are to complete her detox and return home, her dad and brother are helping to watch her dog and cat.  She has been working with [facility] and recently got a new S worker that comes 3 times a week up to 4 hours per time and thinks this will be helpful for her support.  She is not interested in treatment.  She had been working with [facility] but states that they made some changes in their program and so has not been working with them recently.       See Admission note by Jessica Haskins DO on 5/29/23 for additional details.          Diagnoses:     Alcohol use disorder, severe, dependence with withdrawal with complication of history of DTs and seizures   Bipolar disorder, type 2, in partial remission, most recent episode depressed   OMARI  PTSD  Elevated AST  Hypothyroidism  High anion gap metabolic acidosis  GERD  Hx of IVDU and Hep C s/p treatment in 2015  Prediabetes  Elevated BP  COPD  Splinter R foot         Labs:     Recent Results (from the past 168 hour(s))   Asymptomatic COVID-19 Virus (Coronavirus) by PCR Nose    Collection Time: 05/27/23  6:30 PM    Specimen: Nose; Swab   Result Value Ref Range    SARS " CoV2 PCR Negative Negative   Alcohol breath test POCT    Collection Time: 05/27/23  6:54 PM   Result Value Ref Range    Alcohol Breath Test 0.272 (A) 0.00 - 0.01   Drug abuse screen 1 urine (ED)    Collection Time: 05/27/23  7:16 PM   Result Value Ref Range    Amphetamines Urine Screen Negative Screen Negative    Barbituates Urine Screen Negative Screen Negative    Benzodiazepine Urine Screen Negative Screen Negative    Cannabinoids Urine Screen Negative Screen Negative    Cocaine Urine Screen Negative Screen Negative    Opiates Urine Screen Negative Screen Negative   Comprehensive metabolic panel    Collection Time: 05/27/23  7:17 PM   Result Value Ref Range    Sodium 135 (L) 136 - 145 mmol/L    Potassium 3.6 3.4 - 5.3 mmol/L    Chloride 98 98 - 107 mmol/L    Carbon Dioxide (CO2) 23 22 - 29 mmol/L    Anion Gap 14 7 - 15 mmol/L    Urea Nitrogen 7.0 (L) 8.0 - 23.0 mg/dL    Creatinine 0.58 0.51 - 0.95 mg/dL    Calcium 9.0 8.8 - 10.2 mg/dL    Glucose 92 70 - 99 mg/dL    Alkaline Phosphatase 47 35 - 104 U/L    AST 33 10 - 35 U/L    ALT 19 10 - 35 U/L    Protein Total 7.6 6.4 - 8.3 g/dL    Albumin 4.3 3.5 - 5.2 g/dL    Bilirubin Total 0.2 <=1.2 mg/dL    GFR Estimate >90 >60 mL/min/1.73m2   Lipase    Collection Time: 05/27/23  7:17 PM   Result Value Ref Range    Lipase 46 13 - 60 U/L   CBC with platelets and differential    Collection Time: 05/27/23  7:17 PM   Result Value Ref Range    WBC Count 6.1 4.0 - 11.0 10e3/uL    RBC Count 4.01 3.80 - 5.20 10e6/uL    Hemoglobin 13.3 11.7 - 15.7 g/dL    Hematocrit 37.2 35.0 - 47.0 %    MCV 93 78 - 100 fL    MCH 33.2 (H) 26.5 - 33.0 pg    MCHC 35.8 31.5 - 36.5 g/dL    RDW 13.2 10.0 - 15.0 %    Platelet Count 292 150 - 450 10e3/uL    % Neutrophils 31 %    % Lymphocytes 51 %    % Monocytes 16 %    % Eosinophils 1 %    % Basophils 1 %    % Immature Granulocytes 0 %    NRBCs per 100 WBC 0 <1 /100    Absolute Neutrophils 1.9 1.6 - 8.3 10e3/uL    Absolute Lymphocytes 3.1 0.8 - 5.3 10e3/uL     Absolute Monocytes 1.0 0.0 - 1.3 10e3/uL    Absolute Eosinophils 0.0 0.0 - 0.7 10e3/uL    Absolute Basophils 0.1 0.0 - 0.2 10e3/uL    Absolute Immature Granulocytes 0.0 <=0.4 10e3/uL    Absolute NRBCs 0.0 10e3/uL   Alcohol breath test POCT    Collection Time: 05/29/23  2:19 AM   Result Value Ref Range    Alcohol Breath Test 0.211 (A) 0.00 - 0.01   Comprehensive metabolic panel    Collection Time: 05/29/23  2:59 AM   Result Value Ref Range    Sodium 140 136 - 145 mmol/L    Potassium 3.9 3.4 - 5.3 mmol/L    Chloride 101 98 - 107 mmol/L    Carbon Dioxide (CO2) 23 22 - 29 mmol/L    Anion Gap 16 (H) 7 - 15 mmol/L    Urea Nitrogen 7.8 (L) 8.0 - 23.0 mg/dL    Creatinine 0.56 0.51 - 0.95 mg/dL    Calcium 9.1 8.8 - 10.2 mg/dL    Glucose 114 (H) 70 - 99 mg/dL    Alkaline Phosphatase 51 35 - 104 U/L    AST 44 (H) 10 - 35 U/L    ALT 22 10 - 35 U/L    Protein Total 7.9 6.4 - 8.3 g/dL    Albumin 4.5 3.5 - 5.2 g/dL    Bilirubin Total 0.3 <=1.2 mg/dL    GFR Estimate >90 >60 mL/min/1.73m2   CBC with platelets and differential    Collection Time: 05/29/23  2:59 AM   Result Value Ref Range    WBC Count 6.1 4.0 - 11.0 10e3/uL    RBC Count 4.28 3.80 - 5.20 10e6/uL    Hemoglobin 14.3 11.7 - 15.7 g/dL    Hematocrit 39.7 35.0 - 47.0 %    MCV 93 78 - 100 fL    MCH 33.4 (H) 26.5 - 33.0 pg    MCHC 36.0 31.5 - 36.5 g/dL    RDW 13.2 10.0 - 15.0 %    Platelet Count 289 150 - 450 10e3/uL    % Neutrophils 54 %    % Lymphocytes 35 %    % Monocytes 10 %    % Eosinophils 0 %    % Basophils 1 %    % Immature Granulocytes 0 %    NRBCs per 100 WBC 0 <1 /100    Absolute Neutrophils 3.3 1.6 - 8.3 10e3/uL    Absolute Lymphocytes 2.1 0.8 - 5.3 10e3/uL    Absolute Monocytes 0.6 0.0 - 1.3 10e3/uL    Absolute Eosinophils 0.0 0.0 - 0.7 10e3/uL    Absolute Basophils 0.0 0.0 - 0.2 10e3/uL    Absolute Immature Granulocytes 0.0 <=0.4 10e3/uL    Absolute NRBCs 0.0 10e3/uL   GGT    Collection Time: 05/29/23  2:59 AM   Result Value Ref Range    GGT 19 5 - 36 U/L    TSH with free T4 reflex    Collection Time: 05/29/23  2:59 AM   Result Value Ref Range    TSH 0.31 0.30 - 4.20 uIU/mL   TSH    Collection Time: 05/29/23  2:59 AM   Result Value Ref Range    TSH 0.31 0.30 - 4.20 uIU/mL   Asymptomatic COVID-19 Virus (Coronavirus) by PCR Nose    Collection Time: 05/29/23  4:01 AM    Specimen: Nose; Swab   Result Value Ref Range    SARS CoV2 PCR Negative Negative   Drug abuse screen 1 urine (ED)    Collection Time: 05/29/23  4:51 AM   Result Value Ref Range    Amphetamines Urine Screen Negative Screen Negative    Barbituates Urine Screen Negative Screen Negative    Benzodiazepine Urine Screen Positive (A) Screen Negative    Cannabinoids Urine Screen Negative Screen Negative    Cocaine Urine Screen Negative Screen Negative    Opiates Urine Screen Negative Screen Negative   Basic metabolic panel    Collection Time: 05/30/23  7:20 AM   Result Value Ref Range    Sodium 138 136 - 145 mmol/L    Potassium 3.7 3.4 - 5.3 mmol/L    Chloride 104 98 - 107 mmol/L    Carbon Dioxide (CO2) 24 22 - 29 mmol/L    Anion Gap 10 7 - 15 mmol/L    Urea Nitrogen 10.8 8.0 - 23.0 mg/dL    Creatinine 0.67 0.51 - 0.95 mg/dL    Calcium 9.2 8.8 - 10.2 mg/dL    Glucose 100 (H) 70 - 99 mg/dL    GFR Estimate >90 >60 mL/min/1.73m2              Consults:   IM consult placed for management of other medical concerns, including lab abnormalities, per consult note on 5/29/23:   Jolynn Rivera is a 63 year old woman with a history of COPD, alcohol dependence, alcohol withdrawal seizures, hypothyroidism, prediabetes, GERD, PTSD. Pt was admitted on 5/29/2023 to  for medical management of withdrawal from alcohol.     #Alcohol withdrawal, hx of alcohol use disorder   Pt drinks a case of beer per day.   -MSSA 16 this shift. Endorses a hx of withdrawal seizures, last occurring possibly yesterday. Not currently on AEDs.    -continue MSSA   -folvite, multi-vites, thiamine supplementation   -further management per  Psychiatry      #COPD  Note for spiriva prescription coverage noted. Unclear if patient has ever needed inhalers.  -CTM     #Hypothyroidism  Diagnosed 3/17/22, at that time TSH 12.33. TSH 7.55 10/8/2022. Currently TSH 0.31.  -continue PTA levothyroxine 100 mcg     #Prediabetes  Hb A1c 5.9 4/15/2023. Blood sugar this admission 114.   -continue to encourage lifestyle changes- healthy eating, moderate exercise, weight loss     #Elevated blood pressure   Elevated blood pressure in the setting of acute alcohol withdrawal. Expect blood pressures will trend down with cessation from alcohol and treatment for withdrawal. Denies chest pain, headache, blurry vision, palpitations, fatigue, dyspnea. No history of high blood pressure outside of episodes of alcohol withdrawal  -monitor and would only treat with anti-hypertensive if blood pressures persistently > 140/90     #Anion gap metabolic acidosis  Anion gap 16. Associated with significant alcohol ingestion and dehydration.   -alcohol cessation  -encourage fluid intake     #GERD  -continue home PPI     #PTSD  -management per psychiatry     Currently, medically stable and internal medicine will sign off. Please contact if future questions or concerns arise. Thank you for the opportunity to be a part of this patient's care.        BILL Meyer CNP  Internal Medicine [facility] Hospitalist     IM progress note 5/30/23  A/P:     Splinter-right foot  Pt thinks she has a piece of glass in her right foot. States she could feel it poking out but was not allowed to use a tweezers. On exam, there appears to be a splinter just below the surface of the plantar right foot, below the ball of the foot. Cleaned area with iodine and attempted to remove with tweezers. Scant amount of blood. Splinter too deep to remove with tweezers. No redness, heat, minimal pain. No s/s of infection.   -Bacitracin twice daily until pain has resolved or splinter is gone  -Ice to area as needed     COPD  Patient  "following closely with primary doctor.  States she is in the process of having insurance approval for Spiriva inhaler.  Reviewed notes, noted that Spiriva would not be covered at this point and discussed Incruse Ellipta usage.  Patient has follow-up video appoint with her doctor in the next coming week and will discuss.     Hypertension  Patient does not wish to start on antihypertensive medication at this time.  She is following closely with her primary doctor.  She feels her blood pressure is currently elevated because of her PTSD and she is in a locked unit which is causing anxiety.  -hydralazine 10 mg x 1 dose for elevated BP     Selena Taylor, BILL CNP  Internal Medicine [facility] Hospitalist      Brief Medicine Note   31 May 2023      Patient with intermittent elevated BPs since coming out of detox.  Pt attributes to being on a locked unit, anxiety, and PTSD.  See note from Selena Ahuja CNP on 5/30.  Responded well to Hydralazine.  Will discharge with Hydralazine 12.5mg TID PRN for SBP > 160; short supply of 15 tabs as patient has primary care follow up scheduled for Monday 6/5/23.  Pt verbalized understanding and says she would seek medical care if symptomatic w/ HTN.       Evelyn Proctor, CNP, APRN  Internal Medicine [facility] Hospitalist         Hospital Course:   Jolynn Rivera is a 63 year old female with history of polysubstance use, bipolar disorder, anxiety and PTSD who presented to ED seeking detox from alcohol. Medically cleared in ED, admitted to  as voluntary patient. UDS positive for benzodiazepines (received in ED), EtOH 0.211(H), reports drinking at least 15 beers per day. Last admitted to this unit in April, was working with [facility],states recent supports through [facility] services including [facility] worker. PTA medications continued with exception of Lunesta. MSSA with diazepam ordered. IM consult placed and admission labs reviewed with additional labs ordered as below. States mood is \"hopeful\" and now " denying safety concerns including SI and HI. Pt reported goal for hospitalization are complete detox and return home to engage with her outpatient supports.    Jolynn Rivera did participate in groups and was visible in the milieu.     The patient's symptoms of alcohol withdrawal improved. She required 95mg of diazepam per [facility] protocol and was out of detox on 5/31/23 to discharge home. She did have ongoing elevated BP that IM continued to follow as above in consults.     When assessed prior to discharge Jolynn Rivera reported having no thoughts of harming self or others. In addition, she has notable risk factors for self-harm, including age, single status and substance abuse. However, risk is mitigated by commitment to family and pets, absence of past attempts, ability to volunteer a safety plan, is future oriented and history of seeking help when needed. Therefore, based on all available evidence including the factors cited above, she does not appear to be at imminent risk for self-harm, does not meet criteria for a 72-hr hold, and therefore remains appropriate for ongoing outpatient level of care.     Jolynn Rivera was discharged to home. At the time of discharge Jolynn Rivera was determined to not be a danger to herself or others.          Discharge Medications:     Current Discharge Medication List      START taking these medications    Details   bacitracin 500 UNIT/GM OINT Apply topically 2 times daily  Qty: 28 g, Refills: 0    Associated Diagnoses: Splinter of foot, unspecified laterality, initial encounter         CONTINUE these medications which have NOT CHANGED    Details   eszopiclone (LUNESTA) 3 MG tablet Take 1 tablet by mouth At Bedtime      gabapentin (NEURONTIN) 600 MG tablet Take 600 mg by mouth 3 times daily      lamoTRIgine (LAMICTAL) 25 MG tablet Take 25 mg by mouth daily      levothyroxine (SYNTHROID/LEVOTHROID) 100 MCG tablet Take 100 mcg by mouth daily      melatonin 5 MG tablet Take 1  "tablet (5 mg) by mouth nightly as needed for sleep  Qty: 30 tablet, Refills: 0    Associated Diagnoses: Alcohol dependence with intoxication with complication (H)      multivitamin w/minerals (THERA-VIT-M) tablet Take 1 tablet by mouth daily  Qty: 30 tablet, Refills: 0    Comments: Pt has supply  Associated Diagnoses: Alcohol dependence with intoxication with complication (H)      omeprazole (PRILOSEC) 40 MG DR capsule Take 40 mg by mouth daily      propranolol (INDERAL) 20 MG tablet Take 20 mg by mouth 3 times daily      QUEtiapine (SEROQUEL) 25 MG tablet Take 1 tablet (25 mg) by mouth At Bedtime  Qty: 30 tablet, Refills: 2    Associated Diagnoses: Mood disorder (H); Posttraumatic stress disorder      thiamine (B-1) 100 MG tablet Take 1 tablet (100 mg) by mouth daily  Qty: 30 tablet, Refills: 0    Associated Diagnoses: Alcohol dependence with intoxication with complication (H)      umeclidinium (INCRUSE ELLIPTA) 62.5 MCG/ACT inhaler Inhale 1 puff into the lungs daily      Vitamin D3 (VITAMIN D, CHOLECALCIFEROL,) 25 mcg (1000 units) tablet Take 25 mcg by mouth daily                  Psychiatric Examination:     Appearance: awake, alert, dressed in hospital scrubs and untidy  Attitude:  cooperative  Eye Contact:  good  Mood:  \"a little anxious\"  Affect:  appropriate and in normal range and mood congruent  Speech:  clear, coherent and normal prosody  Language: fluent and intact in English  Psychomotor, Gait, Musculoskeletal:  no evidence of tardive dyskinesia, dystonia, or tics  Thought Process:  tangential  Associations:  no loose associations  Thought Content:  no evidence of suicidal ideation or homicidal ideation and no evidence of psychotic thought  Insight:  partial  Judgement:  fair  Oriented to:  time, person, and place  Attention Span and Concentration:  fair  Recent and Remote Memory:  intact  Fund of Knowledge:  appropriate         Discharge Plan:     Disposition: Home.     Recommendation:  Please follow " any and all recommendations and needed and required. Please seek a CD Assessment if needed to enter a higher level of treatment. Please follow up with [facility]. Please reach out to your therapist to get a follow up appointment.     Follow-up Appointment:     [facility]    [address]    [address]    [place] [address]    [phone]   Elizabet Talbot, APRN, CNP    [address] [place] [address]    [phone] (Work)    [phone] (Fax)      Appointment made with above provider for Monday June 5th at 12.40pm     If needed higher level of care we talked about you contacting [facility]. Please follow through if you need. It appears this place is closed. Please call the number listed as a back up for [facility].     [facility]  [address] [place] [id number]  Phone: [phone]  Fax: [phone]     Therapy Appointment  [facility]  June 7th at 9am-Diana Holliday will help you get a follow up appointment next week at your visit.       Attestation:  Patient was seen and evaluated by me, Jessica Haskins DO prior to discharge. 32 minutes were spent in coordination of discharge planning.

## 2023-05-30 NOTE — PROGRESS NOTES
"[facility], [place]   Psychiatric Progress Note  Hospital Day: 1        Interim History:   The patient's care was discussed with the treatment team during the daily team meeting and/or staff's chart notes were reviewed.  Staff report patient has been visible in the milieu, taking medications as prescribed, continues to be in detox status with acute withdrawal, MSSA scores overnight 5, 8 and received 10mg of diazepam, slept on and off.     Upon interview, the patient was participating in group, agreed to be in her room, reports that her mood has been \"okay\" she is tolerating medications.  Discussed chart review indicating she was supposed to start lamotrigine as an outpatient, she reports that she is \"not ready yet\" to start this medication and often takes her many weeks before she is okay with a trial of new medication.  She understands that she needs to start this medication with a slow titration to avoid a rash or SJS.  She will continue to think about doing this and has a prescription as outpatient that she may start when she returns home.  She noticed some increasing withdrawal symptoms overnight, having some sweating, shaking, states that she is feeling better this morning after getting some diazepam overnight.  She has been able to eat and drink.  She met with internal medicine who tried to get a piece of glass out of her foot, she states that she often walks around without shoes and still has a lot of calluses on her feet and did not even notice that she had glass in her foot.  They were not able to remove it and she was started on some topical treatment with hope that the foreign body will push its way out of her foot.  She has been having higher blood pressures, has not been having headaches or vision changes, educated on signs and symptoms of concern with elevated blood pressures.  Her nurse rechecked her blood pressure while writer was in the room and he continued to be quite " "elevated, RN will contact internal medicine to discuss further.  She denies having any safety concerns including thoughts of harming self or others, denies having any hallucinations.  She reports that she is more open to consideration of a treatment program and plans to discuss more with her outpatient therapist, states that she is wanting a \"science based program\" as she did not feel like other programs were a good fit for her.  She plans to return home when she is out of detox and work with her outpatient supports in order to pursue a possible treatment program.  She would like to discharge home tomorrow if she is out of detox, states she does not need any medications at home with her.  No additional concerns.         Medications:       folic acid  1 mg Oral Daily     gabapentin  600 mg Oral TID     levothyroxine  100 mcg Oral Daily     omeprazole  40 mg Oral QAM     propranolol  20 mg Oral TID     QUEtiapine  25 mg Oral At Bedtime     thiamine  100 mg Oral Daily          Allergies:     Allergies   Allergen Reactions     Amlodipine      Other reaction(s): Nightmares     Bee Venom      Other reaction(s): Edema     Clonidine Unknown     Prednisone Anxiety     Patient stated that prednisone causes \"bad panic attacks\".  Patient stated that prednisone causes \"bad panic attacks\".  Patient stated that prednisone causes \"bad panic attacks\".       Antihistamines, Chlorpheniramine-Type [Antihistamines, Chlorpheniramine-Type]      Compazine      Lock jaw; all medications ending with -zine     Diphenhydramine      Muscle Cramps     Penicillins      Panic attack     Prochlorperazine      Muscle cramps     Trazodone Nausea and Vomiting     \" I ended up falling and feeling like I was drunk\"     Wasps [Hornets]      Swelling           Labs:     Recent Results (from the past 48 hour(s))   Alcohol breath test POCT    Collection Time: 05/29/23  2:19 AM   Result Value Ref Range    Alcohol Breath Test 0.211 (A) 0.00 - 0.01 "   Comprehensive metabolic panel    Collection Time: 05/29/23  2:59 AM   Result Value Ref Range    Sodium 140 136 - 145 mmol/L    Potassium 3.9 3.4 - 5.3 mmol/L    Chloride 101 98 - 107 mmol/L    Carbon Dioxide (CO2) 23 22 - 29 mmol/L    Anion Gap 16 (H) 7 - 15 mmol/L    Urea Nitrogen 7.8 (L) 8.0 - 23.0 mg/dL    Creatinine 0.56 0.51 - 0.95 mg/dL    Calcium 9.1 8.8 - 10.2 mg/dL    Glucose 114 (H) 70 - 99 mg/dL    Alkaline Phosphatase 51 35 - 104 U/L    AST 44 (H) 10 - 35 U/L    ALT 22 10 - 35 U/L    Protein Total 7.9 6.4 - 8.3 g/dL    Albumin 4.5 3.5 - 5.2 g/dL    Bilirubin Total 0.3 <=1.2 mg/dL    GFR Estimate >90 >60 mL/min/1.73m2   CBC with platelets and differential    Collection Time: 05/29/23  2:59 AM   Result Value Ref Range    WBC Count 6.1 4.0 - 11.0 10e3/uL    RBC Count 4.28 3.80 - 5.20 10e6/uL    Hemoglobin 14.3 11.7 - 15.7 g/dL    Hematocrit 39.7 35.0 - 47.0 %    MCV 93 78 - 100 fL    MCH 33.4 (H) 26.5 - 33.0 pg    MCHC 36.0 31.5 - 36.5 g/dL    RDW 13.2 10.0 - 15.0 %    Platelet Count 289 150 - 450 10e3/uL    % Neutrophils 54 %    % Lymphocytes 35 %    % Monocytes 10 %    % Eosinophils 0 %    % Basophils 1 %    % Immature Granulocytes 0 %    NRBCs per 100 WBC 0 <1 /100    Absolute Neutrophils 3.3 1.6 - 8.3 10e3/uL    Absolute Lymphocytes 2.1 0.8 - 5.3 10e3/uL    Absolute Monocytes 0.6 0.0 - 1.3 10e3/uL    Absolute Eosinophils 0.0 0.0 - 0.7 10e3/uL    Absolute Basophils 0.0 0.0 - 0.2 10e3/uL    Absolute Immature Granulocytes 0.0 <=0.4 10e3/uL    Absolute NRBCs 0.0 10e3/uL   GGT    Collection Time: 05/29/23  2:59 AM   Result Value Ref Range    GGT 19 5 - 36 U/L   TSH with free T4 reflex    Collection Time: 05/29/23  2:59 AM   Result Value Ref Range    TSH 0.31 0.30 - 4.20 uIU/mL   TSH    Collection Time: 05/29/23  2:59 AM   Result Value Ref Range    TSH 0.31 0.30 - 4.20 uIU/mL   Asymptomatic COVID-19 Virus (Coronavirus) by PCR Nose    Collection Time: 05/29/23  4:01 AM    Specimen: Nose; Swab   Result Value  "Ref Range    SARS CoV2 PCR Negative Negative   Drug abuse screen 1 urine (ED)    Collection Time: 05/29/23  4:51 AM   Result Value Ref Range    Amphetamines Urine Screen Negative Screen Negative    Barbituates Urine Screen Negative Screen Negative    Benzodiazepine Urine Screen Positive (A) Screen Negative    Cannabinoids Urine Screen Negative Screen Negative    Cocaine Urine Screen Negative Screen Negative    Opiates Urine Screen Negative Screen Negative          Psychiatric Examination:     BP (!) 167/93   Pulse 60   Temp 97.3  F (36.3  C) (Temporal)   Resp 16   Ht 1.626 m (5' 4\")   Wt 72.7 kg (160 lb 4.4 oz)   SpO2 96%   BMI 27.51 kg/m    Weight is 160 lbs 4.39 oz  Body mass index is 27.51 kg/m .    Orthostatic Vitals     None            Appearance: awake, alert, dressed in hospital scrubs and untidy  Attitude:  cooperative  Eye Contact:  good  Mood:  \"a little anxious\"  Affect:  appropriate and in normal range and mood congruent  Speech:  clear, coherent and normal prosody  Language: fluent and intact in English  Psychomotor, Gait, Musculoskeletal:  no evidence of tardive dyskinesia, dystonia, or tics  Thought Process:  tangential  Associations:  no loose associations  Thought Content:  no evidence of suicidal ideation or homicidal ideation and no evidence of psychotic thought  Insight:  partial  Judgement:  fair  Oriented to:  time, person, and place  Attention Span and Concentration:  fair  Recent and Remote Memory:  intact  Fund of Knowledge:  appropriate           Precautions:     Behavioral Orders   Procedures     Code 1 - Restrict to Unit     Routine Programming     As clinically indicated     Seizure precautions     Status 15     Every 15 minutes.     Withdrawal precautions          Diagnoses:     Alcohol use disorder, severe, dependence with withdrawal with complication of history of DTs and seizures   Suicidal ideation, passive in ED, denies on current interview   Bipolar disorder, type 2, in " "partial remission, most recent episode depressed   OMARI  PTSD  Elevated AST  Hypothyroidism  High anion gap metabolic acidosis  GERD  Hx of IVDU and Hep C s/p treatment in 2015  Prediabetes  Elevated BP  COPD  Splinter R foot         Assessment & Plan:   Assessment and hospital summary:  This patient is a 63 year old female with history of polysubstance use, bipolar disorder, anxiety and PTSD who presented to ED seeking detox from alcohol. Medically cleared in ED, admitted to  as voluntary patient. UDS positive for benzodiazepines (received in ED), EtOH 0.211(H), reports drinking at least 15 beers per day. Last admitted to this unit in April, was working with [facility],states recent supports through [employer] services including [facility] worker. PTA medications continued with exception of Lunesta. MSSA with diazepam ordered. IM consult placed and admission labs reviewed with additional labs ordered as below. States mood is \"hopeful\" and now denying safety concerns including SI and HI. Pt reports goal for hospitalization are complete detox and return home to engage with her outpatient supports.     Inpatient psychiatric hospitalization is warranted at this time for safety, stabilization, and possible adjustment in medications    Psychiatric treatment/interventions:  Medications:   -will not start PTA lamotrigine 25mg daily for mood at this time, pt states \"not ready yet\" will consider starting upon discharge   -continue MSSA with diazepam, thiamine, folic acid, multivitamin for alcohol withdrawal   -continue PTA gabapentin 600mg TID for anxiety and restless legs  -continue PTA propranolol 20mg TID for anxiety and restlessness, added hold parameters   -continue PTA quetiapine 25mg at bedtime for sleep and mood  -will not order PTA Lunesta for sleep, pt may resume as outpatient per outpatient provider  -PRN hydroxyzine 25mg every 4 hours for anxiety   -PRN melatonin 5mg at bedtime for sleep       The risks, benefits, " alternatives and side effects have been discussed and are understood by the patient.     Laboratory/Imaging: TSH WNL; GGT 19 WNL; and repeat BMP WNL     Patient will be treated in therapeutic milieu with appropriate individual and group therapies as described.     Medical treatment/interventions:  Medical concerns:   1) Alcohol withdrawal, complicated by hx of seizures and DTs  -MSSA as above, pt has received 95mg of diazepam so far, last MSSA scores 5, 8  -PRN zofran and imodium for GI distress related to withdrawal   -withdrawal and seizure precautions     2) IM consult placed for management of other medical concerns, including lab abnormalities, per consult note on 5/29/23:   Jolynn Rivera is a 63 year old woman with a history of COPD, alcohol dependence, alcohol withdrawal seizures, hypothyroidism, prediabetes, GERD, PTSD. Pt was admitted on 5/29/2023 to  for medical management of withdrawal from alcohol.     #Alcohol withdrawal, hx of alcohol use disorder   Pt drinks a case of beer per day.   -MSSA 16 this shift. Endorses a hx of withdrawal seizures, last occurring possibly yesterday. Not currently on AEDs.    -continue MSSA   -folvite, multi-vites, thiamine supplementation   -further management per Psychiatry      #COPD  Note for spiriva prescription coverage noted. Unclear if patient has ever needed inhalers.  -CTM     #Hypothyroidism  Diagnosed 3/17/22, at that time TSH 12.33. TSH 7.55 10/8/2022. Currently TSH 0.31.  -continue PTA levothyroxine 100 mcg     #Prediabetes  Hb A1c 5.9 4/15/2023. Blood sugar this admission 114.   -continue to encourage lifestyle changes- healthy eating, moderate exercise, weight loss     #Elevated blood pressure   Elevated blood pressure in the setting of acute alcohol withdrawal. Expect blood pressures will trend down with cessation from alcohol and treatment for withdrawal. Denies chest pain, headache, blurry vision, palpitations, fatigue, dyspnea. No history of high blood  pressure outside of episodes of alcohol withdrawal  -monitor and would only treat with anti-hypertensive if blood pressures persistently > 140/90     #Anion gap metabolic acidosis  Anion gap 16. Associated with significant alcohol ingestion and dehydration.   -alcohol cessation  -encourage fluid intake     #GERD  -continue home PPI     #PTSD  -management per psychiatry     Currently, medically stable and internal medicine will sign off. Please contact if future questions or concerns arise. Thank you for the opportunity to be a part of this patient's care.        BILL Meyer CNP  Internal Medicine [facility] Hospitalist    3) IM progress note 5/30/23  A/P:     Splinter-right foot  Pt thinks she has a piece of glass in her right foot. States she could feel it poking out but was not allowed to use a tweezers. On exam, there appears to be a splinter just below the surface of the plantar right foot, below the ball of the foot. Cleaned area with iodine and attempted to remove with tweezers. Scant amount of blood. Splinter too deep to remove with tweezers. No redness, heat, minimal pain. No s/s of infection.   -Bacitracin twice daily until pain has resolved or splinter is gone  -Ice to area as needed     COPD  Patient following closely with primary doctor.  States she is in the process of having insurance approval for Spiriva inhaler.  Reviewed notes, noted that Spiriva would not be covered at this point and discussed Incruse Ellipta usage.  Patient has follow-up video appoint with her doctor in the next coming week and will discuss.     Hypertension  Patient does not wish to start on antihypertensive medication at this time.  She is following closely with her primary doctor.  She feels her blood pressure is currently elevated because of her PTSD and she is in a locked unit which is causing anxiety.  -hydralazine 10 mg x 1 dose for elevated BP     BILL Meyer CNP  Internal Medicine [facility] Hospitalist      Disposition  Plan   Reason for ongoing admission: requires detoxification from substance that poses a risk of bodily harm during withdrawal period  Discharge location: home with self-care  Discharge Medications: ordered.  Follow-up Appointments: scheduled  Legal Status: voluntary    >50 min total time that was spent in counseling and coordination of care with staff, reviewing medical record, educating patient about treatment options, side effects and benefits and alternative treatments for medications, providing supportive therapy and redirection regarding above symptoms.     This document is created with the help of Dragon dictation system.  All grammatical/typing errors or context distortion are unintentional and inherent to software.    Patient has been seen and evaluated by me, Jessica Haskins DO.

## 2023-05-30 NOTE — PLAN OF CARE
Problem: Substance Withdrawal  Goal: Substance Withdrawal  Description: Signs and symptoms of listed problems will be absent or manageable.  Outcome: Progressing   Goal Outcome Evaluation:           Pt continues in alcohol detox on MSSA with prn valium.    0000 MSSA 3, pt endorsed mild sweats and denied all other withdrawal symptoms.   B/P: 130/77, T: 97.4, P: 74, R: 18     0400: MSSA =8. Prn valium 10mg given. With prn tylenol for gen body aches.   Pt reports feeling feeling sweaty and anxious at this time.    Pt slept on and off

## 2023-05-31 VITALS
SYSTOLIC BLOOD PRESSURE: 160 MMHG | RESPIRATION RATE: 16 BRPM | DIASTOLIC BLOOD PRESSURE: 88 MMHG | OXYGEN SATURATION: 98 % | BODY MASS INDEX: 27.36 KG/M2 | WEIGHT: 160.27 LBS | TEMPERATURE: 97.4 F | HEIGHT: 64 IN | HEART RATE: 61 BPM

## 2023-05-31 PROCEDURE — 250N000013 HC RX MED GY IP 250 OP 250 PS 637: Performed by: NURSE PRACTITIONER

## 2023-05-31 PROCEDURE — 250N000011 HC RX IP 250 OP 636: Performed by: PSYCHIATRY & NEUROLOGY

## 2023-05-31 PROCEDURE — 250N000013 HC RX MED GY IP 250 OP 250 PS 637: Performed by: PSYCHIATRY & NEUROLOGY

## 2023-05-31 RX ORDER — HYDRALAZINE HYDROCHLORIDE 25 MG/1
12.5 TABLET, FILM COATED ORAL 3 TIMES DAILY PRN
Qty: 15 TABLET | Refills: 0 | Status: SHIPPED | OUTPATIENT
Start: 2023-05-31 | End: 2023-05-31

## 2023-05-31 RX ORDER — HYDRALAZINE HYDROCHLORIDE 25 MG/1
12.5 TABLET, FILM COATED ORAL 3 TIMES DAILY PRN
Qty: 15 TABLET | Refills: 0 | Status: SHIPPED | OUTPATIENT
Start: 2023-05-31 | End: 2023-08-30

## 2023-05-31 RX ADMIN — OMEPRAZOLE 40 MG: 20 CAPSULE, DELAYED RELEASE ORAL at 08:24

## 2023-05-31 RX ADMIN — PROPRANOLOL HYDROCHLORIDE 20 MG: 20 TABLET ORAL at 08:25

## 2023-05-31 RX ADMIN — THIAMINE HCL TAB 100 MG 100 MG: 100 TAB at 08:25

## 2023-05-31 RX ADMIN — NICOTINE POLACRILEX 4 MG: 4 GUM, CHEWING BUCCAL at 11:17

## 2023-05-31 RX ADMIN — GABAPENTIN 600 MG: 600 TABLET, FILM COATED ORAL at 13:03

## 2023-05-31 RX ADMIN — NICOTINE POLACRILEX 4 MG: 4 GUM, CHEWING BUCCAL at 03:56

## 2023-05-31 RX ADMIN — Medication 12.5 MG: at 11:17

## 2023-05-31 RX ADMIN — PROPRANOLOL HYDROCHLORIDE 20 MG: 20 TABLET ORAL at 13:03

## 2023-05-31 RX ADMIN — LEVOTHYROXINE SODIUM 100 MCG: 100 TABLET ORAL at 08:25

## 2023-05-31 RX ADMIN — ACETAMINOPHEN 650 MG: 325 TABLET ORAL at 09:43

## 2023-05-31 RX ADMIN — NICOTINE POLACRILEX 4 MG: 4 GUM, CHEWING BUCCAL at 09:27

## 2023-05-31 RX ADMIN — FOLIC ACID 1 MG: 1 TABLET ORAL at 08:24

## 2023-05-31 RX ADMIN — GABAPENTIN 600 MG: 600 TABLET, FILM COATED ORAL at 08:24

## 2023-05-31 RX ADMIN — ONDANSETRON 4 MG: 4 TABLET, ORALLY DISINTEGRATING ORAL at 13:03

## 2023-05-31 RX ADMIN — NICOTINE POLACRILEX 4 MG: 4 GUM, CHEWING BUCCAL at 07:20

## 2023-05-31 ASSESSMENT — ACTIVITIES OF DAILY LIVING (ADL)
ADLS_ACUITY_SCORE: 43

## 2023-05-31 NOTE — PLAN OF CARE
"  Problem: Plan of Care - These are the overarching goals to be used throughout the patient stay.    Goal: Plan of Care Review  Description: The Plan of Care Review/Shift note should be completed every shift.  The Outcome Evaluation is a brief statement about your assessment that the patient is improving, declining, or no change.  This information will be displayed automatically on your shift note.  Outcome: Adequate for Care Transition  Goal: Patient-Specific Goal (Individualized)  Description: You can add care plan individualizations to a care plan. Examples of Individualization might be:  \"Parent requests to be called daily at 9am for status\", \"I have a hard time hearing out of my right ear\", or \"Do not touch me to wake me up as it startles me\".  Outcome: Adequate for Care Transition  Goal: Absence of Hospital-Acquired Illness or Injury  Outcome: Adequate for Care Transition  Intervention: Identify and Manage Fall Risk  Recent Flowsheet Documentation  Taken 5/31/2023 0909 by Yoko Alex RN  Safety Promotion/Fall Prevention: nonskid shoes/slippers when out of bed  Intervention: Prevent Skin Injury  Recent Flowsheet Documentation  Taken 5/31/2023 0909 by Yoko Alex RN  Body Position: position changed independently  Goal: Optimal Comfort and Wellbeing  Outcome: Adequate for Care Transition  Intervention: Provide Person-Centered Care  Recent Flowsheet Documentation  Taken 5/31/2023 0909 by Yoko Alex RN  Trust Relationship/Rapport: care explained  Goal: Readiness for Transition of Care  5/31/2023 1321 by Yoko Alex, RN  Outcome: Adequate for Care Transition  5/31/2023 1049 by Yoko Alex RN  Outcome: Progressing   Goal Outcome Evaluation:    Plan of Care Reviewed With: patient                   "

## 2023-05-31 NOTE — PROGRESS NOTES
Triage & Transition Services, [address]     Attempts to contact  CADI Worker and Therapist. No answer. Voicemail left to call back to give collateral information if needed.     Jennifer Balderas  Triage & Transition Services - Mental Health and Addiction Service Line  [address] - Adult Inpatient Addiction Psychiatry Unit

## 2023-05-31 NOTE — PROGRESS NOTES
Brief Medicine Note   31 May 2023         Patient with intermittent elevated BPs since coming out of detox.  Pt attributes to being on a locked unit, anxiety, and PTSD.  See note from Selena Ahuja CNP on 5/30.  Responded well to Hydralazine.  Will discharge with Hydralazine 12.5mg TID PRN for SBP > 160; short supply of 15 tabs as patient has primary care follow up scheduled for Monday 6/5/23.  Pt verbalized understanding and says she would seek medical care if symptomatic w/ HTN.      Evelyn Proctor, CNP, APRN  Internal Medicine [facility] Hospitalist  [facility]  Pager [phone]

## 2023-05-31 NOTE — PLAN OF CARE
Goal Outcome Evaluation:       Pt was a wake on and off all shift.  She slept 4 hours.  Her MSSA score was a 1.

## 2023-05-31 NOTE — PROGRESS NOTES
Still no call back from patients CADI or Therapist. Patient has a good relationship and will follow up with them both after discharging. Patient mentioned multiple different resources to help with her sobriety.

## 2023-05-31 NOTE — PLAN OF CARE
Problem: Plan of Care - These are the overarching goals to be used throughout the patient stay.    Goal: Optimal Comfort and Wellbeing  Intervention: Provide Person-Centered Care  Recent Flowsheet Documentation  Taken 5/31/2023 0909 by Yoko Alex, RN  Trust Relationship/Rapport: care explained   Goal Outcome Evaluation:    Plan of Care Reviewed With: patient        Patient alert and looking forward to discharging to home then pursuing [facility] on her own x 5 days, she states she is in the lift program and will be able to attend meetings, and with the CADI waver in place, she will have supports after discharge, she c/o headache, tylenol 650 as needed with effect, pt is currently out of detox, it has been 24 hrs since she got valium, reports good appetite and sleep, blood pressure elevated this am 156/88,  on propanolol scheduled, still high at 162/94 despite med administration, update given to MD , Dr Brennan and Evelyn SWENSON, pt will discharge with Hydralazine 12.5mg tid as needed  for Blood pressure maintenance, and will  follow up with primary   On June 5th at 2.40 pm and a virtual Psych  appointment on June 9th at 9am    Hydralazine given as needed now,  discharge instructions and meds reviewed with patient, will   Hydralazine at [facility], pt discharging  after lunch to home non med,  via lift. No psych symptoms reported,  States she has bacitracin ointment at home and does not need more for now, pt denies SI/HI, [place] for safety.  Gabapentin and propanolol given prior to discharge, pt slightly anxious and has an elevated Bp. Aware of discharge instructions and needed follow up required to normalize her blood pressure, pt demonstrates understanding.    Pt discharging to home with Bacitracin ointment and Hydralazine 2 week supply till she gets to her follow up appointment, on June 5th, pt monitors her blood pressure at home and has new parameters for hydralazine, pt education given,  she demonstrated understanding of med adherence and blood pressure monitoring, feels able and ready to follow through with care and supports.

## 2023-06-01 ENCOUNTER — PATIENT OUTREACH (OUTPATIENT)
Dept: CARE COORDINATION | Facility: CLINIC | Age: 63
End: 2023-06-01
Payer: MEDICARE

## 2023-06-01 NOTE — PROGRESS NOTES
"Clinic Care Coordination Contact  [facility]: Post-Discharge Note  SITUATION                                                      Admission:    Admission Date: 05/29/23   Reason for Admission: detox from alcohol.  Discharge:   Discharge Date: 05/31/23  Discharge Diagnosis: detox from alcohol.    BACKGROUND                                                      Jolynn Rivera is a 63 year old female with history of polysubstance use, bipolar disorder, anxiety and PTSD who presented to ED seeking detox from alcohol. Medically cleared in ED, admitted to  as voluntary patient. UDS positive for benzodiazepines (received in ED), EtOH 0.211(H), reports drinking at least 15 beers per day. Last admitted to this unit in April, was working with [facility],states recent supports through [facility] services including [facility] worker. PTA medications continued with exception of Lunesta. MSSA with diazepam ordered. IM consult placed and admission labs reviewed with additional labs ordered as below. States mood is \"hopeful\" and now denying safety concerns including SI and HI. Pt reported goal for hospitalization are complete detox and return home to engage with her outpatient supports.     Jolynn Rivera did participate in groups and was visible in the milieu.      The patient's symptoms of alcohol withdrawal improved. She required 95mg of diazepam per MSSA protocol and was out of detox on 5/31/23 to discharge home. She did have ongoing elevated BP that IM continued to follow as above in consults.      When assessed prior to discharge Jolynn Rivera reported having no thoughts of harming self or others. In addition, she has notable risk factors for self-harm, including age, single status and substance abuse. However, risk is mitigated by commitment to family and pets, absence of past attempts, ability to volunteer a safety plan, is future oriented and history of seeking help when needed. Therefore, based on all available " evidence including the factors cited above, she does not appear to be at imminent risk for self-harm, does not meet criteria for a 72-hr hold, and therefore remains appropriate for ongoing outpatient level of care.      Jolynn Rivera was discharged to home. At the time of discharge Jolynn Rivera was determined to not be a danger to herself or others.     ASSESSMENT           Discharge Assessment  How are you doing now that you are home?: Patient shares that she is doing well, has been consistently taking her blood pressure and has had some consistent higher readings. Writer referred to AVS and reviewed when/how she should be taking her blood pressure medication. Patient has an appt with PCP and therapist next week. Declines needing anything else right now and thanks writer for the call.  How are your symptoms? (Red Flag symptoms escalate to triage hotline per guidelines): Improved  Do you feel your condition is stable enough to be safe at home until your provider visit?: Yes  Does the patient have their discharge instructions? : Yes  Does the patient have questions regarding their discharge instructions? : No  Were you started on any new medications or were there changes to any of your previous medications? : Yes  Does the patient have all of their medications?: Yes  Do you have questions regarding any of your medications? : No  Discharge follow-up appointment scheduled within 14 calendar days? : Yes  Discharge Follow Up Appointment Date: 06/05/23  Discharge Follow Up Appointment Scheduled with?: Primary Care Provider    Post-op ([facility] Only)  If the patient had a surgery or procedure, do they have any questions for a nurse?: No    Post-op (Clinicians Only)  Did the patient have surgery or a procedure: No  Fever: No  Chills: No        PLAN                                                      Outpatient Plan:    [facility]    [address]    [address]    [place] [address]    [phone]   Elizabet Talbot,  APRN, CNP    [address]. [address] [place] [address]    [phone] (Work)    [phone] (Fax)      Appointment made with above provider for Monday June 5th at 12.40pm     If needed higher level of care we talked about you contacting [facility]. Please follow through if you need. It appears this place is closed. Please call the number listed as a back up for [facility].     [place] [address]  Phone: [phone]  Fax: [phone]     Therapy Appointment  [facility]  June 7th at 9am-Diana Holliday will help you get a follow up appointment next week at your visit.         No future appointments.      For any urgent concerns, please contact our 24 hour nurse triage line: [phone] ([phone])         COLLIN Zarate

## 2023-08-07 ENCOUNTER — HOSPITAL ENCOUNTER (EMERGENCY)
Facility: CLINIC | Age: 63
Discharge: HOME | End: 2023-08-07
Attending: EMERGENCY MEDICINE | Admitting: EMERGENCY MEDICINE
Payer: MEDICARE

## 2023-08-07 ENCOUNTER — TELEPHONE (OUTPATIENT)
Dept: BEHAVIORAL HEALTH | Facility: CLINIC | Age: 63
End: 2023-08-07

## 2023-08-07 VITALS
DIASTOLIC BLOOD PRESSURE: 77 MMHG | WEIGHT: 159.2 LBS | OXYGEN SATURATION: 94 % | RESPIRATION RATE: 18 BRPM | TEMPERATURE: 97.5 F | SYSTOLIC BLOOD PRESSURE: 119 MMHG | HEIGHT: 63 IN | HEART RATE: 74 BPM | BODY MASS INDEX: 28.21 KG/M2

## 2023-08-07 DIAGNOSIS — F10.220 ALCOHOL DEPENDENCE WITH UNCOMPLICATED INTOXICATION (H): ICD-10-CM

## 2023-08-07 LAB
ALBUMIN SERPL BCG-MCNC: 4.1 G/DL (ref 3.5–5.2)
ALCOHOL BREATH TEST: 0.12 (ref 0–0.01)
ALP SERPL-CCNC: 46 U/L (ref 35–104)
ALT SERPL W P-5'-P-CCNC: 22 U/L (ref 0–50)
ANION GAP SERPL CALCULATED.3IONS-SCNC: 15 MMOL/L (ref 7–15)
AST SERPL W P-5'-P-CCNC: 43 U/L (ref 0–45)
BASOPHILS # BLD AUTO: 0 10E3/UL (ref 0–0.2)
BASOPHILS NFR BLD AUTO: 1 %
BILIRUB SERPL-MCNC: 0.2 MG/DL
BUN SERPL-MCNC: 6.9 MG/DL (ref 8–23)
CALCIUM SERPL-MCNC: 8.6 MG/DL (ref 8.8–10.2)
CHLORIDE SERPL-SCNC: 106 MMOL/L (ref 98–107)
CREAT SERPL-MCNC: 0.58 MG/DL (ref 0.51–0.95)
DEPRECATED HCO3 PLAS-SCNC: 22 MMOL/L (ref 22–29)
EOSINOPHIL # BLD AUTO: 0.1 10E3/UL (ref 0–0.7)
EOSINOPHIL NFR BLD AUTO: 2 %
ERYTHROCYTE [DISTWIDTH] IN BLOOD BY AUTOMATED COUNT: 14.6 % (ref 10–15)
GFR SERPL CREATININE-BSD FRML MDRD: >90 ML/MIN/1.73M2
GLUCOSE SERPL-MCNC: 129 MG/DL (ref 70–99)
HCT VFR BLD AUTO: 38.1 % (ref 35–47)
HGB BLD-MCNC: 13.5 G/DL (ref 11.7–15.7)
IMM GRANULOCYTES # BLD: 0 10E3/UL
IMM GRANULOCYTES NFR BLD: 0 %
LYMPHOCYTES # BLD AUTO: 2.1 10E3/UL (ref 0.8–5.3)
LYMPHOCYTES NFR BLD AUTO: 46 %
MCH RBC QN AUTO: 34.3 PG (ref 26.5–33)
MCHC RBC AUTO-ENTMCNC: 35.4 G/DL (ref 31.5–36.5)
MCV RBC AUTO: 97 FL (ref 78–100)
MONOCYTES # BLD AUTO: 0.7 10E3/UL (ref 0–1.3)
MONOCYTES NFR BLD AUTO: 16 %
NEUTROPHILS # BLD AUTO: 1.6 10E3/UL (ref 1.6–8.3)
NEUTROPHILS NFR BLD AUTO: 35 %
NRBC # BLD AUTO: 0 10E3/UL
NRBC BLD AUTO-RTO: 0 /100
PLATELET # BLD AUTO: 185 10E3/UL (ref 150–450)
POTASSIUM SERPL-SCNC: 3.6 MMOL/L (ref 3.4–5.3)
PROT SERPL-MCNC: 7 G/DL (ref 6.4–8.3)
RBC # BLD AUTO: 3.94 10E6/UL (ref 3.8–5.2)
SODIUM SERPL-SCNC: 143 MMOL/L (ref 136–145)
WBC # BLD AUTO: 4.6 10E3/UL (ref 4–11)

## 2023-08-07 PROCEDURE — 96361 HYDRATE IV INFUSION ADD-ON: CPT

## 2023-08-07 PROCEDURE — 85025 COMPLETE CBC W/AUTO DIFF WBC: CPT | Performed by: EMERGENCY MEDICINE

## 2023-08-07 PROCEDURE — 36415 COLL VENOUS BLD VENIPUNCTURE: CPT | Performed by: EMERGENCY MEDICINE

## 2023-08-07 PROCEDURE — 80053 COMPREHEN METABOLIC PANEL: CPT | Performed by: EMERGENCY MEDICINE

## 2023-08-07 PROCEDURE — 96360 HYDRATION IV INFUSION INIT: CPT

## 2023-08-07 PROCEDURE — 258N000003 HC RX IP 258 OP 636: Performed by: EMERGENCY MEDICINE

## 2023-08-07 PROCEDURE — 250N000013 HC RX MED GY IP 250 OP 250 PS 637: Performed by: EMERGENCY MEDICINE

## 2023-08-07 PROCEDURE — 99284 EMERGENCY DEPT VISIT MOD MDM: CPT | Performed by: EMERGENCY MEDICINE

## 2023-08-07 PROCEDURE — 99283 EMERGENCY DEPT VISIT LOW MDM: CPT | Mod: 25

## 2023-08-07 RX ORDER — POLYETHYLENE GLYCOL 3350 17 G/17G
17 POWDER, FOR SOLUTION ORAL DAILY PRN
Status: CANCELLED | OUTPATIENT
Start: 2023-08-07

## 2023-08-07 RX ORDER — TRAZODONE HYDROCHLORIDE 50 MG/1
50 TABLET, FILM COATED ORAL
Status: CANCELLED | OUTPATIENT
Start: 2023-08-07

## 2023-08-07 RX ORDER — ALBUTEROL SULFATE 90 UG/1
2 AEROSOL, METERED RESPIRATORY (INHALATION) EVERY 6 HOURS PRN
Status: CANCELLED | OUTPATIENT
Start: 2023-08-07

## 2023-08-07 RX ORDER — ACETAMINOPHEN 325 MG/1
650 TABLET ORAL EVERY 4 HOURS PRN
Status: CANCELLED | OUTPATIENT
Start: 2023-08-07

## 2023-08-07 RX ORDER — MULTIPLE VITAMINS W/ MINERALS TAB 9MG-400MCG
1 TAB ORAL DAILY
Status: CANCELLED | OUTPATIENT
Start: 2023-08-07

## 2023-08-07 RX ORDER — FOLIC ACID 1 MG/1
1 TABLET ORAL DAILY
Status: CANCELLED | OUTPATIENT
Start: 2023-08-07

## 2023-08-07 RX ORDER — DIAZEPAM 5 MG
5-20 TABLET ORAL EVERY 30 MIN PRN
Status: CANCELLED | OUTPATIENT
Start: 2023-08-07

## 2023-08-07 RX ORDER — FOLIC ACID 1 MG/1
1 TABLET ORAL DAILY
Status: DISCONTINUED | OUTPATIENT
Start: 2023-08-07 | End: 2023-08-07 | Stop reason: HOSPADM

## 2023-08-07 RX ORDER — HYDROXYZINE HYDROCHLORIDE 25 MG/1
25 TABLET, FILM COATED ORAL EVERY 4 HOURS PRN
Status: CANCELLED | OUTPATIENT
Start: 2023-08-07

## 2023-08-07 RX ORDER — LEVOTHYROXINE SODIUM 100 UG/1
100 TABLET ORAL
Status: CANCELLED | OUTPATIENT
Start: 2023-08-07

## 2023-08-07 RX ORDER — MAGNESIUM HYDROXIDE/ALUMINUM HYDROXICE/SIMETHICONE 120; 1200; 1200 MG/30ML; MG/30ML; MG/30ML
30 SUSPENSION ORAL EVERY 4 HOURS PRN
Status: CANCELLED | OUTPATIENT
Start: 2023-08-07

## 2023-08-07 RX ORDER — DIAZEPAM 5 MG
5-20 TABLET ORAL EVERY 30 MIN PRN
Status: DISCONTINUED | OUTPATIENT
Start: 2023-08-07 | End: 2023-08-07 | Stop reason: HOSPADM

## 2023-08-07 RX ADMIN — DIAZEPAM 5 MG: 5 TABLET ORAL at 03:19

## 2023-08-07 RX ADMIN — NICOTINE POLACRILEX 2 MG: 2 GUM, CHEWING BUCCAL at 03:22

## 2023-08-07 RX ADMIN — SODIUM CHLORIDE 1000 ML: 9 INJECTION, SOLUTION INTRAVENOUS at 03:03

## 2023-08-07 ASSESSMENT — ACTIVITIES OF DAILY LIVING (ADL)
ADLS_ACUITY_SCORE: 37
ADLS_ACUITY_SCORE: 37

## 2023-08-07 NOTE — TELEPHONE ENCOUNTER
S: [facility] , Provider Landen calling at 2:40AM with clinical on a 63 year old/Female presenting for alcohol detox.     B: Pt presents for ETOH detox.   Currently reports drinking 8 beers daily for 2 weeks.    Patient reports last use was 2 hours PTA.  Pt LEOBARDO: 0.115  Pt  denies hx of DT  Pt  endorses hx of seizures. Last seizure:  Unknown when  Pt endorsing the following symptoms of withdrawal:  None at this time d/t intoxication  MSSA Score: Not at this time d/t intoxication    Pt denies acute mental health or medical concerns. Hx of hypothyroid and HTN  Pt denies other drug use: None Amount/frequency: N/A    Does Pt have a detox care plan in [facility]? No  Does pt present with specific needs, assistive devices, or exclusionary criteria? None  Is the patient ambulating, eating and drinking in the ED? Yes    A: Pt meets criteria to be presented for IP detox admission. Patient is voluntary    COVID Symptoms: No  If yes, COVID test required   Utox: Ordered, not yet collected  CMP: Abnormalities: Urea Nitrogen 6.9 / Calcium 8.6 / Glucose 129  CBC: Abnormalities: MCH 34.3  HCG: N/A     Patient cleared and ready for behavioral bed placement: Yes - Pending labs  Pt is meeting criteria for presentation to 3A/CD    04:34 - Left message for [facility] provider. Awaiting CB  04:43 - [facility] accepts for detox. Placed pt in 3A queue    R: 3A / Jeny / CD    04:46 - Notified unit and ED - Requested UDS be collected prior to transfer to unit    Update:  05:10 - Received called from Dr. Schuster to report that pt will be discharging from the ED/Detox admission no longer needed  05:12 - Updated unit and removed pt from queue

## 2023-08-07 NOTE — CONSULTS
Name: Jolynn Rivera  : 1960  Date: 2023  Time: 4:42am  Location of patient: [facility]  Location of doctor: [place]  Spoke with: Elizabet  HPI:  The patient is a 63 year old female with a past psychiatric history of PTSD and alcohol abuse who presents for alcohol detox. She reports drinking 8 beers daily for the past 2 weeks. Her alcohol level on presentation was 0.115. She denies a history of withdrawal DTs but noted a history of withdrawal seizures (unknown last seizure). She denies SI, HI, or AVH. UDS pending. Her other labs are noncontributory.   Plan/Recommendations: Voluntary admission to behavioral health (detox)

## 2023-08-07 NOTE — ED PROVIDER NOTES
[facility] EMERGENCY DEPARTMENT ([facility])  23      History     Chief Complaint   Patient presents with    Alcohol Intoxication     Seeking detox and mental health  evaluation.  Depression. PTSD. Anxiety. Hx withdrawal seizures. Last withdrawal about a month ago.     HPI  Jolynn Rivera is a 63 year old female who has a past medical history of COPD, seizures, hepatitis C, polysubstance use, bipolar disorder, anxiety and PTSD  presents to the ED with request for detox. She drinks 8 beers/day for 2 weeks. She thinks she has a seizure, states her dog let her know when she would have a seizure.  Last drink was about 2 hours ago.  No other drugs.  Denies hallucinations or voices.  She states her dog just  and she has been having mental health decline since then.  Denies suicidal ideation but is concerned that she might start think about hurting herself.  Denies any other symptoms of psychosis.  Patient has no physical complaint such as headache, chest pain, shortness of breath or abdominal pain.  She would like detox.      Past Medical History  Past Medical History:   Diagnosis Date    Anxiety     COPD (chronic obstructive pulmonary disease) (H)     COPD (chronic obstructive pulmonary disease) (H)     Hepatitis C     PTSD (post-traumatic stress disorder)     Seizures (H)     second to ETOH    Substance abuse (H)      Past Surgical History:   Procedure Laterality Date    LAPAROSCOPIC TUBAL LIGATION      ORTHOPEDIC SURGERY      Left knee    TONSILLECTOMY       eszopiclone (LUNESTA) 3 MG tablet  gabapentin (NEURONTIN) 600 MG tablet  hydrALAZINE (APRESOLINE) 25 MG tablet  levothyroxine (SYNTHROID/LEVOTHROID) 100 MCG tablet  melatonin 5 MG tablet  multivitamin w/minerals (THERA-VIT-M) tablet  omeprazole (PRILOSEC) 40 MG DR capsule  propranolol (INDERAL) 20 MG tablet  thiamine (B-1) 100 MG tablet  Vitamin D3 (VITAMIN D, CHOLECALCIFEROL,) 25 mcg (1000 units) tablet  bacitracin 500 UNIT/GM  "OINT      Allergies   Allergen Reactions    Amlodipine      Other reaction(s): Nightmares    Bee Venom      Other reaction(s): Edema    Clonidine Unknown    Prednisone Anxiety     Patient stated that prednisone causes \"bad panic attacks\".  Patient stated that prednisone causes \"bad panic attacks\".  Patient stated that prednisone causes \"bad panic attacks\".      Antihistamines, Chlorpheniramine-Type [Antihistamines, Chlorpheniramine-Type]     Compazine      Lock jaw; all medications ending with -zine    Diphenhydramine      Muscle Cramps    Penicillins      Panic attack    Prochlorperazine      Muscle cramps    Trazodone Nausea and Vomiting     \" I ended up falling and feeling like I was drunk\"    Wasps [Hornets]      Swelling      Family History  Family History   Problem Relation Age of Onset    Anxiety Disorder Mother     Asthma Mother     Alcohol/Drug Father     Prostate Cancer Father     Arthritis Father     Alcohol/Drug Brother     Alcohol/Drug Brother     Hypertension Brother     Alcohol/Drug Brother     Depression Brother     Psychotic Disorder Brother      Social History   Social History     Tobacco Use    Smoking status: Every Day     Packs/day: 0.50     Types: Cigarettes    Smokeless tobacco: Never    Tobacco comments:     Starting Chantix October 2014   Substance Use Topics    Alcohol use: Yes     Comment: 12-15 cans per day    Drug use: No     Comment: stopped 1986         A medically appropriate review of systems was performed with pertinent positives and negatives noted in the HPI, and all other systems negative.    Physical Exam   BP: 124/82  Pulse: 71  Temp: 98.2  F (36.8  C)  Resp: 16  Height: 160 cm (5' 3\")  Weight: 72.2 kg (159 lb 3.2 oz)  SpO2: 98 %  Physical Exam  Physical Exam   Constitutional: oriented to person, place, and time. appears well-developed and well-nourished.   HENT:   Head: Normocephalic and atraumatic.   Neck: Normal range of motion.   Pulmonary/Chest: Effort normal. No " respiratory distress.   Cardiac: No murmurs, rubs, gallops. RRR.  Abdominal: Abdomen soft, nontender, nondistended. No rebound tenderness.  MSK: Long bones without deformity or evidence of trauma  Neurological: alert and oriented to person, place, and time.  Stable gait.  No tremor.  No tongue fasciculations.  No nystagmus.  Skin: Skin is warm and dry.   Psychiatric:  normal mood and affect.  behavior is normal. Thought content normal.       ED Course, Procedures, & Data      Procedures           Results for orders placed or performed during the hospital encounter of 08/07/23   Alcohol breath test POCT     Status: Abnormal   Result Value Ref Range    Alcohol Breath Test 0.115 (A) 0.00 - 0.01     Medications - No data to display  Labs Ordered and Resulted from Time of ED Arrival to Time of ED Departure - No data to display  No orders to display          Critical care was not performed.     Medical Decision Making  The patient's presentation was of high complexity (a chronic illness severe exacerbation, progression, or side effect of treatment).    The patient's evaluation involved:  ordering and/or review of 3+ test(s) in this encounter (see separate area of note for details)  discussion of management or test interpretation with another health professional (mental health/chemical dependency intake)    The patient's management necessitated high risk (a decision regarding hospitalization).    Assessment & Plan    MDM  Patient presenting with request for detox.  She has no medical complaints tonight.  She appears quite well otherwise and is clinically sober.  She is intoxicated.  No active significant withdrawal we will place her on MSSA protocol due to high risk with concern for history of seizures.  She is not actively suicidal but likely needs mental health addressed while in detox.  She does not have any medical concerns tonight.  CBC, CMP and drug screen pending.  Plan to go to detox after that.    Addendum:  Patient change her mind and would like to be discharged.  Discussed cannot guarantee that she will not withdrawal or have a seizure at home.  She understands these risks.  She is clinically sober and ambulated without difficulty.  Discussed again about her mental health.  She has been feeling more depressed since putting her dog down this past week however she states she would never hurt herself and has never had thoughts about hurting herself.  She was mainly concerned about her depression getting to that point however she states she will return if she has these concerns.  She has never had an attempt at her life.  She does not have any weapons at home.  Patient will be discharged and she knows she can return if she changes her mind.    I have reviewed the nursing notes. I have reviewed the findings, diagnosis, plan and need for follow up with the patient.    New Prescriptions    No medications on file       Final diagnoses:   Alcohol dependence with uncomplicated intoxication (H)       Rock Schuster MD    [facility] EMERGENCY DEPARTMENT  8/7/2023     Rock Schuster MD  08/07/23 0244       Rock Schuster MD  08/07/23 0566

## 2023-08-07 NOTE — ED TRIAGE NOTES
Triage Assessment       Row Name 08/07/23 0138       Triage Assessment (Adult)    Airway WDL WDL       Respiratory WDL    Respiratory WDL WDL       Skin Circulation/Temperature WDL    Skin Circulation/Temperature WDL WDL

## 2023-08-07 NOTE — DISCHARGE INSTRUCTIONS
DISCHARGE RESOURCES:  -[facility] [phone] (detox), [phone] (outpatient & [facility])    -[facility] - self management for addiction recovery:  www.smartrecovery.org    -[facility] ~ [facility] Crisis Resource [facility] Support [facility]: 657.326.2793.  -[facility] [phone]   -Substance Abuse and Mental Health Services (www.samhsa.gov)  -Harm Reduction Coalition (www. Harmreduction.org)  -[place] Opioid Prevention Coalition: www.opioidcoalition.org  -Poison control 1-773.933.8343       Sober Support Group Information:  AA/NA & Sponsor/Support  -Alcoholics Anonymous (www.alcoholics-anonymous.org): for local information 24 hours/day  -AA Intergroup service office in [place] (http://www.aastpaul.org/) [phone]  -AA Intergroup service office in [facility]: [phone]. (http://www.aaminneaG2Linkis.org/)  -Narcotics Anonymous (www.nam[place].org) (704) 635-8260   -Sober Fun Activities: www.soberLiquiGlideactivities.Anonymess.Yoics/[facility]//[facility] [place] [facility] (MRC)  [facility] connects people seeking recovery to resources that help foster and sustain long-term recovery.  Whether you are seeking resources for treatment, transportation, housing, job training, education, health care or other pathways to recovery, [facility] is a great place to start.   Phone: 824.377.8277. www.[place]Kickboard.org (Great listing of all types of recovery and non-recovery related resources)

## 2023-08-30 ENCOUNTER — APPOINTMENT (OUTPATIENT)
Dept: GENERAL RADIOLOGY | Facility: CLINIC | Age: 63
DRG: 897 | End: 2023-08-30
Attending: FAMILY MEDICINE
Payer: MEDICARE

## 2023-08-30 ENCOUNTER — APPOINTMENT (OUTPATIENT)
Dept: CT IMAGING | Facility: CLINIC | Age: 63
DRG: 897 | End: 2023-08-30
Attending: FAMILY MEDICINE
Payer: MEDICARE

## 2023-08-30 ENCOUNTER — TELEPHONE (OUTPATIENT)
Dept: BEHAVIORAL HEALTH | Facility: CLINIC | Age: 63
End: 2023-08-30

## 2023-08-30 ENCOUNTER — HOSPITAL ENCOUNTER (INPATIENT)
Facility: CLINIC | Age: 63
LOS: 2 days | Discharge: HOME | DRG: 897 | End: 2023-09-01
Attending: FAMILY MEDICINE | Admitting: INTERNAL MEDICINE
Payer: MEDICARE

## 2023-08-30 DIAGNOSIS — R11.10 VOMITING AND DIARRHEA: ICD-10-CM

## 2023-08-30 DIAGNOSIS — R55 SYNCOPE AND COLLAPSE: ICD-10-CM

## 2023-08-30 DIAGNOSIS — R19.7 VOMITING AND DIARRHEA: ICD-10-CM

## 2023-08-30 DIAGNOSIS — F10.229 ALCOHOL DEPENDENCE WITH INTOXICATION WITH COMPLICATION (H): ICD-10-CM

## 2023-08-30 DIAGNOSIS — R56.9 GENERALIZED-ONSET SEIZURES (H): Primary | ICD-10-CM

## 2023-08-30 LAB
ALBUMIN SERPL BCG-MCNC: 3.9 G/DL (ref 3.5–5.2)
ALCOHOL BREATH TEST: 0.16 (ref 0–0.01)
ALP SERPL-CCNC: 56 U/L (ref 35–104)
ALT SERPL W P-5'-P-CCNC: 26 U/L (ref 0–50)
AMPHETAMINES UR QL SCN: ABNORMAL
ANION GAP SERPL CALCULATED.3IONS-SCNC: 12 MMOL/L (ref 7–15)
AST SERPL W P-5'-P-CCNC: 53 U/L (ref 0–45)
ATRIAL RATE - MUSE: 64 BPM
BARBITURATES UR QL SCN: ABNORMAL
BASOPHILS # BLD AUTO: 0 10E3/UL (ref 0–0.2)
BASOPHILS NFR BLD AUTO: 1 %
BENZODIAZ UR QL SCN: ABNORMAL
BILIRUB SERPL-MCNC: 0.4 MG/DL
BUN SERPL-MCNC: 6.1 MG/DL (ref 8–23)
BZE UR QL SCN: ABNORMAL
CALCIUM SERPL-MCNC: 8.2 MG/DL (ref 8.8–10.2)
CANNABINOIDS UR QL SCN: ABNORMAL
CHLORIDE SERPL-SCNC: 103 MMOL/L (ref 98–107)
CREAT SERPL-MCNC: 0.53 MG/DL (ref 0.51–0.95)
DEPRECATED HCO3 PLAS-SCNC: 23 MMOL/L (ref 22–29)
DIASTOLIC BLOOD PRESSURE - MUSE: NORMAL MMHG
EOSINOPHIL # BLD AUTO: 0 10E3/UL (ref 0–0.7)
EOSINOPHIL NFR BLD AUTO: 0 %
ERYTHROCYTE [DISTWIDTH] IN BLOOD BY AUTOMATED COUNT: 14.1 % (ref 10–15)
ETHANOL SERPL-MCNC: 0.23 G/DL
GFR SERPL CREATININE-BSD FRML MDRD: >90 ML/MIN/1.73M2
GLUCOSE SERPL-MCNC: 97 MG/DL (ref 70–99)
HCT VFR BLD AUTO: 40 % (ref 35–47)
HGB BLD-MCNC: 14.3 G/DL (ref 11.7–15.7)
IMM GRANULOCYTES # BLD: 0 10E3/UL
IMM GRANULOCYTES NFR BLD: 0 %
INTERPRETATION ECG - MUSE: NORMAL
LIPASE SERPL-CCNC: 51 U/L (ref 13–60)
LYMPHOCYTES # BLD AUTO: 1.6 10E3/UL (ref 0.8–5.3)
LYMPHOCYTES NFR BLD AUTO: 39 %
MAGNESIUM SERPL-MCNC: 1.5 MG/DL (ref 1.7–2.3)
MCH RBC QN AUTO: 33.3 PG (ref 26.5–33)
MCHC RBC AUTO-ENTMCNC: 35.8 G/DL (ref 31.5–36.5)
MCV RBC AUTO: 93 FL (ref 78–100)
MONOCYTES # BLD AUTO: 0.6 10E3/UL (ref 0–1.3)
MONOCYTES NFR BLD AUTO: 14 %
NEUTROPHILS # BLD AUTO: 1.8 10E3/UL (ref 1.6–8.3)
NEUTROPHILS NFR BLD AUTO: 46 %
NRBC # BLD AUTO: 0 10E3/UL
NRBC BLD AUTO-RTO: 0 /100
OPIATES UR QL SCN: ABNORMAL
P AXIS - MUSE: 69 DEGREES
PLATELET # BLD AUTO: 190 10E3/UL (ref 150–450)
POTASSIUM SERPL-SCNC: 3.5 MMOL/L (ref 3.4–5.3)
PR INTERVAL - MUSE: 186 MS
PROT SERPL-MCNC: 6.8 G/DL (ref 6.4–8.3)
QRS DURATION - MUSE: 80 MS
QT - MUSE: 428 MS
QTC - MUSE: 441 MS
R AXIS - MUSE: 7 DEGREES
RBC # BLD AUTO: 4.29 10E6/UL (ref 3.8–5.2)
SODIUM SERPL-SCNC: 138 MMOL/L (ref 136–145)
SYSTOLIC BLOOD PRESSURE - MUSE: NORMAL MMHG
T AXIS - MUSE: 69 DEGREES
TROPONIN T SERPL HS-MCNC: 13 NG/L
VENTRICULAR RATE- MUSE: 64 BPM
WBC # BLD AUTO: 4 10E3/UL (ref 4–11)

## 2023-08-30 PROCEDURE — 250N000009 HC RX 250: Performed by: NURSE PRACTITIONER

## 2023-08-30 PROCEDURE — 80053 COMPREHEN METABOLIC PANEL: CPT | Performed by: FAMILY MEDICINE

## 2023-08-30 PROCEDURE — 120N000002 HC R&B MED SURG/OB UMMC

## 2023-08-30 PROCEDURE — 96361 HYDRATE IV INFUSION ADD-ON: CPT | Performed by: FAMILY MEDICINE

## 2023-08-30 PROCEDURE — 96365 THER/PROPH/DIAG IV INF INIT: CPT | Performed by: FAMILY MEDICINE

## 2023-08-30 PROCEDURE — 72125 CT NECK SPINE W/O DYE: CPT | Mod: MA

## 2023-08-30 PROCEDURE — 84484 ASSAY OF TROPONIN QUANT: CPT | Performed by: FAMILY MEDICINE

## 2023-08-30 PROCEDURE — 36415 COLL VENOUS BLD VENIPUNCTURE: CPT | Performed by: FAMILY MEDICINE

## 2023-08-30 PROCEDURE — 250N000013 HC RX MED GY IP 250 OP 250 PS 637: Performed by: FAMILY MEDICINE

## 2023-08-30 PROCEDURE — 96376 TX/PRO/DX INJ SAME DRUG ADON: CPT | Performed by: FAMILY MEDICINE

## 2023-08-30 PROCEDURE — C9113 INJ PANTOPRAZOLE SODIUM, VIA: HCPCS | Mod: JZ | Performed by: FAMILY MEDICINE

## 2023-08-30 PROCEDURE — 250N000011 HC RX IP 250 OP 636: Mod: JZ | Performed by: FAMILY MEDICINE

## 2023-08-30 PROCEDURE — 999N000127 HC STATISTIC PERIPHERAL IV START W US GUIDANCE

## 2023-08-30 PROCEDURE — 70450 CT HEAD/BRAIN W/O DYE: CPT | Mod: MA

## 2023-08-30 PROCEDURE — 250N000011 HC RX IP 250 OP 636: Mod: JZ

## 2023-08-30 PROCEDURE — 71046 X-RAY EXAM CHEST 2 VIEWS: CPT

## 2023-08-30 PROCEDURE — 258N000003 HC RX IP 258 OP 636: Performed by: NURSE PRACTITIONER

## 2023-08-30 PROCEDURE — 99222 1ST HOSP IP/OBS MODERATE 55: CPT | Mod: AI | Performed by: INTERNAL MEDICINE

## 2023-08-30 PROCEDURE — 82077 ASSAY SPEC XCP UR&BREATH IA: CPT | Performed by: FAMILY MEDICINE

## 2023-08-30 PROCEDURE — 85025 COMPLETE CBC W/AUTO DIFF WBC: CPT | Performed by: FAMILY MEDICINE

## 2023-08-30 PROCEDURE — 93010 ELECTROCARDIOGRAM REPORT: CPT | Performed by: FAMILY MEDICINE

## 2023-08-30 PROCEDURE — 80307 DRUG TEST PRSMV CHEM ANLYZR: CPT | Performed by: FAMILY MEDICINE

## 2023-08-30 PROCEDURE — 99207 PR APP CREDIT; MD BILLING SHARED VISIT: CPT | Performed by: NURSE PRACTITIONER

## 2023-08-30 PROCEDURE — 250N000013 HC RX MED GY IP 250 OP 250 PS 637: Performed by: NURSE PRACTITIONER

## 2023-08-30 PROCEDURE — 83690 ASSAY OF LIPASE: CPT | Performed by: FAMILY MEDICINE

## 2023-08-30 PROCEDURE — 96375 TX/PRO/DX INJ NEW DRUG ADDON: CPT | Performed by: FAMILY MEDICINE

## 2023-08-30 PROCEDURE — 82075 ASSAY OF BREATH ETHANOL: CPT | Performed by: FAMILY MEDICINE

## 2023-08-30 PROCEDURE — 250N000013 HC RX MED GY IP 250 OP 250 PS 637: Performed by: INTERNAL MEDICINE

## 2023-08-30 PROCEDURE — 99285 EMERGENCY DEPT VISIT HI MDM: CPT | Mod: GC | Performed by: FAMILY MEDICINE

## 2023-08-30 PROCEDURE — 250N000013 HC RX MED GY IP 250 OP 250 PS 637

## 2023-08-30 PROCEDURE — 999N000040 HC STATISTIC CONSULT NO CHARGE VASC ACCESS

## 2023-08-30 PROCEDURE — 250N000011 HC RX IP 250 OP 636: Performed by: NURSE PRACTITIONER

## 2023-08-30 PROCEDURE — 83735 ASSAY OF MAGNESIUM: CPT | Performed by: FAMILY MEDICINE

## 2023-08-30 PROCEDURE — 99285 EMERGENCY DEPT VISIT HI MDM: CPT | Mod: 25 | Performed by: FAMILY MEDICINE

## 2023-08-30 PROCEDURE — 93005 ELECTROCARDIOGRAM TRACING: CPT | Performed by: FAMILY MEDICINE

## 2023-08-30 PROCEDURE — 258N000003 HC RX IP 258 OP 636: Performed by: FAMILY MEDICINE

## 2023-08-30 RX ORDER — GABAPENTIN 600 MG/1
600 TABLET ORAL 3 TIMES DAILY
Status: DISCONTINUED | OUTPATIENT
Start: 2023-08-30 | End: 2023-09-01 | Stop reason: HOSPADM

## 2023-08-30 RX ORDER — MULTIPLE VITAMINS W/ MINERALS TAB 9MG-400MCG
1 TAB ORAL DAILY
Status: DISCONTINUED | OUTPATIENT
Start: 2023-08-31 | End: 2023-09-01 | Stop reason: HOSPADM

## 2023-08-30 RX ORDER — SODIUM CHLORIDE 9 MG/ML
INJECTION, SOLUTION INTRAVENOUS CONTINUOUS
Status: DISCONTINUED | OUTPATIENT
Start: 2023-08-30 | End: 2023-09-01 | Stop reason: HOSPADM

## 2023-08-30 RX ORDER — BISACODYL 5 MG
10 TABLET, DELAYED RELEASE (ENTERIC COATED) ORAL DAILY PRN
Status: DISCONTINUED | OUTPATIENT
Start: 2023-08-30 | End: 2023-09-01 | Stop reason: HOSPADM

## 2023-08-30 RX ORDER — METOCLOPRAMIDE HYDROCHLORIDE 5 MG/ML
5 INJECTION INTRAMUSCULAR; INTRAVENOUS ONCE
Status: COMPLETED | OUTPATIENT
Start: 2023-08-30 | End: 2023-08-30

## 2023-08-30 RX ORDER — DIAZEPAM 10 MG/2ML
5-10 INJECTION, SOLUTION INTRAMUSCULAR; INTRAVENOUS EVERY 30 MIN PRN
Status: DISCONTINUED | OUTPATIENT
Start: 2023-08-30 | End: 2023-09-01 | Stop reason: HOSPADM

## 2023-08-30 RX ORDER — ONDANSETRON 2 MG/ML
8 INJECTION INTRAMUSCULAR; INTRAVENOUS ONCE
Status: COMPLETED | OUTPATIENT
Start: 2023-08-30 | End: 2023-08-30

## 2023-08-30 RX ORDER — DIAZEPAM 10 MG
10 TABLET ORAL EVERY 30 MIN PRN
Status: DISCONTINUED | OUTPATIENT
Start: 2023-08-30 | End: 2023-09-01 | Stop reason: HOSPADM

## 2023-08-30 RX ORDER — ONDANSETRON 2 MG/ML
8 INJECTION INTRAMUSCULAR; INTRAVENOUS ONCE
Status: DISCONTINUED | OUTPATIENT
Start: 2023-08-30 | End: 2023-08-30

## 2023-08-30 RX ORDER — PROPRANOLOL HYDROCHLORIDE 20 MG/1
20 TABLET ORAL 3 TIMES DAILY
Status: DISCONTINUED | OUTPATIENT
Start: 2023-08-30 | End: 2023-09-01 | Stop reason: HOSPADM

## 2023-08-30 RX ORDER — SCOLOPAMINE TRANSDERMAL SYSTEM 1 MG/1
1 PATCH, EXTENDED RELEASE TRANSDERMAL
Status: DISCONTINUED | OUTPATIENT
Start: 2023-08-30 | End: 2023-08-31

## 2023-08-30 RX ORDER — POLYETHYLENE GLYCOL 3350 17 G/17G
17 POWDER, FOR SOLUTION ORAL DAILY PRN
Status: DISCONTINUED | OUTPATIENT
Start: 2023-08-30 | End: 2023-09-01 | Stop reason: HOSPADM

## 2023-08-30 RX ORDER — DIAZEPAM 10 MG/2ML
5 INJECTION, SOLUTION INTRAMUSCULAR; INTRAVENOUS ONCE
Status: COMPLETED | OUTPATIENT
Start: 2023-08-30 | End: 2023-08-30

## 2023-08-30 RX ORDER — LEVOTHYROXINE SODIUM 100 UG/1
100 TABLET ORAL DAILY
Status: DISCONTINUED | OUTPATIENT
Start: 2023-08-31 | End: 2023-09-01 | Stop reason: HOSPADM

## 2023-08-30 RX ORDER — PANTOPRAZOLE SODIUM 40 MG/1
40 TABLET, DELAYED RELEASE ORAL
Status: DISCONTINUED | OUTPATIENT
Start: 2023-08-30 | End: 2023-09-01 | Stop reason: HOSPADM

## 2023-08-30 RX ORDER — ONDANSETRON 4 MG/1
4 TABLET, ORALLY DISINTEGRATING ORAL EVERY 6 HOURS PRN
Status: ON HOLD | COMMUNITY
Start: 2023-07-24 | End: 2023-11-18

## 2023-08-30 RX ORDER — ONDANSETRON 4 MG/1
4 TABLET, ORALLY DISINTEGRATING ORAL EVERY 6 HOURS PRN
Status: DISCONTINUED | OUTPATIENT
Start: 2023-08-30 | End: 2023-09-01 | Stop reason: HOSPADM

## 2023-08-30 RX ORDER — HALOPERIDOL 5 MG/ML
2.5-5 INJECTION INTRAMUSCULAR EVERY 6 HOURS PRN
Status: DISCONTINUED | OUTPATIENT
Start: 2023-08-30 | End: 2023-09-01 | Stop reason: HOSPADM

## 2023-08-30 RX ORDER — FOLIC ACID 1 MG/1
1 TABLET ORAL ONCE
Status: COMPLETED | OUTPATIENT
Start: 2023-08-30 | End: 2023-08-30

## 2023-08-30 RX ORDER — VITAMIN B COMPLEX
25 TABLET ORAL DAILY
Status: DISCONTINUED | OUTPATIENT
Start: 2023-08-31 | End: 2023-09-01 | Stop reason: HOSPADM

## 2023-08-30 RX ORDER — ONDANSETRON 2 MG/ML
4 INJECTION INTRAMUSCULAR; INTRAVENOUS ONCE
Status: COMPLETED | OUTPATIENT
Start: 2023-08-30 | End: 2023-08-30

## 2023-08-30 RX ORDER — LIDOCAINE 40 MG/G
CREAM TOPICAL
Status: DISCONTINUED | OUTPATIENT
Start: 2023-08-30 | End: 2023-09-01 | Stop reason: HOSPADM

## 2023-08-30 RX ORDER — FLUMAZENIL 0.1 MG/ML
0.2 INJECTION, SOLUTION INTRAVENOUS
Status: DISCONTINUED | OUTPATIENT
Start: 2023-08-30 | End: 2023-09-01 | Stop reason: HOSPADM

## 2023-08-30 RX ORDER — ONDANSETRON 2 MG/ML
4 INJECTION INTRAMUSCULAR; INTRAVENOUS EVERY 6 HOURS PRN
Status: DISCONTINUED | OUTPATIENT
Start: 2023-08-30 | End: 2023-09-01 | Stop reason: HOSPADM

## 2023-08-30 RX ORDER — ESZOPICLONE 3 MG/1
3 TABLET, FILM COATED ORAL AT BEDTIME
Status: DISCONTINUED | OUTPATIENT
Start: 2023-08-30 | End: 2023-09-01 | Stop reason: HOSPADM

## 2023-08-30 RX ORDER — LOPERAMIDE HCL 2 MG
2 CAPSULE ORAL 4 TIMES DAILY PRN
Status: DISCONTINUED | OUTPATIENT
Start: 2023-08-30 | End: 2023-09-01 | Stop reason: HOSPADM

## 2023-08-30 RX ORDER — OLANZAPINE 5 MG/1
5-10 TABLET, ORALLY DISINTEGRATING ORAL EVERY 6 HOURS PRN
Status: DISCONTINUED | OUTPATIENT
Start: 2023-08-30 | End: 2023-09-01 | Stop reason: HOSPADM

## 2023-08-30 RX ORDER — ACETAMINOPHEN 650 MG/1
650 SUPPOSITORY RECTAL EVERY 6 HOURS PRN
Status: DISCONTINUED | OUTPATIENT
Start: 2023-08-30 | End: 2023-09-01 | Stop reason: HOSPADM

## 2023-08-30 RX ORDER — DIAZEPAM 5 MG
5 TABLET ORAL ONCE
Status: COMPLETED | OUTPATIENT
Start: 2023-08-30 | End: 2023-08-30

## 2023-08-30 RX ORDER — ACETAMINOPHEN 325 MG/1
650 TABLET ORAL EVERY 6 HOURS PRN
Status: DISCONTINUED | OUTPATIENT
Start: 2023-08-30 | End: 2023-09-01 | Stop reason: HOSPADM

## 2023-08-30 RX ORDER — BISACODYL 5 MG
5 TABLET, DELAYED RELEASE (ENTERIC COATED) ORAL DAILY PRN
Status: DISCONTINUED | OUTPATIENT
Start: 2023-08-30 | End: 2023-09-01 | Stop reason: HOSPADM

## 2023-08-30 RX ORDER — DIAZEPAM 5 MG
5-20 TABLET ORAL EVERY 30 MIN PRN
Status: DISCONTINUED | OUTPATIENT
Start: 2023-08-30 | End: 2023-08-30

## 2023-08-30 RX ORDER — MAGNESIUM SULFATE HEPTAHYDRATE 40 MG/ML
2 INJECTION, SOLUTION INTRAVENOUS ONCE
Status: COMPLETED | OUTPATIENT
Start: 2023-08-30 | End: 2023-08-30

## 2023-08-30 RX ADMIN — DIAZEPAM 5 MG: 5 INJECTION INTRAMUSCULAR; INTRAVENOUS at 14:36

## 2023-08-30 RX ADMIN — METOCLOPRAMIDE HYDROCHLORIDE 5 MG: 5 INJECTION INTRAMUSCULAR; INTRAVENOUS at 13:35

## 2023-08-30 RX ADMIN — PANTOPRAZOLE SODIUM 40 MG: 40 INJECTION, POWDER, FOR SOLUTION INTRAVENOUS at 14:15

## 2023-08-30 RX ADMIN — GABAPENTIN 600 MG: 600 TABLET, FILM COATED ORAL at 23:04

## 2023-08-30 RX ADMIN — SODIUM CHLORIDE: 9 INJECTION, SOLUTION INTRAVENOUS at 23:35

## 2023-08-30 RX ADMIN — SCOPALAMINE 1 PATCH: 1 PATCH, EXTENDED RELEASE TRANSDERMAL at 22:58

## 2023-08-30 RX ADMIN — DIAZEPAM 10 MG: 5 INJECTION INTRAMUSCULAR; INTRAVENOUS at 22:58

## 2023-08-30 RX ADMIN — LOPERAMIDE HYDROCHLORIDE 2 MG: 2 CAPSULE ORAL at 23:35

## 2023-08-30 RX ADMIN — DIAZEPAM 5 MG: 5 INJECTION INTRAMUSCULAR; INTRAVENOUS at 19:20

## 2023-08-30 RX ADMIN — THIAMINE HCL TAB 100 MG 100 MG: 100 TAB at 12:18

## 2023-08-30 RX ADMIN — DIAZEPAM 5 MG: 5 INJECTION INTRAMUSCULAR; INTRAVENOUS at 17:45

## 2023-08-30 RX ADMIN — METOCLOPRAMIDE HYDROCHLORIDE 5 MG: 5 INJECTION INTRAMUSCULAR; INTRAVENOUS at 14:12

## 2023-08-30 RX ADMIN — DIAZEPAM 5 MG: 5 INJECTION INTRAMUSCULAR; INTRAVENOUS at 21:59

## 2023-08-30 RX ADMIN — ONDANSETRON 4 MG: 2 INJECTION INTRAMUSCULAR; INTRAVENOUS at 18:39

## 2023-08-30 RX ADMIN — DIAZEPAM 10 MG: 5 TABLET ORAL at 10:48

## 2023-08-30 RX ADMIN — DIAZEPAM 5 MG: 5 TABLET ORAL at 09:12

## 2023-08-30 RX ADMIN — FOLIC ACID 1 MG: 1 TABLET ORAL at 12:18

## 2023-08-30 RX ADMIN — DIAZEPAM 10 MG: 5 TABLET ORAL at 12:18

## 2023-08-30 RX ADMIN — ONDANSETRON 4 MG: 2 INJECTION INTRAMUSCULAR; INTRAVENOUS at 10:46

## 2023-08-30 RX ADMIN — SODIUM CHLORIDE 1000 ML: 9 INJECTION, SOLUTION INTRAVENOUS at 09:05

## 2023-08-30 RX ADMIN — ONDANSETRON 8 MG: 2 INJECTION INTRAMUSCULAR; INTRAVENOUS at 08:46

## 2023-08-30 RX ADMIN — PANTOPRAZOLE SODIUM 40 MG: 40 TABLET, DELAYED RELEASE ORAL at 17:10

## 2023-08-30 RX ADMIN — DIAZEPAM 5 MG: 5 TABLET ORAL at 09:06

## 2023-08-30 RX ADMIN — SODIUM CHLORIDE: 9 INJECTION, SOLUTION INTRAVENOUS at 15:22

## 2023-08-30 RX ADMIN — ACETAMINOPHEN 650 MG: 325 TABLET, FILM COATED ORAL at 17:10

## 2023-08-30 RX ADMIN — MAGNESIUM SULFATE HEPTAHYDRATE 2 G: 40 INJECTION, SOLUTION INTRAVENOUS at 11:56

## 2023-08-30 RX ADMIN — DIAZEPAM 5 MG: 5 INJECTION INTRAMUSCULAR; INTRAVENOUS at 16:07

## 2023-08-30 ASSESSMENT — ACTIVITIES OF DAILY LIVING (ADL)
ADLS_ACUITY_SCORE: 37

## 2023-08-30 NOTE — MEDICATION SCRIBE - ADMISSION MEDICATION HISTORY
Medication Scribe Admission Medication History    Admission medication history is complete. The information provided in this note is only as accurate as the sources available at the time of the update.    Medication reconciliation/reorder completed by provider prior to medication history? No    Information Source(s): Patient and CareEverywhere/SureScripts via in-person    Pertinent Information: Patient reports she no longer takes Bacitracin, Hydralazine, Lamictal or Terbinafine.    Changes made to PTA medication list:  Added: Nicotine gum 4 mg, Zofran 4 mg.  Deleted: Bacitracin, Hydralazine, Lamictal, Terbinafine.  Changed: None    Medication Affordability:  Not including over the counter (OTC) medications, was there a time in the past 3 months when you did not take your medications as prescribed because of cost?: No    Allergies reviewed with patient and updates made in EHR: yes    Medication History Completed By: Simeon Jackson MD 8/30/2023 2:04 PM    Prior to Admission medications    Medication Sig Last Dose Taking? Auth Provider Long Term End Date   eszopiclone (LUNESTA) 3 MG tablet Take 1 tablet by mouth At Bedtime 8/29/2023 at hs Yes Reported, Patient No    gabapentin (NEURONTIN) 600 MG tablet Take 600 mg by mouth 3 times daily 8/29/2023 at pm Yes Reported, Patient Yes    levothyroxine (SYNTHROID/LEVOTHROID) 100 MCG tablet Take 100 mcg by mouth daily 8/29/2023 at am Yes Reported, Patient Yes    melatonin 5 MG tablet Take 1 tablet (5 mg) by mouth nightly as needed for sleep 8/29/2023 at hs Yes Aly Quintanilla MD     multivitamin w/minerals (THERA-VIT-M) tablet Take 1 tablet by mouth daily 8/29/2023 at am Yes Aly Quintanilla MD     nicotine polacrilex (NICORETTE) 4 MG gum Place 4 mg inside cheek every hour as needed for smoking cessation More than a month at unknown Yes Reported, Patient     omeprazole (PRILOSEC) 40 MG DR capsule Take 40 mg by mouth daily 8/29/2023 at pm Yes Reported, Patient      ondansetron (ZOFRAN ODT) 4 MG ODT tab Take 4 mg by mouth every 6 hours as needed for nausea 8/29/2023 at pm Yes Reported, Patient     propranolol (INDERAL) 20 MG tablet Take 20 mg by mouth 3 times daily 8/29/2023 at pm Yes Reported, Patient Yes    thiamine (B-1) 100 MG tablet Take 1 tablet (100 mg) by mouth daily 8/29/2023 at pm Yes Aly Quintanilla MD     Vitamin D3 (VITAMIN D, CHOLECALCIFEROL,) 25 mcg (1000 units) tablet Take 25 mcg by mouth daily 8/29/2023 at pm Yes Unknown, Entered By History     albuterol (PROAIR HFA, PROVENTIL HFA, VENTOLIN HFA) 108 (90 BASE) MCG/ACT inhaler Inhale 2 puffs into the lungs every 6 hours as needed for shortness of breath / dyspnea or wheezing   Katharine Land MD Yes 4/22/22

## 2023-08-30 NOTE — ED NOTES
Patient reports IV is tender. Attempted to look for new access site but unable to find. Vascular access paged.     Hospitalist at bedside.     Patient continues to cough into dry heaves but no actual emesis.

## 2023-08-30 NOTE — ED NOTES
Bed: [facility]  Expected date: 8/30/23  Expected time: 7:30 AM  Means of arrival: Ambulance ([id number])  Comments:  [id number]  64F Alcohol withdrawal

## 2023-08-30 NOTE — TELEPHONE ENCOUNTER
S: [facility] , Provider Walter calling at 11:41 with clinical on a 63 year old/Female presenting for alcohol detox.     B: Pt presents for ETOH detox.   Currently reports drinking two 6 packs of beer daily for the past 6 weeks .    Patient reports last use was yesterday at 9pm.  Pt LEOBARDO: 0.157  Pt  denies hx of DT  Pt  endorses hx of seizures. Last seizure:  Yesterday  Pt endorsing the following symptoms of withdrawal: Shaky, Tremulous, and Anxious  MSSA Score: 12 at 8am this morning.    Pt endorses acute mental health or medical concerns.COPD, hypertension and hep C, Bipolar  and PTSD  Pt endorses other drug use: None and Benzos Amount/frequency: Unknown , denied other substance use but tested positive.    Does Pt have a detox care plan in [facility]? No  Does pt present with specific needs, assistive devices, or exclusionary criteria? None  Is the patient ambulating, eating and drinking in the ED? Independent with ADLs. Has not been offered food or beverage     A: Pt meets criteria to be presented for IP detox admission. Patient is voluntary    COVID Symptoms: No  If yes, COVID test required   Utox: Positive for Benzodiazepine  CMP: Abnormalities: Urea 6.1; Calcium 8.2; AST 53  CBC: Abnormalities: MCH 33.3       R: Patient cleared and ready for behavioral bed placement: Yes    Pt is meeting criteria for presentation to 3A/CD/[facility]    Does Patient need a Transfer Center request created? No, Pt is located within [facility] ED, [facility] ED, or [place] ED.    12:22 PM: Intake paged [facility] to present Pt for 3A.    12:36 PM: Pt has been accepted to 3A pending magnesium level and ensuring that Pt is able to eat and drink fluids.    12:42 PM: Intake informed CRN on [facility] ED and will offer food/drink and monitor magnesium level.    1:57 PM: Intake spoke with Aissatou to follow-up on whether Pt is able to eat or drink. Pt is with Provider now. Nursing to call back with update.    2:35 PM:Intake received an update that Pt has  vomited and will be going to medical. Pt is no longer needing placement for detox.     2:42 PM: Intake paged Provider to inform that Pt will no longer need detox. Pt removed from work list.

## 2023-08-30 NOTE — ED PROVIDER NOTES
"ED Provider Note  [facility]      History     Chief Complaint   Patient presents with    Drug / Alcohol Assessment     Pt arrives due to having an alcohol withdrawal seizure.          Drug / Alcohol Assessment      Jolynn Rivera is a 63 year old female who who presents to the ER after she passed out in the bathroom last night.  The patient does not recall the exact time of passing out, but acknowledged that she woke up today in the bathroom floor confused.  She acknowledged that she is feeling nauseous, body breathing, dizzy, and shaky which she reports is similar to her past alcohol withdrawal seizures.  The patient reports that she drinks 2 six packs of beer per day and that she started drinking 6 weeks ago when her dog got sick.  She does not exactly remember the last time she drank alcohol yesterday but approximately was around 9 PM. The patient has a history of complicated alcohol withdrawal, polysubstance use, extensive psychiatric history (PTSD, bipolar, mood disorders), and medical history significant for hypertension, hepatitis C, arthritis, and COPD.    Past Medical History  Past Medical History:   Diagnosis Date    Anxiety     COPD (chronic obstructive pulmonary disease) (H)     COPD (chronic obstructive pulmonary disease) (H)     Hepatitis C     PTSD (post-traumatic stress disorder)     Seizures (H)     second to ETOH    Substance abuse (H)      Past Surgical History:   Procedure Laterality Date    LAPAROSCOPIC TUBAL LIGATION      ORTHOPEDIC SURGERY      Left knee    TONSILLECTOMY       No current outpatient medications on file.    Allergies   Allergen Reactions    Amlodipine      Other reaction(s): Nightmares    Bee Venom      Other reaction(s): Edema    Clonidine Unknown    Prednisone Anxiety     Patient stated that prednisone causes \"bad panic attacks\".  Patient stated that prednisone causes \"bad panic attacks\".  Patient stated that prednisone causes \"bad panic " "attacks\".      Antihistamines, Chlorpheniramine-Type [Antihistamines, Chlorpheniramine-Type]     Compazine      Lock jaw; all medications ending with -zine    Diphenhydramine      Muscle Cramps    Penicillins      Panic attack    Prochlorperazine      Muscle cramps    Trazodone Nausea and Vomiting     \" I ended up falling and feeling like I was drunk\"    Wasps [Hornets]      Swelling      Family History  Family History   Problem Relation Age of Onset    Anxiety Disorder Mother     Asthma Mother     Alcohol/Drug Father     Prostate Cancer Father     Arthritis Father     Alcohol/Drug Brother     Alcohol/Drug Brother     Hypertension Brother     Alcohol/Drug Brother     Depression Brother     Psychotic Disorder Brother      Social History   Social History     Tobacco Use    Smoking status: Every Day     Packs/day: 0.50     Types: Cigarettes    Smokeless tobacco: Never    Tobacco comments:     Starting Chantix October 2014   Substance Use Topics    Alcohol use: Yes     Comment: 12-15 cans per day    Drug use: No     Comment: stopped 1986         A medically appropriate review of systems was performed with pertinent positives and negatives noted in the HPI, and all other systems negative.    Physical Exam   BP: 127/78  Pulse: 71  Temp: 98.1  F (36.7  C)  Resp: 17  Weight: 70.3 kg (155 lb)  SpO2: 95 %  Physical Exam  Cardiovascular:      Rate and Rhythm: Normal rate and regular rhythm.      Pulses: Normal pulses.   Pulmonary:      Effort: Pulmonary effort is normal.      Breath sounds: Normal breath sounds.   Abdominal:      General: Abdomen is flat.      Palpations: Abdomen is soft.   Musculoskeletal:         General: Normal range of motion.      Cervical back: Normal range of motion.   Neurological:      Mental Status: She is alert and oriented to person, place, and time.     The patient has gross hand tremors, tongue tremors, and is shaky.  Point tenderness on the patient neck is appreciated during exam.      ED " Course, Procedures, & Data      Procedures            EKG Interpretation:      Interpreted by Steph Watson MD  Time reviewed: 10 am  Symptoms at time of EKG: none  Rhythm: normal sinus   Rate: normal  Axis: normal  Ectopy: none  Conduction: normal  ST Segments/ T Waves: No ST-T wave changes  Q Waves: none  Comparison to prior: Unchanged    Clinical Impression: normal EKG                 Results for orders placed or performed during the hospital encounter of 08/30/23   CT Head w/o Contrast     Status: None    Narrative    CT SCAN OF THE HEAD WITHOUT CONTRAST   8/30/2023 10:28 AM     HISTORY: Trauma, LOC.    TECHNIQUE: Axial images of the head and coronal reformations without  IV contrast material. Radiation dose for this scan was reduced using  automated exposure control, adjustment of the mA and/or kV according  to patient size, or iterative reconstruction technique.    COMPARISON: None.    FINDINGS: The ventricles are normal in size and configuration. Mild  generalized brain parenchymal volume loss. Minimal subtle patchy  hypoattenuation in the cerebral white matter is noted, nonspecific,  likely due to mild chronic small vessel ischemic change. Small  partially calcified extra-axial mass overlying the superolateral  aspect of the right parietal lobe (series 5 image 47) measuring 8 mm x  3 mm. No CT findings of large transcortical acute or subacute infarct,  acute intracranial hemorrhage, extra axial fluid collection, or mass  effect.    There is a probable retention cyst or polyp in the right maxillary  sinus that is only partially visualized. Minimal mucosal thickening in  the visualized paranasal sinuses elsewhere. The mastoid air cells are  clear. The calvarium appears grossly intact.      Impression    IMPRESSION:  1. No CT findings of acute intracranial process.  2. Small calcified extra axial lesion overlying the right parietal  lobe without mass effect, potentially representing a small meningioma.  3.  Mild presumed age-related changes of the brain, as described.    BABITA SORENSEN MD         SYSTEM ID:  [id number]   Cervical spine CT w/o contrast     Status: None    Narrative    CT CERVICAL SPINE WITHOUT CONTRAST   8/30/2023 10:26 AM     HISTORY: Trauma, fall, intoxicated, neck pain.     TECHNIQUE: Axial images of the cervical spine were obtained without  intravenous contrast. Multiplanar reformations were performed.   Radiation dose for this scan was reduced using automated exposure  control, adjustment of the mA and/or kV according to patient size, or  iterative reconstruction technique.    COMPARISON: None.    FINDINGS: No acute cervical spine fracture is identified. There is  straightening of the normal cervical lordosis. Minimal retrolisthesis  of C5 on C6. Minimal dextroconvex curvature of the lower  cervical/upper thoracic spine.    Moderate disc height loss at C5-C6. The other intervertebral discs  appear relatively maintained in height. Bilateral uncovertebral  spurring at C5-C6. Mild scattered degenerative facet disease. No  high-grade central spinal canal stenosis is identified. There is mild  to moderate bilateral C5-C6 neural foraminal stenosis. No significant  neural foraminal narrowing elsewhere.    Bilateral carotid bifurcation atherosclerotic calcification, greater  on the left. The visualized paraspinous soft tissues otherwise appear  grossly unremarkable.      Impression    IMPRESSION:  1. No acute cervical spine fracture.  2. Minimal retrolisthesis of C5 on C6. Straightening of the normal  cervical lordosis.  3. Degenerative changes at C5-C6. No high-grade central spinal canal  stenosis. Please see the body of the report for additional details.    BABITA SORENSEN MD         SYSTEM ID:  [id number]   XR Chest 2 Views     Status: None    Narrative    CHEST TWO VIEWS 8/30/2023 10:14 AM     HISTORY: cough, trauma    COMPARISON: Chest x-ray 10/18/2015.       Impression    IMPRESSION: Cardiac silhouette is  within normal limits. No focal  airspace consolidation. Mild thickening of the minor fissure without  significant pleural effusion. No discernible pneumothorax. No acute  displaced fracture identified.    NAVARRO ODELL MD         SYSTEM ID:  [id number]   Drug abuse screen 1 urine (ED)     Status: Abnormal   Result Value Ref Range    Amphetamines Urine Screen Negative Screen Negative    Barbituates Urine Screen Negative Screen Negative    Benzodiazepine Urine Screen Positive (A) Screen Negative    Cannabinoids Urine Screen Negative Screen Negative    Cocaine Urine Screen Negative Screen Negative    Opiates Urine Screen Negative Screen Negative   Comprehensive metabolic panel     Status: Abnormal   Result Value Ref Range    Sodium 138 136 - 145 mmol/L    Potassium 3.5 3.4 - 5.3 mmol/L    Chloride 103 98 - 107 mmol/L    Carbon Dioxide (CO2) 23 22 - 29 mmol/L    Anion Gap 12 7 - 15 mmol/L    Urea Nitrogen 6.1 (L) 8.0 - 23.0 mg/dL    Creatinine 0.53 0.51 - 0.95 mg/dL    Calcium 8.2 (L) 8.8 - 10.2 mg/dL    Glucose 97 70 - 99 mg/dL    Alkaline Phosphatase 56 35 - 104 U/L    AST 53 (H) 0 - 45 U/L    ALT 26 0 - 50 U/L    Protein Total 6.8 6.4 - 8.3 g/dL    Albumin 3.9 3.5 - 5.2 g/dL    Bilirubin Total 0.4 <=1.2 mg/dL    GFR Estimate >90 >60 mL/min/1.73m2   Magnesium     Status: Abnormal   Result Value Ref Range    Magnesium 1.5 (L) 1.7 - 2.3 mg/dL   Troponin T, High Sensitivity     Status: Normal   Result Value Ref Range    Troponin T, High Sensitivity 13 <=14 ng/L   Lipase     Status: Normal   Result Value Ref Range    Lipase 51 13 - 60 U/L   Ethyl Alcohol Level     Status: Abnormal   Result Value Ref Range    Alcohol ethyl 0.23 (H) <=0.01 g/dL   CBC with platelets and differential     Status: Abnormal   Result Value Ref Range    WBC Count 4.0 4.0 - 11.0 10e3/uL    RBC Count 4.29 3.80 - 5.20 10e6/uL    Hemoglobin 14.3 11.7 - 15.7 g/dL    Hematocrit 40.0 35.0 - 47.0 %    MCV 93 78 - 100 fL    MCH 33.3 (H) 26.5 - 33.0 pg     MCHC 35.8 31.5 - 36.5 g/dL    RDW 14.1 10.0 - 15.0 %    Platelet Count 190 150 - 450 10e3/uL    % Neutrophils 46 %    % Lymphocytes 39 %    % Monocytes 14 %    % Eosinophils 0 %    % Basophils 1 %    % Immature Granulocytes 0 %    NRBCs per 100 WBC 0 <1 /100    Absolute Neutrophils 1.8 1.6 - 8.3 10e3/uL    Absolute Lymphocytes 1.6 0.8 - 5.3 10e3/uL    Absolute Monocytes 0.6 0.0 - 1.3 10e3/uL    Absolute Eosinophils 0.0 0.0 - 0.7 10e3/uL    Absolute Basophils 0.0 0.0 - 0.2 10e3/uL    Absolute Immature Granulocytes 0.0 <=0.4 10e3/uL    Absolute NRBCs 0.0 10e3/uL   Basic metabolic panel     Status: Abnormal   Result Value Ref Range    Sodium 137 136 - 145 mmol/L    Potassium 3.0 (L) 3.4 - 5.3 mmol/L    Chloride 102 98 - 107 mmol/L    Carbon Dioxide (CO2) 23 22 - 29 mmol/L    Anion Gap 12 7 - 15 mmol/L    Urea Nitrogen 3.9 (L) 8.0 - 23.0 mg/dL    Creatinine 0.60 0.51 - 0.95 mg/dL    Calcium 8.6 (L) 8.8 - 10.2 mg/dL    Glucose 148 (H) 70 - 99 mg/dL    GFR Estimate >90 >60 mL/min/1.73m2   CBC with platelets     Status: Abnormal   Result Value Ref Range    WBC Count 5.0 4.0 - 11.0 10e3/uL    RBC Count 4.28 3.80 - 5.20 10e6/uL    Hemoglobin 14.4 11.7 - 15.7 g/dL    Hematocrit 40.3 35.0 - 47.0 %    MCV 94 78 - 100 fL    MCH 33.6 (H) 26.5 - 33.0 pg    MCHC 35.7 31.5 - 36.5 g/dL    RDW 14.3 10.0 - 15.0 %    Platelet Count 174 150 - 450 10e3/uL   Magnesium     Status: Normal   Result Value Ref Range    Magnesium 1.7 1.7 - 2.3 mg/dL   Phosphorus     Status: Abnormal   Result Value Ref Range    Phosphorus 1.5 (L) 2.5 - 4.5 mg/dL   EKG 12-lead, tracing only     Status: None   Result Value Ref Range    Systolic Blood Pressure  mmHg    Diastolic Blood Pressure  mmHg    Ventricular Rate 64 BPM    Atrial Rate 64 BPM    MN Interval 186 ms    QRS Duration 80 ms     ms    QTc 441 ms    P Axis 69 degrees    R AXIS 7 degrees    T Axis 69 degrees    Interpretation ECG       Sinus rhythm with sinus arrhythmia  Normal ECG    Unconfirmed  report - interpretation of this ECG is computer generated - see medical record for final interpretation  Confirmed by - EMERGENCY ROOM, PHYSICIAN ([id number]),  SANDI PANIAGUA ([id number]) on 8/30/2023 1:10:19 PM     Alcohol breath test POCT     Status: Abnormal   Result Value Ref Range    Alcohol Breath Test 0.157 (A) 0.00 - 0.01   Urine Drugs of Abuse Screen     Status: Abnormal    Narrative    The following orders were created for panel order Urine Drugs of Abuse Screen.  Procedure                               Abnormality         Status                     ---------                               -----------         ------                     Drug abuse screen 1 urin...[[id number]]  Abnormal            Final result                 Please view results for these tests on the individual orders.   CBC with platelets differential     Status: Abnormal    Narrative    The following orders were created for panel order CBC with platelets differential.  Procedure                               Abnormality         Status                     ---------                               -----------         ------                     CBC with platelets and d...[[id number]]  Abnormal            Final result                 Please view results for these tests on the individual orders.     Medications   loperamide (IMODIUM) capsule 2 mg (2 mg Oral $Given 8/31/23 0544)   sodium chloride 0.9% infusion ( Intravenous $New Bag 8/30/23 4835)   eszopiclone (LUNESTA) tablet 3 mg (3 mg Oral $Given 8/31/23 0053)   gabapentin (NEURONTIN) tablet 600 mg (600 mg Oral $Given 8/31/23 0840)   levothyroxine (SYNTHROID/LEVOTHROID) tablet 100 mcg (100 mcg Oral $Given 8/31/23 0839)   multivitamin w/minerals (THERA-VIT-M) tablet 1 tablet (1 tablet Oral $Given 8/31/23 0840)   propranolol (INDERAL) tablet 20 mg (20 mg Oral $Given 8/31/23 0839)   thiamine (B-1) tablet 100 mg (100 mg Oral Not Given 8/31/23 0839)   Vitamin D3 (CHOLECALCIFEROL) tablet 25 mcg (25 mcg  Oral Not Given 8/31/23 0839)   lidocaine 1 % 0.1-1 mL (has no administration in time range)   lidocaine (LMX4) cream (has no administration in time range)   sodium chloride (PF) 0.9% PF flush 3 mL (3 mLs Intracatheter $Given 8/31/23 0842)   sodium chloride (PF) 0.9% PF flush 3 mL (has no administration in time range)   acetaminophen (TYLENOL) tablet 650 mg (650 mg Oral $Given 8/31/23 0245)     Or   acetaminophen (TYLENOL) Suppository 650 mg ( Rectal See Alternative 8/31/23 0245)   bisacodyl (DULCOLAX) EC tablet 5 mg (has no administration in time range)     Or   bisacodyl (DULCOLAX) EC tablet 10 mg (has no administration in time range)   polyethylene glycol (MIRALAX) Packet 17 g (has no administration in time range)   ondansetron (ZOFRAN ODT) ODT tab 4 mg ( Oral See Alternative 8/31/23 0832)     Or   ondansetron (ZOFRAN) injection 4 mg (4 mg Intravenous $Given 8/31/23 0832)   OLANZapine zydis (zyPREXA) ODT tab 5-10 mg (has no administration in time range)     Or   haloperidol lactate (HALDOL) injection 2.5-5 mg (has no administration in time range)   flumazenil (ROMAZICON) injection 0.2 mg (has no administration in time range)   melatonin tablet 5 mg (has no administration in time range)   diazepam (VALIUM) tablet 10 mg (10 mg Oral $Given 8/31/23 0839)     Or   diazepam (VALIUM) injection 5-10 mg ( Intravenous See Alternative 8/31/23 0839)   nicotine (NICORETTE) gum 2 mg (2 mg Buccal $Given 8/31/23 0907)   pantoprazole (PROTONIX) EC tablet 40 mg (40 mg Oral $Given 8/31/23 0840)   scopolamine (TRANSDERM) 72 hr patch 1 patch (1 patch Transdermal $Patch/Med Applied 8/30/23 7754)     And   scopolamine (TRANSDERM-SCOP) Patch in Place ( Transdermal Patch in Place 8/31/23 1131)   folic acid (FOLVITE) tablet 1 mg (1 mg Oral Not Given 8/31/23 0839)   potassium phosphate 15 mmol in sodium chloride 0.9 % 250 mL intermittent infusion (has no administration in time range)   ondansetron (ZOFRAN) injection 8 mg (8 mg Intravenous  $Given 8/30/23 0846)   0.9% sodium chloride BOLUS (0 mLs Intravenous Stopped 8/30/23 1001)   diazepam (VALIUM) tablet 5 mg (5 mg Oral $Given 8/30/23 0906)   thiamine (B-1) tablet 100 mg (100 mg Oral $Given 8/30/23 1218)   folic acid (FOLVITE) tablet 1 mg (1 mg Oral $Given 8/30/23 1218)   ondansetron (ZOFRAN) injection 4 mg (4 mg Intravenous $Given 8/30/23 1046)   magnesium sulfate 2 g in 50 mL sterile water intermittent infusion (0 g Intravenous Stopped 8/30/23 1254)   metoclopramide (REGLAN) injection 5 mg (5 mg Intravenous $Given 8/30/23 1335)   pantoprazole (PROTONIX) IV push injection 40 mg (40 mg Intravenous $Given 8/30/23 1415)   metoclopramide (REGLAN) injection 5 mg (5 mg Intravenous $Given 8/30/23 1412)   diazepam (VALIUM) injection 5 mg (5 mg Intravenous $Given 8/30/23 1436)   diazepam (VALIUM) injection 5 mg (5 mg Intravenous $Given 8/30/23 1607)     Labs Ordered and Resulted from Time of ED Arrival to Time of ED Departure   DRUG ABUSE SCREEN 1 URINE (ED) - Abnormal       Result Value    Amphetamines Urine Screen Negative      Barbituates Urine Screen Negative      Benzodiazepine Urine Screen Positive (*)     Cannabinoids Urine Screen Negative      Cocaine Urine Screen Negative      Opiates Urine Screen Negative     COMPREHENSIVE METABOLIC PANEL - Abnormal    Sodium 138      Potassium 3.5      Chloride 103      Carbon Dioxide (CO2) 23      Anion Gap 12      Urea Nitrogen 6.1 (*)     Creatinine 0.53      Calcium 8.2 (*)     Glucose 97      Alkaline Phosphatase 56      AST 53 (*)     ALT 26      Protein Total 6.8      Albumin 3.9      Bilirubin Total 0.4      GFR Estimate >90     MAGNESIUM - Abnormal    Magnesium 1.5 (*)    ETHYL ALCOHOL LEVEL - Abnormal    Alcohol ethyl 0.23 (*)    CBC WITH PLATELETS AND DIFFERENTIAL - Abnormal    WBC Count 4.0      RBC Count 4.29      Hemoglobin 14.3      Hematocrit 40.0      MCV 93      MCH 33.3 (*)     MCHC 35.8      RDW 14.1      Platelet Count 190      % Neutrophils  46      % Lymphocytes 39      % Monocytes 14      % Eosinophils 0      % Basophils 1      % Immature Granulocytes 0      NRBCs per 100 WBC 0      Absolute Neutrophils 1.8      Absolute Lymphocytes 1.6      Absolute Monocytes 0.6      Absolute Eosinophils 0.0      Absolute Basophils 0.0      Absolute Immature Granulocytes 0.0      Absolute NRBCs 0.0     ALCOHOL BREATH TEST POCT - Abnormal    Alcohol Breath Test 0.157 (*)    TROPONIN T, HIGH SENSITIVITY - Normal    Troponin T, High Sensitivity 13     LIPASE - Normal    Lipase 51     GLUCOSE MONITOR NURSING POCT   ENTERIC BACTERIA AND VIRUS PANEL BY PCR     CT Head w/o Contrast   Final Result   IMPRESSION:   1. No CT findings of acute intracranial process.   2. Small calcified extra axial lesion overlying the right parietal   lobe without mass effect, potentially representing a small meningioma.   3. Mild presumed age-related changes of the brain, as described.      BABITA SORENSEN MD            SYSTEM ID:  [id number]      Cervical spine CT w/o contrast   Final Result   IMPRESSION:   1. No acute cervical spine fracture.   2. Minimal retrolisthesis of C5 on C6. Straightening of the normal   cervical lordosis.   3. Degenerative changes at C5-C6. No high-grade central spinal canal   stenosis. Please see the body of the report for additional details.      BABITA SORENSEN MD            SYSTEM ID:  [id number]      XR Chest 2 Views   Final Result   IMPRESSION: Cardiac silhouette is within normal limits. No focal   airspace consolidation. Mild thickening of the minor fissure without   significant pleural effusion. No discernible pneumothorax. No acute   displaced fracture identified.      NAVARRO ODELL MD            SYSTEM ID:  [id number]                 Assessment & Plan    Jolynn Rivera is a 63 year old female who who presents to the ER after she passed out in the bathroom last night.  The patient does not recall the exact time of passing out, but acknowledged that she woke up  today in the bathroom floor confused.  She acknowledged that she is feeling nauseous, body breathing, dizzy, and shaky which she reports is similar to her past alcohol withdrawal seizures.  The patient reports that she drinks 2 six packs of beer per day and that she started drinking 6 weeks ago when her dog got sick.  She does not exactly remember the last time she drank alcohol yesterday but approximately was around 9 PM    Potential diagnosis and treatment plan:    #Alcohol withdrawal seizures: Patient rates anxiety 9 out of 10, she is tremulous, she has tongue tremors, she is feeling dizzy, and nauseous.  The patient endorses point tenderness in the neck during exam.  The patient was confused when she woke up in the morning and she endorses that her symptoms are similar to previous withdrawal symptoms.  Vital signs were within normal range during exam, blood pressure 127/78, temperature 98.1, pulse 71, respiratory rate 17, SPO2 95%.  A complete work-up for alcohol withdrawal seizure is conducted.  -CT scan without contrast of the head . No CT findings of acute intracranial process. Small calcified extra axial lesion overlying the right parietal lobe without mass effect, potentially representing a small meningioma, and Mild presumed age-related changes of the brain  -CT scan without contrast of the neck:  No acute cervical spine fracture. Minimal retrolisthesis of C5 on C6. Straightening of the normal cervical lordosis. Degenerative changes at C5-C6. No high-grade central spinal canal stenosis.  -CBC is noted for low MCH 33.3  -CMP for urea nitrogen 6.1, calcium 8.2, magnesium 1.5, AST 53  -MSSA was placed: MSSA 12 at 8.30 am , and 9 at 900.   -Blood magnesium level: 1.5, 2 g of magnesium IV over 60 minutes were administered.  - lipase: 51  -Blood ethanol level 0.115  -UDS was positive for benzodiazepines.   -To address current nausea, dizziness, and tremulousness we administered a bolus of saline, Zofran 4 mg IV,  and diazepam 5 mg PO. Thiamine 100 mg PO and folic acid mg was also administered.  Magnesium 2 g IV over the course of 60 minutes was administered to correct for hypomagnesemia.    #Loss of consciousness due to cardiac reason (arrhythmia/ischemic cardiac disease)  The patient old age, history of hypertension could be predisposing factors to acute coronary syndrome/arrhythmia.  -EKG sinus rhythm with sinus arrhythmia.  -troponin level 13  -Continuous cardiac monitoring  - Chest xray; Cardiac silhouette is within normal limits. No focal airspace consolidation. Mild thickening of the minor fissure without significant pleural effusion. No discernible pneumothorax. No acute displaced fracture identified.    Disposition: Based on the patient presentation, history, and clinical picture, it is highly suggestive that the patient experiences withdrawal seizure as a result of heavy alcohol use. While the patient agreed to be admitted to the detox unit and the team arranging her admission.  The patient started experiencing protracted vomiting, retching, and diarrhea.  The patient was given metoclopramide 5 mg IV twice, pantoprazole 40 mg, loperamide, and diazepam 5 mg IV as she could not tolerate oral medications.  The team decided to admit the patient to the medical floor.      I have reviewed the nursing notes. I have reviewed the findings, diagnosis, plan and need for follow up with the patient.    Current Discharge Medication List          Final diagnoses:   Alcohol dependence with intoxication with complication (H)   Vomiting and diarrhea   Patient was seen and discussed with my attending.  Steph Watson MD, PhD candidate.  --    ED Attending Physician Attestation    I Trever Akins MD, cared for this patient with the Resident. I have performed a history and physical examination of the patient and discussed management with the resident. I reviewed the resident's documentation above and agree with the documented findings  and plan of care.    Summary of HPI, PE, ED Course   Patient is a 63 year old female evaluated in the emergency department for episode of loss of consciousness, suspicion for alcohol withdrawal seizure versus severe alcohol intoxication versus syncopal event.  Patient has a history of severe alcohol dependence and had been drinking for multiple weeks.  She stopped drinking approximately 3 hours prior to the event found himself on the bathroom floor without notable injury or other signs of seizure, however called 911 due to prior history of withdrawal seizures.  Denies any chest pain or palpitations or any prior cardiac history did complain of some mild headache and neck pain but no focal neurologic symptoms. Exam and ED course notable for patient with objective and subjective signs of complicated alcohol withdrawal, initially diagnostic studies including EKG, imaging of head and neck, labs support detox admission.  Was receiving Valium on at least 2 occasions for elevated MSSA score.  Did tolerate the oral Valium but then later developed protracted nausea and vomiting despite multiple different antiemetics and appears more appropriate for admission to a medical floor to be monitored closely for worsening alcohol withdrawal as well as treatment of nausea and vomiting as well as diarrhea.. After the completion of care in the emergency department, the patient was admitted to inpatient.    Critical Care & Procedures  Not applicable.        Medical Decision Making  The patient's presentation was of high complexity (a chronic illness severe exacerbation, progression, or side effect of treatment).    The patient's evaluation involved:  review of external note(s) from 3+ sources (reviewed multiple prior clinic notes, ED visits, and hospitalization for complications related to alcohol)  review of 3+ test result(s) ordered prior to this encounter (reviewed multiple labs from prior encounters for comparison to today)  ordering  and/or review of 3+ test(s) in this encounter (see separate area of note for details)  independent interpretation of testing performed by another health professional (personally reviewed the imaging of head and neck by CT and also reviewed radiologist report)    The patient's management necessitated moderate risk (prescription drug management including medications given in the ED), high risk (a parenteral controlled substance), and high risk (a decision regarding hospitalization).          Trever Akins MD  Emergency Medicine     Trever Akins MD  [facility] EMERGENCY DEPARTMENT  8/30/2023     Trever Akins MD  08/31/23 1151

## 2023-08-30 NOTE — ED NOTES
Pt continues to dry heave, no emesis. Pt also experiencing chills and visibly tremorous, scored on CIWA and gave IV valium

## 2023-08-30 NOTE — H&P
"[facility]    History and Physical - Hospitalist Service, GOLD TEAM        Date of Admission:  8/30/2023    Assessment & Plan      Jolynn Rivera is a 63 year old woman admitted on 8/30/2023. She has a history of hepatitis C which has been treated, COPD, PTSD, alcohol abuse and hypothyroidism and is admitted after she woke up on her bathroom floor this morning without recall of how she arrived there.    1) Alcohol withdrawal, fall secondary to seizure vs syncope vs intoxication - Presents today with concern for alcohol withdrawal.  She has been drinking 12 beers/day for 6 weeks, drank at 9 pm tonight and at 0300 she went to the bathroom feeling \"out of it\" and woke up on the floor in the morning with no recall of falling.  She had urinated, and is concerned she had a seizure.  She is interested in alcohol withdrawal treatment.  She has a history of withdrawal seizures.  - Alcohol withdrawal protocol with diazepam.  OK to use IV if nausea and vomiting limit oral intake.  - Already on gabapentin, will continue  - Propranolol  - MVI, folate, MVI  - CT head and C spine without acute issues  - I note her UA is positive for benzodiazepines but the patient denies any benzo use.  - Tele overnight    2) History of hypothyroidism  - Continue home levothyoroxine    3) History of hypertension  - On propranolol    4) History of PTSD, anxiety, depression and insomnia  - Continue home eszopiclone, gabapentin,     5) Nicotine use  - Nicotine gum)     Diet:  Regular  DVT Prophylaxis: Low Risk/Ambulatory with no VTE prophylaxis indicated  Morales Catheter: Not present  Lines: None     Cardiac Monitoring: None  Code Status:  Full    Clinically Significant Risk Factors Present on Admission          # Hypocalcemia: Lowest Ca = 8.2 mg/dL in last 2 days, will monitor and replace as appropriate   # Hypomagnesemia: Lowest Mg = 1.5 mg/dL in last 2 days, will replace as needed            # " "Overweight: Estimated body mass index is 27.46 kg/m  as calculated from the following:    Height as of 8/7/23: 1.6 m (5' 3\").    Weight as of this encounter: 70.3 kg (155 lb).              Disposition Plan      Expected Discharge Date: 09/01/2023                The patient's care was discussed with the Attending Physician, Dr. Fox .    BILL [facility]  Hospitalist Service, [facility]  Securely message with Vocera (more info)  Text page via [facility] Paging/Directory   See signed in provider for up to date coverage information    ______________________________________________________________________    Chief Complaint   Fall, alcohol withdrawal    History is obtained from the patient    History of Present Illness   Jolynn Rivera is a 63 year old woman admitted on 8/30/2023. She has a history of hepatitis C which has been treated, COPD, PTSD, alcohol abuse and hypothyroidism and is admitted after she woke up on her bathroom floor this morning without recall of how she arrived there.    The patient tells me she has been drinking 12 beers a day for six weeks after the loss of her dog.  Last night she drank around 9 pm, got up at 0300 and went to the bathroom feeling \"off\" and then woke in the morning on her bathroom floor.  She did lose bladder control.  She has had seizures in the past and feels like this was similar.      Past Medical History    Past Medical History:   Diagnosis Date    Anxiety     COPD (chronic obstructive pulmonary disease) (H)     COPD (chronic obstructive pulmonary disease) (H)     Hepatitis C     PTSD (post-traumatic stress disorder)     Seizures (H)     second to ETOH    Substance abuse (H)        Past Surgical History   Past Surgical History:   Procedure Laterality Date    LAPAROSCOPIC TUBAL LIGATION      ORTHOPEDIC SURGERY      Left knee    TONSILLECTOMY         Prior to Admission Medications   Prior to Admission " Medications   Prescriptions Last Dose Informant Patient Reported? Taking?   Vitamin D3 (VITAMIN D, CHOLECALCIFEROL,) 25 mcg (1000 units) tablet 8/29/2023 at pm Self Yes Yes   Sig: Take 25 mcg by mouth daily   eszopiclone (LUNESTA) 3 MG tablet 8/29/2023 at hs Self Yes Yes   Sig: Take 1 tablet by mouth At Bedtime   gabapentin (NEURONTIN) 600 MG tablet 8/29/2023 at pm Self Yes Yes   Sig: Take 600 mg by mouth 3 times daily   levothyroxine (SYNTHROID/LEVOTHROID) 100 MCG tablet 8/29/2023 at am Self Yes Yes   Sig: Take 100 mcg by mouth daily   melatonin 5 MG tablet 8/29/2023 at hs Self No Yes   Sig: Take 1 tablet (5 mg) by mouth nightly as needed for sleep   multivitamin w/minerals (THERA-VIT-M) tablet 8/29/2023 at am Self No Yes   Sig: Take 1 tablet by mouth daily   nicotine polacrilex (NICORETTE) 4 MG gum More than a month at unknown Self Yes Yes   Sig: Place 4 mg inside cheek every hour as needed for smoking cessation   omeprazole (PRILOSEC) 40 MG DR capsule 8/29/2023 at pm Self Yes Yes   Sig: Take 40 mg by mouth daily   ondansetron (ZOFRAN ODT) 4 MG ODT tab 8/29/2023 at pm Self Yes Yes   Sig: Take 4 mg by mouth every 6 hours as needed for nausea   propranolol (INDERAL) 20 MG tablet 8/29/2023 at pm Self Yes Yes   Sig: Take 20 mg by mouth 3 times daily   thiamine (B-1) 100 MG tablet 8/29/2023 at pm Self No Yes   Sig: Take 1 tablet (100 mg) by mouth daily      Facility-Administered Medications: None           Physical Exam   Vital Signs: Temp: 98.7  F (37.1  C) Temp src: Oral BP: (!) 137/95 Pulse: 95   Resp: 18 SpO2: 95 % O2 Device: None (Room air)    Weight: 155 lbs 0 oz    Physical Exam   Constitutional:   Well nourished, well developed, resting comfortably   Cardiovascular: Regular rate and rhythm without murmurs or gallops  Pulmonary/Chest: Clear to auscultation bilaterally, with no wheezes or retractions. No respiratory distress.  GI: Soft with good bowel sounds.  Non-tender, non-distended, with no guarding, no  rebound, no peritoneal signs.   Musculoskeletal:  No edema or clubbing   Skin: Skin is warm and dry. No rash noted.   Neurological: Alert and oriented to person, place, and time. Tremulous.  Psychiatric:  Normal mood and affect.      Medical Decision Making       25 MINUTES SPENT BY ME on the date of service doing chart review, history, exam, documentation & further activities per the note.      Data   CBC, BMP, CT head and neck reviewed

## 2023-08-31 ENCOUNTER — TELEPHONE (OUTPATIENT)
Dept: BEHAVIORAL HEALTH | Facility: CLINIC | Age: 63
End: 2023-08-31
Payer: MEDICARE

## 2023-08-31 LAB
ANION GAP SERPL CALCULATED.3IONS-SCNC: 12 MMOL/L (ref 7–15)
BUN SERPL-MCNC: 3.9 MG/DL (ref 8–23)
CALCIUM SERPL-MCNC: 8.6 MG/DL (ref 8.8–10.2)
CHLORIDE SERPL-SCNC: 102 MMOL/L (ref 98–107)
CREAT SERPL-MCNC: 0.6 MG/DL (ref 0.51–0.95)
DEPRECATED HCO3 PLAS-SCNC: 23 MMOL/L (ref 22–29)
ERYTHROCYTE [DISTWIDTH] IN BLOOD BY AUTOMATED COUNT: 14.3 % (ref 10–15)
GFR SERPL CREATININE-BSD FRML MDRD: >90 ML/MIN/1.73M2
GLUCOSE SERPL-MCNC: 148 MG/DL (ref 70–99)
HCT VFR BLD AUTO: 40.3 % (ref 35–47)
HGB BLD-MCNC: 14.4 G/DL (ref 11.7–15.7)
MAGNESIUM SERPL-MCNC: 1.7 MG/DL (ref 1.7–2.3)
MCH RBC QN AUTO: 33.6 PG (ref 26.5–33)
MCHC RBC AUTO-ENTMCNC: 35.7 G/DL (ref 31.5–36.5)
MCV RBC AUTO: 94 FL (ref 78–100)
PHOSPHATE SERPL-MCNC: 1.5 MG/DL (ref 2.5–4.5)
PHOSPHATE SERPL-MCNC: 2.8 MG/DL (ref 2.5–4.5)
PLATELET # BLD AUTO: 174 10E3/UL (ref 150–450)
POTASSIUM SERPL-SCNC: 3 MMOL/L (ref 3.4–5.3)
POTASSIUM SERPL-SCNC: 3.5 MMOL/L (ref 3.4–5.3)
RBC # BLD AUTO: 4.28 10E6/UL (ref 3.8–5.2)
SODIUM SERPL-SCNC: 137 MMOL/L (ref 136–145)
WBC # BLD AUTO: 5 10E3/UL (ref 4–11)

## 2023-08-31 PROCEDURE — 250N000013 HC RX MED GY IP 250 OP 250 PS 637: Performed by: NURSE PRACTITIONER

## 2023-08-31 PROCEDURE — 250N000009 HC RX 250: Performed by: INTERNAL MEDICINE

## 2023-08-31 PROCEDURE — 250N000013 HC RX MED GY IP 250 OP 250 PS 637: Performed by: INTERNAL MEDICINE

## 2023-08-31 PROCEDURE — 80048 BASIC METABOLIC PNL TOTAL CA: CPT | Performed by: NURSE PRACTITIONER

## 2023-08-31 PROCEDURE — 258N000003 HC RX IP 258 OP 636: Performed by: INTERNAL MEDICINE

## 2023-08-31 PROCEDURE — 84100 ASSAY OF PHOSPHORUS: CPT | Performed by: INTERNAL MEDICINE

## 2023-08-31 PROCEDURE — 36415 COLL VENOUS BLD VENIPUNCTURE: CPT | Performed by: NURSE PRACTITIONER

## 2023-08-31 PROCEDURE — 36415 COLL VENOUS BLD VENIPUNCTURE: CPT | Performed by: INTERNAL MEDICINE

## 2023-08-31 PROCEDURE — 85027 COMPLETE CBC AUTOMATED: CPT | Performed by: NURSE PRACTITIONER

## 2023-08-31 PROCEDURE — 84132 ASSAY OF SERUM POTASSIUM: CPT | Performed by: INTERNAL MEDICINE

## 2023-08-31 PROCEDURE — 99232 SBSQ HOSP IP/OBS MODERATE 35: CPT | Performed by: INTERNAL MEDICINE

## 2023-08-31 PROCEDURE — 120N000002 HC R&B MED SURG/OB UMMC

## 2023-08-31 PROCEDURE — 250N000011 HC RX IP 250 OP 636: Mod: JZ | Performed by: NURSE PRACTITIONER

## 2023-08-31 PROCEDURE — 83735 ASSAY OF MAGNESIUM: CPT | Performed by: INTERNAL MEDICINE

## 2023-08-31 RX ORDER — FOLIC ACID 1 MG/1
1 TABLET ORAL DAILY
Status: DISCONTINUED | OUTPATIENT
Start: 2023-08-31 | End: 2023-09-01 | Stop reason: HOSPADM

## 2023-08-31 RX ORDER — FLUMAZENIL 0.1 MG/ML
0.2 INJECTION, SOLUTION INTRAVENOUS
Status: DISCONTINUED | OUTPATIENT
Start: 2023-08-31 | End: 2023-08-31

## 2023-08-31 RX ORDER — POTASSIUM CHLORIDE 750 MG/1
40 TABLET, EXTENDED RELEASE ORAL ONCE
Status: COMPLETED | OUTPATIENT
Start: 2023-08-31 | End: 2023-08-31

## 2023-08-31 RX ORDER — POTASSIUM CHLORIDE 750 MG/1
20 TABLET, EXTENDED RELEASE ORAL ONCE
Status: COMPLETED | OUTPATIENT
Start: 2023-08-31 | End: 2023-08-31

## 2023-08-31 RX ORDER — DIAZEPAM 10 MG
10 TABLET ORAL EVERY 30 MIN PRN
Status: DISCONTINUED | OUTPATIENT
Start: 2023-08-31 | End: 2023-08-31

## 2023-08-31 RX ORDER — HALOPERIDOL 5 MG/ML
2.5-5 INJECTION INTRAMUSCULAR EVERY 6 HOURS PRN
Status: DISCONTINUED | OUTPATIENT
Start: 2023-08-31 | End: 2023-08-31

## 2023-08-31 RX ORDER — OLANZAPINE 5 MG/1
5-10 TABLET, ORALLY DISINTEGRATING ORAL EVERY 6 HOURS PRN
Status: DISCONTINUED | OUTPATIENT
Start: 2023-08-31 | End: 2023-08-31

## 2023-08-31 RX ORDER — DIAZEPAM 10 MG/2ML
5-10 INJECTION, SOLUTION INTRAMUSCULAR; INTRAVENOUS EVERY 30 MIN PRN
Status: DISCONTINUED | OUTPATIENT
Start: 2023-08-31 | End: 2023-08-31

## 2023-08-31 RX ADMIN — DIAZEPAM 10 MG: 5 INJECTION INTRAMUSCULAR; INTRAVENOUS at 02:45

## 2023-08-31 RX ADMIN — NICOTINE POLACRILEX 2 MG: 2 GUM, CHEWING BUCCAL at 09:07

## 2023-08-31 RX ADMIN — LEVOTHYROXINE SODIUM 100 MCG: 100 TABLET ORAL at 08:39

## 2023-08-31 RX ADMIN — NICOTINE POLACRILEX 2 MG: 2 GUM, CHEWING BUCCAL at 16:40

## 2023-08-31 RX ADMIN — POTASSIUM PHOSPHATE, MONOBASIC POTASSIUM PHOSPHATE, DIBASIC 15 MMOL: 224; 236 INJECTION, SOLUTION, CONCENTRATE INTRAVENOUS at 12:19

## 2023-08-31 RX ADMIN — NICOTINE POLACRILEX 4 MG: 2 GUM, CHEWING BUCCAL at 20:17

## 2023-08-31 RX ADMIN — POTASSIUM CHLORIDE 40 MEQ: 750 TABLET, EXTENDED RELEASE ORAL at 14:25

## 2023-08-31 RX ADMIN — POTASSIUM CHLORIDE 20 MEQ: 750 TABLET, EXTENDED RELEASE ORAL at 16:40

## 2023-08-31 RX ADMIN — LOPERAMIDE HYDROCHLORIDE 2 MG: 2 CAPSULE ORAL at 05:44

## 2023-08-31 RX ADMIN — MULTIPLE VITAMINS W/ MINERALS TAB 1 TABLET: TAB at 08:40

## 2023-08-31 RX ADMIN — GABAPENTIN 600 MG: 600 TABLET, FILM COATED ORAL at 08:40

## 2023-08-31 RX ADMIN — DIAZEPAM 10 MG: 10 TABLET ORAL at 08:39

## 2023-08-31 RX ADMIN — PROPRANOLOL HYDROCHLORIDE 20 MG: 20 TABLET ORAL at 08:39

## 2023-08-31 RX ADMIN — PANTOPRAZOLE SODIUM 40 MG: 40 TABLET, DELAYED RELEASE ORAL at 08:40

## 2023-08-31 RX ADMIN — NICOTINE POLACRILEX 2 MG: 2 GUM, CHEWING BUCCAL at 12:20

## 2023-08-31 RX ADMIN — Medication 5 MG: at 22:13

## 2023-08-31 RX ADMIN — ESZOPICLONE 3 MG: 3 TABLET, COATED ORAL at 22:13

## 2023-08-31 RX ADMIN — ONDANSETRON 4 MG: 2 INJECTION INTRAMUSCULAR; INTRAVENOUS at 08:32

## 2023-08-31 RX ADMIN — PANTOPRAZOLE SODIUM 40 MG: 40 TABLET, DELAYED RELEASE ORAL at 16:40

## 2023-08-31 RX ADMIN — DIAZEPAM 10 MG: 10 TABLET ORAL at 12:16

## 2023-08-31 RX ADMIN — ESZOPICLONE 3 MG: 3 TABLET, COATED ORAL at 00:53

## 2023-08-31 RX ADMIN — DIAZEPAM 10 MG: 5 INJECTION INTRAMUSCULAR; INTRAVENOUS at 00:53

## 2023-08-31 RX ADMIN — ACETAMINOPHEN 650 MG: 325 TABLET, FILM COATED ORAL at 02:45

## 2023-08-31 RX ADMIN — GABAPENTIN 600 MG: 600 TABLET, FILM COATED ORAL at 14:25

## 2023-08-31 RX ADMIN — GABAPENTIN 600 MG: 600 TABLET, FILM COATED ORAL at 20:17

## 2023-08-31 RX ADMIN — NICOTINE POLACRILEX 2 MG: 2 GUM, CHEWING BUCCAL at 14:31

## 2023-08-31 RX ADMIN — PROPRANOLOL HYDROCHLORIDE 20 MG: 20 TABLET ORAL at 14:25

## 2023-08-31 RX ADMIN — NICOTINE POLACRILEX 4 MG: 2 GUM, CHEWING BUCCAL at 22:14

## 2023-08-31 RX ADMIN — PROPRANOLOL HYDROCHLORIDE 20 MG: 20 TABLET ORAL at 20:17

## 2023-08-31 ASSESSMENT — ACTIVITIES OF DAILY LIVING (ADL)
ADLS_ACUITY_SCORE: 22
ADLS_ACUITY_SCORE: 37
VISION_MANAGEMENT: GLASSES
ADLS_ACUITY_SCORE: 22
DIFFICULTY_EATING/SWALLOWING: NO
ADLS_ACUITY_SCORE: 22
ADLS_ACUITY_SCORE: 37
CONCENTRATING,_REMEMBERING_OR_MAKING_DECISIONS_DIFFICULTY: NO
DRESSING/BATHING_DIFFICULTY: NO
TOILETING_ISSUES: NO
WEAR_GLASSES_OR_BLIND: YES
DOING_ERRANDS_INDEPENDENTLY_DIFFICULTY: NO
FALL_HISTORY_WITHIN_LAST_SIX_MONTHS: NO
ADLS_ACUITY_SCORE: 22
WALKING_OR_CLIMBING_STAIRS_DIFFICULTY: NO
CHANGE_IN_FUNCTIONAL_STATUS_SINCE_ONSET_OF_CURRENT_ILLNESS/INJURY: NO
ADLS_ACUITY_SCORE: 37
ADLS_ACUITY_SCORE: 22
ADLS_ACUITY_SCORE: 22
ADLS_ACUITY_SCORE: 37

## 2023-08-31 NOTE — TELEPHONE ENCOUNTER
Referring patient to the medicare 55+ group at [place].  Is in inpatient at [facility].   Patient has Medicare and [place] with a spend down.  Expecting a phone all from Miya Carreon.     [facility] Meraz [facility] on 8/31/2023 at 12:22 PM

## 2023-08-31 NOTE — PROGRESS NOTES
BP (!) 168/99   Pulse 77   Temp 98  F (36.7  C) (Oral)   Resp 20   Wt 70.3 kg (155 lb)   SpO2 96%   BMI 27.46 kg/m      Pt is AxO x4. On RA, Pt has had elevated BP of leticia 174/102. Provider was paged and stated it's ok to wait the morning pills for BP. Pt denies SOB, N/T, and chest pain. Pt c/f nausea and bad headache per patient's report. Pt continues to dry heave, no emesis. Pt also experiencing chills and visibly tremorous, scored on CIWA and gave IV valium x4. Pt stated of relived for short time after each time of valium was given. Pt is independent to the room and bathroom. L-PIV infusing 125 ml/hr of NS. Pt was able to tolerate to drink fluid and eat crackers & popsicle. Pt on regular diet. Call light within reach and able to make needs known. Continuo plan of care.

## 2023-08-31 NOTE — CONSULTS
Met with patient to discuss possible treatment options and to possibly complete an assessment. Assessment has been completed at this time and patient is being recommended Intensive Outpatient Treatment at [address] and [facility].  Referrals to [address] and [facility] are being made at this time.  Patient is aware of the referrals and is in agreement.     [facility] IOP:  Coordinator: Miya Carreon  Phone: [phone]  [email or web address]      [place] [facility]   [address] [place] [address]  [phone]  [phone]    John Meraz [facility] on 8/31/2023 at 12:10 PM
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
## 2023-08-31 NOTE — PLAN OF CARE
BP (!) 154/87   Pulse 64   Temp 97.7  F (36.5  C) (Axillary)   Resp 22   Wt 70.3 kg (155 lb)   SpO2 96%   BMI 27.46 kg/m      A&Ox4. CIWA scores have significantly improved this shift. Pt last received vallium at 1200 and endorses interest in going home tomorrow if no significant change occurs overnight. C/o diarrhea this shift. Pt was nauseous and dry heaving at beginning of shift. Nausea improved with scop patch and zofran. Phos and K replaced this shift. Pt requesting nicotine gum multiple times this shift and requested dose to be increased to 4mg.     Pt endorses interest in following up with outpatient chemical dependency.     Sheryl Francisco RN

## 2023-08-31 NOTE — PROGRESS NOTES
"[facility]    Medicine Progress Note - Hospitalist Service, GOLD TEAM 16    Date of Admission:  8/30/2023    Assessment & Plan     Jolynn Rivera is a 63 year old woman admitted on 8/30/2023. She has a history of hepatitis C which has been treated, COPD, PTSD, alcohol abuse and hypothyroidism and is admitted after she woke up on her bathroom floor this morning without recall of how she arrived there.     Alcohol withdrawal, fall secondary to seizure vs syncope vs intoxication   - Presented with concern for alcohol withdrawal.  She has been drinking 12 beers/day for 6 weeks, drank at 9 pm tonight and at 0300 she went to the bathroom feeling \"out of it\" and woke up on the floor in the morning with no recall of falling.  She had urinated, and is concerned she had a seizure.  She is interested in alcohol withdrawal treatment.  She has a history of withdrawal seizures.  - Alcohol withdrawal protocol with diazepam.  - Already on gabapentin and  continue  - Propranolol  - MVI, folate, MVI  - CT head negative for acute process , did show small calcified 8 mm x 3 mm extremities axial mass overlaying supratentorial part of right parietal lobe potentially small meningeoma, follow up  as out patient    - CT c spine negative for acute fracture   - Tele overnight  - mild elevation of AST  - US 7/2022 did show  hepatomegaly       Hypokalemia  - replace , check Mg     Tox screen + for benzodiazepine  - denies ant benzo use, takes lunesta at night so might be causing false + test        History of hypothyroidism  - Continue home levothyoroxine     History of hypertension  - On propranolol     History of PTSD, anxiety, depression and insomnia  - Continue home eszopiclone, gabapentin,      Nicotine use  - Nicotine gum        Diet: Combination Diet Regular Diet Adult    DVT Prophylaxis: Ambulate every shift  Morales Catheter: Not present  Lines: None     Cardiac Monitoring: ACTIVE order. " "Indication: Syncope- low cardiac risk (24 hours)  Code Status: Full Code      Clinically Significant Risk Factors Present on Admission          # Hypocalcemia: Lowest Ca = 8.2 mg/dL in last 2 days, will monitor and replace as appropriate   # Hypomagnesemia: Lowest Mg = 1.5 mg/dL in last 2 days, will replace as needed            # Overweight: Estimated body mass index is 27.46 kg/m  as calculated from the following:    Height as of 8/7/23: 1.6 m (5' 3\").    Weight as of this encounter: 70.3 kg (155 lb).              Disposition Plan      Expected Discharge Date: 09/01/2023                  Crys Up MD  Hospitalist Service, GOLD TEAM 16  [facility]  Securely message with [employer] (more info)  Text page via [employer] Paging/Directory   See signed in provider for up to date coverage information  ______________________________________________________________________    Interval History     Still feels  shaky and asking for valium, nausea vomiting better as well as diarrhea   Physical Exam   Vital Signs: Temp: 98  F (36.7  C) Temp src: Oral BP: (!) 168/99 Pulse: 77   Resp: 20 SpO2: 96 % O2 Device: None (Room air)    Weight: 155 lbs 0 oz    General appearance: awake alert in  no apparent distress     HEENT: EOMI and PEARLA, sclera nonicteric, moist mucus membranes, head atraumatic  NECK: supple  RESPIRATORY: lungs are clear   CARDIOVASCULAR: normal S1S2 regular rate and rhythm, no rubs gallops or murmurs appreciated,  GASTROINTESTINAL: abdomen is soft,  non-distended, non-tender with normal bowel sounds.   MUSCULOSKELETAL: normal muscle bulk and tone  SKIN: warm and dry, no rashes, no mottling noted   NEUROLOGIC: awake alert and oriented   EXTREMITIES: no clubbing cyanosis or edema noted  moves all extremities     Data   Recent Labs   Lab 08/31/23  0733 08/30/23  1001   WBC 5.0 4.0   HGB 14.4 14.3   MCV 94 93    190    138   POTASSIUM 3.0* 3.5   CHLORIDE 102 103 "   CO2 23 23   BUN 3.9* 6.1*   CR 0.60 0.53   ANIONGAP 12 12   ELVIRA 8.6* 8.2*   * 97   ALBUMIN  --  3.9   PROTTOTAL  --  6.8   BILITOTAL  --  0.4   ALKPHOS  --  56   ALT  --  26   AST  --  53*   LIPASE  --  51

## 2023-09-01 VITALS
RESPIRATION RATE: 18 BRPM | TEMPERATURE: 98.2 F | HEART RATE: 76 BPM | SYSTOLIC BLOOD PRESSURE: 137 MMHG | DIASTOLIC BLOOD PRESSURE: 82 MMHG | WEIGHT: 155 LBS | BODY MASS INDEX: 27.46 KG/M2 | OXYGEN SATURATION: 93 %

## 2023-09-01 LAB
HOLD SPECIMEN: NORMAL
MAGNESIUM SERPL-MCNC: 1.6 MG/DL (ref 1.7–2.3)
PHOSPHATE SERPL-MCNC: 2.5 MG/DL (ref 2.5–4.5)
POTASSIUM SERPL-SCNC: 3.5 MMOL/L (ref 3.4–5.3)

## 2023-09-01 PROCEDURE — 250N000013 HC RX MED GY IP 250 OP 250 PS 637: Performed by: INTERNAL MEDICINE

## 2023-09-01 PROCEDURE — 83735 ASSAY OF MAGNESIUM: CPT | Performed by: INTERNAL MEDICINE

## 2023-09-01 PROCEDURE — 84132 ASSAY OF SERUM POTASSIUM: CPT | Performed by: INTERNAL MEDICINE

## 2023-09-01 PROCEDURE — 250N000013 HC RX MED GY IP 250 OP 250 PS 637: Performed by: NURSE PRACTITIONER

## 2023-09-01 PROCEDURE — 36415 COLL VENOUS BLD VENIPUNCTURE: CPT | Performed by: INTERNAL MEDICINE

## 2023-09-01 PROCEDURE — 99239 HOSP IP/OBS DSCHRG MGMT >30: CPT | Performed by: INTERNAL MEDICINE

## 2023-09-01 PROCEDURE — 84100 ASSAY OF PHOSPHORUS: CPT | Performed by: INTERNAL MEDICINE

## 2023-09-01 PROCEDURE — 258N000003 HC RX IP 258 OP 636: Performed by: INTERNAL MEDICINE

## 2023-09-01 RX ADMIN — NICOTINE POLACRILEX 4 MG: 2 GUM, CHEWING BUCCAL at 11:07

## 2023-09-01 RX ADMIN — MULTIPLE VITAMINS W/ MINERALS TAB 1 TABLET: TAB at 08:08

## 2023-09-01 RX ADMIN — PANTOPRAZOLE SODIUM 40 MG: 40 TABLET, DELAYED RELEASE ORAL at 08:07

## 2023-09-01 RX ADMIN — LEVOTHYROXINE SODIUM 100 MCG: 100 TABLET ORAL at 08:13

## 2023-09-01 RX ADMIN — FOLIC ACID 1 MG: 1 TABLET ORAL at 08:08

## 2023-09-01 RX ADMIN — Medication 25 MCG: at 08:07

## 2023-09-01 RX ADMIN — NICOTINE POLACRILEX 4 MG: 2 GUM, CHEWING BUCCAL at 08:06

## 2023-09-01 RX ADMIN — NICOTINE POLACRILEX 4 MG: 2 GUM, CHEWING BUCCAL at 05:12

## 2023-09-01 RX ADMIN — THIAMINE HCL TAB 100 MG 100 MG: 100 TAB at 08:07

## 2023-09-01 RX ADMIN — GABAPENTIN 600 MG: 600 TABLET, FILM COATED ORAL at 08:07

## 2023-09-01 RX ADMIN — SODIUM CHLORIDE: 9 INJECTION, SOLUTION INTRAVENOUS at 00:10

## 2023-09-01 RX ADMIN — PROPRANOLOL HYDROCHLORIDE 20 MG: 20 TABLET ORAL at 08:06

## 2023-09-01 ASSESSMENT — ACTIVITIES OF DAILY LIVING (ADL)
ADLS_ACUITY_SCORE: 22

## 2023-09-01 NOTE — DISCHARGE SUMMARY
"[facility]  Hospitalist Discharge Summary      Date of Admission:  8/30/2023  Date of Discharge:  9/1/2023  1:04 PM  Discharging Provider: Crys Up MD  Discharge Service: Hospitalist Service, GOLD TEAM 16    Discharge Diagnoses   Alcohol withdrawal, fall secondary to seizure vs syncope vs intoxication  Hypokalemia  Tox screen + for benzodiazepine  History of hypothyroidism  History of hypertension  History of PTSD, anxiety, depression and insomnia  Nicotine use  Clinically Significant Risk Factors     # Overweight: Estimated body mass index is 27.46 kg/m  as calculated from the following:    Height as of 8/7/23: 1.6 m (5' 3\").    Weight as of this encounter: 70.3 kg (155 lb).       Follow-ups Needed After Discharge   Follow-up Appointments     Adult [facility] Follow-up and recommended labs and tests      Follow up with primary care provider, Physician No Ref-Primary, within 7   days for hospital follow- up.      Rehab as recommended       Appointments on [place] and/or [facility] (with [facility] or [facility]   provider or service). Call [phone] if you haven't heard regarding   these appointments within 7 days of discharge.          Discharge Disposition   Discharged to home  Condition at discharge: Stable    Hospital Course   Jolynn Rivera is a 63 year old woman admitted on 8/30/2023. She has a history of hepatitis C which has been treated, COPD, PTSD, alcohol abuse and hypothyroidism and is admitted after she woke up on her bathroom floor this morning without recall of how she arrived there.     Alcohol withdrawal, fall secondary to seizure vs syncope vs intoxication   - Presented with concern for alcohol withdrawal.  She has been drinking 12 beers/day for 6 weeks, drank at 9 pm tonight and at 0300 she went to the bathroom feeling \"out of it\" and woke up on the floor in the morning with no recall of falling.  She had urinated, and is concerned she had a " seizure.  She is interested in alcohol withdrawal treatment.  She has a history of withdrawal seizures. She  last drank Tuesday night   - she is doing well mow, has information regarding treament   - Already on gabapentin and  continue  - Propranolol  - continue MVI, folate, MVI  - CT head negative for acute process , did show small calcified 8 mm x 3 mm extremities axial mass overlaying supratentorial part of right parietal lobe potentially small meningeoma, follow up  as out patient    - CT c spine negative for acute fracture   - mild elevation of AST  - US 7/2022 did show  hepatomegaly         Hypokalemia  - replaced      Tox screen + for benzodiazepine  - denies ant benzo use, takes lunesta at night so might be causing false + test         History of hypothyroidism  - Continue home levothyoroxine     History of hypertension  - On propranolol     History of PTSD, anxiety, depression and insomnia  - Continue home eszopiclone, gabapentin,      Nicotine use  - Nicotine gum     Consultations This Hospital Stay   MEDICATION HISTORY IP PHARMACY CONSULT  CHEMICAL DEPENDENCY IP CONSULT    Code Status   Prior    Time Spent on this Encounter   I, Crys Up MD, personally saw the patient today and spent greater than 30 minutes discharging this patient.       Crys Up MD  [facility] MED SURG  Formerly [facility], [facility] [address]  Phone: [phone]  Fax: [phone]  ______________________________________________________________________    Physical Exam   Vital Signs: Temp: 98.2  F (36.8  C) Temp src: Oral BP: 137/82 Pulse: 76   Resp: 18 SpO2: 93 % O2 Device: None (Room air)    Weight: 155 lbs 0 oz      General appearance: awake alert in  no apparent distress      HEENT: EOMI and PEARLA, sclera nonicteric, moist mucus membranes, head atraumatic  NECK: supple  RESPIRATORY: lungs are clear   CARDIOVASCULAR: normal S1S2 regular rate and rhythm, no rubs gallops or murmurs  appreciated,  GASTROINTESTINAL: abdomen is soft,  non-distended, non-tender with normal bowel sounds.   MUSCULOSKELETAL: normal muscle bulk and tone  SKIN: warm and dry, no rashes, no mottling noted   NEUROLOGIC: awake alert and oriented   EXTREMITIES: no clubbing cyanosis or edema noted  moves all extremities    Primary Care Physician   Physician No Ref-Primary    Discharge Orders      Reason for your hospital stay    Nausea vomiting diarrhea alcohol withdrawal     Activity    Your activity upon discharge: activity as tolerated     Adult [facility] Follow-up and recommended labs and tests    Follow up with primary care provider, Physician No Ref-Primary, within 7 days for hospital follow- up.      Rehab as recommended       Appointments on [place] and/or [facility] (with [facility] or [facility] provider or service). Call [phone] if you haven't heard regarding these appointments within 7 days of discharge.     Diet    Follow this diet upon discharge: as tolerated       Significant Results and Procedures   Most Recent 3 CBC's:  Recent Labs   Lab Test 08/31/23  0733 08/30/23  1001 08/07/23  0259   WBC 5.0 4.0 4.6   HGB 14.4 14.3 13.5   MCV 94 93 97    190 185     Most Recent 3 BMP's:  Recent Labs   Lab Test 09/01/23  0638 08/31/23  2108 08/31/23  0733 08/30/23  1001 08/07/23  0259   NA  --   --  137 138 143   POTASSIUM 3.5 3.5 3.0* 3.5 3.6   CHLORIDE  --   --  102 103 106   CO2  --   --  23 23 22   BUN  --   --  3.9* 6.1* 6.9*   CR  --   --  0.60 0.53 0.58   ANIONGAP  --   --  12 12 15   ELVIRA  --   --  8.6* 8.2* 8.6*   GLC  --   --  148* 97 129*     Most Recent 2 LFT's:  Recent Labs   Lab Test 08/30/23  1001 08/07/23  0259   AST 53* 43   ALT 26 22   ALKPHOS 56 46   BILITOTAL 0.4 0.2   ,   Results for orders placed or performed during the hospital encounter of 08/30/23   CT Head w/o Contrast    Narrative    CT SCAN OF THE HEAD WITHOUT CONTRAST   8/30/2023 10:28 AM     HISTORY: Trauma, LOC.    TECHNIQUE: Axial  images of the head and coronal reformations without  IV contrast material. Radiation dose for this scan was reduced using  automated exposure control, adjustment of the mA and/or kV according  to patient size, or iterative reconstruction technique.    COMPARISON: None.    FINDINGS: The ventricles are normal in size and configuration. Mild  generalized brain parenchymal volume loss. Minimal subtle patchy  hypoattenuation in the cerebral white matter is noted, nonspecific,  likely due to mild chronic small vessel ischemic change. Small  partially calcified extra-axial mass overlying the superolateral  aspect of the right parietal lobe (series 5 image 47) measuring 8 mm x  3 mm. No CT findings of large transcortical acute or subacute infarct,  acute intracranial hemorrhage, extra axial fluid collection, or mass  effect.    There is a probable retention cyst or polyp in the right maxillary  sinus that is only partially visualized. Minimal mucosal thickening in  the visualized paranasal sinuses elsewhere. The mastoid air cells are  clear. The calvarium appears grossly intact.      Impression    IMPRESSION:  1. No CT findings of acute intracranial process.  2. Small calcified extra axial lesion overlying the right parietal  lobe without mass effect, potentially representing a small meningioma.  3. Mild presumed age-related changes of the brain, as described.    BABITA SORENSEN MD         SYSTEM ID:  [id number]   Cervical spine CT w/o contrast    Narrative    CT CERVICAL SPINE WITHOUT CONTRAST   8/30/2023 10:26 AM     HISTORY: Trauma, fall, intoxicated, neck pain.     TECHNIQUE: Axial images of the cervical spine were obtained without  intravenous contrast. Multiplanar reformations were performed.   Radiation dose for this scan was reduced using automated exposure  control, adjustment of the mA and/or kV according to patient size, or  iterative reconstruction technique.    COMPARISON: None.    FINDINGS: No acute cervical  spine fracture is identified. There is  straightening of the normal cervical lordosis. Minimal retrolisthesis  of C5 on C6. Minimal dextroconvex curvature of the lower  cervical/upper thoracic spine.    Moderate disc height loss at C5-C6. The other intervertebral discs  appear relatively maintained in height. Bilateral uncovertebral  spurring at C5-C6. Mild scattered degenerative facet disease. No  high-grade central spinal canal stenosis is identified. There is mild  to moderate bilateral C5-C6 neural foraminal stenosis. No significant  neural foraminal narrowing elsewhere.    Bilateral carotid bifurcation atherosclerotic calcification, greater  on the left. The visualized paraspinous soft tissues otherwise appear  grossly unremarkable.      Impression    IMPRESSION:  1. No acute cervical spine fracture.  2. Minimal retrolisthesis of C5 on C6. Straightening of the normal  cervical lordosis.  3. Degenerative changes at C5-C6. No high-grade central spinal canal  stenosis. Please see the body of the report for additional details.    BABITA SORENSEN MD         SYSTEM ID:  [id number]   XR Chest 2 Views    Narrative    CHEST TWO VIEWS 8/30/2023 10:14 AM     HISTORY: cough, trauma    COMPARISON: Chest x-ray 10/18/2015.       Impression    IMPRESSION: Cardiac silhouette is within normal limits. No focal  airspace consolidation. Mild thickening of the minor fissure without  significant pleural effusion. No discernible pneumothorax. No acute  displaced fracture identified.    NAVARRO ODELL MD         SYSTEM ID:  [id number]       Discharge Medications   Discharge Medication List as of 9/1/2023 12:31 PM        CONTINUE these medications which have NOT CHANGED    Details   eszopiclone (LUNESTA) 3 MG tablet Take 1 tablet by mouth At Bedtime, Historical      gabapentin (NEURONTIN) 600 MG tablet Take 600 mg by mouth 3 times daily, Historical      levothyroxine (SYNTHROID/LEVOTHROID) 100 MCG tablet Take 100 mcg by mouth daily,  "Historical      melatonin 5 MG tablet Take 1 tablet (5 mg) by mouth nightly as needed for sleep, Disp-30 tablet, R-0, E-Prescribe      multivitamin w/minerals (THERA-VIT-M) tablet Take 1 tablet by mouth daily, Disp-30 tablet, R-0, E-PrescribePt has supply      nicotine polacrilex (NICORETTE) 4 MG gum Place 4 mg inside cheek every hour as needed for smoking cessation, Historical      omeprazole (PRILOSEC) 40 MG DR capsule Take 40 mg by mouth daily, Historical      ondansetron (ZOFRAN ODT) 4 MG ODT tab Take 4 mg by mouth every 6 hours as needed for nausea, Historical      propranolol (INDERAL) 20 MG tablet Take 20 mg by mouth 3 times daily, Historical      thiamine (B-1) 100 MG tablet Take 1 tablet (100 mg) by mouth daily, Disp-30 tablet, R-0, E-Prescribe      Vitamin D3 (VITAMIN D, CHOLECALCIFEROL,) 25 mcg (1000 units) tablet Take 25 mcg by mouth daily, Historical           Allergies   Allergies   Allergen Reactions    Amlodipine      Other reaction(s): Nightmares    Bee Venom      Other reaction(s): Edema    Clonidine Unknown    Prednisone Anxiety     Patient stated that prednisone causes \"bad panic attacks\".  Patient stated that prednisone causes \"bad panic attacks\".  Patient stated that prednisone causes \"bad panic attacks\".      Antihistamines, Chlorpheniramine-Type [Antihistamines, Chlorpheniramine-Type]     Compazine      Lock jaw; all medications ending with -zine    Diphenhydramine      Muscle Cramps    Penicillins      Panic attack    Prochlorperazine      Muscle cramps    Trazodone Nausea and Vomiting     \" I ended up falling and feeling like I was drunk\"    Wasps [Hornets]      Swelling      "

## 2023-09-01 NOTE — PLAN OF CARE
VS: BP (!) 146/89 (BP Location: Right arm)   Pulse 80   Temp 98.4  F (36.9  C) (Oral)   Resp 17   Wt 70.3 kg (155 lb)   SpO2 93%   BMI 27.46 kg/m      O2: Sating >90% on RA. Lung sounds clear. Denies chest pain and SOB.   Output: Voids spontaneously and adequately.    Last BM: Bowel sounds active x4. Passing flatus. LBM 8/31/23   Activity: Independent in the room   Skin: Intact   Pain: Denies   CMS: A&Ox4. Denies N/T.    Dressing: None   Diet: Regular. Appetite was good. Denies N/V.    LDA: PIV to L is infusing NS at 75 ml /hr   Equipment: IV pole, and personal belongings. Call light within reach and uses appropriately.   Plan: TBD    Additional Info: Pt is interested in outpatient chemical dependency. CIWA score  no intervention is needed. Nicotine gum 4 mg given times 2. Tele discontinue.

## 2023-09-01 NOTE — PLAN OF CARE
Patient is alert and O x4, VSS, on RA.     Denied pain, chest pain, SOB, N/V,   IND.    CIWA scored and no Valium needed since yesterday noon.     Good appetite, cont of B/B.     Plan to discharge home today.

## 2023-09-06 ENCOUNTER — PATIENT OUTREACH (OUTPATIENT)
Dept: CARE COORDINATION | Facility: CLINIC | Age: 63
End: 2023-09-06
Payer: MEDICARE

## 2023-09-06 NOTE — PROGRESS NOTES
Clinic Care Coordination Contact  [facility]/Voicemail       Clinical Data: Care Coordinator Outreach  Outreach attempted x 2.  Left message on patient's voicemail with call back information and requested return call.    Plan: Care Coordinator will do no further outreaches at this time.    COLLIN Eldridge  Connected Care Resource Center  [facility]     *Connected Care Resource Team does NOT follow patient ongoing. Referrals are identified based on internal discharge reports and the outreach is to ensure patient has an understanding of their discharge instructions.

## 2023-11-17 ENCOUNTER — HOSPITAL ENCOUNTER (INPATIENT)
Facility: CLINIC | Age: 63
LOS: 2 days | Discharge: HOME | DRG: 897 | End: 2023-11-19
Attending: EMERGENCY MEDICINE | Admitting: PEDIATRICS
Payer: MEDICARE

## 2023-11-17 DIAGNOSIS — R56.9 SEIZURE (H): ICD-10-CM

## 2023-11-17 DIAGNOSIS — F10.929 ALCOHOLIC INTOXICATION WITH COMPLICATION (H): ICD-10-CM

## 2023-11-17 LAB
ABO/RH(D): NORMAL
ALBUMIN SERPL BCG-MCNC: 4.2 G/DL (ref 3.5–5.2)
ALBUMIN UR-MCNC: NEGATIVE MG/DL
ALCOHOL BREATH TEST: 0.25 (ref 0–0.01)
ALP SERPL-CCNC: 41 U/L (ref 40–150)
ALT SERPL W P-5'-P-CCNC: 27 U/L (ref 0–50)
AMPHETAMINES UR QL SCN: NORMAL
ANION GAP SERPL CALCULATED.3IONS-SCNC: 11 MMOL/L (ref 7–15)
ANTIBODY SCREEN: NEGATIVE
APPEARANCE UR: CLEAR
AST SERPL W P-5'-P-CCNC: ABNORMAL U/L
BARBITURATES UR QL SCN: NORMAL
BASOPHILS # BLD AUTO: 0 10E3/UL (ref 0–0.2)
BASOPHILS NFR BLD AUTO: 1 %
BENZODIAZ UR QL SCN: NORMAL
BILIRUB SERPL-MCNC: 0.3 MG/DL
BILIRUB UR QL STRIP: NEGATIVE
BUN SERPL-MCNC: 8.4 MG/DL (ref 8–23)
BZE UR QL SCN: NORMAL
CALCIUM SERPL-MCNC: 8.4 MG/DL (ref 8.8–10.2)
CANNABINOIDS UR QL SCN: NORMAL
CHLORIDE SERPL-SCNC: 96 MMOL/L (ref 98–107)
COLOR UR AUTO: NORMAL
CREAT SERPL-MCNC: 0.54 MG/DL (ref 0.51–0.95)
DEPRECATED HCO3 PLAS-SCNC: 26 MMOL/L (ref 22–29)
EGFRCR SERPLBLD CKD-EPI 2021: >90 ML/MIN/1.73M2
EOSINOPHIL # BLD AUTO: 0 10E3/UL (ref 0–0.7)
EOSINOPHIL NFR BLD AUTO: 0 %
ERYTHROCYTE [DISTWIDTH] IN BLOOD BY AUTOMATED COUNT: 12.1 % (ref 10–15)
ETHANOL SERPL-MCNC: 0.32 G/DL
FENTANYL UR QL: NORMAL
GLUCOSE SERPL-MCNC: 111 MG/DL (ref 70–99)
GLUCOSE UR STRIP-MCNC: NEGATIVE MG/DL
HCT VFR BLD AUTO: 41.4 % (ref 35–47)
HGB BLD-MCNC: 14.9 G/DL (ref 11.7–15.7)
HGB UR QL STRIP: NEGATIVE
IMM GRANULOCYTES # BLD: 0 10E3/UL
IMM GRANULOCYTES NFR BLD: 0 %
INR PPP: 0.97 (ref 0.85–1.15)
KETONES UR STRIP-MCNC: NEGATIVE MG/DL
LACTATE SERPL-SCNC: 1.9 MMOL/L (ref 0.7–2)
LEUKOCYTE ESTERASE UR QL STRIP: NEGATIVE
LIPASE SERPL-CCNC: 35 U/L (ref 13–60)
LYMPHOCYTES # BLD AUTO: 2.5 10E3/UL (ref 0.8–5.3)
LYMPHOCYTES NFR BLD AUTO: 50 %
MAGNESIUM SERPL-MCNC: 1.8 MG/DL (ref 1.7–2.3)
MCH RBC QN AUTO: 34.7 PG (ref 26.5–33)
MCHC RBC AUTO-ENTMCNC: 36 G/DL (ref 31.5–36.5)
MCV RBC AUTO: 97 FL (ref 78–100)
MONOCYTES # BLD AUTO: 0.7 10E3/UL (ref 0–1.3)
MONOCYTES NFR BLD AUTO: 13 %
NEUTROPHILS # BLD AUTO: 1.9 10E3/UL (ref 1.6–8.3)
NEUTROPHILS NFR BLD AUTO: 36 %
NITRATE UR QL: NEGATIVE
NRBC # BLD AUTO: 0 10E3/UL
NRBC BLD AUTO-RTO: 0 /100
OPIATES UR QL SCN: NORMAL
PCP QUAL URINE (ROCHE): NORMAL
PH UR STRIP: 5 [PH] (ref 5–7)
PLATELET # BLD AUTO: 218 10E3/UL (ref 150–450)
POTASSIUM SERPL-SCNC: 4.2 MMOL/L (ref 3.4–5.3)
PROCALCITONIN SERPL IA-MCNC: <0.02 NG/ML
PROT SERPL-MCNC: 7.3 G/DL (ref 6.4–8.3)
RBC # BLD AUTO: 4.29 10E6/UL (ref 3.8–5.2)
RBC URINE: 1 /HPF
SODIUM SERPL-SCNC: 133 MMOL/L (ref 135–145)
SP GR UR STRIP: 1 (ref 1–1.03)
SPECIMEN EXPIRATION DATE: NORMAL
UROBILINOGEN UR STRIP-MCNC: NORMAL MG/DL
WBC # BLD AUTO: 5.1 10E3/UL (ref 4–11)
WBC URINE: 0 /HPF

## 2023-11-17 PROCEDURE — HZ2ZZZZ DETOXIFICATION SERVICES FOR SUBSTANCE ABUSE TREATMENT: ICD-10-PCS | Performed by: EMERGENCY MEDICINE

## 2023-11-17 PROCEDURE — 99285 EMERGENCY DEPT VISIT HI MDM: CPT | Mod: 25 | Performed by: EMERGENCY MEDICINE

## 2023-11-17 PROCEDURE — 99285 EMERGENCY DEPT VISIT HI MDM: CPT | Performed by: EMERGENCY MEDICINE

## 2023-11-17 PROCEDURE — 250N000013 HC RX MED GY IP 250 OP 250 PS 637: Performed by: EMERGENCY MEDICINE

## 2023-11-17 PROCEDURE — 36415 COLL VENOUS BLD VENIPUNCTURE: CPT | Performed by: EMERGENCY MEDICINE

## 2023-11-17 PROCEDURE — 82075 ASSAY OF BREATH ETHANOL: CPT | Performed by: EMERGENCY MEDICINE

## 2023-11-17 PROCEDURE — 83605 ASSAY OF LACTIC ACID: CPT | Performed by: EMERGENCY MEDICINE

## 2023-11-17 PROCEDURE — 120N000002 HC R&B MED SURG/OB UMMC

## 2023-11-17 PROCEDURE — 81001 URINALYSIS AUTO W/SCOPE: CPT | Performed by: EMERGENCY MEDICINE

## 2023-11-17 PROCEDURE — 96374 THER/PROPH/DIAG INJ IV PUSH: CPT | Performed by: EMERGENCY MEDICINE

## 2023-11-17 PROCEDURE — 258N000003 HC RX IP 258 OP 636: Performed by: EMERGENCY MEDICINE

## 2023-11-17 PROCEDURE — 86901 BLOOD TYPING SEROLOGIC RH(D): CPT | Performed by: EMERGENCY MEDICINE

## 2023-11-17 PROCEDURE — 93005 ELECTROCARDIOGRAM TRACING: CPT | Performed by: EMERGENCY MEDICINE

## 2023-11-17 PROCEDURE — 80307 DRUG TEST PRSMV CHEM ANLYZR: CPT | Performed by: EMERGENCY MEDICINE

## 2023-11-17 PROCEDURE — 83690 ASSAY OF LIPASE: CPT | Performed by: EMERGENCY MEDICINE

## 2023-11-17 PROCEDURE — 85610 PROTHROMBIN TIME: CPT | Performed by: EMERGENCY MEDICINE

## 2023-11-17 PROCEDURE — 83735 ASSAY OF MAGNESIUM: CPT | Performed by: EMERGENCY MEDICINE

## 2023-11-17 PROCEDURE — 96361 HYDRATE IV INFUSION ADD-ON: CPT | Performed by: EMERGENCY MEDICINE

## 2023-11-17 PROCEDURE — 84145 PROCALCITONIN (PCT): CPT | Performed by: EMERGENCY MEDICINE

## 2023-11-17 PROCEDURE — 86850 RBC ANTIBODY SCREEN: CPT | Performed by: EMERGENCY MEDICINE

## 2023-11-17 PROCEDURE — 84155 ASSAY OF PROTEIN SERUM: CPT | Performed by: EMERGENCY MEDICINE

## 2023-11-17 PROCEDURE — 82077 ASSAY SPEC XCP UR&BREATH IA: CPT | Performed by: EMERGENCY MEDICINE

## 2023-11-17 PROCEDURE — 85025 COMPLETE CBC W/AUTO DIFF WBC: CPT | Performed by: EMERGENCY MEDICINE

## 2023-11-17 PROCEDURE — 250N000011 HC RX IP 250 OP 636: Performed by: EMERGENCY MEDICINE

## 2023-11-17 RX ORDER — LORAZEPAM 2 MG/ML
1 INJECTION INTRAMUSCULAR ONCE
Status: COMPLETED | OUTPATIENT
Start: 2023-11-17 | End: 2023-11-17

## 2023-11-17 RX ORDER — DIAZEPAM 5 MG
5-20 TABLET ORAL EVERY 30 MIN PRN
Status: DISCONTINUED | OUTPATIENT
Start: 2023-11-17 | End: 2023-11-18

## 2023-11-17 RX ADMIN — SODIUM CHLORIDE 1000 ML: 9 INJECTION, SOLUTION INTRAVENOUS at 21:34

## 2023-11-17 RX ADMIN — DIAZEPAM 10 MG: 5 TABLET ORAL at 23:41

## 2023-11-17 RX ADMIN — LORAZEPAM 1 MG: 2 INJECTION INTRAMUSCULAR; INTRAVENOUS at 21:43

## 2023-11-17 ASSESSMENT — ACTIVITIES OF DAILY LIVING (ADL): ADLS_ACUITY_SCORE: 37

## 2023-11-18 LAB
ALBUMIN SERPL BCG-MCNC: 4.1 G/DL (ref 3.5–5.2)
ALP SERPL-CCNC: 39 U/L (ref 40–150)
ALT SERPL W P-5'-P-CCNC: 26 U/L (ref 0–50)
ANION GAP SERPL CALCULATED.3IONS-SCNC: 11 MMOL/L (ref 7–15)
AST SERPL W P-5'-P-CCNC: 50 U/L (ref 0–45)
BILIRUB DIRECT SERPL-MCNC: <0.2 MG/DL (ref 0–0.3)
BILIRUB SERPL-MCNC: 0.3 MG/DL
BUN SERPL-MCNC: 8.6 MG/DL (ref 8–23)
CALCIUM SERPL-MCNC: 8.1 MG/DL (ref 8.8–10.2)
CHLORIDE SERPL-SCNC: 101 MMOL/L (ref 98–107)
CREAT SERPL-MCNC: 0.56 MG/DL (ref 0.51–0.95)
DEPRECATED HCO3 PLAS-SCNC: 22 MMOL/L (ref 22–29)
EGFRCR SERPLBLD CKD-EPI 2021: >90 ML/MIN/1.73M2
GLUCOSE SERPL-MCNC: 179 MG/DL (ref 70–99)
MAGNESIUM SERPL-MCNC: 1.7 MG/DL (ref 1.7–2.3)
PHOSPHATE SERPL-MCNC: 2.6 MG/DL (ref 2.5–4.5)
POTASSIUM SERPL-SCNC: 3.2 MMOL/L (ref 3.4–5.3)
POTASSIUM SERPL-SCNC: 3.8 MMOL/L (ref 3.4–5.3)
POTASSIUM SERPL-SCNC: 4.2 MMOL/L (ref 3.4–5.3)
PROT SERPL-MCNC: 6.9 G/DL (ref 6.4–8.3)
SODIUM SERPL-SCNC: 134 MMOL/L (ref 135–145)

## 2023-11-18 PROCEDURE — 250N000013 HC RX MED GY IP 250 OP 250 PS 637: Performed by: EMERGENCY MEDICINE

## 2023-11-18 PROCEDURE — 99223 1ST HOSP IP/OBS HIGH 75: CPT | Mod: AI | Performed by: PEDIATRICS

## 2023-11-18 PROCEDURE — 36415 COLL VENOUS BLD VENIPUNCTURE: CPT | Performed by: PEDIATRICS

## 2023-11-18 PROCEDURE — 84132 ASSAY OF SERUM POTASSIUM: CPT | Performed by: PEDIATRICS

## 2023-11-18 PROCEDURE — 250N000013 HC RX MED GY IP 250 OP 250 PS 637: Performed by: PEDIATRICS

## 2023-11-18 PROCEDURE — 82248 BILIRUBIN DIRECT: CPT | Performed by: PEDIATRICS

## 2023-11-18 PROCEDURE — 258N000003 HC RX IP 258 OP 636: Performed by: STUDENT IN AN ORGANIZED HEALTH CARE EDUCATION/TRAINING PROGRAM

## 2023-11-18 PROCEDURE — 120N000002 HC R&B MED SURG/OB UMMC

## 2023-11-18 PROCEDURE — 250N000011 HC RX IP 250 OP 636: Performed by: PEDIATRICS

## 2023-11-18 PROCEDURE — 83735 ASSAY OF MAGNESIUM: CPT | Performed by: PEDIATRICS

## 2023-11-18 PROCEDURE — 80053 COMPREHEN METABOLIC PANEL: CPT | Performed by: PEDIATRICS

## 2023-11-18 RX ORDER — DIAZEPAM 10 MG/2ML
5-10 INJECTION, SOLUTION INTRAMUSCULAR; INTRAVENOUS EVERY 30 MIN PRN
Status: DISCONTINUED | OUTPATIENT
Start: 2023-11-18 | End: 2023-11-19 | Stop reason: HOSPADM

## 2023-11-18 RX ORDER — MULTIPLE VITAMINS W/ MINERALS TAB 9MG-400MCG
1 TAB ORAL DAILY
Status: DISCONTINUED | OUTPATIENT
Start: 2023-11-18 | End: 2023-11-18

## 2023-11-18 RX ORDER — VITAMIN B COMPLEX
25 TABLET ORAL DAILY
Status: DISCONTINUED | OUTPATIENT
Start: 2023-11-18 | End: 2023-11-19 | Stop reason: HOSPADM

## 2023-11-18 RX ORDER — ONDANSETRON 4 MG/1
4 TABLET, ORALLY DISINTEGRATING ORAL EVERY 6 HOURS PRN
Status: DISCONTINUED | OUTPATIENT
Start: 2023-11-18 | End: 2023-11-19 | Stop reason: HOSPADM

## 2023-11-18 RX ORDER — HALOPERIDOL 5 MG/ML
2.5-5 INJECTION INTRAMUSCULAR EVERY 6 HOURS PRN
Status: DISCONTINUED | OUTPATIENT
Start: 2023-11-18 | End: 2023-11-19 | Stop reason: HOSPADM

## 2023-11-18 RX ORDER — POLYETHYLENE GLYCOL 3350 17 G/17G
17 POWDER, FOR SOLUTION ORAL 2 TIMES DAILY PRN
Status: DISCONTINUED | OUTPATIENT
Start: 2023-11-18 | End: 2023-11-19 | Stop reason: HOSPADM

## 2023-11-18 RX ORDER — DIAZEPAM 10 MG
10 TABLET ORAL EVERY 30 MIN PRN
Status: DISCONTINUED | OUTPATIENT
Start: 2023-11-18 | End: 2023-11-19 | Stop reason: HOSPADM

## 2023-11-18 RX ORDER — GABAPENTIN 600 MG/1
600 TABLET ORAL 3 TIMES DAILY
Status: DISCONTINUED | OUTPATIENT
Start: 2023-11-18 | End: 2023-11-19 | Stop reason: HOSPADM

## 2023-11-18 RX ORDER — ESZOPICLONE 3 MG/1
3 TABLET, FILM COATED ORAL AT BEDTIME
Status: DISCONTINUED | OUTPATIENT
Start: 2023-11-18 | End: 2023-11-19 | Stop reason: HOSPADM

## 2023-11-18 RX ORDER — PROPRANOLOL HYDROCHLORIDE 20 MG/1
20 TABLET ORAL 3 TIMES DAILY
Status: DISCONTINUED | OUTPATIENT
Start: 2023-11-18 | End: 2023-11-19 | Stop reason: HOSPADM

## 2023-11-18 RX ORDER — ONDANSETRON 2 MG/ML
4 INJECTION INTRAMUSCULAR; INTRAVENOUS EVERY 6 HOURS PRN
Status: DISCONTINUED | OUTPATIENT
Start: 2023-11-18 | End: 2023-11-19 | Stop reason: HOSPADM

## 2023-11-18 RX ORDER — SODIUM CHLORIDE, SODIUM LACTATE, POTASSIUM CHLORIDE, CALCIUM CHLORIDE 600; 310; 30; 20 MG/100ML; MG/100ML; MG/100ML; MG/100ML
INJECTION, SOLUTION INTRAVENOUS CONTINUOUS
Status: DISPENSED | OUTPATIENT
Start: 2023-11-18 | End: 2023-11-18

## 2023-11-18 RX ORDER — AMOXICILLIN 250 MG
2 CAPSULE ORAL 2 TIMES DAILY PRN
Status: DISCONTINUED | OUTPATIENT
Start: 2023-11-18 | End: 2023-11-19 | Stop reason: HOSPADM

## 2023-11-18 RX ORDER — LEVOTHYROXINE SODIUM 100 UG/1
100 TABLET ORAL DAILY
Status: DISCONTINUED | OUTPATIENT
Start: 2023-11-18 | End: 2023-11-19 | Stop reason: HOSPADM

## 2023-11-18 RX ORDER — ACETAMINOPHEN 325 MG/1
650 TABLET ORAL EVERY 4 HOURS PRN
Status: DISCONTINUED | OUTPATIENT
Start: 2023-11-18 | End: 2023-11-19 | Stop reason: HOSPADM

## 2023-11-18 RX ORDER — MULTIPLE VITAMINS W/ MINERALS TAB 9MG-400MCG
1 TAB ORAL DAILY
Status: DISCONTINUED | OUTPATIENT
Start: 2023-11-18 | End: 2023-11-19 | Stop reason: HOSPADM

## 2023-11-18 RX ORDER — POTASSIUM CHLORIDE 1500 MG/1
40 TABLET, EXTENDED RELEASE ORAL ONCE
Status: COMPLETED | OUTPATIENT
Start: 2023-11-18 | End: 2023-11-18

## 2023-11-18 RX ORDER — ONDANSETRON 4 MG/1
4 TABLET, ORALLY DISINTEGRATING ORAL EVERY 6 HOURS PRN
Status: DISCONTINUED | OUTPATIENT
Start: 2023-11-18 | End: 2023-11-18 | Stop reason: ALTCHOICE

## 2023-11-18 RX ORDER — ACETAMINOPHEN 650 MG/1
650 SUPPOSITORY RECTAL EVERY 4 HOURS PRN
Status: DISCONTINUED | OUTPATIENT
Start: 2023-11-18 | End: 2023-11-19 | Stop reason: HOSPADM

## 2023-11-18 RX ORDER — LIDOCAINE 40 MG/G
CREAM TOPICAL
Status: DISCONTINUED | OUTPATIENT
Start: 2023-11-18 | End: 2023-11-19 | Stop reason: HOSPADM

## 2023-11-18 RX ORDER — AMOXICILLIN 250 MG
1 CAPSULE ORAL 2 TIMES DAILY PRN
Status: DISCONTINUED | OUTPATIENT
Start: 2023-11-18 | End: 2023-11-19 | Stop reason: HOSPADM

## 2023-11-18 RX ORDER — CALCIUM CARBONATE 500 MG/1
1000 TABLET, CHEWABLE ORAL 4 TIMES DAILY PRN
Status: DISCONTINUED | OUTPATIENT
Start: 2023-11-18 | End: 2023-11-19 | Stop reason: HOSPADM

## 2023-11-18 RX ORDER — OLANZAPINE 5 MG/1
5-10 TABLET, ORALLY DISINTEGRATING ORAL EVERY 6 HOURS PRN
Status: DISCONTINUED | OUTPATIENT
Start: 2023-11-18 | End: 2023-11-19 | Stop reason: HOSPADM

## 2023-11-18 RX ORDER — FOLIC ACID 1 MG/1
1 TABLET ORAL DAILY
Status: DISCONTINUED | OUTPATIENT
Start: 2023-11-18 | End: 2023-11-19 | Stop reason: HOSPADM

## 2023-11-18 RX ORDER — FLUMAZENIL 0.1 MG/ML
0.2 INJECTION, SOLUTION INTRAVENOUS
Status: DISCONTINUED | OUTPATIENT
Start: 2023-11-18 | End: 2023-11-19 | Stop reason: HOSPADM

## 2023-11-18 RX ORDER — POTASSIUM CHLORIDE 750 MG/1
40 TABLET, EXTENDED RELEASE ORAL ONCE
Status: COMPLETED | OUTPATIENT
Start: 2023-11-18 | End: 2023-11-18

## 2023-11-18 RX ADMIN — DIAZEPAM 10 MG: 10 TABLET ORAL at 09:00

## 2023-11-18 RX ADMIN — PROPRANOLOL HYDROCHLORIDE 20 MG: 20 TABLET ORAL at 20:06

## 2023-11-18 RX ADMIN — NICOTINE POLACRILEX 4 MG: 4 GUM, CHEWING BUCCAL at 09:01

## 2023-11-18 RX ADMIN — POTASSIUM CHLORIDE 40 MEQ: 750 TABLET, EXTENDED RELEASE ORAL at 09:01

## 2023-11-18 RX ADMIN — DIAZEPAM 10 MG: 10 TABLET ORAL at 04:06

## 2023-11-18 RX ADMIN — PROPRANOLOL HYDROCHLORIDE 20 MG: 20 TABLET ORAL at 14:43

## 2023-11-18 RX ADMIN — SODIUM CHLORIDE, POTASSIUM CHLORIDE, SODIUM LACTATE AND CALCIUM CHLORIDE: 600; 310; 30; 20 INJECTION, SOLUTION INTRAVENOUS at 20:25

## 2023-11-18 RX ADMIN — ONDANSETRON 4 MG: 4 TABLET, ORALLY DISINTEGRATING ORAL at 03:13

## 2023-11-18 RX ADMIN — GABAPENTIN 600 MG: 600 TABLET, FILM COATED ORAL at 09:01

## 2023-11-18 RX ADMIN — NICOTINE POLACRILEX 4 MG: 4 GUM, CHEWING BUCCAL at 20:19

## 2023-11-18 RX ADMIN — ESZOPICLONE 3 MG: 3 TABLET, COATED ORAL at 22:24

## 2023-11-18 RX ADMIN — NICOTINE POLACRILEX 4 MG: 4 GUM, CHEWING BUCCAL at 10:59

## 2023-11-18 RX ADMIN — LEVOTHYROXINE SODIUM 100 MCG: 100 TABLET ORAL at 09:02

## 2023-11-18 RX ADMIN — NICOTINE POLACRILEX 4 MG: 4 GUM, CHEWING BUCCAL at 13:39

## 2023-11-18 RX ADMIN — GABAPENTIN 600 MG: 600 TABLET, FILM COATED ORAL at 13:39

## 2023-11-18 RX ADMIN — SODIUM CHLORIDE, POTASSIUM CHLORIDE, SODIUM LACTATE AND CALCIUM CHLORIDE: 600; 310; 30; 20 INJECTION, SOLUTION INTRAVENOUS at 09:01

## 2023-11-18 RX ADMIN — PROPRANOLOL HYDROCHLORIDE 20 MG: 20 TABLET ORAL at 01:58

## 2023-11-18 RX ADMIN — POTASSIUM CHLORIDE 40 MEQ: 1500 TABLET, EXTENDED RELEASE ORAL at 13:40

## 2023-11-18 RX ADMIN — NICOTINE POLACRILEX 4 MG: 4 GUM, CHEWING BUCCAL at 18:06

## 2023-11-18 RX ADMIN — DIAZEPAM 10 MG: 5 TABLET ORAL at 01:58

## 2023-11-18 RX ADMIN — DIAZEPAM 10 MG: 10 TABLET ORAL at 03:12

## 2023-11-18 RX ADMIN — GABAPENTIN 600 MG: 600 TABLET, FILM COATED ORAL at 20:06

## 2023-11-18 RX ADMIN — PROPRANOLOL HYDROCHLORIDE 20 MG: 20 TABLET ORAL at 09:00

## 2023-11-18 RX ADMIN — Medication 5 MG: at 22:24

## 2023-11-18 RX ADMIN — DIAZEPAM 10 MG: 10 TABLET ORAL at 06:15

## 2023-11-18 RX ADMIN — MULTIPLE VITAMINS W/ MINERALS TAB 1 TABLET: TAB at 09:00

## 2023-11-18 RX ADMIN — NICOTINE POLACRILEX 4 MG: 4 GUM, CHEWING BUCCAL at 05:03

## 2023-11-18 RX ADMIN — FOLIC ACID 1 MG: 1 TABLET ORAL at 09:00

## 2023-11-18 RX ADMIN — DIAZEPAM 10 MG: 10 TABLET ORAL at 13:37

## 2023-11-18 RX ADMIN — DIAZEPAM 10 MG: 10 TABLET ORAL at 07:06

## 2023-11-18 RX ADMIN — ONDANSETRON 4 MG: 4 TABLET, ORALLY DISINTEGRATING ORAL at 10:59

## 2023-11-18 RX ADMIN — GABAPENTIN 600 MG: 600 TABLET, FILM COATED ORAL at 01:58

## 2023-11-18 RX ADMIN — OMEPRAZOLE 40 MG: 20 CAPSULE, DELAYED RELEASE ORAL at 09:00

## 2023-11-18 RX ADMIN — THIAMINE HCL TAB 100 MG 100 MG: 100 TAB at 09:00

## 2023-11-18 RX ADMIN — NICOTINE POLACRILEX 4 MG: 4 GUM, CHEWING BUCCAL at 20:21

## 2023-11-18 RX ADMIN — Medication 25 MCG: at 09:00

## 2023-11-18 ASSESSMENT — LIFESTYLE VARIABLES
BLOOD PRESSURE: 121/78
ANXIETY: 3
ANXIETY: 3
PAROXYSMAL SWEATS: BARELY PERCEPTIBLE SWEATING, PALMS MOIST
NAUSEA AND VOMITING: 3
ORIENTATION AND CLOUDING OF SENSORIUM: ORIENTED AND CAN DO SERIAL ADDITIONS
TOTAL SCORE: 10
TREMOR: 3
VISUAL DISTURBANCES: NOT PRESENT
BLOOD PRESSURE: 135/82
ORIENTATION AND CLOUDING OF SENSORIUM: ORIENTED AND CAN DO SERIAL ADDITIONS
PAROXYSMAL SWEATS: BARELY PERCEPTIBLE SWEATING, PALMS MOIST
PAROXYSMAL SWEATS: BARELY PERCEPTIBLE SWEATING, PALMS MOIST
NAUSEA AND VOMITING: 2
VISUAL DISTURBANCES: NOT PRESENT
TREMOR: 3
AUDITORY DISTURBANCES: NOT PRESENT
PAROXYSMAL SWEATS: BARELY PERCEPTIBLE SWEATING, PALMS MOIST
PULSE: 84
AGITATION: NORMAL ACTIVITY
BLOOD PRESSURE: 128/77
NAUSEA AND VOMITING: 3
AGITATION: SOMEWHAT MORE THAN NORMAL ACTIVITY
ANXIETY: MODERATELY ANXIOUS, OR GUARDED, SO ANXIETY IS INFERRED
PAROXYSMAL SWEATS: BARELY PERCEPTIBLE SWEATING, PALMS MOIST
HEADACHE, FULLNESS IN HEAD: MODERATE
NAUSEA AND VOMITING: 3
AGITATION: NORMAL ACTIVITY
VISUAL DISTURBANCES: NOT PRESENT
ANXIETY: 2
PULSE: 74
PULSE: 81
PULSE: 74
AGITATION: NORMAL ACTIVITY
BLOOD PRESSURE: 149/92
ORIENTATION AND CLOUDING OF SENSORIUM: ORIENTED AND CAN DO SERIAL ADDITIONS
VISUAL DISTURBANCES: NOT PRESENT
AUDITORY DISTURBANCES: NOT PRESENT
TREMOR: 3
TOTAL SCORE: 12
TOTAL SCORE: 15
AUDITORY DISTURBANCES: NOT PRESENT
HEADACHE, FULLNESS IN HEAD: NOT PRESENT
HEADACHE, FULLNESS IN HEAD: SEVERE
HEADACHE, FULLNESS IN HEAD: MODERATE
ANXIETY: 2
HEADACHE, FULLNESS IN HEAD: MODERATE
AUDITORY DISTURBANCES: NOT PRESENT
TOTAL SCORE: 15
ORIENTATION AND CLOUDING OF SENSORIUM: ORIENTED AND CAN DO SERIAL ADDITIONS
TOTAL SCORE: 13
AUDITORY DISTURBANCES: NOT PRESENT
ORIENTATION AND CLOUDING OF SENSORIUM: ORIENTED AND CAN DO SERIAL ADDITIONS
VISUAL DISTURBANCES: NOT PRESENT
NAUSEA AND VOMITING: INTERMITTENT NAUSEA WITH DRY HEAVES
TREMOR: 2
TACTILE DISTURBANCES: MILD ITCHING, PINS AND NEEDLES, BURNING OR NUMBNESS
AGITATION: NORMAL ACTIVITY
TREMOR: 3

## 2023-11-18 ASSESSMENT — ACTIVITIES OF DAILY LIVING (ADL)
ADLS_ACUITY_SCORE: 22
ADLS_ACUITY_SCORE: 22
ADLS_ACUITY_SCORE: 37
ADLS_ACUITY_SCORE: 22
ADLS_ACUITY_SCORE: 37
ADLS_ACUITY_SCORE: 22
ADLS_ACUITY_SCORE: 37

## 2023-11-18 NOTE — H&P
[facility]    History and Physical - Hospitalist Service, GOLD TEAM        Date of Admission:  11/17/2023    Assessment & Plan      Jolynn Rivera is a 63 yoF w/PMH of COPD, GIRMA (etoh) c/b hx of szr, HCV s/p harvoni, insomnia, hypothyroidism who presented to the [facility] ER with concern for seizure at home while self weaning alcohol    #Suspected alcohol withdrawal seizure  #Substance use disorder, alcohol  #Acute alcohol withdrawal   :: given seizure, detox unit declined detox admit; admit to medicine and monitor closely for withdrawal and seizures.  :: CIWA per protocol  :: MVI, thiamine, folate  :: Trend CMP  :: Monitor for seizure  :: Chem dep when ready and CD available  :: Continue with home meds, gabapentin    #Insomnia: Continue with home Lunesta, melatonin  #Hypothyroidism: Continue with home levothyroxine  #Anxiety: Continue with propranolol  #COPD: No evidence of flare, may have ROB nebs or DuoNebs if new respiratory symptoms            Diet: Combination Diet Regular Diet Adult    DVT Prophylaxis: Pneumatic Compression Devices  Morales Catheter: Not present  Lines: None     Cardiac Monitoring: None  Code Status: Full Code      Clinically Significant Risk Factors Present on Admission          # Hypocalcemia: Lowest Ca = 8.4 mg/dL in last 2 days, will monitor and replace as appropriate                         Disposition Plan      Expected Discharge Date: 11/20/2023                  Kishore Sierra MD  Hospitalist Service, GOLD TEAM   [facility]  Securely message with [employer] (more info)  Text page via [employer] Paging/Directory   See signed in provider for up to date coverage information    ______________________________________________________________________    Chief Complaint   Etoh withdrawal    History is obtained from the patient  And EMR    History of Present Illness   Jolynn Rivera is a 63 year old female who  "[facility] for suspected seizure related to alcohol withdrawal.  Patient was self tapering off of alcohol from her baseline 12 pack of beer per day, last drink was few hours prior to arrival in the emergency department.  Does not recall events immediately arriving to the ER she believes she had a seizure things blacked out, noted pants are wet  Patient has had seizure secondary to alcohol withdrawal in the past feels this is similar.  States she very motivated stop drinking, states very tough year with friends and family and loved ones passing away as well as pets passing away.  Is oriented to the future noting she just got a new dog who she enjoys caring for very much.  Dog helps her stay motivated to stop drinking.  Patient notes she has been through numerous rehabs and has social support, \"just need to make decision stop doing it.\"  Family history of substance use disorder, father stopped drinking \"once he decided to stop.\"  Patient is very compliant on her medication regimen at home lives alone in her apartment [place] [facility].  Very distant history of heroin use, stopped using on her own.      Past Medical History    Past Medical History:   Diagnosis Date    Anxiety     COPD (chronic obstructive pulmonary disease) (H)     COPD (chronic obstructive pulmonary disease) (H)     Hepatitis C     PTSD (post-traumatic stress disorder)     Seizures (H)     second to ETOH    Substance abuse (H)        Past Surgical History   Past Surgical History:   Procedure Laterality Date    LAPAROSCOPIC TUBAL LIGATION      ORTHOPEDIC SURGERY      Left knee    TONSILLECTOMY         Prior to Admission Medications   Prior to Admission Medications   Prescriptions Last Dose Informant Patient Reported? Taking?   Vitamin D3 (VITAMIN D, CHOLECALCIFEROL,) 25 mcg (1000 units) tablet 11/17/2023 Self Yes Yes   Sig: Take 25 mcg by mouth daily   eszopiclone (LUNESTA) 3 MG tablet 11/16/2023 Self Yes Yes   Sig: Take 1 tablet by mouth " At Bedtime   gabapentin (NEURONTIN) 600 MG tablet 11/17/2023 Self Yes Yes   Sig: Take 600 mg by mouth 3 times daily   levothyroxine (SYNTHROID/LEVOTHROID) 100 MCG tablet 11/17/2023 Self Yes Yes   Sig: Take 100 mcg by mouth daily   melatonin 5 MG tablet 11/16/2023 Self No Yes   Sig: Take 1 tablet (5 mg) by mouth nightly as needed for sleep   multivitamin w/minerals (THERA-VIT-M) tablet 11/17/2023 Self No Yes   Sig: Take 1 tablet by mouth daily   nicotine polacrilex (NICORETTE) 4 MG gum Past Month Self Yes Yes   Sig: Place 4 mg inside cheek every hour as needed for smoking cessation   omeprazole (PRILOSEC) 40 MG DR capsule 11/17/2023 Self Yes Yes   Sig: Take 40 mg by mouth daily   ondansetron (ZOFRAN ODT) 4 MG ODT tab 11/17/2023 Self Yes Yes   Sig: Take 4 mg by mouth every 6 hours as needed for nausea   propranolol (INDERAL) 20 MG tablet 11/17/2023 Self Yes Yes   Sig: Take 20 mg by mouth 3 times daily   thiamine (B-1) 100 MG tablet 11/17/2023 Self No Yes   Sig: Take 1 tablet (100 mg) by mouth daily      Facility-Administered Medications: None        Review of Systems    The 10 point Review of Systems is negative other than noted in the HPI or here.     Social History   I have reviewed this patient's social history and updated it with pertinent information if needed.  Social History     Tobacco Use    Smoking status: Every Day     Packs/day: .5     Types: Cigarettes    Smokeless tobacco: Never    Tobacco comments:     Starting Chantix October 2014   Substance Use Topics    Alcohol use: Yes     Comment: 12-15 cans per day    Drug use: No     Comment: stopped 1986         Family History   I have reviewed this patient's family history and updated it with pertinent information if needed.  Family History   Problem Relation Age of Onset    Anxiety Disorder Mother     Asthma Mother     Alcohol/Drug Father     Prostate Cancer Father     Arthritis Father     Alcohol/Drug Brother     Alcohol/Drug Brother     Hypertension Brother      Alcohol/Drug Brother     Depression Brother     Psychotic Disorder Brother         Physical Exam   Vital Signs: Temp: 99  F (37.2  C) Temp src: Oral BP: 121/78 Pulse: 74   Resp: 16 SpO2: 99 % O2 Device: None (Room air)    Weight: 0 lbs 0 oz    EXAM  General: Tired appearing adult woman sitting up in bed, no acute distress  Head: NC, AT  Eye: symm gaze, anicteric sclerae  ENT: patent nares wo drainage/epistaxis, MMM  Pulm: CTAB, comfortable WOB on room air  CV: normal rate, regular rhythm,   Neuro: awake, alert, hearing speech and phonation, intact grossly: Slight tremor while at rest; no asterixis, no nystagmus.  Conversing well, pleasant and cooperative           Medical Decision Making       75 MINUTES SPENT BY ME on the date of service doing chart review, history, exam, documentation & further activities per the note.      Data   ------------------------- PAST 24 HR DATA REVIEWED -----------------------------------------------    I have personally reviewed the following data over the past 24 hrs:    5.1  \   14.9   / 218     133 (L) 96 (L) 8.4 /  111 (H)   4.2 26 0.54 \     ALT: 27 AST: N/A AP: 41 TBILI: 0.3   ALB: 4.2 TOT PROTEIN: 7.3 LIPASE: 35     Procal: <0.02 CRP: N/A Lactic Acid: 1.9       INR:  0.97 PTT:  N/A   D-dimer:  N/A Fibrinogen:  N/A       Imaging results reviewed over the past 24 hrs:   No results found for this or any previous visit (from the past 24 hour(s)).

## 2023-11-18 NOTE — PROGRESS NOTES
"VS:  BP (!) 148/69 (BP Location: Left arm, Patient Position: Left side, Cuff Size: Adult Regular)   Pulse 73   Temp 98.6  F (37  C) (Oral)   Resp 18   Ht 1.6 m (5' 3\")   Wt 69.2 kg (152 lb 8 oz)   SpO2 95%   BMI 27.01 kg/m     O2:  Room Air   Output:  Voiding without issue   Last BM:  11/18   Activity:  Independent in Room   Up for meals?  Yes    Skin:  No wounds or injuries    Pain:  Denies    CMS:  A&OX 4   Dressing:  None   Diet:  Regular    LDA:  Right PIV   Equipment:  Personal Belongings    Plan:  POC   Additional Info:  CIWA score 3-5 / Noon 5-10      ETOH    Acte Alcohol Withdrawal     No Seizures during Day Shift     Detox     Received Valium 1X during Day shift   "

## 2023-11-18 NOTE — ED PROVIDER NOTES
ED PROVIDER NOTE  November 17, 2023  History     Chief Complaint   Patient presents with    Alcohol Problem     Patient looking to go to detox, believes she had a seizure at home as she was self tapering      HPI  Jolynn Rivera is a 63 year old female who has a history significant for alcohol dependence and prior withdrawal seizure.  She arrives today to the emergency department with suspected seizure when attempting to self discontinue alcohol.  Patient reports she has not been drinking upwards of a 12 pack of alcohol per day for quite some time.  She states that recently she has tried to decrease this with last drink just prior to arrival.  She states she believes she has a seizure as she does not recall the event and did have urinary incontinence.  Patient reports ongoing sensation of anxiety and tremulousness consistent with prior episodes of withdrawal.  EMS reportedly gave patient antiemetics for nausea.  Patient reports no traumatic injury secondary to seizure.  She specifically denies head injury or headache.  Patient reports no neck stiffness.  No intraoral lesions or tongue lacerations.  Patient reports no recent fever, chills, neck stiffness, unusual rash.  She denies any illicit substance use, street drugs or other nonprescribed medications.  Patient denies recent chest pain, shortness of breath, palpitations or abdominal discomfort.  Patient denies vomiting or diarrhea.  She states in the past she has had multiple electrolyte abnormalities.      Past Medical History  Past Medical History:   Diagnosis Date    Anxiety     COPD (chronic obstructive pulmonary disease) (H)     COPD (chronic obstructive pulmonary disease) (H)     Hepatitis C     PTSD (post-traumatic stress disorder)     Seizures (H)     second to ETOH    Substance abuse (H)      Past Surgical History:   Procedure Laterality Date    LAPAROSCOPIC TUBAL LIGATION      ORTHOPEDIC SURGERY      Left knee    TONSILLECTOMY       eszopiclone  "(LUNESTA) 3 MG tablet  gabapentin (NEURONTIN) 600 MG tablet  levothyroxine (SYNTHROID/LEVOTHROID) 100 MCG tablet  melatonin 5 MG tablet  multivitamin w/minerals (THERA-VIT-M) tablet  nicotine polacrilex (NICORETTE) 4 MG gum  omeprazole (PRILOSEC) 40 MG DR capsule  ondansetron (ZOFRAN ODT) 4 MG ODT tab  propranolol (INDERAL) 20 MG tablet  thiamine (B-1) 100 MG tablet  Vitamin D3 (VITAMIN D, CHOLECALCIFEROL,) 25 mcg (1000 units) tablet      Allergies   Allergen Reactions    Amlodipine      Other reaction(s): Nightmares    Bee Venom      Other reaction(s): Edema    Clonidine Unknown    Prednisone Anxiety     Patient stated that prednisone causes \"bad panic attacks\".  Patient stated that prednisone causes \"bad panic attacks\".  Patient stated that prednisone causes \"bad panic attacks\".      Antihistamines, Chlorpheniramine-Type [Antihistamines, Chlorpheniramine-Type]     Compazine      Lock jaw; all medications ending with -zine    Diphenhydramine      Muscle Cramps    Penicillins      Panic attack    Prochlorperazine      Muscle cramps    Trazodone Nausea and Vomiting     \" I ended up falling and feeling like I was drunk\"    Wasps [Hornets]      Swelling      Family History  Family History   Problem Relation Age of Onset    Anxiety Disorder Mother     Asthma Mother     Alcohol/Drug Father     Prostate Cancer Father     Arthritis Father     Alcohol/Drug Brother     Alcohol/Drug Brother     Hypertension Brother     Alcohol/Drug Brother     Depression Brother     Psychotic Disorder Brother      Social History   Social History     Tobacco Use    Smoking status: Every Day     Packs/day: .5     Types: Cigarettes    Smokeless tobacco: Never    Tobacco comments:     Starting Chantix October 2014   Substance Use Topics    Alcohol use: Yes     Comment: 12-15 cans per day    Drug use: No     Comment: stopped 1986         A medically appropriate review of systems was performed with pertinent positives and negatives noted in the " HPI, and all other systems negative.      Physical Exam   BP: (!) 149/79  Pulse: 66  Temp: 98.4  F (36.9  C)  Resp: 18  SpO2: 95 %      Physical Exam  Vitals and nursing note reviewed.   Constitutional:       General: She is in acute distress.      Appearance: Normal appearance. She is not ill-appearing, toxic-appearing or diaphoretic.   HENT:      Head: Normocephalic and atraumatic.      Right Ear: External ear normal.      Left Ear: External ear normal.      Nose: Nose normal. No congestion.      Mouth/Throat:      Mouth: Mucous membranes are moist.      Pharynx: Oropharynx is clear. No oropharyngeal exudate.   Eyes:      Extraocular Movements: Extraocular movements intact.      Conjunctiva/sclera: Conjunctivae normal.      Pupils: Pupils are equal, round, and reactive to light.   Cardiovascular:      Rate and Rhythm: Normal rate.      Pulses: Normal pulses.      Heart sounds: Normal heart sounds. No murmur heard.     No friction rub.   Pulmonary:      Effort: Pulmonary effort is normal. No respiratory distress.      Breath sounds: No stridor. No wheezing, rhonchi or rales.   Abdominal:      General: Abdomen is flat. There is no distension.      Tenderness: There is no abdominal tenderness. There is no guarding or rebound.   Musculoskeletal:         General: No deformity or signs of injury. Normal range of motion.      Cervical back: Normal range of motion.   Skin:     General: Skin is warm.      Capillary Refill: Capillary refill takes less than 2 seconds.      Coloration: Skin is not pale.      Findings: No bruising or erythema.   Neurological:      General: No focal deficit present.      Mental Status: She is alert.      Cranial Nerves: No cranial nerve deficit.      Motor: No weakness.   Psychiatric:         Mood and Affect: Mood is anxious.         Behavior: Behavior normal.         ED Course        Procedures         Results for orders placed or performed during the hospital encounter of 11/17/23 (from the  past 24 hour(s))   CBC with platelets differential    Narrative    The following orders were created for panel order CBC with platelets differential.  Procedure                               Abnormality         Status                     ---------                               -----------         ------                     CBC with platelets and d...[[id number]]  Abnormal            Final result                 Please view results for these tests on the individual orders.   INR   Result Value Ref Range    INR 0.97 0.85 - 1.15   ABO/Rh type and screen    Narrative    The following orders were created for panel order ABO/Rh type and screen.  Procedure                               Abnormality         Status                     ---------                               -----------         ------                     Adult Type and Screen[[id number]]                            Final result                 Please view results for these tests on the individual orders.   Comprehensive metabolic panel   Result Value Ref Range    Sodium 133 (L) 135 - 145 mmol/L    Potassium 4.2 3.4 - 5.3 mmol/L    Carbon Dioxide (CO2) 26 22 - 29 mmol/L    Anion Gap 11 7 - 15 mmol/L    Urea Nitrogen 8.4 8.0 - 23.0 mg/dL    Creatinine 0.54 0.51 - 0.95 mg/dL    GFR Estimate >90 >60 mL/min/1.73m2    Calcium 8.4 (L) 8.8 - 10.2 mg/dL    Chloride 96 (L) 98 - 107 mmol/L    Glucose 111 (H) 70 - 99 mg/dL    Alkaline Phosphatase 41 40 - 150 U/L    AST      ALT 27 0 - 50 U/L    Protein Total 7.3 6.4 - 8.3 g/dL    Albumin 4.2 3.5 - 5.2 g/dL    Bilirubin Total 0.3 <=1.2 mg/dL   Lipase   Result Value Ref Range    Lipase 35 13 - 60 U/L   Lactic acid whole blood   Result Value Ref Range    Lactic Acid 1.9 0.7 - 2.0 mmol/L   Procalcitonin   Result Value Ref Range    Procalcitonin <0.02 <0.50 ng/mL   Ethyl Alcohol Level   Result Value Ref Range    Alcohol ethyl 0.32 (HH) <=0.01 g/dL   Magnesium   Result Value Ref Range    Magnesium 1.8 1.7 - 2.3 mg/dL   CBC with  platelets and differential   Result Value Ref Range    WBC Count 5.1 4.0 - 11.0 10e3/uL    RBC Count 4.29 3.80 - 5.20 10e6/uL    Hemoglobin 14.9 11.7 - 15.7 g/dL    Hematocrit 41.4 35.0 - 47.0 %    MCV 97 78 - 100 fL    MCH 34.7 (H) 26.5 - 33.0 pg    MCHC 36.0 31.5 - 36.5 g/dL    RDW 12.1 10.0 - 15.0 %    Platelet Count 218 150 - 450 10e3/uL    % Neutrophils 36 %    % Lymphocytes 50 %    % Monocytes 13 %    % Eosinophils 0 %    % Basophils 1 %    % Immature Granulocytes 0 %    NRBCs per 100 WBC 0 <1 /100    Absolute Neutrophils 1.9 1.6 - 8.3 10e3/uL    Absolute Lymphocytes 2.5 0.8 - 5.3 10e3/uL    Absolute Monocytes 0.7 0.0 - 1.3 10e3/uL    Absolute Eosinophils 0.0 0.0 - 0.7 10e3/uL    Absolute Basophils 0.0 0.0 - 0.2 10e3/uL    Absolute Immature Granulocytes 0.0 <=0.4 10e3/uL    Absolute NRBCs 0.0 10e3/uL   Adult Type and Screen   Result Value Ref Range    ABO/RH(D) A POS     Antibody Screen Negative Negative    SPECIMEN EXPIRATION DATE [id number]    Alcohol breath test POCT   Result Value Ref Range    Alcohol Breath Test 0.251 (A) 0.00 - 0.01   EKG 12-lead, tracing only   Result Value Ref Range    Systolic Blood Pressure  mmHg    Diastolic Blood Pressure  mmHg    Ventricular Rate 73 BPM    Atrial Rate 73 BPM    OR Interval 186 ms    QRS Duration 70 ms     ms    QTc 434 ms    P Axis 65 degrees    R AXIS -4 degrees    T Axis 52 degrees    Interpretation ECG Sinus rhythm  Normal ECG      UA with Microscopic reflex to Culture    Specimen: Urine, Midstream   Result Value Ref Range    Color Urine Straw Colorless, Straw, Light Yellow, Yellow    Appearance Urine Clear Clear    Glucose Urine Negative Negative mg/dL    Bilirubin Urine Negative Negative    Ketones Urine Negative Negative mg/dL    Specific Gravity Urine 1.004 1.003 - 1.035    Blood Urine Negative Negative    pH Urine 5.0 5.0 - 7.0    Protein Albumin Urine Negative Negative mg/dL    Urobilinogen Urine Normal Normal, 2.0 mg/dL    Nitrite Urine  Negative Negative    Leukocyte Esterase Urine Negative Negative    RBC Urine 1 <=2 /HPF    WBC Urine 0 <=5 /HPF    Narrative    Urine Culture not indicated   Urine Drug Screen    Narrative    The following orders were created for panel order Urine Drug Screen.  Procedure                               Abnormality         Status                     ---------                               -----------         ------                     Urine Drug Screen Panel[[id number]]      Normal              Final result                 Please view results for these tests on the individual orders.   Urine Drug Screen Panel   Result Value Ref Range    Amphetamines Urine Screen Negative Screen Negative    Barbituates Urine Screen Negative Screen Negative    Benzodiazepine Urine Screen Negative Screen Negative    Cannabinoids Urine Screen Negative Screen Negative    Cocaine Urine Screen Negative Screen Negative    Fentanyl Qual Urine Screen Negative Screen Negative    Opiates Urine Screen Negative Screen Negative    PCP Urine Screen Negative Screen Negative     Medications   diazepam (VALIUM) tablet 5-20 mg (10 mg Oral $Given 11/17/23 3924)   sodium chloride 0.9% BOLUS 1,000 mL (0 mLs Intravenous Stopped 11/17/23 2234)   LORazepam (ATIVAN) injection 1 mg (1 mg Intravenous $Given 11/17/23 2143)             Critical care was not performed.     Medical Decision Making  The patient's presentation was of moderate complexity (an acute complicated injury).    The patient's evaluation involved:  review of external note(s) from 3+ sources (see separate area of note for details)  review of 3+ test result(s) ordered prior to this encounter (see separate area of note for details)  ordering and/or review of 3+ test(s) in this encounter (see separate area of note for details)    The patient's management necessitated high risk (a decision regarding hospitalization).    Assessments & Plan (with Medical Decision Making)     Jolynn Rivera is a 63 year  old female who has a history significant for alcohol dependence and prior withdrawal seizure.  She arrives today to the emergency department with suspected seizure when attempting to self discontinue alcohol.  Upon arrival patient will be alert.  Presently afebrile and hemodynamically stable.  GCS presently 15.  No visible signs of external trauma to the head or neck.  No sign of obvious neurovascular deficit.  Low suspicion for intracranial pathology such as bleed, stroke, mass lesion or elevated ICP warranting CT imaging of the head or LP.  This sounds to be consistent with withdrawal seizure.  I would plan for IV fluids and Ativan as the patient does appear to be fluid down.  We will place her on MSSA protocol.  I did review the patient's chart.  She does have a history of hypokalemia and hypomagnesemia.  We will plan for screening laboratory studies.  Secondary to seizures this evening I have a low suspicion detox will take primarily.  She is requesting detox however.  She may request medical detoxification depending on laboratory studies and ongoing examinations.    Laboratory studies demonstrate no significant lab abnormality.  I did call detox however secondary to concern of acute seizure they have declined stating patient should be under internal medicine service.  I would agree.  Reassuringly lactic acid is not elevated.  No severe electrolyte abnormalities.  Patient did receive IV Ativan along with fluids.  She will be placed on MSSA protocol.  At this time I believe [facility] level of care is appropriate.    I have reviewed the nursing notes.    I have reviewed the findings, diagnosis, plan and need for follow up with the patient.    New Prescriptions    No medications on file       Final diagnoses:   Alcoholic intoxication with complication (H24)   Seizure (H)       RAMON ESCALERA MD    11/17/2023   [facility] EMERGENCY DEPARTMENT     Ramon Escalera MD  11/18/23 0006

## 2023-11-18 NOTE — ED NOTES
Bed: EMMETT-CHR[facility]  Expected date: 11/17/23  Expected time: 8:42 PM  Means of arrival:   Comments:  H425  63 F  ETOH/seizures

## 2023-11-19 VITALS
HEIGHT: 63 IN | HEART RATE: 68 BPM | TEMPERATURE: 97.4 F | DIASTOLIC BLOOD PRESSURE: 98 MMHG | WEIGHT: 152.5 LBS | SYSTOLIC BLOOD PRESSURE: 161 MMHG | BODY MASS INDEX: 27.02 KG/M2 | OXYGEN SATURATION: 94 % | RESPIRATION RATE: 16 BRPM

## 2023-11-19 PROCEDURE — 36415 COLL VENOUS BLD VENIPUNCTURE: CPT | Performed by: STUDENT IN AN ORGANIZED HEALTH CARE EDUCATION/TRAINING PROGRAM

## 2023-11-19 PROCEDURE — 250N000013 HC RX MED GY IP 250 OP 250 PS 637: Performed by: PEDIATRICS

## 2023-11-19 PROCEDURE — 99239 HOSP IP/OBS DSCHRG MGMT >30: CPT | Performed by: STUDENT IN AN ORGANIZED HEALTH CARE EDUCATION/TRAINING PROGRAM

## 2023-11-19 RX ADMIN — THIAMINE HCL TAB 100 MG 100 MG: 100 TAB at 07:54

## 2023-11-19 RX ADMIN — NICOTINE POLACRILEX 4 MG: 4 GUM, CHEWING BUCCAL at 11:10

## 2023-11-19 RX ADMIN — NICOTINE POLACRILEX 4 MG: 4 GUM, CHEWING BUCCAL at 06:54

## 2023-11-19 RX ADMIN — MULTIPLE VITAMINS W/ MINERALS TAB 1 TABLET: TAB at 07:55

## 2023-11-19 RX ADMIN — Medication 25 MCG: at 07:55

## 2023-11-19 RX ADMIN — OMEPRAZOLE 40 MG: 20 CAPSULE, DELAYED RELEASE ORAL at 07:52

## 2023-11-19 RX ADMIN — GABAPENTIN 600 MG: 600 TABLET, FILM COATED ORAL at 07:54

## 2023-11-19 RX ADMIN — FOLIC ACID 1 MG: 1 TABLET ORAL at 07:54

## 2023-11-19 RX ADMIN — PROPRANOLOL HYDROCHLORIDE 20 MG: 20 TABLET ORAL at 07:53

## 2023-11-19 RX ADMIN — LEVOTHYROXINE SODIUM 100 MCG: 100 TABLET ORAL at 07:55

## 2023-11-19 ASSESSMENT — ACTIVITIES OF DAILY LIVING (ADL)
ADLS_ACUITY_SCORE: 22

## 2023-11-19 NOTE — PROGRESS NOTES
"VS:  BP (!) 161/98 (BP Location: Left arm, Patient Position: Supine, Cuff Size: Adult Regular)   Pulse 68   Temp 97.4  F (36.3  C) (Oral)   Resp 16   Ht 1.6 m (5' 3\")   Wt 69.2 kg (152 lb 8 oz)   SpO2 94%   BMI 27.01 kg/m     O2:  Room Air    Output:  Voiding Normally   Last BM:  11/19   Activity:  Independent in Room / Goes Outside   Up for meals?  Yes    Skin:  No wounds or injuries    Pain:  Denies    CMS:  A&OX 4   Dressing:  None   Diet:  Regular    LDA:  Right PIV /SL   Equipment:  Personal Belongings    Plan:  POC   Additional Info:  CIWA score 0-5       ETOH     Acte Alcohol Withdrawal      No Seizures during Day Shift      Detox     PRN Nicotine Gum       "

## 2023-11-19 NOTE — PROGRESS NOTES
"[id number]    VS: BP (!) 145/87 (BP Location: Left arm)   Pulse 74   Temp 98.3  F (36.8  C) (Oral)   Resp 18   Ht 1.6 m (5' 3\")   Wt 69.2 kg (152 lb 8 oz)   SpO2 96%   BMI 27.01 kg/m       O2: Stable on room air, denies chest pain/SOB   Output: Voids spontaneously w/o help   Last BM:  11/18/2023, continent   Activity: Up independently in room and outside   Up for meals? No   Skin: All visible skin intact   Pain: Denies pain   CMS: AO x4, denies N/T  Last CIWA score:    Dressing: No   Diet: Regular diet   LDA: L PIV running LR at 75mL/hr   Equipment: IV pole, personal belongings, call light in reach   Plan: Continue to monitor   Additional Info: PRN Nicotine gum given x1     Karina Oleary RN on 11/18/2023 at 6:42 PM    "

## 2023-11-19 NOTE — DISCHARGE SUMMARY
6MS DISCHARGE    D: Patient discharged to home at 1129. Patient accompanied by self.    I: Discharge prescriptions given to patient. No discharge medications prescribed for patient. Disscharged instructions reviewed with patient. Patient instructed to seek care if experiencing worsening symptoms, such as seizures or pain. Other phone numbers to call with questions or concerns after discharge reviewed. Right PIV removed. Education completed.    A: Patient  verbalized understanding of discharge medications and instructions. No prescribed home medications given to patient. Belongings returned to patient.

## 2023-11-19 NOTE — DISCHARGE SUMMARY
"[facility]  Hospitalist Discharge Summary      Date of Admission:  11/17/2023  Date of Discharge:  11/19/2023  Discharging Provider: ANNA CALLAHAN MD  Discharge Service: Hospitalist Service, GOLD TEAM 22    Discharge Diagnoses   See below     Clinically Significant Risk Factors     # Overweight: Estimated body mass index is 27.01 kg/m  as calculated from the following:    Height as of this encounter: 1.6 m (5' 3\").    Weight as of this encounter: 69.2 kg (152 lb 8 oz).       Follow-ups Needed After Discharge   Follow-up Appointments     Adult [facility] Follow-up and recommended labs and tests      Follow up with outpt treatment as discussed  Follow up with PCP    Appointments on [place] and/or [facility] (with [facility] or [facility]   provider or service). Call [phone] if you haven't heard regarding   these appointments within 7 days of discharge.            Unresulted Labs Ordered in the Past 30 Days of this Admission       No orders found from 10/18/2023 to 11/18/2023.            Discharge Disposition   Discharged to home  Condition at discharge: Stable    Hospital Course      63 yoF w/PMH of COPD, GIRMA (etoh) c/b hx of szr, HCV s/p harvoni, insomnia, hypothyroidism who presented to the [facility] with concern for seizure at home while self weaning alcohol. Treated here for alcohol withdrawal and did great after 1 day. Doesn't want CP as has plan after discharge to follow outpt. Naltrexone/Acamprosate did not help in the past. Follow up with PCP     #Suspected alcohol withdrawal seizure  #Substance use disorder, alcohol  #Acute alcohol withdrawal   :: given seizure, detox unit declined detox admit; admit to medicine and monitor closely for withdrawal and seizures.  :: GEETAWA per protocol done  :: MVI, thiamine, folate  :: Doesn't want CP as has plan after discharge to follow outpt. Naltrexone/Acamprosate did not help in the past  :: Continue with home meds, " gabapentin     #Insomnia: Continue with home Lunesta, melatonin  #Hypothyroidism: Continue with home levothyroxine  #Anxiety: Continue with propranolol  #COPD: No evidence of flare, may have ROB nebs or DuoNebs if new respiratory symptoms       Consultations This Hospital Stay   None    Code Status   Full Code    Time Spent on this Encounter   I, ANNA CALLAHAN MD, personally saw the patient today and spenr more than or equal to 30 minutes discharging this patient.       ANNA CALLAHAN MD  [facility] MED SURG  [address] [place] [id number]  Phone: [phone]  Fax: [phone]  ______________________________________________________________________    Physical Exam   Vital Signs: Temp: 97.4  F (36.3  C) Temp src: Oral BP: (!) 161/98 Pulse: 68   Resp: 16 SpO2: 94 % O2 Device: None (Room air)    Weight: 152 lbs 8 oz  Constitutional: Lying in bed with no acute distress        Primary Care Physician   Physician No Ref-Primary    Discharge Orders      Reason for your hospital stay    Alcohol withdrawal     Activity    Your activity upon discharge: activity as tolerated     Adult [facility] Follow-up and recommended labs and tests    Follow up with outpt treatment as discussed  Follow up with PCP    Appointments on [place] and/or [facility] (with [facility] or [facility] provider or service). Call [phone] if you haven't heard regarding these appointments within 7 days of discharge.     Diet    Follow this diet upon discharge: Orders Placed This Encounter      Combination Diet Regular Diet Adult       Significant Results and Procedures     Most Recent 3 CBC's:  Recent Labs   Lab Test 11/17/23 2133 08/31/23  0733 08/30/23  1001   WBC 5.1 5.0 4.0   HGB 14.9 14.4 14.3   MCV 97 94 93    174 190     Most Recent 3 BMP's:  Recent Labs   Lab Test 11/18/23  2113 11/18/23  1338 11/18/23  0550 11/17/23 2133 08/31/23  2108 08/31/23  0733   NA  --   --  134* 133*  --  137   POTASSIUM 3.8 4.2 3.2*  4.2   < > 3.0*   CHLORIDE  --   --  101 96*  --  102   CO2  --   --  22 26  --  23   BUN  --   --  8.6 8.4  --  3.9*   CR  --   --  0.56 0.54  --  0.60   ANIONGAP  --   --  11 11  --  12   ELVIRA  --   --  8.1* 8.4*  --  8.6*   GLC  --   --  179* 111*  --  148*    < > = values in this interval not displayed.     Most Recent 2 LFT's:  Recent Labs   Lab Test 11/18/23  0550 11/17/23  2133 08/30/23  1001   AST 50*  --  53*   ALT 26 27 26   ALKPHOS 39* 41 56   BILITOTAL 0.3 0.3 0.4       Discharge Medications   Current Discharge Medication List        CONTINUE these medications which have NOT CHANGED    Details   eszopiclone (LUNESTA) 3 MG tablet Take 3 mg by mouth at bedtime      gabapentin (NEURONTIN) 600 MG tablet Take 600 mg by mouth 3 times daily      levothyroxine (SYNTHROID/LEVOTHROID) 100 MCG tablet Take 100 mcg by mouth daily      melatonin 5 MG tablet Take 5 mg by mouth at bedtime Takes 1 hour prior to Lunesta      multivitamin w/minerals (THERA-VIT-M) tablet Take 1 tablet by mouth daily  Qty: 30 tablet, Refills: 0    Comments: Pt has supply  Associated Diagnoses: Alcohol dependence with intoxication with complication (H)      nicotine polacrilex (NICORETTE) 4 MG gum Place 4 mg inside cheek every hour as needed for smoking cessation      omeprazole (PRILOSEC) 40 MG DR capsule Take 40 mg by mouth daily      propranolol (INDERAL) 20 MG tablet Take 20 mg by mouth 3 times daily      thiamine (B-1) 100 MG tablet Take 1 tablet (100 mg) by mouth daily  Qty: 30 tablet, Refills: 0    Associated Diagnoses: Alcohol dependence with intoxication with complication (H)      Vitamin D3 (VITAMIN D, CHOLECALCIFEROL,) 25 mcg (1000 units) tablet Take 25 mcg by mouth daily           STOP taking these medications       ondansetron (ZOFRAN ODT) 4 MG ODT tab Comments:   Reason for Stopping:             Allergies   Allergies   Allergen Reactions    Amlodipine      Other reaction(s): Nightmares    Bee Venom      Other reaction(s): Edema     "Clonidine Unknown    Prednisone Anxiety     Patient stated that prednisone causes \"bad panic attacks\".  Patient stated that prednisone causes \"bad panic attacks\".  Patient stated that prednisone causes \"bad panic attacks\".      Antihistamines, Chlorpheniramine-Type [Antihistamines, Chlorpheniramine-Type]     Compazine      Lock jaw; all medications ending with -zine    Diphenhydramine      Muscle Cramps    Penicillins      Panic attack    Prochlorperazine      Muscle cramps    Trazodone Nausea and Vomiting     \" I ended up falling and feeling like I was drunk\"    Wasps [Hornets]      Swelling      "

## 2023-11-19 NOTE — PLAN OF CARE
"Goal Outcome Evaluation:  Alert and oriented x4, able to make needs known, on R/A, denies pain, headache., N/V, denies chest pain/SOB. Independent in the room-no device, CIWA score-4, no seizure activity during shift. No new skin concerns, slept on and off between bathroom use, no concerns reported/observed, L.PIV-SL. Continue with POC.  Problem: Adult Inpatient Plan of Care  Goal: Plan of Care Review  Description: The Plan of Care Review/Shift note should be completed every shift.  The Outcome Evaluation is a brief statement about your assessment that the patient is improving, declining, or no change.  This information will be displayed automatically on your shift  note.  Outcome: Progressing  Flowsheets (Taken 11/18/2023 2227)  Plan of Care Reviewed With: patient  Overall Patient Progress: improving  Goal: Patient-Specific Goal (Individualized)  Description: You can add care plan individualizations to a care plan. Examples of Individualization might be:  \"Parent requests to be called daily at 9am for status\", \"I have a hard time hearing out of my right ear\", or \"Do not touch me to wake me up as it startles  me\".  Outcome: Progressing  Goal: Absence of Hospital-Acquired Illness or Injury  Outcome: Progressing  Intervention: Identify and Manage Fall Risk  Flowsheets  Taken 11/18/2023 2227  Safety Promotion/Fall Prevention:   activity supervised   lighting adjusted   mobility aid in reach   nonskid shoes/slippers when out of bed   safety round/check completed   room near nurse's station   clutter free environment maintained  Taken 11/18/2023 2027  Safety Promotion/Fall Prevention:   activity supervised   lighting adjusted   nonskid shoes/slippers when out of bed   room near nurse's station   safety round/check completed  Intervention: Prevent Skin Injury  Recent Flowsheet Documentation  Taken 11/18/2023 2027 by Inessa Flores RN  Body Position: sitting up in bed  Intervention: Prevent and Manage VTE (Venous " Thromboembolism) Risk  Recent Flowsheet Documentation  Taken 11/18/2023 2027 by Inessa Flores RN  VTE Prevention/Management: SCDs (sequential compression devices) off  Intervention: Prevent Infection  Flowsheets (Taken 11/18/2023 2227)  Infection Prevention:   hand hygiene promoted   equipment surfaces disinfected   personal protective equipment utilized   rest/sleep promoted   single patient room provided  Goal: Optimal Comfort and Wellbeing  Outcome: Progressing  Intervention: Monitor Pain and Promote Comfort  Flowsheets (Taken 11/18/2023 2227)  Pain Management Interventions: medication (see MAR)  Intervention: Provide Person-Centered Care  Flowsheets (Taken 11/18/2023 2227)  Trust Relationship/Rapport:   care explained   choices provided   emotional support provided   empathic listening provided   questions answered   questions encouraged   reassurance provided   thoughts/feelings acknowledged  Goal: Readiness for Transition of Care  Outcome: Progressing         Plan of Care Reviewed With: patient    Overall Patient Progress: improvingOverall Patient Progress: improving

## 2023-11-19 NOTE — PHARMACY-ADMISSION MEDICATION HISTORY
Pharmacist Admission Medication History    Admission medication history is complete. The information provided in this note is only as accurate as the sources available at the time of the update.    Information Source(s): Patient and CareEverywhere/SureScripts via phone    Pertinent Information:   Patient takes both Lunesta and melatonin at bedtime for sleep. Takes melatonin 1 hour prior ot Lunesta  Last filled Lunesta 3 mg on 11/2 for #30 tablets    Changes made to PTA medication list:  Added: None  Deleted: None  Changed: melatonin 5 mg at bedtime as needed --> 5 mg at bedtime (per patient, Care Everywhere)    Medication Affordability:  Not including over the counter (OTC) medications, was there a time in the past 3 months when you did not take your medications as prescribed because of cost?: No      Allergies reviewed with patient and updates made in EHR: yes    Medication History Completed By: Corine Hussein [facility] 11/18/2023 6:21 PM    PTA Med List   Medication Sig Last Dose    eszopiclone (LUNESTA) 3 MG tablet Take 3 mg by mouth at bedtime 11/16/2023 at pm    gabapentin (NEURONTIN) 600 MG tablet Take 600 mg by mouth 3 times daily 11/17/2023 at am    levothyroxine (SYNTHROID/LEVOTHROID) 100 MCG tablet Take 100 mcg by mouth daily 11/17/2023 at am    melatonin 5 MG tablet Take 5 mg by mouth at bedtime Takes 1 hour prior to Lunesta 11/16/2023 at pm    multivitamin w/minerals (THERA-VIT-M) tablet Take 1 tablet by mouth daily 11/17/2023 at am    nicotine polacrilex (NICORETTE) 4 MG gum Place 4 mg inside cheek every hour as needed for smoking cessation Past Month at prn    omeprazole (PRILOSEC) 40 MG DR capsule Take 40 mg by mouth daily 11/17/2023 at am    ondansetron (ZOFRAN ODT) 4 MG ODT tab Take 4 mg by mouth every 6 hours as needed for nausea 11/17/2023 at prn    propranolol (INDERAL) 20 MG tablet Take 20 mg by mouth 3 times daily 11/17/2023 at am    thiamine (B-1) 100 MG tablet Take 1 tablet (100 mg) by mouth  daily 11/17/2023 at am    Vitamin D3 (VITAMIN D, CHOLECALCIFEROL,) 25 mcg (1000 units) tablet Take 25 mcg by mouth daily 11/17/2023 at am

## 2023-11-19 NOTE — PLAN OF CARE
Problem: Adult Inpatient Plan of Care  Goal: Plan of Care Review  Description:   Patient's behavior as been stable and uneventful. Psychotic, depressive, and anxiety symptoms are denied. Patient behavior is basically appropriate and there are no indications that seizures, hallucinations, or delusions are present.   Outcome: Met  Goal: Patient-Specific Goal (Individualized)  Description:   Patient will follow up with outpatient treatment as discussed with provider.  Outcome: Met  Goal: Absence of Hospital-Acquired Illness or Injury  Outcome: Met  Intervention: Identify and Manage Fall Risk  Recent Flowsheet Documentation  Taken 11/19/2023 0734 by Bernarda Darling, RN  Safety Promotion/Fall Prevention:   activity supervised   lighting adjusted   mobility aid in reach   nonskid shoes/slippers when out of bed   safety round/check completed   room near nurse's station   clutter free environment maintained  Goal: Optimal Comfort and Wellbeing  Outcome: Met  Intervention: Monitor Pain and Promote Comfort  Intervention: Provide Person-Centered Care  Recent Flowsheet Documentation  Taken 11/19/2023 0734 by Bernarda Darling, RN  Trust Relationship/Rapport:   care explained   choices provided   emotional support provided   empathic listening provided   questions answered   questions encouraged   reassurance provided   thoughts/feelings acknowledged  Goal: Readiness for Transition of Care  Outcome: Met     Problem: Alcohol Withdrawal  Goal: Alcohol Withdrawal Symptom Control  Outcome: Met  Goal: Optimal Neurologic Function  Outcome: Met  Goal: Readiness for Change Identified  Outcome: Met   Goal Outcome Evaluation:

## 2023-11-20 LAB
ATRIAL RATE - MUSE: 73 BPM
DIASTOLIC BLOOD PRESSURE - MUSE: NORMAL MMHG
INTERPRETATION ECG - MUSE: NORMAL
P AXIS - MUSE: 65 DEGREES
PR INTERVAL - MUSE: 186 MS
QRS DURATION - MUSE: 70 MS
QT - MUSE: 394 MS
QTC - MUSE: 434 MS
R AXIS - MUSE: -4 DEGREES
SYSTOLIC BLOOD PRESSURE - MUSE: NORMAL MMHG
T AXIS - MUSE: 52 DEGREES
VENTRICULAR RATE- MUSE: 73 BPM

## 2024-01-14 ENCOUNTER — HEALTH MAINTENANCE LETTER (OUTPATIENT)
Age: 64
End: 2024-01-14

## 2024-01-22 ENCOUNTER — HOSPITAL ENCOUNTER (EMERGENCY)
Facility: CLINIC | Age: 64
Discharge: HOME | End: 2024-01-22
Attending: EMERGENCY MEDICINE | Admitting: EMERGENCY MEDICINE
Payer: MEDICARE

## 2024-01-22 VITALS
RESPIRATION RATE: 16 BRPM | TEMPERATURE: 98.2 F | BODY MASS INDEX: 26.06 KG/M2 | WEIGHT: 147.1 LBS | SYSTOLIC BLOOD PRESSURE: 137 MMHG | DIASTOLIC BLOOD PRESSURE: 62 MMHG | OXYGEN SATURATION: 94 % | HEART RATE: 75 BPM | HEIGHT: 63 IN

## 2024-01-22 DIAGNOSIS — F10.10 ALCOHOL ABUSE: ICD-10-CM

## 2024-01-22 LAB
ALBUMIN SERPL BCG-MCNC: 4.6 G/DL (ref 3.5–5.2)
ALP SERPL-CCNC: 49 U/L (ref 40–150)
ALT SERPL W P-5'-P-CCNC: 26 U/L (ref 0–50)
AMPHETAMINES UR QL SCN: NORMAL
ANION GAP SERPL CALCULATED.3IONS-SCNC: 15 MMOL/L (ref 7–15)
AST SERPL W P-5'-P-CCNC: 47 U/L (ref 0–45)
BARBITURATES UR QL SCN: NORMAL
BASOPHILS # BLD AUTO: 0 10E3/UL (ref 0–0.2)
BASOPHILS NFR BLD AUTO: 1 %
BENZODIAZ UR QL SCN: NORMAL
BILIRUB SERPL-MCNC: 0.4 MG/DL
BUN SERPL-MCNC: 6.7 MG/DL (ref 8–23)
BZE UR QL SCN: NORMAL
CALCIUM SERPL-MCNC: 9.4 MG/DL (ref 8.8–10.2)
CANNABINOIDS UR QL SCN: NORMAL
CHLORIDE SERPL-SCNC: 95 MMOL/L (ref 98–107)
CREAT SERPL-MCNC: 0.63 MG/DL (ref 0.51–0.95)
DEPRECATED HCO3 PLAS-SCNC: 24 MMOL/L (ref 22–29)
EGFRCR SERPLBLD CKD-EPI 2021: >90 ML/MIN/1.73M2
EOSINOPHIL # BLD AUTO: 0.1 10E3/UL (ref 0–0.7)
EOSINOPHIL NFR BLD AUTO: 2 %
ERYTHROCYTE [DISTWIDTH] IN BLOOD BY AUTOMATED COUNT: 11.9 % (ref 10–15)
FENTANYL UR QL: NORMAL
GLUCOSE SERPL-MCNC: 106 MG/DL (ref 70–99)
HCT VFR BLD AUTO: 43.6 % (ref 35–47)
HGB BLD-MCNC: 15.4 G/DL (ref 11.7–15.7)
IMM GRANULOCYTES # BLD: 0 10E3/UL
IMM GRANULOCYTES NFR BLD: 0 %
LIPASE SERPL-CCNC: 33 U/L (ref 13–60)
LYMPHOCYTES # BLD AUTO: 2.4 10E3/UL (ref 0.8–5.3)
LYMPHOCYTES NFR BLD AUTO: 39 %
MAGNESIUM SERPL-MCNC: 1.8 MG/DL (ref 1.7–2.3)
MCH RBC QN AUTO: 33.8 PG (ref 26.5–33)
MCHC RBC AUTO-ENTMCNC: 35.3 G/DL (ref 31.5–36.5)
MCV RBC AUTO: 96 FL (ref 78–100)
MONOCYTES # BLD AUTO: 0.7 10E3/UL (ref 0–1.3)
MONOCYTES NFR BLD AUTO: 12 %
NEUTROPHILS # BLD AUTO: 2.8 10E3/UL (ref 1.6–8.3)
NEUTROPHILS NFR BLD AUTO: 46 %
NRBC # BLD AUTO: 0 10E3/UL
NRBC BLD AUTO-RTO: 0 /100
OPIATES UR QL SCN: NORMAL
PCP QUAL URINE (ROCHE): NORMAL
PLATELET # BLD AUTO: 239 10E3/UL (ref 150–450)
POTASSIUM SERPL-SCNC: 4 MMOL/L (ref 3.4–5.3)
PROT SERPL-MCNC: 8.2 G/DL (ref 6.4–8.3)
RBC # BLD AUTO: 4.55 10E6/UL (ref 3.8–5.2)
SODIUM SERPL-SCNC: 134 MMOL/L (ref 135–145)
WBC # BLD AUTO: 6.1 10E3/UL (ref 4–11)

## 2024-01-22 PROCEDURE — 83735 ASSAY OF MAGNESIUM: CPT | Performed by: EMERGENCY MEDICINE

## 2024-01-22 PROCEDURE — 99283 EMERGENCY DEPT VISIT LOW MDM: CPT | Performed by: EMERGENCY MEDICINE

## 2024-01-22 PROCEDURE — 80307 DRUG TEST PRSMV CHEM ANLYZR: CPT | Mod: GZ | Performed by: EMERGENCY MEDICINE

## 2024-01-22 PROCEDURE — 99285 EMERGENCY DEPT VISIT HI MDM: CPT | Performed by: EMERGENCY MEDICINE

## 2024-01-22 PROCEDURE — 36415 COLL VENOUS BLD VENIPUNCTURE: CPT | Performed by: EMERGENCY MEDICINE

## 2024-01-22 PROCEDURE — 82040 ASSAY OF SERUM ALBUMIN: CPT | Performed by: EMERGENCY MEDICINE

## 2024-01-22 PROCEDURE — 85025 COMPLETE CBC W/AUTO DIFF WBC: CPT | Performed by: EMERGENCY MEDICINE

## 2024-01-22 PROCEDURE — 83690 ASSAY OF LIPASE: CPT | Performed by: EMERGENCY MEDICINE

## 2024-01-22 ASSESSMENT — ACTIVITIES OF DAILY LIVING (ADL)
ADLS_ACUITY_SCORE: 37
ADLS_ACUITY_SCORE: 37

## 2024-01-22 NOTE — DISCHARGE INSTRUCTIONS
Thank you for coming to the [facility] Emergency Department.     We do support you getting sober. Please return in a few days and we will reassess if there are available beds on our detox unit. Until you return, cut back on alcohol use but do not stop completely.     We have provided you with a list of other detox facilities. We recommend calling to see if any have availability.     Return if withdrawal is severe or you develop vomiting. Also return if you have thoughts or actions to end your life.

## 2024-01-22 NOTE — ED NOTES
"Pt asked writer what her blood work results were. Writer briefly went over specific questions about what her potassium level was, per her request. Writer took pt's vitals. Pt upset, stating \"oh so they won't admit me medically then and detox is closed. So, you just want me to go home and have a seizure and die\". Pt then made a comment that a different pt was able to stay and she was not. After writer told her, her vital sign numbers, she states, \"I guess just throw me out then\". During the mix of this, pt asked writer, \"is there any way I can get some valium\", writer told her that staff are only to give it when a pt is going through withdrawals. Pt stated, \"I am going through withdrawals, but its just I still alcohol in me still\".   "

## 2024-01-22 NOTE — ED PROVIDER NOTES
"ED Provider Note  [facility]      History     Chief Complaint   Patient presents with    Alcohol Intoxication     Detox from alcohol, history of seizures, last drink 1 hour ago    Suicidal     Pt wants detox, pt has history of seizures, last drink 1 hour ago.  Pt feels suicidal with no plan.       HPI  Jolynn Rivera is a 63 year old female who presents seeking detox.   Reports long hx of alcohol abuse. Current intake is 6-12 beers/day. Last drink 1 hour prior to arrival.   Is hoping to do detox today.     Reported SI on initial evaluation, but on reassessment she indicates she is worried she will die if she keeps drinking. Is not taking steps to end her life and had no plan or intent to kill herself. She fears withdrawal, as she has had severe withdrawal with seizure and intubation in the past.     Past Medical History  Past Medical History:   Diagnosis Date    Anxiety     COPD (chronic obstructive pulmonary disease) (H)     COPD (chronic obstructive pulmonary disease) (H)     Hepatitis C     PTSD (post-traumatic stress disorder)     Seizures (H)     second to ETOH    Substance abuse (H)      Past Surgical History:   Procedure Laterality Date    LAPAROSCOPIC TUBAL LIGATION      ORTHOPEDIC SURGERY      Left knee    TONSILLECTOMY       eszopiclone (LUNESTA) 3 MG tablet  gabapentin (NEURONTIN) 600 MG tablet  levothyroxine (SYNTHROID/LEVOTHROID) 100 MCG tablet  melatonin 5 MG tablet  multivitamin w/minerals (THERA-VIT-M) tablet  omeprazole (PRILOSEC) 40 MG DR capsule  propranolol (INDERAL) 20 MG tablet  thiamine (B-1) 100 MG tablet  Vitamin D3 (VITAMIN D, CHOLECALCIFEROL,) 25 mcg (1000 units) tablet  nicotine polacrilex (NICORETTE) 4 MG gum      Allergies   Allergen Reactions    Amlodipine      Other reaction(s): Nightmares    Bee Venom      Other reaction(s): Edema    Clonidine Unknown    Prednisone Anxiety     Patient stated that prednisone causes \"bad panic attacks\".  Patient stated that " "prednisone causes \"bad panic attacks\".  Patient stated that prednisone causes \"bad panic attacks\".      Antihistamines, Chlorpheniramine-Type [Antihistamines, Chlorpheniramine-Type]     Compazine      Lock jaw; all medications ending with -zine    Diphenhydramine      Muscle Cramps    Penicillins      Panic attack    Prochlorperazine      Muscle cramps    Trazodone Nausea and Vomiting     \" I ended up falling and feeling like I was drunk\"    Wasps [Hornets]      Swelling      Family History  Family History   Problem Relation Age of Onset    Anxiety Disorder Mother     Asthma Mother     Alcohol/Drug Father     Prostate Cancer Father     Arthritis Father     Alcohol/Drug Brother     Alcohol/Drug Brother     Hypertension Brother     Alcohol/Drug Brother     Depression Brother     Psychotic Disorder Brother      Social History   Social History     Tobacco Use    Smoking status: Every Day     Packs/day: .5     Types: Cigarettes    Smokeless tobacco: Never    Tobacco comments:     Starting Chantix October 2014   Substance Use Topics    Alcohol use: Yes     Comment: 12-15 cans per day    Drug use: No     Comment: stopped 1986         A medically appropriate review of systems was performed with pertinent positives and negatives noted in the HPI, and all other systems negative.    Physical Exam   BP: 117/77  Pulse: 75  Temp: 98.6  F (37  C)  Resp: 16  Height: 160 cm (5' 3\")  Weight: 66.7 kg (147 lb 1.6 oz)  SpO2: 94 %  Physical Exam  Gen:A&Ox3, no acute distress, intoxicated but alert and cooperative  HEENT:PERRL, no facial tenderness or wounds, head atraumatic, oropharynx clear, mucous membranes moist, TMs clear bilaterally  Neck:no bony tenderness or step offs, no JVD, trachea midline  Back: no CVA tenderness  CV:RRR without murmurs  PULM:Clear to auscultation bilaterally  Abd:soft, nontender, nondistended. Bowel sounds present and normal  UE:No traumatic injuries, skin normal  LE:no traumatic injuries, skin normal, no LE " edema  Neuro:CN II-XII intact, strength 5/5 throughout, gait stable.   Skin: no rashes or ecchymoses    ED Course, Procedures, & Data      Procedures       Results for orders placed or performed during the hospital encounter of 01/22/24   Comprehensive metabolic panel     Status: Abnormal   Result Value Ref Range    Sodium 134 (L) 135 - 145 mmol/L    Potassium 4.0 3.4 - 5.3 mmol/L    Carbon Dioxide (CO2) 24 22 - 29 mmol/L    Anion Gap 15 7 - 15 mmol/L    Urea Nitrogen 6.7 (L) 8.0 - 23.0 mg/dL    Creatinine 0.63 0.51 - 0.95 mg/dL    GFR Estimate >90 >60 mL/min/1.73m2    Calcium 9.4 8.8 - 10.2 mg/dL    Chloride 95 (L) 98 - 107 mmol/L    Glucose 106 (H) 70 - 99 mg/dL    Alkaline Phosphatase 49 40 - 150 U/L    AST 47 (H) 0 - 45 U/L    ALT 26 0 - 50 U/L    Protein Total 8.2 6.4 - 8.3 g/dL    Albumin 4.6 3.5 - 5.2 g/dL    Bilirubin Total 0.4 <=1.2 mg/dL   Lipase     Status: Normal   Result Value Ref Range    Lipase 33 13 - 60 U/L   Magnesium     Status: Normal   Result Value Ref Range    Magnesium 1.8 1.7 - 2.3 mg/dL   CBC with platelets and differential     Status: Abnormal   Result Value Ref Range    WBC Count 6.1 4.0 - 11.0 10e3/uL    RBC Count 4.55 3.80 - 5.20 10e6/uL    Hemoglobin 15.4 11.7 - 15.7 g/dL    Hematocrit 43.6 35.0 - 47.0 %    MCV 96 78 - 100 fL    MCH 33.8 (H) 26.5 - 33.0 pg    MCHC 35.3 31.5 - 36.5 g/dL    RDW 11.9 10.0 - 15.0 %    Platelet Count 239 150 - 450 10e3/uL    % Neutrophils 46 %    % Lymphocytes 39 %    % Monocytes 12 %    % Eosinophils 2 %    % Basophils 1 %    % Immature Granulocytes 0 %    NRBCs per 100 WBC 0 <1 /100    Absolute Neutrophils 2.8 1.6 - 8.3 10e3/uL    Absolute Lymphocytes 2.4 0.8 - 5.3 10e3/uL    Absolute Monocytes 0.7 0.0 - 1.3 10e3/uL    Absolute Eosinophils 0.1 0.0 - 0.7 10e3/uL    Absolute Basophils 0.0 0.0 - 0.2 10e3/uL    Absolute Immature Granulocytes 0.0 <=0.4 10e3/uL    Absolute NRBCs 0.0 10e3/uL   Urine Drug Screen Panel     Status: Normal   Result Value Ref Range     Amphetamines Urine Screen Negative Screen Negative    Barbituates Urine Screen Negative Screen Negative    Benzodiazepine Urine Screen Negative Screen Negative    Cannabinoids Urine Screen Negative Screen Negative    Cocaine Urine Screen Negative Screen Negative    Fentanyl Qual Urine Screen Negative Screen Negative    Opiates Urine Screen Negative Screen Negative    PCP Urine Screen Negative Screen Negative   CBC with platelets differential     Status: Abnormal    Narrative    The following orders were created for panel order CBC with platelets differential.  Procedure                               Abnormality         Status                     ---------                               -----------         ------                     CBC with platelets and d...[[id number]]  Abnormal            Final result                 Please view results for these tests on the individual orders.   Urine Drug Screen     Status: Normal    Narrative    The following orders were created for panel order Urine Drug Screen.  Procedure                               Abnormality         Status                     ---------                               -----------         ------                     Urine Drug Screen Panel[[id number]]      Normal              Final result                 Please view results for these tests on the individual orders.     Medications - No data to display  Labs Ordered and Resulted from Time of ED Arrival to Time of ED Departure   COMPREHENSIVE METABOLIC PANEL - Abnormal       Result Value    Sodium 134 (*)     Potassium 4.0      Carbon Dioxide (CO2) 24      Anion Gap 15      Urea Nitrogen 6.7 (*)     Creatinine 0.63      GFR Estimate >90      Calcium 9.4      Chloride 95 (*)     Glucose 106 (*)     Alkaline Phosphatase 49      AST 47 (*)     ALT 26      Protein Total 8.2      Albumin 4.6      Bilirubin Total 0.4     CBC WITH PLATELETS AND DIFFERENTIAL - Abnormal    WBC Count 6.1      RBC Count 4.55      Hemoglobin  15.4      Hematocrit 43.6      MCV 96      MCH 33.8 (*)     MCHC 35.3      RDW 11.9      Platelet Count 239      % Neutrophils 46      % Lymphocytes 39      % Monocytes 12      % Eosinophils 2      % Basophils 1      % Immature Granulocytes 0      NRBCs per 100 WBC 0      Absolute Neutrophils 2.8      Absolute Lymphocytes 2.4      Absolute Monocytes 0.7      Absolute Eosinophils 0.1      Absolute Basophils 0.0      Absolute Immature Granulocytes 0.0      Absolute NRBCs 0.0     LIPASE - Normal    Lipase 33     MAGNESIUM - Normal    Magnesium 1.8     URINE DRUG SCREEN PANEL - Normal    Amphetamines Urine Screen Negative      Barbituates Urine Screen Negative      Benzodiazepine Urine Screen Negative      Cannabinoids Urine Screen Negative      Cocaine Urine Screen Negative      Fentanyl Qual Urine Screen Negative      Opiates Urine Screen Negative      PCP Urine Screen Negative     ETHYL ALCOHOL LEVEL     No orders to display              Assessment & Plan    62 yo F with a hx of alcohol abuse presenting with intoxication and interested in detox.   Vitals stable.   Last drink 1 hour prior to arrival.   Screen lab testing included CBC, CMP, Mg, lipase. Unremarkable.   Assessed for significant mental health decompensation. Has thoughts and fears of death without plan/intent to end her life. Has outpatient psychiatrist and therapy.   Unfortunately we have no inpatient detox beds today. Pt does not currently have signs or symptoms of withdrawal that require inpatient medical admission.   She is clinically sober enough to ambulate and care for herself.   Advised cutting back on alcohol without full abstinence due to risk for withdrawal, and return in the next few days for reassessment.   We also offered to reach out to other inpatient detox facilities and she declines. Given list of local detox/CD options.     Discharged.     I have reviewed the nursing notes. I have reviewed the findings, diagnosis, plan and need for  follow up with the patient.    Discharge Medication List as of 1/22/2024  4:04 PM          Final diagnoses:   Alcohol abuse       Latisha Echeverria MD   [facility] EMERGENCY DEPARTMENT  1/22/2024     Latisha Echeverria MD  01/22/24 [id number]

## 2024-01-22 NOTE — ED TRIAGE NOTES
Triage Assessment (Adult)       Row Name 01/22/24 7821          Triage Assessment    Airway WDL WDL        Respiratory WDL    Respiratory WDL WDL        Skin Circulation/Temperature WDL    Skin Circulation/Temperature WDL WDL        Cardiac WDL    Cardiac WDL WDL        Peripheral/Neurovascular WDL    Peripheral Neurovascular WDL WDL        Cognitive/Neuro/Behavioral WDL    Cognitive/Neuro/Behavioral WDL WDL

## 2024-01-23 ENCOUNTER — TELEPHONE (OUTPATIENT)
Dept: BEHAVIORAL HEALTH | Facility: CLINIC | Age: 64
End: 2024-01-23

## 2024-01-23 ENCOUNTER — HOSPITAL ENCOUNTER (EMERGENCY)
Facility: CLINIC | Age: 64
Discharge: HOME | End: 2024-01-24
Attending: STUDENT IN AN ORGANIZED HEALTH CARE EDUCATION/TRAINING PROGRAM | Admitting: STUDENT IN AN ORGANIZED HEALTH CARE EDUCATION/TRAINING PROGRAM
Payer: MEDICARE

## 2024-01-23 DIAGNOSIS — F10.930 ALCOHOL WITHDRAWAL SYNDROME WITHOUT COMPLICATION (H): ICD-10-CM

## 2024-01-23 DIAGNOSIS — F10.929 ALCOHOL INTOXICATION (H): ICD-10-CM

## 2024-01-23 DIAGNOSIS — R45.851 SUICIDAL IDEATION: ICD-10-CM

## 2024-01-23 PROBLEM — F10.90 ALCOHOL USE DISORDER: Status: ACTIVE | Noted: 2023-04-15

## 2024-01-23 LAB
ALBUMIN UR-MCNC: 10 MG/DL
ALCOHOL BREATH TEST: 0.28 (ref 0–0.01)
APPEARANCE UR: CLEAR
BACTERIA #/AREA URNS HPF: ABNORMAL /HPF
BILIRUB UR QL STRIP: NEGATIVE
COLOR UR AUTO: ABNORMAL
GLUCOSE UR STRIP-MCNC: NEGATIVE MG/DL
HCG UR QL: NEGATIVE
HGB UR QL STRIP: NEGATIVE
KETONES UR STRIP-MCNC: NEGATIVE MG/DL
LEUKOCYTE ESTERASE UR QL STRIP: NEGATIVE
MUCOUS THREADS #/AREA URNS LPF: PRESENT /LPF
NITRATE UR QL: NEGATIVE
PH UR STRIP: 6.5 [PH] (ref 5–7)
RBC URINE: 1 /HPF
SARS-COV-2 RNA RESP QL NAA+PROBE: NEGATIVE
SP GR UR STRIP: 1.01 (ref 1–1.03)
SQUAMOUS EPITHELIAL: <1 /HPF
UROBILINOGEN UR STRIP-MCNC: NORMAL MG/DL
WBC URINE: <1 /HPF

## 2024-01-23 PROCEDURE — 250N000013 HC RX MED GY IP 250 OP 250 PS 637: Performed by: EMERGENCY MEDICINE

## 2024-01-23 PROCEDURE — 81001 URINALYSIS AUTO W/SCOPE: CPT

## 2024-01-23 PROCEDURE — 87635 SARS-COV-2 COVID-19 AMP PRB: CPT

## 2024-01-23 PROCEDURE — 99285 EMERGENCY DEPT VISIT HI MDM: CPT | Performed by: STUDENT IN AN ORGANIZED HEALTH CARE EDUCATION/TRAINING PROGRAM

## 2024-01-23 PROCEDURE — 82075 ASSAY OF BREATH ETHANOL: CPT | Performed by: STUDENT IN AN ORGANIZED HEALTH CARE EDUCATION/TRAINING PROGRAM

## 2024-01-23 PROCEDURE — 250N000013 HC RX MED GY IP 250 OP 250 PS 637: Performed by: STUDENT IN AN ORGANIZED HEALTH CARE EDUCATION/TRAINING PROGRAM

## 2024-01-23 PROCEDURE — 250N000011 HC RX IP 250 OP 636: Performed by: EMERGENCY MEDICINE

## 2024-01-23 PROCEDURE — 81025 URINE PREGNANCY TEST: CPT

## 2024-01-23 PROCEDURE — 250N000013 HC RX MED GY IP 250 OP 250 PS 637

## 2024-01-23 RX ORDER — CALCIUM CARBONATE/VITAMIN D3 600 MG-10
1 TABLET ORAL 2 TIMES DAILY WITH MEALS
Status: DISCONTINUED | OUTPATIENT
Start: 2024-01-23 | End: 2024-01-24 | Stop reason: HOSPADM

## 2024-01-23 RX ORDER — LORAZEPAM 1 MG/1
1-4 TABLET ORAL EVERY 30 MIN PRN
Status: DISCONTINUED | OUTPATIENT
Start: 2024-01-23 | End: 2024-01-24 | Stop reason: HOSPADM

## 2024-01-23 RX ORDER — GABAPENTIN 600 MG/1
600 TABLET ORAL 3 TIMES DAILY
Status: DISCONTINUED | OUTPATIENT
Start: 2024-01-23 | End: 2024-01-24 | Stop reason: HOSPADM

## 2024-01-23 RX ORDER — ONDANSETRON 4 MG/1
4 TABLET, ORALLY DISINTEGRATING ORAL EVERY 8 HOURS PRN
Status: DISCONTINUED | OUTPATIENT
Start: 2024-01-23 | End: 2024-01-24 | Stop reason: HOSPADM

## 2024-01-23 RX ORDER — MULTIPLE VITAMINS W/ MINERALS TAB 9MG-400MCG
1 TAB ORAL DAILY
Status: DISCONTINUED | OUTPATIENT
Start: 2024-01-23 | End: 2024-01-24 | Stop reason: HOSPADM

## 2024-01-23 RX ORDER — PROPRANOLOL HYDROCHLORIDE 10 MG/1
20 TABLET ORAL 3 TIMES DAILY
Status: DISCONTINUED | OUTPATIENT
Start: 2024-01-23 | End: 2024-01-24 | Stop reason: HOSPADM

## 2024-01-23 RX ORDER — ESZOPICLONE 3 MG/1
3 TABLET, FILM COATED ORAL AT BEDTIME
Status: DISCONTINUED | OUTPATIENT
Start: 2024-01-23 | End: 2024-01-24 | Stop reason: HOSPADM

## 2024-01-23 RX ORDER — ONDANSETRON 4 MG/1
4 TABLET, ORALLY DISINTEGRATING ORAL ONCE
Status: COMPLETED | OUTPATIENT
Start: 2024-01-23 | End: 2024-01-23

## 2024-01-23 RX ORDER — PANTOPRAZOLE SODIUM 40 MG/1
40 TABLET, DELAYED RELEASE ORAL
Status: DISCONTINUED | OUTPATIENT
Start: 2024-01-23 | End: 2024-01-24 | Stop reason: HOSPADM

## 2024-01-23 RX ORDER — CALCIUM CARBONATE/VITAMIN D3 600 MG-10
1 TABLET ORAL 2 TIMES DAILY WITH MEALS
COMMUNITY
Start: 2024-01-18 | End: 2024-03-19

## 2024-01-23 RX ORDER — LEVOTHYROXINE SODIUM 100 UG/1
100 TABLET ORAL DAILY
Status: DISCONTINUED | OUTPATIENT
Start: 2024-01-23 | End: 2024-01-24 | Stop reason: HOSPADM

## 2024-01-23 RX ORDER — ONDANSETRON 4 MG/1
4 TABLET, ORALLY DISINTEGRATING ORAL EVERY 8 HOURS PRN
COMMUNITY
Start: 2024-01-18

## 2024-01-23 RX ADMIN — PANTOPRAZOLE SODIUM 40 MG: 40 TABLET, DELAYED RELEASE ORAL at 19:49

## 2024-01-23 RX ADMIN — Medication 1 TABLET: at 21:04

## 2024-01-23 RX ADMIN — LORAZEPAM 2 MG: 1 TABLET ORAL at 15:01

## 2024-01-23 RX ADMIN — LORAZEPAM 2 MG: 1 TABLET ORAL at 17:10

## 2024-01-23 RX ADMIN — LORAZEPAM 1 MG: 1 TABLET ORAL at 19:19

## 2024-01-23 RX ADMIN — ESZOPICLONE 3 MG: 3 TABLET, COATED ORAL at 21:34

## 2024-01-23 RX ADMIN — PROPRANOLOL HYDROCHLORIDE 20 MG: 10 TABLET ORAL at 19:49

## 2024-01-23 RX ADMIN — LEVOTHYROXINE SODIUM 100 MCG: 100 TABLET ORAL at 21:05

## 2024-01-23 RX ADMIN — LORAZEPAM 4 MG: 1 TABLET ORAL at 20:57

## 2024-01-23 RX ADMIN — NICOTINE POLACRILEX 4 MG: 4 GUM, CHEWING BUCCAL at 18:19

## 2024-01-23 RX ADMIN — ONDANSETRON 4 MG: 4 TABLET, ORALLY DISINTEGRATING ORAL at 19:49

## 2024-01-23 RX ADMIN — Medication 1 TABLET: at 21:09

## 2024-01-23 RX ADMIN — THIAMINE HCL TAB 100 MG 100 MG: 100 TAB at 19:49

## 2024-01-23 RX ADMIN — GABAPENTIN 600 MG: 600 TABLET, FILM COATED ORAL at 21:05

## 2024-01-23 RX ADMIN — NICOTINE POLACRILEX 4 MG: 4 GUM, CHEWING BUCCAL at 21:41

## 2024-01-23 ASSESSMENT — ACTIVITIES OF DAILY LIVING (ADL)
ADLS_ACUITY_SCORE: 37

## 2024-01-23 NOTE — ED TRIAGE NOTES
looking for detox, drinks about 12 beers a day, last drink about an hour ago, history of siezures with withdawl ,     Reports reports siezure 3 days ago, while drinking      Triage Assessment (Adult)       Row Name 01/23/24 1302          Triage Assessment    Airway WDL WDL        Respiratory WDL    Respiratory WDL WDL        Skin Circulation/Temperature WDL    Skin Circulation/Temperature WDL WDL        Cardiac WDL    Cardiac WDL WDL        Peripheral/Neurovascular WDL    Peripheral Neurovascular WDL WDL        Cognitive/Neuro/Behavioral WDL    Cognitive/Neuro/Behavioral WDL WDL        [place] Coma Scale    Best Eye Response 4-->(E4) spontaneous     Best Motor Response 6-->(M6) obeys commands     Best Verbal Response 5-->(V5) oriented     [place] Coma Scale Score 15

## 2024-01-23 NOTE — MEDICATION SCRIBE - ADMISSION MEDICATION HISTORY
Medication Scribe Admission Medication History    Admission medication history is complete. The information provided in this note is only as accurate as the sources available at the time of the update.    Information Source(s): Patient and CareEverywhere/SureScripts via in-person    Pertinent Information: N/A    Changes made to PTA medication list:  Added: caltrate, zofran  Deleted: None  Changed: None    Medication Affordability:       Allergies reviewed with patient and updates made in EHR: yes    Medication History Completed By: Fernanda Guzman 1/23/2024 4:48 PM    PTA Med List   Medication Sig Last Dose    calcium carbonate-vitamin D (CALTRATE) 600-10 MG-MCG per tablet Take 1 tablet by mouth 2 times daily (with meals) Past Week    eszopiclone (LUNESTA) 3 MG tablet Take 3 mg by mouth at bedtime 1/22/2024 at pm    gabapentin (NEURONTIN) 600 MG tablet Take 600 mg by mouth 3 times daily 1/22/2024    levothyroxine (SYNTHROID/LEVOTHROID) 100 MCG tablet Take 100 mcg by mouth daily 1/22/2024    melatonin 5 MG tablet Take 5 mg by mouth at bedtime Takes 1 hour prior to Lunesta 1/22/2024    multivitamin w/minerals (THERA-VIT-M) tablet Take 1 tablet by mouth daily Past Week    nicotine polacrilex (NICORETTE) 4 MG gum Place 4 mg inside cheek every hour as needed for smoking cessation Past Month    omeprazole (PRILOSEC) 40 MG DR capsule Take 40 mg by mouth daily 1/22/2024    ondansetron (ZOFRAN ODT) 4 MG ODT tab Take 4 mg by mouth every 8 hours as needed for vomiting or nausea 1/23/2024    propranolol (INDERAL) 20 MG tablet Take 20 mg by mouth 3 times daily 1/22/2024    thiamine (B-1) 100 MG tablet Take 1 tablet (100 mg) by mouth daily Past Week

## 2024-01-23 NOTE — ED PROVIDER NOTES
[facility] [place]    1/23/24      ED PROVIDER NOTE   [place] C  History     Chief Complaint   Patient presents with    Alcohol Problem    Suicidal     The history is provided by the patient and medical records.     Jolynn Rivera is a 63 year old female with history of bipolar disorder, PTSD, alcohol use disorder complicated by alcohol withdrawal seizures, COPD, HCV s/p Harvoni who presents seeking alcohol detox.  She drinks about 12 beers a day for the past month with last drink about an hour ago.  She notes prior history of alcohol withdrawal seizures, last seizure took place 3 days ago while drinking.  Endorses suicidal ideation with plan to overdose on pills. This plan is new over the past 2 days. No homicidal ideation.  She denies any other substance use.  No DTs. She states she is taking her psychiatric medications. She was seen here yesterday, endorsed depression but denied suicidal ideation at that time.  She denies any episodes of vomiting, symptoms of nausea, chest pain, shortness of air, abdominal pain.    Past Medical History  Past Medical History:   Diagnosis Date    Anxiety     COPD (chronic obstructive pulmonary disease) (H)     COPD (chronic obstructive pulmonary disease) (H)     Hepatitis C     PTSD (post-traumatic stress disorder)     Seizures (H)     second to ETOH    Substance abuse (H)      Past Surgical History:   Procedure Laterality Date    LAPAROSCOPIC TUBAL LIGATION      ORTHOPEDIC SURGERY      Left knee    TONSILLECTOMY       calcium carbonate-vitamin D (CALTRATE) 600-10 MG-MCG per tablet  eszopiclone (LUNESTA) 3 MG tablet  gabapentin (NEURONTIN) 600 MG tablet  levothyroxine (SYNTHROID/LEVOTHROID) 100 MCG tablet  melatonin 5 MG tablet  multivitamin w/minerals (THERA-VIT-M) tablet  nicotine polacrilex (NICORETTE) 4 MG gum  omeprazole (PRILOSEC) 40 MG DR capsule  ondansetron (ZOFRAN ODT) 4 MG ODT tab  propranolol (INDERAL) 20 MG tablet  thiamine (B-1) 100  "MG tablet      Allergies   Allergen Reactions    Amlodipine      Other reaction(s): Nightmares    Bee Venom      Other reaction(s): Edema    Clonidine Unknown    Prednisone Anxiety     Patient stated that prednisone causes \"bad panic attacks\".  Patient stated that prednisone causes \"bad panic attacks\".  Patient stated that prednisone causes \"bad panic attacks\".      Antihistamines, Chlorpheniramine-Type [Antihistamines, Chlorpheniramine-Type]     Compazine      Lock jaw; all medications ending with -zine    Diphenhydramine      Muscle Cramps    Penicillins      Panic attack    Prochlorperazine      Muscle cramps    Trazodone Nausea and Vomiting     \" I ended up falling and feeling like I was drunk\"    Wasps [Hornets]      Swelling      Family History  Family History   Problem Relation Age of Onset    Anxiety Disorder Mother     Asthma Mother     Alcohol/Drug Father     Prostate Cancer Father     Arthritis Father     Alcohol/Drug Brother     Alcohol/Drug Brother     Hypertension Brother     Alcohol/Drug Brother     Depression Brother     Psychotic Disorder Brother      Social History   Social History     Tobacco Use    Smoking status: Every Day     Packs/day: .5     Types: Cigarettes    Smokeless tobacco: Never    Tobacco comments:     Starting Chantix October 2014   Substance Use Topics    Alcohol use: Yes     Comment: 12-15 cans per day    Drug use: No     Comment: stopped 1986      Past medical history, past surgical history, medications, allergies, family history, and social history were reviewed with the patient. No additional pertinent items.      A medically appropriate review of systems was performed with pertinent positives and negatives noted in the HPI, and all other systems negative.    Physical Exam   BP: 127/75  Pulse: 77  Temp: 97.6  F (36.4  C)  Resp: 15  SpO2: 96 %    Physical Exam  Constitutional:       General: She is not in acute distress.     Appearance: Normal appearance. She is normal weight. " She is not ill-appearing, toxic-appearing or diaphoretic.   HENT:      Mouth/Throat:      Mouth: Mucous membranes are moist.      Pharynx: Oropharynx is clear.   Eyes:      Pupils: Pupils are equal, round, and reactive to light.   Cardiovascular:      Rate and Rhythm: Regular rhythm. Tachycardia present.      Pulses: Normal pulses.   Pulmonary:      Effort: Pulmonary effort is normal. No respiratory distress.      Breath sounds: Normal breath sounds.   Abdominal:      General: Abdomen is flat. Bowel sounds are normal.      Palpations: Abdomen is soft.   Musculoskeletal:         General: Normal range of motion.   Skin:     General: Skin is warm.      Capillary Refill: Capillary refill takes less than 2 seconds.   Neurological:      General: No focal deficit present.      Mental Status: She is alert and oriented to person, place, and time.   Psychiatric:         Mood and Affect: Mood normal.         Behavior: Behavior normal.           ED Course, Procedures, & Data     ED Course as of 01/23/24 1912 Tue Jan 23, 2024   1432 DEC assessment placed   1432 Total MSSA score: 10   1433 CD to see patient    1446 Patient had screening blood work yesterday during her ED evaluation for detox.  Will not repeat at this time due to no change in her symptoms.  Will continue with DEC assessment due to new suicidal ideation with a plan.     Procedures          Mental Health Risk Assessment        PSS-3      Date and Time Over the past 2 weeks have you felt down, depressed, or hopeless? Over the past 2 weeks have you had thoughts of killing yourself? Have you ever attempted to kill yourself? When did this last happen? User   01/23/24 1303 yes yes yes between 1 and 6 months ago SMB          C-SSRS ([place])      Date and Time Q1 Wished to be Dead (Past Month) Q2 Suicidal Thoughts (Past Month) Q3 Suicidal Thought Method Q4 Suicidal Intent without Specific Plan Q5 Suicide Intent with Specific Plan Q6 Suicide Behavior (Lifetime) Within  the Past 3 Months? RETIRED: Level of Risk per Screen Screening Not Complete User   01/23/24 1910 yes yes yes yes yes yes -- -- -- MCV   01/23/24 1303 yes yes no yes yes -- -- -- -- SMB                  Item Assessment   Suicidal Ideation Suicidal thoughts with method (no specific plan or intent to act)   Plan Overdose on pills with alcohol on board   Intent None   Suicidal or self-harm behaviors None   Risk Factors Hopelessness and Substance abuse or dependence   Protective Factors Family, safe place, requesting alcohol detox, new psychiatrist/ GP          Results for orders placed or performed during the hospital encounter of 01/23/24   HCG qualitative urine (UPT)     Status: Normal   Result Value Ref Range    hCG Urine Qualitative Negative Negative   UA with Microscopic reflex to Culture     Status: Abnormal    Specimen: Urine, Midstream   Result Value Ref Range    Color Urine Light Yellow Colorless, Straw, Light Yellow, Yellow    Appearance Urine Clear Clear    Glucose Urine Negative Negative mg/dL    Bilirubin Urine Negative Negative    Ketones Urine Negative Negative mg/dL    Specific Gravity Urine 1.009 1.003 - 1.035    Blood Urine Negative Negative    pH Urine 6.5 5.0 - 7.0    Protein Albumin Urine 10 (A) Negative mg/dL    Urobilinogen Urine Normal Normal, 2.0 mg/dL    Nitrite Urine Negative Negative    Leukocyte Esterase Urine Negative Negative    Bacteria Urine Few (A) None Seen /HPF    Mucus Urine Present (A) None Seen /LPF    RBC Urine 1 <=2 /HPF    WBC Urine <1 <=5 /HPF    Squamous Epithelials Urine <1 <=1 /HPF    Narrative    Urine Culture not indicated   Asymptomatic COVID-19 Virus (Coronavirus) by PCR Nasopharyngeal     Status: Normal    Specimen: Nasopharyngeal; Swab   Result Value Ref Range    SARS CoV2 PCR Negative Negative    Narrative    Testing was performed using the Xpert Xpress SARS-CoV-2 Assay on the Cepheid Gene-Xpert Instrument Systems. Additional information about this Emergency Use  Authorization (EUA) assay can be found via the Lab Guide. This test should be ordered for the detection of SARS-CoV-2 in individuals who meet SARS-CoV-2 clinical and/or epidemiological criteria as well as from individuals without symptoms or other reasons to suspect COVID-19. Test performance for asymptomatic patients has only been established in anterior nasal swab specimens. This test is for in vitro diagnostic use under the FDA EUA for laboratories certified under CLIA to perform high complexity testing. This test has not been FDA cleared or approved. A negative result does not rule out the presence of PCR inhibitors in the specimen or target RNA concentration below the limit of detection for the assay. The possibility of a false negative should be considered if the patient's recent exposure or clinical presentation suggests COVID-19. This test was validated by the [facility]. This laboratory is certified under the Clinical Laboratory Improvement Amendments (CLIA) as qualified to perform high complexity laboratory testing.     Alcohol breath test POCT     Status: Abnormal   Result Value Ref Range    Alcohol Breath Test 0.28 (A) 0.00 - 0.01     Medications   LORazepam (ATIVAN) tablet 1-4 mg (2 mg Oral $Given 1/23/24 1710)   nicotine polacrilex (NICORETTE) gum 4 mg (4 mg Buccal $Given 1/23/24 1819)     Labs Ordered and Resulted from Time of ED Arrival to Time of ED Departure   ROUTINE UA WITH MICROSCOPIC REFLEX TO CULTURE - Abnormal       Result Value    Color Urine Light Yellow      Appearance Urine Clear      Glucose Urine Negative      Bilirubin Urine Negative      Ketones Urine Negative      Specific Gravity Urine 1.009      Blood Urine Negative      pH Urine 6.5      Protein Albumin Urine 10 (*)     Urobilinogen Urine Normal      Nitrite Urine Negative      Leukocyte Esterase Urine Negative      Bacteria Urine Few (*)     Mucus Urine Present (*)     RBC Urine 1      WBC  Urine <1      Squamous Epithelials Urine <1     ALCOHOL BREATH TEST POCT - Abnormal    Alcohol Breath Test 0.28 (*)    HCG QUALITATIVE URINE - Normal    hCG Urine Qualitative Negative     COVID-19 VIRUS (CORONAVIRUS) BY PCR - Normal    SARS CoV2 PCR Negative       No orders to display          Critical care was not performed.     Medical Decision Making  The patient's presentation was of moderate complexity (a chronic illness mild to moderate exacerbation, progression, or side effect of treatment).    The patient's evaluation involved:  review of external note(s) from 1 sources (ED encounter 01/23/24)  review of 1 test result(s) ordered prior to this encounter (Labs 01/22/24)  ordering and/or review of 3+ test(s) in this encounter (see separate area of note for details)  discussion of management or test interpretation with another health professional (DEC )    The patient's management necessitated high risk (a decision regarding hospitalization).    Assessment & Plan    Jolynn is a 63-year-old female that presented for alcohol intoxication and requesting alcohol detox as well as worsening depression with suicidal ideation with a plan.  Vital signs stable and within normal limits.  Patient acutely intoxicated with alcohol but otherwise no acute distress and nontoxic-appearing.  Initial MSSA score 10.  Labs obtained yesterday at previous ED visit for alcohol intoxication were within normal limits.  Labs were not repeated today due to no change in or new symptoms.  Alcohol breath test 0.28.  UA unremarkable.  COVID-negative.  Beta-hCG negative.  DEC assessment obtained and recommended inpatient mental health due to patient's suicidal ideation with a plan with alcohol detox as well.  Discussed the admission plan with the patient at length.  Patient was agreeable.  Patient transferred to [facility] for continued monitoring of SI as well as withdrawal symptoms.  Patient voiced understanding of the plan and all questions  were answered to the best of provider ability prior to transfer to the [facility] prior to IP mental health admit.    Will continue to monitor and treat patient appropriately while boarding prior to IP mental health admit.    I have reviewed the nursing notes. I have reviewed the findings, diagnosis, plan and need for follow up with the patient.    New Prescriptions    No medications on file       Final diagnoses:   Alcohol intoxication (H24)   Alcohol withdrawal syndrome without complication (H)   Suicidal ideation     EDER Sarmiento MD     [facility] EMERGENCY DEPARTMENT  1/23/2024     Deidre Hernandez PA-C  01/23/24 1912       Deidre Hernandez PA-C  01/23/24 1914    --    ED Attending Physician Attestation    I Aissatou Roberts MD, cared for this patient with the Advanced Practice Provider (MEGGAN). I have performed a history and physical examination of the patient independent of the MEGGAN. I reviewed the MEGGAN's documentation above and agree with the documented findings and plan of care. I personally provided a substantive portion of the care for this patient, including the complete Medical Decision Making. Please see the MEGGAN's documentation for full details.      Medical Decision Making  The patient's presentation was of high complexity (an acute health issue posing potential threat to life or bodily function).    The patient's evaluation involved:  review of external note(s) from 3+ sources (see separate area of note for details)  review of 3+ test result(s) ordered prior to this encounter (see separate area of note for details)  ordering and/or review of 3+ test(s) in this encounter (see separate area of note for details)    The patient's management necessitated high risk (a decision regarding hospitalization).          Aissatou Roberts MD  Emergency Medicine          Aissatou Roberts MD  01/24/24 5807

## 2024-01-24 ENCOUNTER — TELEPHONE (OUTPATIENT)
Dept: BEHAVIORAL HEALTH | Facility: CLINIC | Age: 64
End: 2024-01-24
Payer: MEDICARE

## 2024-01-24 VITALS
HEART RATE: 54 BPM | DIASTOLIC BLOOD PRESSURE: 81 MMHG | OXYGEN SATURATION: 95 % | TEMPERATURE: 98.1 F | RESPIRATION RATE: 18 BRPM | SYSTOLIC BLOOD PRESSURE: 166 MMHG

## 2024-01-24 PROCEDURE — 250N000013 HC RX MED GY IP 250 OP 250 PS 637: Performed by: EMERGENCY MEDICINE

## 2024-01-24 PROCEDURE — 250N000011 HC RX IP 250 OP 636

## 2024-01-24 PROCEDURE — 250N000013 HC RX MED GY IP 250 OP 250 PS 637: Performed by: STUDENT IN AN ORGANIZED HEALTH CARE EDUCATION/TRAINING PROGRAM

## 2024-01-24 PROCEDURE — 250N000013 HC RX MED GY IP 250 OP 250 PS 637

## 2024-01-24 PROCEDURE — 99284 EMERGENCY DEPT VISIT MOD MDM: CPT

## 2024-01-24 RX ORDER — ASPIRIN 325 MG
325 TABLET ORAL ONCE
Status: COMPLETED | OUTPATIENT
Start: 2024-01-24 | End: 2024-01-24

## 2024-01-24 RX ADMIN — LEVOTHYROXINE SODIUM 100 MCG: 100 TABLET ORAL at 10:14

## 2024-01-24 RX ADMIN — NICOTINE POLACRILEX 4 MG: 4 GUM, CHEWING BUCCAL at 04:35

## 2024-01-24 RX ADMIN — LORAZEPAM 1 MG: 1 TABLET ORAL at 05:54

## 2024-01-24 RX ADMIN — NICOTINE POLACRILEX 4 MG: 4 GUM, CHEWING BUCCAL at 10:10

## 2024-01-24 RX ADMIN — ONDANSETRON 4 MG: 4 TABLET, ORALLY DISINTEGRATING ORAL at 05:28

## 2024-01-24 RX ADMIN — Medication 1 TABLET: at 09:16

## 2024-01-24 RX ADMIN — PANTOPRAZOLE SODIUM 40 MG: 40 TABLET, DELAYED RELEASE ORAL at 09:15

## 2024-01-24 RX ADMIN — NICOTINE POLACRILEX 4 MG: 4 GUM, CHEWING BUCCAL at 05:47

## 2024-01-24 RX ADMIN — ASPIRIN 325 MG ORAL TABLET 325 MG: 325 PILL ORAL at 01:07

## 2024-01-24 RX ADMIN — GABAPENTIN 600 MG: 600 TABLET, FILM COATED ORAL at 09:16

## 2024-01-24 RX ADMIN — NICOTINE POLACRILEX 4 MG: 4 GUM, CHEWING BUCCAL at 00:52

## 2024-01-24 RX ADMIN — Medication 1 TABLET: at 09:15

## 2024-01-24 RX ADMIN — Medication 5 MG: at 01:07

## 2024-01-24 RX ADMIN — THIAMINE HCL TAB 100 MG 100 MG: 100 TAB at 09:16

## 2024-01-24 RX ADMIN — PROPRANOLOL HYDROCHLORIDE 20 MG: 10 TABLET ORAL at 09:15

## 2024-01-24 RX ADMIN — NICOTINE POLACRILEX 4 MG: 4 GUM, CHEWING BUCCAL at 11:51

## 2024-01-24 RX ADMIN — LORAZEPAM 1 MG: 1 TABLET ORAL at 06:43

## 2024-01-24 RX ADMIN — NICOTINE POLACRILEX 4 MG: 4 GUM, CHEWING BUCCAL at 07:49

## 2024-01-24 ASSESSMENT — COLUMBIA-SUICIDE SEVERITY RATING SCALE - C-SSRS
TOTAL  NUMBER OF ABORTED OR SELF INTERRUPTED ATTEMPTS SINCE LAST CONTACT: NO
ATTEMPT SINCE LAST CONTACT: NO
2. HAVE YOU ACTUALLY HAD ANY THOUGHTS OF KILLING YOURSELF?: NO
1. SINCE LAST CONTACT, HAVE YOU WISHED YOU WERE DEAD OR WISHED YOU COULD GO TO SLEEP AND NOT WAKE UP?: NO
SUICIDE, SINCE LAST CONTACT: NO
TOTAL  NUMBER OF INTERRUPTED ATTEMPTS SINCE LAST CONTACT: NO
6. HAVE YOU EVER DONE ANYTHING, STARTED TO DO ANYTHING, OR PREPARED TO DO ANYTHING TO END YOUR LIFE?: NO

## 2024-01-24 ASSESSMENT — ACTIVITIES OF DAILY LIVING (ADL)
ADLS_ACUITY_SCORE: 37

## 2024-01-24 NOTE — PROGRESS NOTES
"Triage and Transition Services Extended Care Reassessment     Patient: Jolynn goes by \"Jolynn,\" uses she/her pronouns  Date of Service: January 24, 2024  Site of Service: [facility] EMERGENCY DEPARTMENT                             [id number]  Patient was seen yes  Mode of Assessment: Virtual: iPad     Reason for Reassessment: significant behavior change, recent loss, depression, suicidal ideation    History of Patient's Original Emergency Room Encounter: Pt has a history of depression, PTSD, and alcohol use disorder. Pt reported she relapsed on ETOH 1 month ago, however pt's ILS worker reported she has been drinking on and off since they have worked together over the last few months.    Current Patient Presentation: Writer met [facility] Ms. Rivera today with Psychiatry. She denies SI and states \"I just said that to get the help and they didnt discharge me again\".  She notes that she has had many losses in her life recently.   She notes she has CADI, ILS, EMDR therapy, PCP, Psychiatry and Dental Care in an OP setting. Due to recently losses in her life she started to drink ETOH again.  She is able to discuss that she is using other coping strategies such as TIPP and exercise but \"relapsed\" in her ETOH recently.  She reports that she has a history of withdrawal from ETOH that can be upto and incuding seizures.  She reports use of several coping stratgies  that she she does not wish for any medication changes.  She is educated about signs and concerns for withdrawal and concern for safety around ETOH withdrawal. She reports having OP psychiatry and psychotherapy appointments. She denies SI, HI, NSSIB, kim or psychosis with congruent affect.  She reports a desire to return home with resumption of her OP resources and care.  Pt does not currently meet [facility] for a 72 hour hold or civil commitment.    Presentation Summary: Writer met [facility] Ms. Rivera today with Psychiatry. She denies SI and states \"I just said that " "to get the help and they didnt discharge me again\". She notes that she has had many losses in her life recently. She notes she has CADI, ILS, EMDR therapy, PCP, Psychiatry and Dental Care in an OP setting. Due to recently losses in her life she started to drink ETOH again. She is able to discuss that she is using other coping strategies such as TIPP and exercise but \"relapsed\" in her ETOH recently. She reports that she has a history of withdrawal from ETOH that can be upto and incuding seizures. She reports use of several coping stratgies that she she does not wish for any medication changes. She is educated about signs and concerns for withdrawal and concern for safety around ETOH withdrawal. She reports having OP psychiatry and psychotherapy appointments. She denies SI, HI, NSSIB, kim or psychosis with congruent affect. She reports a desire to return home with resumption of her OP resources and care. Pt does not currently meet [facility] for a 72 hour hold or civil commitment.    Changes Observed Since Initial Assessment: decrease in presenting symptoms    Therapeutic Interventions Provided: Coached on coping techniques/relaxation skills to help improve distress tolerance and managing intense emotions., Taught the link between thoughts, feelings, and behaviors., Engaged in safety planning    Current Symptoms: anxious difficulty concentrating, sadness, excessive guilt          Mental Status Exam   Affect: Appropriate  Appearance: Appropriate  Attention Span/Concentration: Attentive  Eye Contact: Engaged    Fund of Knowledge: Appropriate   Language /Speech Content: Fluent  Language /Speech Volume: Normal  Language /Speech Rate/Productions: Normal  Recent Memory: Intact  Remote Memory: Intact  Mood: Sad, Anxious  Orientation to Person: Yes   Orientation to Place: Yes  Orientation to Time of Day: Yes  Orientation to Date: Yes     Situation (Do they understand why they are here?): Yes  Psychomotor Behavior: " "Normal  Thought Content: Clear  Thought Form: Intact    Treatment Objective(s) Addressed: rapport building, orienting the patient to therapy, processing feelings, safety planning, assessing safety, identifying an appropriate aftercare plan, identifying treatment goals, identifying additional supports, exploring obstacles to safety in the community    Patient Response to Interventions: acceptance expressed, verbalizes understanding    Progress Towards Goals:  Patient Reports Symptoms Are: stable  Patient Progress Toward Goals: is making progress  Next Step to Work Toward Discharge: means restriction  Means Restriction: No access to fire arms. Completed and engaged in safety planning.    Case Management: Case Management Included:  (Coordination with Hospitalist and Psychiatry)  Summary of Interaction: Hospitalist and Psychiatry agree that patient is appropriate for discharge at this time.    C-SSRS Since Last Contact:   1. Wish to be Dead (Since Last Contact): No  2. Non-Specific Active Suicidal Thoughts (Since Last Contact): No     Actual Attempt (Since Last Contact): No  Has subject engaged in non-suicidal self-injurious behavior? (Since Last Contact): No  Interrupted Attempts (Since Last Contact): No  Aborted or Self-Interrupted Attempt (Since Last Contact): No  Preparatory Acts or Behavior (Since Last Contact): No  Suicide (Since Last Contact): No     Calculated C-SSRS Risk Score (Since Last Contact): No Risk Indicated    Plan: Final Disposition / Recommended Care Path: discharge  Plan for Care reviewed with assigned Medical Provider: yes  Plan for Care Team Review: provider, other (see comment) (Dr. Akins and Dr. Hollingsworth, [facility])  Patient and/or validated legal guardian concurs: yes  Clinical Substantiation: Writer met [facility] Ms. Rivera today with Psychiatry. She denies SI and states \"I just said that to get the help and they didnt discharge me again\". She notes that she has had many losses in her life " "recently. She notes she has CADI, ILS, EMDR therapy, PCP, Psychiatry and Dental Care in an OP setting. Due to recently losses in her life she started to drink ETOH again. She is able to discuss that she is using other coping strategies such as TIPP and exercise but \"relapsed\" in her ETOH recently. She reports that she has a history of withdrawal from ETOH that can be upto and incuding seizures. She reports use of several coping stratgies that she she does not wish for any medication changes. She is educated about signs and concerns for withdrawal and concern for safety around ETOH withdrawal. She reports having OP psychiatry and psychotherapy appointments. She denies SI, HI, NSSIB, kim or psychosis with congruent affect. She reports a desire to return home with resumption of her OP resources and care. Pt does not currently meet critieria for a 72 hour hold or civil commitment. Discharge to OP services is recommeneded at this time.    Legal Status: Legal Status at Admission: Voluntary/Patient has signed consent for treatment  72 Hour Hold - Date/Time Initiated: NA  72 Hour Hold - Date/Time Ends: NA    Session Status: Time session started: 1040  Time session ended: 1110  Session Duration (minutes): 30 minutes  Session Number: 1  Anticipated number of sessions or this episode of care: 1    Session Start Time: 1040  Session Stop Time: 1110  CPT codes: 56289 - Psychotherapy (with patient) - 30 (16-37*) min  Time Spent: 30 minutes      CPT code(s) utilized: 80554 - Psychotherapy (with patient) - 30 (16-37*) min    Diagnosis:   Patient Active Problem List   Diagnosis Code    Polysubstance abuse (H) F19.10    Chronic hepatitis C (H) B18.2    COPD (chronic obstructive pulmonary disease) (H) J44.9    GERD (gastroesophageal reflux disease) K21.9    Mood disorder (H24) F39    Chemical dependency (H) F19.20    Posttraumatic stress disorder F43.10    Nicotine abuse Z72.0    Alcohol use disorder, mild, abuse F10.10    Hx of " psychiatric care Z92.89    Alcohol withdrawal, uncomplicated (H) F10.930    Sinusitis, unspecified chronicity, unspecified location J32.9    Alcohol withdrawal syndrome without complication (H) F10.930    Elevated LFTs R79.89    Drug withdrawal seizure with complication (H) F19.939, R56.9    Alcohol dependence with intoxication with complication (H) F10.229    Alcoholic ketoacidosis E87.29    Alcohol use disorder F10.90    Vomiting and diarrhea R11.10, R19.7    Seizure (H) R56.9    Alcoholic intoxication with complication (H24) F10.929       Primary Problem This Admission: Active Hospital Problems    Alcohol use disorder      Posttraumatic stress disorder        SARA PRYOR   Licensed Mental Health Professional (HP), [facility]  [phone]    AVS CC:  Continue with Outpatient mental health supports:    Karon Hastings LP as Psychologist (Psychology)    Anisha Maciel MD as Resident (Psychiatry)    [place] (ILS Worker)     Please return to the Emergency Room if symptoms of ETOH detox worsen.   Signs and symptoms of the various stages of alcohol withdrawal may include:    Headaches.  Anxiety.  Tremors or shakes.  Insomnia.  Fatigue.  Mood changes.  Gastrointestinal disturbances.  Heart palpitations.  Increased blood pressure or heart rate.  Hyperthermia (i.e., overheated body).  Rapid abnormal breathing.  Hallucinations.  Seizures.  ______________________________________________________________________________________________________________________  When you stop drinking, various things happen to your mind and body. However, the nature and intensity of these effects can vary depending on how much and how frequently you drink. While occasional social drinking may not result in significant changes when you decide to quit, you may experience more significant effects if you have been drinking heavily for an extended time.    If you have decided that it is time to stop or reduce your alcohol  consumption, knowing what happens to your body when you stop drinking can give you a better idea of what to expect.    Timeline: What Happens When You Quit Drinking?  If you're ready to give up alcohol, and you are drinking every day, here is a timeline of what you can expect in regards to your mental and physical health when you stop drinking. If you have alcohol use disorder but only drink on weekends, know that you will also get benefits from stopping:  After One Day  The first day is always the hardest, but it's also an important milestone. After 24 hours without alcohol, your body will start to detoxify and you may experience withdrawal symptoms.    It's important to remember that they are only temporary and will usually subside within a few days. For individuals with severe alcohol dependence, however, withdrawal symptoms can be more severe and may require medical attention.    After Three Days  After three days, you will likely start to feel more like yourself. However, individuals who have been drinking heavily for long periods of time may still experience some symptoms of withdrawal and may even have hallucinations or delirium tremens (DTs) and seizures.    Delirium tremens is a a serous and life-threatening condition, and If you're concerned about your symptoms, it's important to talk to your doctor.    After One Week  After one week without alcohol, your risk of seizures is much less. Also, your risk of developing cardiovascular disease will start to decrease. Alcohol can increase your blood pressure and make your heart work harder. In the coming weeks, your liver will also begin to repair itself.    After One Month  A month alcohol-free is a big accomplishment. This is usually when people start to feel their best after giving up alcohol. By this point, most physical withdrawal symptoms should have subsided and you should start to feel less anxious and more positive.    The brain also begins to repair  some of the damage and shrinkage you may have experienced while drinking. One study showed that after 6 weeks of abstinence from alcohol, brain volume increases by an average of 2%.    After Six Months  After half a year without drinking, you will really start to reap the rewards. Your risk of developing cancer will decrease, and your liver function will have greatly improved.    You may also begin to notice a number of improvements in your physical health. You'll have more energy and stamina,and you may notice that your skin looks healthier.    After One Year  At this point, your risk of developing all types of disease will be reduced and your bone density will start to increase. Keep in mind that everyone is different and will experience different things when they stop drinking.  While giving up alcohol can be a challenge, it's important to remember that the benefits are well worth it.    Mental Health Effects When You Stop Drinking  The mental health changes you experience when you stop drinking can include symptoms of withdrawal, difficulty sleeping, irritability, mood swings, and clearer thinking. While some of these changes can be uncomfortable for some time, they will eventually begin to improve the longer you abstain from alcohol use.    Your Body Starts to Detox  When you first stop drinking, your body will begin to detoxify itself. This can lead to withdrawal symptoms, including anxiety, tremors, sweating, and nausea.1 Symptoms of alcohol withdrawal can range from mild to severe.    While not everyone who quits drinking will experience withdrawal, you are more likely to have symptoms if you have been drinking for a long time, if you drink heavily, and/or if you drink frequently.     Some people who stop drinking may experience delirium tremens (DT). This condition is serious and may lead to symptoms such as high blood pressure, tremors, and seizures.  It is potentially life-threatening, so it is essential  "to seek medical attention immediately if you experience such symptoms.8    You May Have Trouble Sleeping  One of the most common side effects of giving up alcohol is insomnia.This is because alcohol acts as a sedative, so when it's no longer in your system, you may have trouble falling asleep or staying asleep.    This may be more pronounced if you often use alcohol as a way to manage existing sleep problems. Unfortunately, while alcohol may make it seem like you are falling asleep quicker, it can also lead to worse sleep quality.    \"Using alcohol to sleep can reduce the time it takes to fall asleep, but during the sleep cycle the quality of sleep is poor. As the night goes on there are more awakenings and reduced amounts of REM sleep,\" says Carloz Hickman MD, PhD,  and medical director of the [facility] and  of the [employer]. \"Furthermore, consistent use of alcohol to induce sleep only increases the need to use alcohol in the future to get to sleep,\" he explains.    Fortunately, reducing your alcohol use can help improve your sleep over time,9 although you might experience more sleep disturbances for some time after you quit.  If you struggle to sleep, talk to your doctor about solutions that might help.    You May Feel Irritable, Anxious, or Helm  Feeling anxious or irritable is common when you first give up alcohol. This is because alcohol is a central nervous system depressant, so when it's no longer in your system, your body has to adjust to the change.  Along with anxiety and irritability, you may also experience mood swings when you give up alcohol.    You might notice this effect more if you were drinking to manage your mood, such as drinking to temporarily relieve negative emotions or boost positive ones.    Like other symptoms, the impact on mood depends on the amount and duration of your alcohol use. For light or moderate drinking, you might " "experience a more temporary, mild effect on mood. If you've been drinking heavily or for long periods, the impact will be more pronounced, and it will take longer for your neurotransmitter systems to restore their balance in your body.    You May Think More Clearly  Another benefit of giving up alcohol is that your mind may be clearer. This is because alcohol can cause changes in brain chemistry, which can lead to cognitive problems.    Drinking alcohol can contribute to a variety of cognitive issues, including poor memory, slow reaction time, impaired impulse control, and poor concentration. Over time, drinking can also damage nerve cells and contribute to a loss of brain volume.    \"Research shows that it takes about two to four weeks for cognitive function to improve. People find themselves better able to concentrate and remember events. People are less impulsive and better able to plan and organize tasks,\" Dr. Hickman says.    You May Feel Happier  One of the best things about giving up alcohol is that you may find yourself feeling happier overall. This is because alcohol can cause depression, anxiety, and other mental health problems.    Alcohol use disorder frequently occurs alongside other mental health conditions. Pre-existing mental health conditions can sometimes lead people to turn to alcohol to cope with their symptoms. In other cases, long-term alcohol exposure can increase a person's risk of developing a psychiatric illness.    This does not mean that a mental health condition will necessarily go aware when you stop drinking but abstaining from alcohol use can improve your symptoms and lead to better treatment outcomes.  Physical Effects When You Stop Drinking  In addition to the mental benefits you'll experience when you stop drinking, there are also many physical benefits as well. Some of the ways that quitting drinking can improve your physical health and well-being include:    Your Liver Will " Start to Repair Itself  Alcohol is a toxin that can damage your liver.3 However, when you stop drinking, your liver will begin to repair itself and the damage will start to reverse.    Your Appetite May Change  When you drink alcohol, even in moderate amounts, it can result in obesity.13 So, when you stop drinking, you may find that you have fewer cravings for food.    Your Risk of Certain Diseases Will Decrease  If you're a heavy drinker, you're at an increased risk of developing certain diseases, such as cancer, heart disease, and stroke.However, when you stop drinking, your risk of developing these diseases decreases.    Your Skin May Improve  One of the surprising side effects of giving up alcohol is that your skin may start to look better. This is because alcohol can cause dehydration, which can lead to dry, dull skin.So, giving up alcohol can help your skin to look more hydrated and glowing.    You May Have More Energy  Once the initial symptoms of withdrawal have subsided, you may find that you have more energy than you did before you stopped drinking. This is because alcohol is a depressant, so when it's no longer in your system, your body has more energy to work with.    It is worth mentioning that nutritional status improvement occurs when someone strop drinking, which is one big reason why people may see many of the above improvements with sobriety.    Other Benefits When You Stop Drinking  Quitting drinking can have many important benefits for your physical and mental health, but there are many other types of benefits you may experience as well.  You May Have More Money  One of the financial benefits of giving up alcohol is that you'll likely have more money to spend. This is because alcohol is a costly habit, so giving it up can free up some extra cash.    You May Live Longer  One of the most significant benefits of giving up alcohol is that you may increase your lifespan. Alcohol misuse can lead to  serious health problems like liver disease and cancer. So, giving up alcohol can help you to avoid these potentially deadly diseases.    While it has been suggested that one or two drinks per day may have health benefits, one meta-analysis found that even moderate intake has serious health risks. Women who had two or more drinks per day and men who had four or more drinks per day had significantly increased mortality.    Both the World Health Organization (WHO)16 and the [place] on Substance Use and Addiction have determined that there is not safe amount of alcohol.    You May Feel More Productive  One of the unexpected benefits of giving up alcohol is that you may find yourself more productive than before. This is because alcohol can cause fatigue and decreased motivation, so when you stop drinking, you may find it easier to get things done.    You May have Better Relationships  Alcohol can cause problems in relationships, such as conflicts, communication problems, and trust issues.18 So, giving up alcohol may help you to improve your relationships with friends and family.

## 2024-01-24 NOTE — TELEPHONE ENCOUNTER
R: [place]  Access Inpatient Bed Call Log  1/24/2024 12:18 AM  Intake has called facilities that have not updated their bed status within the last 12 hours.??      ADULTS:     *[facility] -- [facility]: @ cap per website.  [place] -- [facility]:  @ cap per website.   [place] -- [facility]: @ Cap per website  [place] -- [facility]: @ cap per website. - 1:26 AM Per Hank, they have1 low acuity bed.   [place] -- [facility]: @ cap per website.   [facility] -- [facility]: @ cap per website.  [place] -- [facility]/YA beds @ Posting 4 beds. Ages 18-25, Voluntary only, COVID test req'd, NO aggression, physical or sexual assault, violence hx or drug abuse, or psychosis- 1:05 AM Per Hank, they are capped for young adults but have adolescent beds.   [place] -- [facility]: @ cap per website.  [place] -- [facility]: @ cap per website.  [place] -- [facility]:  @ Posting 1 bed.     *[facility]: @ Cap per website. Mixed unit/Low acuity only.    [facility]: @ Posting 1 bed. Low acuity, No aggression   [facility]: @ cap per website   [facility]:  @ Posting 2 beds. Low acuity only. No current aggression.  [facility]:  @ Cap per website COVID negative test req. Lower acuity only  [facility]: @ Posting 1 bed. Low acuity only. Prefer med adjustments placement.   [facility]: Posting 4 beds. No aggression  [place] -- [facility]: @ Posting 1 bed. NO reviews after 10PM. Low acuity only.    [place] -- [facility]: @ Cap per website. No hx of aggression. No sexual offenders. Voluntary patients only   [place] -- [facility]:  Posting 6 beds. Low acuity only. Must have the cognitive ability to do programming. No aggressive or violent behavior or recent HX in the last 2 yrs. MH must be primary.   [place] -- [facility]: @ Cap per website. COVID negative test. Must be low acuity ONLY.  [place] -- [facility]: @ Posting 3  beds. Low acuity. Negative Covid.  1:27 AM Per Jessika, they are capped.   [place] -- [facility]: Posting 1 beds. Covid neg. Voluntary only. Mixed unit of adults & adol. No aggressive or violent behavior. No registered sex offenders.  - 1:28 AM Per Svetlana, they have beds available.   [place] -- [place]: @Posting 1 bed. COVID negative test    [facility]: @ @ cap per website. No hx of aggression/assault. No lines, drains or tubes. Does not provide detox or CD treatment. -1:30 AM Per Galindo, they are capped due to acuity.   [place] -- [facility]: @ Cap per website. Mixed unit/Low acuity/no medical devices - IV, CPAP etc. Negative Covid. - 1:31 AM Per Delma they are able to review.      Pt remains on waitlist pending appropriate placement availability.

## 2024-01-24 NOTE — DISCHARGE INSTRUCTIONS
Continue with Outpatient mental health supports:    [facility] as Psychologist (Psychology)    Anisha Maciel MD as Resident (Psychiatry)    [place] (ILS Worker)     Please return to the Emergency Room if symptoms of ETOH detox worsen.   Signs and symptoms of the various stages of alcohol withdrawal may include:    Headaches.  Anxiety.  Tremors or shakes.  Insomnia.  Fatigue.  Mood changes.  Gastrointestinal disturbances.  Heart palpitations.  Increased blood pressure or heart rate.  Hyperthermia (i.e., overheated body).  Rapid abnormal breathing  Hallucinations  Seizures  ______________________________________________________________________________________________________________________  When you stop drinking, various things happen to your mind and body. However, the nature and intensity of these effects can vary depending on how much and how frequently you drink. While occasional social drinking may not result in significant changes when you decide to quit, you may experience more significant effects if you have been drinking heavily for an extended time.    If you have decided that it is time to stop or reduce your alcohol consumption, knowing what happens to your body when you stop drinking can give you a better idea of what to expect.    Timeline: What Happens When You Quit Drinking?  If you're ready to give up alcohol, and you are drinking every day, here is a timeline of what you can expect in regards to your mental and physical health when you stop drinking. If you have alcohol use disorder but only drink on weekends, know that you will also get benefits from stopping:  After One Day  The first day is always the hardest, but it's also an important milestone. After 24 hours without alcohol, your body will start to detoxify and you may experience withdrawal symptoms.    It's important to remember that they are only temporary and will usually subside within a few days. For individuals with  severe alcohol dependence, however, withdrawal symptoms can be more severe and may require medical attention.    After Three Days  After three days, you will likely start to feel more like yourself. However, individuals who have been drinking heavily for long periods of time may still experience some symptoms of withdrawal and may even have hallucinations or delirium tremens (DTs) and seizures.    Delirium tremens is a a serous and life-threatening condition, and If you're concerned about your symptoms, it's important to talk to your doctor.    After One Week  After one week without alcohol, your risk of seizures is much less. Also, your risk of developing cardiovascular disease will start to decrease. Alcohol can increase your blood pressure and make your heart work harder. In the coming weeks, your liver will also begin to repair itself.    After One Month  A month alcohol-free is a big accomplishment. This is usually when people start to feel their best after giving up alcohol. By this point, most physical withdrawal symptoms should have subsided and you should start to feel less anxious and more positive.    The brain also begins to repair some of the damage and shrinkage you may have experienced while drinking. One study showed that after 6 weeks of abstinence from alcohol, brain volume increases by an average of 2%.    After Six Months  After half a year without drinking, you will really start to reap the rewards. Your risk of developing cancer will decrease, and your liver function will have greatly improved.    You may also begin to notice a number of improvements in your physical health. You'll have more energy and stamina,and you may notice that your skin looks healthier.    After One Year  At this point, your risk of developing all types of disease will be reduced and your bone density will start to increase. Keep in mind that everyone is different and will experience different things when they stop  "drinking.  While giving up alcohol can be a challenge, it's important to remember that the benefits are well worth it.    Mental Health Effects When You Stop Drinking  The mental health changes you experience when you stop drinking can include symptoms of withdrawal, difficulty sleeping, irritability, mood swings, and clearer thinking. While some of these changes can be uncomfortable for some time, they will eventually begin to improve the longer you abstain from alcohol use.    Your Body Starts to Detox  When you first stop drinking, your body will begin to detoxify itself. This can lead to withdrawal symptoms, including anxiety, tremors, sweating, and nausea.1 Symptoms of alcohol withdrawal can range from mild to severe.    While not everyone who quits drinking will experience withdrawal, you are more likely to have symptoms if you have been drinking for a long time, if you drink heavily, and/or if you drink frequently.     Some people who stop drinking may experience delirium tremens (DT). This condition is serious and may lead to symptoms such as high blood pressure, tremors, and seizures.  It is potentially life-threatening, so it is essential to seek medical attention immediately if you experience such symptoms.8    You May Have Trouble Sleeping  One of the most common side effects of giving up alcohol is insomnia.This is because alcohol acts as a sedative, so when it's no longer in your system, you may have trouble falling asleep or staying asleep.    This may be more pronounced if you often use alcohol as a way to manage existing sleep problems. Unfortunately, while alcohol may make it seem like you are falling asleep quicker, it can also lead to worse sleep quality.    \"Using alcohol to sleep can reduce the time it takes to fall asleep, but during the sleep cycle the quality of sleep is poor. As the night goes on there are more awakenings and reduced amounts of REM sleep,\" says Carloz Hickman MD, PhD, " " and medical director of the [facility] and  of the [place] [facility]. \"Furthermore, consistent use of alcohol to induce sleep only increases the need to use alcohol in the future to get to sleep,\" he explains.    Fortunately, reducing your alcohol use can help improve your sleep over time,9 although you might experience more sleep disturbances for some time after you quit.  If you struggle to sleep, talk to your doctor about solutions that might help.    You May Feel Irritable, Anxious, or Helm  Feeling anxious or irritable is common when you first give up alcohol. This is because alcohol is a central nervous system depressant, so when it's no longer in your system, your body has to adjust to the change.  Along with anxiety and irritability, you may also experience mood swings when you give up alcohol.    You might notice this effect more if you were drinking to manage your mood, such as drinking to temporarily relieve negative emotions or boost positive ones.    Like other symptoms, the impact on mood depends on the amount and duration of your alcohol use. For light or moderate drinking, you might experience a more temporary, mild effect on mood. If you've been drinking heavily or for long periods, the impact will be more pronounced, and it will take longer for your neurotransmitter systems to restore their balance in your body.    You May Think More Clearly  Another benefit of giving up alcohol is that your mind may be clearer. This is because alcohol can cause changes in brain chemistry, which can lead to cognitive problems.    Drinking alcohol can contribute to a variety of cognitive issues, including poor memory, slow reaction time, impaired impulse control, and poor concentration. Over time, drinking can also damage nerve cells and contribute to a loss of brain volume.    \"Research shows that it takes about two to four weeks for cognitive function to improve. " "People find themselves better able to concentrate and remember events. People are less impulsive and better able to plan and organize tasks,\" Dr. Hickman says.    You May Feel Happier  One of the best things about giving up alcohol is that you may find yourself feeling happier overall. This is because alcohol can cause depression, anxiety, and other mental health problems.    Alcohol use disorder frequently occurs alongside other mental health conditions. Pre-existing mental health conditions can sometimes lead people to turn to alcohol to cope with their symptoms. In other cases, long-term alcohol exposure can increase a person's risk of developing a psychiatric illness.    This does not mean that a mental health condition will necessarily go aware when you stop drinking but abstaining from alcohol use can improve your symptoms and lead to better treatment outcomes.  Physical Effects When You Stop Drinking  In addition to the mental benefits you'll experience when you stop drinking, there are also many physical benefits as well. Some of the ways that quitting drinking can improve your physical health and well-being include:    Your Liver Will Start to Repair Itself  Alcohol is a toxin that can damage your liver.3 However, when you stop drinking, your liver will begin to repair itself and the damage will start to reverse.    Your Appetite May Change  When you drink alcohol, even in moderate amounts, it can result in obesity.13 So, when you stop drinking, you may find that you have fewer cravings for food.    Your Risk of Certain Diseases Will Decrease  If you're a heavy drinker, you're at an increased risk of developing certain diseases, such as cancer, heart disease, and stroke.However, when you stop drinking, your risk of developing these diseases decreases.    Your Skin May Improve  One of the surprising side effects of giving up alcohol is that your skin may start to look better. This is because alcohol can " cause dehydration, which can lead to dry, dull skin.So, giving up alcohol can help your skin to look more hydrated and glowing.    You May Have More Energy  Once the initial symptoms of withdrawal have subsided, you may find that you have more energy than you did before you stopped drinking. This is because alcohol is a depressant, so when it's no longer in your system, your body has more energy to work with.    It is worth mentioning that nutritional status improvement occurs when someone strop drinking, which is one big reason why people may see many of the above improvements with sobriety.    Other Benefits When You Stop Drinking  Quitting drinking can have many important benefits for your physical and mental health, but there are many other types of benefits you may experience as well.  You May Have More Money  One of the financial benefits of giving up alcohol is that you'll likely have more money to spend. This is because alcohol is a costly habit, so giving it up can free up some extra cash.    You May Live Longer  One of the most significant benefits of giving up alcohol is that you may increase your lifespan. Alcohol misuse can lead to serious health problems like liver disease and cancer. So, giving up alcohol can help you to avoid these potentially deadly diseases.    While it has been suggested that one or two drinks per day may have health benefits, one meta-analysis found that even moderate intake has serious health risks. Women who had two or more drinks per day and men who had four or more drinks per day had significantly increased mortality.    Both the World Health Organization (WHO)16 and the [place] [employer] on Substance Use and Addiction have determined that there is not safe amount of alcohol.    You May Feel More Productive  One of the unexpected benefits of giving up alcohol is that you may find yourself more productive than before. This is because alcohol can cause fatigue and decreased  motivation, so when you stop drinking, you may find it easier to get things done.    You May have Better Relationships  Alcohol can cause problems in relationships, such as conflicts, communication problems, and trust issues.18 So, giving up alcohol may help you to improve your relationships with friends and family.

## 2024-01-24 NOTE — ED NOTES
"0109 - At start of shift patient was very agitated about having to stay on the BE\C.  Wanted to go to the ED \"to see the goofiness\".  Advised patient that is not a valid reason to go to the ED.  Patient states \"I'm bored, and I have to go home to my dog\".  Spoke at length about her dogs and found out \"wilmer is watching my dogs\".  Provided patient with hold notice and order.  Pt upset but understanding and will speak to MD in morning.  Provided PRN for headache and sleepaid.  Patient awake but relaxing in recliner at this time.    0327 - Patient has been resting off and on for the last two hours, occasionally waking to check the time.  States cot is uncomfortable, and wants to go home.No aggression or agitation noted.      0556 - MSSA completed, score 11 patient requiring 2mg of ativan, only 1mg in pyxis.  Provided 1mg, requested refill from pharmacy, will provide 2nd 1mg when meds arrive.    0622 - Patient has been up since 0430.  Pleasant and friendly with staff, quiet keepin busy with books and puzzles.  Trembling throughout the night with minimal relief from ativan.  States feeling ok, except for a small instance of nausea, zofran effective.        "

## 2024-01-24 NOTE — CONSULTS
Jolynn Rivera MRN# [id number]   Age: 63 year old YOB: 1960   Date of Admission to ED: 1/23/2024         Video Visit Details:     Type of Service:  Telemedicine Visit: The patient's condition can be safely assessed and treated via synchronous audio and visual telemedicine encounter.       Reason for Telemedicine Visit: Tele health video being a way to improve access to care and being established as an effective way to treat mental health conditions. Provided verbal consent to conduct today's visit via telehealth and attested to being located in a private space where confidentiality will be protected for the session     Originating Site (Patient Location): Emergency Department [place] emergency room [facility]     Distant Site (Provider Location): [facility] emergency room  Consent:    The patient/guardian has been notified of the following:    This telemedicine visit is conducted live between you and your clinician. We have found that certain health care needs can be provided without the need for a physical exam. This service lets us provide the care you need with a telemedicine conversation.      The patient/guardian has verbally consented to: the potential risks and benefits of telemedicine (video visit) versus in person care; bill my insurance or make self-payment for services provided; and responsibility for payment of non-covered services.      Mode of Communication:  Video Conference via [employer]     As the provider I attest to compliance with applicable laws and regulations related to telemedicine.             Reason for Consult:   This note is being entered to supplement the psychiatry consultation note that was completed on January 23, 2024 by the licensed mental health professional Evie Christopher LGSW  have reviewed  the pertinent clinical details related to their encounter. I am being consulted to offer additional guidance on  "psychiatric pharmacological interventions      Writer met patient through [employer] video system with Tonie SALGADO, patient was alert and oriented x 4 pleasant and cooperative during assessment and interview.  Currently patient denied any suicidal ideation or homicidal ideation or self-injury behavior.  Patient said \" I made suicidal statements to get attention of the nurses, when I get out of here take care of my new dog, I have every service available to me such as a ARMS worker, ILS, therapist case management now psychiatry.\"  Patient said recently a lot of things going on her life such as her mother passing away her cat was passing away and the dog passing away and her 30 years and passing away made her sad and depressed.  Patient states she have every reason to live, she have a new dog to care for, patient also said she have history of withdrawal, suggests shakiness tremors and seizure.  Writer reiterated to the patient right now we worry about having seizure from alcohol withdrawal patient said she has been drinking on any beers.  Patient told as if she feels unsafe she will come back to emergency room as she always has been doing.    Patient grossly appears to be cognitively intact. Insight and judgement have improved since admission. Patient is aware of consequences of medication non-compliance .  Patient has not exhibited aggressive or violence behaviors since prior to this admission. Has notable risk factors for self-harm, including substance use and trauma, past history of attempt. However, risk is mitigated by ability to volunteer a safety plan and history of seeking help when needed. Patient does not have immediate access to firearms. Therefore, based on all available evidence including the factors cited above, she  does not appear to be at imminent risk for self-harm, does not meet criteria for civil commitment and therefore remains appropriate for ongoing outpatient level of care. Patient agreed " to further reduce risk of self-harm by adhering to the safety plan and agreed to remain medication adherent. Additional steps taken to minimize risk include:close psychiatric follow up and provision of crisis resources. Patient expressed understanding of risk associated with medication non-adherence including increased risk of harm to self or others.       Protective Factors: strong bond to family unit, community support,  responsibilities and duties to others, including pets (dog) , lives in a responsibly safe and stable environment, good treatment engagement, sense of importance of health and wellness, able to access care without barriers, supportive ongoing medical and mental health care relationships, help seeking, good problem-solving, coping, and conflict resolution skills, sense of belonging, sense of self-efficacy and/or positive self-esteem, cultural, spiritual , or Anabaptism beliefs associated with meaning and value in life, optimistic outlook - identification of future goals, constructive use of leisure time, enjoyable activities, resilience and reality testing ability    I have reviewed the nursing notes. I have reviewed the findings, diagnosis, plan and need for follow up with the patient.           HPI:          [facility] who [facility] the [facility], that was signed out to me by the night physician in the main emergency department at shift change.  The patient has been evaluated by mental health, completed a DEC assessment, and deemed in need of admission pending stabilization or other change in status.  Patient remains in the [facility] while awaiting inpatient bed placement.  The patient has been medically cleared, and home medications are being administered.  Any necessary psychiatric consultation has been ordered.  I will be available to manage this patient for any acute issues that should arise until the provider in the [facility] arrives to assume  care today, at which time they will assume care of the patient.  I will continue to be available and address any pending mental health recommendations or medical issues, that should arise either by nursing, the psychiatric consultant, or by the behavioral extended care team, until the time of that signout.                 Pt has not required locked seclusion or restraints in the past 24 hours to maintain safety, please refer to RN documentation for further details.  Substance use does  appear to be playing a contributing role in the patient's presentation.  Brief Therapeutic Intervention(s):   Provided active listening, unconditional positive regard, and validation. Engaged in cognitive restructuring/ reframing, looked at common cognitive distortions and challenged negative thoughts.Engaged in guided discovery, explored patient's perspectives and helped expand them through socratic dialogue. Provided positive reinforcement for progress towards goals, gains in knowledge, and application of skills previously taught.  Engaged in social skills training. Explored and identified early warning signs to anger.        Past Psychiatric History:     DEC  note        Substance Use and History:     Severe alcohol use disorder        Past Medical History:   PAST MEDICAL HISTORY:   Past Medical History:   Diagnosis Date    Anxiety     COPD (chronic obstructive pulmonary disease) (H)     COPD (chronic obstructive pulmonary disease) (H)     Hepatitis C     PTSD (post-traumatic stress disorder)     Seizures (H)     second to ETOH    Substance abuse (H)        PAST SURGICAL HISTORY:   Past Surgical History:   Procedure Laterality Date    LAPAROSCOPIC TUBAL LIGATION      ORTHOPEDIC SURGERY      Left knee    TONSILLECTOMY                 Allergies:     Allergies   Allergen Reactions    Amlodipine      Other reaction(s): Nightmares    Bee Venom      Other reaction(s): Edema    Clonidine Unknown    Prednisone Anxiety     Patient  "stated that prednisone causes \"bad panic attacks\".  Patient stated that prednisone causes \"bad panic attacks\".  Patient stated that prednisone causes \"bad panic attacks\".      Antihistamines, Chlorpheniramine-Type [Antihistamines, Chlorpheniramine-Type]     Compazine      Lock jaw; all medications ending with -zine    Diphenhydramine      Muscle Cramps    Penicillins      Panic attack    Prochlorperazine      Muscle cramps    Trazodone Nausea and Vomiting     \" I ended up falling and feeling like I was drunk\"    Wasps [Hornets]      Swelling              Medications:   I have reviewed this patient's current medications  Current Facility-Administered Medications   Medication    calcium carbonate-vitamin D (CALTRATE) 600-10 MG-MCG per tablet 1 tablet    eszopiclone (LUNESTA) tablet 3 mg    gabapentin (NEURONTIN) tablet 600 mg    levothyroxine (SYNTHROID/LEVOTHROID) tablet 100 mcg    LORazepam (ATIVAN) tablet 1-4 mg    melatonin tablet 5 mg    multivitamin w/minerals (THERA-VIT-M) tablet 1 tablet    nicotine polacrilex (NICORETTE) gum 4 mg    ondansetron (ZOFRAN ODT) ODT tab 4 mg    pantoprazole (PROTONIX) EC tablet 40 mg    propranolol (INDERAL) tablet 20 mg    thiamine (B-1) tablet 100 mg     Current Outpatient Medications   Medication Sig    calcium carbonate-vitamin D (CALTRATE) 600-10 MG-MCG per tablet Take 1 tablet by mouth 2 times daily (with meals)    eszopiclone (LUNESTA) 3 MG tablet Take 3 mg by mouth at bedtime    gabapentin (NEURONTIN) 600 MG tablet Take 600 mg by mouth 3 times daily    levothyroxine (SYNTHROID/LEVOTHROID) 100 MCG tablet Take 100 mcg by mouth daily    melatonin 5 MG tablet Take 5 mg by mouth at bedtime Takes 1 hour prior to Lunesta    multivitamin w/minerals (THERA-VIT-M) tablet Take 1 tablet by mouth daily    nicotine polacrilex (NICORETTE) 4 MG gum Place 4 mg inside cheek every hour as needed for smoking cessation    omeprazole (PRILOSEC) 40 MG DR capsule Take 40 mg by mouth daily    " "ondansetron (ZOFRAN ODT) 4 MG ODT tab Take 4 mg by mouth every 8 hours as needed for vomiting or nausea    propranolol (INDERAL) 20 MG tablet Take 20 mg by mouth 3 times daily    thiamine (B-1) 100 MG tablet Take 1 tablet (100 mg) by mouth daily              Family History:   FAMILY HISTORY:   Family History   Problem Relation Age of Onset    Anxiety Disorder Mother     Asthma Mother     Alcohol/Drug Father     Prostate Cancer Father     Arthritis Father     Alcohol/Drug Brother     Alcohol/Drug Brother     Hypertension Brother     Alcohol/Drug Brother     Depression Brother     Psychotic Disorder Brother             Social History:        - Collateral information from the famly/friend: none         PTA Medications:   (Not in a hospital admission)         Allergies:     Allergies   Allergen Reactions    Amlodipine      Other reaction(s): Nightmares    Bee Venom      Other reaction(s): Edema    Clonidine Unknown    Prednisone Anxiety     Patient stated that prednisone causes \"bad panic attacks\".  Patient stated that prednisone causes \"bad panic attacks\".  Patient stated that prednisone causes \"bad panic attacks\".      Antihistamines, Chlorpheniramine-Type [Antihistamines, Chlorpheniramine-Type]     Compazine      Lock jaw; all medications ending with -zine    Diphenhydramine      Muscle Cramps    Penicillins      Panic attack    Prochlorperazine      Muscle cramps    Trazodone Nausea and Vomiting     \" I ended up falling and feeling like I was drunk\"    Wasps [Hornets]      Swelling           Labs:     Recent Results (from the past 48 hour(s))   Comprehensive metabolic panel    Collection Time: 01/22/24  1:54 PM   Result Value Ref Range    Sodium 134 (L) 135 - 145 mmol/L    Potassium 4.0 3.4 - 5.3 mmol/L    Carbon Dioxide (CO2) 24 22 - 29 mmol/L    Anion Gap 15 7 - 15 mmol/L    Urea Nitrogen 6.7 (L) 8.0 - 23.0 mg/dL    Creatinine 0.63 0.51 - 0.95 mg/dL    GFR Estimate >90 >60 mL/min/1.73m2    Calcium 9.4 8.8 - 10.2 " mg/dL    Chloride 95 (L) 98 - 107 mmol/L    Glucose 106 (H) 70 - 99 mg/dL    Alkaline Phosphatase 49 40 - 150 U/L    AST 47 (H) 0 - 45 U/L    ALT 26 0 - 50 U/L    Protein Total 8.2 6.4 - 8.3 g/dL    Albumin 4.6 3.5 - 5.2 g/dL    Bilirubin Total 0.4 <=1.2 mg/dL   Lipase    Collection Time: 01/22/24  1:54 PM   Result Value Ref Range    Lipase 33 13 - 60 U/L   Magnesium    Collection Time: 01/22/24  1:54 PM   Result Value Ref Range    Magnesium 1.8 1.7 - 2.3 mg/dL   CBC with platelets and differential    Collection Time: 01/22/24  1:54 PM   Result Value Ref Range    WBC Count 6.1 4.0 - 11.0 10e3/uL    RBC Count 4.55 3.80 - 5.20 10e6/uL    Hemoglobin 15.4 11.7 - 15.7 g/dL    Hematocrit 43.6 35.0 - 47.0 %    MCV 96 78 - 100 fL    MCH 33.8 (H) 26.5 - 33.0 pg    MCHC 35.3 31.5 - 36.5 g/dL    RDW 11.9 10.0 - 15.0 %    Platelet Count 239 150 - 450 10e3/uL    % Neutrophils 46 %    % Lymphocytes 39 %    % Monocytes 12 %    % Eosinophils 2 %    % Basophils 1 %    % Immature Granulocytes 0 %    NRBCs per 100 WBC 0 <1 /100    Absolute Neutrophils 2.8 1.6 - 8.3 10e3/uL    Absolute Lymphocytes 2.4 0.8 - 5.3 10e3/uL    Absolute Monocytes 0.7 0.0 - 1.3 10e3/uL    Absolute Eosinophils 0.1 0.0 - 0.7 10e3/uL    Absolute Basophils 0.0 0.0 - 0.2 10e3/uL    Absolute Immature Granulocytes 0.0 <=0.4 10e3/uL    Absolute NRBCs 0.0 10e3/uL   Urine Drug Screen Panel    Collection Time: 01/22/24  2:49 PM   Result Value Ref Range    Amphetamines Urine Screen Negative Screen Negative    Barbituates Urine Screen Negative Screen Negative    Benzodiazepine Urine Screen Negative Screen Negative    Cannabinoids Urine Screen Negative Screen Negative    Cocaine Urine Screen Negative Screen Negative    Fentanyl Qual Urine Screen Negative Screen Negative    Opiates Urine Screen Negative Screen Negative    PCP Urine Screen Negative Screen Negative   Alcohol breath test POCT    Collection Time: 01/23/24  1:52 PM   Result Value Ref Range    Alcohol Breath Test  0.28 (A) 0.00 - 0.01   HCG qualitative urine (UPT)    Collection Time: 01/23/24  2:34 PM   Result Value Ref Range    hCG Urine Qualitative Negative Negative   UA with Microscopic reflex to Culture    Collection Time: 01/23/24  2:34 PM    Specimen: Urine, Midstream   Result Value Ref Range    Color Urine Light Yellow Colorless, Straw, Light Yellow, Yellow    Appearance Urine Clear Clear    Glucose Urine Negative Negative mg/dL    Bilirubin Urine Negative Negative    Ketones Urine Negative Negative mg/dL    Specific Gravity Urine 1.009 1.003 - 1.035    Blood Urine Negative Negative    pH Urine 6.5 5.0 - 7.0    Protein Albumin Urine 10 (A) Negative mg/dL    Urobilinogen Urine Normal Normal, 2.0 mg/dL    Nitrite Urine Negative Negative    Leukocyte Esterase Urine Negative Negative    Bacteria Urine Few (A) None Seen /HPF    Mucus Urine Present (A) None Seen /LPF    RBC Urine 1 <=2 /HPF    WBC Urine <1 <=5 /HPF    Squamous Epithelials Urine <1 <=1 /HPF   Asymptomatic COVID-19 Virus (Coronavirus) by PCR Nasopharyngeal    Collection Time: 01/23/24  3:06 PM    Specimen: Nasopharyngeal; Swab   Result Value Ref Range    SARS CoV2 PCR Negative Negative          Physical and Psychiatric Examination:     BP (!) 176/92   Pulse 54   Temp 98  F (36.7  C) (Oral)   Resp 16   SpO2 96%   Weight is 0 lbs 0 oz  There is no height or weight on file to calculate BMI.    Mental Status Exam:  Appearance: awake, alert  Attitude:  cooperative  Eye Contact:  good  Mood:  sad  and depressed  Affect:  appropriate and in normal range  Speech:  clear, coherent  Language: fluent and intact in English  Psychomotor, Gait, Musculoskeletal:  no evidence of tardive dyskinesia, dystonia, or tics  Thought Process:  logical, linear, and goal oriented  Associations:  no loose associations  Thought Content:  no evidence of suicidal ideation or homicidal ideation and no evidence of psychotic thought  Insight:  limited  Judgement:  intact  Oriented to:   time, person, and place  Attention Span and Concentration:  intact  Recent and Remote Memory:  intact  Fund of Knowledge:  appropriate         Diagnoses:      Alcohol intoxication (H24)  Alcohol withdrawal syndrome without complication (H)  Suicidal ideation         Recommendations:   1.  Discharge back to  home per ED provider and LMHP  2.  Drop 72-hour hold and does not meet civil commitment criteria  3.  Continue gabapentin propranolol  4.  Consult psychiatry as needed  5.   Refer to psychiatric provider for medication management.   treatment per ED team  Consulted with Tonie WARE, ED physician  Marc asked if they would like this writer to enter orders in the EHR patient's ED RN regarding this case.    Please call [facility]/DEC at [phone] if you have follow-up questions or wish to place another consult.  Talat Zavaleta Psychiatric Nurse practitioner    Attestation:  Time with:  Patient:5 minutes  Treatment Team: 25 Minutes  Chart Review: 20 minutes    Total time spent was 60 minutes. Over 50% of times was spent counseling and coordination of care.     thank  primary ED provider and the [facility] care team very much for letting me participate in the care of this patient.    I, Talat Zavaleta, CNP, APRN, Psychiatric Nurse Practitioner have personally performed an examination of this patient.  I have edited the note to reflect all relevant changes.  I have discussed this patient with the care team January 24, 2024.  I have reviewed all vitals and laboratory findings.    Disclaimer: This note consists of symbols derived from keyboarding,

## 2024-01-24 NOTE — ED NOTES
Patient reports chronic tremor and chronic insomnia. Patient calm and cooperative, interacting with peers in the milieu, working on activity crossword puzzle. Makes needs well known to staff. Patient denies SI/HI/SIB.

## 2024-01-24 NOTE — ED NOTES
Pt is pleasant. She is displaying symptoms of withdrawals. PRN Ativan administered as indicated. She denies pain. VSS. Denies auditory or visual hallucinations. She does not appear to be responding to internal or external stimuli. She denies suicidal or homicidal thoughts, plans, actions or intentions. She took medication as scheduled. Was observed engaging with other peers. No behavior concerns. Staff will continue to monitor.

## 2024-01-24 NOTE — PROGRESS NOTES
Triage & Transition Services, Extended Care     Client Name: Jolynn Rivera    Date: January 24, 2024    Service Type: refused to attend group session  Session Start Time:  1100    Session End Time: 1119  Session Length: 0  Site Location: [facility] EMERGENCY DEPARTMENT                             [id number]    Total Number ofAttendees: 0  Topic: self-care activities, coping skills/lifestyle management, relaxation techniques   Response: (Did not participate)     COLLIN Lee   MSW Intern, Extended Care  [phone]

## 2024-01-24 NOTE — ED NOTES
Pt admitted to BEC 09 and oriented to unit. Pt calm, cooperative. She denies SI/SIB/HI thoughts, ideations, or gestures. VSS, denies pain. Pt remains behaviorally controlled.

## 2024-01-24 NOTE — ED NOTES
IP MH Referral Acuity Rating Score (RARS)    LMHP complete at referral to IP MH, with DEC; and, daily while awaiting IP MH placement. Call score to PPS.  CRITERIA SCORING   New 73 HH and Involuntary for IP MH (not adolescent) 1/1   Boarding over 24 hours 0/1   Vulnerable adult at least 55+ with multiple co morbidities; or, Patient age 11 or under 1/1   Suicide ideation without relief of precipitating factors 1/1   Current plan for suicide 1/1   Current plan for homicide 0/1   Imminent risk or actual attempt to seriously harm another without relief of factors precipitating the attempt 0/1   Severe dysfunction in daily living (ex: complete neglect for self care, extreme disruption in vegetative function, extreme deterioration in social interactions) 1/1   Recent (last 2 weeks) or current physical aggression in the ED 0/1   Restraints or seclusion episode in ED 0/1   Verbal aggression, agitation, yelling, etc., while in the ED 0/1   Active psychosis with psychomotor agitation or catatonia 0/1   Need for constant or near constant redirection (from leaving, from others, etc).  0/1   Intrusive or disruptive behaviors 0/1   TOTAL Acuity Total Score: 5

## 2024-01-24 NOTE — TELEPHONE ENCOUNTER
S: BEC , DEC  Gwendolyn  calling at 7:13pm about a 63 year old/Female presenting with SI with a plan.       B: Pt arrived via  ILS worker . Presenting problem, stressors: Pt came in for alcohol relapse, SI with a plan.  Pt reports having several losses (death) of family and friends within the last year but recently 2 weeks ago.  Pt has a plan to over dose on her psych meds.  Pt lives alone and has an ILS worker.    Pt affect in ED: Anxious  and Tearful, calm and cooperative.   Pt Dx: Major Depressive Disorder, PTSD, and alcohol use disorder.  Previous [facility] hx? Yes: 5/9/23 was most recent.  She had a lot of medical admissions.   Pt endorses SI with a plan to overdose.     Hx of suicide attempt? Yes: 1 SA in her adolescent but didn't say further what it was.   Pt denies SIB  Pt denies HI   Pt denies hallucinations .   Pt RARS Score: 4    Hx of aggression/violence, sexual offenses, legal concerns, Epic care plan? describe: NO  Current concerns for aggression this visit? No  Does pt have a history of Civil Commitment? No  Is Pt their own guardian? Yes    Pt is prescribed medication. Is patient medication compliant? Yes  Pt endorses OP services: Psychiatrist, Therapist, and ILS Worker  CD concerns: Actively using/consuming alcohol.  She uses 10 beers/daily or more(not reporting).  Acute or chronic medical concerns: No.  But she has a hx of alcohol w/d seizures.  RN note states that her last w/d seizure was 3 days ago.   Does Pt present with specific needs, assistive devices, or exclusionary criteria? None      Pt is ambulatory  Pt is able to perform ADLs independently      A: Pt to be reviewed for [facility] admission. Pt is Voluntary  Preferred placement: [facility], prefers [place].     COVID Symptoms: No.  But COVID is negative.   If yes, COVID test required   Utox: Negative   CMP: Abnormalities: AST47  CBC: WNL  HCG: Negative    R: Patient cleared and ready for behavioral bed placement: Yes  Pt placed on [facility] worklist?  Yes    Does Patient need a Transfer Center request created? No, Pt is located within [facility] ED, [facility] ED, or [place] ED     R: [facility] Access Inpatient Bed Call Log 1/23/2024 9PM   Intake has called facilities that have not updated their bed status within the last 12 hours.        [facility] only:     ADULTS:    [facility] is posting 0 beds.                [facility] is posting 0 beds. [phone]      [facility] is posting 0 beds. [phone]                [facility] is posting 0 beds. [phone] Per call at 8:30am to [place] callback after 9am.   [facility] is posting 0 beds. [phone]              [facility] is posting 0 beds. [phone]     [facility] (These are Young Adult beds, 18-26) is posting 4 beds. Negative COVID test required, no recent or significant aggression, violence, or sexual assault. [phone] Per Z[place], some YA beds, double occupancy adol beds, and no child beds. No veto beds.    [facility] is posting 0 beds. [phone]              [facility] is posting 0 beds. [phone]       [facility] through [facility] is posting 1 beds. [phone]  -Per Ilana, they are at capacity.  There is 1 female bed that is low acuity only in [place].        Pt remains on work list pending appropriate bed availability.

## 2024-01-24 NOTE — PLAN OF CARE
"Jolynn Rivera  January 23, 2024  Plan of Care Hand-off Note     Patient Care Path: inpatient mental health    Plan for Care:   The pt arrived via her ILS worker due to alcohol abuse, suicidal ideation, and worsening psychosocial stressors, and significant recent loss. The pt reported she \"started drinking again\", reporting she was sober for 5.5 years and relapsed due to several losses of friends, family, and pets. The pt reported her mother passed away, then her dog passed away, then her cat, and then her best friend was murdered a few weeks ago, which pt reported \"sent her over the edge\". The pt reported she has been consuming ten beers a day and came to the ED because she \"didn't feel safe\" and is worried about withdrawing, as she has a history of withdrawal seizures. The pt reported she has had thoughts of overdosing on her evening psychiatric medications, noting she \"wants to take a whole bottle right now\". Pt is tearful throughout assessment, and intermittently rocking while sitting in her chair. Pt's ILS worker was present in assessment, and added necessary information in assessment. Pt reported SI, denied SIB / HI / AVH. Pt reported she was here yesterday, but was \"asked to leave because she wasn't going through withdrawal\". Pt requesting detox, however due to several COVID-19 positive cases, the unit is closed. Writer discussed crisis residence as an option w/ pt, however due to her history of severe alcohol withdrawal symptoms, including seizures, that would not be a successful disposition. Writer, pt, and pt's ILS worker discussed IP MH, due to her significant depression, suicidal ideation, and overall inability to care for self. Pt's withdrawal symptoms will also be monitored while in ED awaiting IP MH. MD agreeable to plan.    Identified Goals and Safety Issues:  ETOH withdrawal , SI    Legal Status: Legal Status at Admission: Voluntary/Patient has signed consent for treatment    Psychiatry Consult: " No     Updated MD JESSICA regarding plan of care.      MARTIN Serra

## 2024-01-24 NOTE — TELEPHONE ENCOUNTER
R: [facility] Access Inpatient Bed Call Log 1/24/24 9:20 AM   Intake has called facilities that have not updated their bed status within the last 12 hours.      ADULTS:    [facility] is posting 0 beds.                [facility] is posting 0 beds. [phone]      [facility] is posting 0 beds. [phone]                [facility] is posting 0 beds. [phone] Per call at 9:29am to [facility] poss a low acuity.   [facility] is posting 0 beds. [phone]              [facility] is posting 0 beds. [phone]     [facility] (These are Young Adult beds, 18-26) is posting 4 beds. Negative COVID test required, no recent or significant aggression, violence, or sexual assault. [phone] Per call at 9:29am to [facility] and adol available and no child or veto beds.   [facility] is posting 0 beds. [phone]              [facility] is posting 0 beds. [phone]       [facility] is posting 1 beds. [phone]          Pt remains on work list pending appropriate bed availability.       11:25 AM Per Tonie Valdes via Teams Message Pt is discharging home.    Intake will remove from [facility] worklist.

## 2024-01-24 NOTE — PROGRESS NOTES
"Writer met w/ pt x2 in [facility] tonight. At about 10PM, RN informed writer that pt was asking to leave. When writer met w/ pt, she appeared somewhat confused, stating, \"My ILS worker is going to come here\", writer responded, \"here in [facility]?\" She stated, \"no, she's taking care of my dog at my house to make sure I don't have seizures\". Pt reported, \"I want to go to that one detox / treatment center, [place]\". Writer asked pt if she was hoping to get there tonight, she stated, \"No, I'm on the wait list\". Pt was agreeable to staying the night, and understood that a further plan will be discussed in the morning, if she is wanting to be removed from the IP work list.     At about 11:15PM, RN informed writer that pt was wanting to leave again. Writer called MD, and expressed concern that pt may be experiencing delirium / confusion due to withdrawal, but unsure what her baseline is like. MD reported per report from past MD, there was enough concern for CD / MH concerns, and put pt on 72HH. Writer went to discuss this w/ pt, who was informed by RN she was placed on 72HH. ILS worker was present with pt, and pt was agitated, stating she \"will leave on a hold, she's done it before\". Pt stated, \"I never asked to go to inpatient\". Writer reminded pt of conversation had earlier about detox being closed, and pt requesting inpatient and writer being agreeable due to suicidal ideation w/ plan. Pt stated, \"I never said that\". Pt reported several times her concern of sleeping in an open unit. Writer asked if she wanted to go back to the ED, she stated, \"hell no, that place is crazy\". Writer informed pt she is not leaving tonight, and a therapist can discuss with her options for detox tomorrow if the hold is discontinued. Writer explained to pt she can also meet with a CD counselor if she is wanting a CD assessment. Pt stated, \"I'm not doing anything, they will keep me here for 72 hours. I'll rot\".     Writer leaving pt on IP  work list due " to wavering in decisions and current agitation. If pt not suicidal tomorrow, writer agreeable to detox placement if an MD is comfortable removing 72HH.     Writer updated intake pt is on 72HH.     MARTIN Dias  DEC - Triage & Transition Services  Callback: [phone]

## 2024-01-24 NOTE — ED NOTES
Reviewed AVS with patient and patient states understanding. Patient discharged to home via taxi with belongings.

## 2024-01-24 NOTE — ED PROVIDER NOTES
Patient is asking to go and both psychiatry consult provider and extended care feel she is safe to go.  She will continue on propranolol and gabapentin. She has a psychiatrist and will continue to work with them.  She has an emdr therapist and also ILS worker. She knows of cd resources and is able to access them. She denies si.  She does not need med refills. Death of a friend was a trigger for her relapse. She was able contract for safety and able to lead safety planning herself.          Nita Hollingsworth MD  01/24/24 8386

## 2024-01-24 NOTE — ED PROVIDER NOTES
[facility] who [facility], that was signed out to me by the night physician in the main emergency department at shift change.  The patient has been evaluated by mental health, completed a DEC assessment, and deemed in need of admission pending stabilization or other change in status.  [facility] remains in the [facility] while awaiting inpatient bed placement.  The patient has been medically cleared, and home medications are being administered.  Any necessary psychiatric consultation has been ordered.  I will be available to manage this patient for any acute issues that should arise until the provider in the [facility] arrives to assume care today, at which time they will assume care of the patient.  I will continue to be available and address any pending mental health recommendations or medical issues, that should arise either by nursing, the psychiatric consultant, or by the behavioral extended care team, until the time of that signout.       Trever Akins MD  01/24/24 0722

## 2024-01-24 NOTE — CONSULTS
"Diagnostic Evaluation Consultation  Crisis Assessment    Patient Name: Jolynn Rivera  Age:  63 year old  Legal Sex: female  Gender Identity: female  Pronouns: she/her/hers  Race: White  Ethnicity: Not  or   Language: English    Patient was assessed: In person      Patient location: [facility] EMERGENCY DEPARTMENT                             [id number]    Referral Data and Chief Complaint  Jolynn Rivera presents to the ED by  self. Patient is presenting to the ED for the following concerns: Substance use, Worsening psychosocial stress.   Factors that make the mental health crisis life threatening or complex are:  The pt arrived via her ILS worker due to alcohol abuse, suicidal ideation, and worsening psychosocial stressors, and significant recent loss. The pt reported she \"started drinking again\", reporting she was sober for 5.5 years and relapsed due to several losses of friends, family, and pets. The pt reported her mother passed away, then her dog passed away, then her cat, and then her best friend was murdered a few weeks ago, which pt reported \"sent her over the edge\". The pt reported she has been consuming ten beers a day and came to the ED because she \"didn't feel safe\" and is worried about withdrawing, as she has a history of withdrawal seizures. The pt reported she has had thoughts of overdosing on her evening psychiatric medications, noting she \"wants to take a whole bottle right now\". Pt is tearful throughout assessment, and intermittently rocking while sitting in her chair. Pt's ILS worker was present in assessment, and added necessary information in assessment. Pt reported SI, denied SIB / HI / AVH. Pt reported she was here yesterday, but was \"asked to leave because she wasn't going through withdrawal\".    Informed Consent and Assessment Methods  Explained the crisis assessment process, including applicable information disclosures and limits to confidentiality, assessed " understanding of the process, and obtained consent to proceed with the assessment.  Assessment methods included conducting a formal interview with patient, review of medical records, collaboration with medical staff, and obtaining relevant collateral information from family and community providers when available.  : done     Patient response to interventions: acceptance expressed  Coping skills were attempted to reduce the crisis:  Pt willing to come to ED in crisis     History of the Crisis   Pt has a history of depression, PTSD, and alcohol use disorder. Pt reported she relapsed on ETOH 1 month ago, however pt's ILS worker reported she has been drinking on and off since they have worked together over the last few months.    Brief Psychosocial History  Family:   , Children no  Support System:     Employment Status:  unemployed  Source of Income:  none  Financial Environmental Concerns:  none  Current Hobbies:  family functions  Barriers in Personal Life:  mental health concerns, lack of companionship    Significant Clinical History  Current Anxiety Symptoms:  anxious  Current Depression/Trauma:  apathy, avoidance, sadness, hopelessness, helplessness, low self esteem, impaired decision making, sense of doom, difficulty concentrating, withdrawl/isolation, negativistic, crying or feels like crying, excessive guilt, thoughts of death/suicide, irritable  Current Somatic Symptoms:  anxious, racing thoughts  Current Psychosis/Thought Disturbance:     Current Eating Symptoms:     Chemical Use History:  Alcohol: Daily, Binge  Benzodiazepines: None  Opiates: None  Cocaine: None  Marijuana: None  Other Use: None   Past diagnosis:  Depression, Substance Use Disorder  Family history:     Past treatment:  Individual therapy, Primary Care, Psychiatric Medication Management, ILS worker. Most recent [facility] hospitalization 5/2023.     Collateral Information  Is there collateral information: Yes     Collateral information name,  relationship, phone number:  Writer JORJE for pt's brother, Iam [phone].     Writer also spoke to pt's ILS worker, Iwona who provided information throughout assessment regarding pt. She brought pt to ED.    Risk Assessment  [place] Suicide Severity Rating Scale Full Clinical Version:  Suicidal Ideation  Q1 Wish to be Dead (Lifetime): Yes  Q2 Non-Specific Active Suicidal Thoughts (Lifetime): Yes  3. Active Suicidal Ideation with any Methods (Not Plan) Without Intent to Act (Lifetime): No  Q4 Active Suicidal Ideation with Some Intent to Act, Without Specific Plan (Lifetime): No  Q5 Active Suicidal Ideation with Specific Plan and Intent (Lifetime): No  Q6 Suicide Behavior (Lifetime): yes     Suicidal Behavior (Lifetime)  Actual Attempt (Lifetime): Yes  Total Number of Actual Attempts (Lifetime): 1  Has subject engaged in non-suicidal self-injurious behavior? (Lifetime): Yes  Interrupted Attempts (Lifetime): No  Aborted or Self-Interrupted Attempt (Lifetime): No  Preparatory Acts or Behavior (Lifetime): No    [place] Suicide Severity Rating Scale Recent:   Suicidal Ideation (Recent)  Q1 Wished to be Dead (Past Month): yes  Q2 Suicidal Thoughts (Past Month): yes  Q3 Suicidal Thought Method: yes  Q4 Suicidal Intent without Specific Plan: yes  Q5 Suicide Intent with Specific Plan: yes  Level of Risk per Screen: high risk  Intensity of Ideation (Recent)  Most Severe Ideation Rating (Past 1 Month): 3  Frequency (Past 1 Month): Less than once a week  Duration (Past 1 Month): Fleeting, few seconds or minutes  Controllability (Past 1 Month): Can control thoughts with some difficulty  Deterrents (Past 1 Month): Deterrents probably stopped you  Reasons for Ideation (Past 1 Month): Equally to get attention, revenge, or a reaction from others and to end/stop the pain  Suicidal Behavior (Recent)  Actual Attempt (Past 3 Months): No  Has subject engaged in non-suicidal self-injurious behavior? (Past 3 Months): No  Interrupted  Attempts (Past 3 Months): No  Aborted or Self-Interrupted Attempt (Past 3 Months): No  Preparatory Acts or Behavior (Past 3 Months): Yes  Total Number of Preparatory Acts (Past 3 Months): 1  Preparatory Acts or Behavior Description (Past 3 Months): Thinking of ways to kill self via overdose    Environmental or Psychosocial Events: work or task failure, challenging interpersonal relationships, helplessness/hopelessness  Protective Factors: Protective Factors: strong bond to family unit, community support, or employment, intact marriage or domestic partnership, sense of belonging, good problem-solving, coping, and conflict resolution skills, good impulse control, help seeking    Does the patient have thoughts of harming others? Feels Like Hurting Others: no  Previous Attempt to Hurt Others: no  Is the patient engaging in sexually inappropriate behavior?: no    Is the patient engaging in sexually inappropriate behavior?  no        Mental Status Exam   Affect: Appropriate  Appearance: Appropriate  Attention Span/Concentration: Attentive  Eye Contact: Engaged    Fund of Knowledge: Appropriate   Language /Speech Content: Fluent  Language /Speech Volume: Normal  Language /Speech Rate/Productions: Normal  Recent Memory: Intact  Remote Memory: Intact  Mood: Depressed, Sad  Orientation to Person: Yes   Orientation to Place: Yes  Orientation to Time of Day: Yes  Orientation to Date: Yes     Situation (Do they understand why they are here?): Yes  Psychomotor Behavior: Normal  Thought Content: Clear, Suicidal  Thought Form: Intact    Medication  Psychotropic medications:   Medication Orders - Psychiatric (From admission, onward)      Start     Dose/Rate Route Frequency Ordered Stop    01/23/24 1720  nicotine polacrilex (NICORETTE) gum 4 mg        Note to Pharmacy: DO NOT USE THIS FIELD FOR ADMIN INSTRUCTIONS; INFORMATION DOES NOT SHOW ON MAR. USE THE FIELD ABOVE MARKED ADMIN INSTRUCTIONS    4 mg Buccal EVERY 1 HOUR PRN 01/23/24  "1720      01/23/24 1343  LORazepam (ATIVAN) tablet 1-4 mg         1-4 mg Oral EVERY 30 MIN PRN 01/23/24 1343               Current Care Team  Patient Care Team:  No Ref-Primary, Physician as PCP - Karon Silva LP as Psychologist (Psychology)  Rosibel Tatum Carly Terese, MD as Resident (Psychiatry)    Diagnosis  Patient Active Problem List   Diagnosis Code    Polysubstance abuse (H) F19.10    Chronic hepatitis C (H) B18.2    COPD (chronic obstructive pulmonary disease) (H) J44.9    GERD (gastroesophageal reflux disease) K21.9    Mood disorder (H24) F39    Chemical dependency (H) F19.20    Posttraumatic stress disorder F43.10    Nicotine abuse Z72.0    Alcohol use disorder, mild, abuse F10.10    Hx of psychiatric care Z92.89    Alcohol withdrawal, uncomplicated (H) F10.930    Sinusitis, unspecified chronicity, unspecified location J32.9    Alcohol withdrawal syndrome without complication (H) F10.930    Elevated LFTs R79.89    Drug withdrawal seizure with complication (H) F19.939, R56.9    Alcohol dependence with intoxication with complication (H) F10.229    Alcoholic ketoacidosis E87.29    Alcohol use disorder F10.90    Vomiting and diarrhea R11.10, R19.7    Seizure (H) R56.9    Alcoholic intoxication with complication (H24) F10.929       Primary Problem This Admission  Active Hospital Problems    Alcohol use disorder      Posttraumatic stress disorder      Clinical Summary and Substantiation of Recommendations   The pt arrived via her ILS worker due to alcohol abuse, suicidal ideation, and worsening psychosocial stressors, and significant recent loss. The pt reported she \"started drinking again\", reporting she was sober for 5.5 years and relapsed due to several losses of friends, family, and pets. The pt reported her mother passed away, then her dog passed away, then her cat, and then her best friend was murdered a few weeks ago, which pt reported \"sent her over the edge\". The pt reported " "she has been consuming ten beers a day and came to the ED because she \"didn't feel safe\" and is worried about withdrawing, as she has a history of withdrawal seizures. The pt reported she has had thoughts of overdosing on her evening psychiatric medications, noting she \"wants to take a whole bottle right now\". Pt is tearful throughout assessment, and intermittently rocking while sitting in her chair. Pt's ILS worker was present in assessment, and added necessary information in assessment. Pt reported SI, denied SIB / HI / AVH. Pt reported she was here yesterday, but was \"asked to leave because she wasn't going through withdrawal\". Pt requesting detox, however due to several COVID-19 positive cases, the unit is closed. Writer discussed crisis residence as an option w/ pt, however due to her history of severe alcohol withdrawal symptoms, including seizures, that would not be a successful disposition. Writer, pt, and pt's ILS worker discussed IP MH, due to her significant depression, suicidal ideation, and overall inability to care for self. Pt's withdrawal symptoms will also be monitored while in ED awaiting IP MH. MD agreeable to plan.    Patient coping skills attempted to reduce the crisis:  Pt willing to come to ED in crisis    Disposition  Recommended disposition: Inpatient Mental Health        Reviewed case and recommendations with attending provider. Attending Name: MD Roberts       Attending concurs with disposition: yes       Patient and/or validated legal guardian concurs with disposition:   yes       Final disposition:  inpatient mental health    Legal status on admission: Voluntary/Patient has signed consent for treatment    Assessment Details   Total duration spent with the patient: 40 min     CPT code(s) utilized: 39952 - Psychotherapy for Crisis - 60 (30-74*) min    MARTIN Serra, Psychotherapist  DEC - Triage & Transition Services  Callback: [phone]          "

## 2024-01-24 NOTE — ED NOTES
Pt is asking to leave. Says she has found a detox place. Updated extended care who came out to talk to pt.

## 2024-03-19 ENCOUNTER — APPOINTMENT (OUTPATIENT)
Dept: GENERAL RADIOLOGY | Facility: CLINIC | Age: 64
DRG: 897 | End: 2024-03-19
Payer: MEDICARE

## 2024-03-19 ENCOUNTER — HOSPITAL ENCOUNTER (INPATIENT)
Facility: CLINIC | Age: 64
LOS: 2 days | Discharge: HOME | DRG: 897 | End: 2024-03-21
Attending: EMERGENCY MEDICINE | Admitting: INTERNAL MEDICINE
Payer: MEDICARE

## 2024-03-19 ENCOUNTER — APPOINTMENT (OUTPATIENT)
Dept: CT IMAGING | Facility: CLINIC | Age: 64
DRG: 897 | End: 2024-03-19
Payer: MEDICARE

## 2024-03-19 DIAGNOSIS — F19.10 PSYCHOACTIVE SUBSTANCE ABUSE (H): Primary | ICD-10-CM

## 2024-03-19 DIAGNOSIS — F10.929 ALCOHOLIC INTOXICATION WITH COMPLICATION (H): ICD-10-CM

## 2024-03-19 DIAGNOSIS — R56.9 CONVULSIONS, UNSPECIFIED CONVULSION TYPE (H): ICD-10-CM

## 2024-03-19 LAB
ALBUMIN SERPL BCG-MCNC: 4.3 G/DL (ref 3.5–5.2)
ALP SERPL-CCNC: 42 U/L (ref 40–150)
ALT SERPL W P-5'-P-CCNC: 22 U/L (ref 0–50)
AMPHETAMINES UR QL SCN: NORMAL
ANION GAP SERPL CALCULATED.3IONS-SCNC: 12 MMOL/L (ref 7–15)
AST SERPL W P-5'-P-CCNC: ABNORMAL U/L
ATRIAL RATE - MUSE: 64 BPM
BARBITURATES UR QL SCN: NORMAL
BASOPHILS # BLD AUTO: 0 10E3/UL (ref 0–0.2)
BASOPHILS NFR BLD AUTO: 1 %
BENZODIAZ UR QL SCN: NORMAL
BILIRUB SERPL-MCNC: 0.4 MG/DL
BUN SERPL-MCNC: 10.1 MG/DL (ref 8–23)
BZE UR QL SCN: NORMAL
CALCIUM SERPL-MCNC: 8.6 MG/DL (ref 8.8–10.2)
CANNABINOIDS UR QL SCN: NORMAL
CHLORIDE SERPL-SCNC: 101 MMOL/L (ref 98–107)
CREAT SERPL-MCNC: 0.52 MG/DL (ref 0.51–0.95)
DEPRECATED HCO3 PLAS-SCNC: 22 MMOL/L (ref 22–29)
DIASTOLIC BLOOD PRESSURE - MUSE: NORMAL MMHG
EGFRCR SERPLBLD CKD-EPI 2021: >90 ML/MIN/1.73M2
EOSINOPHIL # BLD AUTO: 0 10E3/UL (ref 0–0.7)
EOSINOPHIL NFR BLD AUTO: 0 %
ERYTHROCYTE [DISTWIDTH] IN BLOOD BY AUTOMATED COUNT: 12.1 % (ref 10–15)
ETHANOL SERPL-MCNC: 0.24 G/DL
FENTANYL UR QL: NORMAL
GLUCOSE SERPL-MCNC: 97 MG/DL (ref 70–99)
HCG UR QL: NEGATIVE
HCT VFR BLD AUTO: 41.1 % (ref 35–47)
HGB BLD-MCNC: 14.5 G/DL (ref 11.7–15.7)
IMM GRANULOCYTES # BLD: 0 10E3/UL
IMM GRANULOCYTES NFR BLD: 0 %
INTERPRETATION ECG - MUSE: NORMAL
LYMPHOCYTES # BLD AUTO: 2.3 10E3/UL (ref 0.8–5.3)
LYMPHOCYTES NFR BLD AUTO: 46 %
MAGNESIUM SERPL-MCNC: 1.7 MG/DL (ref 1.7–2.3)
MAGNESIUM SERPL-MCNC: 1.9 MG/DL (ref 1.7–2.3)
MCH RBC QN AUTO: 33.9 PG (ref 26.5–33)
MCHC RBC AUTO-ENTMCNC: 35.3 G/DL (ref 31.5–36.5)
MCV RBC AUTO: 96 FL (ref 78–100)
MONOCYTES # BLD AUTO: 0.6 10E3/UL (ref 0–1.3)
MONOCYTES NFR BLD AUTO: 11 %
NEUTROPHILS # BLD AUTO: 2.1 10E3/UL (ref 1.6–8.3)
NEUTROPHILS NFR BLD AUTO: 42 %
NRBC # BLD AUTO: 0 10E3/UL
NRBC BLD AUTO-RTO: 0 /100
OPIATES UR QL SCN: NORMAL
P AXIS - MUSE: 65 DEGREES
PCP QUAL URINE (ROCHE): NORMAL
PHOSPHATE SERPL-MCNC: 3.6 MG/DL (ref 2.5–4.5)
PLATELET # BLD AUTO: 162 10E3/UL (ref 150–450)
POTASSIUM SERPL-SCNC: 4.9 MMOL/L (ref 3.4–5.3)
PR INTERVAL - MUSE: 186 MS
PROT SERPL-MCNC: 7.6 G/DL (ref 6.4–8.3)
QRS DURATION - MUSE: 78 MS
QT - MUSE: 406 MS
QTC - MUSE: 418 MS
R AXIS - MUSE: -2 DEGREES
RBC # BLD AUTO: 4.28 10E6/UL (ref 3.8–5.2)
SODIUM SERPL-SCNC: 135 MMOL/L (ref 135–145)
SYSTOLIC BLOOD PRESSURE - MUSE: NORMAL MMHG
T AXIS - MUSE: 51 DEGREES
VENTRICULAR RATE- MUSE: 64 BPM
WBC # BLD AUTO: 5 10E3/UL (ref 4–11)

## 2024-03-19 PROCEDURE — 83735 ASSAY OF MAGNESIUM: CPT | Performed by: INTERNAL MEDICINE

## 2024-03-19 PROCEDURE — 250N000013 HC RX MED GY IP 250 OP 250 PS 637: Performed by: EMERGENCY MEDICINE

## 2024-03-19 PROCEDURE — HZ2ZZZZ DETOXIFICATION SERVICES FOR SUBSTANCE ABUSE TREATMENT: ICD-10-PCS | Performed by: INTERNAL MEDICINE

## 2024-03-19 PROCEDURE — 258N000003 HC RX IP 258 OP 636: Performed by: INTERNAL MEDICINE

## 2024-03-19 PROCEDURE — 36415 COLL VENOUS BLD VENIPUNCTURE: CPT

## 2024-03-19 PROCEDURE — 84075 ASSAY ALKALINE PHOSPHATASE: CPT

## 2024-03-19 PROCEDURE — 250N000013 HC RX MED GY IP 250 OP 250 PS 637

## 2024-03-19 PROCEDURE — 70450 CT HEAD/BRAIN W/O DYE: CPT | Mod: ME

## 2024-03-19 PROCEDURE — 73080 X-RAY EXAM OF ELBOW: CPT | Mod: RT

## 2024-03-19 PROCEDURE — 36415 COLL VENOUS BLD VENIPUNCTURE: CPT | Performed by: INTERNAL MEDICINE

## 2024-03-19 PROCEDURE — 82077 ASSAY SPEC XCP UR&BREATH IA: CPT

## 2024-03-19 PROCEDURE — 99223 1ST HOSP IP/OBS HIGH 75: CPT | Mod: AI | Performed by: INTERNAL MEDICINE

## 2024-03-19 PROCEDURE — 85025 COMPLETE CBC W/AUTO DIFF WBC: CPT

## 2024-03-19 PROCEDURE — 84155 ASSAY OF PROTEIN SERUM: CPT

## 2024-03-19 PROCEDURE — 250N000013 HC RX MED GY IP 250 OP 250 PS 637: Performed by: INTERNAL MEDICINE

## 2024-03-19 PROCEDURE — 81025 URINE PREGNANCY TEST: CPT

## 2024-03-19 PROCEDURE — 99285 EMERGENCY DEPT VISIT HI MDM: CPT | Mod: FS | Performed by: EMERGENCY MEDICINE

## 2024-03-19 PROCEDURE — 83735 ASSAY OF MAGNESIUM: CPT

## 2024-03-19 PROCEDURE — 99285 EMERGENCY DEPT VISIT HI MDM: CPT | Mod: 25 | Performed by: EMERGENCY MEDICINE

## 2024-03-19 PROCEDURE — 250N000011 HC RX IP 250 OP 636: Performed by: INTERNAL MEDICINE

## 2024-03-19 PROCEDURE — G1010 CDSM STANSON: HCPCS

## 2024-03-19 PROCEDURE — 80307 DRUG TEST PRSMV CHEM ANLYZR: CPT

## 2024-03-19 PROCEDURE — 120N000002 HC R&B MED SURG/OB UMMC

## 2024-03-19 RX ORDER — PROPRANOLOL HYDROCHLORIDE 20 MG/1
20 TABLET ORAL 3 TIMES DAILY
Status: DISCONTINUED | OUTPATIENT
Start: 2024-03-19 | End: 2024-03-21 | Stop reason: HOSPADM

## 2024-03-19 RX ORDER — ONDANSETRON 2 MG/ML
4 INJECTION INTRAMUSCULAR; INTRAVENOUS EVERY 6 HOURS PRN
Status: DISCONTINUED | OUTPATIENT
Start: 2024-03-19 | End: 2024-03-21 | Stop reason: HOSPADM

## 2024-03-19 RX ORDER — AMOXICILLIN 250 MG
2 CAPSULE ORAL 2 TIMES DAILY PRN
Status: DISCONTINUED | OUTPATIENT
Start: 2024-03-19 | End: 2024-03-21 | Stop reason: HOSPADM

## 2024-03-19 RX ORDER — CALCIUM CARBONATE 500 MG/1
1000 TABLET, CHEWABLE ORAL 4 TIMES DAILY PRN
Status: DISCONTINUED | OUTPATIENT
Start: 2024-03-19 | End: 2024-03-21 | Stop reason: HOSPADM

## 2024-03-19 RX ORDER — DIAZEPAM 10 MG/2ML
5-10 INJECTION, SOLUTION INTRAMUSCULAR; INTRAVENOUS EVERY 30 MIN PRN
Status: DISCONTINUED | OUTPATIENT
Start: 2024-03-19 | End: 2024-03-21 | Stop reason: HOSPADM

## 2024-03-19 RX ORDER — FOLIC ACID 1 MG/1
1 TABLET ORAL DAILY
Status: DISCONTINUED | OUTPATIENT
Start: 2024-03-20 | End: 2024-03-21 | Stop reason: HOSPADM

## 2024-03-19 RX ORDER — FOLIC ACID 1 MG/1
1 TABLET ORAL DAILY
Status: DISCONTINUED | OUTPATIENT
Start: 2024-03-19 | End: 2024-03-19

## 2024-03-19 RX ORDER — DIAZEPAM 5 MG
5-20 TABLET ORAL EVERY 30 MIN PRN
Status: DISCONTINUED | OUTPATIENT
Start: 2024-03-19 | End: 2024-03-19

## 2024-03-19 RX ORDER — ONDANSETRON 4 MG/1
4 TABLET, ORALLY DISINTEGRATING ORAL EVERY 6 HOURS PRN
Status: DISCONTINUED | OUTPATIENT
Start: 2024-03-19 | End: 2024-03-21 | Stop reason: HOSPADM

## 2024-03-19 RX ORDER — MULTIPLE VITAMINS W/ MINERALS TAB 9MG-400MCG
1 TAB ORAL DAILY
Status: DISCONTINUED | OUTPATIENT
Start: 2024-03-19 | End: 2024-03-19

## 2024-03-19 RX ORDER — DIAZEPAM 10 MG
10 TABLET ORAL EVERY 30 MIN PRN
Status: DISCONTINUED | OUTPATIENT
Start: 2024-03-19 | End: 2024-03-21 | Stop reason: HOSPADM

## 2024-03-19 RX ORDER — PANTOPRAZOLE SODIUM 40 MG/1
40 TABLET, DELAYED RELEASE ORAL
Status: DISCONTINUED | OUTPATIENT
Start: 2024-03-19 | End: 2024-03-21 | Stop reason: HOSPADM

## 2024-03-19 RX ORDER — FLUMAZENIL 0.1 MG/ML
0.2 INJECTION, SOLUTION INTRAVENOUS
Status: DISCONTINUED | OUTPATIENT
Start: 2024-03-19 | End: 2024-03-21 | Stop reason: HOSPADM

## 2024-03-19 RX ORDER — GABAPENTIN 300 MG/1
600 CAPSULE ORAL 3 TIMES DAILY
Status: DISCONTINUED | OUTPATIENT
Start: 2024-03-19 | End: 2024-03-20

## 2024-03-19 RX ORDER — LEVOTHYROXINE SODIUM 100 UG/1
100 TABLET ORAL
Status: DISCONTINUED | OUTPATIENT
Start: 2024-03-20 | End: 2024-03-21 | Stop reason: HOSPADM

## 2024-03-19 RX ORDER — OLANZAPINE 5 MG/1
5-10 TABLET, ORALLY DISINTEGRATING ORAL EVERY 6 HOURS PRN
Status: DISCONTINUED | OUTPATIENT
Start: 2024-03-19 | End: 2024-03-21 | Stop reason: HOSPADM

## 2024-03-19 RX ORDER — HALOPERIDOL 5 MG/ML
2.5-5 INJECTION INTRAMUSCULAR EVERY 6 HOURS PRN
Status: DISCONTINUED | OUTPATIENT
Start: 2024-03-19 | End: 2024-03-21 | Stop reason: HOSPADM

## 2024-03-19 RX ORDER — MULTIPLE VITAMINS W/ MINERALS TAB 9MG-400MCG
1 TAB ORAL DAILY
Status: DISCONTINUED | OUTPATIENT
Start: 2024-03-20 | End: 2024-03-21 | Stop reason: HOSPADM

## 2024-03-19 RX ORDER — SODIUM CHLORIDE, SODIUM LACTATE, POTASSIUM CHLORIDE, CALCIUM CHLORIDE 600; 310; 30; 20 MG/100ML; MG/100ML; MG/100ML; MG/100ML
INJECTION, SOLUTION INTRAVENOUS CONTINUOUS
Status: DISCONTINUED | OUTPATIENT
Start: 2024-03-19 | End: 2024-03-21

## 2024-03-19 RX ORDER — ESZOPICLONE 3 MG/1
3 TABLET, FILM COATED ORAL AT BEDTIME
Status: DISCONTINUED | OUTPATIENT
Start: 2024-03-19 | End: 2024-03-21 | Stop reason: HOSPADM

## 2024-03-19 RX ORDER — ONDANSETRON 4 MG/1
4 TABLET, ORALLY DISINTEGRATING ORAL EVERY 8 HOURS PRN
Status: DISCONTINUED | OUTPATIENT
Start: 2024-03-19 | End: 2024-03-19

## 2024-03-19 RX ORDER — LIDOCAINE 40 MG/G
CREAM TOPICAL
Status: DISCONTINUED | OUTPATIENT
Start: 2024-03-19 | End: 2024-03-21 | Stop reason: HOSPADM

## 2024-03-19 RX ORDER — AMOXICILLIN 250 MG
1 CAPSULE ORAL 2 TIMES DAILY PRN
Status: DISCONTINUED | OUTPATIENT
Start: 2024-03-19 | End: 2024-03-21 | Stop reason: HOSPADM

## 2024-03-19 RX ADMIN — Medication 1 TABLET: at 14:56

## 2024-03-19 RX ADMIN — THIAMINE HCL TAB 100 MG 100 MG: 100 TAB at 14:56

## 2024-03-19 RX ADMIN — DIAZEPAM 10 MG: 5 TABLET ORAL at 14:56

## 2024-03-19 RX ADMIN — ONDANSETRON 4 MG: 4 TABLET, ORALLY DISINTEGRATING ORAL at 18:29

## 2024-03-19 RX ADMIN — PANTOPRAZOLE SODIUM 40 MG: 40 TABLET, DELAYED RELEASE ORAL at 17:41

## 2024-03-19 RX ADMIN — NICOTINE POLACRILEX 4 MG: 2 GUM, CHEWING BUCCAL at 17:41

## 2024-03-19 RX ADMIN — GABAPENTIN 600 MG: 300 CAPSULE ORAL at 21:10

## 2024-03-19 RX ADMIN — NICOTINE POLACRILEX 4 MG: 2 GUM, CHEWING BUCCAL at 15:43

## 2024-03-19 RX ADMIN — DIAZEPAM 10 MG: 10 TABLET ORAL at 17:04

## 2024-03-19 RX ADMIN — DIAZEPAM 10 MG: 5 INJECTION INTRAMUSCULAR; INTRAVENOUS at 20:11

## 2024-03-19 RX ADMIN — PROPRANOLOL HYDROCHLORIDE 20 MG: 20 TABLET ORAL at 21:10

## 2024-03-19 RX ADMIN — ESZOPICLONE 3 MG: 3 TABLET, FILM COATED ORAL at 21:10

## 2024-03-19 RX ADMIN — SODIUM CHLORIDE, POTASSIUM CHLORIDE, SODIUM LACTATE AND CALCIUM CHLORIDE: 600; 310; 30; 20 INJECTION, SOLUTION INTRAVENOUS at 17:41

## 2024-03-19 RX ADMIN — FOLIC ACID 1 MG: 1 TABLET ORAL at 14:56

## 2024-03-19 ASSESSMENT — ACTIVITIES OF DAILY LIVING (ADL)
ADLS_ACUITY_SCORE: 28
ADLS_ACUITY_SCORE: 40
ADLS_ACUITY_SCORE: 27
ADLS_ACUITY_SCORE: 28
ADLS_ACUITY_SCORE: 40
ADLS_ACUITY_SCORE: 38
ADLS_ACUITY_SCORE: 28
ADLS_ACUITY_SCORE: 27
ADLS_ACUITY_SCORE: 28
ADLS_ACUITY_SCORE: 27
ADLS_ACUITY_SCORE: 40

## 2024-03-19 ASSESSMENT — COLUMBIA-SUICIDE SEVERITY RATING SCALE - C-SSRS
1. IN THE PAST MONTH, HAVE YOU WISHED YOU WERE DEAD OR WISHED YOU COULD GO TO SLEEP AND NOT WAKE UP?: YES
6. HAVE YOU EVER DONE ANYTHING, STARTED TO DO ANYTHING, OR PREPARED TO DO ANYTHING TO END YOUR LIFE?: NO
4. HAVE YOU HAD THESE THOUGHTS AND HAD SOME INTENTION OF ACTING ON THEM?: NO
5. HAVE YOU STARTED TO WORK OUT OR WORKED OUT THE DETAILS OF HOW TO KILL YOURSELF? DO YOU INTEND TO CARRY OUT THIS PLAN?: NO
2. HAVE YOU ACTUALLY HAD ANY THOUGHTS OF KILLING YOURSELF IN THE PAST MONTH?: YES
3. HAVE YOU BEEN THINKING ABOUT HOW YOU MIGHT KILL YOURSELF?: NO

## 2024-03-19 NOTE — H&P
[facility]    History and Physical - Hospitalist Service, GOLD TEAM        Date of Admission:  3/19/2024    Assessment & Plan   Jolynn Rivera is a 64 yo female admitted on 3/19/24. She has a long standing h/o alcohol abuse, alcohol withdrawal seizure, prior heroin use (in remission), PTSD, HCV infection s/p HARVONI, COPD, hypothyroidism and insomnia.  She lives in a public housing and has a dog.  She also has a h/o recurrent hospitalization for alcohol detoxification.  She says she relapsed about 3 weeks ago and has been drinking up to 9 beers daily.  She says she is under a lot of stressors recently.  She feels unsafe at the public housing where she currently resides.  She says she has a CADI waiver and gets ILS once a week.  Today, her ILS worker advised her to present to the ED for detox.  Last alcohol drink was just few minutes before coming to the ED.  She thinks she may have had a withdrawal seizure last night.  BAL 0.23 g/dl in the ED.  Because of her h/o withdrawal seizure, detox unit declined to accept her.  Admitted to the hospitalist service for alcohol detoxification.    Alcohol abuse  Alcohol intoxication  H/o alcohol withdrawal seizure  ---   Pt has been drinking up to 9 beers daily for the past 3 wks  ---   Her last drink was just few minutes before coming to the ED  ---   BAL 0.23 g/dl  ---   Failed numerous CD treatment in the past  ---   Willing to consult with CD service  ---   Start Valium for alcohol withdrawal treatment protocol using CIWA score  ---   Continue PTA gabapentin 600 mg po tid  ---   Start gentle IVF hydration  ---   Start MVI, folate and thiamine supplements  ---   Seizure precaution  ---   CT service consulted    Depression  Anxiety disorder  PTSD  Insomnia  ---   Resume PTA Lunesta, gabapentin and propranolol    Hypothyroidism  ---   Resume PTA levothyroxine  ---   Check TSH level    Tobacco abuse  ---   Resume PTA Nicorette  gum        Diet: Combination Diet Regular Diet Adult    DVT Prophylaxis: Low Risk/Ambulatory with no VTE prophylaxis indicated  Morales Catheter: Not present  Lines: None     Cardiac Monitoring: None  Code Status: Full Code    Disposition Plan    Anticipate 2-3 days of hospitalization for alcohol detoxification.            Judy Bernstein MD  Hospitalist Service, [facility]  Securely message with [employer] (more info)  Text page via [employer] Paging/Directory   See signed in provider for up to date coverage information    ______________________________________________________________________    Chief Complaint   Alcohol intoxication    History is obtained from the patient    History of Present Illness   Jolynn Rivera is a 62 yo female admitted on 3/19/24. She has a long standing h/o alcohol abuse, alcohol withdrawal seizure, prior heroin use (in remission), PTSD, HCV infection s/p HARVONI, COPD, hypothyroidism and insomnia.  She lives in a public housing and has a dog.  She also has a h/o recurrent hospitalization for alcohol detoxification.  She says she relapsed about 3 weeks ago and has been drinking up to 9 beers daily.  She says she is under a lot of stressors recently.  She feels unsafe at the public housing where she currently resides.  She says she has a CADI waiver and gets ILS once a week.  Today, her ILS worker advised her to present to the ED for detox.  Last alcohol drink was just few minutes before coming to the ED.  She thinks she may have had a withdrawal seizure last night.  BAL 0.23 g/dl in the ED.  Because of her h/o withdrawal seizure, detox unit declined to accept her.  Admitted to the hospitalist service for alcohol detoxification.      Past Medical History     Diagnosis Date    Anxiety     COPD (chronic obstructive pulmonary disease) (H)          Hepatitis C     PTSD (post-traumatic stress disorder)     Seizures (H)     second to ETOH     Substance abuse (H)        Past Surgical History    Procedure Laterality Date    LAPAROSCOPIC TUBAL LIGATION      ORTHOPEDIC SURGERY      Left knee    TONSILLECTOMY         Prior to Admission Medications   Prescriptions Last Dose Informant Patient Reported? Taking?   eszopiclone (LUNESTA) 3 MG tablet 3/18/2024 at pm Self Yes Yes   Sig: Take 3 mg by mouth at bedtime   gabapentin (NEURONTIN) 600 MG tablet 3/19/2024 at am Self Yes Yes   Sig: Take 600 mg by mouth 3 times daily   levothyroxine (SYNTHROID/LEVOTHROID) 100 MCG tablet 3/19/2024 at am Self Yes Yes   Sig: Take 100 mcg by mouth daily   melatonin 5 MG tablet 3/18/2024 at pm  Yes Yes   Sig: Take 10 mg by mouth at bedtime Takes 1 hour prior to Lunesta   multivitamin w/minerals (THERA-VIT-M) tablet 3/19/2024 at am Self No Yes   Sig: Take 1 tablet by mouth daily   nicotine polacrilex (NICORETTE) 4 MG gum Past Month Self Yes Yes   Sig: Place 4 mg inside cheek every hour as needed for smoking cessation   omeprazole (PRILOSEC) 40 MG DR capsule 3/19/2024 at am Self Yes Yes   Sig: Take 40 mg by mouth daily   ondansetron (ZOFRAN ODT) 4 MG ODT tab 3/19/2024 at am  Yes Yes   Sig: Take 4 mg by mouth every 8 hours as needed for vomiting or nausea   propranolol (INDERAL) 20 MG tablet 3/19/2024 at am Self Yes Yes   Sig: Take 20 mg by mouth 3 times daily   thiamine (B-1) 100 MG tablet 3/19/2024 at am Self No Yes   Sig: Take 1 tablet (100 mg) by mouth daily      Facility-Administered Medications: None        Review of Systems   The 10 point Review of Systems is negative other than noted in the HPI or here.     Social History   I have reviewed this patient's social history and updated it with pertinent information if needed.  Social History     Tobacco Use    Smoking status: Every Day     Packs/day: .5     Types: Cigarettes    Smokeless tobacco: Never    Tobacco comments:     Starting Chantix October 2014   Substance Use Topics    Alcohol use: Yes     Comment: 12-15 cans per day  "   Drug use: No     Comment: stopped 1986       Family History   I have reviewed this patient's family history and updated it with pertinent information if needed.  Family History   Problem Relation Age of Onset    Anxiety Disorder Mother     Asthma Mother     Alcohol/Drug Father     Prostate Cancer Father     Arthritis Father     Alcohol/Drug Brother     Alcohol/Drug Brother     Hypertension Brother     Alcohol/Drug Brother     Depression Brother     Psychotic Disorder Brother        Allergies   Allergen Reactions    Amlodipine      Other reaction(s): Nightmares    Bee Venom      Other reaction(s): Edema    Clonidine Unknown    Prednisone Anxiety     Patient stated that prednisone causes \"bad panic attacks\".  Patient stated that prednisone causes \"bad panic attacks\".  Patient stated that prednisone causes \"bad panic attacks\".      Antihistamines, Chlorpheniramine-Type [Antihistamines, Chlorpheniramine-Type]     Compazine      Lock jaw; all medications ending with -zine    Diphenhydramine      Muscle Cramps    Penicillins      Panic attack    Prochlorperazine      Muscle cramps    Trazodone Nausea and Vomiting     \" I ended up falling and feeling like I was drunk\"    Wasps [Hornets]      Swelling        Physical Exam   Vital Signs: Temp: 98.3  F (36.8  C) Temp src: Oral BP: 128/84 Pulse: 69   Resp: 18 SpO2: 96 % O2 Device: None (Room air)    Weight: 0 lbs 0 oz  General: aao x 3, NAD.  HEENT:  NC/AT, PERRL, EOMI, neck supple, no thyromegaly, op clear, mmm.  CVS:  NL s 1 and s2, no m/r/g.  Lungs:  CTA B/L.   Abd:  Soft, + bs, NT, no rebound or gaurding, no fluid shift.  Ext:  No c/c.  Lymph:  No edema.  Neuro:  Nonfocal.  Musculoskeletal: No calf tenderness to palpation.    Skin:  No rash.  Psychiatry:  Mood and affect appropriate.        Data     I have personally reviewed the following data over the past 24 hrs:    5.0  \   14.5   / 162     135 101 10.1 /  97   4.9 22 0.52 \     ALT: 22 AST: N/A AP: 42 TBILI: 0.4 "   ALB: 4.3 TOT PROTEIN: 7.6 LIPASE: N/A       Imaging results reviewed over the past 24 hrs:   Recent Results (from the past 24 hour(s))   CT Head w/o Contrast    Narrative    EXAM: CT HEAD W/O CONTRAST  3/19/2024 1:36 PM     HISTORY:  unwitnessed fall/ head injury; right sided hematoma       COMPARISON:  Head CT 8/30/2023    TECHNIQUE: Using multidetector thin collimation helical acquisition  technique, axial, coronal and sagittal CT images from the skull base  to the vertex were obtained without intravenous contrast.   (topogram) image(s) also obtained and reviewed. Dose reduction  techniques were performed.    FINDINGS:  No intracranial hemorrhage, mass effect, or midline shift. No acute  loss of gray-white matter differentiation in the cerebral hemispheres.  Ventricles are proportionate to the cerebral sulci. Clear basal  cisterns. Unchanged small calcified lesion over the right parietal  convexity.    The bony calvaria and the bones of the skull base are normal. Right  maxillary sinus mucous retention cyst. The remaining paranasal sinuses  are clear. Minimal right mastoid effusion. Grossly normal orbits.       Impression    IMPRESSION: No acute intracranial pathology.     DAYNE HITCHCOCK MD         SYSTEM ID:  [id number]   CT Cervical Spine w/o Contrast    Narrative    EXAM: CT CERVICAL SPINE W/O CONTRAST  3/19/2024 1:36 PM     HISTORY: Unwitnessed fall with head injury; alcohol intoxication; Neck  pain; Trauma; Mild/moderate trauma; None of the following:  Spondyloarthropathy, cervical x-ray with negative result, questionable  finding, or inadequate coverage       COMPARISON: Cervical CT 8/30/2023    TECHNIQUE: Using multidetector thin collimation helical acquisition  technique, axial, coronal and sagittal CT images through the cervical  spine were obtained without intravenous contrast. Dose reduction  techniques were used.    FINDINGS:  No acute fracture or traumatic subluxation. No prevertebral edema.      Reversal of normal cervical lordosis.    Multilevel mild disc height narrowing osteophyte formation. Disc  osteophyte complex at C5-6 with mild to moderate right and mild left  neural foraminal stenosis. No high-grade spinal canal stenosis.    Atherosclerotic calcifications at the left carotid bifurcation.      Impression    IMPRESSION:  1. No acute fracture or posttraumatic subluxation.  2. Degenerative changes without high-grade spinal canal or neural  foraminal stenosis    DAYNE HITCHCOCK MD         SYSTEM ID:  [id number]   Elbow XR, G/E 3 views, right    Narrative    ELBOW THREE VIEWS RIGHT  3/19/2024 1:37 PM     HISTORY: Unwitnessed fall with injury; bruising and pain to the right  elbow  COMPARISON: None.      Impression    IMPRESSION: No acute fracture or malalignment. Mild degenerative  changes. No elbow joint effusion.    EDWARD GARDUNO MD         SYSTEM ID:  [id number]

## 2024-03-19 NOTE — ED PROVIDER NOTES
ED Provider Note  [facility]      History     Chief Complaint   Patient presents with    Alcohol Problem     Here seeking detox from alcohol. Endorses drinking 6pack of beer a day. Last drink was just prior to arrival. Hx seizures with withdrawal. Pt accompanied by home health aid who reports the pt possibly having a seizure last night. Pt does not remember.     Fall     The history is provided by the patient and medical records. No  was used.     Jolynn Rivera is a 63 year old female with a history of alcohol use disorder with withdrawal seizure, polysubstance abuse, alcoholic ketoacidosis, bipolar 1, hepatitis C s/p Harvoni, COPD, and HTN presents to the emergency department for detox.  She presents requesting voluntary alcohol detox after drinking daily for the past month.  She states that prior to her relapse over the past month, she had been sober for the past 4 to 5 years.  She states that she drinks approximately a sixpack or more of beer a day with her last drink just prior to arrival today.  She denies any other illicit drug use.  She reports nausea and vomiting but is unsure, episodes of emesis that she has had in the past 24 hours.  She denies any visible blood in her emesis though.  She otherwise reports some generalized abdominal discomfort without any sharp, stabbing localized pain.  She denies any bowel movement changes or abnormalities or any urinary symptoms.  She does report some passive suicidal ideation stating that the stress in her life has just become overwhelming for her due to several recent family and friend deaths as well as family animal deaths.  She denies any active plan or suicide attempts in the past or recently.  She is able to contract for safety while in the ED with regular nursing checks.  She denies any HI.    In addition to requesting voluntary alcohol detox, she states that she had an unwitnessed fall yesterday that she is  "suspicious that may be seizure.  She has a history of withdrawal seizures but states that the presentation of her symptoms after she came to are not consistent with her previous history of seizures.  She states that when she has had withdrawal seizures in the past, she has urinated herself and had specific postictal phase symptoms.  She states that when she came to yesterday, she had not urinated herself and noted that had been texting other people that she did not remember texting which is inconsistent with her abuse history as reported by her.  She states that she has not attempted detox or withdrawal at home prior to this unwitnessed fall/possible seizure-like activity and that she was intoxicated.  She also reports a history of DTs requiring intubation and ventilation in the past and states that this occurred numerous years ago but is unsure specifically of when.    Past Medical History  Past Medical History:   Diagnosis Date    Anxiety     COPD (chronic obstructive pulmonary disease) (H)     COPD (chronic obstructive pulmonary disease) (H)     Hepatitis C     PTSD (post-traumatic stress disorder)     Seizures (H)     second to ETOH    Substance abuse (H)      Past Surgical History:   Procedure Laterality Date    LAPAROSCOPIC TUBAL LIGATION      ORTHOPEDIC SURGERY      Left knee    TONSILLECTOMY       No current outpatient medications on file.    Allergies   Allergen Reactions    Amlodipine      Other reaction(s): Nightmares    Bee Venom      Other reaction(s): Edema    Clonidine Unknown    Prednisone Anxiety     Patient stated that prednisone causes \"bad panic attacks\".  Patient stated that prednisone causes \"bad panic attacks\".  Patient stated that prednisone causes \"bad panic attacks\".      Antihistamines, Chlorpheniramine-Type [Antihistamines, Chlorpheniramine-Type]     Compazine      Lock jaw; all medications ending with -zine    Diphenhydramine      Muscle Cramps    Penicillins      Panic attack    " "Prochlorperazine      Muscle cramps    Trazodone Nausea and Vomiting     \" I ended up falling and feeling like I was drunk\"    Wasps [Hornets]      Swelling      Family History  Family History   Problem Relation Age of Onset    Anxiety Disorder Mother     Asthma Mother     Alcohol/Drug Father     Prostate Cancer Father     Arthritis Father     Alcohol/Drug Brother     Alcohol/Drug Brother     Hypertension Brother     Alcohol/Drug Brother     Depression Brother     Psychotic Disorder Brother      Social History   Social History     Tobacco Use    Smoking status: Every Day     Packs/day: .5     Types: Cigarettes    Smokeless tobacco: Never    Tobacco comments:     Starting Chantix October 2014   Substance Use Topics    Alcohol use: Yes     Comment: 12-15 cans per day    Drug use: No     Comment: stopped 1986         A medically appropriate review of systems was performed with pertinent positives and negatives noted in the HPI, and all other systems negative.    Physical Exam   BP: 128/84  Pulse: 69  Temp: 98.3  F (36.8  C)  Resp: 18  SpO2: 96 %  Physical Exam  Constitutional:       General: She is not in acute distress.     Appearance: Normal appearance. She is normal weight. She is not ill-appearing, toxic-appearing or diaphoretic.      Comments: Acutely intoxicated with alcohol   HENT:      Head: Contusion (right lateral head) present.      Jaw: There is normal jaw occlusion.        Mouth/Throat:      Mouth: Mucous membranes are moist.      Pharynx: Oropharynx is clear.   Eyes:      Extraocular Movements: Extraocular movements intact.      Pupils: Pupils are equal, round, and reactive to light.   Cardiovascular:      Rate and Rhythm: Normal rate and regular rhythm.      Pulses: Normal pulses.   Pulmonary:      Effort: Pulmonary effort is normal. No respiratory distress.      Breath sounds: Normal breath sounds. No stridor. No wheezing, rhonchi or rales.   Abdominal:      General: Abdomen is flat. Bowel sounds are " normal. There is no distension.      Palpations: Abdomen is soft.      Tenderness: There is no abdominal tenderness. There is no guarding or rebound.   Musculoskeletal:      Right elbow: No swelling, deformity, effusion or lacerations. Normal range of motion. Tenderness present in olecranon process.      Left elbow: Normal.        Arms:       Cervical back: Normal range of motion. No spinous process tenderness or muscular tenderness.   Skin:     General: Skin is warm.      Capillary Refill: Capillary refill takes less than 2 seconds.      Findings: Bruising (right elbow) present.   Neurological:      General: No focal deficit present.      Mental Status: She is alert and oriented to person, place, and time.   Psychiatric:         Mood and Affect: Mood normal.         Behavior: Behavior normal.     Critical care was not performed.       ED Course, Procedures, & Data     ED Course as of 03/19/24 1625   Tue Mar 19, 2024   1324 According to chart review, patient has been not been sober for the past 4 to 5 years, with numerous ED visits and admissions for alcohol detox throughout 2022 and 2023.   1423 Alcohol ethyl(!): 0.24   1503 Patient was accepted to medicine for alcohol withdrawal and detox based on patient's history of DTs requiring intubation as well as significant history of withdrawal seizures.  Medicine triage hospitalist requested discussion with detox to confirm that she would not be a candidate for 3A detox solely.  Discussed with behavioral intake who states that those do not necessarily exclude her from 3A detox but there are no 3A beds available for her at this time.     Procedures               Results for orders placed or performed during the hospital encounter of 03/19/24   CT Head w/o Contrast     Status: None    Narrative    EXAM: CT HEAD W/O CONTRAST  3/19/2024 1:36 PM     HISTORY:  unwitnessed fall/ head injury; right sided hematoma       COMPARISON:  Head CT 8/30/2023    TECHNIQUE: Using  multidetector thin collimation helical acquisition  technique, axial, coronal and sagittal CT images from the skull base  to the vertex were obtained without intravenous contrast.   (topogram) image(s) also obtained and reviewed. Dose reduction  techniques were performed.    FINDINGS:  No intracranial hemorrhage, mass effect, or midline shift. No acute  loss of gray-white matter differentiation in the cerebral hemispheres.  Ventricles are proportionate to the cerebral sulci. Clear basal  cisterns. Unchanged small calcified lesion over the right parietal  convexity.    The bony calvaria and the bones of the skull base are normal. Right  maxillary sinus mucous retention cyst. The remaining paranasal sinuses  are clear. Minimal right mastoid effusion. Grossly normal orbits.       Impression    IMPRESSION: No acute intracranial pathology.     DAYNE HITCHCOCK MD         SYSTEM ID:  [id number]   CT Cervical Spine w/o Contrast     Status: None    Narrative    EXAM: CT CERVICAL SPINE W/O CONTRAST  3/19/2024 1:36 PM     HISTORY: Unwitnessed fall with head injury; alcohol intoxication; Neck  pain; Trauma; Mild/moderate trauma; None of the following:  Spondyloarthropathy, cervical x-ray with negative result, questionable  finding, or inadequate coverage       COMPARISON: Cervical CT 8/30/2023    TECHNIQUE: Using multidetector thin collimation helical acquisition  technique, axial, coronal and sagittal CT images through the cervical  spine were obtained without intravenous contrast. Dose reduction  techniques were used.    FINDINGS:  No acute fracture or traumatic subluxation. No prevertebral edema.     Reversal of normal cervical lordosis.    Multilevel mild disc height narrowing osteophyte formation. Disc  osteophyte complex at C5-6 with mild to moderate right and mild left  neural foraminal stenosis. No high-grade spinal canal stenosis.    Atherosclerotic calcifications at the left carotid bifurcation.      Impression     IMPRESSION:  1. No acute fracture or posttraumatic subluxation.  2. Degenerative changes without high-grade spinal canal or neural  foraminal stenosis    DAYNE HITCHCOCK MD         SYSTEM ID:  [id number]   Elbow XR, G/E 3 views, right     Status: None    Narrative    ELBOW THREE VIEWS RIGHT  3/19/2024 1:37 PM     HISTORY: Unwitnessed fall with injury; bruising and pain to the right  elbow  COMPARISON: None.      Impression    IMPRESSION: No acute fracture or malalignment. Mild degenerative  changes. No elbow joint effusion.    EDWARD GARDUNO MD         SYSTEM ID:  [id number]   Comprehensive metabolic panel     Status: Abnormal   Result Value Ref Range    Sodium 135 135 - 145 mmol/L    Potassium 4.9 3.4 - 5.3 mmol/L    Carbon Dioxide (CO2) 22 22 - 29 mmol/L    Anion Gap 12 7 - 15 mmol/L    Urea Nitrogen 10.1 8.0 - 23.0 mg/dL    Creatinine 0.52 0.51 - 0.95 mg/dL    GFR Estimate >90 >60 mL/min/1.73m2    Calcium 8.6 (L) 8.8 - 10.2 mg/dL    Chloride 101 98 - 107 mmol/L    Glucose 97 70 - 99 mg/dL    Alkaline Phosphatase 42 40 - 150 U/L    AST      ALT 22 0 - 50 U/L    Protein Total 7.6 6.4 - 8.3 g/dL    Albumin 4.3 3.5 - 5.2 g/dL    Bilirubin Total 0.4 <=1.2 mg/dL   Magnesium     Status: Normal   Result Value Ref Range    Magnesium 1.9 1.7 - 2.3 mg/dL   Ethyl Alcohol Level     Status: Abnormal   Result Value Ref Range    Alcohol ethyl 0.24 (H) <=0.01 g/dL   HCG qualitative urine     Status: Normal   Result Value Ref Range    hCG Urine Qualitative Negative Negative   CBC with platelets and differential     Status: Abnormal   Result Value Ref Range    WBC Count 5.0 4.0 - 11.0 10e3/uL    RBC Count 4.28 3.80 - 5.20 10e6/uL    Hemoglobin 14.5 11.7 - 15.7 g/dL    Hematocrit 41.1 35.0 - 47.0 %    MCV 96 78 - 100 fL    MCH 33.9 (H) 26.5 - 33.0 pg    MCHC 35.3 31.5 - 36.5 g/dL    RDW 12.1 10.0 - 15.0 %    Platelet Count 162 150 - 450 10e3/uL    % Neutrophils 42 %    % Lymphocytes 46 %    % Monocytes 11 %    % Eosinophils 0  %    % Basophils 1 %    % Immature Granulocytes 0 %    NRBCs per 100 WBC 0 <1 /100    Absolute Neutrophils 2.1 1.6 - 8.3 10e3/uL    Absolute Lymphocytes 2.3 0.8 - 5.3 10e3/uL    Absolute Monocytes 0.6 0.0 - 1.3 10e3/uL    Absolute Eosinophils 0.0 0.0 - 0.7 10e3/uL    Absolute Basophils 0.0 0.0 - 0.2 10e3/uL    Absolute Immature Granulocytes 0.0 <=0.4 10e3/uL    Absolute NRBCs 0.0 10e3/uL   Urine Drug Screen Panel     Status: Normal   Result Value Ref Range    Amphetamines Urine Screen Negative Screen Negative    Barbituates Urine Screen Negative Screen Negative    Benzodiazepine Urine Screen Negative Screen Negative    Cannabinoids Urine Screen Negative Screen Negative    Cocaine Urine Screen Negative Screen Negative    Fentanyl Qual Urine Screen Negative Screen Negative    Opiates Urine Screen Negative Screen Negative    PCP Urine Screen Negative Screen Negative   EKG 12-lead, tracing only     Status: None (Preliminary result)   Result Value Ref Range    Systolic Blood Pressure  mmHg    Diastolic Blood Pressure  mmHg    Ventricular Rate 64 BPM    Atrial Rate 64 BPM    NY Interval 186 ms    QRS Duration 78 ms     ms    QTc 418 ms    P Axis 65 degrees    R AXIS -2 degrees    T Axis 51 degrees    Interpretation ECG Sinus rhythm  Normal ECG      CBC with platelets differential     Status: Abnormal    Narrative    The following orders were created for panel order CBC with platelets differential.  Procedure                               Abnormality         Status                     ---------                               -----------         ------                     CBC with platelets and d...[[id number]]  Abnormal            Final result                 Please view results for these tests on the individual orders.   Urine Drug Screen     Status: Normal    Narrative    The following orders were created for panel order Urine Drug Screen.  Procedure                               Abnormality         Status                      ---------                               -----------         ------                     Urine Drug Screen Panel[[id number]]      Normal              Final result                 Please view results for these tests on the individual orders.     Medications   eszopiclone (LUNESTA) tablet 3 mg (has no administration in time range)   gabapentin (NEURONTIN) tablet 600 mg (has no administration in time range)   levothyroxine (SYNTHROID/LEVOTHROID) tablet 100 mcg (has no administration in time range)   multivitamin w/minerals (THERA-VIT-M) tablet 1 tablet (has no administration in time range)   nicotine (NICORETTE) gum 4 mg (has no administration in time range)   omeprazole (PriLOSEC) CR capsule 40 mg (has no administration in time range)   ondansetron (ZOFRAN ODT) ODT tab 4 mg (has no administration in time range)   propranolol (INDERAL) tablet 20 mg (has no administration in time range)   thiamine (B-1) tablet 100 mg (has no administration in time range)   lidocaine 1 % 0.1-1 mL (has no administration in time range)   lidocaine (LMX4) cream (has no administration in time range)   sodium chloride (PF) 0.9% PF flush 3 mL (has no administration in time range)   sodium chloride (PF) 0.9% PF flush 3 mL (has no administration in time range)   senna-docusate (SENOKOT-S/PERICOLACE) 8.6-50 MG per tablet 1 tablet (has no administration in time range)     Or   senna-docusate (SENOKOT-S/PERICOLACE) 8.6-50 MG per tablet 2 tablet (has no administration in time range)   calcium carbonate (TUMS) chewable tablet 1,000 mg (has no administration in time range)   OLANZapine zydis (zyPREXA) ODT tab 5-10 mg (has no administration in time range)     Or   haloperidol lactate (HALDOL) injection 2.5-5 mg (has no administration in time range)   flumazenil (ROMAZICON) injection 0.2 mg (has no administration in time range)   melatonin tablet 5 mg (has no administration in time range)   ondansetron (ZOFRAN ODT) ODT tab 4 mg (has no  administration in time range)     Or   ondansetron (ZOFRAN) injection 4 mg (has no administration in time range)   diazepam (VALIUM) tablet 10 mg (has no administration in time range)     Or   diazepam (VALIUM) injection 5-10 mg (has no administration in time range)   folic acid (FOLVITE) tablet 1 mg (has no administration in time range)   multivitamin w/minerals (THERA-VIT-M) tablet 1 tablet (has no administration in time range)   lactated ringers infusion (has no administration in time range)   nicotine (NICORETTE) gum 4 mg (4 mg Buccal $Given 3/19/24 0798)     Labs Ordered and Resulted from Time of ED Arrival to Time of ED Departure   COMPREHENSIVE METABOLIC PANEL - Abnormal       Result Value    Sodium 135      Potassium 4.9      Carbon Dioxide (CO2) 22      Anion Gap 12      Urea Nitrogen 10.1      Creatinine 0.52      GFR Estimate >90      Calcium 8.6 (*)     Chloride 101      Glucose 97      Alkaline Phosphatase 42      AST        ALT 22      Protein Total 7.6      Albumin 4.3      Bilirubin Total 0.4     ETHYL ALCOHOL LEVEL - Abnormal    Alcohol ethyl 0.24 (*)    CBC WITH PLATELETS AND DIFFERENTIAL - Abnormal    WBC Count 5.0      RBC Count 4.28      Hemoglobin 14.5      Hematocrit 41.1      MCV 96      MCH 33.9 (*)     MCHC 35.3      RDW 12.1      Platelet Count 162      % Neutrophils 42      % Lymphocytes 46      % Monocytes 11      % Eosinophils 0      % Basophils 1      % Immature Granulocytes 0      NRBCs per 100 WBC 0      Absolute Neutrophils 2.1      Absolute Lymphocytes 2.3      Absolute Monocytes 0.6      Absolute Eosinophils 0.0      Absolute Basophils 0.0      Absolute Immature Granulocytes 0.0      Absolute NRBCs 0.0     MAGNESIUM - Normal    Magnesium 1.9     HCG QUALITATIVE URINE - Normal    hCG Urine Qualitative Negative     URINE DRUG SCREEN PANEL - Normal    Amphetamines Urine Screen Negative      Barbituates Urine Screen Negative      Benzodiazepine Urine Screen Negative      Cannabinoids  Urine Screen Negative      Cocaine Urine Screen Negative      Fentanyl Qual Urine Screen Negative      Opiates Urine Screen Negative      PCP Urine Screen Negative     MAGNESIUM   PHOSPHORUS     Elbow XR, G/E 3 views, right   Final Result   IMPRESSION: No acute fracture or malalignment. Mild degenerative   changes. No elbow joint effusion.      EDWARD GARDUNO MD            SYSTEM ID:  [id number]      CT Cervical Spine w/o Contrast   Final Result   IMPRESSION:   1. No acute fracture or posttraumatic subluxation.   2. Degenerative changes without high-grade spinal canal or neural   foraminal stenosis      DAYNE HITCHCOCK MD            SYSTEM ID:  [id number]      CT Head w/o Contrast   Final Result   IMPRESSION: No acute intracranial pathology.       DAYNE HITCHCOCK MD            SYSTEM ID:  [id number]             Critical care was not performed.     Medical Decision Making    The patient's presentation was of high complexity (an acute health issue posing potential threat to life or bodily function).    The patient's evaluation involved:  review of external note(s) from 1 sources (numerous, previous ED encounters for alcohol intoxication and withdrawal)  review of 1 test result(s) ordered prior to this encounter (previous imaging and labs)  ordering and/or review of 3+ test(s) in this encounter (see separate area of note for details)    The patient's management necessitated high risk (a decision regarding hospitalization).      Assessment & Plan    Jolynn is a 63-year-old female that presented to the ED requesting voluntary alcohol detox and for evaluation after an unwitnessed fall yesterday.  Acceptable vital signs on presentation without any signs of tachycardia, hypotension, hypertension, fever, or hypoxia on room air.  Patient notably intoxicated with alcohol but oriented x 3 and able to answer questions appropriately without any signs of confusion or altered mental status.  No gross neurodeficits or cranial  nerve deficits on exam.  No peripheral paresthesias reported or elicited on exam.  Blood alcohol 0.24.  UDS negative.  Beta-hCG negative.  Labs otherwise unremarkable and within normal limits.  CT head negative for any acute intracranial abnormalities.  CT cervical spine negative for any acute fracture or subluxation.  X-ray right elbow negative for any bony abnormalities including fracture or dislocation.  MSSA score 8.  P.o. Valium 10 mg in addition to p.o. folic acid, p.o. thiamine, p.o. multivitamin given in the ED.  Patient tolerating oral intake of fluids and food as well as able to ambulate and speak without any difficulties.  Due to patient's significant history of severe withdrawal seizures as well as DTs requiring intubation and ventilation in the past, recommend admission to the medicine service for detox at this time.  Discussed patient case with general medicine triage hospitalist who is agreeable to admission.  Discussed all lab and imaging results with the patient at length as well as the admission plan.  Patient voiced understanding and all questions were answered prior to transfer of care to the general medicine service.    I have reviewed the nursing notes. I have reviewed the findings, diagnosis, plan and need for follow up with the patient.    Current Discharge Medication List          Final diagnoses:   Alcoholic intoxication with complication (H24)       Deidre Hernandez PA-C    [facility] EMERGENCY DEPARTMENT  3/19/2024     Deidre Hernandez PA-C  03/19/24 7874    --    ED Attending Physician Attestation    CHARLY Hickey MD, cared for this patient with the Advanced Practice Provider (MEGGAN). I have performed a history and physical examination of the patient independent of the MEGGAN. I reviewed the MEGGAN's documentation above and agree with the documented findings and plan of care. I personally provided a substantive portion of the care for this patient, including the complete  Medical Decision Making. Please see the MEGGAN's documentation for full details.    Summary of HPI, PE, ED Course   Patient is a 63 year old female evaluated in the emergency department for alcohol intoxication and withdrawal. Exam and ED course notable for normal CT imaging of the head cervical spine, normal right elbow x-ray, normal CBC and electrolytes, negative UDS, serum alcohol 0.24.  Started on MSSA protocol, after the completion of care in the emergency department, the patient was admitted to inpatient.    Critical Care & Procedures  Not applicable.        Medical Decision Making  The patient's presentation was of high complexity (a chronic illness severe exacerbation, progression, or side effect of treatment).    The patient's evaluation involved:  ordering and/or review of 3+ test(s) in this encounter (see separate area of note for details)    The patient's management necessitated high risk (a decision regarding hospitalization).          Terrence Hickey MD  Emergency Medicine          Terrence Hickey MD  03/19/24 1986

## 2024-03-19 NOTE — LETTER
Jolynn Rivera MRN# [id number]   YOB: 1960 Age: 63 year old     Date of Admission:  3/21/24    Date of Discharge:  3/21/2024 11:16 AM  Admitting Physician:  No admitting provider for patient encounter.       Primary Care Provider: Carmela Srinivasan     To Whom it May Concern:            You have been identified as the Primary Care Provider for Jolynn Rivera, who was recently admitted to the [facility].  Thank you for the referral to our hospital.  It is our goal to provide the highest quality of care for our patients, including planning for seamless continuity of care by providing you with timely, accurate and concise information.  After reviewing the following combined discharge summary and final progress note, please contact us if you have any remaining questions.  The Discharging Physician will be the best informed, with their contact information listed above.  If unable to reach them, or if you have received this letter in error, please call [phone] and someone will try to help you.

## 2024-03-19 NOTE — ED TRIAGE NOTES
Here seeking detox from alcohol. Endorses drinking 6pack of beer a day. Last drink was just prior to arrival. Hx seizures with withdrawal. Pt accompanied by home health aid who reports the pt possibly having a seizure last night- Bruising to R side forehead and R elbow. Pt cannot account for bruising.        Triage Assessment (Adult)       Row Name 03/19/24 1220          Triage Assessment    Airway WDL WDL        Respiratory WDL    Respiratory WDL WDL        Skin Circulation/Temperature WDL    Skin Circulation/Temperature WDL WDL        Cardiac WDL    Cardiac WDL WDL        Peripheral/Neurovascular WDL    Peripheral Neurovascular WDL WDL        Cognitive/Neuro/Behavioral WDL    Cognitive/Neuro/Behavioral WDL motor response        Motor Response    General Motor Response --  slow

## 2024-03-20 LAB
HOLD SPECIMEN: NORMAL
MAGNESIUM SERPL-MCNC: 1.6 MG/DL (ref 1.7–2.3)
POTASSIUM SERPL-SCNC: 3.8 MMOL/L (ref 3.4–5.3)

## 2024-03-20 PROCEDURE — 99207 PR NO BILLABLE SERVICE THIS VISIT: CPT | Performed by: INTERNAL MEDICINE

## 2024-03-20 PROCEDURE — 258N000003 HC RX IP 258 OP 636: Performed by: INTERNAL MEDICINE

## 2024-03-20 PROCEDURE — 250N000013 HC RX MED GY IP 250 OP 250 PS 637

## 2024-03-20 PROCEDURE — H0001 ALCOHOL AND/OR DRUG ASSESS: HCPCS

## 2024-03-20 PROCEDURE — 83735 ASSAY OF MAGNESIUM: CPT | Performed by: INTERNAL MEDICINE

## 2024-03-20 PROCEDURE — 99233 SBSQ HOSP IP/OBS HIGH 50: CPT | Performed by: INTERNAL MEDICINE

## 2024-03-20 PROCEDURE — 99222 1ST HOSP IP/OBS MODERATE 55: CPT

## 2024-03-20 PROCEDURE — 120N000002 HC R&B MED SURG/OB UMMC

## 2024-03-20 PROCEDURE — 250N000013 HC RX MED GY IP 250 OP 250 PS 637: Performed by: INTERNAL MEDICINE

## 2024-03-20 PROCEDURE — 36415 COLL VENOUS BLD VENIPUNCTURE: CPT | Performed by: INTERNAL MEDICINE

## 2024-03-20 PROCEDURE — 84132 ASSAY OF SERUM POTASSIUM: CPT | Performed by: INTERNAL MEDICINE

## 2024-03-20 PROCEDURE — 250N000011 HC RX IP 250 OP 636: Performed by: INTERNAL MEDICINE

## 2024-03-20 RX ORDER — GABAPENTIN 300 MG/1
900 CAPSULE ORAL 3 TIMES DAILY
Status: DISCONTINUED | OUTPATIENT
Start: 2024-03-20 | End: 2024-03-21 | Stop reason: HOSPADM

## 2024-03-20 RX ORDER — GABAPENTIN 300 MG/1
900 CAPSULE ORAL 3 TIMES DAILY
Status: DISCONTINUED | OUTPATIENT
Start: 2024-03-20 | End: 2024-03-20

## 2024-03-20 RX ADMIN — NICOTINE POLACRILEX 4 MG: 2 GUM, CHEWING BUCCAL at 17:28

## 2024-03-20 RX ADMIN — Medication 1 TABLET: at 08:12

## 2024-03-20 RX ADMIN — GABAPENTIN 600 MG: 300 CAPSULE ORAL at 13:32

## 2024-03-20 RX ADMIN — FOLIC ACID 1 MG: 1 TABLET ORAL at 08:12

## 2024-03-20 RX ADMIN — GABAPENTIN 900 MG: 300 CAPSULE ORAL at 20:32

## 2024-03-20 RX ADMIN — PANTOPRAZOLE SODIUM 40 MG: 40 TABLET, DELAYED RELEASE ORAL at 17:05

## 2024-03-20 RX ADMIN — PROPRANOLOL HYDROCHLORIDE 20 MG: 20 TABLET ORAL at 08:23

## 2024-03-20 RX ADMIN — ONDANSETRON 4 MG: 4 TABLET, ORALLY DISINTEGRATING ORAL at 07:09

## 2024-03-20 RX ADMIN — NICOTINE POLACRILEX 4 MG: 2 GUM, CHEWING BUCCAL at 22:09

## 2024-03-20 RX ADMIN — LEVOTHYROXINE SODIUM 100 MCG: 100 TABLET ORAL at 08:23

## 2024-03-20 RX ADMIN — ONDANSETRON 4 MG: 4 TABLET, ORALLY DISINTEGRATING ORAL at 13:32

## 2024-03-20 RX ADMIN — DIAZEPAM 10 MG: 10 TABLET ORAL at 13:32

## 2024-03-20 RX ADMIN — NICOTINE POLACRILEX 4 MG: 2 GUM, CHEWING BUCCAL at 13:32

## 2024-03-20 RX ADMIN — DIAZEPAM 10 MG: 10 TABLET ORAL at 11:16

## 2024-03-20 RX ADMIN — GABAPENTIN 600 MG: 300 CAPSULE ORAL at 08:12

## 2024-03-20 RX ADMIN — NICOTINE POLACRILEX 4 MG: 2 GUM, CHEWING BUCCAL at 08:27

## 2024-03-20 RX ADMIN — DIAZEPAM 10 MG: 10 TABLET ORAL at 08:23

## 2024-03-20 RX ADMIN — PANTOPRAZOLE SODIUM 40 MG: 40 TABLET, DELAYED RELEASE ORAL at 08:12

## 2024-03-20 RX ADMIN — THIAMINE HCL TAB 100 MG 100 MG: 100 TAB at 08:12

## 2024-03-20 RX ADMIN — SODIUM CHLORIDE, POTASSIUM CHLORIDE, SODIUM LACTATE AND CALCIUM CHLORIDE: 600; 310; 30; 20 INJECTION, SOLUTION INTRAVENOUS at 17:07

## 2024-03-20 RX ADMIN — PROPRANOLOL HYDROCHLORIDE 20 MG: 20 TABLET ORAL at 20:32

## 2024-03-20 RX ADMIN — NICOTINE POLACRILEX 4 MG: 2 GUM, CHEWING BUCCAL at 11:21

## 2024-03-20 RX ADMIN — ESZOPICLONE 3 MG: 3 TABLET, FILM COATED ORAL at 22:10

## 2024-03-20 RX ADMIN — DIAZEPAM 10 MG: 10 TABLET ORAL at 01:09

## 2024-03-20 RX ADMIN — NICOTINE POLACRILEX 4 MG: 2 GUM, CHEWING BUCCAL at 00:54

## 2024-03-20 RX ADMIN — Medication 10 MG: at 20:32

## 2024-03-20 RX ADMIN — PROPRANOLOL HYDROCHLORIDE 20 MG: 20 TABLET ORAL at 13:32

## 2024-03-20 RX ADMIN — CALCIUM CARBONATE (ANTACID) CHEW TAB 500 MG 1000 MG: 500 CHEW TAB at 04:02

## 2024-03-20 RX ADMIN — DIAZEPAM 10 MG: 10 TABLET ORAL at 17:27

## 2024-03-20 ASSESSMENT — ACTIVITIES OF DAILY LIVING (ADL)
ADLS_ACUITY_SCORE: 27

## 2024-03-20 NOTE — PLAN OF CARE
Patient is A/O x 4. Able to make needs known. On RA. Denies SOB, CP n/t.Hx of seizures withdrawal. Bruising on R side of head, R elbow and forearm.  AT 0000 CIWA/ MSSA are 4. CIWA @ 0100 AM was 6. SBA, Continent of B/B. Regular diet.

## 2024-03-20 NOTE — PROGRESS NOTES
6MS ADMISSION    D: Patient admitted/transferred from ED via inpatient transport for alcohol withdrawal  .     I: Upon arrival to the unit patient was oriented to room, unit, and call light. Patient s height, weight, and vital signs were obtained. Allergies reviewed and allergy band applied. Provider notified of patient s arrival on the unit. Adult AVS completed. Head to toe assessment completed. Education assessment completed. Care plan initiated.    A: Vital signs stable upon admission. Patient rates pain at 7/10. Two RN skin assessment completed Yes. Second RN was isa BAUMAN No significant Skin Findings except dryness and bruises on the face. [facility] Nurse Consult Ordered  No. Bed Algorithm can be found in PCS flow sheets (Support Surface Algorithm) and on IP [facility] NURSE RESOURCE TAB, was this used during this assessment?  Yes. Was a pulsate mattress ordered No.    P: Continue to monitor patient and intervene as needed. Continue with plan of care. Notify provider with any concerns or changes in patient status.
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
## 2024-03-20 NOTE — PLAN OF CARE
"  Problem: Adult Inpatient Plan of Care  Goal: Plan of Care Review  Description: The Plan of Care Review/Shift note should be completed every shift.  The Outcome Evaluation is a brief statement about your assessment that the patient is improving, declining, or no change.  This information will be displayed automatically on your shift  note.  Outcome: Progressing  Flowsheets (Taken 3/20/2024 1706)  Outcome Evaluation: Pt CIWA score high this shift valume given. prn nichotein gum given. pt is A&O and is able to make needs known appropriatly Pt declined to have seziure pads on bed r/t feeling caged in. c/o slight headache no SOB no dizienes. continue POC  Plan of Care Reviewed With: patient  Overall Patient Progress: no change  Goal: Patient-Specific Goal (Individualized)  Description: You can add care plan individualizations to a care plan. Examples of Individualization might be:  \"Parent requests to be called daily at 9am for status\", \"I have a hard time hearing out of my right ear\", or \"Do not touch me to wake me up as it startles  me\".  Outcome: Progressing  Goal: Absence of Hospital-Acquired Illness or Injury  Outcome: Progressing  Intervention: Identify and Manage Fall Risk  Recent Flowsheet Documentation  Taken 3/20/2024 0813 by Malini Altamirano, RN  Safety Promotion/Fall Prevention: activity supervised  Intervention: Prevent Infection  Recent Flowsheet Documentation  Taken 3/20/2024 0813 by Malini Altamirano, RN  Infection Prevention: hand hygiene promoted  Goal: Optimal Comfort and Wellbeing  Outcome: Progressing  Goal: Readiness for Transition of Care  Outcome: Progressing   Goal Outcome Evaluation:      Plan of Care Reviewed With: patient    Overall Patient Progress: no changeOverall Patient Progress: no change    Outcome Evaluation: Pt CIWA score high this shift valume given. prn nichotein gum given. pt is A&O and is able to make needs known appropriatly Pt declined to have seziure pads on bed r/t feeling caged " in. c/o slight headache no SOB no dizienes. continue POC

## 2024-03-20 NOTE — CONSULTS
Met with pt for CD Consult and completed GIRMA Comprehensive Assessment Update recommending IOP (pt's original assessment from 8/31/23 available in chart).  Pt is open to referral to [facility].  Faxing assessment for referral per pt's verbal consent and sending JUANY to [facility] for pt signature.    Recommendations:  1)  Complete a Intensive Outpatient CD Treatment Program  2)  Abstain from all mood-altering chemicals unless prescribed by a licensed provider.   3)  Attend, at minimum, 2 weekly support group meetings, such as 12 step based (AA/NA), SMART Recovery, Health Realizations, and/or Refuge Recovery meetings.     4)  Actively work with a mentor/sponsor on a weekly basis.   5)  Follow all the recommendations of your treatment/medical providers.  6)  Continue to attend your scheduled individual psychotherapy appointments.       Clinical Substantiation:  Patient would benefit from developing long-term sober maintenance skills, would benefit from developing sober coping skills, would benefit from developing a sober peer support network, and has dual issues of mental health and substance abuse.     Referrals/ Alternatives:  [facility]  [address] [place] [address]  P: [phone]  F: [phone]     [facility] Assessment ID: [id number]      GIRMA consult completed by:   BULL Glover, [facility] [place] [place]  Substance Use Disorder Evaluation Counselor  E-mail Address: [email or web address] and Addiction Services Consult & [place] [address]
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
## 2024-03-20 NOTE — PROGRESS NOTES
VS:  BP (!) 147/83 (BP Location: Right arm)   Pulse 66   Temp 98.3  F (36.8  C) (Oral)   Resp 16   Wt 64.5 kg (142 lb 4.8 oz)   SpO2 96%   BMI 25.21 kg/m     O2:  Room Air   Output:  Continent of Bowel and Bladder    Last BM:  03/20/2024   Activity:  Independent   Up for meals?  Yes    Skin:  No Issues    Pain:  No    CMS:  A&OX 3-4 / Intermittent Confusion   Dressing:  None    Diet:  Regular    LDA:  Right PIV / Infusing    Equipment:  Personal Belongings / IV Pole   Plan:  POC   Additional Info:  ETOH      CIWA Score   13    Received Valium

## 2024-03-21 VITALS
SYSTOLIC BLOOD PRESSURE: 144 MMHG | DIASTOLIC BLOOD PRESSURE: 88 MMHG | OXYGEN SATURATION: 97 % | RESPIRATION RATE: 18 BRPM | BODY MASS INDEX: 25.21 KG/M2 | TEMPERATURE: 97.4 F | WEIGHT: 142.3 LBS | HEART RATE: 67 BPM

## 2024-03-21 LAB
ANION GAP SERPL CALCULATED.3IONS-SCNC: 7 MMOL/L (ref 7–15)
BUN SERPL-MCNC: 11 MG/DL (ref 8–23)
CALCIUM SERPL-MCNC: 9.3 MG/DL (ref 8.8–10.2)
CHLORIDE SERPL-SCNC: 101 MMOL/L (ref 98–107)
CREAT SERPL-MCNC: 0.7 MG/DL (ref 0.51–0.95)
DEPRECATED HCO3 PLAS-SCNC: 32 MMOL/L (ref 22–29)
EGFRCR SERPLBLD CKD-EPI 2021: >90 ML/MIN/1.73M2
GLUCOSE SERPL-MCNC: 96 MG/DL (ref 70–99)
MAGNESIUM SERPL-MCNC: 2.4 MG/DL (ref 1.7–2.3)
POTASSIUM SERPL-SCNC: 3.5 MMOL/L (ref 3.4–5.3)
SODIUM SERPL-SCNC: 140 MMOL/L (ref 135–145)
TSH SERPL DL<=0.005 MIU/L-ACNC: 2.29 UIU/ML (ref 0.3–4.2)

## 2024-03-21 PROCEDURE — 83735 ASSAY OF MAGNESIUM: CPT | Performed by: INTERNAL MEDICINE

## 2024-03-21 PROCEDURE — 36415 COLL VENOUS BLD VENIPUNCTURE: CPT | Performed by: INTERNAL MEDICINE

## 2024-03-21 PROCEDURE — 99239 HOSP IP/OBS DSCHRG MGMT >30: CPT | Performed by: INTERNAL MEDICINE

## 2024-03-21 PROCEDURE — 250N000013 HC RX MED GY IP 250 OP 250 PS 637

## 2024-03-21 PROCEDURE — 250N000013 HC RX MED GY IP 250 OP 250 PS 637: Performed by: INTERNAL MEDICINE

## 2024-03-21 PROCEDURE — 84443 ASSAY THYROID STIM HORMONE: CPT | Performed by: INTERNAL MEDICINE

## 2024-03-21 PROCEDURE — 80048 BASIC METABOLIC PNL TOTAL CA: CPT | Performed by: INTERNAL MEDICINE

## 2024-03-21 RX ADMIN — NICOTINE POLACRILEX 4 MG: 2 GUM, CHEWING BUCCAL at 06:57

## 2024-03-21 RX ADMIN — Medication 1 TABLET: at 08:11

## 2024-03-21 RX ADMIN — THIAMINE HCL TAB 100 MG 100 MG: 100 TAB at 08:11

## 2024-03-21 RX ADMIN — PROPRANOLOL HYDROCHLORIDE 20 MG: 20 TABLET ORAL at 08:10

## 2024-03-21 RX ADMIN — FOLIC ACID 1 MG: 1 TABLET ORAL at 08:11

## 2024-03-21 RX ADMIN — GABAPENTIN 900 MG: 300 CAPSULE ORAL at 06:27

## 2024-03-21 RX ADMIN — LEVOTHYROXINE SODIUM 100 MCG: 100 TABLET ORAL at 06:57

## 2024-03-21 RX ADMIN — PANTOPRAZOLE SODIUM 40 MG: 40 TABLET, DELAYED RELEASE ORAL at 06:57

## 2024-03-21 ASSESSMENT — ACTIVITIES OF DAILY LIVING (ADL)
ADLS_ACUITY_SCORE: 27

## 2024-03-21 NOTE — DISCHARGE SUMMARY
[facility]  Hospitalist Discharge Summary      Date of Admission:  3/19/2024  Date of Discharge:  3/21/2024  Discharging Provider: Miguel Madison MD  Discharge Service: Hospitalist Service, GOLD TEAM 16    Discharge Diagnoses   Alcohol Use D/O  Alcohol intoxication and withdrawal, resolved  H/o alcohol withdrawal seizures   Depression  Anxiety disorder  PTSD  Insomnia  Hypothyroidism   Tobacco abuse      Clinically Significant Risk Factors          Follow-ups Needed After Discharge   Follow-up Appointments     Adult [facility] Follow-up and recommended labs and tests      Follow up with primary care provider, Dr. Carmela Srinivasan at [facility], within 7   days for hospital follow- up.  No follow up labs or test are needed.      Appointments on [place] and/or [facility] (with [facility] or [facility]   provider or service). Call [phone] if you haven't heard regarding   these appointments within 7 days of discharge.                Discharge Disposition   Discharged to home  Condition at discharge: Stable    Hospital Course   Jolynn Rivera is a 64 yo female admitted on 3/19/24. She has a long standing h/o alcohol abuse, alcohol withdrawal seizure, prior heroin use (in remission), PTSD, HCV infection s/p HARVONI, COPD, hypothyroidism and insomnia.  She lives in a public housing and has a dog.  She also has a h/o recurrent hospitalization for alcohol detoxification.  She says she relapsed about 3 weeks ago and has been drinking up to 9 beers daily.  She says she is under a lot of stressors recently.  She feels unsafe at the public housing where she currently resides.  She says she has a CADI waiver and gets ILS once a week.  Today, her ILS worker advised her to present to the ED for detox.  Last alcohol drink was just few minutes before coming to the ED.  She thinks she may have had a withdrawal seizure last night.  BAL 0.23 g/dl in the ED.  Because of her h/o withdrawal  seizure, detox unit declined to accept her.  Admitted to the hospitalist service for alcohol detoxification.     Alcohol abuse   Alcohol intoxication  H/o alcohol withdrawal seizure  Patient lives alone, has a dog and a cat, lives in an apartment.  No children.  On disability.  Smokes 10 cigarettes/day.  States that she has been sober for 5-1/2 years but has had recurrent relapses over the past year.  Last known seizure was a few months ago while experiencing withdrawal.  Has had multiple stressors in the past year including the death of her mother, her previous dog and cat and her best friend.  She is motivated to resume recovery and is planning on outpatient CD treatment after hospital discharge.  Unable to do inpatient CD treatment because she has nobody to care for her dog.  Pt had been drinking up to 9 beers daily for the 3 weeks PTA.  Last drink was just a few minutes before arriving in the ED, BAL 0.23.  Patient has noted a few falls in the previous 1-2 weeks with no significant injuries aside from a right scalp contusion and right elbow contusion.  Denies any headaches.  LFTs, CBC, BMP normal.  Head CT, C-spine CT negative for acute findings.  Right elbow x-ray negative. Withdrawal now resolved, CIWA scores normalizing. Eating and drinking well, no further tremors and gait normal.   -planned outpt CD Rx through [facility], repeat intake completed 3/20/24  -F/U with recovery support group, has been involved with [employer] in past  -has multiple resources she avails herself of, including primary psychiatrist and psychologist     Depression  Anxiety disorder  PTSD  Insomnia  Patient reports multiple life stressors over the past year in addition to her relapses, she lost her mother, her best friend as well as her cat and dog.  She states she had been sober for 5-1/2 years prior to her relapses starting 1 year ago.  She is followed closely by her primary psychiatrist and psychologist but one of them is  retiring soon. Denies active suicidal ideation and is motivated to pursue recovery again. Seen by psychiatry 3/20, no need for involuntary hold.   -Continue PTA Lunesta, gabapentin and propranolol  -F/U with primary psychiatrist and [place] [facility] [place].     Hypothyroidism  TSH normal 3/21/24.  -Continue PTA levothyroxine     Tobacco abuse  Patient states that she used to smoke 3 packs/day, but had cut down to 10 cigarettes/day.    -Continue PTA Nicorette gum        Consultations This Hospital Stay   CHEMICAL DEPENDENCY IP CONSULT  PSYCHIATRY IP CONSULT    Code Status   Full Code    Time Spent on this Encounter   I, Miguel Madison MD, personally saw the patient today and spent greater than 30 minutes discharging this patient.       Miguel Madison MD  [facility] MED SURG  [address] [place] [id number]  Phone: [phone]  Fax: [phone]  ______________________________________________________________________    Physical Exam   Vital Signs: Temp: 97.4  F (36.3  C) Temp src: Oral BP: (!) 144/88 Pulse: 67   Resp: 18 SpO2: 97 % O2 Device: None (Room air)    Weight: 142 lbs 4.8 oz  General: Alert and oriented x 4, very pleasant.  Speech, affect normal.  HEENT: Healing abrasion on the right scalp, otherwise atraumatic.  HEENT: EOM intact.  No icterus.  OP moist and clear.  Neck supple, nontender  Chest: CTA bilaterally  CV: RRR.  No murmurs.  Abdomen: NABS.  Soft, nontender, nondistended.  Extremities: No edema  Musculoskeletal: Contusion over the right elbow, non-tender and fading.  No pain with range of motion of the right elbow.  Bilateral shoulder range of motion fully intact.  Neuro: CN II through XII grossly intact with hearing aids.  Strength 5/5 symmetrically.  No tremors.  Gait normal.       Primary Care Physician   Physician No Ref-Primary    Discharge Orders      Reason for your hospital stay    Alcohol dependence with relapse, acute alcohol  withdrawal, now resolved. Planning outpatient CD treatment through [facility] in [place].     Activity    Your activity upon discharge: activity as tolerated     Adult [facility] Follow-up and recommended labs and tests    Follow up with primary care provider, Dr. Carmela Srinivasan at [facility], within 7 days for hospital follow- up.  No follow up labs or test are needed.      Appointments on [place] and/or [facility] (with [facility] or [facility] provider or service). Call [phone] if you haven't heard regarding these appointments within 7 days of discharge.     Diet    Follow this diet upon discharge: Regular Diet       Significant Results and Procedures   Most Recent 3 CBC's:  Recent Labs   Lab Test 03/19/24  1306 01/22/24  1354 11/17/23  2133   WBC 5.0 6.1 5.1   HGB 14.5 15.4 14.9   MCV 96 96 97    239 218     Most Recent 3 BMP's:  Recent Labs   Lab Test 03/21/24  0814 03/20/24  0807 03/19/24  1306 01/22/24  1354     --  135 134*   POTASSIUM 3.5 3.8 4.9 4.0   CHLORIDE 101  --  101 95*   CO2 32*  --  22 24   BUN 11.0  --  10.1 6.7*   CR 0.70  --  0.52 0.63   ANIONGAP 7  --  12 15   ELVIRA 9.3  --  8.6* 9.4   GLC 96  --  97 106*     Most Recent 2 LFT's:  Recent Labs   Lab Test 03/19/24  1306 01/22/24  1354 11/18/23  0550   AST  --  47* 50*   ALT 22 26 26   ALKPHOS 42 49 39*   BILITOTAL 0.4 0.4 0.3   ,   Results for orders placed or performed during the hospital encounter of 03/19/24   CT Head w/o Contrast    Narrative    EXAM: CT HEAD W/O CONTRAST  3/19/2024 1:36 PM     HISTORY:  unwitnessed fall/ head injury; right sided hematoma       COMPARISON:  Head CT 8/30/2023    TECHNIQUE: Using multidetector thin collimation helical acquisition  technique, axial, coronal and sagittal CT images from the skull base  to the vertex were obtained without intravenous contrast.   (topogram) image(s) also obtained and reviewed. Dose reduction  techniques were performed.    FINDINGS:  No intracranial hemorrhage, mass  effect, or midline shift. No acute  loss of gray-white matter differentiation in the cerebral hemispheres.  Ventricles are proportionate to the cerebral sulci. Clear basal  cisterns. Unchanged small calcified lesion over the right parietal  convexity.    The bony calvaria and the bones of the skull base are normal. Right  maxillary sinus mucous retention cyst. The remaining paranasal sinuses  are clear. Minimal right mastoid effusion. Grossly normal orbits.       Impression    IMPRESSION: No acute intracranial pathology.     DAYNE HITCHCOCK MD         SYSTEM ID:  [id number]   CT Cervical Spine w/o Contrast    Narrative    EXAM: CT CERVICAL SPINE W/O CONTRAST  3/19/2024 1:36 PM     HISTORY: Unwitnessed fall with head injury; alcohol intoxication; Neck  pain; Trauma; Mild/moderate trauma; None of the following:  Spondyloarthropathy, cervical x-ray with negative result, questionable  finding, or inadequate coverage       COMPARISON: Cervical CT 8/30/2023    TECHNIQUE: Using multidetector thin collimation helical acquisition  technique, axial, coronal and sagittal CT images through the cervical  spine were obtained without intravenous contrast. Dose reduction  techniques were used.    FINDINGS:  No acute fracture or traumatic subluxation. No prevertebral edema.     Reversal of normal cervical lordosis.    Multilevel mild disc height narrowing osteophyte formation. Disc  osteophyte complex at C5-6 with mild to moderate right and mild left  neural foraminal stenosis. No high-grade spinal canal stenosis.    Atherosclerotic calcifications at the left carotid bifurcation.      Impression    IMPRESSION:  1. No acute fracture or posttraumatic subluxation.  2. Degenerative changes without high-grade spinal canal or neural  foraminal stenosis    DAYNE HITCHCOCK MD         SYSTEM ID:  [id number]   Elbow XR, G/E 3 views, right    Narrative    ELBOW THREE VIEWS RIGHT  3/19/2024 1:37 PM     HISTORY: Unwitnessed fall with injury;  "bruising and pain to the right  elbow  COMPARISON: None.      Impression    IMPRESSION: No acute fracture or malalignment. Mild degenerative  changes. No elbow joint effusion.    EDWARD GARDUNO MD         SYSTEM ID:  [id number]       Discharge Medications   Current Discharge Medication List        CONTINUE these medications which have NOT CHANGED    Details   eszopiclone (LUNESTA) 3 MG tablet Take 3 mg by mouth at bedtime      gabapentin (NEURONTIN) 600 MG tablet Take 600 mg by mouth 3 times daily      levothyroxine (SYNTHROID/LEVOTHROID) 100 MCG tablet Take 100 mcg by mouth daily      melatonin 5 MG tablet Take 10 mg by mouth at bedtime Takes 1 hour prior to Lunesta      multivitamin w/minerals (THERA-VIT-M) tablet Take 1 tablet by mouth daily  Qty: 30 tablet, Refills: 0    Comments: Pt has supply  Associated Diagnoses: Alcohol dependence with intoxication with complication (H)      nicotine polacrilex (NICORETTE) 4 MG gum Place 4 mg inside cheek every hour as needed for smoking cessation      omeprazole (PRILOSEC) 40 MG DR capsule Take 40 mg by mouth daily      ondansetron (ZOFRAN ODT) 4 MG ODT tab Take 4 mg by mouth every 8 hours as needed for vomiting or nausea      propranolol (INDERAL) 20 MG tablet Take 20 mg by mouth 3 times daily      thiamine (B-1) 100 MG tablet Take 1 tablet (100 mg) by mouth daily  Qty: 30 tablet, Refills: 0    Associated Diagnoses: Alcohol dependence with intoxication with complication (H)           Allergies   Allergies   Allergen Reactions    Amlodipine      Other reaction(s): Nightmares    Bee Venom      Other reaction(s): Edema    Clonidine Unknown    Prednisone Anxiety     Patient stated that prednisone causes \"bad panic attacks\".  Patient stated that prednisone causes \"bad panic attacks\".  Patient stated that prednisone causes \"bad panic attacks\".      Antihistamines, Chlorpheniramine-Type [Antihistamines, Chlorpheniramine-Type]     Compazine      Lock jaw; all medications " "ending with -zine    Diphenhydramine      Muscle Cramps    Penicillins      Panic attack    Prochlorperazine      Muscle cramps    Trazodone Nausea and Vomiting     \" I ended up falling and feeling like I was drunk\"    Wasps [Hornets]      Swelling      "

## 2024-03-21 NOTE — PROGRESS NOTES
Care Management Discharge Note    Discharge Date: 03/21/2024       Discharge Disposition:  Home with outpt treatment    Discharge Services: outpt treatment    Discharge DME:  None    Discharge Transportation:  Family/friend    Private pay costs discussed: Not applicable    Does the patient's insurance plan have a 3 day qualifying hospital stay waiver?  No    PAS Confirmation Code:  N/A  Patient/family educated on Medicare website which has current facility and service quality ratings:  N/A    Education Provided on the Discharge Plan:  Yes  Persons Notified of Discharge Plans: Pt, Provider  Patient/Family in Agreement with the Plan:  yes    Handoff Referral Completed: Yes    Additional Information:  Writer met with pt and provider as provider was discussing discharge plan with pt. Pt reports she has been in contact with "[facility] Outpt Addiction Treatment program and her plan is to start outpt treatment with them after she has moved next Friday. She prefers to wait until after her move date as she will be quite busy and feels she has adequate supports and resources in the meantime. She has a CADI worker she can call 24/7 as well as a counselor she sees. Her counselor is retiring soon, but has been working with her to find new providers. Pt reports she also receives emotional support from her dog, trips to the [place], online groups and potentially taking classes at the [facility]. Pt also voiced considerations of being sure to balance how much she is taking on in order to best support her mental health. Pt had no other questions or concerns and was looking forward to discharge. Writer verified pt's PCP with pt and provider and obtained pt's consent to send a handoff. No other discharge needs or concerns.     Mita Pantoja RNCC  Covering for 6 Med/Surg  Phone [phone]  Pager [phone]      RN Care Coordinator     Social Work and Care Management Department      SEARCHABLE in [facility]  search CARE COORDINATOR       [place] ([phone]) Saturday & Sunday; ([phone]) [facility] Recognized Holidays     Units: 5A Onc Vocera & 5C Vocera Pager: [phone]    Units: 6B Vocera & 6C Vocera  Pager: [phone]    Units: 7A SOT RNCC Vocera, 7B Med Surg Vocera, & 7C Med Surg Vocera  Pager: [phone]    Units: 6A Vocera & 4A CVICU Vocera, 4C MICU Vocera, and 4E SICU Vocera   Pager: [phone]    Units: 5 Ortho Vocera & 5 Med Surg Vocera  Pager: [phone]    Units: 6 Med Surg Vocera & 8 Med Surg Vocera  Pager [phone]      Shannan Henry RN

## 2024-03-21 NOTE — PLAN OF CARE
Pt is alert and ox4, VSS, on RA.     CIWA 3.     Denied chest pain, SOB, N/V,     IND.     Continent of B/B.     Tolerated well on regular diet and water. IV infusing discontinued after paged provider and got permission.     Plan to discharge home this shift.

## 2024-03-21 NOTE — PROGRESS NOTES
Shift [id number]    VS: BP (!) 140/91 (BP Location: Left arm, Patient Position: Semi-Rey's, Cuff Size: Adult Regular)   Pulse 64   Temp 98  F (36.7  C) (Oral)   Resp 18   Wt 64.5 kg (142 lb 4.8 oz)   SpO2 97%   BMI 25.21 kg/m     O2: Stable on RA. Denies chest pain and SOB.    Output: Voids spontaneously without difficulty to bathroom.   Last BM: 03/20   Activity: SBA.   Skin: All visible skin intact.   Pain: Denies pain.    CMS: Intact, AOx4. Denies numbness and tingling.   Dressing: No dressing.    Diet: Regular diet. Denies nausea/vomiting.    LDA: R PIV infusing LR @ 75 ml/hr.    Equipment: IV pole, personal belongings, and call light in reach.    Plan: Standard precautions maintained. Continue with plan of care. Call light within reach, pt able to make needs known.    Additional Info: For CIWA scores check flowsheet.

## 2024-03-23 ENCOUNTER — PATIENT OUTREACH (OUTPATIENT)
Dept: CARE COORDINATION | Facility: CLINIC | Age: 64
End: 2024-03-23
Payer: MEDICARE

## 2024-03-23 NOTE — PROGRESS NOTES
[facility]: Post-Discharge Note  SITUATION                                                      Admission:    Admission Date: 03/19/24   Reason for Admission: Alcohol dependence with relapse, acute alcohol withdrawal, now resolved. Planning outpatient CD treatment  through [facility] in [place].  Discharge:   Discharge Date: 03/21/24  Discharge Diagnosis: Alcohol dependence with relapse, acute alcohol withdrawal, now resolved. Planning outpatient CD treatment  through [facility] in [place].    BACKGROUND                                                      Per hospital discharge summary and inpatient provider notes:    Jolynn Rivera is a 64 yo female admitted on 3/19/24. She has a long standing h/o alcohol abuse, alcohol withdrawal seizure, prior heroin use (in remission), PTSD, HCV infection s/p HARVONI, COPD, hypothyroidism and insomnia.  She lives in a public housing and has a dog.  She also has a h/o recurrent hospitalization for alcohol detoxification.  She says she relapsed about 3 weeks ago and has been drinking up to 9 beers daily.  She says she is under a lot of stressors recently.  She feels unsafe at the public housing where she currently resides.  She says she has a CADI waiver and gets ILS once a week.  Today, her ILS worker advised her to present to the ED for detox.  Last alcohol drink was just few minutes before coming to the ED.  She thinks she may have had a withdrawal seizure last night.  BAL 0.23 g/dl in the ED.  Because of her h/o withdrawal seizure, detox unit declined to accept her.  Admitted to the hospitalist service for alcohol detoxification.     ASSESSMENT           Discharge Assessment  How are you doing now that you are home?: I am doing pretty good. I am in the process of moving.  How are your symptoms? (Red Flag symptoms escalate to triage hotline per guidelines): Improved  Do you feel your condition is stable enough to be safe at  home until your provider visit?: Yes  Does the patient have their discharge instructions? : Yes  Does the patient have questions regarding their discharge instructions? : No  Were you started on any new medications or were there changes to any of your previous medications? : No (No new medications were started.)  Discharge follow-up appointment scheduled within 14 calendar days? : No  Is patient agreeable to assistance with scheduling? : No                  PLAN                                                      Outpatient Plan: [place] up [place] care [place] [facility] [place] for hospital follow- up. No follow up labs or  test are needed.    No future appointments.      For any urgent concerns, please contact our 24 hour nurse triage line: [phone])         IRAM Pinon  [phone]  [facility]

## 2024-04-02 ENCOUNTER — APPOINTMENT (OUTPATIENT)
Dept: CT IMAGING | Facility: CLINIC | Age: 64
DRG: 897 | End: 2024-04-02
Attending: EMERGENCY MEDICINE
Payer: MEDICARE

## 2024-04-02 ENCOUNTER — HOSPITAL ENCOUNTER (EMERGENCY)
Facility: CLINIC | Age: 64
Discharge: HOME | DRG: 897 | End: 2024-04-02
Attending: EMERGENCY MEDICINE | Admitting: EMERGENCY MEDICINE
Payer: MEDICARE

## 2024-04-02 VITALS
SYSTOLIC BLOOD PRESSURE: 152 MMHG | TEMPERATURE: 98.1 F | HEIGHT: 63 IN | OXYGEN SATURATION: 95 % | DIASTOLIC BLOOD PRESSURE: 95 MMHG | HEART RATE: 75 BPM | BODY MASS INDEX: 25.16 KG/M2 | WEIGHT: 142 LBS | RESPIRATION RATE: 16 BRPM

## 2024-04-02 DIAGNOSIS — F10.90 ALCOHOL USE DISORDER: ICD-10-CM

## 2024-04-02 DIAGNOSIS — F10.930 ALCOHOL WITHDRAWAL, UNCOMPLICATED (H): ICD-10-CM

## 2024-04-02 LAB
ALBUMIN SERPL BCG-MCNC: 4.6 G/DL (ref 3.5–5.2)
ALP SERPL-CCNC: 56 U/L (ref 40–150)
ALT SERPL W P-5'-P-CCNC: 24 U/L (ref 0–50)
AMPHETAMINES UR QL SCN: NORMAL
ANION GAP SERPL CALCULATED.3IONS-SCNC: 17 MMOL/L (ref 7–15)
AST SERPL W P-5'-P-CCNC: 48 U/L (ref 0–45)
BARBITURATES UR QL SCN: NORMAL
BASOPHILS # BLD AUTO: 0.1 10E3/UL (ref 0–0.2)
BASOPHILS NFR BLD AUTO: 1 %
BENZODIAZ UR QL SCN: NORMAL
BILIRUB SERPL-MCNC: 0.3 MG/DL
BUN SERPL-MCNC: 7.1 MG/DL (ref 8–23)
BZE UR QL SCN: NORMAL
CALCIUM SERPL-MCNC: 8.8 MG/DL (ref 8.8–10.2)
CANNABINOIDS UR QL SCN: NORMAL
CHLORIDE SERPL-SCNC: 95 MMOL/L (ref 98–107)
CREAT SERPL-MCNC: 0.6 MG/DL (ref 0.51–0.95)
DEPRECATED HCO3 PLAS-SCNC: 22 MMOL/L (ref 22–29)
EGFRCR SERPLBLD CKD-EPI 2021: >90 ML/MIN/1.73M2
EOSINOPHIL # BLD AUTO: 0 10E3/UL (ref 0–0.7)
EOSINOPHIL NFR BLD AUTO: 0 %
ERYTHROCYTE [DISTWIDTH] IN BLOOD BY AUTOMATED COUNT: 12.7 % (ref 10–15)
ETHANOL SERPL-MCNC: 0.24 G/DL
FENTANYL UR QL: NORMAL
GLUCOSE SERPL-MCNC: 86 MG/DL (ref 70–99)
HCT VFR BLD AUTO: 42.4 % (ref 35–47)
HGB BLD-MCNC: 14.8 G/DL (ref 11.7–15.7)
IMM GRANULOCYTES # BLD: 0 10E3/UL
IMM GRANULOCYTES NFR BLD: 0 %
LACTATE SERPL-SCNC: 2.6 MMOL/L (ref 0.7–2)
LYMPHOCYTES # BLD AUTO: 2.4 10E3/UL (ref 0.8–5.3)
LYMPHOCYTES NFR BLD AUTO: 46 %
MAGNESIUM SERPL-MCNC: 1.8 MG/DL (ref 1.7–2.3)
MCH RBC QN AUTO: 33.8 PG (ref 26.5–33)
MCHC RBC AUTO-ENTMCNC: 34.9 G/DL (ref 31.5–36.5)
MCV RBC AUTO: 97 FL (ref 78–100)
MONOCYTES # BLD AUTO: 0.6 10E3/UL (ref 0–1.3)
MONOCYTES NFR BLD AUTO: 10 %
NEUTROPHILS # BLD AUTO: 2.3 10E3/UL (ref 1.6–8.3)
NEUTROPHILS NFR BLD AUTO: 43 %
NRBC # BLD AUTO: 0 10E3/UL
NRBC BLD AUTO-RTO: 0 /100
OPIATES UR QL SCN: NORMAL
PCP QUAL URINE (ROCHE): NORMAL
PLATELET # BLD AUTO: 298 10E3/UL (ref 150–450)
POTASSIUM SERPL-SCNC: 4 MMOL/L (ref 3.4–5.3)
PROT SERPL-MCNC: 7.8 G/DL (ref 6.4–8.3)
RBC # BLD AUTO: 4.38 10E6/UL (ref 3.8–5.2)
SODIUM SERPL-SCNC: 134 MMOL/L (ref 135–145)
WBC # BLD AUTO: 5.3 10E3/UL (ref 4–11)

## 2024-04-02 PROCEDURE — 82040 ASSAY OF SERUM ALBUMIN: CPT | Performed by: EMERGENCY MEDICINE

## 2024-04-02 PROCEDURE — 36415 COLL VENOUS BLD VENIPUNCTURE: CPT | Performed by: EMERGENCY MEDICINE

## 2024-04-02 PROCEDURE — 82077 ASSAY SPEC XCP UR&BREATH IA: CPT | Performed by: EMERGENCY MEDICINE

## 2024-04-02 PROCEDURE — 99284 EMERGENCY DEPT VISIT MOD MDM: CPT | Mod: 25 | Performed by: EMERGENCY MEDICINE

## 2024-04-02 PROCEDURE — 85025 COMPLETE CBC W/AUTO DIFF WBC: CPT | Performed by: EMERGENCY MEDICINE

## 2024-04-02 PROCEDURE — 80307 DRUG TEST PRSMV CHEM ANLYZR: CPT | Performed by: EMERGENCY MEDICINE

## 2024-04-02 PROCEDURE — 250N000011 HC RX IP 250 OP 636: Performed by: EMERGENCY MEDICINE

## 2024-04-02 PROCEDURE — 83605 ASSAY OF LACTIC ACID: CPT | Performed by: EMERGENCY MEDICINE

## 2024-04-02 PROCEDURE — 70450 CT HEAD/BRAIN W/O DYE: CPT | Mod: MG

## 2024-04-02 PROCEDURE — 83735 ASSAY OF MAGNESIUM: CPT | Performed by: EMERGENCY MEDICINE

## 2024-04-02 PROCEDURE — 96360 HYDRATION IV INFUSION INIT: CPT | Performed by: EMERGENCY MEDICINE

## 2024-04-02 PROCEDURE — 99285 EMERGENCY DEPT VISIT HI MDM: CPT | Performed by: EMERGENCY MEDICINE

## 2024-04-02 PROCEDURE — 82075 ASSAY OF BREATH ETHANOL: CPT | Performed by: EMERGENCY MEDICINE

## 2024-04-02 PROCEDURE — 250N000013 HC RX MED GY IP 250 OP 250 PS 637: Performed by: EMERGENCY MEDICINE

## 2024-04-02 PROCEDURE — 258N000003 HC RX IP 258 OP 636: Performed by: EMERGENCY MEDICINE

## 2024-04-02 RX ORDER — DIAZEPAM 5 MG
5 TABLET ORAL ONCE
Status: COMPLETED | OUTPATIENT
Start: 2024-04-02 | End: 2024-04-02

## 2024-04-02 RX ORDER — ONDANSETRON 4 MG/1
4 TABLET, ORALLY DISINTEGRATING ORAL ONCE
Status: COMPLETED | OUTPATIENT
Start: 2024-04-02 | End: 2024-04-02

## 2024-04-02 RX ORDER — DIAZEPAM 5 MG
5-20 TABLET ORAL EVERY 30 MIN PRN
Status: DISCONTINUED | OUTPATIENT
Start: 2024-04-02 | End: 2024-04-02 | Stop reason: HOSPADM

## 2024-04-02 RX ORDER — DIAZEPAM 10 MG
10 TABLET ORAL ONCE
Status: COMPLETED | OUTPATIENT
Start: 2024-04-02 | End: 2024-04-02

## 2024-04-02 RX ORDER — DIAZEPAM 5 MG
5 TABLET ORAL EVERY 8 HOURS PRN
Qty: 9 TABLET | Refills: 0 | Status: ON HOLD | OUTPATIENT
Start: 2024-04-02 | End: 2024-04-06

## 2024-04-02 RX ADMIN — DIAZEPAM 5 MG: 5 TABLET ORAL at 16:32

## 2024-04-02 RX ADMIN — DIAZEPAM 10 MG: 10 TABLET ORAL at 17:42

## 2024-04-02 RX ADMIN — SODIUM CHLORIDE, POTASSIUM CHLORIDE, SODIUM LACTATE AND CALCIUM CHLORIDE 1000 ML: 600; 310; 30; 20 INJECTION, SOLUTION INTRAVENOUS at 16:32

## 2024-04-02 RX ADMIN — ONDANSETRON 4 MG: 4 TABLET, ORALLY DISINTEGRATING ORAL at 16:32

## 2024-04-02 ASSESSMENT — COLUMBIA-SUICIDE SEVERITY RATING SCALE - C-SSRS
6. HAVE YOU EVER DONE ANYTHING, STARTED TO DO ANYTHING, OR PREPARED TO DO ANYTHING TO END YOUR LIFE?: NO
2. HAVE YOU ACTUALLY HAD ANY THOUGHTS OF KILLING YOURSELF IN THE PAST MONTH?: NO
1. IN THE PAST MONTH, HAVE YOU WISHED YOU WERE DEAD OR WISHED YOU COULD GO TO SLEEP AND NOT WAKE UP?: NO

## 2024-04-02 ASSESSMENT — ACTIVITIES OF DAILY LIVING (ADL)
ADLS_ACUITY_SCORE: 39
ADLS_ACUITY_SCORE: 39

## 2024-04-02 NOTE — ED PROVIDER NOTES
"    [facility] [place] EMERGENCY DEPARTMENT ([facility])    4/02/24      ED PROVIDER NOTE   ED 9  History     Chief Complaint   Patient presents with    Alcohol Problem    Seizures     Possible withdrawal seizure this afternoon after waking up from nap     The history is provided by the patient and medical records.     Jolynn Rivera is a 63 year old female who drinks approximately 12 pack of beer every day, last drink few hours prior to arrival, presents after what she believes to be a seizure. Patient reports last sober day approximately 1 month ago and has been drinking approximately a 12 pack of beer daily. Patient recalls not down titrating her alcohol use and an unclear setting in her home lost consciousness and awoke confused, not knowing where she was or where her dog was, urinated on herself.  Patient was able to call 911 and came to the emergency department.  On my exam patient reports a posterior headache, no vomiting, mild nausea and feels like \"I am withdrawing.\"     Physical Exam   BP: (!) 141/89  Pulse: 79  Temp: 98.1  F (36.7  C)  Resp: 18  Height: 160 cm (5' 3\")  Weight: 64.4 kg (142 lb)  SpO2: 94 %    Physical Exam  Lungs clear to auscultations   Heart regular rate and rhythm   Skin dry   Moving all 4 extremities   Pleasant, speaking in full sentences     ED Course, Procedures, & Data      Procedures         Results for orders placed or performed during the hospital encounter of 04/02/24   CT Head w/o Contrast     Status: None    Narrative    EXAM: CT HEAD W/O CONTRAST  LOCATION: [facility]  DATE: 4/2/2024    INDICATION: head strike, trauma, headache  COMPARISON: CT head 03/19/2024  TECHNIQUE: Routine CT Head without IV contrast. Multiplanar reformats. Dose reduction techniques were used.    FINDINGS:  INTRACRANIAL CONTENTS: No intracranial hemorrhage, extraaxial collection, or mass effect.  No CT evidence of acute infarct. Normal parenchymal attenuation. " Mild generalized volume loss. No hydrocephalus.     VISUALIZED ORBITS/SINUSES/MASTOIDS: No intraorbital abnormality. No paranasal sinus mucosal disease. No middle ear or mastoid effusion.    BONES/SOFT TISSUES: No acute abnormality.      Impression    IMPRESSION:  1.  No acute intracranial process.   Comprehensive metabolic panel     Status: Abnormal   Result Value Ref Range    Sodium 134 (L) 135 - 145 mmol/L    Potassium 4.0 3.4 - 5.3 mmol/L    Carbon Dioxide (CO2) 22 22 - 29 mmol/L    Anion Gap 17 (H) 7 - 15 mmol/L    Urea Nitrogen 7.1 (L) 8.0 - 23.0 mg/dL    Creatinine 0.60 0.51 - 0.95 mg/dL    GFR Estimate >90 >60 mL/min/1.73m2    Calcium 8.8 8.8 - 10.2 mg/dL    Chloride 95 (L) 98 - 107 mmol/L    Glucose 86 70 - 99 mg/dL    Alkaline Phosphatase 56 40 - 150 U/L    AST 48 (H) 0 - 45 U/L    ALT 24 0 - 50 U/L    Protein Total 7.8 6.4 - 8.3 g/dL    Albumin 4.6 3.5 - 5.2 g/dL    Bilirubin Total 0.3 <=1.2 mg/dL   Magnesium     Status: Normal   Result Value Ref Range    Magnesium 1.8 1.7 - 2.3 mg/dL   Lactic acid whole blood     Status: Abnormal   Result Value Ref Range    Lactic Acid 2.6 (H) 0.7 - 2.0 mmol/L   Ethyl Alcohol Level     Status: Abnormal   Result Value Ref Range    Alcohol ethyl 0.24 (H) <=0.01 g/dL   CBC with platelets and differential     Status: Abnormal   Result Value Ref Range    WBC Count 5.3 4.0 - 11.0 10e3/uL    RBC Count 4.38 3.80 - 5.20 10e6/uL    Hemoglobin 14.8 11.7 - 15.7 g/dL    Hematocrit 42.4 35.0 - 47.0 %    MCV 97 78 - 100 fL    MCH 33.8 (H) 26.5 - 33.0 pg    MCHC 34.9 31.5 - 36.5 g/dL    RDW 12.7 10.0 - 15.0 %    Platelet Count 298 150 - 450 10e3/uL    % Neutrophils 43 %    % Lymphocytes 46 %    % Monocytes 10 %    % Eosinophils 0 %    % Basophils 1 %    % Immature Granulocytes 0 %    NRBCs per 100 WBC 0 <1 /100    Absolute Neutrophils 2.3 1.6 - 8.3 10e3/uL    Absolute Lymphocytes 2.4 0.8 - 5.3 10e3/uL    Absolute Monocytes 0.6 0.0 - 1.3 10e3/uL    Absolute Eosinophils 0.0 0.0 - 0.7  10e3/uL    Absolute Basophils 0.1 0.0 - 0.2 10e3/uL    Absolute Immature Granulocytes 0.0 <=0.4 10e3/uL    Absolute NRBCs 0.0 10e3/uL   Urine Drug Screen     Status: None (In process)    Narrative    The following orders were created for panel order Urine Drug Screen.  Procedure                               Abnormality         Status                     ---------                               -----------         ------                     Urine Drug Screen Panel[[id number]]                          In process                   Please view results for these tests on the individual orders.   CBC with platelets differential     Status: Abnormal    Narrative    The following orders were created for panel order CBC with platelets differential.  Procedure                               Abnormality         Status                     ---------                               -----------         ------                     CBC with platelets and d...[[id number]]  Abnormal            Final result                 Please view results for these tests on the individual orders.     Medications   diazepam (VALIUM) tablet 5-20 mg (has no administration in time range)   diazepam (VALIUM) tablet 10 mg (has no administration in time range)   lactated ringers BOLUS 1,000 mL (1,000 mLs Intravenous $New Bag 4/2/24 1632)   diazepam (VALIUM) tablet 5 mg (5 mg Oral $Given 4/2/24 1632)   ondansetron (ZOFRAN ODT) ODT tab 4 mg (4 mg Oral $Given 4/2/24 1632)     Labs Ordered and Resulted from Time of ED Arrival to Time of ED Departure   COMPREHENSIVE METABOLIC PANEL - Abnormal       Result Value    Sodium 134 (*)     Potassium 4.0      Carbon Dioxide (CO2) 22      Anion Gap 17 (*)     Urea Nitrogen 7.1 (*)     Creatinine 0.60      GFR Estimate >90      Calcium 8.8      Chloride 95 (*)     Glucose 86      Alkaline Phosphatase 56      AST 48 (*)     ALT 24      Protein Total 7.8      Albumin 4.6      Bilirubin Total 0.3     LACTIC ACID WHOLE BLOOD -  Abnormal    Lactic Acid 2.6 (*)    ETHYL ALCOHOL LEVEL - Abnormal    Alcohol ethyl 0.24 (*)    CBC WITH PLATELETS AND DIFFERENTIAL - Abnormal    WBC Count 5.3      RBC Count 4.38      Hemoglobin 14.8      Hematocrit 42.4      MCV 97      MCH 33.8 (*)     MCHC 34.9      RDW 12.7      Platelet Count 298      % Neutrophils 43      % Lymphocytes 46      % Monocytes 10      % Eosinophils 0      % Basophils 1      % Immature Granulocytes 0      NRBCs per 100 WBC 0      Absolute Neutrophils 2.3      Absolute Lymphocytes 2.4      Absolute Monocytes 0.6      Absolute Eosinophils 0.0      Absolute Basophils 0.1      Absolute Immature Granulocytes 0.0      Absolute NRBCs 0.0     MAGNESIUM - Normal    Magnesium 1.8     URINE DRUG SCREEN PANEL   ALCOHOL BREATH TEST POCT     CT Head w/o Contrast   Final Result   IMPRESSION:   1.  No acute intracranial process.             Critical care was not performed.     Medical Decision Making  The patient's presentation was of high complexity (a chronic illness severe exacerbation, progression, or side effect of treatment).    The patient's evaluation involved:  ordering and/or review of 3+ test(s) in this encounter (see separate area of note for details)  independent interpretation of testing performed by another health professional (CT head)    The patient's management necessitated high risk (a decision regarding hospitalization).    Assessment & Plan    Chronic alcohol use disorder with a seizure-like event today.  Will evaluate for electrolyte abnormality, organ dysfunction, will rule out intracranial bleed from likely fall with CT head, labs as a protocol, p.o. Valium x 1 fluids. I have offered patient inpatient medical detox but she prefers to seek her own detox, she has one in mind which she has been to before.  Will reevaluate after initial workup and treatment.    5:30 PM  I have independently reviewed the CT head imaging showing no signs of intracranial bleed.  Patient feeling  improved after 15 mg of Valium orally.  Ideally, patient wants to be admitted to detox unit outside of [facility].  I called mental health intake, patient placement however no available inpatient medical detox beds were available.  Patient is comfortable taking as needed oral Valium at home and has oral Zofran tablets at home.  Patient is returned to her baseline, eating, ambulating, and will be discharged with close follow-up          I have reviewed the nursing notes. I have reviewed the findings, diagnosis, plan and need for follow up with the patient.    New Prescriptions    DIAZEPAM (VALIUM) 5 MG TABLET    Take 1 tablet (5 mg) by mouth every 8 hours as needed for anxiety or withdrawal       Final diagnoses:   Alcohol use disorder   Alcohol withdrawal, uncomplicated (H)       Luciano Gudino MD  [facility] EMERGENCY DEPARTMENT  4/2/2024     Luciano Gudino MD  04/02/24 [id number]

## 2024-04-02 NOTE — ED TRIAGE NOTES
Per EMS: patient called 911 after being woken by her dog, believes she had a seizure-urinated on herself in sleep. Has been drinking today-6 pack of beer. Has a history of alcohol withdrawal seizures. Vitals WDL in rig, .

## 2024-04-02 NOTE — ED TRIAGE NOTES
Per patient: has been drinking 12 pack of beer daily since being released from the hospital about 3 weeks ago. Denies other drug use. Denies SI.  Reports history of withdrawal seizures. States was sober for 5 years until about a year ago.

## 2024-04-02 NOTE — DISCHARGE INSTRUCTIONS
Please use Valium as prescribed for alcohol withdrawal symptoms.   Our detox unit did not have any available beds tonight, but you are welcome to return at approximately 11 AM to noon tomorrow if you would like to be considered for an inpatient medical detox bed at [facility].  Otherwise, consider following up with your other detox facility

## 2024-04-02 NOTE — ED NOTES
Pt to discharge home with prescription/instructions, deemed clinically sober by MD. Pt stable on feet and will cab self home.

## 2024-04-04 ENCOUNTER — HOSPITAL ENCOUNTER (INPATIENT)
Facility: CLINIC | Age: 64
LOS: 2 days | Discharge: HOME | DRG: 897 | End: 2024-04-06
Attending: EMERGENCY MEDICINE | Admitting: PSYCHIATRY & NEUROLOGY
Payer: MEDICARE

## 2024-04-04 ENCOUNTER — TELEPHONE (OUTPATIENT)
Dept: BEHAVIORAL HEALTH | Facility: CLINIC | Age: 64
End: 2024-04-04
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
## 2024-04-04 DIAGNOSIS — J45.909 ASTHMA, UNSPECIFIED ASTHMA SEVERITY, UNSPECIFIED WHETHER COMPLICATED, UNSPECIFIED WHETHER PERSISTENT: ICD-10-CM

## 2024-04-04 DIAGNOSIS — F10.229 ACUTE ALCOHOLIC INTOXICATION IN ALCOHOLISM WITH COMPLICATION (H): ICD-10-CM

## 2024-04-04 LAB
ALBUMIN SERPL BCG-MCNC: 4.1 G/DL (ref 3.5–5.2)
ALBUMIN SERPL BCG-MCNC: 4.1 G/DL (ref 3.5–5.2)
ALCOHOL BREATH TEST: 0.05 (ref 0–0.01)
ALP SERPL-CCNC: 49 U/L (ref 40–150)
ALP SERPL-CCNC: 51 U/L (ref 40–150)
ALT SERPL W P-5'-P-CCNC: 24 U/L (ref 0–50)
ALT SERPL W P-5'-P-CCNC: ABNORMAL U/L
AMPHETAMINES UR QL SCN: ABNORMAL
ANION GAP SERPL CALCULATED.3IONS-SCNC: 13 MMOL/L (ref 7–15)
AST SERPL W P-5'-P-CCNC: 55 U/L (ref 0–45)
AST SERPL W P-5'-P-CCNC: ABNORMAL U/L
BARBITURATES UR QL SCN: ABNORMAL
BASOPHILS # BLD AUTO: 0.1 10E3/UL (ref 0–0.2)
BASOPHILS NFR BLD AUTO: 1 %
BENZODIAZ UR QL SCN: ABNORMAL
BILIRUB DIRECT SERPL-MCNC: <0.2 MG/DL (ref 0–0.3)
BILIRUB SERPL-MCNC: 0.3 MG/DL
BILIRUB SERPL-MCNC: 0.3 MG/DL
BUN SERPL-MCNC: 7.3 MG/DL (ref 8–23)
BZE UR QL SCN: ABNORMAL
CALCIUM SERPL-MCNC: 8.6 MG/DL (ref 8.8–10.2)
CANNABINOIDS UR QL SCN: ABNORMAL
CHLORIDE SERPL-SCNC: 96 MMOL/L (ref 98–107)
CREAT SERPL-MCNC: 0.6 MG/DL (ref 0.51–0.95)
DEPRECATED HCO3 PLAS-SCNC: 26 MMOL/L (ref 22–29)
EGFRCR SERPLBLD CKD-EPI 2021: >90 ML/MIN/1.73M2
EOSINOPHIL # BLD AUTO: 0.1 10E3/UL (ref 0–0.7)
EOSINOPHIL NFR BLD AUTO: 2 %
ERYTHROCYTE [DISTWIDTH] IN BLOOD BY AUTOMATED COUNT: 12.6 % (ref 10–15)
FENTANYL UR QL: ABNORMAL
GGT SERPL-CCNC: 22 U/L (ref 5–36)
GLUCOSE SERPL-MCNC: 102 MG/DL (ref 70–99)
HCT VFR BLD AUTO: 40 % (ref 35–47)
HGB BLD-MCNC: 14.1 G/DL (ref 11.7–15.7)
IMM GRANULOCYTES # BLD: 0 10E3/UL
IMM GRANULOCYTES NFR BLD: 0 %
LIPASE SERPL-CCNC: 41 U/L (ref 13–60)
LYMPHOCYTES # BLD AUTO: 2 10E3/UL (ref 0.8–5.3)
LYMPHOCYTES NFR BLD AUTO: 46 %
MAGNESIUM SERPL-MCNC: 1.7 MG/DL (ref 1.7–2.3)
MCH RBC QN AUTO: 34 PG (ref 26.5–33)
MCHC RBC AUTO-ENTMCNC: 35.3 G/DL (ref 31.5–36.5)
MCV RBC AUTO: 96 FL (ref 78–100)
MONOCYTES # BLD AUTO: 0.7 10E3/UL (ref 0–1.3)
MONOCYTES NFR BLD AUTO: 16 %
NEUTROPHILS # BLD AUTO: 1.5 10E3/UL (ref 1.6–8.3)
NEUTROPHILS NFR BLD AUTO: 35 %
NRBC # BLD AUTO: 0 10E3/UL
NRBC BLD AUTO-RTO: 0 /100
OPIATES UR QL SCN: ABNORMAL
PCP QUAL URINE (ROCHE): ABNORMAL
PHOSPHATE SERPL-MCNC: 3.6 MG/DL (ref 2.5–4.5)
PLATELET # BLD AUTO: 264 10E3/UL (ref 150–450)
POTASSIUM SERPL-SCNC: 4.6 MMOL/L (ref 3.4–5.3)
PROT SERPL-MCNC: 7 G/DL (ref 6.4–8.3)
PROT SERPL-MCNC: 7.3 G/DL (ref 6.4–8.3)
RBC # BLD AUTO: 4.15 10E6/UL (ref 3.8–5.2)
SODIUM SERPL-SCNC: 135 MMOL/L (ref 135–145)
TSH SERPL DL<=0.005 MIU/L-ACNC: 2.13 UIU/ML (ref 0.3–4.2)
WBC # BLD AUTO: 4.3 10E3/UL (ref 4–11)

## 2024-04-04 PROCEDURE — 84155 ASSAY OF PROTEIN SERUM: CPT | Performed by: EMERGENCY MEDICINE

## 2024-04-04 PROCEDURE — 85004 AUTOMATED DIFF WBC COUNT: CPT | Performed by: EMERGENCY MEDICINE

## 2024-04-04 PROCEDURE — 99285 EMERGENCY DEPT VISIT HI MDM: CPT | Performed by: EMERGENCY MEDICINE

## 2024-04-04 PROCEDURE — 83735 ASSAY OF MAGNESIUM: CPT | Performed by: EMERGENCY MEDICINE

## 2024-04-04 PROCEDURE — 82075 ASSAY OF BREATH ETHANOL: CPT | Performed by: EMERGENCY MEDICINE

## 2024-04-04 PROCEDURE — 36415 COLL VENOUS BLD VENIPUNCTURE: CPT | Performed by: EMERGENCY MEDICINE

## 2024-04-04 PROCEDURE — 80307 DRUG TEST PRSMV CHEM ANLYZR: CPT | Performed by: EMERGENCY MEDICINE

## 2024-04-04 PROCEDURE — 128N000004 HC R&B CD ADULT

## 2024-04-04 PROCEDURE — HZ2ZZZZ DETOXIFICATION SERVICES FOR SUBSTANCE ABUSE TREATMENT: ICD-10-PCS | Performed by: EMERGENCY MEDICINE

## 2024-04-04 PROCEDURE — 82977 ASSAY OF GGT: CPT | Performed by: CLINICAL NURSE SPECIALIST

## 2024-04-04 PROCEDURE — 84443 ASSAY THYROID STIM HORMONE: CPT | Performed by: CLINICAL NURSE SPECIALIST

## 2024-04-04 PROCEDURE — 250N000013 HC RX MED GY IP 250 OP 250 PS 637: Performed by: CLINICAL NURSE SPECIALIST

## 2024-04-04 PROCEDURE — 250N000011 HC RX IP 250 OP 636: Performed by: CLINICAL NURSE SPECIALIST

## 2024-04-04 PROCEDURE — 84100 ASSAY OF PHOSPHORUS: CPT | Performed by: EMERGENCY MEDICINE

## 2024-04-04 PROCEDURE — 80069 RENAL FUNCTION PANEL: CPT | Performed by: EMERGENCY MEDICINE

## 2024-04-04 PROCEDURE — 83036 HEMOGLOBIN GLYCOSYLATED A1C: CPT | Performed by: PSYCHIATRY & NEUROLOGY

## 2024-04-04 PROCEDURE — 82040 ASSAY OF SERUM ALBUMIN: CPT | Performed by: EMERGENCY MEDICINE

## 2024-04-04 PROCEDURE — 83690 ASSAY OF LIPASE: CPT | Performed by: CLINICAL NURSE SPECIALIST

## 2024-04-04 PROCEDURE — 250N000013 HC RX MED GY IP 250 OP 250 PS 637: Performed by: EMERGENCY MEDICINE

## 2024-04-04 RX ORDER — OLANZAPINE 5 MG/1
5-10 TABLET, ORALLY DISINTEGRATING ORAL EVERY 6 HOURS PRN
Status: DISCONTINUED | OUTPATIENT
Start: 2024-04-04 | End: 2024-04-06 | Stop reason: HOSPADM

## 2024-04-04 RX ORDER — DIAZEPAM 5 MG
10 TABLET ORAL EVERY 30 MIN PRN
Status: DISCONTINUED | OUTPATIENT
Start: 2024-04-04 | End: 2024-04-04

## 2024-04-04 RX ORDER — FOLIC ACID 1 MG/1
1 TABLET ORAL DAILY
Status: DISCONTINUED | OUTPATIENT
Start: 2024-04-05 | End: 2024-04-06 | Stop reason: HOSPADM

## 2024-04-04 RX ORDER — ACETAMINOPHEN 325 MG/1
650 TABLET ORAL EVERY 4 HOURS PRN
Status: DISCONTINUED | OUTPATIENT
Start: 2024-04-04 | End: 2024-04-06 | Stop reason: HOSPADM

## 2024-04-04 RX ORDER — LOPERAMIDE HCL 2 MG
2 CAPSULE ORAL 4 TIMES DAILY PRN
Status: DISCONTINUED | OUTPATIENT
Start: 2024-04-04 | End: 2024-04-06 | Stop reason: HOSPADM

## 2024-04-04 RX ORDER — ATENOLOL 50 MG/1
50 TABLET ORAL DAILY PRN
Status: DISCONTINUED | OUTPATIENT
Start: 2024-04-04 | End: 2024-04-06 | Stop reason: HOSPADM

## 2024-04-04 RX ORDER — GABAPENTIN 600 MG/1
1200 TABLET ORAL ONCE
Status: COMPLETED | OUTPATIENT
Start: 2024-04-04 | End: 2024-04-04

## 2024-04-04 RX ORDER — LANOLIN ALCOHOL/MO/W.PET/CERES
3 CREAM (GRAM) TOPICAL
Status: DISCONTINUED | OUTPATIENT
Start: 2024-04-04 | End: 2024-04-04

## 2024-04-04 RX ORDER — HYDROXYZINE HYDROCHLORIDE 25 MG/1
25 TABLET, FILM COATED ORAL EVERY 4 HOURS PRN
Status: DISCONTINUED | OUTPATIENT
Start: 2024-04-04 | End: 2024-04-05

## 2024-04-04 RX ORDER — AMOXICILLIN 250 MG
1 CAPSULE ORAL 2 TIMES DAILY PRN
Status: DISCONTINUED | OUTPATIENT
Start: 2024-04-04 | End: 2024-04-06 | Stop reason: HOSPADM

## 2024-04-04 RX ORDER — ONDANSETRON 4 MG/1
4 TABLET, ORALLY DISINTEGRATING ORAL EVERY 6 HOURS PRN
Status: DISCONTINUED | OUTPATIENT
Start: 2024-04-04 | End: 2024-04-06 | Stop reason: HOSPADM

## 2024-04-04 RX ORDER — DIAZEPAM 5 MG
5-20 TABLET ORAL EVERY 30 MIN PRN
Status: DISCONTINUED | OUTPATIENT
Start: 2024-04-04 | End: 2024-04-06 | Stop reason: HOSPADM

## 2024-04-04 RX ORDER — HALOPERIDOL 5 MG/ML
2.5-5 INJECTION INTRAMUSCULAR EVERY 6 HOURS PRN
Status: DISCONTINUED | OUTPATIENT
Start: 2024-04-04 | End: 2024-04-06 | Stop reason: HOSPADM

## 2024-04-04 RX ORDER — GABAPENTIN 300 MG/1
600 CAPSULE ORAL EVERY 8 HOURS
Qty: 12 CAPSULE | Refills: 0 | Status: DISCONTINUED | OUTPATIENT
Start: 2024-04-08 | End: 2024-04-05

## 2024-04-04 RX ORDER — FLUMAZENIL 0.1 MG/ML
0.2 INJECTION, SOLUTION INTRAVENOUS
Status: DISCONTINUED | OUTPATIENT
Start: 2024-04-04 | End: 2024-04-06 | Stop reason: HOSPADM

## 2024-04-04 RX ORDER — GABAPENTIN 100 MG/1
100 CAPSULE ORAL EVERY 8 HOURS
Qty: 9 CAPSULE | Refills: 0 | Status: DISCONTINUED | OUTPATIENT
Start: 2024-04-12 | End: 2024-04-05

## 2024-04-04 RX ORDER — FOLIC ACID 1 MG/1
1 TABLET ORAL DAILY
Status: DISCONTINUED | OUTPATIENT
Start: 2024-04-04 | End: 2024-04-04

## 2024-04-04 RX ORDER — MULTIPLE VITAMINS W/ MINERALS TAB 9MG-400MCG
1 TAB ORAL DAILY
Status: DISCONTINUED | OUTPATIENT
Start: 2024-04-04 | End: 2024-04-04

## 2024-04-04 RX ORDER — DIAZEPAM 10 MG/2ML
5-10 INJECTION, SOLUTION INTRAMUSCULAR; INTRAVENOUS EVERY 30 MIN PRN
Status: DISCONTINUED | OUTPATIENT
Start: 2024-04-04 | End: 2024-04-04

## 2024-04-04 RX ORDER — MULTIPLE VITAMINS W/ MINERALS TAB 9MG-400MCG
1 TAB ORAL DAILY
Status: DISCONTINUED | OUTPATIENT
Start: 2024-04-05 | End: 2024-04-06 | Stop reason: HOSPADM

## 2024-04-04 RX ORDER — GABAPENTIN 300 MG/1
300 CAPSULE ORAL EVERY 8 HOURS
Qty: 6 CAPSULE | Refills: 0 | Status: DISCONTINUED | OUTPATIENT
Start: 2024-04-10 | End: 2024-04-05

## 2024-04-04 RX ORDER — GABAPENTIN 300 MG/1
900 CAPSULE ORAL EVERY 8 HOURS
Qty: 27 CAPSULE | Refills: 0 | Status: DISCONTINUED | OUTPATIENT
Start: 2024-04-05 | End: 2024-04-05

## 2024-04-04 RX ORDER — MAGNESIUM HYDROXIDE/ALUMINUM HYDROXICE/SIMETHICONE 120; 1200; 1200 MG/30ML; MG/30ML; MG/30ML
30 SUSPENSION ORAL EVERY 4 HOURS PRN
Status: DISCONTINUED | OUTPATIENT
Start: 2024-04-04 | End: 2024-04-06 | Stop reason: HOSPADM

## 2024-04-04 RX ADMIN — FOLIC ACID 1 MG: 1 TABLET ORAL at 17:40

## 2024-04-04 RX ADMIN — ONDANSETRON 4 MG: 4 TABLET, ORALLY DISINTEGRATING ORAL at 22:09

## 2024-04-04 RX ADMIN — THIAMINE HCL TAB 100 MG 100 MG: 100 TAB at 17:40

## 2024-04-04 RX ADMIN — Medication 5 MG: at 22:43

## 2024-04-04 RX ADMIN — DIAZEPAM 10 MG: 5 TABLET ORAL at 22:09

## 2024-04-04 RX ADMIN — GABAPENTIN 1200 MG: 600 TABLET, FILM COATED ORAL at 17:39

## 2024-04-04 RX ADMIN — Medication 1 TABLET: at 17:40

## 2024-04-04 ASSESSMENT — LIFESTYLE VARIABLES
ORIENTATION AND CLOUDING OF SENSORIUM: ORIENTED AND CAN DO SERIAL ADDITIONS
TREMOR: 2
ANXIETY: NO ANXIETY, AT EASE
TREMOR: 2
AGITATION: NORMAL ACTIVITY
ANXIETY: MILDLY ANXIOUS
VISUAL DISTURBANCES: NOT PRESENT
PAROXYSMAL SWEATS: NO SWEAT VISIBLE
AGITATION: NORMAL ACTIVITY
AUDITORY DISTURBANCES: NOT PRESENT
SKIP TO QUESTIONS 9-10: 0
VISUAL DISTURBANCES: NOT PRESENT
AUDITORY DISTURBANCES: NOT PRESENT
TOTAL SCORE: 1
TREMOR: NOT VISIBLE, BUT CAN BE FELT FINGERTIP TO FINGERTIP
AUDITORY DISTURBANCES: NOT PRESENT
TOTAL SCORE: 3
VISUAL DISTURBANCES: NOT PRESENT
ORIENTATION AND CLOUDING OF SENSORIUM: ORIENTED AND CAN DO SERIAL ADDITIONS
NAUSEA AND VOMITING: NO NAUSEA AND NO VOMITING
HEADACHE, FULLNESS IN HEAD: NOT PRESENT
HEADACHE, FULLNESS IN HEAD: NOT PRESENT
ANXIETY: MILDLY ANXIOUS
PAROXYSMAL SWEATS: NO SWEAT VISIBLE
NAUSEA AND VOMITING: NO NAUSEA AND NO VOMITING
AGITATION: NORMAL ACTIVITY
ORIENTATION AND CLOUDING OF SENSORIUM: ORIENTED AND CAN DO SERIAL ADDITIONS
NAUSEA AND VOMITING: NO NAUSEA AND NO VOMITING
TOTAL SCORE: 3
HEADACHE, FULLNESS IN HEAD: NOT PRESENT
PAROXYSMAL SWEATS: NO SWEAT VISIBLE

## 2024-04-04 ASSESSMENT — COLUMBIA-SUICIDE SEVERITY RATING SCALE - C-SSRS
6. HAVE YOU EVER DONE ANYTHING, STARTED TO DO ANYTHING, OR PREPARED TO DO ANYTHING TO END YOUR LIFE?: NO
3. HAVE YOU BEEN THINKING ABOUT HOW YOU MIGHT KILL YOURSELF?: NO
1. IN THE PAST MONTH, HAVE YOU WISHED YOU WERE DEAD OR WISHED YOU COULD GO TO SLEEP AND NOT WAKE UP?: YES
2. HAVE YOU ACTUALLY HAD ANY THOUGHTS OF KILLING YOURSELF IN THE PAST MONTH?: YES
5. HAVE YOU STARTED TO WORK OUT OR WORKED OUT THE DETAILS OF HOW TO KILL YOURSELF? DO YOU INTEND TO CARRY OUT THIS PLAN?: NO
4. HAVE YOU HAD THESE THOUGHTS AND HAD SOME INTENTION OF ACTING ON THEM?: YES

## 2024-04-04 ASSESSMENT — ACTIVITIES OF DAILY LIVING (ADL)
ADLS_ACUITY_SCORE: 39
ADLS_ACUITY_SCORE: 57
ADLS_ACUITY_SCORE: 39
ADLS_ACUITY_SCORE: 39
ADLS_ACUITY_SCORE: 57
ADLS_ACUITY_SCORE: 39

## 2024-04-04 NOTE — ED PROVIDER NOTES
"      ED Provider Note  April 4, 2024  [facility]    History     Chief Complaint: Alcohol Intoxication (Last drink 1 hour ago./Amount daily \"a lot,\" per pt. Unable to quantify) and Fall (Patient suspects fall. Complaining of \"head pain,\" potential head injury)      HPI  Jolynn Rivera is a 63 year old female with severe alcohol use disorder who presents to the emergency department via ambulance with alcohol intoxication she reports she was here 2 days ago but ultimately left prior to admission to detox which she had wanted at the time.    Now returns requesting detox  Drinks approximately 12 pack daily  Had a fall 2 days ago for which she was imaged with a CT scan which was unrevealing during her recent ED visit  Last drink approximate 1 hour prior to arrival.  She is able to ambulate with a steady gait and is conversant and alert appearing during our conversation.    Denies any chest pain or shortness of breath  Denies other drugs  Reports history of seizures and DTs in the past            Past Medical History  Past Medical History:   Diagnosis Date    Anxiety     COPD (chronic obstructive pulmonary disease) (H)     COPD (chronic obstructive pulmonary disease) (H)     Hepatitis C     PTSD (post-traumatic stress disorder)     Seizures (H)     second to ETOH    Substance abuse (H)      Past Surgical History:   Procedure Laterality Date    LAPAROSCOPIC TUBAL LIGATION      ORTHOPEDIC SURGERY      Left knee    TONSILLECTOMY       diazepam (VALIUM) 5 MG tablet  eszopiclone (LUNESTA) 3 MG tablet  gabapentin (NEURONTIN) 600 MG tablet  levothyroxine (SYNTHROID/LEVOTHROID) 100 MCG tablet  melatonin 5 MG tablet  omeprazole (PRILOSEC) 40 MG DR capsule  propranolol (INDERAL) 20 MG tablet  multivitamin w/minerals (THERA-VIT-M) tablet  nicotine polacrilex (NICORETTE) 4 MG gum  ondansetron (ZOFRAN ODT) 4 MG ODT tab  thiamine (B-1) 100 MG tablet      Allergies   Allergen Reactions    Amlodipine      " "Other reaction(s): Nightmares    Bee Venom      Other reaction(s): Edema    Clonidine Unknown    Prednisone Anxiety     Patient stated that prednisone causes \"bad panic attacks\".  Patient stated that prednisone causes \"bad panic attacks\".  Patient stated that prednisone causes \"bad panic attacks\".      Antihistamines, Chlorpheniramine-Type [Antihistamines, Chlorpheniramine-Type]     Compazine      Lock jaw; all medications ending with -zine    Diphenhydramine      Muscle Cramps    Penicillins      Panic attack    Prochlorperazine      Muscle cramps    Trazodone Nausea and Vomiting     \" I ended up falling and feeling like I was drunk\"    Wasps [Hornets]      Swelling      Family History  Family History   Problem Relation Age of Onset    Anxiety Disorder Mother     Asthma Mother     Alcohol/Drug Father     Prostate Cancer Father     Arthritis Father     Alcohol/Drug Brother     Alcohol/Drug Brother     Hypertension Brother     Alcohol/Drug Brother     Depression Brother     Psychotic Disorder Brother      Social History   Social History     Tobacco Use    Smoking status: Every Day     Packs/day: .5     Types: Cigarettes    Smokeless tobacco: Never    Tobacco comments:     Starting Chantix October 2014   Substance Use Topics    Alcohol use: Yes     Comment: 12-15 cans per day    Drug use: No     Comment: stopped 1986        Past medical history, past surgical history, medications, allergies, family history, and social history were reviewed with the patient. No additional pertinent items.      A medically appropriate review of systems was performed with pertinent positives and negatives noted in the HPI, and all other systems negative.    Physical Exam   /64   Pulse 75   Resp 18   Ht 1.6 m (5' 3\")   SpO2 98%   BMI 25.15 kg/m      GEN: Well appearing, non toxic, cooperative and conversant.   HEENT: The head is normocephalic and atraumatic. Pupils are equal round and reactive to light. Extraocular motions are " intact. There is no facial swelling.   CV: Regular rate   PULM: Unlabored breathing     EXT: Full range of motion.  No edema.  NEURO: Cranial nerves II through XII are intact and symmetric. Bilateral upper and lower extremities grossly show full range of motion without any focal deficits.  No tongue fasciculations.    PSYCH: Calm and cooperative, interactive.     ED Course, Procedures, & Data      Procedures                    Results for orders placed or performed during the hospital encounter of 04/04/24   Comprehensive metabolic panel     Status: Abnormal   Result Value Ref Range    Sodium 135 135 - 145 mmol/L    Potassium 4.6 3.4 - 5.3 mmol/L    Carbon Dioxide (CO2) 26 22 - 29 mmol/L    Anion Gap 13 7 - 15 mmol/L    Urea Nitrogen 7.3 (L) 8.0 - 23.0 mg/dL    Creatinine 0.60 0.51 - 0.95 mg/dL    GFR Estimate >90 >60 mL/min/1.73m2    Calcium 8.6 (L) 8.8 - 10.2 mg/dL    Chloride 96 (L) 98 - 107 mmol/L    Glucose 102 (H) 70 - 99 mg/dL    Alkaline Phosphatase 49 40 - 150 U/L    AST      ALT      Protein Total 7.3 6.4 - 8.3 g/dL    Albumin 4.1 3.5 - 5.2 g/dL    Bilirubin Total 0.3 <=1.2 mg/dL   Magnesium     Status: Normal   Result Value Ref Range    Magnesium 1.7 1.7 - 2.3 mg/dL   Phosphorus     Status: Normal   Result Value Ref Range    Phosphorus 3.6 2.5 - 4.5 mg/dL   CBC with platelets and differential     Status: Abnormal   Result Value Ref Range    WBC Count 4.3 4.0 - 11.0 10e3/uL    RBC Count 4.15 3.80 - 5.20 10e6/uL    Hemoglobin 14.1 11.7 - 15.7 g/dL    Hematocrit 40.0 35.0 - 47.0 %    MCV 96 78 - 100 fL    MCH 34.0 (H) 26.5 - 33.0 pg    MCHC 35.3 31.5 - 36.5 g/dL    RDW 12.6 10.0 - 15.0 %    Platelet Count 264 150 - 450 10e3/uL    % Neutrophils 35 %    % Lymphocytes 46 %    % Monocytes 16 %    % Eosinophils 2 %    % Basophils 1 %    % Immature Granulocytes 0 %    NRBCs per 100 WBC 0 <1 /100    Absolute Neutrophils 1.5 (L) 1.6 - 8.3 10e3/uL    Absolute Lymphocytes 2.0 0.8 - 5.3 10e3/uL    Absolute Monocytes  0.7 0.0 - 1.3 10e3/uL    Absolute Eosinophils 0.1 0.0 - 0.7 10e3/uL    Absolute Basophils 0.1 0.0 - 0.2 10e3/uL    Absolute Immature Granulocytes 0.0 <=0.4 10e3/uL    Absolute NRBCs 0.0 10e3/uL   CBC with platelets differential     Status: Abnormal    Narrative    The following orders were created for panel order CBC with platelets differential.  Procedure                               Abnormality         Status                     ---------                               -----------         ------                     CBC with platelets and d...[[id number]]  Abnormal            Final result                 Please view results for these tests on the individual orders.     Medications   OLANZapine zydis (zyPREXA) ODT tab 5-10 mg (has no administration in time range)     Or   haloperidol lactate (HALDOL) injection 2.5-5 mg (has no administration in time range)   flumazenil (ROMAZICON) injection 0.2 mg (has no administration in time range)   melatonin tablet 5 mg (has no administration in time range)   gabapentin (NEURONTIN) capsule 900 mg (has no administration in time range)   gabapentin (NEURONTIN) capsule 600 mg (has no administration in time range)   gabapentin (NEURONTIN) capsule 300 mg (has no administration in time range)   gabapentin (NEURONTIN) capsule 100 mg (has no administration in time range)   diazepam (VALIUM) tablet 10 mg (has no administration in time range)     Or   diazepam (VALIUM) injection 5-10 mg (has no administration in time range)   thiamine (B-1) tablet 100 mg (100 mg Oral $Given 4/4/24 1740)   folic acid (FOLVITE) tablet 1 mg (1 mg Oral $Given 4/4/24 1740)   multivitamin w/minerals (THERA-VIT-M) tablet 1 tablet (1 tablet Oral $Given 4/4/24 1740)   gabapentin (NEURONTIN) tablet 1,200 mg (1,200 mg Oral $Given 4/4/24 1739)     Labs Ordered and Resulted from Time of ED Arrival to Time of ED Departure   COMPREHENSIVE METABOLIC PANEL - Abnormal       Result Value    Sodium 135      Potassium 4.6       Carbon Dioxide (CO2) 26      Anion Gap 13      Urea Nitrogen 7.3 (*)     Creatinine 0.60      GFR Estimate >90      Calcium 8.6 (*)     Chloride 96 (*)     Glucose 102 (*)     Alkaline Phosphatase 49      AST        ALT        Protein Total 7.3      Albumin 4.1      Bilirubin Total 0.3     CBC WITH PLATELETS AND DIFFERENTIAL - Abnormal    WBC Count 4.3      RBC Count 4.15      Hemoglobin 14.1      Hematocrit 40.0      MCV 96      MCH 34.0 (*)     MCHC 35.3      RDW 12.6      Platelet Count 264      % Neutrophils 35      % Lymphocytes 46      % Monocytes 16      % Eosinophils 2      % Basophils 1      % Immature Granulocytes 0      NRBCs per 100 WBC 0      Absolute Neutrophils 1.5 (*)     Absolute Lymphocytes 2.0      Absolute Monocytes 0.7      Absolute Eosinophils 0.1      Absolute Basophils 0.1      Absolute Immature Granulocytes 0.0      Absolute NRBCs 0.0     MAGNESIUM - Normal    Magnesium 1.7     PHOSPHORUS - Normal    Phosphorus 3.6     ETHYL ALCOHOL LEVEL     No orders to display            Medical Decision Making Matrix    Problems Addressed   MDM High: 1 acute or chronic illness or injury that poses a threat to life or bodily function     Data   Considered        Risk of Patient Management       High Risk: Decision regarding hospitalization or escalation of hospital level of care           Assessment & Plan    63-year-old female with history of severe alcohol use disorder, multiple prior historical episodes of seizures as well as DTs, alcohol withdrawal presenting requesting detox  Characteristics of drinking as noted above    In the emergency department she has had no further trauma since her initial evaluation 2 days ago.  No current evidence of severe withdrawal but anticipate she will.  Will place on MSSA now.    Labs sent and reviewed  -Chemistry magnesium and phosphorus within appropriate limits for detox admission  -Urine drug screen pending    -Clinical information given and patient admitted to  detox, awaiting bed.      I have reviewed the nursing notes. I have reviewed the findings, diagnosis, plan and need for follow up with the patient.    New Prescriptions    No medications on file       Final diagnoses:   Acute alcoholic intoxication in alcoholism with complication (H)       Carloz Mason MD   [facility] EMERGENCY DEPARTMENT  April 4, 2024     Carloz Mason MD  04/04/24 2503

## 2024-04-04 NOTE — MEDICATION SCRIBE - ADMISSION MEDICATION HISTORY
Medication Scribe Admission Medication History    Admission medication history is complete. The information provided in this note is only as accurate as the sources available at the time of the update.    Information Source(s): Patient and CareEverywhere/SureScripts via in-person    Pertinent Information: N/A    Changes made to PTA medication list:  Added: None  Deleted: None  Changed: gabapentin instructions changed from 600mg tid to 900mg tid    Allergies reviewed with patient and updates made in EHR: yes    Medication History Completed By: Fernanda Guzman 4/4/2024 5:48 PM    PTA Med List   Medication Sig Last Dose    diazepam (VALIUM) 5 MG tablet Take 1 tablet (5 mg) by mouth every 8 hours as needed for anxiety or withdrawal 4/4/2024    eszopiclone (LUNESTA) 3 MG tablet Take 3 mg by mouth at bedtime 4/3/2024 at pm    gabapentin (NEURONTIN) 600 MG tablet Take 900 mg by mouth 3 times daily 4/4/2024 at am    levothyroxine (SYNTHROID/LEVOTHROID) 100 MCG tablet Take 100 mcg by mouth every morning 4/4/2024 at am    melatonin 5 MG tablet Take 10 mg by mouth at bedtime Takes 1 hour prior to Lunesta 4/3/2024 at pm    omeprazole (PRILOSEC) 40 MG DR capsule Take 40 mg by mouth every morning 4/4/2024 at am    propranolol (INDERAL) 20 MG tablet Take 20 mg by mouth 3 times daily 4/4/2024 at am

## 2024-04-04 NOTE — ED TRIAGE NOTES
Triage Assessment (Adult)       Row Name 04/04/24 1456          Triage Assessment    Airway WDL WDL        Respiratory WDL    Respiratory WDL WDL        Skin Circulation/Temperature WDL    Skin Circulation/Temperature WDL WDL        Cardiac WDL    Cardiac WDL WDL        Peripheral/Neurovascular WDL    Peripheral Neurovascular WDL WDL        Cognitive/Neuro/Behavioral WDL    Cognitive/Neuro/Behavioral WDL WDL

## 2024-04-04 NOTE — TELEPHONE ENCOUNTER
R:    5:55 PM Paged provider to review for [facility].    6:21 PM Provider accepted pt to [facility].    6:32 PM Both units notified, report info exchanged, and admit board updated.

## 2024-04-04 NOTE — ED NOTES
Bed: ED07  Expected date: 4/4/24  Expected time: 2:28 PM  Means of arrival:   Comments:  Yamile 418: 62 y/o, M, ETOH, possible seizure

## 2024-04-04 NOTE — ED TRIAGE NOTES
"Pt brought in by EMS.  Pt has been drinking \"a lot\" and about an hour ago had a withdrawal seizure.  Pt fell and must of hit her head as pt complains of her head hurting, per EMS report.  Per EMS, no signs of withdrawal.  Not tachy.  /  Smells like alcohol.  Denies N/V.  Pt took her prn zofran this morning.    Pt unaware of time she took it.  Blood pressure in the 100s.  Was here a few days ago and is requesting detox.        "

## 2024-04-04 NOTE — TELEPHONE ENCOUNTER
S: [facility] , Provider Marlon calling at 5:31 PM with clinical on a 63 year old/Female presenting for alcohol detox.     B: Pt presents for ETOH detox.   Currently reports drinking 12 pack of beer daily for this afternoon 1 hr prior to admission.    Patient reports last use was 1 hr before admission.  Pt LEOBARDO: lab ordered by provider  Pt  endorses hx of DT  Pt  endorses hx of seizures. Last seizure:  Unclear  Pt endorsing the following symptoms of withdrawal:  No currently  MSSA Score: pt still under  influence    Pt denies acute mental health or medical concerns.   Pt denies other drug use: None Amount/frequency: N/A    Does Pt have a detox care plan in [facility]? No  Does pt present with specific needs, assistive devices, or exclusionary criteria? None  Is the patient ambulating, eating and drinking in the ED? Yes    A: Pt meets criteria to be presented for IP detox admission. Patient is voluntary    COVID Symptoms: No  If yes, COVID test required   Utox: In process  Magnesium: WNL  CMP: WNL  CBC: WNL  HCG: N/A     R: Patient cleared and ready for behavioral bed placement: Yes    Pt is meeting criteria for presentation to 3A/CD    Does Patient need a Transfer Center request created? No, Pt is located within [facility] ED, [facility], or [facility]

## 2024-04-05 LAB
BASOPHILS # BLD AUTO: 0.1 10E3/UL (ref 0–0.2)
BASOPHILS NFR BLD AUTO: 1 %
CHOLEST SERPL-MCNC: 175 MG/DL
EOSINOPHIL # BLD AUTO: 0.1 10E3/UL (ref 0–0.7)
EOSINOPHIL NFR BLD AUTO: 2 %
HBA1C MFR BLD: 5.9 %
HDLC SERPL-MCNC: 108 MG/DL
IMM GRANULOCYTES # BLD: 0 10E3/UL
IMM GRANULOCYTES NFR BLD: 0 %
LDLC SERPL CALC-MCNC: 54 MG/DL
LYMPHOCYTES # BLD AUTO: 1.8 10E3/UL (ref 0.8–5.3)
LYMPHOCYTES NFR BLD AUTO: 38 %
MONOCYTES # BLD AUTO: 0.6 10E3/UL (ref 0–1.3)
MONOCYTES NFR BLD AUTO: 12 %
NEUTROPHILS # BLD AUTO: 2.2 10E3/UL (ref 1.6–8.3)
NEUTROPHILS NFR BLD AUTO: 47 %
NONHDLC SERPL-MCNC: 67 MG/DL
NRBC # BLD AUTO: 0 10E3/UL
NRBC BLD AUTO-RTO: 0 /100
TRIGL SERPL-MCNC: 65 MG/DL
WBC # BLD AUTO: 4.7 10E3/UL (ref 4–11)

## 2024-04-05 PROCEDURE — 250N000013 HC RX MED GY IP 250 OP 250 PS 637: Performed by: PSYCHIATRY & NEUROLOGY

## 2024-04-05 PROCEDURE — 250N000013 HC RX MED GY IP 250 OP 250 PS 637: Performed by: CLINICAL NURSE SPECIALIST

## 2024-04-05 PROCEDURE — 85048 AUTOMATED LEUKOCYTE COUNT: CPT | Performed by: PSYCHIATRY & NEUROLOGY

## 2024-04-05 PROCEDURE — 99222 1ST HOSP IP/OBS MODERATE 55: CPT

## 2024-04-05 PROCEDURE — 128N000004 HC R&B CD ADULT

## 2024-04-05 PROCEDURE — 250N000013 HC RX MED GY IP 250 OP 250 PS 637: Performed by: EMERGENCY MEDICINE

## 2024-04-05 PROCEDURE — 36415 COLL VENOUS BLD VENIPUNCTURE: CPT | Performed by: PSYCHIATRY & NEUROLOGY

## 2024-04-05 PROCEDURE — 82465 ASSAY BLD/SERUM CHOLESTEROL: CPT | Performed by: PSYCHIATRY & NEUROLOGY

## 2024-04-05 PROCEDURE — 99223 1ST HOSP IP/OBS HIGH 75: CPT | Mod: AI | Performed by: PSYCHIATRY & NEUROLOGY

## 2024-04-05 PROCEDURE — 250N000011 HC RX IP 250 OP 636: Performed by: CLINICAL NURSE SPECIALIST

## 2024-04-05 RX ORDER — LEVOTHYROXINE SODIUM 100 UG/1
100 TABLET ORAL
Status: DISCONTINUED | OUTPATIENT
Start: 2024-04-05 | End: 2024-04-06 | Stop reason: HOSPADM

## 2024-04-05 RX ORDER — ALBUTEROL SULFATE 90 UG/1
2 AEROSOL, METERED RESPIRATORY (INHALATION) EVERY 6 HOURS PRN
Status: DISCONTINUED | OUTPATIENT
Start: 2024-04-05 | End: 2024-04-06 | Stop reason: HOSPADM

## 2024-04-05 RX ORDER — PROPRANOLOL HYDROCHLORIDE 20 MG/1
20 TABLET ORAL 3 TIMES DAILY
Status: DISCONTINUED | OUTPATIENT
Start: 2024-04-05 | End: 2024-04-06 | Stop reason: HOSPADM

## 2024-04-05 RX ORDER — PANTOPRAZOLE SODIUM 40 MG/1
40 TABLET, DELAYED RELEASE ORAL
Status: DISCONTINUED | OUTPATIENT
Start: 2024-04-05 | End: 2024-04-06 | Stop reason: HOSPADM

## 2024-04-05 RX ADMIN — DIAZEPAM 5 MG: 5 TABLET ORAL at 16:15

## 2024-04-05 RX ADMIN — Medication 900 MG: at 20:15

## 2024-04-05 RX ADMIN — ACETAMINOPHEN 650 MG: 325 TABLET, FILM COATED ORAL at 09:08

## 2024-04-05 RX ADMIN — GABAPENTIN 900 MG: 300 CAPSULE ORAL at 03:59

## 2024-04-05 RX ADMIN — NICOTINE POLACRILEX 4 MG: 4 GUM, CHEWING BUCCAL at 18:10

## 2024-04-05 RX ADMIN — Medication 900 MG: at 11:16

## 2024-04-05 RX ADMIN — MELATONIN 5 MG TABLET 10 MG: at 20:15

## 2024-04-05 RX ADMIN — NICOTINE POLACRILEX 4 MG: 4 GUM, CHEWING BUCCAL at 22:17

## 2024-04-05 RX ADMIN — LOPERAMIDE HYDROCHLORIDE 2 MG: 2 CAPSULE ORAL at 11:16

## 2024-04-05 RX ADMIN — NICOTINE POLACRILEX 4 MG: 4 GUM, CHEWING BUCCAL at 07:35

## 2024-04-05 RX ADMIN — ACETAMINOPHEN 650 MG: 325 TABLET, FILM COATED ORAL at 17:01

## 2024-04-05 RX ADMIN — DIAZEPAM 10 MG: 5 TABLET ORAL at 08:26

## 2024-04-05 RX ADMIN — DIAZEPAM 10 MG: 5 TABLET ORAL at 04:11

## 2024-04-05 RX ADMIN — PROPRANOLOL HYDROCHLORIDE 20 MG: 20 TABLET ORAL at 14:30

## 2024-04-05 RX ADMIN — PROPRANOLOL HYDROCHLORIDE 20 MG: 20 TABLET ORAL at 20:15

## 2024-04-05 RX ADMIN — DIAZEPAM 5 MG: 5 TABLET ORAL at 11:18

## 2024-04-05 RX ADMIN — NICOTINE POLACRILEX 4 MG: 4 GUM, CHEWING BUCCAL at 08:28

## 2024-04-05 RX ADMIN — PROPRANOLOL HYDROCHLORIDE 20 MG: 20 TABLET ORAL at 00:57

## 2024-04-05 RX ADMIN — LEVOTHYROXINE SODIUM 100 MCG: 100 TABLET ORAL at 07:27

## 2024-04-05 RX ADMIN — PROPRANOLOL HYDROCHLORIDE 20 MG: 20 TABLET ORAL at 08:43

## 2024-04-05 RX ADMIN — ACETAMINOPHEN 650 MG: 325 TABLET, FILM COATED ORAL at 00:28

## 2024-04-05 RX ADMIN — THIAMINE HCL TAB 100 MG 100 MG: 100 TAB at 08:26

## 2024-04-05 RX ADMIN — HYDROXYZINE HYDROCHLORIDE 25 MG: 25 TABLET, FILM COATED ORAL at 00:28

## 2024-04-05 RX ADMIN — Medication 900 MG: at 14:29

## 2024-04-05 RX ADMIN — PANTOPRAZOLE SODIUM 40 MG: 40 TABLET, DELAYED RELEASE ORAL at 07:27

## 2024-04-05 RX ADMIN — Medication 1 TABLET: at 08:26

## 2024-04-05 RX ADMIN — FOLIC ACID 1 MG: 1 TABLET ORAL at 08:26

## 2024-04-05 RX ADMIN — LOPERAMIDE HYDROCHLORIDE 2 MG: 2 CAPSULE ORAL at 19:09

## 2024-04-05 RX ADMIN — ONDANSETRON 4 MG: 4 TABLET, ORALLY DISINTEGRATING ORAL at 08:26

## 2024-04-05 RX ADMIN — DIAZEPAM 10 MG: 5 TABLET ORAL at 00:28

## 2024-04-05 RX ADMIN — Medication 5 MG: at 00:57

## 2024-04-05 RX ADMIN — NICOTINE POLACRILEX 4 MG: 4 GUM, CHEWING BUCCAL at 17:01

## 2024-04-05 RX ADMIN — NICOTINE POLACRILEX 4 MG: 4 GUM, CHEWING BUCCAL at 15:05

## 2024-04-05 RX ADMIN — NICOTINE POLACRILEX 4 MG: 4 GUM, CHEWING BUCCAL at 00:57

## 2024-04-05 RX ADMIN — NICOTINE POLACRILEX 4 MG: 4 GUM, CHEWING BUCCAL at 11:16

## 2024-04-05 ASSESSMENT — ACTIVITIES OF DAILY LIVING (ADL)
HYGIENE/GROOMING: INDEPENDENT
ADLS_ACUITY_SCORE: 57
ORAL_HYGIENE: INDEPENDENT
ADLS_ACUITY_SCORE: 57
DRESS: INDEPENDENT
ADLS_ACUITY_SCORE: 57
HYGIENE/GROOMING: INDEPENDENT
ADLS_ACUITY_SCORE: 57
ORAL_HYGIENE: INDEPENDENT
DRESS: INDEPENDENT
ADLS_ACUITY_SCORE: 57

## 2024-04-05 NOTE — PLAN OF CARE
"  Problem: Alcohol Withdrawal  Goal: Alcohol Withdrawal Symptom Control  Outcome: Progressing     Problem: Sleep Disturbance  Goal: Adequate Sleep/Rest  Outcome: Progressing   Goal Outcome Evaluation:    The Patient's MSSA scores were 8 and 8. She received Valium 10 mg twice during the night. The Team conducted safety checks every 15 minutes without issues. She also got Vistaril, Tylenol and Nicotine gum with relief. The said that she does not do well with all meds that end in \"Zine\" after taking the Hydroxyzine. The Writer added the med to her allergy list.                        "
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
## 2024-04-05 NOTE — CONSULTS
"[facility]  Consult Note - Hospitalist Service  Date of Admission:  4/4/2024  Consult Requested by: Jessica Haskins MD   Reason for Consult: detox; history of DT and seizures; recent fall     Assessment & Plan   Jolynn Rivera is a 63 year old female admitted on 4/4/2024. She has a PMHx notable for alcohol use disorder, alcohol withdrawal seizure, hx of heroin use, PTSD, depression, anxiety, HCV infection s/p HARVONI, COPD, hypothyroidism, and insomnia. She is admitted to [facility] for alcohol detox.     Alcohol use disorder  Acute alcohol withdrawal  Hx of withdrawal seizures   Hx of heroin use   Patient reports drinking 12 pack daily. Endorse history of withdrawal seizures and DT's. Last seizure was on 4/2, she was at home alone and thinks she had a seizure that caused her to fall. Last drink 1 hour prior to arrival on 4/4. UDS also positive for benzos.   - psychiatry primary   - agree with vitamin supplementation including of thiamine and folate  - continue MSSA with valium  - PRN Tylenol, Maalox, atenolol, Zyprexa  - counseling, group therapies, community resources  - seizure precautions     PTSD  Depression  Anxiety   Insomnia   - psychiatry primary   - PTA regimen: Lunesta 3 mg at bedtime, gabapentin 900 mg TID, valium 5 mg q8h PRN, and propranolol 20 mg TID     Recent fall  Per chart review, the patient spoke about falling recently during her previous hospitalization on 3/19. At that time, she mentioned a few falls in the 1-2 weeks prior with no significant injuries aside from a right scalp contusion. Head CT and C-spine CT negative for acute findings. Patient was seen again in the ER on 4/2 for intoxication and fall d/t unwitnessed and patient-reported seizure. CT head again unrevealing. When she presented to the ER on 4/4, she complained of \"head pain\" but without further trauma since her evaluation 2 days ago. Says her head pain is getting better when " "compared to yesterday, mild.   - continue to monitor   - notify medicine of new neurologic complaints or deterioration     Elevated blood pressure  SBP of 120-150s. Patient does not have a history of hypertension nor do they take any medications for this issue at home. I suspect this is due to the acute alcohol withdrawal process and will normalize as the withdrawal resolves.   - notify medicine if SBP > 170 despite adequate treatment of withdrawal   - if BP fails to normalize despite completion of withdrawal, patient should follow-up with PCP within 1 week of discharge       Stable/Chronic Conditions:   HCV infection s/p HARVONI: noted   COPD: albuterol q6h PRN for SOB/wheezing/dyspnea   Tobacco use disorder: continue PTA nicotine gum PRN   GERD: continue PTA omeprazole 40 mg daily or formulary equivalent   Hypothyroidism: TSH normal on 3/21/24; continue PTA levothyroxine 100 mcg every morning     Patient is stable at this time, Internal Medicine will sign off. Please reach out with any future questions or concerns.     Lorrie Morales DNP, [facility]-  Internal Medicine MEGGAN Hospitalist  [facility]      Clinically Significant Risk Factors Present on Admission                       # Overweight: Estimated body mass index is 25.69 kg/m  as calculated from the following:    Height as of this encounter: 1.6 m (5' 3\").    Weight as of this encounter: 65.8 kg (145 lb).       # Financial/Environmental Concerns:           Lorrie Morales NP  Hospitalist Service  Securely message with [employer] (more info)  Text page via [facility] Paging/Directory   ______________________________________________________________________    Chief Complaint   Alcohol and head pain     History is obtained from the patient and electronic health record    History of Present Illness   Jolynn Rivera is a 63 year old female admitted on 4/4/2024. She has a PMHx notable for alcohol use disorder, alcohol withdrawal seizure, hx of heroin use, " PTSD, depression, anxiety, HCV infection s/p HARVONI, COPD, hypothyroidism, and insomnia.     Jolynn was seen in her room. She shares that she believes she had a seizure on 4/2 that caused her to fall and hit her head. She says that her pain is mild and getting better each day. She denies headache, blurry vision, or neurologic complaints. She also says that she has so much support and is very motivated to quit drinking. She has no further questions or concerns at this time.     Past Medical History    Past Medical History:   Diagnosis Date    Anxiety     COPD (chronic obstructive pulmonary disease) (H)     COPD (chronic obstructive pulmonary disease) (H)     Hepatitis C     PTSD (post-traumatic stress disorder)     Seizures (H)     second to ETOH    Substance abuse (H)        Past Surgical History   Past Surgical History:   Procedure Laterality Date    LAPAROSCOPIC TUBAL LIGATION      ORTHOPEDIC SURGERY      Left knee    TONSILLECTOMY         Medications   Medications Prior to Admission   Medication Sig Dispense Refill Last Dose    diazepam (VALIUM) 5 MG tablet Take 1 tablet (5 mg) by mouth every 8 hours as needed for anxiety or withdrawal 9 tablet 0 4/4/2024    eszopiclone (LUNESTA) 3 MG tablet Take 3 mg by mouth at bedtime   4/3/2024 at pm    gabapentin (NEURONTIN) 600 MG tablet Take 900 mg by mouth 3 times daily   4/4/2024 at am    levothyroxine (SYNTHROID/LEVOTHROID) 100 MCG tablet Take 100 mcg by mouth every morning   4/4/2024 at am    melatonin 5 MG tablet Take 10 mg by mouth at bedtime Takes 1 hour prior to Lunesta   4/3/2024 at pm    omeprazole (PRILOSEC) 40 MG DR capsule Take 40 mg by mouth every morning   4/4/2024 at am    propranolol (INDERAL) 20 MG tablet Take 20 mg by mouth 3 times daily   4/4/2024 at am    multivitamin w/minerals (THERA-VIT-M) tablet Take 1 tablet by mouth daily 30 tablet 0     nicotine polacrilex (NICORETTE) 4 MG gum Place 4 mg inside cheek every hour as needed for smoking cessation        ondansetron (ZOFRAN ODT) 4 MG ODT tab Take 4 mg by mouth every 8 hours as needed for vomiting or nausea       thiamine (B-1) 100 MG tablet Take 1 tablet (100 mg) by mouth daily 30 tablet 0          Physical Exam   Vital Signs: Temp: 97.8  F (36.6  C) Temp src: Oral BP: (!) 157/91 Pulse: 65   Resp: 16 SpO2: 94 % O2 Device: None (Room air)    Weight: 145 lbs 0 oz    GENERAL: Alert and awake. Answering questions appropriately. Oriented x 3. NAD. Pleasant and conversational   HEENT: Anicteric sclera. EOMI. Mucous membranes moist   CARDIOVASCULAR: RRR. S1, S2. No murmurs, rubs, or gallops.   RESPIRATORY: Effort normal on RA. Clear to auscultation bilaterally, no rales, rhonchi or wheezes  GI: Abdomen soft, non-tender abdomen without rebound or guarding, normoactive bowel sounds present  MUSCULOSKELETAL: No joint swelling or tenderness. Moves all extremities.   EXTREMITIES: No peripheral edema. No calf asymmetry, erythema, or tenderness.   NEUROLOGICAL: No focal deficits. Moving all extremities symmetrically.   SKIN: Intact. Warm and dry. No jaundice. No rashes on exposed skin  PSYCH: Affect appropriate     Medical Decision Making       40 MINUTES SPENT BY ME on the date of service doing chart review, history, exam, documentation & further activities per the note.      Data   Imaging results reviewed over the past 24 hrs:   No results found for this or any previous visit (from the past 24 hour(s)).  Recent Labs   Lab 04/04/24  1757 04/04/24  1615 04/02/24  1631   WBC  --  4.3 5.3   HGB  --  14.1 14.8   MCV  --  96 97   PLT  --  264 298   NA  --  135 134*   POTASSIUM  --  4.6 4.0   CHLORIDE  --  96* 95*   CO2  --  26 22   BUN  --  7.3* 7.1*   CR  --  0.60 0.60   ANIONGAP  --  13 17*   ELVIRA  --  8.6* 8.8   GLC  --  102* 86   ALBUMIN 4.1 4.1 4.6   PROTTOTAL 7.0 7.3 7.8   BILITOTAL 0.3 0.3 0.3   ALKPHOS 51 49 56   ALT 24  --  24   AST 55*  --  48*   LIPASE 41  --   --

## 2024-04-05 NOTE — PLAN OF CARE
"Goal Outcome Evaluation:         3AW Admission Note    S = Situation:   Jolynn Rivera is a 63 year old year old female with a chief complaint of Alcohol Intoxication (Last drink 1 hour ago./Amount daily \"a lot,\" per pt. Unable to quantify) and Fall (Patient suspects fall. Complaining of \"head pain,\" potential head injury)  The above ED note was from 2 days ago. CT was taken on 4/2/24 resulting in no acute abnormalities.   Voluntary admission to [facility] for Etoh withdrawal and detox.    B  = Background:   Substance use history: Alcohol use  Mental health history: Admitted to Inpt, out pt and   Medical history: Copd, Withdrawal Seizures  Legal history:   Treatment history: 10+ times  Living situation: Independent with her new dog  Recent life stressors: Several losses; dog death     A  =  Assessment:   During admission interview, pt affect was  Anxious .  MSSA 10.   AST 55, Calcium 8.6, Chloride 96  Pt familiar with unit but was more anxious with her night time meds that she normally takes, as they were not yet available to be given tonight and no Nicotine gum in Med list yet. Paged [employer] at 2220-per my verbal note it seemed earlier but since it actually was after 10 pm I could not page on call onsite. Anxiety 6/10 especially relating to night time meds. Denies SI, SIB, HI    /81 (Patient Position: Sitting)   Pulse 77   Temp 98.2  F (36.8  C) (Oral)   Resp 16   Ht 1.6 m (5' 3\")   Wt 65.8 kg (145 lb)   SpO2 94%   BMI 25.69 kg/m       R =   Request or Recommendation:   Etoh withdrawal will be monitored and treated using MSSA with valium  Pt will meet with psychiatry, internal medicine, and case management tomorrow.  At the time of admission, pt reports discharge plan is unknown yet.  Slight Headache pain, no Tylenol given this evening for this.     Also pt reports smoking 1/2 pack /daycigarettes. Will you please place nicotine gum for daytime? (I paged and requested this also tonight along with night " time meds.)

## 2024-04-05 NOTE — PROGRESS NOTES
04/04/24 2202   Patient Belongings   Did you bring any home meds/supplements to the hospital?  Yes   Disposition of meds  Sent to security/pharmacy per site process   Patient Belongings other (see comments)   Belongings Search Yes   Clothing Search Yes   Second Staff Kyra Benavides     STORAGE BIN:   Backpack, jacket, tobacco, e-tablet, keyboard, lighter, restricted clothing    MED ROOM BIN:   Cell phone, [employer] #[id number]: CSC CARD, BUS, EBT, MC, ID  [employer]# [id number]:medications  PHARMACY [employer]EL[employer]# [id number]: diazapam  A             Admission:  I am responsible for any personal items that are not sent to the safe or pharmacy.  [facility] is not responsible for loss, theft or damage of any property in my possession.    Signature:  _________________________________ Date: _______  Time: _____                                              Staff Signature:  ____________________________ Date: ________  Time: _____      2nd Staff person, if patient is unable/unwilling to sign:    Signature: ________________________________ Date: ________  Time: _____   Discharge:  [facility] has returned all of my personal belongings:    Signature: _________________________________ Date: ________  Time: _____                                          Staff Signature:  ____________________________ Date: ________  Time: _____

## 2024-04-05 NOTE — PLAN OF CARE
Behavioral Team Discussion: (4/5/2024)    Continued Stay Criteria/Rationale: Patient admitted for Chemical Use Issues.  Plan: The following services will be provided to the patient; psychiatric assessment, medication management, therapeutic milieu, individual and group support, and skills groups.   Participants: 3A Provider: Dr. Jessica Haskins MD; 3A RN:  Katharine Triplett RN; 3A CM's:  Lesley Cleary .  Summary/Recommendation: Providers will assess today for treatment recommendations, discharge planning, and aftercare plans. CM will meet with pt for discharge planning.   Medical/Physical: Pt may have fell and hit her head pt complains of her head hurting   Precautions:   Behavioral Orders   Procedures    Code 1 - Restrict to Unit    Fall precautions    Routine Programming     As clinically indicated    Seizure precautions    Status 15     Every 15 minutes.    Suicide precautions: Suicide Risk: MODERATE; Clinical rationale to override score: modification to the care environment     Search patient belongings according to policy for contraband or items that could be used for self-harm.   Send personal items home or secure valuables according to Patient Belongings policy.  Secure visitor's belongings  Patients able to keep undergarments on after search.  Direct visualization when patient in bathroom.     Order Specific Question:   Suicide Risk     Answer:   MODERATE     Order Specific Question:   Clinical rationale to override score:     Answer:   modification to the care environment    Withdrawal precautions     Rationale for change in precautions or plan: N/A  Progress: Initial.    ASAM Dimension Scale Ratings:  Dimension 1: 2 Client has some difficulty tolerating and coping with withdrawal discomfort. Client's intoxication may be severe, but responds to support and treatment such that the client does not immediately endanger self or others. Client displays moderate signs and symptoms with moderate risk of severe  withdrawal.  Dimension 2: 1 Client tolerates and rahul with physical discomfort and is able to get the services that the client needs.  Dimension 3: 2 Client has difficulty with impulse control and lacks coping skills. Client has thoughts of suicide or harm to others without means; however, the thoughts may interfere with participation in some treatment activities. Client has difficulty functioning in significant life areas. Client has moderate symptoms of emotional, behavioral, or cognitive problems. Client is able to participate in most treatment activities.  Dimension 4: 2 Client displays verbal compliance, but lacks consistent behaviors; has low motivation for change; and is passively involved in treatment.  Dimension 5: 4 No awareness of the negative impact of mental health problems or substance abuse. No coping skills to arrest mental health or addiction illnesses, or prevent relapse.  Dimension 6: 4 Client has (A) Chronically antagonistic significant other, living environment, family, peer group or long-term criminal justice involvement that is harmful to recovery or treatment progress, or (B) Client has an actively antagonistic significant other, family, work, or living environment with immediate threat to the client's safety and well-being.

## 2024-04-05 NOTE — COMMUNITY RESOURCES LIST (ENGLISH)
April 5, 2024           YOUR PERSONALIZED LIST OF SERVICES & PROGRAMS           USE    Use Treatment      [facility] - Outpatient substance use treatment  [address] [place] [address] (Distance: 0.3 miles)  Phone: [phone]  Website: http://[employer]  Language: English, [place]  Fee: Self pay  Accessibility: Ada accessible, Translation services      [facility] ([facility])  Phone: [phone]) [phone]  Website: https://[employer]/programs-services/addiction-treatment/  Language: English  Hours: Mon 8:00 AM - 5:00 PM Tue 8:00 AM - 5:00 PM Wed 8:00 AM - 5:00 PM Thu 8:00 AM - 5:00 PM Fri 8:00 AM - 5:00 PM  Fee: Insurance, Self pay  Accessibility: Ada accessible      [facility]  Phone: [phone]  Website: https://www.[employer]/  Language: English  Hours: Mon 7:00 AM - 7:00 PM Tue 7:00 AM - 7:00 PM Wed 7:00 AM - 7:00 PM Thu 7:00 AM - 7:00 PM Fri 7:00 AM - 7:00 PM  Fee: Free    Use Recovery Support      [facility] Alcoholics [facility] and [facility]  [address] [place] [address] (Distance: 0.2 miles)  Phone: [phone]) [phone]  Website: http://www.[place]  Language: English  Fee: Free      [facility]  Phone: [phone]) [phone]  Website: https://[employer].org/mnwarmline  Language: English  Hours: Mon 9:00 AM - 5:00 PM Tue 9:00 AM - 5:00 PM Wed 9:00 AM - 5:00 PM Thu 9:00 AM - 5:00 PM Fri 9:00 AM - 5:00 PM  Fee: Free  Accessibility: Ada accessible, Translation services      [facility]  Phone: [phone]  Website: https://[employer]/  Language: English  Hours: Mon 7:00 AM - 7:00 PM Tue 7:00 AM - 7:00 PM Wed 7:00 AM - 7:00 PM Thu 7:00 AM - 7:00 PM Fri 7:00 AM - 7:00 PM  Fee: Free               IMPORTANT NUMBERS & WEBSITES        Emergency Services  911  .   [facility]  211 [email or web address].org  .   Poison  Control  (719) 367-2144 http://[place]poison.org http://wis[place]poison.org  .     Suicide and Crisis Lifeline  988 http://988lifeline.org  .   [employer]help [place] Child Abuse Hotline  815.825.2043 http://Childhelphotline.org   .   National Sexual Assault Hotline  (280) 735-6198 (HOPE) http://CDPn.org   .     National Runaway Safeline  (407) 682-4006 (RUNAWAY) http://[employer]  .   Pregnancy & Postpartum Support  Call/text 781-133-8044  [place]: http://ppsupportmn.org  [place]: http://psichapters.com/wi  .   Substance Abuse National Helpline ([facility])  404-667-HELP (4990) http://Findtreatment.gov   .                DISCLAIMER: These resources have been generated via the National Recovery Services Platform. National Recovery Services does not endorse any service providers mentioned in this resource list. National Recovery Services does not guarantee that the services mentioned in this resource list will be available to you or will improve your health or wellness.    [facility]

## 2024-04-05 NOTE — PROGRESS NOTES
This patient does not require Contact Precautions because the patient had no hospitalization or invasive procedures outside of [place] or [place]. and hospitalization or long-term care stay was not in a high-risk state.    April 5, 2024  Tyler Da Silva, Infection Prevention

## 2024-04-05 NOTE — DISCHARGE INSTRUCTIONS
Behavioral Discharge Planning and Instructions  THANK YOU FOR CHOOSING [facility]  3A  [phone]    Summary: You were admitted to [facility] on 4/4/24 for detoxification from alcohol.  A medical exam was performed that included lab work. You have met with a  and opted to detox only.  Please take care and make your recovery a daily priority, Jolynn!  It was a pleasure working with you and the entire treatment team here wishes you the very best in your recovery!     Recommendation:  Abstain from use of mood altering substances. If you are unable to maintain sobriety in the community then please obtain a chemical health assessment and follow all recommendations.  To schedule an assessment:  Call the [facility] assessment center at [phone] and ask to schedule a Chemical Health Assessment.     You can also call your [facility] Chemical Dependency unit (these are usually included under adult mental health services). Most [facility] have a number for you to call to schedule an assessment.  If you have private insurance or a PMAP you can call the customer service number on your insurance  card and they can give you a list of places to call for an assessment that are in network with your  insurance.       To find a treatment program:     SAMHSA.gov  Click on find treatment  Enter your zip code and select your city/state.  The Substance Abuse and Mental Health Services Administration (SAMHSA) is the agency within  the U.S. Department of Health and Human Services that leads public health efforts to advance the  behavioral health of the nation. SAMHSA's mission is to reduce the impact of substance abuse and  mental illness on [place] communities.     [employer].org select substance use disorder programs then select find services.  Fast Tracker is a virtual community and health care connection resource. We connect  individuals, families, mental health and substance use disorder providers, physicians,  care coordinators,  and others with a real-time, searchable directory of mental health and substance use disorder resources  and their availability within [place].    If you have any questions at anytime after you are discharged please call [facility] detox unit 3AW at [phone].  VICENTE Joe, Behavioral Intake [phone]  Medical Records call [phone]  Outpatient Behavioral Intake call [phone]      Main Diagnoses:  Per Dr. Divine MD;  303.90 (F10.20) Alcohol Use Disorder Severe    Major Treatments, Procedures and Findings:  You were treated for alcohol detoxification using valium. You did not complete a chemical dependency assessment. You had labs drawn and those results were reviewed with you. Please take a copy of your lab work with you to your next primary care provider appointment.    Symptoms to Report:  If you experience more anxiety, confusion, sleeplessness, deep sadness or thoughts of suicide, notify your treatment team or notify your primary care provider. IF ANY OF THE SYMPTOMS YOU ARE EXPERIENCING ARE A MEDICAL EMERGENCY CALL 911 IMMEDIATELY.     Lifestyle Adjustment: Adjust your lifestyle to get enough sleep, relaxation, exercise and good nutrition. Continue to develop healthy coping skills to decrease stress and promote a sober living environment. Do not use mood altering substances including alcohol, illegal drugs or addictive medications other than what is currently prescribed.     Disposition: Home    Facts about COVID19 at www.cdc.gov/COVID19 and www.[place].gov/covid19    Keeping hands clean is one of the most important steps we can take to avoid getting sick and spreading germs to others.  Please wash your hands frequently and lather with soap for at least 20 seconds!    Follow-up Appointment:   Per patient--will make appointment independently    Patient declined CD resources from case management and opts to follow up with [facility] on her own    Recovery apps for  your phone for educational purposes and to locate in person and zoom recovery meetings  Everything AA - educational purpose and mahnaz is a great resource  12 Step Toolkit - educational purpose learning about the 12 steps to recovery  [employer] - meeting mahnaz  AA  - meeting mahnaz  Meeting guide - meeting mahnaz  Quick NA meeting - meeting mahnaz  Estela- has various apps    Resources:  Some AA/NA meetings are being held online however most have returned to in-person or a hybrid combination please check online to verify*  Need Support Now? If you or someone you know is struggling or in crisis, help is available. Call or text 852 or chat [place]  AA meetings search for them at: https://aa-intergroup.org (worldwide meeting listings)  AA meetings for [place] area can be found online at: https://a[place].org (click local online meetings listings)  NA meetings for [place] area can be found online at: https://www.nam[place].org  (click find a meeting)  AA and NA Sponsors are excellent resources for support and you can find one at any support group meeting.   Alcoholics Anonymous (https://aa.org/): for information 24 hours/day  AA Intergroup service office in [place] (http://www.aastpaul.org/) 429.802.2884  AA Intergroup service office in [facility]: 290.953.9425. (http://www.a[place].org/)  Narcotics Anonymous (www.nam[place].org) (332) 878-5336  https://aafairviewriverside.org/meetings  SMART Recovery - self management for addiction recovery:  www.smartrecovery.org  Pathways ~ A Health Crisis Resource & Support Center:  792.175.1163.  https://prescribetoprevent.org/patient-education/videos/  http://www.harmreduction.org  Crisis Text Line  Text 887371  You will be connected with a trained live crisis counselor to provide support. Por espanol, texto  JEANNETTE a 686504 o texto a 442-AYUDAME en Whats[facility]ied[facility] Hope Line  1.800.SUICIDE [7017007]  [facility] [phone]  Support Group:   "AA/NA and Sponsor/support.  Fast Tracker  Linking people to mental health and substance use disorder resources  fasttrackermn.org   [place] Mental Health Warm Line  Peer to peer support  Monday thru Saturday, 12 pm to 10 pm  085.435.5833 or 9.760.853.2836  Text \"Support\" to 68739  National [place] on Mental Illness (MARITZA)  660.303.8777 or 1.888.MARITZA.HELPS  Alcoholics Anonymous (www.alcoholics-anonymous.org): Check your phone book for your local chapter.  Suicide Awareness Voices of Education (SAVE) (www.save.Yoolink): 918-001-MGDV (7283)  National Suicide Prevention Line (www.mentalhealthmn.org): 579-553-IQLN (2132)  Mental Health Consumer/Survivor Network of [place] (www.mhcsn.net): 304.776.2642 or 285-382-6145  Mental Health Association of [place] (www.mentalhealth.org): 851.845.9824 or 377-554-7684   Substance Abuse and Mental Health Services (www.samhsa.gov)  [place] Opioid Prevention Coalition: www.opioidcoalition.org    [place] Recovery Connection (MRC)  [facility] connects people seeking recovery to resources that help foster and sustain long-term recovery.  Whether you are seeking resources for treatment, transportation, housing, job training, education, health care or other pathways to recovery, [facility] is a great place to start.  616.934.7417.  www.[place].org    Great Pod casts for nutrition and wellness  Listen on Apple Podcasts  Dishing Up Nutrition   [employer]   Nutrition       Understand the connection between what you eat and how you feel. Hosted by licensed nutritionists and dietitians from [employer] we share practical, real-life solutions for healthier living through nutrition.     General Medication Instructions:   See your medication sheet(s) for instructions.   Take all medications as prescribed.  Make no changes unless your primary care provider suggests them.   Go to all your primary care provider visits.  Be sure to have all your required lab " tests. This way, your medicines can be refilled on time.  Do not use any forms of alcohol.    Please Note:  If you have any questions at anytime after you are discharged please call [facility] detox unit 3AW at [phone].  [facility], Behavioral Intake [phone]  Medical Records call [phone]  Outpatient Behavioral Intake call [phone]  LP+ Wait List/Bed Availability call [phone]    Please remember to take all of your behavioral discharge planning and lab paperwork to any follow up appointments, it contains your lab results, diagnosis, medication list and discharge recommendations.      THANK YOU FOR CHOOSING [facility]

## 2024-04-05 NOTE — PLAN OF CARE
Problem: Alcohol Withdrawal  Goal: Alcohol Withdrawal Symptom Control  Outcome: Progressing   Goal Outcome Evaluation:    MSSENRIQUE's 8 (received 5mg valium) & 6 this shift.  Pt present in milieu, social w/ peers, attended AA group.  Rated her anxiety 5/10, depression 7/10.  Denied A/VH's.  Denied SI/SIB/HI.  Pt requested/received the following prn's this shift: tylenol (arthritis pain), melatonin & imodium for diarrhea which she states she gets from eating hospital food.  Compliant w/ scheduled medication.  Pt talked about her plans for discharge, stating she'd like to go home and do [facility].  Pt talked about how in the past she had 5.5 yrs sober while doing SMART recovery.  Pt pleasant, cooperative & behaviorally controlled.  Status 15.  Will continue to monitor and support plan of care.

## 2024-04-05 NOTE — PROGRESS NOTES
"[address]     Case Management Encounter: Writer met with patient to discuss aftercare and discharge plans. Patient states she has been considering outpatient at [facility] and declined case management assistance with this. Patient states \"I have so many coping skills, I just had too many deaths happen all at once to use them all\". Patient discussed the recent losses of her friends, family members, and pets. Patient reported she could \"feel the spirits\" of the loved ones she has lost pushing her forward in life, and writer encouraged her to use that as motivation for sobriety as well, patient agrees.     Insurance: Primary Medicare - Secondary [employer] PMAP     Legal Status: vol     SUDs Assessment Status: does not need     ROIs on file: None     Living Situation: Patient reports she just moved to a \"safer\" neighborhood in [place] and lives alone with her new dog     Current Providers, Supports & Collateral:  Patient states she has worked with an EMDR therapist through [facility] for 6 years and this person is retiring. Patient states she worked with this provider to find another [facility] provider who she knows and trusts.    Current Plan/Referral Status: Patient reports she plans to follow up on outpatient CD treatment at [facility] on her own and is here for detox only.   Patient has engaged in smart recovery in the past and reports she is soon to begin a \"wellness program\" that combines yoga, meditation and recovery. Patient reports being \"well supported with doctors and providers\".    RN updated.    Viviane Covarrubias  [address] - Adult Inpatient Addiction Psychiatry Unit           "

## 2024-04-05 NOTE — PLAN OF CARE
Problem: Alcohol Withdrawal  Goal: Alcohol Withdrawal Symptom Control  Outcome: Progressing   Goal Outcome Evaluation:    Plan of Care Reviewed With: patient       Patient has been awake and alert all of the shift.  She is eating marginally and drinking adequate fluids.  She continues to be tremulous, diaphoretic and anxious.  Pt reports that she has been overwhelmed with loss due to pets and friends dying.  She is considering an outpatient program but hasn't committed entirely.  Pt's affect is blunted, her speech is clear, her thoughts follow.  Pt is medication compliant and knowledgeable.  Will continue to monitor and assist with discharge planning when out of detox.

## 2024-04-06 VITALS
SYSTOLIC BLOOD PRESSURE: 170 MMHG | DIASTOLIC BLOOD PRESSURE: 106 MMHG | RESPIRATION RATE: 16 BRPM | HEART RATE: 67 BPM | TEMPERATURE: 97.3 F | WEIGHT: 145 LBS | HEIGHT: 63 IN | BODY MASS INDEX: 25.69 KG/M2 | OXYGEN SATURATION: 97 %

## 2024-04-06 PROCEDURE — 250N000013 HC RX MED GY IP 250 OP 250 PS 637: Performed by: PSYCHIATRY & NEUROLOGY

## 2024-04-06 PROCEDURE — 250N000013 HC RX MED GY IP 250 OP 250 PS 637: Performed by: CLINICAL NURSE SPECIALIST

## 2024-04-06 PROCEDURE — 99239 HOSP IP/OBS DSCHRG MGMT >30: CPT | Performed by: PSYCHIATRY & NEUROLOGY

## 2024-04-06 PROCEDURE — 250N000011 HC RX IP 250 OP 636: Performed by: CLINICAL NURSE SPECIALIST

## 2024-04-06 RX ORDER — ALBUTEROL SULFATE 90 UG/1
2 AEROSOL, METERED RESPIRATORY (INHALATION) EVERY 6 HOURS PRN
Status: SHIPPED
Start: 2024-04-06

## 2024-04-06 RX ADMIN — NICOTINE POLACRILEX 4 MG: 4 GUM, CHEWING BUCCAL at 14:49

## 2024-04-06 RX ADMIN — PROPRANOLOL HYDROCHLORIDE 20 MG: 20 TABLET ORAL at 14:48

## 2024-04-06 RX ADMIN — PROPRANOLOL HYDROCHLORIDE 20 MG: 20 TABLET ORAL at 08:14

## 2024-04-06 RX ADMIN — THIAMINE HCL TAB 100 MG 100 MG: 100 TAB at 08:14

## 2024-04-06 RX ADMIN — LEVOTHYROXINE SODIUM 100 MCG: 100 TABLET ORAL at 06:37

## 2024-04-06 RX ADMIN — Medication 900 MG: at 08:14

## 2024-04-06 RX ADMIN — NICOTINE POLACRILEX 4 MG: 4 GUM, CHEWING BUCCAL at 13:17

## 2024-04-06 RX ADMIN — PANTOPRAZOLE SODIUM 40 MG: 40 TABLET, DELAYED RELEASE ORAL at 06:38

## 2024-04-06 RX ADMIN — Medication 900 MG: at 14:48

## 2024-04-06 RX ADMIN — ONDANSETRON 4 MG: 4 TABLET, ORALLY DISINTEGRATING ORAL at 13:34

## 2024-04-06 RX ADMIN — ALUMINUM HYDROXIDE, MAGNESIUM HYDROXIDE, AND SIMETHICONE 30 ML: 200; 200; 20 SUSPENSION ORAL at 03:55

## 2024-04-06 RX ADMIN — NICOTINE POLACRILEX 4 MG: 4 GUM, CHEWING BUCCAL at 07:21

## 2024-04-06 RX ADMIN — NICOTINE POLACRILEX 4 MG: 4 GUM, CHEWING BUCCAL at 11:46

## 2024-04-06 RX ADMIN — NICOTINE POLACRILEX 4 MG: 4 GUM, CHEWING BUCCAL at 06:08

## 2024-04-06 RX ADMIN — FOLIC ACID 1 MG: 1 TABLET ORAL at 08:14

## 2024-04-06 RX ADMIN — Medication 1 TABLET: at 08:14

## 2024-04-06 RX ADMIN — NICOTINE POLACRILEX 4 MG: 4 GUM, CHEWING BUCCAL at 08:14

## 2024-04-06 ASSESSMENT — ACTIVITIES OF DAILY LIVING (ADL)
ADLS_ACUITY_SCORE: 57
HYGIENE/GROOMING: INDEPENDENT
DRESS: INDEPENDENT
HYGIENE/GROOMING: INDEPENDENT
ADLS_ACUITY_SCORE: 57
ORAL_HYGIENE: INDEPENDENT
DRESS: INDEPENDENT
ADLS_ACUITY_SCORE: 57
ADLS_ACUITY_SCORE: 57
ORAL_HYGIENE: INDEPENDENT
ADLS_ACUITY_SCORE: 57

## 2024-04-06 NOTE — DISCHARGE SUMMARY
Psychiatric Discharge Summary    Jolynn Rivera MRN# [id number]   Age: 63 year old YOB: 1960     Date of Admission:  4/4/2024  Date of Discharge:  4/6/2024  4:56 PM  Admitting Physician:  Jessica Haskins MD  Discharge Physician:  Darren Fermin MD          Event Leading to Hospitalization:   Jolynn is a 63 year old female with history of substance use, bipolar disorder, anxiety who presented to ED seeking detox from alcohol.      Chart review completed including ED notes from this visit, recent outpatient psychiatry and therapy notes, recent medical admission for EtOH 3/19 nand psych consult completed during that admission and last admission to this unit May 2023 and discharged home to [facility] up with [facility].      Per ED physician note: Jolynn Rivera is a 63 year old female with severe alcohol use disorder who presents to the emergency department via ambulance with alcohol intoxication she reports she was here 2 days ago but ultimately left prior to admission to detox which she had wanted at the time.     Now returns requesting detox  Drinks approximately 12 pack daily  Had a fall 2 days ago for which she was imaged with a CT scan which was unrevealing during her recent ED visit  Last drink approximate 1 hour prior to arrival.  She is able to ambulate with a steady gait and is conversant and alert appearing during our conversation.     Denies any chest pain or shortness of breath  Denies other drugs  Reports history of seizures and DTs in the past     Upon interview: Pt confirms information above, reports that she relapsed a few months ago, went to [place] detox 3 months ago, for past month has been drinking 12 beers per day. Denies other substance use outside of nicotine. Has tolerance to alcohol, difficulty cutting down, drinking more than intended and withdrawal symptoms when she stops drinking. Has history of withdrawal seizures. Denies AVH. Currently having some tremors, poor  "sleep, weak, shaking, nausea, not able to eat much. Reports her mood has been \"pretty positive\" recently due to some changes in her life such as a new dog and moving to a new apartment that has a full kitchen and in a safer area. Denies having any SI or HI. Has been trying to take her medications consistently but does miss a few doses at times. She has supports in community such as therapy that she plans to follow up with on discharge. She may be interested in a womens only program through [employer] and will talk to the [facility] about this. No other concerns.        See Admission note by Jessica Haskins MD for additional details.          Diagnoses:     Alcohol use disorder, severe, dependence with withdrawal with complication including reported hx of seizures and DTs  Nicotine dependence with withdrawal   OMARI  Bipolar affective disorder, type 2, most recent episode depressed, in partial remission   PTSD    Clinically Significant Risk Factors                      # Overweight: Estimated body mass index is 25.69 kg/m  as calculated from the following:    Height as of this encounter: 1.6 m (5' 3\").    Weight as of this encounter: 65.8 kg (145 lb)., PRESENT ON ADMISSION     # Financial/Environmental Concerns:                  Labs:        Lab Results   Component Value Date     04/04/2024     09/03/2016    Lab Results   Component Value Date    CHLORIDE 96 04/04/2024    CHLORIDE 102 12/09/2022    CHLORIDE 104 09/03/2016    Lab Results   Component Value Date    BUN 7.3 04/04/2024    BUN 6 12/09/2022    BUN 7 09/03/2016      Lab Results   Component Value Date    POTASSIUM 4.6 04/04/2024    POTASSIUM 3.6 12/09/2022    POTASSIUM 3.8 09/03/2016    Lab Results   Component Value Date    CO2 26 04/04/2024    CO2 20 12/09/2022    CO2 25 09/03/2016    Lab Results   Component Value Date    CR 0.60 04/04/2024    CR 0.61 09/03/2016          Lab Results   Component Value Date    WBC 4.7 04/05/2024    HGB 14.1 04/04/2024 "    HCT 40.0 04/04/2024    MCV 96 04/04/2024     04/04/2024     Lab Results   Component Value Date    AST 55 (H) 04/04/2024    ALT 24 04/04/2024    GGT 22 04/04/2024    ALKPHOS 51 04/04/2024    BILITOTAL 0.3 04/04/2024     Lab Results   Component Value Date    TSH 2.13 04/04/2024            Consults:   Consultation during this admission received from internal medicine:  Jolynn Rivera is a 63 year old female admitted on 4/4/2024. She has a PMHx notable for alcohol use disorder, alcohol withdrawal seizure, hx of heroin use, PTSD, depression, anxiety, HCV infection s/p HARVONI, COPD, hypothyroidism, and insomnia. She is admitted to [facility] for alcohol detox.      Alcohol use disorder  Acute alcohol withdrawal  Hx of withdrawal seizures   Hx of heroin use   Patient reports drinking 12 pack daily. Endorse history of withdrawal seizures and DT's. Last seizure was on 4/2, she was at home alone and thinks she had a seizure that caused her to fall. Last drink 1 hour prior to arrival on 4/4. UDS also positive for benzos.   - psychiatry primary   - agree with vitamin supplementation including of thiamine and folate  - continue MSSA with valium  - PRN Tylenol, Maalox, atenolol, Zyprexa  - counseling, group therapies, community resources  - seizure precautions      PTSD  Depression  Anxiety   Insomnia   - psychiatry primary   - PTA regimen: Lunesta 3 mg at bedtime, gabapentin 900 mg TID, valium 5 mg q8h PRN, and propranolol 20 mg TID      Recent fall  Per chart review, the patient spoke about falling recently during her previous hospitalization on 3/19. At that time, she mentioned a few falls in the 1-2 weeks prior with no significant injuries aside from a right scalp contusion. Head CT and C-spine CT negative for acute findings. Patient was seen again in the ER on 4/2 for intoxication and fall d/t unwitnessed and patient-reported seizure. CT head again unrevealing. When she presented to the ER on 4/4, she complained  "of \"head pain\" but without further trauma since her evaluation 2 days ago. Says her head pain is getting better when compared to yesterday, mild.   - continue to monitor   - notify medicine of new neurologic complaints or deterioration      Elevated blood pressure  SBP of 120-150s. Patient does not have a history of hypertension nor do they take any medications for this issue at home. I suspect this is due to the acute alcohol withdrawal process and will normalize as the withdrawal resolves.   - notify medicine if SBP > 170 despite adequate treatment of withdrawal   - if BP fails to normalize despite completion of withdrawal, patient should follow-up with PCP within 1 week of discharge         Stable/Chronic Conditions:   HCV infection s/p HARVONI: noted   COPD: albuterol q6h PRN for SOB/wheezing/dyspnea   Tobacco use disorder: continue PTA nicotine gum PRN   GERD: continue PTA omeprazole 40 mg daily or formulary equivalent   Hypothyroidism: TSH normal on 3/21/24; continue PTA levothyroxine 100 mcg every morning          Hospital Course:   Jolynn Rivera was admitted to [facility] with attending Jessica Haskins MD as a voluntary patient. The patient was placed under status 15 (15 minute checks) to ensure patient safety.   MSSA protocol was initiated due to the patient's history of alcohol abuse and concern for withdrawal symptoms.  CBC, BMP and utox obtained.    All outpatient medications were continued with the exception of Lunesta. The patient declined the offer of MAT for AUD.      Jolynn Rivera did participate in groups and was visible in the milieu.     The patient's symptoms of withdrawal improved.     Jolynn Rivera was released to  home with CD treatment . At the time of discharge Jolynn Rivera was determined to not be a danger to herself or others. At the current time of discharge, the patient does not meet criteria for involuntary hospitalization. On the day of discharge, the patient reports that " they do not have suicidal or homicidal ideation and would never hurt themselves or others. Steps taken to minimize risk include: assessing patient s behavior and thought process daily during hospital stay, discharging patient with adequate plan for follow up for mental and physical health and discussing safety plan of returning to the hospital should the patient ever have thoughts of harming themselves or others. Therefore, based on all available evidence including the factors cited above, the patient does not appear to be at imminent risk for self-harm, and is appropriate for outpatient level of care.           Discharge Medications:     Current Discharge Medication List        START taking these medications    Details   albuterol (PROAIR HFA/PROVENTIL HFA/VENTOLIN HFA) 108 (90 Base) MCG/ACT inhaler Inhale 2 puffs into the lungs every 6 hours as needed for wheezing    Comments: Pharmacy may dispense brand covered by insurance (Proair, or proventil or ventolin or generic albuterol inhaler)  Associated Diagnoses: Asthma, unspecified asthma severity, unspecified whether complicated, unspecified whether persistent           CONTINUE these medications which have NOT CHANGED    Details   eszopiclone (LUNESTA) 3 MG tablet Take 3 mg by mouth at bedtime      gabapentin (NEURONTIN) 600 MG tablet Take 900 mg by mouth 3 times daily      levothyroxine (SYNTHROID/LEVOTHROID) 100 MCG tablet Take 100 mcg by mouth every morning      melatonin 5 MG tablet Take 10 mg by mouth at bedtime Takes 1 hour prior to Lunesta      omeprazole (PRILOSEC) 40 MG DR capsule Take 40 mg by mouth every morning      propranolol (INDERAL) 20 MG tablet Take 20 mg by mouth 3 times daily      multivitamin w/minerals (THERA-VIT-M) tablet Take 1 tablet by mouth daily  Qty: 30 tablet, Refills: 0    Comments: Pt has supply  Associated Diagnoses: Alcohol dependence with intoxication with complication (H)      nicotine polacrilex (NICORETTE) 4 MG gum Place 4 mg  inside cheek every hour as needed for smoking cessation      ondansetron (ZOFRAN ODT) 4 MG ODT tab Take 4 mg by mouth every 8 hours as needed for vomiting or nausea      thiamine (B-1) 100 MG tablet Take 1 tablet (100 mg) by mouth daily  Qty: 30 tablet, Refills: 0    Associated Diagnoses: Alcohol dependence with intoxication with complication (H)           STOP taking these medications       diazepam (VALIUM) 5 MG tablet Comments:   Reason for Stopping:                    Psychiatric Examination:   Appearance:  awake, alert and adequately groomed  Attitude:  cooperative  Eye Contact:  good  Mood:  good  Affect:  mood congruent  Speech:  clear, coherent  Psychomotor Behavior:  no evidence of tardive dyskinesia, dystonia, or tics  Thought Process:  goal oriented  Associations:  no loose associations  Thought Content:  no evidence of suicidal ideation or homicidal ideation and no evidence of psychotic thought  Insight:  fair  Judgment:  fair  Oriented to:  time, person, and place  Attention Span and Concentration:  intact  Recent and Remote Memory:  fair  Language: Able to read and write  Fund of Knowledge: appropriate  Muscle Strength and Tone: normal  Gait and Station: Normal         Discharge Plan:   Continue medications as above.     Recommendation:  Abstain from use of mood altering substances. If you are unable to maintain sobriety in the community then please obtain a chemical health assessment and follow all recommendations.  To schedule an assessment:  Call the [facility] assessment center at [phone] and ask to schedule a Chemical Health Assessment.     You can also call your [facility] Chemical Dependency unit (these are usually included under adult mental health services). Most [facility] have a number for you to call to schedule an assessment.  If you have private insurance or a PMAP you can call the customer service number on your insurance  card and they can give you a list of places to call for an  assessment that are in network with your  insurance.       To find a treatment program:     SAMHSA.gov  Click on find treatment  Enter your zip code and select your city/state.  The Substance Abuse and Mental Health Services Administration (SAMHSA) is the agency within  the U.S. Department of Health and Human Services that leads public health efforts to advance the  behavioral health of the nation. SAMHSA's mission is to reduce the impact of substance abuse and  mental illness on [place] communities.     [employer] select substance use disorder programs then select find services.  [employer] is a virtual community and health care connection resource. We connect  individuals, families, mental health and substance use disorder providers, physicians, care coordinators,  and others with a real-time, searchable directory of mental health and substance use disorder resources  and their availability within [place].     If you have any questions at anytime after you are discharged please call [facility] detox unit [facility] at [phone].  [facility], Behavioral Intake [phone]  Medical Records call [phone]  Outpatient Behavioral Intake call [phone]        Major Treatments, Procedures and Findings:  You were treated for alcohol detoxification using valium. You did not complete a chemical dependency assessment. You had labs drawn and those results were reviewed with you. Please take a copy of your lab work with you to your next primary care provider appointment.     Symptoms to Report:  If you experience more anxiety, confusion, sleeplessness, deep sadness or thoughts of suicide, notify your treatment team or notify your primary care provider. IF ANY OF THE SYMPTOMS YOU ARE EXPERIENCING ARE A MEDICAL EMERGENCY CALL [phone] IMMEDIATELY.      Lifestyle Adjustment: Adjust your lifestyle to get enough sleep, relaxation, exercise and good nutrition. Continue to develop healthy coping skills to  decrease stress and promote a sober living environment. Do not use mood altering substances including alcohol, illegal drugs or addictive medications other than what is currently prescribed.      Disposition: Home     Facts about COVID19 at www.cdc.gov/COVID19 and www.[place].gov/covid19     Keeping hands clean is one of the most important steps we can take to avoid getting sick and spreading germs to others.  Please wash your hands frequently and lather with soap for at least 20 seconds!     Follow-up Appointment:   Per patient--will make appointment independently     Patient declined CD resources from case management and opts to follow up with [facility] on her own    Attestation:    The patient was seen and evaluated by me. I spent more than 30 minutes on discharge day activities. Darren Fermin MD

## 2024-04-06 NOTE — PLAN OF CARE
Problem: Alcohol Withdrawal  Goal: Alcohol Withdrawal Symptom Control  Outcome: Met   Goal Outcome Evaluation:ongoing    Pt's MSSA 4 at 1620.  Pt's last dose valium was yesterday, 4/5/24 at 1615.  It has been 24 hrs since pt's last dose valium and pt taken out of detox per unit protocol.  Last BP elevated at 1625: 170/106, HR 67.  Pt states she has plans to follow up with her outpatient provider regarding her BP.

## 2024-04-06 NOTE — PLAN OF CARE
"  Problem: Adult Behavioral Health Plan of Care  Goal: Plan of Care Review  Outcome: Progressing  Flowsheets (Taken 4/6/2024 0800)  Patient Agreement with Plan of Care: agrees   Goal Outcome Evaluation:    Plan of Care Reviewed With: patient        Pt awake and alert all day.  She is oriented X 4, denies SI, SIB.  Pt's affect is blunted but she is calm.  She is knowledgeable about her medications, their uses and schedule.  Pt has a CAD[facility] worker that she stays in close contact with.  She has multiple medical and psychiatric appointments scheduled with [facility] practitioners that are not visible on [facility].  Pt reports that she is going to start [facility] on Monday.  She thinks it might be a good \"refresher\" course.  Patient has not started any new medications and has medications to go home with.  All labs, medications and discharge instructions reviewed with patient.  Pt is discharging to home after her 16:00 detox evaluation to confirm detox is complete.             "

## 2024-04-06 NOTE — PLAN OF CARE
Problem: Alcohol Withdrawal  Goal: Alcohol Withdrawal Symptom Control  Outcome: Progressing     Problem: Sleep Disturbance  Goal: Adequate Sleep/Rest  Outcome: Progressing   Goal Outcome Evaluation:    The Patient's MSSA scores were 3 and 3 and slept okay. She received no Valium during the night but she got Maalox for GI upset and nicotine gum x2.. The Team conducted safety checks every 15 minutes without issues.

## 2024-04-06 NOTE — PROGRESS NOTES
Pt discharged to home from unit at 1656.  Pt left unit with her belongings and medication she came in with, with the exception of her bottle of diazepam 5mg which has 2 tabs left--on-call provider notified of this and aware that the 2 tabs of valium were destroyed in the presence of 2 RN's.  Discharge paperwork stated for pt to stop taking diazepam, therefore nursing informed not to let pt discharge with this medication.  Pt was not happy about this, writer explained reasoning why staff not allowed to let pt discharge with this.  Pt denied SI/SIB/HI.  Pt reported she felt safe and ready to discharge.  Denied pain.  Rated her anxiety 7/10, depression 2/10.  Denied A/VH's.  Pt reports her plan is to attend [facility] program with all women, states she feels good about this.  Pt is to follow up with her outpt provider regarding her blood pressure, pt verbalized understanding of this.  No safety concerns at discharge.

## 2024-06-02 ENCOUNTER — HEALTH MAINTENANCE LETTER (OUTPATIENT)
Age: 64
End: 2024-06-02

## 2024-12-02 ENCOUNTER — APPOINTMENT (OUTPATIENT)
Dept: CT IMAGING | Facility: CLINIC | Age: 64
DRG: 896 | End: 2024-12-02
Attending: EMERGENCY MEDICINE
Payer: MEDICARE

## 2024-12-02 ENCOUNTER — HOSPITAL ENCOUNTER (INPATIENT)
Facility: CLINIC | Age: 64
LOS: 1 days | Discharge: HOME | DRG: 896 | End: 2024-12-03
Attending: EMERGENCY MEDICINE | Admitting: STUDENT IN AN ORGANIZED HEALTH CARE EDUCATION/TRAINING PROGRAM
Payer: MEDICARE

## 2024-12-02 ENCOUNTER — APPOINTMENT (OUTPATIENT)
Dept: GENERAL RADIOLOGY | Facility: CLINIC | Age: 64
DRG: 896 | End: 2024-12-02
Attending: EMERGENCY MEDICINE
Payer: MEDICARE

## 2024-12-02 DIAGNOSIS — R10.13 EPIGASTRIC PAIN: Primary | ICD-10-CM

## 2024-12-02 DIAGNOSIS — R79.89 ELEVATED TROPONIN: ICD-10-CM

## 2024-12-02 DIAGNOSIS — R09.02 HYPOXIA: ICD-10-CM

## 2024-12-02 DIAGNOSIS — F10.930 ALCOHOL WITHDRAWAL, UNCOMPLICATED (H): ICD-10-CM

## 2024-12-02 DIAGNOSIS — R91.8 PULMONARY NODULES: ICD-10-CM

## 2024-12-02 LAB
ALBUMIN SERPL BCG-MCNC: 4.3 G/DL (ref 3.5–5.2)
ALBUMIN UR-MCNC: 30 MG/DL
ALCOHOL BREATH TEST: 0.11 (ref 0–0.01)
ALP SERPL-CCNC: 47 U/L (ref 40–150)
ALT SERPL W P-5'-P-CCNC: 56 U/L (ref 0–50)
AMPHETAMINES UR QL SCN: NORMAL
ANION GAP SERPL CALCULATED.3IONS-SCNC: 21 MMOL/L (ref 7–15)
APPEARANCE UR: CLEAR
AST SERPL W P-5'-P-CCNC: 69 U/L (ref 0–45)
ATRIAL RATE - MUSE: 80 BPM
BACTERIA #/AREA URNS HPF: ABNORMAL /HPF
BARBITURATES UR QL SCN: NORMAL
BASOPHILS # BLD AUTO: 0 10E3/UL (ref 0–0.2)
BASOPHILS NFR BLD AUTO: 0 %
BENZODIAZ UR QL SCN: NORMAL
BILIRUB SERPL-MCNC: 0.4 MG/DL
BILIRUB UR QL STRIP: NEGATIVE
BUN SERPL-MCNC: 9.4 MG/DL (ref 8–23)
BZE UR QL SCN: NORMAL
CALCIUM SERPL-MCNC: 9.1 MG/DL (ref 8.8–10.4)
CANNABINOIDS UR QL SCN: NORMAL
CHLORIDE SERPL-SCNC: 91 MMOL/L (ref 98–107)
COLOR UR AUTO: ABNORMAL
CREAT SERPL-MCNC: 0.56 MG/DL (ref 0.51–0.95)
D DIMER PPP FEU-MCNC: 1.14 UG/ML FEU (ref 0–0.5)
DIASTOLIC BLOOD PRESSURE - MUSE: NORMAL MMHG
EGFRCR SERPLBLD CKD-EPI 2021: >90 ML/MIN/1.73M2
EOSINOPHIL # BLD AUTO: 0 10E3/UL (ref 0–0.7)
EOSINOPHIL NFR BLD AUTO: 0 %
ERYTHROCYTE [DISTWIDTH] IN BLOOD BY AUTOMATED COUNT: 11.9 % (ref 10–15)
FENTANYL UR QL: NORMAL
FLUAV RNA SPEC QL NAA+PROBE: NEGATIVE
FLUAV RNA SPEC QL NAA+PROBE: NEGATIVE
FLUBV RNA RESP QL NAA+PROBE: NEGATIVE
FLUBV RNA RESP QL NAA+PROBE: NEGATIVE
GLUCOSE SERPL-MCNC: 178 MG/DL (ref 70–99)
GLUCOSE UR STRIP-MCNC: NEGATIVE MG/DL
HCO3 SERPL-SCNC: 19 MMOL/L (ref 22–29)
HCT VFR BLD AUTO: 39.4 % (ref 35–47)
HGB BLD-MCNC: 14.3 G/DL (ref 11.7–15.7)
HGB UR QL STRIP: NEGATIVE
HYALINE CASTS: 4 /LPF
IMM GRANULOCYTES # BLD: 0 10E3/UL
IMM GRANULOCYTES NFR BLD: 0 %
INTERPRETATION ECG - MUSE: NORMAL
KETONES UR STRIP-MCNC: 150 MG/DL
LEUKOCYTE ESTERASE UR QL STRIP: NEGATIVE
LIPASE SERPL-CCNC: 33 U/L (ref 13–60)
LYMPHOCYTES # BLD AUTO: 1.7 10E3/UL (ref 0.8–5.3)
LYMPHOCYTES NFR BLD AUTO: 22 %
MAGNESIUM SERPL-MCNC: 1.5 MG/DL (ref 1.7–2.3)
MAGNESIUM SERPL-MCNC: 1.6 MG/DL (ref 1.7–2.3)
MCH RBC QN AUTO: 34.5 PG (ref 26.5–33)
MCHC RBC AUTO-ENTMCNC: 36.3 G/DL (ref 31.5–36.5)
MCV RBC AUTO: 95 FL (ref 78–100)
MONOCYTES # BLD AUTO: 0.8 10E3/UL (ref 0–1.3)
MONOCYTES NFR BLD AUTO: 10 %
MUCOUS THREADS #/AREA URNS LPF: PRESENT /LPF
NEUTROPHILS # BLD AUTO: 5.3 10E3/UL (ref 1.6–8.3)
NEUTROPHILS NFR BLD AUTO: 67 %
NITRATE UR QL: NEGATIVE
NRBC # BLD AUTO: 0 10E3/UL
NRBC BLD AUTO-RTO: 0 /100
OPIATES UR QL SCN: NORMAL
P AXIS - MUSE: 72 DEGREES
PCP QUAL URINE (ROCHE): NORMAL
PH UR STRIP: 5 [PH] (ref 5–7)
PHOSPHATE SERPL-MCNC: 1.5 MG/DL (ref 2.5–4.5)
PLATELET # BLD AUTO: 210 10E3/UL (ref 150–450)
POTASSIUM SERPL-SCNC: 4 MMOL/L (ref 3.4–5.3)
PR INTERVAL - MUSE: 162 MS
PROCALCITONIN SERPL IA-MCNC: 0.02 NG/ML
PROT SERPL-MCNC: 7.5 G/DL (ref 6.4–8.3)
QRS DURATION - MUSE: 74 MS
QT - MUSE: 396 MS
QTC - MUSE: 456 MS
R AXIS - MUSE: -6 DEGREES
RBC # BLD AUTO: 4.15 10E6/UL (ref 3.8–5.2)
RBC URINE: <1 /HPF
RSV RNA SPEC NAA+PROBE: NEGATIVE
RSV RNA SPEC NAA+PROBE: NEGATIVE
SARS-COV-2 RNA RESP QL NAA+PROBE: NEGATIVE
SARS-COV-2 RNA RESP QL NAA+PROBE: NEGATIVE
SODIUM SERPL-SCNC: 131 MMOL/L (ref 135–145)
SP GR UR STRIP: 1.02 (ref 1–1.03)
SQUAMOUS EPITHELIAL: 1 /HPF
SYSTOLIC BLOOD PRESSURE - MUSE: NORMAL MMHG
T AXIS - MUSE: 81 DEGREES
T4 FREE SERPL-MCNC: 1.47 NG/DL (ref 0.9–1.7)
T4 FREE SERPL-MCNC: 1.49 NG/DL (ref 0.9–1.7)
TROPONIN T SERPL HS-MCNC: 22 NG/L
TROPONIN T SERPL HS-MCNC: 26 NG/L
TSH SERPL DL<=0.005 MIU/L-ACNC: 0.12 UIU/ML (ref 0.3–4.2)
UROBILINOGEN UR STRIP-MCNC: NORMAL MG/DL
VENTRICULAR RATE- MUSE: 80 BPM
WBC # BLD AUTO: 7.9 10E3/UL (ref 4–11)
WBC URINE: 1 /HPF

## 2024-12-02 PROCEDURE — HZ2ZZZZ DETOXIFICATION SERVICES FOR SUBSTANCE ABUSE TREATMENT: ICD-10-PCS | Performed by: EMERGENCY MEDICINE

## 2024-12-02 PROCEDURE — 85004 AUTOMATED DIFF WBC COUNT: CPT | Performed by: EMERGENCY MEDICINE

## 2024-12-02 PROCEDURE — 999N000127 HC STATISTIC PERIPHERAL IV START W US GUIDANCE

## 2024-12-02 PROCEDURE — 81001 URINALYSIS AUTO W/SCOPE: CPT | Performed by: EMERGENCY MEDICINE

## 2024-12-02 PROCEDURE — 83690 ASSAY OF LIPASE: CPT | Performed by: EMERGENCY MEDICINE

## 2024-12-02 PROCEDURE — 99223 1ST HOSP IP/OBS HIGH 75: CPT | Mod: FS | Performed by: PHYSICIAN ASSISTANT

## 2024-12-02 PROCEDURE — 250N000013 HC RX MED GY IP 250 OP 250 PS 637: Performed by: EMERGENCY MEDICINE

## 2024-12-02 PROCEDURE — 250N000011 HC RX IP 250 OP 636: Performed by: EMERGENCY MEDICINE

## 2024-12-02 PROCEDURE — 80053 COMPREHEN METABOLIC PANEL: CPT | Performed by: EMERGENCY MEDICINE

## 2024-12-02 PROCEDURE — 84443 ASSAY THYROID STIM HORMONE: CPT | Performed by: EMERGENCY MEDICINE

## 2024-12-02 PROCEDURE — 99207 PR APP CREDIT; MD BILLING SHARED VISIT: CPT | Mod: FS | Performed by: STUDENT IN AN ORGANIZED HEALTH CARE EDUCATION/TRAINING PROGRAM

## 2024-12-02 PROCEDURE — 84145 PROCALCITONIN (PCT): CPT | Performed by: EMERGENCY MEDICINE

## 2024-12-02 PROCEDURE — 84439 ASSAY OF FREE THYROXINE: CPT | Performed by: EMERGENCY MEDICINE

## 2024-12-02 PROCEDURE — 258N000003 HC RX IP 258 OP 636: Performed by: EMERGENCY MEDICINE

## 2024-12-02 PROCEDURE — 250N000013 HC RX MED GY IP 250 OP 250 PS 637: Performed by: PHYSICIAN ASSISTANT

## 2024-12-02 PROCEDURE — 87637 SARSCOV2&INF A&B&RSV AMP PRB: CPT | Performed by: EMERGENCY MEDICINE

## 2024-12-02 PROCEDURE — 93005 ELECTROCARDIOGRAM TRACING: CPT | Performed by: EMERGENCY MEDICINE

## 2024-12-02 PROCEDURE — 70450 CT HEAD/BRAIN W/O DYE: CPT | Mod: MG

## 2024-12-02 PROCEDURE — 84100 ASSAY OF PHOSPHORUS: CPT | Performed by: PHYSICIAN ASSISTANT

## 2024-12-02 PROCEDURE — 99418 PROLNG IP/OBS E/M EA 15 MIN: CPT | Mod: FS | Performed by: PHYSICIAN ASSISTANT

## 2024-12-02 PROCEDURE — 71275 CT ANGIOGRAPHY CHEST: CPT | Mod: ME

## 2024-12-02 PROCEDURE — 85379 FIBRIN DEGRADATION QUANT: CPT | Performed by: EMERGENCY MEDICINE

## 2024-12-02 PROCEDURE — 71046 X-RAY EXAM CHEST 2 VIEWS: CPT

## 2024-12-02 PROCEDURE — 84439 ASSAY OF FREE THYROXINE: CPT | Performed by: PHYSICIAN ASSISTANT

## 2024-12-02 PROCEDURE — 99285 EMERGENCY DEPT VISIT HI MDM: CPT | Mod: 25 | Performed by: EMERGENCY MEDICINE

## 2024-12-02 PROCEDURE — 250N000011 HC RX IP 250 OP 636: Performed by: PHYSICIAN ASSISTANT

## 2024-12-02 PROCEDURE — 87070 CULTURE OTHR SPECIMN AEROBIC: CPT | Performed by: PHYSICIAN ASSISTANT

## 2024-12-02 PROCEDURE — 96365 THER/PROPH/DIAG IV INF INIT: CPT | Performed by: EMERGENCY MEDICINE

## 2024-12-02 PROCEDURE — 87637 SARSCOV2&INF A&B&RSV AMP PRB: CPT | Performed by: PHYSICIAN ASSISTANT

## 2024-12-02 PROCEDURE — 36415 COLL VENOUS BLD VENIPUNCTURE: CPT | Performed by: EMERGENCY MEDICINE

## 2024-12-02 PROCEDURE — 84484 ASSAY OF TROPONIN QUANT: CPT | Performed by: EMERGENCY MEDICINE

## 2024-12-02 PROCEDURE — 83735 ASSAY OF MAGNESIUM: CPT | Performed by: EMERGENCY MEDICINE

## 2024-12-02 PROCEDURE — 83735 ASSAY OF MAGNESIUM: CPT | Performed by: PHYSICIAN ASSISTANT

## 2024-12-02 PROCEDURE — 250N000009 HC RX 250: Performed by: EMERGENCY MEDICINE

## 2024-12-02 PROCEDURE — G1010 CDSM STANSON: HCPCS

## 2024-12-02 PROCEDURE — 80307 DRUG TEST PRSMV CHEM ANLYZR: CPT | Performed by: EMERGENCY MEDICINE

## 2024-12-02 PROCEDURE — 250N000013 HC RX MED GY IP 250 OP 250 PS 637: Performed by: STUDENT IN AN ORGANIZED HEALTH CARE EDUCATION/TRAINING PROGRAM

## 2024-12-02 PROCEDURE — 93010 ELECTROCARDIOGRAM REPORT: CPT | Performed by: EMERGENCY MEDICINE

## 2024-12-02 PROCEDURE — 999N000285 HC STATISTIC VASC ACCESS LAB DRAW WITH PIV START

## 2024-12-02 PROCEDURE — 96361 HYDRATE IV INFUSION ADD-ON: CPT | Performed by: EMERGENCY MEDICINE

## 2024-12-02 PROCEDURE — 85041 AUTOMATED RBC COUNT: CPT | Performed by: EMERGENCY MEDICINE

## 2024-12-02 PROCEDURE — 120N000002 HC R&B MED SURG/OB UMMC

## 2024-12-02 PROCEDURE — 99285 EMERGENCY DEPT VISIT HI MDM: CPT | Performed by: EMERGENCY MEDICINE

## 2024-12-02 PROCEDURE — 96375 TX/PRO/DX INJ NEW DRUG ADDON: CPT | Performed by: EMERGENCY MEDICINE

## 2024-12-02 RX ORDER — IOPAMIDOL 755 MG/ML
100 INJECTION, SOLUTION INTRAVASCULAR ONCE
Status: COMPLETED | OUTPATIENT
Start: 2024-12-02 | End: 2024-12-02

## 2024-12-02 RX ORDER — OLANZAPINE 5 MG/1
5-10 TABLET, ORALLY DISINTEGRATING ORAL EVERY 6 HOURS PRN
Status: DISCONTINUED | OUTPATIENT
Start: 2024-12-02 | End: 2024-12-03 | Stop reason: HOSPADM

## 2024-12-02 RX ORDER — LEVALBUTEROL INHALATION SOLUTION 0.63 MG/3ML
0.63 SOLUTION RESPIRATORY (INHALATION) ONCE
Status: COMPLETED | OUTPATIENT
Start: 2024-12-02 | End: 2024-12-02

## 2024-12-02 RX ORDER — ONDANSETRON 4 MG/1
4 TABLET, ORALLY DISINTEGRATING ORAL EVERY 6 HOURS PRN
Status: DISCONTINUED | OUTPATIENT
Start: 2024-12-02 | End: 2024-12-03 | Stop reason: HOSPADM

## 2024-12-02 RX ORDER — GABAPENTIN 300 MG/1
900 CAPSULE ORAL ONCE
Status: DISCONTINUED | OUTPATIENT
Start: 2024-12-02 | End: 2024-12-02

## 2024-12-02 RX ORDER — FOLIC ACID 1 MG/1
1 TABLET ORAL DAILY
Status: DISCONTINUED | OUTPATIENT
Start: 2024-12-03 | End: 2024-12-03 | Stop reason: HOSPADM

## 2024-12-02 RX ORDER — FLUMAZENIL 0.1 MG/ML
0.2 INJECTION, SOLUTION INTRAVENOUS
Status: DISCONTINUED | OUTPATIENT
Start: 2024-12-02 | End: 2024-12-03 | Stop reason: HOSPADM

## 2024-12-02 RX ORDER — AMOXICILLIN 250 MG
1 CAPSULE ORAL 2 TIMES DAILY PRN
Status: DISCONTINUED | OUTPATIENT
Start: 2024-12-02 | End: 2024-12-03 | Stop reason: HOSPADM

## 2024-12-02 RX ORDER — DIAZEPAM 10 MG/2ML
5-10 INJECTION, SOLUTION INTRAMUSCULAR; INTRAVENOUS EVERY 30 MIN PRN
Status: DISCONTINUED | OUTPATIENT
Start: 2024-12-02 | End: 2024-12-03 | Stop reason: HOSPADM

## 2024-12-02 RX ORDER — LIDOCAINE 4 G/G
2 PATCH TOPICAL
Status: DISCONTINUED | OUTPATIENT
Start: 2024-12-03 | End: 2024-12-03 | Stop reason: HOSPADM

## 2024-12-02 RX ORDER — PANTOPRAZOLE SODIUM 40 MG/1
40 TABLET, DELAYED RELEASE ORAL
Status: DISCONTINUED | OUTPATIENT
Start: 2024-12-02 | End: 2024-12-03 | Stop reason: HOSPADM

## 2024-12-02 RX ORDER — MULTIPLE VITAMINS W/ MINERALS TAB 9MG-400MCG
1 TAB ORAL DAILY
Status: DISCONTINUED | OUTPATIENT
Start: 2024-12-03 | End: 2024-12-03 | Stop reason: HOSPADM

## 2024-12-02 RX ORDER — ACETAMINOPHEN 325 MG/1
650 TABLET ORAL EVERY 4 HOURS PRN
Status: DISCONTINUED | OUTPATIENT
Start: 2024-12-02 | End: 2024-12-03 | Stop reason: HOSPADM

## 2024-12-02 RX ORDER — AMOXICILLIN 250 MG
2 CAPSULE ORAL 2 TIMES DAILY PRN
Status: DISCONTINUED | OUTPATIENT
Start: 2024-12-02 | End: 2024-12-03 | Stop reason: HOSPADM

## 2024-12-02 RX ORDER — ACETAMINOPHEN 650 MG/1
650 SUPPOSITORY RECTAL EVERY 4 HOURS PRN
Status: DISCONTINUED | OUTPATIENT
Start: 2024-12-02 | End: 2024-12-03 | Stop reason: HOSPADM

## 2024-12-02 RX ORDER — FOLIC ACID 1 MG/1
1 TABLET ORAL ONCE
Status: COMPLETED | OUTPATIENT
Start: 2024-12-02 | End: 2024-12-02

## 2024-12-02 RX ORDER — ESZOPICLONE 1 MG/1
3 TABLET, FILM COATED ORAL AT BEDTIME
Status: DISCONTINUED | OUTPATIENT
Start: 2024-12-02 | End: 2024-12-03 | Stop reason: HOSPADM

## 2024-12-02 RX ORDER — FAMOTIDINE 20 MG/1
40 TABLET, FILM COATED ORAL ONCE
Status: COMPLETED | OUTPATIENT
Start: 2024-12-02 | End: 2024-12-02

## 2024-12-02 RX ORDER — PROPRANOLOL HCL 20 MG
20 TABLET ORAL 3 TIMES DAILY
Status: DISCONTINUED | OUTPATIENT
Start: 2024-12-02 | End: 2024-12-03 | Stop reason: HOSPADM

## 2024-12-02 RX ORDER — ONDANSETRON 2 MG/ML
4 INJECTION INTRAMUSCULAR; INTRAVENOUS ONCE
Status: COMPLETED | OUTPATIENT
Start: 2024-12-02 | End: 2024-12-02

## 2024-12-02 RX ORDER — DIAZEPAM 10 MG/1
10 TABLET ORAL EVERY 30 MIN PRN
Status: DISCONTINUED | OUTPATIENT
Start: 2024-12-02 | End: 2024-12-03 | Stop reason: HOSPADM

## 2024-12-02 RX ORDER — PROPRANOLOL HCL 20 MG
20 TABLET ORAL ONCE
Status: DISCONTINUED | OUTPATIENT
Start: 2024-12-02 | End: 2024-12-02

## 2024-12-02 RX ORDER — ENOXAPARIN SODIUM 100 MG/ML
40 INJECTION SUBCUTANEOUS EVERY 24 HOURS
Status: DISCONTINUED | OUTPATIENT
Start: 2024-12-02 | End: 2024-12-02

## 2024-12-02 RX ORDER — MAGNESIUM SULFATE HEPTAHYDRATE 40 MG/ML
2 INJECTION, SOLUTION INTRAVENOUS ONCE
Status: COMPLETED | OUTPATIENT
Start: 2024-12-02 | End: 2024-12-02

## 2024-12-02 RX ORDER — IPRATROPIUM BROMIDE AND ALBUTEROL SULFATE 2.5; .5 MG/3ML; MG/3ML
3 SOLUTION RESPIRATORY (INHALATION) ONCE
Status: DISCONTINUED | OUTPATIENT
Start: 2024-12-02 | End: 2024-12-02

## 2024-12-02 RX ORDER — DIAZEPAM 5 MG/1
5-20 TABLET ORAL EVERY 30 MIN PRN
Status: DISCONTINUED | OUTPATIENT
Start: 2024-12-02 | End: 2024-12-02

## 2024-12-02 RX ORDER — MULTIPLE VITAMINS W/ MINERALS TAB 9MG-400MCG
1 TAB ORAL ONCE
Status: COMPLETED | OUTPATIENT
Start: 2024-12-02 | End: 2024-12-02

## 2024-12-02 RX ORDER — CALCIUM CARBONATE 500 MG/1
1000 TABLET, CHEWABLE ORAL 4 TIMES DAILY PRN
Status: DISCONTINUED | OUTPATIENT
Start: 2024-12-02 | End: 2024-12-03 | Stop reason: HOSPADM

## 2024-12-02 RX ORDER — LEVOTHYROXINE SODIUM 100 UG/1
100 TABLET ORAL
Status: DISCONTINUED | OUTPATIENT
Start: 2024-12-03 | End: 2024-12-03 | Stop reason: HOSPADM

## 2024-12-02 RX ORDER — MAGNESIUM SULFATE HEPTAHYDRATE 40 MG/ML
4 INJECTION, SOLUTION INTRAVENOUS ONCE
Status: DISCONTINUED | OUTPATIENT
Start: 2024-12-02 | End: 2024-12-03 | Stop reason: HOSPADM

## 2024-12-02 RX ORDER — MULTIPLE VITAMINS W/ MINERALS TAB 9MG-400MCG
1 TAB ORAL DAILY
Status: DISCONTINUED | OUTPATIENT
Start: 2024-12-03 | End: 2024-12-02

## 2024-12-02 RX ORDER — HALOPERIDOL 5 MG/ML
2.5-5 INJECTION INTRAMUSCULAR EVERY 6 HOURS PRN
Status: DISCONTINUED | OUTPATIENT
Start: 2024-12-02 | End: 2024-12-03 | Stop reason: HOSPADM

## 2024-12-02 RX ORDER — GUAIFENESIN 600 MG/1
600 TABLET, EXTENDED RELEASE ORAL 2 TIMES DAILY
Status: DISCONTINUED | OUTPATIENT
Start: 2024-12-02 | End: 2024-12-03 | Stop reason: HOSPADM

## 2024-12-02 RX ORDER — LIDOCAINE 40 MG/G
CREAM TOPICAL
Status: DISCONTINUED | OUTPATIENT
Start: 2024-12-02 | End: 2024-12-03 | Stop reason: HOSPADM

## 2024-12-02 RX ORDER — ONDANSETRON 2 MG/ML
4 INJECTION INTRAMUSCULAR; INTRAVENOUS EVERY 6 HOURS PRN
Status: DISCONTINUED | OUTPATIENT
Start: 2024-12-02 | End: 2024-12-03 | Stop reason: HOSPADM

## 2024-12-02 RX ADMIN — DIAZEPAM 5 MG: 5 INJECTION INTRAMUSCULAR; INTRAVENOUS at 21:45

## 2024-12-02 RX ADMIN — Medication 900 MG: at 20:50

## 2024-12-02 RX ADMIN — SODIUM CHLORIDE 75 ML: 9 INJECTION, SOLUTION INTRAVENOUS at 16:41

## 2024-12-02 RX ADMIN — ONDANSETRON 4 MG: 2 INJECTION INTRAMUSCULAR; INTRAVENOUS at 17:38

## 2024-12-02 RX ADMIN — FAMOTIDINE 40 MG: 20 TABLET ORAL at 20:49

## 2024-12-02 RX ADMIN — ACETAMINOPHEN 650 MG: 325 TABLET, FILM COATED ORAL at 22:59

## 2024-12-02 RX ADMIN — MAGNESIUM SULFATE HEPTAHYDRATE 2 G: 40 INJECTION, SOLUTION INTRAVENOUS at 16:59

## 2024-12-02 RX ADMIN — DIAZEPAM 10 MG: 5 TABLET ORAL at 16:59

## 2024-12-02 RX ADMIN — DIAZEPAM 5 MG: 5 INJECTION INTRAMUSCULAR; INTRAVENOUS at 22:59

## 2024-12-02 RX ADMIN — DIAZEPAM 15 MG: 5 TABLET ORAL at 19:21

## 2024-12-02 RX ADMIN — DIAZEPAM 10 MG: 10 TABLET ORAL at 23:59

## 2024-12-02 RX ADMIN — DIAZEPAM 10 MG: 5 TABLET ORAL at 14:04

## 2024-12-02 RX ADMIN — SODIUM CHLORIDE, POTASSIUM CHLORIDE, SODIUM LACTATE AND CALCIUM CHLORIDE 1000 ML: 600; 310; 30; 20 INJECTION, SOLUTION INTRAVENOUS at 13:47

## 2024-12-02 RX ADMIN — THIAMINE HCL TAB 100 MG 100 MG: 100 TAB at 13:41

## 2024-12-02 RX ADMIN — ESZOPICLONE 3 MG: 1 TABLET, FILM COATED ORAL at 22:59

## 2024-12-02 RX ADMIN — DIAZEPAM 20 MG: 5 TABLET ORAL at 18:07

## 2024-12-02 RX ADMIN — DIAZEPAM 10 MG: 5 INJECTION INTRAMUSCULAR; INTRAVENOUS at 20:54

## 2024-12-02 RX ADMIN — PROPRANOLOL HYDROCHLORIDE 20 MG: 20 TABLET ORAL at 20:50

## 2024-12-02 RX ADMIN — IOPAMIDOL 54 ML: 755 INJECTION, SOLUTION INTRAVENOUS at 16:41

## 2024-12-02 RX ADMIN — DIAZEPAM 10 MG: 5 TABLET ORAL at 15:36

## 2024-12-02 RX ADMIN — Medication 1 TABLET: at 13:41

## 2024-12-02 RX ADMIN — LIDOCAINE 2 PATCH: 4 PATCH TOPICAL at 23:59

## 2024-12-02 RX ADMIN — FOLIC ACID 1 MG: 1 TABLET ORAL at 13:41

## 2024-12-02 RX ADMIN — GUAIFENESIN 600 MG: 600 TABLET ORAL at 20:49

## 2024-12-02 RX ADMIN — PANTOPRAZOLE SODIUM 40 MG: 40 TABLET, DELAYED RELEASE ORAL at 20:49

## 2024-12-02 ASSESSMENT — ACTIVITIES OF DAILY LIVING (ADL)
ADLS_ACUITY_SCORE: 60
ADLS_ACUITY_SCORE: 58

## 2024-12-02 ASSESSMENT — COLUMBIA-SUICIDE SEVERITY RATING SCALE - C-SSRS
6. HAVE YOU EVER DONE ANYTHING, STARTED TO DO ANYTHING, OR PREPARED TO DO ANYTHING TO END YOUR LIFE?: NO
1. IN THE PAST MONTH, HAVE YOU WISHED YOU WERE DEAD OR WISHED YOU COULD GO TO SLEEP AND NOT WAKE UP?: NO
2. HAVE YOU ACTUALLY HAD ANY THOUGHTS OF KILLING YOURSELF IN THE PAST MONTH?: NO

## 2024-12-02 NOTE — CONSULTS
"Consult received for Vascular access care.  For additional needs place \"Nursing to Consult for Vascular Access\" [id number] order in EPIC.  "

## 2024-12-02 NOTE — ED NOTES
Pt refused Neb treatment.  Stated it tasted bad.  Refusing Covid swab pulls head away before swab gets near her face.  MD updated.

## 2024-12-02 NOTE — CONSULTS
"Consult received for Vascular access care.  See LDA for details. For additional needs place \"Nursing to Consult for Vascular Access\" [id number] order in EPIC.  "

## 2024-12-02 NOTE — ED TRIAGE NOTES
Triage Assessment (Adult)       Row Name 12/02/24 1225          Triage Assessment    Airway WDL X;WDL        Respiratory WDL    Respiratory WDL WDL;cough     Cough Frequency frequent     Cough Type congested        Cardiac WDL    Cardiac WDL WDL        Peripheral/Neurovascular WDL    Peripheral Neurovascular WDL WDL        Cognitive/Neuro/Behavioral WDL    Cognitive/Neuro/Behavioral WDL WDL

## 2024-12-02 NOTE — ED NOTES
"Pt removed nasal cannula for \"comfort\" and to give her nose \"A break.\" This nurse educated pt that with her oxygen saturation in the lower 90s it is advised she remain on oxygen at this time. Pt stated understanding and states she will put nasal cannula back on after she returns from CT imaging. MD notified.   "

## 2024-12-02 NOTE — ED PROVIDER NOTES
"ED Provider Note  [facility]      History     Chief Complaint   Patient presents with    Generalized Weakness     Pt states her last drink was sometime yesterday. Pt states she drank a half bottle of gin yesterday. Pt states she has a history of seizures during withdrawal. She is feeling sick.Pt states something is not right. She feels like she has the flu.  EMS states BG 65       HPI  Jolynn Rivera is a 64 year old female with a history of alcohol use disorder with seizures and DTs, polysubstance abuse, hepatitis C, bipolar disorder who presents to the emergency department for alcohol detox.  She reports that she has been drinking a bottle of wine a day for the last few weeks.  She last drink yesterday but does not recall the time.  She does report a history of withdrawal seizures and hallucinations within the last year.  She denies any other substance use.  Additionally she reports generally feeling\" off\".  She reports that she is generally been feeling foggy.  She has had a number of falls but does not recall if she has hit her head.  She does report an increased cough and feeling as if she has the flu.  She does feel more short of breath, denies any chest pain.  She has been intermittently nauseated with epigastric discomfort.  Denies any abdominal pain.  She denies any diarrhea or urinary symptoms.      Physical Exam   BP: 135/83  Pulse: 111  Temp: 97.7  F (36.5  C)  Resp: 20  SpO2: 92 %  Physical Exam  General: patient is alert and oriented but slow to respond   head: atraumatic and normocephalic   EENT: Tacky mucus membranes, sclera anicteric   neck: supple   Cardiovascular: regular rate and rhythm, extremities warm and well perfused, no lower extremity edema  Pulmonary: Trace bilateral wheezing, symmetric   abdomen: soft, non-tender   Musculoskeletal: normal range of motion   Neurological: alert and oriented but slow to respond, no facial droop, no slurring of speech, moving all " extremities symmetrically, gait normal   Skin: warm, dry      ED Course, Procedures, & Data      Procedures            EKG Interpretation:      Interpreted by Selena Cabezas MD  Time reviewed: 1239  Symptoms at time of EKG: epigastric pain   Rhythm: normal sinus   Rate: normal  Axis: normal  Ectopy: none  Conduction: normal  ST Segments/ T Waves: No ST-T wave changes  Q Waves: none  Comparison to prior: Unchanged    Clinical Impression: normal EKG            No results found for any visits on 12/02/24.  Medications - No data to display  Labs Ordered and Resulted from Time of ED Arrival to Time of ED Departure - No data to display  No orders to display          Critical care was not performed.     Medical Decision Making  The patient's presentation was of high complexity (a chronic illness severe exacerbation, progression, or side effect of treatment).    The patient's evaluation involved:  ordering and/or review of 3+ test(s) in this encounter (see separate area of note for details)  independent interpretation of testing performed by another health professional (CXR)    The patient's management necessitated further care after sign-out to Dr. Cordova (see their note for further management).    Assessment & Plan    Ms. Rivera is a 64 year old female with a history of alcohol use disorder with seizures and DTs, polysubstance abuse, hepatitis C, COPD, bipolar disorder who presents to the emergency department for alcohol detox and generalized fatigue.   She is noted to be mildly hypertensive otherwise hemodynamically stable, afebrile and in no respiratory distress but does have borderline oxygen saturations of 91%.  She does report a history of COPD and unclear what her baseline oxygen saturation is.  She was placed on 2 L supplemental O2 with improvement in saturations to the mid 90s.  She is experiencing some withdrawal symptoms and was started on MSSA protocol as well as received thiamine, folate and a  multivitamin.  She appears quite dehydrated and has received IV fluids.  Additionally upon arrival her blood sugar is noted to be low at 65.  She was given oral glucose with improvement in her glucose.  Patient was signed out to Dr. Solitario pending remainder of her laboratory evaluation with plan for CTA of the chest to complete her medical screening.  If remainder of workup is negative anticipate detox admission versus medicine admission.    I have reviewed the nursing notes. I have reviewed the findings, diagnosis, plan and need for follow up with the patient.    New Prescriptions    No medications on file         Selena Cabezas MD  [facility] EMERGENCY DEPARTMENT  12/2/2024     Selena Cabezas MD  12/02/24 1600

## 2024-12-03 ENCOUNTER — PATIENT OUTREACH (OUTPATIENT)
Dept: ONCOLOGY | Facility: CLINIC | Age: 64
End: 2024-12-03
Payer: MEDICARE

## 2024-12-03 VITALS
HEART RATE: 79 BPM | OXYGEN SATURATION: 95 % | HEIGHT: 63 IN | SYSTOLIC BLOOD PRESSURE: 140 MMHG | TEMPERATURE: 98.1 F | BODY MASS INDEX: 25.2 KG/M2 | RESPIRATION RATE: 16 BRPM | DIASTOLIC BLOOD PRESSURE: 84 MMHG | WEIGHT: 142.2 LBS

## 2024-12-03 DIAGNOSIS — R91.8 PULMONARY NODULES: Primary | ICD-10-CM

## 2024-12-03 LAB
ALBUMIN SERPL BCG-MCNC: 4.2 G/DL (ref 3.5–5.2)
ALP SERPL-CCNC: 45 U/L (ref 40–150)
ALT SERPL W P-5'-P-CCNC: 44 U/L (ref 0–50)
ANION GAP SERPL CALCULATED.3IONS-SCNC: 11 MMOL/L (ref 7–15)
AST SERPL W P-5'-P-CCNC: 49 U/L (ref 0–45)
BILIRUB SERPL-MCNC: 1.3 MG/DL
BUN SERPL-MCNC: 10.7 MG/DL (ref 8–23)
CALCIUM SERPL-MCNC: 8.8 MG/DL (ref 8.8–10.4)
CHLORIDE SERPL-SCNC: 95 MMOL/L (ref 98–107)
CREAT SERPL-MCNC: 0.61 MG/DL (ref 0.51–0.95)
EGFRCR SERPLBLD CKD-EPI 2021: >90 ML/MIN/1.73M2
ERYTHROCYTE [DISTWIDTH] IN BLOOD BY AUTOMATED COUNT: 11.9 % (ref 10–15)
GLUCOSE SERPL-MCNC: 190 MG/DL (ref 70–99)
HCO3 SERPL-SCNC: 26 MMOL/L (ref 22–29)
HCT VFR BLD AUTO: 37 % (ref 35–47)
HGB BLD-MCNC: 13.5 G/DL (ref 11.7–15.7)
MAGNESIUM SERPL-MCNC: 2 MG/DL (ref 1.7–2.3)
MCH RBC QN AUTO: 34.5 PG (ref 26.5–33)
MCHC RBC AUTO-ENTMCNC: 36.5 G/DL (ref 31.5–36.5)
MCV RBC AUTO: 95 FL (ref 78–100)
PLATELET # BLD AUTO: 172 10E3/UL (ref 150–450)
POTASSIUM SERPL-SCNC: 3.6 MMOL/L (ref 3.4–5.3)
PROT SERPL-MCNC: 6.7 G/DL (ref 6.4–8.3)
RBC # BLD AUTO: 3.91 10E6/UL (ref 3.8–5.2)
SODIUM SERPL-SCNC: 132 MMOL/L (ref 135–145)
WBC # BLD AUTO: 6.1 10E3/UL (ref 4–11)

## 2024-12-03 PROCEDURE — 250N000013 HC RX MED GY IP 250 OP 250 PS 637: Performed by: EMERGENCY MEDICINE

## 2024-12-03 PROCEDURE — 85014 HEMATOCRIT: CPT | Performed by: PHYSICIAN ASSISTANT

## 2024-12-03 PROCEDURE — 36415 COLL VENOUS BLD VENIPUNCTURE: CPT | Performed by: PHYSICIAN ASSISTANT

## 2024-12-03 PROCEDURE — 83735 ASSAY OF MAGNESIUM: CPT | Performed by: PHYSICIAN ASSISTANT

## 2024-12-03 PROCEDURE — 250N000013 HC RX MED GY IP 250 OP 250 PS 637: Performed by: PHYSICIAN ASSISTANT

## 2024-12-03 PROCEDURE — 99223 1ST HOSP IP/OBS HIGH 75: CPT | Mod: GC | Performed by: INTERNAL MEDICINE

## 2024-12-03 PROCEDURE — 99239 HOSP IP/OBS DSCHRG MGMT >30: CPT | Performed by: STUDENT IN AN ORGANIZED HEALTH CARE EDUCATION/TRAINING PROGRAM

## 2024-12-03 PROCEDURE — 97530 THERAPEUTIC ACTIVITIES: CPT | Mod: GP

## 2024-12-03 PROCEDURE — 84520 ASSAY OF UREA NITROGEN: CPT | Performed by: PHYSICIAN ASSISTANT

## 2024-12-03 PROCEDURE — 97161 PT EVAL LOW COMPLEX 20 MIN: CPT | Mod: GP

## 2024-12-03 RX ORDER — LOPERAMIDE HYDROCHLORIDE 2 MG/1
2 CAPSULE ORAL 4 TIMES DAILY PRN
Status: DISCONTINUED | OUTPATIENT
Start: 2024-12-03 | End: 2024-12-03 | Stop reason: HOSPADM

## 2024-12-03 RX ADMIN — THIAMINE HCL TAB 100 MG 100 MG: 100 TAB at 07:03

## 2024-12-03 RX ADMIN — PANTOPRAZOLE SODIUM 40 MG: 40 TABLET, DELAYED RELEASE ORAL at 07:03

## 2024-12-03 RX ADMIN — Medication 1 TABLET: at 07:02

## 2024-12-03 RX ADMIN — FOLIC ACID 1 MG: 1 TABLET ORAL at 07:04

## 2024-12-03 RX ADMIN — NICOTINE POLACRILEX 4 MG: 2 GUM, CHEWING BUCCAL at 10:55

## 2024-12-03 RX ADMIN — NICOTINE POLACRILEX 4 MG: 2 GUM, CHEWING BUCCAL at 05:01

## 2024-12-03 RX ADMIN — LEVOTHYROXINE SODIUM 100 MCG: 100 TABLET ORAL at 07:02

## 2024-12-03 RX ADMIN — Medication 900 MG: at 14:33

## 2024-12-03 RX ADMIN — DIAZEPAM 10 MG: 10 TABLET ORAL at 07:00

## 2024-12-03 RX ADMIN — Medication 900 MG: at 07:01

## 2024-12-03 RX ADMIN — GUAIFENESIN 600 MG: 600 TABLET ORAL at 07:02

## 2024-12-03 RX ADMIN — PROPRANOLOL HYDROCHLORIDE 20 MG: 20 TABLET ORAL at 07:01

## 2024-12-03 RX ADMIN — NICOTINE POLACRILEX 4 MG: 2 GUM, CHEWING BUCCAL at 02:51

## 2024-12-03 RX ADMIN — NICOTINE POLACRILEX 4 MG: 2 GUM, CHEWING BUCCAL at 00:21

## 2024-12-03 RX ADMIN — NICOTINE POLACRILEX 4 MG: 2 GUM, CHEWING BUCCAL at 12:55

## 2024-12-03 RX ADMIN — PROPRANOLOL HYDROCHLORIDE 20 MG: 20 TABLET ORAL at 14:35

## 2024-12-03 RX ADMIN — ACETAMINOPHEN 650 MG: 325 TABLET, FILM COATED ORAL at 12:51

## 2024-12-03 RX ADMIN — DIAZEPAM 10 MG: 10 TABLET ORAL at 05:01

## 2024-12-03 ASSESSMENT — ACTIVITIES OF DAILY LIVING (ADL)
ADLS_ACUITY_SCORE: 46
DEPENDENT_IADLS:: CLEANING;LAUNDRY;SHOPPING
ADLS_ACUITY_SCORE: 46
ADLS_ACUITY_SCORE: 46
ADLS_ACUITY_SCORE: 60
ADLS_ACUITY_SCORE: 46
ADLS_ACUITY_SCORE: 60
ADLS_ACUITY_SCORE: 46
ADLS_ACUITY_SCORE: 46

## 2024-12-03 NOTE — PROGRESS NOTES
8MS Admission Note    Reason for admission: Generalized weakness, alcohol withdrawal   Primary team notified of pt arrival.  Admitted from: [place] [facility]   Via: Cart   Accompanied by: Pt came by herself   Belongings: Placed in closet; valuables sent home with family  Admission Required Doc Completed: Yes  Teaching: Orientation to unit and call light- call light within reach, use of console, meal times, when to call for the RN, and enforced importance of safety.  IV Access: R PIV SL   Telemetry: No  Ht./Wt.: Completed  Code Status verified on armband: Yes  2 RN Skin Assessment Completed with: Jose Gauthier, RN   Suction/Ambu bag/Flowmeter at bedside: Yes    Pt status:     Temp:  [97.7  F (36.5  C)-98.8  F (37.1  C)] 98.5  F (36.9  C)  Pulse:  [] 75  Resp:  [16-20] 16  BP: (118-156)/() 118/84  SpO2:  [90 %-97 %] 96 %

## 2024-12-03 NOTE — PLAN OF CARE
"Goal Outcome Evaluation:    VS: BP (!) 140/84 (BP Location: Left arm)   Pulse 79   Temp 98.1  F (36.7  C) (Oral)   Resp 16   Ht 1.6 m (5' 3\")   Wt 64.5 kg (142 lb 3.2 oz)   SpO2 95%   BMI 25.19 kg/m      O2: RA, denies chest pain and SOB 02>90%   Neuro: AxO X4, reports numbness and tingling in lower extremities    Bowel/Bladder: Continent of B&B   LBM: 12/1/2024   Diet: Regular    Skin: Intact   Pain: Managed with scheduled tylenol   Activity: IND   Dressings: None   LDA: PIV removed for discharge   Equipment: Call light and personal belongings    Plan: Possible discharge 12/3/2024   Additional Info: Pt is on CIWA precaution              "

## 2024-12-03 NOTE — PHARMACY-ADMISSION MEDICATION HISTORY
Pharmacist Admission Medication History    Admission medication history is complete. The information provided in this note is only as accurate as the sources available at the time of the update.    Information Source(s): Patient via in-person    Pertinent Information: none    Changes made to PTA medication list:  Added: None  Deleted: albuterol  Changed: None    Allergies reviewed with patient and updates made in EHR: yes    Medication History Completed By: Lorna Hedrick RPH 12/2/2024 7:25 PM    PTA Med List   Medication Sig Last Dose/Taking    eszopiclone (LUNESTA) 3 MG tablet Take 3 mg by mouth at bedtime 12/1/2024    gabapentin (NEURONTIN) 600 MG tablet Take 900 mg by mouth 3 times daily 12/1/2024    levothyroxine (SYNTHROID/LEVOTHROID) 100 MCG tablet Take 100 mcg by mouth every morning 12/1/2024    melatonin 5 MG tablet Take 10 mg by mouth at bedtime Takes 1 hour prior to Lunesta 12/1/2024    multivitamin w/minerals (THERA-VIT-M) tablet Take 1 tablet by mouth daily 12/1/2024    nicotine polacrilex (NICORETTE) 4 MG gum Place 4 mg inside cheek every hour as needed for smoking cessation Past Week    omeprazole (PRILOSEC) 40 MG DR capsule Take 40 mg by mouth every morning 12/1/2024    ondansetron (ZOFRAN ODT) 4 MG ODT tab Take 4 mg by mouth every 8 hours as needed for vomiting or nausea Past Week    propranolol (INDERAL) 20 MG tablet Take 20 mg by mouth 3 times daily 12/1/2024    thiamine (B-1) 100 MG tablet Take 1 tablet (100 mg) by mouth daily 12/1/2024

## 2024-12-03 NOTE — CONSULTS
[facility]   Pulmonary   Consult Note 12/3/2024  Jolynn Rivera MRN: [id number]    Reason for Consult: For the evaluation of the growing pulmonary nodules and opinion on management.     Assessment & Plan      Jolynn Rivera is a 64 year old female with past medical history of alcohol dependence, COPD, tobacco use, hypothyroidism, bipolar disorder was admitted to [facility] on 12/2/2024 for treatment of alcohol withdrawal and SOB with mild hypoxemia. Pulmonology was consulted for an evaluation on the growing pulmonary nodules.     Jolynn presents with cough for three days, productive on day four when she presented to the hospital with clear sputum and mild shortness of breath, with a significant smoking history. Her CT Chest revealed right middle lobe volume loss with extensive endobronchial filling defects and generalized bronchial wall thickening, which may represent mucous plugging and/or retained secretions. Also revealing left upper lobe pulmonary nodules - 9.1 x 7.3 mm left upper lobe nodule, increased in size from 5 mm on prior scan 4/19/2024 (no image available). Additional nodules are new or increased as well.    An endobronchial ultrasound would be recommended with endobronchial biopsy and possible biopsy of the growing pulmonary nodule in view of possible malignancy and possible compression of the central airways. A BAL for infectious etiologies would also be recommended. The patient also requested a transfer of care to [place].     Recommendations:    Outpatient follow-up : IP for consideration of EBUS + BAL and Pulmonary for management of COPD    Thank you for allowing us to care for this patient.  We will continue to follow.  Please don't hesitate to page or call with any questions or concerns.    Patient seen & discussed w/  Dr. Garrick Gerard, who agrees with the above assessment and plan.    Elayne Dewey  Medical Student  12/3/2024           History of Present Illness:   Jolynn  JAMAAL Rivera is a 64 year old female with history pertinent for alcohol dependence, COPD, tobacco use, hypothyroidism, bipolar disorder. She was admitted on 12/2/2024 for SOB with mild hypoxemia and treatment of alcohol withdrawal.     Patient presents with flu like symptoms including cough and SOB. She presents with cough for three days, productive on day four when she presented to the hospital with clear sputum and mild shortness of breath, with a significant smoking history. Patient states she has had 3 days of cough that started being productive on day 4. She has no hemoptysis, no fever, chills and rigors. She is a smoker and was smoking 3 packs per day but most recently cut down to 10 cigarettes per day.Patient also reports longstanding alcohol dependence. Has been through withdrawal many times before and notes history of withdrawal seizure. Average intake of one bottle of wine per day for the last few weeks, and usually has 12-15 cans every day. Last drink was 12/2 day of admission. She also presented with generalized weakness, recent vomiting for 3 days and diarrhea for 1 week. No dysuria, blood in her stool or vomit. Patient also reports moving into a mouldy apartment 6 months ago and has two pets at home. She reports a history of COPD with no recent exacerbations, mild symptoms, with low peripheral eosinophils. She reports no history of asthma.    Patient is currently HD stable with highest BP recording being 141/128 on 12/2.    Results:  - Sodium 132, Chloride 95  - AST 49, Bilirubin 1.3 mild, F actin antibody for autoimmune liver disease - 43 (>31)  - Reactive Hep C Ab and 6.2 log HCV RNA  - Glucose 190, A1C 5.9, Blood ketones 3.2  - Trop T 26, was 22 on 12/2  - TSH 7.55  - D-dimer 12/2 1.14  - Urine ketones 150, albumin 30, mucous urine, few bacteria, hyaline casts 4  - Ethanol positive and Positive breath test    Procedures:  - PFT done on 6/13/23 with an FEV1 1.83 67%, FVC 2.71L at 77% and FEV1/FVC at 0.68  "showing a moderate obstructive ventilatory defect     Imagin.  No evidence of pulmonary embolus.  2.  Right middle lobe volume loss with extensive endobronchial filling defects and generalized bronchial wall thickening. This may represent mucous plugging and/or retained secretions in the setting of bronchopneumonia. Follow-up is advised to exclude a central obstructing lesion.  3.  Left upper lobe pulmonary nodules. These were not reported on outside CT dated 2024 (images unavailable). While infectious or inflammatory etiologies are possible, short-term follow-up posttherapy is recommended to exclude neoplasm.            Review of Symptoms:   10-point ROS reviewed, & found negative w/ exceptions noted in the HPI.          Past Medical History:     Past Medical History:   Diagnosis Date    Anxiety     COPD (chronic obstructive pulmonary disease) (H)     COPD (chronic obstructive pulmonary disease) (H)     Hepatitis C     PTSD (post-traumatic stress disorder)     Seizures (H)     second to ETOH    Substance abuse (H)        Past Surgical History:   Procedure Laterality Date    LAPAROSCOPIC TUBAL LIGATION      ORTHOPEDIC SURGERY      Left knee    TONSILLECTOMY              Allergies:     Allergies   Allergen Reactions    Amlodipine      Other reaction(s): Nightmares    Bee Venom      Other reaction(s): Edema    Clonidine Unknown    Prednisone Anxiety     Patient stated that prednisone causes \"bad panic attacks\".  Patient stated that prednisone causes \"bad panic attacks\".  Patient stated that prednisone causes \"bad panic attacks\".      Antihistamines, Chlorpheniramine-Type [Antihistamines, Chlorpheniramine-Type]     Compazine      Lock jaw; all medications ending with -zine    Diphenhydramine      Muscle Cramps    Hydroxyzine      Lock jaw    Penicillins      Panic attack    Prochlorperazine      Muscle cramps    Trazodone Nausea and Vomiting     \" I ended up falling and feeling like I was drunk\"    Wasps " [Hornets]      Swelling              Outpatient Medications:     Current Facility-Administered Medications   Medication Dose Route Frequency Provider Last Rate Last Admin    acetaminophen (TYLENOL) tablet 650 mg  650 mg Oral Q4H PRN Jayne Davis PA-C   650 mg at 12/02/24 2259    Or    acetaminophen (TYLENOL) Suppository 650 mg  650 mg Rectal Q4H PRN Jayne Davis PA-C        calcium carbonate (TUMS) chewable tablet 1,000 mg  1,000 mg Oral 4x Daily PRN Jayne Davis PA-C        diazepam (VALIUM) tablet 10 mg  10 mg Oral Q30 Min PRN Jayne Davis PA-C   10 mg at 12/03/24 0700    Or    diazepam (VALIUM) injection 5-10 mg  5-10 mg Intravenous Q30 Min PRN Jayne Davis PA-C   5 mg at 12/02/24 2259    eszopiclone (LUNESTA) tablet 3 mg  3 mg Oral At Bedtime Jayne Davis PA-C   3 mg at 12/02/24 2259    flumazenil (ROMAZICON) injection 0.2 mg  0.2 mg Intravenous q1 min prn Jayne Davis PA-C        folic acid (FOLVITE) tablet 1 mg  1 mg Oral Daily Jayne Davis PA-C   1 mg at 12/03/24 0704    gabapentin (NEURONTIN) half-tab 900 mg  900 mg Oral TID Jayne Davis PA-C   900 mg at 12/03/24 0701    guaiFENesin (MUCINEX) 12 hr tablet 600 mg  600 mg Oral BID Jayne Davis PA-C   600 mg at 12/03/24 0702    OLANZapine zydis (zyPREXA) ODT tab 5-10 mg  5-10 mg Oral Q6H PRN Jayne Davis PA-C        Or    haloperidol lactate (HALDOL) injection 2.5-5 mg  2.5-5 mg Intravenous Q6H PRN Jayne Davis PA-C        levothyroxine (SYNTHROID/LEVOTHROID) tablet 100 mcg  100 mcg Oral QAM AC Jayne Davis PA-C   100 mcg at 12/03/24 0702    Lidocaine (LIDOCARE) 4 % Patch 2 patch  2 patch Transdermal Q24h Pierre Singh MD   2 patch at 12/02/24 9221    lidocaine (LMX4) cream   Topical Q1H PRN Jayne Davis PA-C        lidocaine 1 % 0.1-1 mL  0.1-1 mL Other Q1H PRN Jayne Davis PA-C        magnesium sulfate 4 g in 100 mL sterile water intermittent infusion  4 g Intravenous Once Jayne Davis PA-C        melatonin tablet 5 mg  5 mg Oral QPM PRN Jayne Davis  EDER LOWE        multivitamin w/minerals (THERA-VIT-M) tablet 1 tablet  1 tablet Oral Daily Jayne Davis PA-C   1 tablet at 12/03/24 0702    nicotine (NICORETTE) gum 4 mg  4 mg Buccal Q1H PRN Jayne Davis PA-C   4 mg at 12/03/24 0501    ondansetron (ZOFRAN ODT) ODT tab 4 mg  4 mg Oral Q6H PRN Jayne Davis PA-C        Or    ondansetron (ZOFRAN) injection 4 mg  4 mg Intravenous Q6H PRN Jayne Davis PA-C        pantoprazole (PROTONIX) EC tablet 40 mg  40 mg Oral BID AC John Cordova MD   40 mg at 12/03/24 0703    propranolol (INDERAL) tablet 20 mg  20 mg Oral TID Jayne Davis PA-C   20 mg at 12/03/24 0701    senna-docusate (SENOKOT-S/PERICOLACE) 8.6-50 MG per tablet 1 tablet  1 tablet Oral BID PRN Jayne Davis PA-C        Or    senna-docusate (SENOKOT-S/PERICOLACE) 8.6-50 MG per tablet 2 tablet  2 tablet Oral BID PRN Jayne Daivs PA-C        sodium chloride (PF) 0.9% PF flush 3 mL  3 mL Intracatheter Q8H Jayne Davis PA-C   3 mL at 12/03/24 0502    sodium chloride (PF) 0.9% PF flush 3 mL  3 mL Intracatheter q1 min prn Jayne Davis PA-C        thiamine (B-1) tablet 100 mg  100 mg Oral Daily Jayne Davis PA-C   100 mg at 12/03/24 0703             Family History:     Family History   Problem Relation Age of Onset    Anxiety Disorder Mother     Asthma Mother     Alcohol/Drug Father     Prostate Cancer Father     Arthritis Father     Alcohol/Drug Brother     Alcohol/Drug Brother     Hypertension Brother     Alcohol/Drug Brother     Depression Brother     Psychotic Disorder Brother                Social History:     Social History     Tobacco Use    Smoking status: Every Day     Current packs/day: 0.50     Types: Cigarettes    Smokeless tobacco: Never    Tobacco comments:     Starting Chantix October 2014   Substance Use Topics    Alcohol use: Yes     Comment: 12-15 cans per day    Drug use: No     Comment: stopped 1986             Physical Exam:   /79   Pulse 69   Temp 98.1  F (36.7  C) (Oral)   Resp 16   Ht 1.6  "m (5' 3\")   Wt 64.5 kg (142 lb 3.2 oz)   SpO2 100%   BMI 25.19 kg/m      General: restless female in no acute distress  HENT: external ears without visible abnormalities, no rhinorrhea, no epistaxis  Lungs: CTAB, on room air, no accessory muscle use  Heart: RRR, no murmurs  Abdomen: soft, non-distended, bowel tones present  Extremities: No pitting edema, no clubbing or cyanosis  Skin: no visible rashes, no mottling  Neurologic: moving all 4 extremities spontaneously, awake and alert  Psych: full affect, appropriate insight, appropriate judgment          Data:   Labs (all laboratory studies reviewed by me): notable labs in HPI above.    Imaging and other diagnostic testing    1.  No evidence of pulmonary embolus.  2.  Right middle lobe volume loss with extensive endobronchial filling defects and generalized bronchial wall thickening. This may represent mucous plugging and/or retained secretions in the setting of bronchopneumonia. Follow-up is advised to exclude a central obstructing lesion.  3.  Left upper lobe pulmonary nodules. These were not reported on outside CT dated 4/19/2024 (images unavailable). While infectious or inflammatory etiologies are possible, short-term follow-up posttherapy is recommended to exclude neoplasm.    --    ADDENDUM  I was present with the medical student, who participated in the service and in the documentation of the note.  I have verified the history and personally performed the physical exam and medical decision making.  I agree with the assessment and plan of care as documented in the note. Staffed with Dr. Gerard.    Evaluated previously by Ena Garcia and Cl[place] at [facility] [place]. Prefers to proceed with further IP evaluation through [facility]; she's in the process of transitioning much of her care to this system. Message sent to IP team for outpatient follow up within the next few weeks to decide bronchoscopic utility and approach. Encouraged tobacco + EtOH cessation.     Joana Jean MD " PGY5  Pulmonary and Critical Care    Attending note:  Patient seen, examined and discussed with Dr. Jean.  All data reviewed. Agree with the assessment and plan as outlined in the above note.  RML partial collapse with airway thickening, pre-tracheal adenopathy, and LISE nodular opacity.  Needs further examination with bronchoscopy to rule out malignancy or atypical infection; as will also need lymph node sampling with refer to Interventional Pulmonology to set up as outpatient.  We discussed the importance of smoking cessation.    Mckenzie Gerard MD  [phone]

## 2024-12-03 NOTE — DISCHARGE SUMMARY
"[facility]  Hospitalist Discharge Summary      Date of Admission:  12/2/2024  Date of Discharge:  12/3/2024  Discharging Provider: Ebony Dos Santos MD  Discharge Service: Hospitalist Service, GOLD TEAM 22    Discharge Diagnoses   Alcohol use disorder  Alcohol withdrawal  Acute hypoxic respiratory failure, resolved  Enlarging pulmonary nodules  COPD  History of tobacco use  Anion gap metabolic acidosis  Elevated liver enzymes  Mild hyponatremia  Hypoglycemia    Clinically Significant Risk Factors     # Overweight: Estimated body mass index is 25.19 kg/m  as calculated from the following:    Height as of this encounter: 1.6 m (5' 3\").    Weight as of this encounter: 64.5 kg (142 lb 3.2 oz).       Follow-ups Needed After Discharge   Follow-up Appointments       Adult [facility] Follow-up and recommended labs and tests      Follow up with pulmonology and interventional pulmonology for ongoing management of your lung findings.     Appointments on [place] and/or [facility] (with [facility] or [facility] provider or service). Call [phone] if you haven't heard regarding these appointments within 7 days of discharge.                Unresulted Labs Ordered in the Past 30 Days of this Admission       Date and Time Order Name Status Description    12/2/2024  8:34 PM Respiratory Aerobic Bacterial Culture with Gram Stain Preliminary         These results will be followed up by hospitalist pool    Discharge Disposition   Discharged to home  Condition at discharge: Stable    Hospital Course   Jolynn Rivera is a 64 year old female admitted on 12/2/2024. She presented with generalized weakness, nausea, vomiting found to be in acute withdrawal as well as mildly hypoxic.  She was treated for withdrawal with Valium and CIWA scoring and had significant improvement in her symptoms over the next day and was able to be weaned off of requiring any Valium.  She expresses interest in being sober, " and states that she has the resources that she needs and knows how she wants to do it.  She declined addiction or chemical dependency services during this admission.    For her hypoxia she was evaluated with CT chest that demonstrated no evidence of acute PE but did show increasing size of previously known pulmonary nodules as well as diffuse bronchial wall thickening and volume loss of the right middle lobe.  Given this constellation of findings and suggestion that this been worsening, the pulmonology team was consulted during this admission.  They met with the patient and discussed recommendations for next steps and in their discussion they agreed upon plan for outpatient follow-up and pulmonology and interventional pulmonology clinics for ongoing workup of these findings.  She had been rapidly weaned off oxygen on her first night and was breathing comfortably on room air today and preferred to discharge home this afternoon.        Consultations This Hospital Stay   NURSING TO CONSULT FOR VASCULAR ACCESS CARE IP CONSULT  NURSING TO CONSULT FOR VASCULAR ACCESS CARE IP CONSULT  PULMONARY GENERAL ADULT IP CONSULT  PHYSICAL THERAPY ADULT IP CONSULT  CARE MANAGEMENT / SOCIAL WORK IP CONSULT    Code Status   Full Code    Time Spent on this Encounter   I, Ebony Dos Santos MD, personally saw the patient today and spent greater than 30 minutes discharging this patient.       Ebony Dos Santos MD  [facility] [address]  [facility] [facility] [address]  Phone: [phone]  Fax: [phone]  ______________________________________________________________________    Physical Exam   Vital Signs: Temp: 98.1  F (36.7  C) Temp src: Oral BP: (!) 140/84 Pulse: 79   Resp: 16 SpO2: 95 % O2 Device: None (Room air)   Weight: 142 lbs 3.15 oz  General Appearance: Sitting up in bed, NAD  Respiratory: Breathing nonlabored on room air, CTAB, no wheezes or crackles  Cardiovascular: RRR  GI: Soft, nontender  Other: Alert and oriented        Primary Care Physician   Carmela Srinivasan MD    Discharge Orders      Adult Pulmonary Medicine  Referral      Adult Pulmonary Medicine  Referral      Reason for your hospital stay    You were hospitalized for detox from alcohol and evaluation of lung problems. Please follow up with pulmonology in clinic for ongoing workup of this.     Activity    Your activity upon discharge: activity as tolerated     Adult [facility] Follow-up and recommended labs and tests    Follow up with pulmonology and interventional pulmonology for ongoing management of your lung findings.     Appointments on [place] and/or [facility] (with [facility] or [facility] provider or service). Call [phone] if you haven't heard regarding these appointments within 7 days of discharge.     Diet    Follow this diet upon discharge: Current Diet:Orders Placed This Encounter      Combination Diet Regular Diet Adult       Significant Results and Procedures   Most Recent 3 CBC's:  Recent Labs   Lab Test 12/03/24  0332 12/02/24  1502 04/05/24  0749 04/04/24  1615   WBC 6.1 7.9 4.7 4.3   HGB 13.5 14.3  --  14.1   MCV 95 95  --  96    210  --  264     Most Recent 3 BMP's:  Recent Labs   Lab Test 12/03/24  0332 12/02/24  1502 04/04/24  1615   * 131* 135   POTASSIUM 3.6 4.0 4.6   CHLORIDE 95* 91* 96*   CO2 26 19* 26   BUN 10.7 9.4 7.3*   CR 0.61 0.56 0.60   ANIONGAP 11 21* 13   ELVIRA 8.8 9.1 8.6*   * 178* 102*   ,   Results for orders placed or performed during the hospital encounter of 12/02/24   CT Head w/o Contrast    Narrative    CT SCAN OF THE HEAD WITHOUT CONTRAST   12/2/2024 2:32 PM     HISTORY: AMS, possible fall    TECHNIQUE:  Axial images of the head and coronal reformations without  IV contrast material. Radiation dose for this scan was reduced using  automated exposure control, adjustment of the mA and/or kV according  to patient size, or iterative reconstruction technique.    COMPARISON: 4/2/2024    FINDINGS: There  is no evidence of intracranial hemorrhage, mass, acute  infarct or anomaly. Mild generalized parenchymal volume loss. The  ventricles are normal in size, shape and configuration. The brain  parenchyma and subarachnoid spaces are normal.     The visualized portions of the sinuses and mastoids appear normal. The  bony calvarium and bones of the skull base appear intact.       Impression    IMPRESSION:   No evidence of acute intracranial hemorrhage, mass, or  herniation.      MARIA DE JESUS GARDNER MD         SYSTEM ID:  [id number]   XR Chest 2 Views    Narrative    CHEST TWO VIEWS December 2, 2024 2:38 PM     HISTORY: Cough, shortness of breath.    COMPARISON: August 30, 2023.       Impression    IMPRESSION: There are no acute infiltrates. The cardiac silhouette is  not enlarged. Pulmonary vasculature is unremarkable.    BABITA HOOPER MD         SYSTEM ID:  [id number]   CT Chest Pulmonary Embolism w Contrast    Addendum: 12/2/2024    COMPARISON ADDENDUM:   Prior CT chest from 4/19/2024 is made available for comparison.   1. 9.1 x 7.3 mm left upper lobe nodule, image 65 has increased in size from 5 mm on prior. Additional nodules are new or increased as well. Neoplasm should be excluded. Pulmonology consultation recommended, with consideration to short-term follow-up   posttherapy versus PET/CT.  2. Diffuse bronchial wall thickening redemonstrated. Right middle lobe volume loss has significantly worsened and right middle lobe bronchial opacification is new.    END ADDENDUM      Narrative    EXAM: CT CHEST PULMONARY EMBOLISM W CONTRAST  LOCATION: [facility]  DATE: 12/2/2024    INDICATION: shortness of breath  COMPARISON: None.  TECHNIQUE: CT chest pulmonary angiogram during arterial phase injection of IV contrast. Multiplanar reformats and MIP reconstructions were performed. Dose reduction techniques were used.   CONTRAST: 54mL Isovue 370    FINDINGS:  ANGIOGRAM CHEST: Pulmonary  arteries are normal caliber and negative for pulmonary emboli. Thoracic aorta is negative for dissection. No CT evidence of right heart strain.    LUNGS AND PLEURA: Right middle lobe volume loss, with near complete opacification of right middle lobe bronchi. There is generalized bronchial wall thickening throughout both lungs. Retained central secretions. There is a cluster of 3 nodules in the   posterior aspect of the right upper lobe, including 9.1 x 7.3 mm, image 65, 9.7 x 6.7 mm, image 70, and 7.9 x 6.2 mm, image 72. Additional adjacent 4 mm nodule, image 60. No pneumothorax. No pleural effusions..    MEDIASTINUM/AXILLAE: Scattered subcentimeter mediastinal nodes.    CORONARY ARTERY CALCIFICATION: Mild to moderate    UPPER ABDOMEN: Hepatic steatosis.    MUSCULOSKELETAL: No acute bony abnormalities.      Impression    IMPRESSION:  1.  No evidence of pulmonary embolus.  2.  Right middle lobe volume loss with extensive endobronchial filling defects and generalized bronchial wall thickening. This may represent mucous plugging and/or retained secretions in the setting of bronchopneumonia. Follow-up is advised to exclude a   central obstructing lesion.  3.  Left upper lobe pulmonary nodules. These were not reported on outside CT dated 4/19/2024 (images unavailable). While infectious or inflammatory etiologies are possible, short-term follow-up posttherapy is recommended to exclude neoplasm.       Discharge Medications   Current Discharge Medication List        CONTINUE these medications which have NOT CHANGED    Details   eszopiclone (LUNESTA) 3 MG tablet Take 3 mg by mouth at bedtime      gabapentin (NEURONTIN) 600 MG tablet Take 900 mg by mouth 3 times daily      levothyroxine (SYNTHROID/LEVOTHROID) 100 MCG tablet Take 100 mcg by mouth every morning      melatonin 5 MG tablet Take 10 mg by mouth at bedtime Takes 1 hour prior to Lunesta      multivitamin w/minerals (THERA-VIT-M) tablet Take 1 tablet by mouth  "daily  Qty: 30 tablet, Refills: 0    Comments: Pt has supply  Associated Diagnoses: Alcohol dependence with intoxication with complication (H)      nicotine polacrilex (NICORETTE) 4 MG gum Place 4 mg inside cheek every hour as needed for smoking cessation      omeprazole (PRILOSEC) 40 MG DR capsule Take 40 mg by mouth every morning      ondansetron (ZOFRAN ODT) 4 MG ODT tab Take 4 mg by mouth every 8 hours as needed for vomiting or nausea      propranolol (INDERAL) 20 MG tablet Take 20 mg by mouth 3 times daily      thiamine (B-1) 100 MG tablet Take 1 tablet (100 mg) by mouth daily  Qty: 30 tablet, Refills: 0    Associated Diagnoses: Alcohol dependence with intoxication with complication (H)           Allergies   Allergies   Allergen Reactions    Amlodipine      Other reaction(s): Nightmares    Bee Venom      Other reaction(s): Edema    Clonidine Unknown    Prednisone Anxiety     Patient stated that prednisone causes \"bad panic attacks\".  Patient stated that prednisone causes \"bad panic attacks\".  Patient stated that prednisone causes \"bad panic attacks\".      Antihistamines, Chlorpheniramine-Type [Antihistamines, Chlorpheniramine-Type]     Compazine      Lock jaw; all medications ending with -zine    Diphenhydramine      Muscle Cramps    Hydroxyzine      Lock jaw    Penicillins      Panic attack    Prochlorperazine      Muscle cramps    Trazodone Nausea and Vomiting     \" I ended up falling and feeling like I was drunk\"    Wasps [Hornets]      Swelling      "

## 2024-12-03 NOTE — ED NOTES
Pt being transferred to unit 8A. Instruction and education for transfer provided, pt stated understanding. Nurse to nurse handoff report given to TAM Alonso. Pt's condition discussed and plan of care reviewed. All questions and concerns addressed prior to transfer.

## 2024-12-03 NOTE — PROGRESS NOTES
New IP (Interventional Pulmonology) referral rec'd.  Chart reviewed.       New Patient: Interventional Pulmonary (Lung nodule) Nurse Navigator Note    Referring provider: Ebony Truong MD[address]    Referred to (specialty): Interventional Pulmonary (Lung nodule)    Requested provider (if applicable): n/a    Date Referral Received: 12/3/2024    Evaluation for :  Lung nodule    Clinical History (per Nurse review of records provided):    **BOOK MARKED**    OMPARISON ADDENDUM:   Prior CT chest from 4/19/2024 is made available for comparison.   1. 9.1 x 7.3 mm left upper lobe nodule, image 65 has increased in size from 5 mm on prior. Additional nodules are new or increased as well. Neoplasm should be excluded. Pulmonology consultation recommended, with consideration to short-term follow-up   posttherapy versus PET/CT.  2. Diffuse bronchial wall thickening redemonstrated. Right middle lobe volume loss has significantly worsened and right middle lobe bronchial opacification is new.     END ADDENDUM   Addended by Dheeraj Velez MD on 12/2/2024  5:48 PM     Study Result    Narrative & Impression   EXAM: CT CHEST PULMONARY EMBOLISM W CONTRAST  LOCATION: [facility]  DATE: 12/2/2024     INDICATION: shortness of breath  COMPARISON: None.  TECHNIQUE: CT chest pulmonary angiogram during arterial phase injection of IV contrast. Multiplanar reformats and MIP reconstructions were performed. Dose reduction techniques were used.   CONTRAST: 54mL Isovue 370     FINDINGS:  ANGIOGRAM CHEST: Pulmonary arteries are normal caliber and negative for pulmonary emboli. Thoracic aorta is negative for dissection. No CT evidence of right heart strain.     LUNGS AND PLEURA: Right middle lobe volume loss, with near complete opacification of right middle lobe bronchi. There is generalized bronchial wall thickening throughout both lungs.  Retained central secretions. There is a cluster of 3 nodules in the   posterior aspect of the right upper lobe, including 9.1 x 7.3 mm, image 65, 9.7 x 6.7 mm, image 70, and 7.9 x 6.2 mm, image 72. Additional adjacent 4 mm nodule, image 60. No pneumothorax. No pleural effusions..     MEDIASTINUM/AXILLAE: Scattered subcentimeter mediastinal nodes.     CORONARY ARTERY CALCIFICATION: Mild to moderate     UPPER ABDOMEN: Hepatic steatosis.     MUSCULOSKELETAL: No acute bony abnormalities.                                                                      IMPRESSION:  1.  No evidence of pulmonary embolus.  2.  Right middle lobe volume loss with extensive endobronchial filling defects and generalized bronchial wall thickening. This may represent mucous plugging and/or retained secretions in the setting of bronchopneumonia. Follow-up is advised to exclude a   central obstructing lesion.  3.  Left upper lobe pulmonary nodules. These were not reported on outside CT dated 4/19/2024 (images unavailable). While infectious or inflammatory etiologies are possible, short-term follow-up posttherapy is recommended to exclude neoplasm.     Records Location: [facility] &  ([place])    RECORDS NEEDED:  Last FIVE years CHEST imaging pushed to PACS from [place]--thank you!!    Additional testing needed prior to consult: PFT's

## 2024-12-03 NOTE — PLAN OF CARE
Goal Outcome Evaluation:      Plan of Care Reviewed With: patient  Overall Patient Progress: improving  Overall Patient Progress: improving    VS: Stable on RA    CV/Resp: SOB with exertion without O2. Denies chest pain   Neuro/MSK: A&O x4. Numbness on R leg  Pain: BL hip/thigh pain - chronic. Lidocaine patches in place on BL thighs. More painful on R leg   GI/: Continent of B&B. LBM 12/1  Diet: Regular with thin liquids. Takes pills whole    Activity: SBA - generalized weakness   Skin: No skin issues   Lines/Access: R PIV SL     Shift Summary: Latest CIWA score: 8. PRN valium available Q 30 minutes. No other changes this shift.

## 2024-12-03 NOTE — PROGRESS NOTES
12/03/24 1300   Appointment Info   Signing Clinician's Name / Credentials (PT) KYREE Díaz   Student Supervision On-site supervision provided;Therapy services provided with the co-signing licensed therapist guiding and directing the services, and providing the skilled judgement and assessment throughout the session   Living Environment   People in Home alone   Current Living Arrangements apartment   Home Accessibility no concerns   Transportation Anticipated health plan transportation;family or friend will provide   Living Environment Comments Pt lives alone in an apartment w/ her dog and cat. Pt has a tub shower and typically takes baths, no equipment at home.   Self-Care   Usual Activity Tolerance good   Current Activity Tolerance moderate   Regular Exercise Yes   Activity/Exercise Type walking   Exercise Amount/Frequency daily   Equipment Currently Used at Home none   Fall history within last six months yes   Number of times patient has fallen within last six months 1   Activity/Exercise/Self-Care Comment Pt PLOF independent w/ mobility and self cares. Walks dog daily. Pt reports falling a few times when camping. Supposed to be getting a SEC and grab bars soon from  waiver.   General Information   Onset of Illness/Injury or Date of Surgery 12/02/24   Referring Physician Jayne Davis, PAANA   Patient/Family Therapy Goals Statement (PT) go home w/ dog and cat   Pertinent History of Current Problem (include personal factors and/or comorbidities that impact the POC) Pt is a 64 year old female with history pertinent for alcohol dependence, COPD, tobacco use, hypothyroidism, bipolar disorder. She was admitted on 12/2/2024 for treatment of alcohol withdrawal and SOB with mild hypoxemia.   Existing Precautions/Restrictions fall   General Observations Pt greeted supine in bed, agreeable to participate in PT.   Cognition   Affect/Mental Status (Cognition) WNL   Follows Commands (Cognition) WNL   Pain  Assessment   Patient Currently in Pain Yes, see Vital Sign flowsheet   Integumentary/Edema   Integumentary/Edema no deficits were identifed   Posture    Posture Forward head position;Protracted shoulders   Range of Motion (ROM)   Range of Motion ROM is WFL   Strength (Manual Muscle Testing)   Strength (Manual Muscle Testing) strength is WFL   Bed Mobility   Comment, (Bed Mobility) supine > sit independently w/ bed flat and no bed rail to simulate home environment   Transfers   Comment, (Transfers) sit > stand from EOB independently   Gait/Stairs (Locomotion)   Comment, (Gait/Stairs) amb 220' w/o AD w/ SBA > indepedent   Balance   Balance Comments pt demonstrates good seated balance and dynamic balance during amb while performing head turns, no LOB   Sensory Examination   Sensory Perception Comments pt reports burning sensation in R hip and numbness in R thigh   Clinical Impression   Criteria for Skilled Therapeutic Intervention Yes, treatment indicated   PT Diagnosis (PT) generalized weakness   Influenced by the following impairments increased pain, decreased strength, impaired balance   Functional limitations due to impairments bed mobility, transfers, amb   Clinical Presentation (PT Evaluation Complexity) stable   Clinical Presentation Rationale per clincial judgement   Clinical Decision Making (Complexity) low complexity   Planned Therapy Interventions (PT) balance training;gait training;home exercise program;neuromuscular re-education;orthotic fitting/training;strengthening;progressive activity/exercise;risk factor education   Risk & Benefits of therapy have been explained evaluation/treatment results reviewed;care plan/treatment goals reviewed;risks/benefits reviewed;current/potential barriers reviewed;participants voiced agreement with care plan;participants included;patient   PT Total Evaluation Time   PT Eval, Low Complexity Minutes (91277) 10   Physical Therapy Goals   PT Frequency One time eval and  treatment only   PT Predicted Duration/Target Date for Goal Attainment 12/05/24   PT Goals Bed Mobility;Transfers;Gait   PT: Bed Mobility Independent;Supine to/from sit;Goal Met   PT: Transfers Independent;Sit to/from stand;Goal Met   PT: Gait Independent;Greater than 200 feet;Goal Met   Interventions   Interventions Quick Adds Therapeutic Activity   Therapeutic Activity   Therapeutic Activities: dynamic activities to improve functional performance Minutes (48196) 12   Symptoms Noted During/After Treatment Fatigue;Increased pain   Treatment Detail/Skilled Intervention Pt greeted supine in bed, agreeable to participate in PT. Educated   PT Discharge Planning   PT Plan DC from PT   PT Discharge Recommendation (DC Rec) home;home with outpatient physical therapy   PT Rationale for DC Rec Pt is currently close to baseline function and is mobilizing safely for DC home. Pt is independent w/ all mobility. Pt reports feeling comfortable w/ plan to DC home. Pt would benefit from OP PT to address R LE pain.   PT Brief overview of current status IND w/ functional mobility   Physical Therapy Time and Intention   Timed Code Treatment Minutes 12   Total Session Time (sum of timed and untimed services) 22

## 2024-12-03 NOTE — PLAN OF CARE
Goal Outcome Evaluation:    Plan of Care Reviewed With: patient    Overall Patient Progress: improving    Outcome Evaluation: Pt's goal is to return home where she lives with her cat and her dog. She has a  and an independent living skills staff

## 2024-12-03 NOTE — PLAN OF CARE
Physical Therapy Discharge Summary    Reason for therapy discharge:    All goals and outcomes met, no further needs identified.    Progress towards therapy goal(s). See goals on Care Plan in [facility] electronic health record for goal details.  Goals met    Therapy recommendation(s):    Continued therapy is recommended.  Rationale/Recommendations:  OP PT to address LE pain/weakness.

## 2024-12-03 NOTE — H&P
[facility]    History and Physical - Hospitalist Service, GOLD TEAM        Date of Admission:  12/2/2024    Assessment & Plan   Jolynn Rivera is a 64 year old female with history pertinent for alcohol dependence, COPD, tobacco use, hypothyroidism, bipolar disorder. She was admitted on 12/2/2024 for treatment of alcohol withdrawal and SOB with mild hypoxemia.     # Alcohol dependence and withdrawal  # Hx of alcohol withdrawal seizures  # Hx of DTs  Longstanding alcohol dependence. Average intake of one bottle of wine per day for the last few weeks. Last drink was 12/2 day of admission. Presents with flu like symptoms including cough and SOB, as well as generalized weakness, recent vomiting and diarrhea. BAL 0.106.   - CIWA protocol with Valium  - thiamine and folate   - resume PTA gabapentin 900 mg TID  - Patient states she has plans for outpatient treatment. She defers addiction medicine     # Acute hypoxemic respiratory failure, improved  # Acute cough and SOB  # Enlarging pulmonary nodules  # COPD  # Tobacco use  Patient states she has had 3 days of cough that started being productive today. Mild SOB. No fevers or rigors. Some hot and cold flashes. Was smoking 3 packs per day but most recently cut down to 10 cigarettes per day.   -- CT PE obtained which was negative for PE, did show possible findings of RML volume loss, extensive endobronchial filling defects, and generalized bronchial wall thickening may represent mucous plugging and/or retained secretions in setting of bronchopneumonia. Also found increased LISE nodule from 5 mm on prior imaging (April?) to 9.1 x 7.3 mm. Additional nodules are new or increased.   -- CXR without acute abnormalities  -- procalc and WBC not elevated.   -- weaned down to room air  Plan:   - flu/covid, respiratory culture  - refused neb  - defer abx for the moment with lower clinical suspicion for bacterial infection  - pulmonology  consult to guide evaluation and treatment of the above  - Mucinex BID  - I-S    # Vomiting and diarrhea  Vomited a few times in the last few days. Diarrhea for the past week. No abdominal pain or fevers. No e/o blood in either. Can presume to be r/t alcohol withdrawal.   - PRN Zofran  - replete electrolytes as below    # AGMA  # Hyponatremia, mild  # Hypomagnesemia, mild  # Transaminitis  Derangements likely r/t alcohol intake, poor po intake / hypovolemia, and GI losses.   - trend CMP  - mag replacement protocol    # Troponinemia, flat  Patient denies CP. EKG not notable for ischemic changes. May be mild type II NSTEMI.     # Recurrent falls  Likely r/t alcohol intoxication.   - PT eval and treat    # Hypoglycemia  BG 65 upon presentation. Resolved. Likely r/t poor po intake PTA.   - CMP as above.     # Bipolar disorder  # PTSD  # Insomnia  - PTA propranolol 20 mg TID  - PTA gabapentin 900 mg TID  - PTA Lunesta 3 mg nightly    # Hypothyroidism - TSH low. Check T4. Continue PTA levothyroxine  # GERD - PTA PPI  # Osteoarthritis of multiple joints - PTA gabapentin she states helps, PRN tylenol  # Hepatitis C s/p treatment          Diet: Combination Diet Regular Diet Adult  DVT Prophylaxis: Pneumatic Compression Devices  Morales Catheter: Not present  Lines: None     Cardiac Monitoring: None  Code Status: Full Code    Clinically Significant Risk Factors Present on Admission         # Hyponatremia: Lowest Na = 131 mmol/L in last 2 days, will monitor as appropriate  # Hypochloremia: Lowest Cl = 91 mmol/L in last 2 days, will monitor as appropriate    # Hypomagnesemia: Lowest Mg = 1.6 mg/dL in last 2 days, will replace as needed  # Anion Gap Metabolic Acidosis: Highest Anion Gap = 21 mmol/L in last 2 days, will monitor and treat as appropriate                    # Financial/Environmental Concerns:           Disposition Plan     Medically Ready for Discharge: Anticipated in 2-4 Days         The patient's care was discussed  "with the Attending Physician, Dr. Aldana and Patient.    Jayne Davis PA-C  Hospitalist Service, [facility]  Securely message with [employer] (more info)  Text page via [facility] Paging/Directory   See signed in provider for up to date coverage information    ______________________________________________________________________    Chief Complaint   \"Feeling sick\"    History is obtained from the patient    History of Present Illness   Jolynn Rivera is a 64 year old female who is seen for a medical evaluation. Patient reports longstanding alcohol dependence. Has been through withdrawal many times before and notes history of withdrawal seizure. Average intake of one bottle of wine per day for the last few weeks. Last drink was 12/2 day of admission. Presents with flu like symptoms including cough and SOB, as well as generalized weakness, recent vomiting x 3 days and diarrhea x 1 week. Has not had any fevers, chills, CP, rigors. Patient states she has had 3 days of cough that started being productive today. Mild SOB. Was smoking 3 packs per day but most recently cut down to 10 cigarettes per day. No dysuria, blood in her stool or vomit.       Past Medical History    Past Medical History:   Diagnosis Date    Anxiety     COPD (chronic obstructive pulmonary disease) (H)     COPD (chronic obstructive pulmonary disease) (H)     Hepatitis C     PTSD (post-traumatic stress disorder)     Seizures (H)     second to ETOH    Substance abuse (H)        Past Surgical History   Past Surgical History:   Procedure Laterality Date    LAPAROSCOPIC TUBAL LIGATION      ORTHOPEDIC SURGERY      Left knee    TONSILLECTOMY         Prior to Admission Medications   Prior to Admission Medications   Prescriptions Last Dose Informant Patient Reported? Taking?   eszopiclone (LUNESTA) 3 MG tablet 12/1/2024 Self Yes Yes   Sig: Take 3 mg by mouth at bedtime   gabapentin (NEURONTIN) 600 MG tablet " 12/1/2024 Self Yes Yes   Sig: Take 900 mg by mouth 3 times daily   levothyroxine (SYNTHROID/LEVOTHROID) 100 MCG tablet 12/1/2024 Self Yes Yes   Sig: Take 100 mcg by mouth every morning   melatonin 5 MG tablet 12/1/2024  Yes Yes   Sig: Take 10 mg by mouth at bedtime Takes 1 hour prior to Lunesta   multivitamin w/minerals (THERA-VIT-M) tablet 12/1/2024 Self No Yes   Sig: Take 1 tablet by mouth daily   nicotine polacrilex (NICORETTE) 4 MG gum Past Week Self Yes Yes   Sig: Place 4 mg inside cheek every hour as needed for smoking cessation   omeprazole (PRILOSEC) 40 MG DR capsule 12/1/2024 Self Yes Yes   Sig: Take 40 mg by mouth every morning   ondansetron (ZOFRAN ODT) 4 MG ODT tab Past Week  Yes Yes   Sig: Take 4 mg by mouth every 8 hours as needed for vomiting or nausea   propranolol (INDERAL) 20 MG tablet 12/1/2024 Self Yes Yes   Sig: Take 20 mg by mouth 3 times daily   thiamine (B-1) 100 MG tablet 12/1/2024 Self No Yes   Sig: Take 1 tablet (100 mg) by mouth daily      Facility-Administered Medications: None        Review of Systems    The 10 point Review of Systems is negative other than noted in the HPI.     Physical Exam   Vital Signs: Temp: 98.8  F (37.1  C) Temp src: Oral BP: (!) 129/107 Pulse: 117   Resp: 20 SpO2: 91 % O2 Device: None (Room air) Oxygen Delivery: 2 LPM  Weight: 135 lbs 1.6 oz    GENERAL: adult female seen sitting in bed. Appears restless. NAD.   NEURO / PSYCH: Alert, converses appropriately. Tremorous. No focal deficits. Moves all extremities.   HEENT: Anicteric sclera.   CV: RRR. S1, S2. No murmurs appreciated.  2+ right radial pulse.  RESPIRATORY: Effort normal. Lungs CTAB with no wheezing, rales, rhonchi.   GI: Abdomen soft and non distended with bowel sounds rare. No tenderness or guarding to palpation.   MSK: no gross deformities  EXTREMITIES: nonedematous  SKIN: No jaundice. No rashes or lesions to exposed areas.       Medical Decision Making       90 MINUTES SPENT BY ME on the date of  service doing chart review, history, exam, documentation & further activities per the note.      Data     I have personally reviewed the following data over the past 24 hrs:    7.9  \   14.3   / 210     131 (L) 91 (L) 9.4 /  178 (H)   4.0 19 (L) 0.56 \     ALT: 56 (H) AST: 69 (H) AP: 47 TBILI: 0.4   ALB: 4.3 TOT PROTEIN: 7.5 LIPASE: 33     Trop: 26 (H) BNP: N/A     TSH: 0.12 (L) T4: 1.47 A1C: N/A     Procal: 0.02 CRP: N/A Lactic Acid: N/A       INR:  N/A PTT:  N/A   D-dimer:  1.14 (H) Fibrinogen:  N/A

## 2024-12-03 NOTE — PROGRESS NOTES
8MS DISCHARGE    D: Patient discharged to home at 4:05pm. Patient accompanied by self.    I: All  instructions reviewed with pt. Patient instructed to seek care if experiencing worsening symptoms, such as pain and withdrawal symptoms. Other phone numbers to call with questions or concerns after discharge reviewed. IV removed. Education completed.    A: Pt verbalized understanding of discharge  instructions. Belongings returned to patient.    P: Patient to follow-up with  PCP within 5 days .

## 2024-12-03 NOTE — CONSULTS
Care Management Initial Consult    General Information  Assessment completed with: PatientJolynn  Type of CM/SW Visit: Initial Assessment  Primary Care Provider verified and updated as needed: Yes -Carmela Srinivasan with [facility]  Readmission within the last 30 days: no previous admission in last 30 days    Reason for Consult: discharge planning  Advance Care Planning: Advance Care Planning Reviewed: other (see comments) (Pt was interested in making a health care directive. I gave her the form)        Communication Assessment  Patient's communication style: spoken language (English)    Hearing Difficulty: yes   Wear Glasses: yes    Cognitive  Cognitive/Neuro/Behavioral: WDL  Level of Consciousness: alert    Arousal Level: opens eyes spontaneously  Orientation: oriented x 4    Mood/Behavior: calm, cooperative    Best Language: 0 - No aphasia    Speech: clear, spontaneous, logical    Living Environment:   People in home: alone     Current living Arrangements: apartment      Able to return to prior arrangements: yes     Family/Social Support:  Care provided by: self, other (see comments) (staff)  Provides care for: pets (dog + cat)  Marital Status:   Support system: Sibling, Friends          Description of Support System: Supportive, Involved    Support Assessment: Other (see comments) (Pt communicates she's experienced the deaths of some close friends which has led her back to drinking)    Current Resources:   Patient receiving home care services: No   Community Resources: [facility] Worker, Other (see comment) ([employer])  Equipment currently used at home: none  Supplies currently used at home: Other (did not assess)    Employment/Financial:  Employment Status: disabled, previously worked in the field of      Financial Concerns: none   Referral to Financial Worker: No  Does the patient's insurance plan have a 3 day qualifying hospital stay  waiver?  No    Lifestyle & Psychosocial Needs:  Social Drivers of Health     Food Insecurity: High Risk (12/3/2024)    Food Insecurity     Within the past 12 months, did you worry that your food would run out before you got money to buy more?: Yes     Within the past 12 months, did the food you bought just not last and you didn t have money to get more?: No   Depression: Not at risk (8/26/2024)    Received from [facility]    PHQ-2     PHQ-2 Subtotal: 1   Housing Stability: Low Risk  (12/3/2024)    Housing Stability     Do you have housing? : Yes     Are you worried about losing your housing?: No   Tobacco Use: High Risk (11/19/2024)    Received from [facility]    Patient History     Smoking Tobacco Use: Every Day     Smokeless Tobacco Use: Never     Passive Exposure: Not on file   Financial Resource Strain: Low Risk  (12/3/2024)    Financial Resource Strain     Within the past 12 months, have you or your family members you live with been unable to get utilities (heat, electricity) when it was really needed?: No   Alcohol Use: Alcohol Misuse (4/4/2024)    AUDIT-C     Frequency of Alcohol Consumption: 4 or more times a week     Average Number of Drinks: 7 to 9     Frequency of Binge Drinking: Daily or almost daily   Transportation Needs: Low Risk  (12/3/2024)    Transportation Needs     Within the past 12 months, has lack of transportation kept you from medical appointments, getting your medicines, non-medical meetings or appointments, work, or from getting things that you need?: No   Physical Activity: Not on File (8/8/2021)    Received from [facility]    Physical Activity   Interpersonal Safety: Low Risk  (12/3/2024)    Interpersonal Safety     Do you feel physically and emotionally safe where you currently live?: Yes     Within the past 12 months, have you been hit, slapped, kicked or otherwise physically hurt by someone?: No     Within the past 12 months, have you been humiliated or emotionally abused  in other ways by your partner or ex-partner?: No   Stress: Not on File (8/8/2021)    Received from [facility]    Stress     Stress: 0   Social Connections: Not on File (8/8/2021)    Received from [facility]    Social Adtrade   Health Literacy: Not on file     Functional Status:  Prior to admission patient needed assistance:   Dependent ADLs: Independent  Dependent IADLs: Cleaning, Laundry, Shopping (a staff person from the [employer] program assists her)     Metal Health Status:  Mental Health Status: Past Concern       Chemical Dependency Status:  Chemical Dependency Status: Current Concern    Chemical Dependency Management:  (Pt reported she is familiar with treatment options and she can access these on her own.)        Values/Beliefs:  Spiritual, Cultural Beliefs, Church Practices, Values that affect care: no             Discussed  Partnership in Safe Discharge Planning  document with patient/family: No    Additional Information: SW met with pt per auto consult due to elevated readmission risk score. Pt shared that she's familiar with these types of assessments and doesn't wish to participate/share too much about herself. She shared she knows that she can quit drinking and knows the option she wants to pursue for treatment.    Pt shared that she's signing up for [employer] to do arts/[employer] type projects to keep her mind and spirit engaged this winter. Pt shared she loves her pets and she has a staff member that's able to care for her pets while she's hospitalized.    Addendum: SW edited note to link it back to the original consult.    Next Steps: No further care management intervention anticipated at this time. Please re-consult if further needs arise. Care management signing off.      Lesley Squires MARIBEL Fan   Covering for [employer] - Phone: [phone]

## 2024-12-04 ENCOUNTER — PRE VISIT (OUTPATIENT)
Dept: ONCOLOGY | Facility: CLINIC | Age: 64
End: 2024-12-04
Payer: MEDICARE

## 2024-12-04 LAB
BACTERIA SPT CULT: NORMAL
GRAM STAIN RESULT: NORMAL

## 2024-12-04 NOTE — TELEPHONE ENCOUNTER
RECORDS STATUS - ALL OTHER DIAGNOSIS      RECORDS RECEIVED FROM: [facility]   NOTES STATUS DETAILS   OFFICE NOTE from referring provider Epic Dr. Ebony Truong   OFFICE NOTE from medical oncologist     OFFICE NOTE from other specialist     DISCHARGE SUMMARY from hospital [facility] 12/02/24: [facility]   DISCHARGE REPORT from the ER     OPERATIVE REPORT     MEDICATION LIST [facility]    LABS     PATHOLOGY REPORTS     ANYTHING RELATED TO DIAGNOSIS Epic Most recent 12/03/24   PATHOLOGY FEDEX TRACKING   Tracking #:   GENONOMIC TESTING     TYPE:     IMAGING (NEED IMAGES & REPORT)     CT SCANS PACS/Req 12/04[facility] PACS:  12/02/24: CT Chest    [facility]:  04/19/24: CT Chest  06/20/23, 05/08/23: CT Lung   MRI     XRAYS PACS/Req 12/[facility] PACS:  12/02/24-09/26/12: XR Chest    [facility]:  10/14/24-05/16/18: XR Chest   ULTRASOUND     PET     IMAGE DISC FEDEX TRACKING   Tracking #:

## 2024-12-05 ENCOUNTER — PATIENT OUTREACH (OUTPATIENT)
Dept: CARE COORDINATION | Facility: CLINIC | Age: 64
End: 2024-12-05
Payer: MEDICARE

## 2024-12-05 NOTE — UTILIZATION REVIEW
Admission Status; Secondary Review Determination    No action to be taken. Please contact me on my Email : [email or web address] if you have any questions.    As part of the [facility] Utilization review plan, a self-audit is done on Medicare inpatient admission with less than 2 midnights stay. The 2014 IPPS Final Rule allows outpatient billing in the event that a hospital determines that an inpatient admission was not medically necessary under utilization review process.     (x) Outpatient status would be Appropriate- Short Stay- Post discharge review.    RATIONALE FOR DETERMINATION    Jolynn Rivera is a 64 year old female admitted on 12/2/2024. She presented with generalized weakness, nausea, vomiting found to be in acute withdrawal as well as mildly hypoxic.  She was treated for withdrawal with Valium and CIWA scoring and had significant improvement in her symptoms over the next day and was able to be weaned off of requiring any Valium.  She expressed interest in being sober, and stated that she has the resources that she needs and knows how she wants to do it.  She declined addiction or chemical dependency services during this admission.     For her hypoxia she was evaluated with CT chest that demonstrated no evidence of acute PE but did show increasing size of previously known pulmonary nodules as well as diffuse bronchial wall thickening and volume loss of the right middle lobe.  Given this constellation of findings and suggestion that this been worsening, the pulmonology team was consulted during this admission.  They met with the patient and discussed recommendations for next steps and in their discussion they agreed upon plan for outpatient follow-up and pulmonology and interventional pulmonology clinics for ongoing workup of these findings.  She had been rapidly weaned off oxygen on her first night and was breathing comfortably on room air today and preferred to discharge home this afternoon      Patient was  admitted and discharge after one night stay. Record was sent by  for a PA review. Based on the  severity of illness, intensity of service provided, expected LOS and risk for adverse outcome make the care appropriate for further outpatient/observation; however, doesn't meet criteria for hospital inpatient admission.       The information on this document is developed by the utilization review team in order for the business office to ensure compliance.  This only denotes the appropriateness of proper admission status and does not reflect the quality of care rendered.       The definitions of Inpatient Status and Observation Status used in making the determination above are those provided in the CMS Coverage Manual, Chapter 1 and Chapter 6, section 70.4.     Please cont me if you want to discuss further about this admission episode.      Rickey Fitzpatrick MD, FACP, TERRY  Medical Director - Utilization management  Staff Hospitalist  [facility]    Pager: [phone]

## 2024-12-05 NOTE — PROGRESS NOTES
[facility]:   [facility] Contact  [facility]/Voicemail     Clinical Data: Post-Discharge Outreach     Outreach attempted x 2.  Left message on patient's voicemail, providing [facility]'s central phone number of [phone] ([phone]) for questions/concerns and/or to schedule an appt with an [facility] provider, if they do not have a PCP.      Plan:  [facility] will do no further outreaches at this time.       Belle Carroll  Community Health Worker  [facility], [facility]  [phone]      *Connected Care Resource Team does NOT follow patient ongoing. Referrals are identified based on internal discharge reports and the outreach is to ensure patient has an understanding of their discharge instructions.

## 2024-12-09 PROBLEM — G89.29 CHRONIC HIP PAIN, RIGHT: Status: ACTIVE | Noted: 2024-06-18

## 2024-12-09 PROBLEM — F41.1 GENERALIZED ANXIETY DISORDER: Status: ACTIVE | Noted: 2017-10-13

## 2024-12-09 PROBLEM — F31.30 BIPOLAR DISORDER, MOST RECENT EPISODE DEPRESSED (H): Status: ACTIVE | Noted: 2020-03-17

## 2024-12-09 PROBLEM — E03.9 ACQUIRED HYPOTHYROIDISM: Status: ACTIVE | Noted: 2020-08-20

## 2024-12-09 PROBLEM — J96.01 ACUTE HYPOXIC RESPIRATORY FAILURE (H): Chronic | Status: ACTIVE | Noted: 2024-04-19

## 2024-12-09 PROBLEM — I10 PRIMARY HYPERTENSION: Status: ACTIVE | Noted: 2021-12-21

## 2024-12-09 PROBLEM — S83.281A TEAR OF LATERAL MENISCUS OF RIGHT KNEE: Status: ACTIVE | Noted: 2019-03-04

## 2024-12-09 PROBLEM — J98.11 ATELECTASIS OF RIGHT LUNG: Status: ACTIVE | Noted: 2023-07-20

## 2024-12-09 PROBLEM — M15.9 OSTEOARTHRITIS OF MULTIPLE JOINTS: Status: ACTIVE | Noted: 2021-04-27

## 2024-12-09 PROBLEM — F17.200 TOBACCO USE DISORDER: Status: ACTIVE | Noted: 2017-11-08

## 2024-12-09 PROBLEM — M25.551 CHRONIC HIP PAIN, RIGHT: Status: ACTIVE | Noted: 2024-06-18

## 2024-12-09 PROBLEM — J44.9 CHRONIC OBSTRUCTIVE PULMONARY DISEASE (H): Status: ACTIVE | Noted: 2018-12-06

## 2024-12-09 PROBLEM — J44.1 COPD WITH ACUTE EXACERBATION (H): Status: ACTIVE | Noted: 2024-04-19

## 2024-12-09 PROBLEM — M25.562 LEFT KNEE PAIN: Status: ACTIVE | Noted: 2020-02-28

## 2024-12-09 PROBLEM — B35.1 ONYCHOMYCOSIS: Status: ACTIVE | Noted: 2021-10-26

## 2024-12-09 PROBLEM — K21.9 GASTROESOPHAGEAL REFLUX DISEASE WITHOUT ESOPHAGITIS: Status: ACTIVE | Noted: 2018-12-06

## 2024-12-09 PROBLEM — F10.20 ALCOHOL USE DISORDER, SEVERE, DEPENDENCE (H): Status: ACTIVE | Noted: 2023-12-06

## 2024-12-09 PROBLEM — F51.04 PSYCHOPHYSIOLOGICAL INSOMNIA: Status: ACTIVE | Noted: 2024-02-06

## 2024-12-09 RX ORDER — ESZOPICLONE 2 MG/1
TABLET, FILM COATED ORAL
COMMUNITY
Start: 2024-08-22

## 2024-12-09 RX ORDER — GLYCOPYRROLATE AND FORMOTEROL FUMARATE 9; 4.8 UG/1; UG/1
AEROSOL, METERED RESPIRATORY (INHALATION)
COMMUNITY
Start: 2024-06-05

## 2024-12-09 RX ORDER — DULOXETIN HYDROCHLORIDE 30 MG/1
CAPSULE, DELAYED RELEASE ORAL
COMMUNITY
Start: 2024-07-29

## 2024-12-09 RX ORDER — BRIMONIDINE 5 MG/G
GEL TOPICAL DAILY
COMMUNITY
Start: 2024-08-22

## 2024-12-09 NOTE — PROGRESS NOTES
Pre-visit planning and chart review completed.     NEW patient appointment:    12/31 with Dr. Chip Araiza  12/2 CT PE Study  12/23 PFTs    - No anticoagulation.  - Current smoker.    CE updated. Medications, allergies, problem list, and immunizations reconciled.

## 2024-12-23 ENCOUNTER — OFFICE VISIT (OUTPATIENT)
Dept: PULMONOLOGY | Facility: CLINIC | Age: 64
End: 2024-12-23
Payer: MEDICARE

## 2024-12-23 DIAGNOSIS — R91.8 PULMONARY NODULES: ICD-10-CM

## 2024-12-23 LAB
DLCOUNC-%PRED-PRE: 109 %
DLCOUNC-PRE: 20.82 ML/MIN/MMHG
DLCOUNC-PRED: 18.97 ML/MIN/MMHG
ERV-%PRED-PRE: 47 %
ERV-PRE: 0.36 L
ERV-PRED: 0.76 L
EXPTIME-PRE: 8.12 SEC
FEF2575-%PRED-PRE: 33 %
FEF2575-PRE: 0.66 L/SEC
FEF2575-PRED: 1.96 L/SEC
FEFMAX-%PRED-PRE: 43 %
FEFMAX-PRE: 2.58 L/SEC
FEFMAX-PRED: 5.93 L/SEC
FEV1-%PRED-PRE: 61 %
FEV1-PRE: 1.32 L
FEV1FEV6-PRE: 56 %
FEV1FEV6-PRED: 80 %
FEV1FVC-PRE: 56 %
FEV1FVC-PRED: 80 %
FEV1SVC-PRE: 55 %
FEV1SVC-PRED: 68 %
FIFMAX-PRE: 3.59 L/SEC
FRCPLETH-%PRED-PRE: 139 %
FRCPLETH-PRE: 3.86 L
FRCPLETH-PRED: 2.78 L
FVC-%PRED-PRE: 87 %
FVC-PRE: 2.36 L
FVC-PRED: 2.71 L
IC-%PRED-PRE: 87 %
IC-PRE: 2.03 L
IC-PRED: 2.32 L
RVPLETH-%PRED-PRE: 182 %
RVPLETH-PRE: 3.5 L
RVPLETH-PRED: 1.92 L
TLCPLETH-%PRED-PRE: 123 %
TLCPLETH-PRE: 5.89 L
TLCPLETH-PRED: 4.77 L
VA-%PRED-PRE: 98 %
VA-PRE: 4.42 L
VC-%PRED-PRE: 75 %
VC-PRE: 2.39 L
VC-PRED: 3.16 L

## 2024-12-23 PROCEDURE — 94375 RESPIRATORY FLOW VOLUME LOOP: CPT | Performed by: INTERNAL MEDICINE

## 2024-12-23 PROCEDURE — 94150 VITAL CAPACITY TEST: CPT | Performed by: INTERNAL MEDICINE

## 2024-12-23 PROCEDURE — 94729 DIFFUSING CAPACITY: CPT | Performed by: INTERNAL MEDICINE

## 2024-12-23 PROCEDURE — 94726 PLETHYSMOGRAPHY LUNG VOLUMES: CPT | Performed by: INTERNAL MEDICINE

## 2024-12-31 ENCOUNTER — VIRTUAL VISIT (OUTPATIENT)
Dept: PULMONOLOGY | Facility: CLINIC | Age: 64
End: 2024-12-31
Attending: STUDENT IN AN ORGANIZED HEALTH CARE EDUCATION/TRAINING PROGRAM
Payer: MEDICARE

## 2024-12-31 VITALS — BODY MASS INDEX: 21.62 KG/M2 | HEIGHT: 63 IN | WEIGHT: 122 LBS

## 2024-12-31 DIAGNOSIS — J44.1 CHRONIC OBSTRUCTIVE PULMONARY DISEASE WITH ACUTE EXACERBATION (H): Primary | ICD-10-CM

## 2024-12-31 DIAGNOSIS — R91.8 PULMONARY NODULES: ICD-10-CM

## 2024-12-31 PROCEDURE — 99205 OFFICE O/P NEW HI 60 MIN: CPT | Mod: 95 | Performed by: INTERNAL MEDICINE

## 2024-12-31 RX ORDER — FLUTICASONE FUROATE, UMECLIDINIUM BROMIDE AND VILANTEROL TRIFENATATE 200; 62.5; 25 UG/1; UG/1; UG/1
1 POWDER RESPIRATORY (INHALATION) DAILY
Qty: 3 EACH | Refills: 5 | Status: SHIPPED | OUTPATIENT
Start: 2024-12-31

## 2024-12-31 RX ORDER — LEVALBUTEROL TARTRATE 45 UG/1
2 AEROSOL, METERED ORAL EVERY 4 HOURS PRN
Qty: 15 G | Refills: 4 | Status: SHIPPED | OUTPATIENT
Start: 2024-12-31 | End: 2025-03-31

## 2024-12-31 RX ORDER — LEVOFLOXACIN 500 MG/1
500 TABLET, FILM COATED ORAL DAILY
Qty: 10 TABLET | Refills: 0 | Status: SHIPPED | OUTPATIENT
Start: 2024-12-31

## 2024-12-31 ASSESSMENT — PAIN SCALES - GENERAL: PAINLEVEL_OUTOF10: NO PAIN (0)

## 2024-12-31 NOTE — LETTER
"12/31/2024       RE: Jolynn Rivera  [address]  [facility] [address]     Dear Colleague,    Thank you for referring your patient, Jolynn Rivera, to the [facility] CANCER [facility]INIC at [facility]. Please see a copy of my visit note below.    Virtual Visit Details    Type of service:  Video Visit   See note        LUNG NODULE & INTERVENTIONAL PULMONARY CLINIC  CL[facility]ICS [facility] SUR[facility]ERY [facility], [facility], [facility]CER CLINIC  [address] [facility] [address]  Phone: [phone]  Fax: [phone]    Patient:  Jolynn Rivera, Age 64 year old, Date of birth 1960, MRN# [id number]  Date of Visit:  12/31/2024  Referring Provider Ebony Truong    Reason for Consultation: Lung Nodule     The patient has been notified of following:   \"This video visit will be conducted via a call between you and your physician/provider. We have found that certain health care needs can be provided without the need for an in-person physical exam.  This service lets us provide the care you need with a video conversation.  If a prescription is necessary we can send it directly to your pharmacy.  If lab work is needed we can place an order for that and you can then stop by our lab to have the test done at a later time.  Video visits are billed at different rates depending on your insurance coverage.  Please reach out to your insurance provider with any questions.  If during the course of the call the physician/provider feels a video visit is not appropriate, you will not be charged for this service.\"  Patient has given verbal consent for Video visit? Yes  How would you like to obtain your AVS? Please refer to rooming staff note  Patient would like the video invitation sent by: Please refer to rooming staff note   Will anyone else be joining your video visit? Please refer to rooming staff " note    Video-Visit Details     Type of service:  Video Visit  Video Start Time: 350  Video End Time: 400  Provider Name: Chip Araiza MD, MHA   Originating Location (pt. Location): Home  Provider Location: Off campus   Distant Location (provider location): Home/Clinic  Platform used for Video Visit: [facility]    Assessment and Plan:    1. New multiple pulmonary lung nodule(s) and RML atelectasis. Given the characteristics on current/previous imaging and risk factors; I would classify this to be Intermediate (6-65%) risk for cancer. Three clustered nodules in the LISE has reported to grow from 5mm since 8mo ago. New RML volume loss likely from a proximal obstruction. The morphology looks infectious/inflammatory in nature. Plan to repeat CT in 3mo with empiric antibiotics. I will have her start it in February then repeat a CT in March    2. COPD. Revised inhaler regimen. Will prescribe trelegy and xopenex for short acting management. Repeat PFT on next visit     Billing: I spent a total of 45min spent on date of encounter which includes prep time, visit with the patient and post visit work including documentation and nursing communication     Chip Araiza MD, MHA  Associate Professor of Medicine  Section of Interventional Pulmonology   Division of Pulmonary, Allergy, Critical Care and Sleep Medicine   [facility]  Pager: [phone]   Office: [phone]  Email: [email or web address]    Anita Castro RN   Interventional Pulmonary Care Coordinator   Office: [phone]  Email: [email or web address]     Antoni Melendez  Interventional Pulmonary Surgery Scheduler   Office: [phone]  Email: [email or web address]         History:     Jolynn Rivera is a 64 year old female with sig h/o for HCV, alcoholism, COPD, seizures who is here for evaluation/followup of Lung Nodule.    - No new resp sx or complaints. Denies dyspnea or cough.   - CT chest with new rml volume loss  and LISE nodules. Has had frequent aspiration from GERD and alcohol use.   - Personal hx of cancer: no  - Family hx of cancer: no  - Exposure hx: Denies asbestos or radon exposure   - Tobacco hx: Current Smoker, 3ppd now down to 0.5ppd.   - My interpretation of the images relevant for this visit includes: nodules   - My interpretation of the PFT's relevant for this visit includes: None     Culprit Nodule(s):   Right middle lobe volume loss, with near complete opacification of right middle lobe bronchi. There is generalized bronchial wall thickening throughout both lungs. Retained central secretions. There is a cluster of 3 nodules in the posterior aspect of the left upper lobe, including 9.1 x 7.3 mm, image 65, 9.7 x 6.7 mm, image 70, and 7.9 x 6.2 mm, image 72. Additional adjacent 4 mm nodule, image 60. No pneumothorax. No pleural effusions..    Other active medical problems include:   - has COPD. Stable on inhalers    - h/o HCV, IV drug abuse and seizures          Past Medical History:      Past Medical History:   Diagnosis Date     Anxiety      COPD (chronic obstructive pulmonary disease) (H)      COPD (chronic obstructive pulmonary disease) (H)      Hepatitis C      PTSD (post-traumatic stress disorder)      Seizures (H)     second to ETOH     Substance abuse (H)              Past Surgical History:      Past Surgical History:   Procedure Laterality Date     LAPAROSCOPIC TUBAL LIGATION       ORTHOPEDIC SURGERY      Left knee     TONSILLECTOMY              Social History:     Social History     Tobacco Use     Smoking status: Every Day     Current packs/day: 0.50     Types: Cigarettes     Smokeless tobacco: Never     Tobacco comments:     Starting Chantix October 2014   Substance Use Topics     Alcohol use: Yes     Comment: 12-15 cans per day            Family History:     Family History   Problem Relation Age of Onset     Anxiety Disorder Mother      Asthma Mother      Alcohol/Drug Father      Prostate Cancer  "Father      Arthritis Father      Alcohol/Drug Brother      Alcohol/Drug Brother      Hypertension Brother      Alcohol/Drug Brother      Depression Brother      Psychotic Disorder Brother              Allergies:      Allergies   Allergen Reactions     Bee Venom Swelling     Wasps [Hornets] Swelling     Amlodipine      Nightmares     Antihistamines, Chlorpheniramine-Type [Antihistamines, Chlorpheniramine-Type]      Clonidine      Compazine      Lock jaw     Diphenhydramine Muscle Pain (Myalgia) and Cramps     Hydroxyzine Cramps     Lock jaw     Metoclopramide      Tardive Dyskinesia     Penicillins      Panic attack     Prednisone Anxiety     Panic attacks.       Prochlorperazine Muscle Pain (Myalgia) and Cramps     Trazodone Nausea and Vomiting and Confusion     \"I ended up falling and feeling like I was drunk\"     Umeclidinium Bromide      Mood swings + anger.            Medications:     Current Outpatient Medications   Medication Sig Dispense Refill     BEVESPI AEROSPHERE 9-4.8 MCG/ACT oral inhaler        brimonidine tartrate (MIRVASO) 0.33 % topical gel Apply topically daily.       DULoxetine (CYMBALTA) 30 MG capsule        eszopiclone (LUNESTA) 2 MG tablet        eszopiclone (LUNESTA) 3 MG tablet Take 3 mg by mouth at bedtime       gabapentin (NEURONTIN) 600 MG tablet Take 900 mg by mouth 3 times daily       levothyroxine (SYNTHROID/LEVOTHROID) 100 MCG tablet Take 100 mcg by mouth every morning       melatonin 5 MG tablet Take 10 mg by mouth at bedtime Takes 1 hour prior to Lunesta       multivitamin w/minerals (THERA-VIT-M) tablet Take 1 tablet by mouth daily 30 tablet 0     nicotine polacrilex (NICORETTE) 4 MG gum Place 4 mg inside cheek every hour as needed for smoking cessation       omeprazole (PRILOSEC) 40 MG DR capsule Take 40 mg by mouth every morning       ondansetron (ZOFRAN ODT) 4 MG ODT tab Take 4 mg by mouth every 8 hours as needed for vomiting or nausea       propranolol (INDERAL) 20 MG tablet " Take 20 mg by mouth 3 times daily       thiamine (B-1) 100 MG tablet Take 1 tablet (100 mg) by mouth daily 30 tablet 0     No current facility-administered medications for this visit.            Review of Systems:     12-point ROS reviewed and abnormalities stated in the history.         Physical Exam:     Constitutional - looks well, in no apparent distress  Eyes - no redness or discharge  Respiratory -breathing appears comfortable.  No cough.  Skin - No appreciable discoloration or lesions (very limited exam)  Neurological - No apparent tremors. Speech fluent and articlate  Psychiatric - no signs of delirium or anxiety     Exam limited to that easily identified on a virtual visit. The rest of a comprehensive physical examination is deferred due to PHE (public health emergency) video visit restrictions.         Current Laboratory Data:   All laboratory and imaging data reviewed.          Latest Ref Rng & Units 12/23/2024     1:34 PM   PFT   FVC L 2.36    FEV1 L 1.32    FVC% % 87    FEV1% % 61                        Again, thank you for allowing me to participate in the care of your patient.      Sincerely,    Chip Araiza MD

## 2024-12-31 NOTE — PROGRESS NOTES
"    LUNG NODULE & INTERVENTIONAL PULMONARY CLINIC  CLINICS & SURGERY CENTER, [facility], [facility] CANCER [address] [address]  Phone: [phone]  Fax: [phone]    Patient:  Jolynn Rivera, Age 64 year old, Date of birth 1960, MRN# [id number]  Date of Visit:  12/31/2024  Referring Provider Ebony Truong    Reason for Consultation: Lung Nodule     The patient has been notified of following:   \"This video visit will be conducted via a call between you and your physician/provider. We have found that certain health care needs can be provided without the need for an in-person physical exam.  This service lets us provide the care you need with a video conversation.  If a prescription is necessary we can send it directly to your pharmacy.  If lab work is needed we can place an order for that and you can then stop by our lab to have the test done at a later time.  Video visits are billed at different rates depending on your insurance coverage.  Please reach out to your insurance provider with any questions.  If during the course of the call the physician/provider feels a video visit is not appropriate, you will not be charged for this service.\"  Patient has given verbal consent for Video visit? Yes  How would you like to obtain your AVS? Please refer to rooming staff note  Patient would like the video invitation sent by: Please refer to rooming staff note   Will anyone else be joining your video visit? Please refer to rooming staff note    Video-Visit Details     Type of service:  Video Visit  Video Start Time: 350  Video End Time: 400  Provider Name: Chip Araiza MD, A   Originating Location (pt. Location): Home  Provider Location: Off campus   Distant Location (provider location): Home/Clinic  Platform used for Video Visit: [employer]    Assessment and Plan:    1. New multiple pulmonary lung nodule(s) and RML " atelectasis. Given the characteristics on current/previous imaging and risk factors; I would classify this to be Intermediate (6-65%) risk for cancer. Three clustered nodules in the LISE has reported to grow from 5mm since 8mo ago. New RML volume loss likely from a proximal obstruction. The morphology looks infectious/inflammatory in nature. Plan to repeat CT in 3mo with empiric antibiotics. I will have her start it in February then repeat a CT in March 2. COPD. Revised inhaler regimen. Will prescribe trelegy and xopenex for short acting management. Repeat PFT on next visit     Billing: I spent a total of 45min spent on date of encounter which includes prep time, visit with the patient and post visit work including documentation and nursing communication     Chip Araiza MD, MHA  Associate Professor of Medicine  Section of Interventional Pulmonology   Division of Pulmonary, Allergy, Critical Care and Sleep Medicine   [facility]  Pager: [phone]   Office: [phone]  Email: [email or web address]    Anita Castro RN   Interventional Pulmonary Care Coordinator   Office: [phone]  Email: [email or web address]     Antoni Melendez  Interventional Pulmonary Surgery Scheduler   Office: [phone]  Email: [email or web address]         History:     Jolynn Rivera is a 64 year old female with sig h/o for HCV, alcoholism, COPD, seizures who is here for evaluation/followup of Lung Nodule.    - No new resp sx or complaints. Denies dyspnea or cough.   - CT chest with new rml volume loss and LISE nodules. Has had frequent aspiration from GERD and alcohol use.   - Personal hx of cancer: no  - Family hx of cancer: no  - Exposure hx: Denies asbestos or radon exposure   - Tobacco hx: Current Smoker, 3ppd now down to 0.5ppd.   - My interpretation of the images relevant for this visit includes: nodules   - My interpretation of the PFT's relevant for this visit includes: None      Culprit Nodule(s):   Right middle lobe volume loss, with near complete opacification of right middle lobe bronchi. There is generalized bronchial wall thickening throughout both lungs. Retained central secretions. There is a cluster of 3 nodules in the posterior aspect of the left upper lobe, including 9.1 x 7.3 mm, image 65, 9.7 x 6.7 mm, image 70, and 7.9 x 6.2 mm, image 72. Additional adjacent 4 mm nodule, image 60. No pneumothorax. No pleural effusions..    Other active medical problems include:   - has COPD. Stable on inhalers    - h/o HCV, IV drug abuse and seizures          Past Medical History:      Past Medical History:   Diagnosis Date    Anxiety     COPD (chronic obstructive pulmonary disease) (H)     COPD (chronic obstructive pulmonary disease) (H)     Hepatitis C     PTSD (post-traumatic stress disorder)     Seizures (H)     second to ETOH    Substance abuse (H)              Past Surgical History:      Past Surgical History:   Procedure Laterality Date    LAPAROSCOPIC TUBAL LIGATION      ORTHOPEDIC SURGERY      Left knee    TONSILLECTOMY              Social History:     Social History     Tobacco Use    Smoking status: Every Day     Current packs/day: 0.50     Types: Cigarettes    Smokeless tobacco: Never    Tobacco comments:     Starting Kardiumtix October 2014   Substance Use Topics    Alcohol use: Yes     Comment: 12-15 cans per day            Family History:     Family History   Problem Relation Age of Onset    Anxiety Disorder Mother     Asthma Mother     Alcohol/Drug Father     Prostate Cancer Father     Arthritis Father     Alcohol/Drug Brother     Alcohol/Drug Brother     Hypertension Brother     Alcohol/Drug Brother     Depression Brother     Psychotic Disorder Brother              Allergies:      Allergies   Allergen Reactions    Bee Venom Swelling    Wasps [Hornets] Swelling    Amlodipine      Nightmares    Antihistamines, Chlorpheniramine-Type [Antihistamines, Chlorpheniramine-Type]      "Clonidine     Compazine      Lock jaw    Diphenhydramine Muscle Pain (Myalgia) and Cramps    Hydroxyzine Cramps     Lock jaw    Metoclopramide      Tardive Dyskinesia    Penicillins      Panic attack    Prednisone Anxiety     Panic attacks.      Prochlorperazine Muscle Pain (Myalgia) and Cramps    Trazodone Nausea and Vomiting and Confusion     \"I ended up falling and feeling like I was drunk\"    Umeclidinium Bromide      Mood swings + anger.            Medications:     Current Outpatient Medications   Medication Sig Dispense Refill    BEVESPI AEROSPHERE 9-4.8 MCG/ACT oral inhaler       brimonidine tartrate (MIRVASO) 0.33 % topical gel Apply topically daily.      DULoxetine (CYMBALTA) 30 MG capsule       eszopiclone (LUNESTA) 2 MG tablet       eszopiclone (LUNESTA) 3 MG tablet Take 3 mg by mouth at bedtime      gabapentin (NEURONTIN) 600 MG tablet Take 900 mg by mouth 3 times daily      levothyroxine (SYNTHROID/LEVOTHROID) 100 MCG tablet Take 100 mcg by mouth every morning      melatonin 5 MG tablet Take 10 mg by mouth at bedtime Takes 1 hour prior to Lunesta      multivitamin w/minerals (THERA-VIT-M) tablet Take 1 tablet by mouth daily 30 tablet 0    nicotine polacrilex (NICORETTE) 4 MG gum Place 4 mg inside cheek every hour as needed for smoking cessation      omeprazole (PRILOSEC) 40 MG DR capsule Take 40 mg by mouth every morning      ondansetron (ZOFRAN ODT) 4 MG ODT tab Take 4 mg by mouth every 8 hours as needed for vomiting or nausea      propranolol (INDERAL) 20 MG tablet Take 20 mg by mouth 3 times daily      thiamine (B-1) 100 MG tablet Take 1 tablet (100 mg) by mouth daily 30 tablet 0     No current facility-administered medications for this visit.            Review of Systems:     12-point ROS reviewed and abnormalities stated in the history.         Physical Exam:     Constitutional - looks well, in no apparent distress  Eyes - no redness or discharge  Respiratory -breathing appears comfortable.  No " cough.  Skin - No appreciable discoloration or lesions (very limited exam)  Neurological - No apparent tremors. Speech fluent and articlate  Psychiatric - no signs of delirium or anxiety     Exam limited to that easily identified on a virtual visit. The rest of a comprehensive physical examination is deferred due to PHE (public health emergency) video visit restrictions.         Current Laboratory Data:   All laboratory and imaging data reviewed.          Latest Ref Rng & Units 12/23/2024     1:34 PM   PFT   FVC L 2.36    FEV1 L 1.32    FVC% % 87    FEV1% % 61

## 2024-12-31 NOTE — NURSING NOTE
Current patient location: [address]  [facility] [address]    Is the patient currently in the state of [place]? YES    Visit mode:VIDEO    If the visit is dropped, the patient can be reconnected by:VIDEO VISIT: Send to e-mail at: [email or web address]]    Will anyone else be joining the visit? NO  (If patient encounters technical issues they should call [phone])    Are changes needed to the allergy or medication list? Pt stated no changes to allergies and Pt stated no med changes    Are refills needed on medications prescribed by this physician? NO    Rooming Documentation:  Questionnaire(s) not done per department protocol    Reason for visit: Consult    Viviane MATOSF

## 2025-01-02 ENCOUNTER — MYC MEDICAL ADVICE (OUTPATIENT)
Dept: PULMONOLOGY | Facility: CLINIC | Age: 65
End: 2025-01-02

## 2025-01-06 NOTE — CONFIDENTIAL NOTE
RECORDS RECEIVED FROM: internal / ce    DATE RECEIVED: 2.5.25    NOTES STATUS DETAILS   OFFICE NOTE from referring provider internal    Ebony Truong MD      DISCHARGE REPORT from the ER internal / ce Internal   12.20.24 Alexi     [facility] [place] 4/18/24    MEDICATION LIST internal     IMAGING  (NEED IMAGES AND REPORTS)     CT SCAN internal  12.2.24,     [facility] [place] 4/19/24, 6/20/23, 5/8/23   CHEST XRAY (CXR) internal  12.2.24, 10/14/24, 4/18/24, 2/16/22    TESTS     PULMONARY FUNCTION TESTING (PFT) internal  12.23.24

## 2025-01-09 ENCOUNTER — APPOINTMENT (OUTPATIENT)
Dept: CT IMAGING | Facility: CLINIC | Age: 65
End: 2025-01-09
Attending: EMERGENCY MEDICINE
Payer: MEDICARE

## 2025-01-09 ENCOUNTER — HOSPITAL ENCOUNTER (INPATIENT)
Facility: CLINIC | Age: 65
LOS: 1 days | Discharge: LEFT BEFORE DISCHARGE | End: 2025-01-09
Attending: EMERGENCY MEDICINE | Admitting: STUDENT IN AN ORGANIZED HEALTH CARE EDUCATION/TRAINING PROGRAM
Payer: MEDICARE

## 2025-01-09 ENCOUNTER — APPOINTMENT (OUTPATIENT)
Dept: GENERAL RADIOLOGY | Facility: CLINIC | Age: 65
End: 2025-01-09
Attending: EMERGENCY MEDICINE
Payer: MEDICARE

## 2025-01-09 VITALS
SYSTOLIC BLOOD PRESSURE: 137 MMHG | RESPIRATION RATE: 18 BRPM | TEMPERATURE: 98.3 F | HEIGHT: 63 IN | BODY MASS INDEX: 25.69 KG/M2 | OXYGEN SATURATION: 94 % | WEIGHT: 145 LBS | DIASTOLIC BLOOD PRESSURE: 74 MMHG | HEART RATE: 104 BPM

## 2025-01-09 DIAGNOSIS — R56.9 ALCOHOL WITHDRAWAL SEIZURE WITH COMPLICATION (H): ICD-10-CM

## 2025-01-09 DIAGNOSIS — F10.939 ALCOHOL WITHDRAWAL SEIZURE WITH COMPLICATION (H): ICD-10-CM

## 2025-01-09 DIAGNOSIS — E87.20 LACTIC ACIDOSIS: ICD-10-CM

## 2025-01-09 LAB
ALBUMIN SERPL BCG-MCNC: 4.4 G/DL (ref 3.5–5.2)
ALBUMIN UR-MCNC: 20 MG/DL
ALCOHOL BREATH TEST: 0.22 (ref 0–0.01)
ALP SERPL-CCNC: 57 U/L (ref 40–150)
ALT SERPL W P-5'-P-CCNC: 27 U/L (ref 0–50)
AMPHETAMINES UR QL SCN: ABNORMAL
ANION GAP SERPL CALCULATED.3IONS-SCNC: 18 MMOL/L (ref 7–15)
ANION GAP SERPL CALCULATED.3IONS-SCNC: 24 MMOL/L (ref 7–15)
APPEARANCE UR: CLEAR
AST SERPL W P-5'-P-CCNC: 37 U/L (ref 0–45)
B-OH-BUTYR SERPL-SCNC: 0.34 MMOL/L
B-OH-BUTYR SERPL-SCNC: <0.18 MMOL/L
BACTERIA BLD CULT: NORMAL
BACTERIA BLD CULT: NORMAL
BARBITURATES UR QL SCN: ABNORMAL
BASOPHILS # BLD AUTO: 0 10E3/UL (ref 0–0.2)
BASOPHILS NFR BLD AUTO: 0 %
BENZODIAZ UR QL SCN: ABNORMAL
BILIRUB DIRECT SERPL-MCNC: <0.2 MG/DL (ref 0–0.3)
BILIRUB SERPL-MCNC: 0.4 MG/DL
BILIRUB UR QL STRIP: NEGATIVE
BUN SERPL-MCNC: 4.6 MG/DL (ref 8–23)
BUN SERPL-MCNC: 5.1 MG/DL (ref 8–23)
BZE UR QL SCN: ABNORMAL
CALCIUM SERPL-MCNC: 8.5 MG/DL (ref 8.8–10.4)
CALCIUM SERPL-MCNC: 8.6 MG/DL (ref 8.8–10.4)
CANNABINOIDS UR QL SCN: ABNORMAL
CHLORIDE SERPL-SCNC: 87 MMOL/L (ref 98–107)
CHLORIDE SERPL-SCNC: 91 MMOL/L (ref 98–107)
CK SERPL-CCNC: 210 U/L (ref 26–192)
COLOR UR AUTO: ABNORMAL
CREAT SERPL-MCNC: 0.44 MG/DL (ref 0.51–0.95)
CREAT SERPL-MCNC: 0.49 MG/DL (ref 0.51–0.95)
EGFRCR SERPLBLD CKD-EPI 2021: >90 ML/MIN/1.73M2
EGFRCR SERPLBLD CKD-EPI 2021: >90 ML/MIN/1.73M2
EOSINOPHIL # BLD AUTO: 0.1 10E3/UL (ref 0–0.7)
EOSINOPHIL NFR BLD AUTO: 1 %
ERYTHROCYTE [DISTWIDTH] IN BLOOD BY AUTOMATED COUNT: 12.1 % (ref 10–15)
ETHANOL SERPL-MCNC: 0.22 G/DL
FENTANYL UR QL: ABNORMAL
GLUCOSE SERPL-MCNC: 110 MG/DL (ref 70–99)
GLUCOSE SERPL-MCNC: 166 MG/DL (ref 70–99)
GLUCOSE UR STRIP-MCNC: NEGATIVE MG/DL
HCO3 SERPL-SCNC: 17 MMOL/L (ref 22–29)
HCO3 SERPL-SCNC: 23 MMOL/L (ref 22–29)
HCT VFR BLD AUTO: 39.9 % (ref 35–47)
HGB BLD-MCNC: 14.9 G/DL (ref 11.7–15.7)
HGB UR QL STRIP: ABNORMAL
HYALINE CASTS: 1 /LPF
IMM GRANULOCYTES # BLD: 0 10E3/UL
IMM GRANULOCYTES NFR BLD: 0 %
KETONES UR STRIP-MCNC: 10 MG/DL
LACTATE SERPL-SCNC: 3.6 MMOL/L (ref 0.7–2)
LACTATE SERPL-SCNC: 5.1 MMOL/L (ref 0.7–2)
LACTATE SERPL-SCNC: 5.4 MMOL/L (ref 0.7–2)
LACTATE SERPL-SCNC: 7 MMOL/L (ref 0.7–2)
LEUKOCYTE ESTERASE UR QL STRIP: NEGATIVE
LIPASE SERPL-CCNC: 27 U/L (ref 13–60)
LYMPHOCYTES # BLD AUTO: 1.9 10E3/UL (ref 0.8–5.3)
LYMPHOCYTES NFR BLD AUTO: 33 %
MAGNESIUM SERPL-MCNC: 1.5 MG/DL (ref 1.7–2.3)
MAGNESIUM SERPL-MCNC: 1.8 MG/DL (ref 1.7–2.3)
MCH RBC QN AUTO: 34.5 PG (ref 26.5–33)
MCHC RBC AUTO-ENTMCNC: 37.3 G/DL (ref 31.5–36.5)
MCV RBC AUTO: 92 FL (ref 78–100)
MONOCYTES # BLD AUTO: 0.8 10E3/UL (ref 0–1.3)
MONOCYTES NFR BLD AUTO: 14 %
NEUTROPHILS # BLD AUTO: 2.9 10E3/UL (ref 1.6–8.3)
NEUTROPHILS NFR BLD AUTO: 51 %
NITRATE UR QL: NEGATIVE
NRBC # BLD AUTO: 0 10E3/UL
NRBC BLD AUTO-RTO: 0 /100
OPIATES UR QL SCN: ABNORMAL
PCP QUAL URINE (ROCHE): ABNORMAL
PH UR STRIP: 6 [PH] (ref 5–7)
PHOSPHATE SERPL-MCNC: 2.7 MG/DL (ref 2.5–4.5)
PLATELET # BLD AUTO: 195 10E3/UL (ref 150–450)
POTASSIUM SERPL-SCNC: 3.5 MMOL/L (ref 3.4–5.3)
POTASSIUM SERPL-SCNC: 3.8 MMOL/L (ref 3.4–5.3)
PROCALCITONIN SERPL IA-MCNC: 0.05 NG/ML
PROT SERPL-MCNC: 7.5 G/DL (ref 6.4–8.3)
RBC # BLD AUTO: 4.32 10E6/UL (ref 3.8–5.2)
RBC URINE: <1 /HPF
SODIUM SERPL-SCNC: 128 MMOL/L (ref 135–145)
SODIUM SERPL-SCNC: 132 MMOL/L (ref 135–145)
SP GR UR STRIP: 1.01 (ref 1–1.03)
SQUAMOUS EPITHELIAL: 1 /HPF
UROBILINOGEN UR STRIP-MCNC: NORMAL MG/DL
WBC # BLD AUTO: 5.7 10E3/UL (ref 4–11)
WBC URINE: 0 /HPF

## 2025-01-09 PROCEDURE — 82010 KETONE BODYS QUAN: CPT | Performed by: STUDENT IN AN ORGANIZED HEALTH CARE EDUCATION/TRAINING PROGRAM

## 2025-01-09 PROCEDURE — 96365 THER/PROPH/DIAG IV INF INIT: CPT | Performed by: STUDENT IN AN ORGANIZED HEALTH CARE EDUCATION/TRAINING PROGRAM

## 2025-01-09 PROCEDURE — 83735 ASSAY OF MAGNESIUM: CPT | Performed by: STUDENT IN AN ORGANIZED HEALTH CARE EDUCATION/TRAINING PROGRAM

## 2025-01-09 PROCEDURE — 82075 ASSAY OF BREATH ETHANOL: CPT | Performed by: STUDENT IN AN ORGANIZED HEALTH CARE EDUCATION/TRAINING PROGRAM

## 2025-01-09 PROCEDURE — 83605 ASSAY OF LACTIC ACID: CPT | Performed by: STUDENT IN AN ORGANIZED HEALTH CARE EDUCATION/TRAINING PROGRAM

## 2025-01-09 PROCEDURE — 250N000013 HC RX MED GY IP 250 OP 250 PS 637: Performed by: STUDENT IN AN ORGANIZED HEALTH CARE EDUCATION/TRAINING PROGRAM

## 2025-01-09 PROCEDURE — 36415 COLL VENOUS BLD VENIPUNCTURE: CPT | Performed by: STUDENT IN AN ORGANIZED HEALTH CARE EDUCATION/TRAINING PROGRAM

## 2025-01-09 PROCEDURE — 87077 CULTURE AEROBIC IDENTIFY: CPT | Performed by: EMERGENCY MEDICINE

## 2025-01-09 PROCEDURE — 99223 1ST HOSP IP/OBS HIGH 75: CPT | Mod: AI | Performed by: STUDENT IN AN ORGANIZED HEALTH CARE EDUCATION/TRAINING PROGRAM

## 2025-01-09 PROCEDURE — 84100 ASSAY OF PHOSPHORUS: CPT | Performed by: EMERGENCY MEDICINE

## 2025-01-09 PROCEDURE — 82077 ASSAY SPEC XCP UR&BREATH IA: CPT | Performed by: EMERGENCY MEDICINE

## 2025-01-09 PROCEDURE — 36415 COLL VENOUS BLD VENIPUNCTURE: CPT | Performed by: EMERGENCY MEDICINE

## 2025-01-09 PROCEDURE — 87149 DNA/RNA DIRECT PROBE: CPT | Performed by: EMERGENCY MEDICINE

## 2025-01-09 PROCEDURE — 85004 AUTOMATED DIFF WBC COUNT: CPT | Performed by: EMERGENCY MEDICINE

## 2025-01-09 PROCEDURE — 120N000002 HC R&B MED SURG/OB UMMC

## 2025-01-09 PROCEDURE — 83735 ASSAY OF MAGNESIUM: CPT | Performed by: EMERGENCY MEDICINE

## 2025-01-09 PROCEDURE — 258N000003 HC RX IP 258 OP 636: Performed by: EMERGENCY MEDICINE

## 2025-01-09 PROCEDURE — 83605 ASSAY OF LACTIC ACID: CPT | Performed by: EMERGENCY MEDICINE

## 2025-01-09 PROCEDURE — 84145 PROCALCITONIN (PCT): CPT | Performed by: STUDENT IN AN ORGANIZED HEALTH CARE EDUCATION/TRAINING PROGRAM

## 2025-01-09 PROCEDURE — 80307 DRUG TEST PRSMV CHEM ANLYZR: CPT | Performed by: EMERGENCY MEDICINE

## 2025-01-09 PROCEDURE — 87040 BLOOD CULTURE FOR BACTERIA: CPT | Performed by: EMERGENCY MEDICINE

## 2025-01-09 PROCEDURE — 96366 THER/PROPH/DIAG IV INF ADDON: CPT | Performed by: STUDENT IN AN ORGANIZED HEALTH CARE EDUCATION/TRAINING PROGRAM

## 2025-01-09 PROCEDURE — 82310 ASSAY OF CALCIUM: CPT | Performed by: STUDENT IN AN ORGANIZED HEALTH CARE EDUCATION/TRAINING PROGRAM

## 2025-01-09 PROCEDURE — 80048 BASIC METABOLIC PNL TOTAL CA: CPT | Performed by: EMERGENCY MEDICINE

## 2025-01-09 PROCEDURE — 71046 X-RAY EXAM CHEST 2 VIEWS: CPT

## 2025-01-09 PROCEDURE — 70450 CT HEAD/BRAIN W/O DYE: CPT

## 2025-01-09 PROCEDURE — 82010 KETONE BODYS QUAN: CPT | Performed by: EMERGENCY MEDICINE

## 2025-01-09 PROCEDURE — 83690 ASSAY OF LIPASE: CPT | Performed by: EMERGENCY MEDICINE

## 2025-01-09 PROCEDURE — 82550 ASSAY OF CK (CPK): CPT | Performed by: EMERGENCY MEDICINE

## 2025-01-09 PROCEDURE — 81001 URINALYSIS AUTO W/SCOPE: CPT | Performed by: EMERGENCY MEDICINE

## 2025-01-09 PROCEDURE — 250N000011 HC RX IP 250 OP 636: Performed by: EMERGENCY MEDICINE

## 2025-01-09 PROCEDURE — 99284 EMERGENCY DEPT VISIT MOD MDM: CPT | Mod: 25 | Performed by: STUDENT IN AN ORGANIZED HEALTH CARE EDUCATION/TRAINING PROGRAM

## 2025-01-09 PROCEDURE — 85048 AUTOMATED LEUKOCYTE COUNT: CPT | Performed by: EMERGENCY MEDICINE

## 2025-01-09 PROCEDURE — 82248 BILIRUBIN DIRECT: CPT | Performed by: EMERGENCY MEDICINE

## 2025-01-09 PROCEDURE — 99207 PR APP CREDIT; MD BILLING SHARED VISIT: CPT | Performed by: STUDENT IN AN ORGANIZED HEALTH CARE EDUCATION/TRAINING PROGRAM

## 2025-01-09 PROCEDURE — 250N000013 HC RX MED GY IP 250 OP 250 PS 637: Performed by: EMERGENCY MEDICINE

## 2025-01-09 RX ORDER — ESZOPICLONE 3 MG/1
3 TABLET, FILM COATED ORAL AT BEDTIME
Status: DISCONTINUED | OUTPATIENT
Start: 2025-01-09 | End: 2025-01-09 | Stop reason: HOSPADM

## 2025-01-09 RX ORDER — FOLIC ACID 1 MG/1
1 TABLET ORAL DAILY
Status: DISCONTINUED | OUTPATIENT
Start: 2025-01-09 | End: 2025-01-09 | Stop reason: HOSPADM

## 2025-01-09 RX ORDER — FLUTICASONE FUROATE AND VILANTEROL 200; 25 UG/1; UG/1
1 POWDER RESPIRATORY (INHALATION) DAILY
Status: DISCONTINUED | OUTPATIENT
Start: 2025-01-09 | End: 2025-01-09

## 2025-01-09 RX ORDER — ONDANSETRON 4 MG/1
4 TABLET, ORALLY DISINTEGRATING ORAL ONCE
Status: COMPLETED | OUTPATIENT
Start: 2025-01-09 | End: 2025-01-09

## 2025-01-09 RX ORDER — PROPRANOLOL HCL 20 MG
20 TABLET ORAL 3 TIMES DAILY
Status: DISCONTINUED | OUTPATIENT
Start: 2025-01-09 | End: 2025-01-09 | Stop reason: HOSPADM

## 2025-01-09 RX ORDER — GABAPENTIN 300 MG/1
900 CAPSULE ORAL ONCE
Status: COMPLETED | OUTPATIENT
Start: 2025-01-09 | End: 2025-01-09

## 2025-01-09 RX ORDER — PROPRANOLOL HCL 20 MG
20 TABLET ORAL 3 TIMES DAILY
Status: DISCONTINUED | OUTPATIENT
Start: 2025-01-09 | End: 2025-01-09

## 2025-01-09 RX ORDER — LEVOTHYROXINE SODIUM 100 UG/1
100 TABLET ORAL EVERY MORNING
Status: DISCONTINUED | OUTPATIENT
Start: 2025-01-09 | End: 2025-01-09 | Stop reason: HOSPADM

## 2025-01-09 RX ORDER — HALOPERIDOL 5 MG/ML
2.5-5 INJECTION INTRAMUSCULAR EVERY 6 HOURS PRN
Status: DISCONTINUED | OUTPATIENT
Start: 2025-01-09 | End: 2025-01-09

## 2025-01-09 RX ORDER — LEVOFLOXACIN 500 MG/1
500 TABLET, FILM COATED ORAL DAILY
COMMUNITY

## 2025-01-09 RX ORDER — DEXTROSE MONOHYDRATE AND SODIUM CHLORIDE 5; .9 G/100ML; G/100ML
INJECTION, SOLUTION INTRAVENOUS CONTINUOUS
Status: DISPENSED | OUTPATIENT
Start: 2025-01-09 | End: 2025-01-09

## 2025-01-09 RX ORDER — FOLIC ACID 1 MG/1
1 TABLET ORAL ONCE
Status: COMPLETED | OUTPATIENT
Start: 2025-01-09 | End: 2025-01-09

## 2025-01-09 RX ORDER — LEVALBUTEROL TARTRATE 45 UG/1
2 AEROSOL, METERED ORAL EVERY 4 HOURS PRN
Status: DISCONTINUED | OUTPATIENT
Start: 2025-01-09 | End: 2025-01-09

## 2025-01-09 RX ORDER — MULTIPLE VITAMINS W/ MINERALS TAB 9MG-400MCG
1 TAB ORAL DAILY
Status: DISCONTINUED | OUTPATIENT
Start: 2025-01-09 | End: 2025-01-09 | Stop reason: HOSPADM

## 2025-01-09 RX ORDER — FLUMAZENIL 0.1 MG/ML
0.2 INJECTION, SOLUTION INTRAVENOUS
Status: DISCONTINUED | OUTPATIENT
Start: 2025-01-09 | End: 2025-01-09 | Stop reason: HOSPADM

## 2025-01-09 RX ORDER — MULTIVITAMIN,THERAPEUTIC
1 TABLET ORAL ONCE
Status: COMPLETED | OUTPATIENT
Start: 2025-01-09 | End: 2025-01-09

## 2025-01-09 RX ORDER — DIAZEPAM 10 MG/2ML
5-10 INJECTION, SOLUTION INTRAMUSCULAR; INTRAVENOUS EVERY 30 MIN PRN
Status: DISCONTINUED | OUTPATIENT
Start: 2025-01-09 | End: 2025-01-09 | Stop reason: HOSPADM

## 2025-01-09 RX ORDER — DIAZEPAM 10 MG/1
10 TABLET ORAL EVERY 30 MIN PRN
Status: DISCONTINUED | OUTPATIENT
Start: 2025-01-09 | End: 2025-01-09 | Stop reason: HOSPADM

## 2025-01-09 RX ORDER — ONDANSETRON 4 MG/1
4 TABLET, ORALLY DISINTEGRATING ORAL EVERY 8 HOURS PRN
Status: DISCONTINUED | OUTPATIENT
Start: 2025-01-09 | End: 2025-01-09 | Stop reason: HOSPADM

## 2025-01-09 RX ORDER — AMOXICILLIN 250 MG
2 CAPSULE ORAL 2 TIMES DAILY PRN
Status: DISCONTINUED | OUTPATIENT
Start: 2025-01-09 | End: 2025-01-09 | Stop reason: HOSPADM

## 2025-01-09 RX ORDER — CALCIUM CARBONATE 500 MG/1
1000 TABLET, CHEWABLE ORAL 4 TIMES DAILY PRN
Status: DISCONTINUED | OUTPATIENT
Start: 2025-01-09 | End: 2025-01-09 | Stop reason: HOSPADM

## 2025-01-09 RX ORDER — LEVALBUTEROL TARTRATE 45 UG/1
2 AEROSOL, METERED ORAL EVERY 4 HOURS PRN
COMMUNITY

## 2025-01-09 RX ORDER — OLANZAPINE 5 MG/1
5-10 TABLET, ORALLY DISINTEGRATING ORAL EVERY 6 HOURS PRN
Status: DISCONTINUED | OUTPATIENT
Start: 2025-01-09 | End: 2025-01-09

## 2025-01-09 RX ORDER — PROPRANOLOL HCL 20 MG
20 TABLET ORAL ONCE
Status: COMPLETED | OUTPATIENT
Start: 2025-01-09 | End: 2025-01-09

## 2025-01-09 RX ORDER — OLANZAPINE 10 MG/1
10 TABLET, ORALLY DISINTEGRATING ORAL ONCE
Status: COMPLETED | OUTPATIENT
Start: 2025-01-09 | End: 2025-01-09

## 2025-01-09 RX ORDER — LIDOCAINE 40 MG/G
CREAM TOPICAL
Status: DISCONTINUED | OUTPATIENT
Start: 2025-01-09 | End: 2025-01-09 | Stop reason: HOSPADM

## 2025-01-09 RX ORDER — DIAZEPAM 5 MG/1
5-20 TABLET ORAL EVERY 30 MIN PRN
Status: DISCONTINUED | OUTPATIENT
Start: 2025-01-09 | End: 2025-01-09

## 2025-01-09 RX ORDER — DIAZEPAM 5 MG/1
5 TABLET ORAL ONCE
Status: COMPLETED | OUTPATIENT
Start: 2025-01-09 | End: 2025-01-09

## 2025-01-09 RX ORDER — MAGNESIUM OXIDE 400 MG/1
800 TABLET ORAL ONCE
Status: COMPLETED | OUTPATIENT
Start: 2025-01-09 | End: 2025-01-09

## 2025-01-09 RX ORDER — AMOXICILLIN 250 MG
1 CAPSULE ORAL 2 TIMES DAILY PRN
Status: DISCONTINUED | OUTPATIENT
Start: 2025-01-09 | End: 2025-01-09 | Stop reason: HOSPADM

## 2025-01-09 RX ADMIN — THERA TABS 1 TABLET: TAB at 03:06

## 2025-01-09 RX ADMIN — DIAZEPAM 10 MG: 10 TABLET ORAL at 10:59

## 2025-01-09 RX ADMIN — PROPRANOLOL HYDROCHLORIDE 20 MG: 20 TABLET ORAL at 12:38

## 2025-01-09 RX ADMIN — MAGNESIUM OXIDE TAB 400 MG (241.3 MG ELEMENTAL MG) 800 MG: 400 (241.3 MG) TAB at 02:33

## 2025-01-09 RX ADMIN — LEVOTHYROXINE SODIUM 100 MCG: 0.1 TABLET ORAL at 11:54

## 2025-01-09 RX ADMIN — PROPRANOLOL HYDROCHLORIDE 20 MG: 20 TABLET ORAL at 03:06

## 2025-01-09 RX ADMIN — DIAZEPAM 10 MG: 10 TABLET ORAL at 07:54

## 2025-01-09 RX ADMIN — OLANZAPINE 10 MG: 10 TABLET, ORALLY DISINTEGRATING ORAL at 04:23

## 2025-01-09 RX ADMIN — ONDANSETRON 4 MG: 4 TABLET, ORALLY DISINTEGRATING ORAL at 02:49

## 2025-01-09 RX ADMIN — THIAMINE HCL TAB 100 MG 100 MG: 100 TAB at 02:33

## 2025-01-09 RX ADMIN — FOLIC ACID 1 MG: 1 TABLET ORAL at 02:33

## 2025-01-09 RX ADMIN — DIAZEPAM 5 MG: 5 TABLET ORAL at 06:15

## 2025-01-09 RX ADMIN — THIAMINE HCL TAB 100 MG 100 MG: 100 TAB at 10:52

## 2025-01-09 RX ADMIN — GABAPENTIN 900 MG: 300 CAPSULE ORAL at 02:33

## 2025-01-09 RX ADMIN — Medication 900 MG: at 12:39

## 2025-01-09 RX ADMIN — FOLIC ACID 1 MG: 1 TABLET ORAL at 07:54

## 2025-01-09 RX ADMIN — OMEPRAZOLE 40 MG: 20 CAPSULE, DELAYED RELEASE ORAL at 10:52

## 2025-01-09 RX ADMIN — DEXTROSE AND SODIUM CHLORIDE: 5; 900 INJECTION, SOLUTION INTRAVENOUS at 04:01

## 2025-01-09 RX ADMIN — NICOTINE POLACRILEX 4 MG: 4 GUM, CHEWING BUCCAL at 12:39

## 2025-01-09 RX ADMIN — NICOTINE POLACRILEX 4 MG: 4 GUM, CHEWING BUCCAL at 11:55

## 2025-01-09 ASSESSMENT — LIFESTYLE VARIABLES
TOTAL SCORE: 9
HEADACHE, FULLNESS IN HEAD: VERY MILD
TREMOR: 2
ORIENTATION AND CLOUDING OF SENSORIUM: ORIENTED AND CAN DO SERIAL ADDITIONS
NAUSEA AND VOMITING: NO NAUSEA AND NO VOMITING
TREMOR: MODERATE, WITH PATIENT'S ARMS EXTENDED
AGITATION: SOMEWHAT MORE THAN NORMAL ACTIVITY
PAROXYSMAL SWEATS: NO SWEAT VISIBLE
AUDITORY DISTURBANCES: NOT PRESENT
AGITATION: SOMEWHAT MORE THAN NORMAL ACTIVITY
VISUAL DISTURBANCES: MILD SENSITIVITY
VISUAL DISTURBANCES: NOT PRESENT
ANXIETY: 2
TOTAL SCORE: 8
NAUSEA AND VOMITING: NO NAUSEA AND NO VOMITING
ANXIETY: 2
PAROXYSMAL SWEATS: NO SWEAT VISIBLE
ORIENTATION AND CLOUDING OF SENSORIUM: ORIENTED AND CAN DO SERIAL ADDITIONS
HEADACHE, FULLNESS IN HEAD: NOT PRESENT
AUDITORY DISTURBANCES: MILD HARSHNESS OR ABILITY TO FRIGHTEN

## 2025-01-09 ASSESSMENT — ACTIVITIES OF DAILY LIVING (ADL)
ADLS_ACUITY_SCORE: 61
ADLS_ACUITY_SCORE: 59
ADLS_ACUITY_SCORE: 61
ADLS_ACUITY_SCORE: 59

## 2025-01-09 ASSESSMENT — COLUMBIA-SUICIDE SEVERITY RATING SCALE - C-SSRS
3. HAVE YOU BEEN THINKING ABOUT HOW YOU MIGHT KILL YOURSELF?: YES
6. HAVE YOU EVER DONE ANYTHING, STARTED TO DO ANYTHING, OR PREPARED TO DO ANYTHING TO END YOUR LIFE?: NO
1. IN THE PAST MONTH, HAVE YOU WISHED YOU WERE DEAD OR WISHED YOU COULD GO TO SLEEP AND NOT WAKE UP?: YES
5. HAVE YOU STARTED TO WORK OUT OR WORKED OUT THE DETAILS OF HOW TO KILL YOURSELF? DO YOU INTEND TO CARRY OUT THIS PLAN?: NO
2. HAVE YOU ACTUALLY HAD ANY THOUGHTS OF KILLING YOURSELF IN THE PAST MONTH?: YES
4. HAVE YOU HAD THESE THOUGHTS AND HAD SOME INTENTION OF ACTING ON THEM?: NO

## 2025-01-09 NOTE — ED TRIAGE NOTES
BIBA from home, alcohol withdrawal, thinks she might have had a seizures. Has been dry for a while and started drinking this am, seems intoxicated. , PIV 20G right AC. Seizures with withdrawals.      Triage Assessment (Adult)       Row Name 01/09/25 0021          Triage Assessment    Airway WDL WDL        Respiratory WDL    Respiratory WDL X;rhythm/pattern;cough     Rhythm/Pattern, Respiratory dyspnea upon exertion     Cough Frequency infrequent     Cough Type congested;productive        Cardiac WDL    Cardiac WDL X;rhythm     Pulse Rate & Regularity tachycardic        Peripheral/Neurovascular WDL    Peripheral Neurovascular WDL WDL        Cognitive/Neuro/Behavioral WDL    Cognitive/Neuro/Behavioral WDL WDL

## 2025-01-09 NOTE — ED NOTES
Patient at this time has expressed that they do not have anyone to help watch their dog tonight, so she would like to be discharged. She is willing to wait to speak with MD about this decision.

## 2025-01-09 NOTE — MEDICATION SCRIBE - ADMISSION MEDICATION HISTORY
"Medication Scribe Admission Medication History    Admission medication history is complete. The information provided in this note is only as accurate as the sources available at the time of the update.    Information Source(s): Patient via in-person    Pertinent Information: Patient has Levofloxacin 500 mg PO daily on PTA. She was instructed to start this medication FEB 1st 2025 for 10 day supply.     Patient was prescribed TRELEGY ELLIPTA and levalbuterol inhaler that was dispensed on 12/31/24. Patient reports she has not started using these inhalers. and states she is hesitant to use them specially levalbuterol  because \"I wanna kill people when am on                                   steroids\"     Patient has Umeclidinum listed as allergy on chart, [facility] jefry suggested that Umeclidinum is similar to TRELEGY ELLIPTA and asked me to clarify, patient confirmed Allergy, and TRELEGY was delivered to her residence but she has not started taking it.     Changes made to PTA medication list:    Added: None    Deleted: Patient reported not taking---- Bevespi oral inhaler                                                                           Brimonidine tartrate topical gel                                                                           Duloxetine 30 mg capsule                                                                           Lunesta 2 mg tab                   Changed: None    Allergies reviewed with patient and updates made in EHR: yes    Medication History Completed By: Zhen Lopez 1/9/2025 10:24 AM    PTA Med List   Medication Sig Last Dose/Taking    Fluticasone-Umeclidin-Vilanterol (TRELEGY ELLIPTA) 200-62.5-25 MCG/ACT oral inhaler Inhale 1 puff into the lungs daily. Has not started yet. Unknown    gabapentin (NEURONTIN) 600 MG tablet Take 900 mg by mouth 3 times daily 1/8/2025 Morning    levalbuterol (XOPENEX HFA) 45 MCG/ACT inhaler Inhale 2 puffs into the lungs every 4 hours as needed for " shortness of breath or wheezing. Has not started yet. Unknown    levofloxacin (LEVAQUIN) 500 MG tablet Take 500 mg by mouth daily. Start FEB 1ST 2025. Unknown    levothyroxine (SYNTHROID/LEVOTHROID) 100 MCG tablet Take 100 mcg by mouth every morning 1/8/2025 Morning    multivitamin w/minerals (THERA-VIT-M) tablet Take 1 tablet by mouth daily 1/8/2025 Morning    nicotine polacrilex (NICORETTE) 4 MG gum Place 4 mg inside cheek every hour as needed for smoking cessation Past Week    omeprazole (PRILOSEC) 40 MG DR capsule Take 40 mg by mouth every morning 1/8/2025 Morning    ondansetron (ZOFRAN ODT) 4 MG ODT tab Take 4 mg by mouth every 8 hours as needed for vomiting or nausea Past Week    propranolol (INDERAL) 20 MG tablet Take 20 mg by mouth 3 times daily 1/8/2025 Morning    thiamine (B-1) 100 MG tablet Take 1 tablet (100 mg) by mouth daily 1/8/2025 Morning    eszopiclone (LUNESTA) 3 MG tablet Take 3 mg by mouth at bedtime Taking    melatonin 5 MG tablet Take 10 mg by mouth at bedtime Takes 1 hour prior to Lunesta Taking

## 2025-01-09 NOTE — H&P
[facility]    History and Physical - Hospitalist Service       Date of Admission:  1/9/2025    Assessment & Plan      Jolynn Rivera is a 64 year old female admitted on 1/9/2025. She has a history of history pertinent for alcohol dependence, COPD, tobacco use, hypothyroidism, bipolar disorder and presents for treatment of alcohol withdrawal.     # Alcohol dependence and withdrawal  # Hx of alcohol withdrawal seizures  # Hx of DTs  Longstanding alcohol dependence. Alcohol level 0.217 on presentation. States she has been drinking 1L of wine for the past 5 days with last drink on the day of presentation. Currently in online treatment program.   - CIWA protocol with Valium  - multivitamin, thiamine and folate   - ctn PTA gabapentin 900 mg TID    # AGMA  # Hyponatremia  # Hypomagnesemia, mild  # Lactic acidosis  Derangements likely r/t alcohol intake, poor po intake / hypovolemia.   - trend lytes  - mag replacement protocol  - continue IVF with repeat lactic acid pending    # Recurrent falls  Likely r/t alcohol intoxication. CT head normal on presentation.   - PT eval and treat    # COPD  - ctn pta inhalers    # Bipolar disorder  # PTSD  # Insomnia  - PTA propranolol 20 mg TID  - PTA gabapentin 900 mg TID  - PTA Lunesta 3 mg nightly     # Hypothyroidism - Continue PTA levothyroxine  # GERD - PTA PPI  # Osteoarthritis of multiple joints - PTA gabapentin she states helps, PRN tylenol  # Hepatitis C s/p treatment        Diet:  Regular diet  DVT Prophylaxis: Low Risk/Ambulatory with no VTE prophylaxis indicated  Morales Catheter: Not present  Lines: None     Cardiac Monitoring: None  Code Status:  Full code    Clinically Significant Risk Factors Present on Admission         # Hyponatremia: Lowest Na = 128 mmol/L in last 2 days, will monitor as appropriate  # Hypochloremia: Lowest Cl = 87 mmol/L in last 2 days, will monitor as appropriate  # Hypocalcemia: Lowest Ca = 8.6 mg/dL in  last 2 days, will monitor and replace as appropriate   # Hypomagnesemia: Lowest Mg = 1.5 mg/dL in last 2 days, will replace as needed  # Anion Gap Metabolic Acidosis: Highest Anion Gap = 24 mmol/L in last 2 days, will monitor and treat as appropriate      # Hypertension: Noted on problem list               # Financial/Environmental Concerns:           Disposition Plan     Medically Ready for Discharge:            Evie Gregorio MD  Hospitalist Service  [facility]  Securely message with [employer] (more info)  Text page via [facility] Paging/Directory     ______________________________________________________________________    Chief Complaint   Alcohol treatment    History is obtained from the patient    History of Present Illness   Jolynn Rivera is a 64 year old female who presents for alcohol intoxication. States that she drinks frequently and not always daily, but for the 5 days prior to presentation had been having 1L of wine per day. She is currently doing a treatment program online and would like to detox safely in the hospital. Notes that she is worried she fell because she has a bump that is tender on the back of her head, but has no recollection of a fall. Denies all other symptoms. States her breathing is normal and she has no other pain.       Past Medical History    Past Medical History:   Diagnosis Date    Anxiety     COPD (chronic obstructive pulmonary disease) (H)     COPD (chronic obstructive pulmonary disease) (H)     Hepatitis C     PTSD (post-traumatic stress disorder)     Seizures (H)     second to ETOH    Substance abuse (H)        Past Surgical History   Past Surgical History:   Procedure Laterality Date    LAPAROSCOPIC TUBAL LIGATION      ORTHOPEDIC SURGERY      Left knee    TONSILLECTOMY         Prior to Admission Medications   Prior to Admission Medications   Prescriptions Last Dose Informant Patient Reported? Taking?   BEVESPI AEROSPHERE 9-4.8  MCG/ACT oral inhaler   Yes No   DULoxetine (CYMBALTA) 30 MG capsule   Yes No   Fluticasone-Umeclidin-Vilanterol (TRELEGY ELLIPTA) 200-62.5-25 MCG/ACT oral inhaler   No No   Sig: Inhale 1 puff into the lungs daily.   brimonidine tartrate (MIRVASO) 0.33 % topical gel   Yes No   Sig: Apply topically daily.   eszopiclone (LUNESTA) 2 MG tablet   Yes No   eszopiclone (LUNESTA) 3 MG tablet  Self Yes No   Sig: Take 3 mg by mouth at bedtime   gabapentin (NEURONTIN) 600 MG tablet  Self Yes No   Sig: Take 900 mg by mouth 3 times daily   levalbuterol (XOPENEX HFA) 45 MCG/ACT inhaler   No No   Sig: Inhale 2 puffs into the lungs every 4 hours as needed for shortness of breath or wheezing.   levofloxacin (LEVAQUIN) 500 MG tablet   No No   Sig: Take 1 tablet (500 mg) by mouth daily.   levothyroxine (SYNTHROID/LEVOTHROID) 100 MCG tablet  Self Yes No   Sig: Take 100 mcg by mouth every morning   melatonin 5 MG tablet   Yes No   Sig: Take 10 mg by mouth at bedtime Takes 1 hour prior to Lunesta   multivitamin w/minerals (THERA-VIT-M) tablet  Self No No   Sig: Take 1 tablet by mouth daily   nicotine polacrilex (NICORETTE) 4 MG gum  Self Yes No   Sig: Place 4 mg inside cheek every hour as needed for smoking cessation   omeprazole (PRILOSEC) 40 MG DR capsule  Self Yes No   Sig: Take 40 mg by mouth every morning   ondansetron (ZOFRAN ODT) 4 MG ODT tab   Yes No   Sig: Take 4 mg by mouth every 8 hours as needed for vomiting or nausea   propranolol (INDERAL) 20 MG tablet  Self Yes No   Sig: Take 20 mg by mouth 3 times daily   thiamine (B-1) 100 MG tablet  Self No No   Sig: Take 1 tablet (100 mg) by mouth daily      Facility-Administered Medications: None           Physical Exam   Vital Signs: Temp: 98.7  F (37.1  C) Temp src: Oral BP: 134/85 Pulse: 108   Resp: 18 SpO2: 96 % O2 Device: None (Room air)    Weight: 0 lbs 0 oz    General Appearance: Sitting up in bed, no acute distress  Respiratory: no increased work of breathing, clear to  auscultation  Cardiovascular: RRR, radial pusles 2+  GI: soft, non-tender    Medical Decision Making       55 MINUTES SPENT BY ME on the date of service doing chart review, history, exam, documentation & further activities per the note.      Data     I have personally reviewed the following data over the past 24 hrs:    5.7  \   14.9   / 195     128 (L) 87 (L) 4.6 (L) /  110 (H)   3.8 17 (L) 0.44 (L) \     ALT: 27 AST: 37 AP: 57 TBILI: 0.4   ALB: 4.4 TOT PROTEIN: 7.5 LIPASE: 27     Procal: N/A CRP: N/A Lactic Acid: 5.4 (HH)         Imaging results reviewed over the past 24 hrs:   Recent Results (from the past 24 hours)   Head CT w/o contrast    Narrative    EXAM: CT HEAD W/O CONTRAST  LOCATION: [facility]  DATE: 1/9/2025    INDICATION: seizures, fall, etoh  COMPARISON:  CT head December 2, 2024.  TECHNIQUE: Routine CT Head without IV contrast. Multiplanar reformats. Dose reduction techniques were used.    FINDINGS:  Motion degrades evaluation.  INTRACRANIAL CONTENTS: No intracranial hemorrhage, extraaxial collection, or mass effect.  No CT evidence of acute infarct. Normal parenchymal attenuation. Mild generalized volume loss. No hydrocephalus.     VISUALIZED ORBITS/SINUSES/MASTOIDS: No intraorbital abnormality. No paranasal sinus mucosal disease. No middle ear or mastoid effusion.    BONES/SOFT TISSUES: No acute abnormality.      Impression    IMPRESSION:  1.  No acute intracranial process.   XR Chest 2 Views    Narrative    EXAM: XR CHEST 2 VIEWS  LOCATION: [facility]  DATE: 1/9/2025    INDICATION: sepsis workup  COMPARISON: 12/2/2024.      Impression    IMPRESSION: There heart is normal in size. Subsegmental right infrahilar atelectasis is noted, the lungs are otherwise clear.

## 2025-01-09 NOTE — ED NOTES
At this time the patient has stated she needs to leave and did not want to wait for the MD to explain the recommendations  and why she should stay for care - patient is alert and oriented, able to make decisions for herself, and has signed out AMA. Patient given belongings, and declined vitals upon leaving.

## 2025-01-09 NOTE — ED NOTES
"Pt comes in seeking withdrawal help. She reports drinking 1.5 liters of wine a day on average. Pt reports hx of seizures and visual hallucinations when withdrawing. Pt thinks she might have had a seizure today due to having a bruise on her head. She thinks she fell, but cannot confirm waking up on the floor when asked.  Pt was sober for 5 years but recent friend deaths have caused her some stress. Pt reports history of ptsd from childhood trauma. [facility] because it is more fit to her liking from past experience. Pt reports seeing things in the past such as \"tattoos\" when withdrawing.   "

## 2025-01-09 NOTE — PROGRESS NOTES
[facility]    Medicine Progress Note - Hospitalist Service, GOLD TEAM 21    Date of Admission:  1/9/2025    Non-billable. Patient admitted on same date. Updates to plan as below.    Assessment & Plan      Jolynn Rivera is a 64 year old female admitted on 1/9/2025. She has a history of history pertinent for alcohol dependence, COPD, tobacco use, hypothyroidism, bipolar disorder and presents for treatment of alcohol withdrawal.     # Alcohol dependence and withdrawal  # Hx of alcohol withdrawal seizures  # Hx of DTs  Longstanding alcohol dependence. Alcohol level 0.217 on presentation. States she has been drinking 1L of wine for the past 5 days with last drink on the day of presentation. Currently in online treatment program.   - CIWA protocol with Valium  - multivitamin, thiamine and folate   - ctn PTA gabapentin 900 mg TID    # AGMA  # Hyponatremia  # Hypomagnesemia, mild  # Lactic acidosis  Derangements likely r/t alcohol intake, poor po intake / hypovolemia.   - trend lytes  - mag replacement protocol  - continue IVF with repeat lactic acid pending    # Recurrent falls  Likely r/t alcohol intoxication. CT head normal on presentation.   - PT eval and treat    # COPD  - ctn pta inhalers    # Bipolar disorder  # PTSD  # Insomnia  - PTA propranolol 20 mg TID  - PTA gabapentin 900 mg TID  - PTA Lunesta 3 mg nightly     # Hypothyroidism - Continue PTA levothyroxine  # GERD - PTA PPI  # Osteoarthritis of multiple joints - PTA gabapentin she states helps, PRN tylenol  # Hepatitis C s/p treatment          Diet: Combination Diet Regular Diet Adult    DVT Prophylaxis: {DVT PROPHYLAXIS:712932}  Morales Catheter: Not present  Lines: None     Cardiac Monitoring: None  Code Status: Full Code      Clinically Significant Risk Factors Present on Admission   { TIP  This section helps capture the illness of the patient on admission.     - Review diagnoses highlighted in blue; right  "click, edit & delete if not appropriate   - If blank, no additional diagnoses identified   :56816}      # Hyponatremia: Lowest Na = 128 mmol/L in last 2 days, will monitor as appropriate  # Hypochloremia: Lowest Cl = 87 mmol/L in last 2 days, will monitor as appropriate  # Hypocalcemia: Lowest Ca = 8.5 mg/dL in last 2 days, will monitor and replace as appropriate   # Hypomagnesemia: Lowest Mg = 1.5 mg/dL in last 2 days, will replace as needed  # Anion Gap Metabolic Acidosis: Highest Anion Gap = 24 mmol/L in last 2 days, will monitor and treat as appropriate      # Hypertension: Noted on problem list           # Overweight: Estimated body mass index is 25.69 kg/m  as calculated from the following:    Height as of this encounter: 1.6 m (5' 3\").    Weight as of this encounter: 65.8 kg (145 lb).       # Financial/Environmental Concerns:           Social Drivers of Health    Food Insecurity: High Risk (12/3/2024)    Food Insecurity     Within the past 12 months, did you worry that your food would run out before you got money to buy more?: Yes     Within the past 12 months, did the food you bought just not last and you didn t have money to get more?: No   Tobacco Use: High Risk (12/31/2024)    Patient History     Smoking Tobacco Use: Every Day     Smokeless Tobacco Use: Never   Alcohol Use: Alcohol Misuse (4/4/2024)    AUDIT-C     Frequency of Alcohol Consumption: 4 or more times a week     Average Number of Drinks: 7 to 9     Frequency of Binge Drinking: Daily or almost daily    Received from [facility]    Physical Activity   Stress: Not on File (8/8/2021)    Received from [facility]    Stress     Stress: 0    Received from [facility]    Social Connections          Disposition Plan   {TIP  The patient's Medical Readiness for Discharge [MRD] has been documented today or is 'Ready Now'. Last Documentation- Anticipated in 2-4 Days   Use SmartList below if a change is needed.  :41033}  Medically Ready for Discharge: {TIME; " "MEDICALLY READY FOR DISCHARGE:58023067}             Tiki Casillas MD  Hospitalist Service, GOLD TEAM 21  [facility]  Securely message with [employer] (more info)  Text page via [employer] Paging/Directory   See signed in provider for up to date coverage information  ______________________________________________________________________    Interval History   {Pertinent overnight events  ;***}    Physical Exam   Vital Signs: Temp: 98.2  F (36.8  C) Temp src: Oral BP: 99/61 Pulse: 101   Resp: 16 SpO2: 93 % O2 Device: None (Room air)    Weight: 145 lbs 0 oz    {Recommend personal SmartPhrase or Notewriter for exam (OPTIONAL)   :391779}    Medical Decision Making   { TIP   MDM Calculator    MDM grid (w/ times)    Coding Support Chat  Billing is now based on time OR medical decision making complexity. Medical decision making included in your A&P does NOT need to be re-documented here.    :33919}    {Time  :239920::\"*** MINUTES SPENT BY ME on the date of service doing chart review, history, exam, documentation & further activities per the note.\"}      Data   {INSERT LABS/IMAGING (OPTIONAL)   :843618}  "

## 2025-01-09 NOTE — ED PROVIDER NOTES
[facility] EMERGENCY DEPARTMENT ([facility])    1/09/25      ED PROVIDER NOTE    History     Chief Complaint   Patient presents with    Drug / Alcohol Assessment     BIBA from home, alcohol withdrawal, thinks she might have had a seizures. Has been dry for a while and started drinking this am, seems intoxicated. , PIV 20G right AC. Seizures with withdrawals.      The history is provided by the patient and medical records.     Jolynn Rivera is a 64 year old female with a history notable for alcohol use disorder with prior withdrawal seizures, bipolar disorder, COPD, GERD, chronic hepatitis C who presents to the ED via EMS with concerns of alcohol withdrawal.  She states that    Past Medical History  Past Medical History:   Diagnosis Date    Anxiety     COPD (chronic obstructive pulmonary disease) (H)     COPD (chronic obstructive pulmonary disease) (H)     Hepatitis C     PTSD (post-traumatic stress disorder)     Seizures (H)     second to ETOH    Substance abuse (H)      Past Surgical History:   Procedure Laterality Date    LAPAROSCOPIC TUBAL LIGATION      ORTHOPEDIC SURGERY      Left knee    TONSILLECTOMY       BEVESPI AEROSPHERE 9-4.8 MCG/ACT oral inhaler  brimonidine tartrate (MIRVASO) 0.33 % topical gel  DULoxetine (CYMBALTA) 30 MG capsule  eszopiclone (LUNESTA) 2 MG tablet  eszopiclone (LUNESTA) 3 MG tablet  Fluticasone-Umeclidin-Vilanterol (TRELEGY ELLIPTA) 200-62.5-25 MCG/ACT oral inhaler  gabapentin (NEURONTIN) 600 MG tablet  levalbuterol (XOPENEX HFA) 45 MCG/ACT inhaler  levofloxacin (LEVAQUIN) 500 MG tablet  levothyroxine (SYNTHROID/LEVOTHROID) 100 MCG tablet  melatonin 5 MG tablet  multivitamin w/minerals (THERA-VIT-M) tablet  nicotine polacrilex (NICORETTE) 4 MG gum  omeprazole (PRILOSEC) 40 MG DR capsule  ondansetron (ZOFRAN ODT) 4 MG ODT tab  propranolol (INDERAL) 20 MG tablet  thiamine (B-1) 100 MG tablet      Allergies   Allergen Reactions    Bee Venom Swelling    Wasps [Hornets]  "Swelling    Amlodipine      Nightmares    Antihistamines, Chlorpheniramine-Type [Antihistamines, Chlorpheniramine-Type]     Clonidine     Compazine      Lock jaw    Diphenhydramine Muscle Pain (Myalgia) and Cramps    Hydroxyzine Cramps     Lock jaw    Metoclopramide      Tardive Dyskinesia    Penicillins      Panic attack    Prednisone Anxiety     Panic attacks.      Prochlorperazine Muscle Pain (Myalgia) and Cramps    Trazodone Nausea and Vomiting and Confusion     \"I ended up falling and feeling like I was drunk\"    Umeclidinium Bromide      Mood swings + anger.     Family History  Family History   Problem Relation Age of Onset    Anxiety Disorder Mother     Asthma Mother     Alcohol/Drug Father     Prostate Cancer Father     Arthritis Father     Alcohol/Drug Brother     Alcohol/Drug Brother     Hypertension Brother     Alcohol/Drug Brother     Depression Brother     Psychotic Disorder Brother      Social History   Social History     Tobacco Use    Smoking status: Every Day     Current packs/day: 0.50     Types: Cigarettes    Smokeless tobacco: Never    Tobacco comments:     Starting Chantix October 2014   Substance Use Topics    Alcohol use: Yes     Comment: 5L every 2 days    Drug use: No     Comment: stopped 1986      Past medical history, past surgical history, medications, allergies, family history, and social history were reviewed with the patient. No additional pertinent items.     A medically appropriate review of systems was performed with pertinent positives and negatives noted in the HPI, and all other systems negative.    Physical Exam   BP: 123/76  Pulse: 102  Temp: 98  F (36.7  C)  Resp: 18  SpO2: 94 %    Physical Exam  ***    ED Course, Procedures, & Data      Procedures       {ED Course Selections (Optional):446448}  {ED Sepsis CMS Documentation (Optional):935671::\" \"}       No results found for any visits on 01/09/25.  Medications - No data to display  Labs Ordered and Resulted from Time of ED " Arrival to Time of ED Departure - No data to display  No orders to display          {Critical Care Performed?:248968}    Assessment & Plan    ***    I have reviewed the nursing notes. I have reviewed the findings, diagnosis, plan and need for follow up with the patient.    New Prescriptions    No medications on file       Final diagnoses:   None       Ramon MITCHELL [facility] EMERGENCY DEPARTMENT  1/9/2025   effusion.    BONES/SOFT TISSUES: No acute abnormality.      Impression    IMPRESSION:  1.  No acute intracranial process.   XR Chest 2 Views     Status: None    Narrative    EXAM: XR CHEST 2 VIEWS  LOCATION: [facility]  DATE: 1/9/2025    INDICATION: sepsis workup  COMPARISON: 12/2/2024.      Impression    IMPRESSION: There heart is normal in size. Subsegmental right infrahilar atelectasis is noted, the lungs are otherwise clear.   Basic metabolic panel     Status: Abnormal   Result Value Ref Range    Sodium 128 (L) 135 - 145 mmol/L    Potassium 3.8 3.4 - 5.3 mmol/L    Chloride 87 (L) 98 - 107 mmol/L    Carbon Dioxide (CO2) 17 (L) 22 - 29 mmol/L    Anion Gap 24 (H) 7 - 15 mmol/L    Urea Nitrogen 4.6 (L) 8.0 - 23.0 mg/dL    Creatinine 0.44 (L) 0.51 - 0.95 mg/dL    GFR Estimate >90 >60 mL/min/1.73m2    Calcium 8.6 (L) 8.8 - 10.4 mg/dL    Glucose 110 (H) 70 - 99 mg/dL   Magnesium     Status: Abnormal   Result Value Ref Range    Magnesium 1.5 (L) 1.7 - 2.3 mg/dL   Phosphorus     Status: Normal   Result Value Ref Range    Phosphorus 2.7 2.5 - 4.5 mg/dL   Ethyl Alcohol Level     Status: Abnormal   Result Value Ref Range    Alcohol ethyl 0.22 (H) <=0.01 g/dL   CBC with platelets and differential     Status: Abnormal   Result Value Ref Range    WBC Count 5.7 4.0 - 11.0 10e3/uL    RBC Count 4.32 3.80 - 5.20 10e6/uL    Hemoglobin 14.9 11.7 - 15.7 g/dL    Hematocrit 39.9 35.0 - 47.0 %    MCV 92 78 - 100 fL    MCH 34.5 (H) 26.5 - 33.0 pg    MCHC 37.3 (H) 31.5 - 36.5 g/dL    RDW 12.1 10.0 - 15.0 %    Platelet Count 195 150 - 450 10e3/uL    % Neutrophils 51 %    % Lymphocytes 33 %    % Monocytes 14 %    % Eosinophils 1 %    % Basophils 0 %    % Immature Granulocytes 0 %    NRBCs per 100 WBC 0 <1 /100    Absolute Neutrophils 2.9 1.6 - 8.3 10e3/uL    Absolute Lymphocytes 1.9 0.8 - 5.3 10e3/uL    Absolute Monocytes 0.8 0.0 - 1.3 10e3/uL    Absolute Eosinophils 0.1 0.0 - 0.7 10e3/uL     Absolute Basophils 0.0 0.0 - 0.2 10e3/uL    Absolute Immature Granulocytes 0.0 <=0.4 10e3/uL    Absolute NRBCs 0.0 10e3/uL   Hepatic panel     Status: Normal   Result Value Ref Range    Protein Total 7.5 6.4 - 8.3 g/dL    Albumin 4.4 3.5 - 5.2 g/dL    Bilirubin Total 0.4 <=1.2 mg/dL    Alkaline Phosphatase 57 40 - 150 U/L    AST 37 0 - 45 U/L    ALT 27 0 - 50 U/L    Bilirubin Direct <0.20 0.00 - 0.30 mg/dL   Lactic acid whole blood with 1x repeat in 2 hr when >2     Status: Abnormal   Result Value Ref Range    Lactic Acid, Initial 7.0 (HH) 0.7 - 2.0 mmol/L   Ketone Beta-Hydroxybutyrate Quantitative     Status: Abnormal   Result Value Ref Range    Ketone (Beta-Hydroxybutyrate) Quantitative 0.34 (H) <=0.30 mmol/L   CK total     Status: Abnormal   Result Value Ref Range     (H) 26 - 192 U/L   Lipase     Status: Normal   Result Value Ref Range    Lipase 27 13 - 60 U/L   Lactic acid whole blood     Status: Abnormal   Result Value Ref Range    Lactic Acid 5.4 (HH) 0.7 - 2.0 mmol/L   Procalcitonin     Status: Normal   Result Value Ref Range    Procalcitonin 0.05 <0.50 ng/mL   UA with Microscopic reflex to Culture     Status: Abnormal    Specimen: Urine, Midstream   Result Value Ref Range    Color Urine Light Yellow Colorless, Straw, Light Yellow, Yellow    Appearance Urine Clear Clear    Glucose Urine Negative Negative mg/dL    Bilirubin Urine Negative Negative    Ketones Urine 10 (A) Negative mg/dL    Specific Gravity Urine 1.009 1.003 - 1.035    Blood Urine Small (A) Negative    pH Urine 6.0 5.0 - 7.0    Protein Albumin Urine 20 (A) Negative mg/dL    Urobilinogen Urine Normal Normal, 2.0 mg/dL    Nitrite Urine Negative Negative    Leukocyte Esterase Urine Negative Negative    RBC Urine <1 <=2 /HPF    WBC Urine 0 <=5 /HPF    Squamous Epithelials Urine 1 <=1 /HPF    Hyaline Casts Urine 1 <=2 /LPF    Narrative    Urine Culture not indicated   Urine Drug Screen Panel     Status: Abnormal   Result Value Ref Range     Amphetamines Urine Screen Negative Screen Negative    Barbituates Urine Screen Negative Screen Negative    Benzodiazepine Urine Screen Positive (A) Screen Negative    Cannabinoids Urine Screen Negative Screen Negative    Cocaine Urine Screen Negative Screen Negative    Fentanyl Qual Urine Screen Negative Screen Negative    Opiates Urine Screen Negative Screen Negative    PCP Urine Screen Negative Screen Negative   Lactic acid whole blood with 1x repeat in 2 hr when >2     Status: Abnormal   Result Value Ref Range    Lactic Acid, Initial 5.1 (HH) 0.7 - 2.0 mmol/L   Basic metabolic panel     Status: Abnormal   Result Value Ref Range    Sodium 132 (L) 135 - 145 mmol/L    Potassium 3.5 3.4 - 5.3 mmol/L    Chloride 91 (L) 98 - 107 mmol/L    Carbon Dioxide (CO2) 23 22 - 29 mmol/L    Anion Gap 18 (H) 7 - 15 mmol/L    Urea Nitrogen 5.1 (L) 8.0 - 23.0 mg/dL    Creatinine 0.49 (L) 0.51 - 0.95 mg/dL    GFR Estimate >90 >60 mL/min/1.73m2    Calcium 8.5 (L) 8.8 - 10.4 mg/dL    Glucose 166 (H) 70 - 99 mg/dL   Magnesium     Status: Normal   Result Value Ref Range    Magnesium 1.8 1.7 - 2.3 mg/dL   Lactic acid whole blood     Status: Abnormal   Result Value Ref Range    Lactic Acid 3.6 (H) 0.7 - 2.0 mmol/L   Ketone Beta-Hydroxybutyrate Quantitative     Status: Normal   Result Value Ref Range    Ketone (Beta-Hydroxybutyrate) Quantitative <0.18 <=0.30 mmol/L   Alcohol breath test POCT     Status: Abnormal   Result Value Ref Range    Alcohol Breath Test 0.217 (A) 0.00 - 0.01   Blood Culture Peripheral Blood     Status: Abnormal (Preliminary result)    Specimen: Peripheral Blood   Result Value Ref Range    Culture Positive on the 1st day of incubation (A)     Culture Staphylococcus epidermidis (AA)     Culture Micrococcus species (AA)    Blood Culture Peripheral Blood     Status: Normal (Preliminary result)    Specimen: Peripheral Blood   Result Value Ref Range    Culture No growth after 2 days    Verigene GP Panel     Status: Abnormal     Specimen: Peripheral Blood   Result Value Ref Range    Staphylococcus aureus Not Detected Not Detected    Staphylococcus epidermidis Detected (A) Not Detected    Staphylococcus lugdunensis Not Detected Not Detected    Enterococcus faecalis Not Detected Not Detected    Enterococcus faecium Not Detected Not Detected    Streptococcus species Not Detected Not Detected    Streptococcus agalactiae Not Detected Not Detected    Streptococcus anginosus group Not Detected Not Detected    Streptococcus pneumoniae Not Detected Not Detected    Streptococcus pyogenes Not Detected Not Detected    Listeria species Not Detected Not Detected    Narrative    Specimen tested with BuzzFeedigene multiplex, gram-positive blood culture nucleic acid test for the following targets: Staphylococcus aureus, Staphylococcus epidermidis, Staphylococcus lugdunensis, other Staphylococcus species, Enterococcus faecalis, Enterococcus faecium, Streptococcus species, Streptococcus agalactiae, Streptococcus anginosus group, Streptococcus pneumoniae, Streptococcus pyogenes, Listeria species, mecA (methicillin resistance), and Tanisha/vanB (vancomycin resistance).   CBC with platelets differential     Status: Abnormal    Narrative    The following orders were created for panel order CBC with platelets differential.  Procedure                               Abnormality         Status                     ---------                               -----------         ------                     CBC with platelets and d...[[id number]]  Abnormal            Final result                 Please view results for these tests on the individual orders.   Urine Drug Screen     Status: Abnormal    Narrative    The following orders were created for panel order Urine Drug Screen.  Procedure                               Abnormality         Status                     ---------                               -----------         ------                     Urine Drug Screen  Panel[[id number]]      Abnormal            Final result                 Please view results for these tests on the individual orders.     Medications   dextrose 5% and 0.9% NaCl infusion (0 mLs Intravenous Stopped 1/9/25 0624)   propranolol (INDERAL) tablet 20 mg (20 mg Oral $Given 1/9/25 0306)   gabapentin (NEURONTIN) capsule 900 mg (900 mg Oral $Given 1/9/25 0233)   multivitamin, therapeutic (THERA-VIT) tablet 1 tablet (1 tablet Oral $Given 1/9/25 0306)   folic acid (FOLVITE) tablet 1 mg (1 mg Oral $Given 1/9/25 0233)   thiamine (B-1) tablet 100 mg (100 mg Oral $Given 1/9/25 0233)   magnesium oxide (MAG-OX) tablet 800 mg (800 mg Oral $Given 1/9/25 0233)   ondansetron (ZOFRAN ODT) ODT tab 4 mg (4 mg Oral $Given 1/9/25 0249)   OLANZapine zydis (zyPREXA) ODT tab 10 mg (10 mg Oral $Given 1/9/25 0423)   diazepam (VALIUM) tablet 5 mg (5 mg Oral $Given 1/9/25 0615)     Labs Ordered and Resulted from Time of ED Arrival to Time of ED Departure   BASIC METABOLIC PANEL - Abnormal       Result Value    Sodium 128 (*)     Potassium 3.8      Chloride 87 (*)     Carbon Dioxide (CO2) 17 (*)     Anion Gap 24 (*)     Urea Nitrogen 4.6 (*)     Creatinine 0.44 (*)     GFR Estimate >90      Calcium 8.6 (*)     Glucose 110 (*)    MAGNESIUM - Abnormal    Magnesium 1.5 (*)    ETHYL ALCOHOL LEVEL - Abnormal    Alcohol ethyl 0.22 (*)    CBC WITH PLATELETS AND DIFFERENTIAL - Abnormal    WBC Count 5.7      RBC Count 4.32      Hemoglobin 14.9      Hematocrit 39.9      MCV 92      MCH 34.5 (*)     MCHC 37.3 (*)     RDW 12.1      Platelet Count 195      % Neutrophils 51      % Lymphocytes 33      % Monocytes 14      % Eosinophils 1      % Basophils 0      % Immature Granulocytes 0      NRBCs per 100 WBC 0      Absolute Neutrophils 2.9      Absolute Lymphocytes 1.9      Absolute Monocytes 0.8      Absolute Eosinophils 0.1      Absolute Basophils 0.0      Absolute Immature Granulocytes 0.0      Absolute NRBCs 0.0     LACTIC ACID WHOLE BLOOD WITH  1X REPEAT IN 2 HR WHEN >2 - Abnormal    Lactic Acid, Initial 7.0 (*)    KETONE BETA-HYDROXYBUTYRATE QUANTITATIVE, RAPID - Abnormal    Ketone (Beta-Hydroxybutyrate) Quantitative 0.34 (*)    CK TOTAL - Abnormal     (*)    LACTIC ACID WHOLE BLOOD - Abnormal    Lactic Acid 5.4 (*)    ROUTINE UA WITH MICROSCOPIC REFLEX TO CULTURE - Abnormal    Color Urine Light Yellow      Appearance Urine Clear      Glucose Urine Negative      Bilirubin Urine Negative      Ketones Urine 10 (*)     Specific Gravity Urine 1.009      Blood Urine Small (*)     pH Urine 6.0      Protein Albumin Urine 20 (*)     Urobilinogen Urine Normal      Nitrite Urine Negative      Leukocyte Esterase Urine Negative      RBC Urine <1      WBC Urine 0      Squamous Epithelials Urine 1      Hyaline Casts Urine 1     URINE DRUG SCREEN PANEL - Abnormal    Amphetamines Urine Screen Negative      Barbituates Urine Screen Negative      Benzodiazepine Urine Screen Positive (*)     Cannabinoids Urine Screen Negative      Cocaine Urine Screen Negative      Fentanyl Qual Urine Screen Negative      Opiates Urine Screen Negative      PCP Urine Screen Negative     LACTIC ACID WHOLE BLOOD WITH 1X REPEAT IN 2 HR WHEN >2 - Abnormal    Lactic Acid, Initial 5.1 (*)    BASIC METABOLIC PANEL - Abnormal    Sodium 132 (*)     Potassium 3.5      Chloride 91 (*)     Carbon Dioxide (CO2) 23      Anion Gap 18 (*)     Urea Nitrogen 5.1 (*)     Creatinine 0.49 (*)     GFR Estimate >90      Calcium 8.5 (*)     Glucose 166 (*)    LACTIC ACID WHOLE BLOOD - Abnormal    Lactic Acid 3.6 (*)    ALCOHOL BREATH TEST POCT - Abnormal    Alcohol Breath Test 0.217 (*)    PHOSPHORUS - Normal    Phosphorus 2.7     HEPATIC FUNCTION PANEL - Normal    Protein Total 7.5      Albumin 4.4      Bilirubin Total 0.4      Alkaline Phosphatase 57      AST 37      ALT 27      Bilirubin Direct <0.20     LIPASE - Normal    Lipase 27     PROCALCITONIN - Normal    Procalcitonin 0.05     MAGNESIUM - Normal     Magnesium 1.8     KETONE BETA-HYDROXYBUTYRATE QUANTITATIVE, RAPID - Normal    Ketone (Beta-Hydroxybutyrate) Quantitative <0.18       XR Chest 2 Views   Final Result   IMPRESSION: There heart is normal in size. Subsegmental right infrahilar atelectasis is noted, the lungs are otherwise clear.      Head CT w/o contrast   Final Result   IMPRESSION:   1.  No acute intracranial process.             Critical care was not performed.     Medical Decision Making  The patient's presentation was of moderate complexity (an acute illness with systemic symptoms).    The patient's evaluation involved:  ordering and/or review of 3+ test(s) in this encounter (see separate area of note for details)    The patient's management necessitated high risk (a decision regarding hospitalization).    Assessment & Plan    On arrival, patient is intoxicated but generally mentating appropriately.  She is GCS of 15.  She has some mild soft tissue swelling of her posterior skull.  CT head is negative for intracranial hemorrhage, skull fracture, or other acute intracranial process.  Her alcohol level is 0.217.  UDS is positive for benzodiazepines.    Metabolic panel shows multiple derangements.  Sodium is 128, chloride is 87.  She is fairly acidotic with a bicarb of 17.  Subsequent ketone levels is only mildly elevated at 0.34.  Likely related to dehydration/alcoholic ketosis.  Lactic acid was checked as well which is significantly elevated at 7.0.  I suspect this is related to dehydration and alcohol use as well.  There is no obvious source of infection on workup or exam.  Chest x-ray is negative.  Urinalysis without significant infection.  Patient is afebrile.  Have not initiated IV antibiotics.  Will send for blood cultures.  Repeat  lactic is trending down at 5.4 after IV fluids.    Patient will need admission to the hospital for alcohol detox, IV fluids, and monitoring of her lactic acid.  Case discussed with hospitalist for admission.  Agree  with withholding antibiotics at this time.  Patient updated on plan of care and is agreeable to hospital mission.    I have reviewed the nursing notes. I have reviewed the findings, diagnosis, plan and need for follow up with the patient.    Discharge Medication List as of 1/9/2025  2:03 PM          Final diagnoses:   Lactic acidosis   Alcohol withdrawal seizure with complication (H)       Ramon Hebert DO  [facility] EMERGENCY DEPARTMENT  1/9/2025     Ramon Hebert DO  01/12/25 0127

## 2025-01-10 LAB
ENTEROCOCCUS FAECALIS: NOT DETECTED
ENTEROCOCCUS FAECIUM: NOT DETECTED
LISTERIA SPECIES (DETECTED/NOT DETECTED): NOT DETECTED
STAPHYLOCOCCUS AUREUS: NOT DETECTED
STAPHYLOCOCCUS EPIDERMIDIS: DETECTED
STAPHYLOCOCCUS LUGDUNENSIS: NOT DETECTED
STREPTOCOCCUS AGALACTIAE: NOT DETECTED
STREPTOCOCCUS ANGINOSUS GROUP: NOT DETECTED
STREPTOCOCCUS PNEUMONIAE: NOT DETECTED
STREPTOCOCCUS PYOGENES: NOT DETECTED
STREPTOCOCCUS SPECIES: NOT DETECTED

## 2025-01-10 NOTE — ED PROVIDER NOTES
7:30 AM -received call from lab regarding 1 of 2 blood cultures positive for gram-positive cocci.  Briefly reviewed patient's chart and called patient's cell phone.  Left a message regarding these findings and the clinical significance, and emergency department callback number    In the setting of the patient's presentation of alcohol withdrawal seizure, this may or may not be relevant and most likely represents a skin contaminant.       Luciano Gudino MD  01/10/25 0810

## 2025-01-11 NOTE — DISCHARGE SUMMARY
"[facility]  Hospitalist Discharge Summary      Date of Admission:  1/9/2025  Date of Discharge:  1/9/2025  2:03 PM  Discharging Provider: Tiki Casillas MD  Discharge Service: Hospitalist Service, GOLD TEAM 21    Discharge Diagnoses     # Alcohol dependence and withdrawal  # Hx of alcohol withdrawal seizures  # Hx of DTs  # AGMA  # Hyponatremia  # Hypomagnesemia, mild  # Lactic acidosis  # Recurrent falls  # COPD  # Bipolar disorder  # PTSD  # Insomnia  # Hypothyroidism   # GERD   # Osteoarthritis of multiple joints   # Hepatitis C s/p treatment    Clinically Significant Risk Factors     # Overweight: Estimated body mass index is 25.69 kg/m  as calculated from the following:    Height as of this encounter: 1.6 m (5' 3\").    Weight as of this encounter: 65.8 kg (145 lb).       Follow-ups Needed After Discharge       Unresulted Labs Ordered in the Past 30 Days of this Admission       Date and Time Order Name Status Description    1/9/2025  4:49 AM Blood Culture Peripheral Blood Preliminary     1/9/2025  4:49 AM Blood Culture Peripheral Blood Preliminary         These results will be followed up by hospitalist pool    Discharge Disposition   Discharged to home AM    Hospital Course      Jolynn Rivera is a 64 year old female admitted on 1/9/2025. She has a history of history pertinent for alcohol dependence, COPD, tobacco use, hypothyroidism, bipolar disorder and presented for treatment of alcohol withdrawal. She underwent partial withdrawal treatment and left AMA as she was unable to find someone to care for her dog.     # Alcohol dependence and withdrawal  # Hx of alcohol withdrawal seizures  # Hx of DTs  Longstanding alcohol dependence. Alcohol level 0.217 on presentation. States she has been drinking 1L of wine for the past 5 days with last drink on the day of presentation. Currently in online treatment program.   - CIWA protocol with Valium  - " multivitamin, thiamine and folate   - ctn PTA gabapentin 900 mg TID    # AGMA  # Hyponatremia  # Hypomagnesemia, mild  # Lactic acidosis  Derangements likely r/t alcohol intake, poor po intake / hypovolemia.   - trend lytes  - mag replacement protocol  - continue IVF with repeat lactic acid pending    # Recurrent falls  Likely r/t alcohol intoxication. CT head normal on presentation.   - PT eval and treat    # COPD  - ctn pta inhalers    # Bipolar disorder  # PTSD  # Insomnia  - PTA propranolol 20 mg TID  - PTA gabapentin 900 mg TID  - PTA Lunesta 3 mg nightly     # Hypothyroidism - Continue PTA levothyroxine  # GERD - PTA PPI  # Osteoarthritis of multiple joints - PTA gabapentin she states helps, PRN tylenol  # Hepatitis C s/p treatment    Consultations This Hospital Stay   PHYSICAL THERAPY ADULT IP CONSULT    Code Status   Prior    Time Spent on this Encounter   I, Tiki Casillas MD, personally saw the patient today and spent less than or equal to 30 minutes discharging this patient.       Tiki Casillas MD  [facility], located within [facility] EMERGENCY DEPARTMENT  [facility] [address]  Phone: [phone]  Fax: [phone]  ______________________________________________________________________    Physical Exam   Vital Signs:                    Weight: 145 lbs 0 oz  General Appearance:  Sitting up in bed, no acute distress  Respiratory: no increased work of breathing, clear to auscultation  Cardiovascular: RRR, radial pusles 2+  GI: soft, non-tender       Primary Care Physician   Carmela Srinivasan MD    Discharge Orders       Significant Results and Procedures   Most Recent 3 CBC's:  Recent Labs   Lab Test 01/09/25  0203 12/03/24  0332 12/02/24  1502   WBC 5.7 6.1 7.9   HGB 14.9 13.5 14.3   MCV 92 95 95    172 210     Most Recent 3 BMP's:  Recent Labs   Lab Test 01/09/25  0730 01/09/25  0203 12/03/24  0332   * 128* 132*   POTASSIUM 3.5 3.8 3.6   CHLORIDE 91* 87* 95*   CO2  23 17* 26   BUN 5.1* 4.6* 10.7   CR 0.49* 0.44* 0.61   ANIONGAP 18* 24* 11   ELVIRA 8.5* 8.6* 8.8   * 110* 190*     Most Recent 2 LFT's:  Recent Labs   Lab Test 01/09/25  0203 12/03/24  0332   AST 37 49*   ALT 27 44   ALKPHOS 57 45   BILITOTAL 0.4 1.3*       ,   Results for orders placed or performed during the hospital encounter of 01/09/25   Head CT w/o contrast    Narrative    EXAM: CT HEAD W/O CONTRAST  LOCATION: [facility]  DATE: 1/9/2025    INDICATION: seizures, fall, etoh  COMPARISON:  CT head December 2, 2024.  TECHNIQUE: Routine CT Head without IV contrast. Multiplanar reformats. Dose reduction techniques were used.    FINDINGS:  Motion degrades evaluation.  INTRACRANIAL CONTENTS: No intracranial hemorrhage, extraaxial collection, or mass effect.  No CT evidence of acute infarct. Normal parenchymal attenuation. Mild generalized volume loss. No hydrocephalus.     VISUALIZED ORBITS/SINUSES/MASTOIDS: No intraorbital abnormality. No paranasal sinus mucosal disease. No middle ear or mastoid effusion.    BONES/SOFT TISSUES: No acute abnormality.      Impression    IMPRESSION:  1.  No acute intracranial process.   XR Chest 2 Views    Narrative    EXAM: XR CHEST 2 VIEWS  LOCATION: [facility]  DATE: 1/9/2025    INDICATION: sepsis workup  COMPARISON: 12/2/2024.      Impression    IMPRESSION: There heart is normal in size. Subsegmental right infrahilar atelectasis is noted, the lungs are otherwise clear.       Discharge Medications   Discharge Medication List as of 1/9/2025  2:03 PM        CONTINUE these medications which have NOT CHANGED    Details   eszopiclone (LUNESTA) 3 MG tablet Take 3 mg by mouth at bedtime, Historical      Fluticasone-Umeclidin-Vilanterol (TRELEGY ELLIPTA) 200-62.5-25 MCG/ACT oral inhaler Inhale 1 puff into the lungs daily. Has not started yet., Historical      gabapentin (NEURONTIN) 600 MG tablet  "Take 900 mg by mouth 3 times daily, Historical      levalbuterol (XOPENEX HFA) 45 MCG/ACT inhaler Inhale 2 puffs into the lungs every 4 hours as needed for shortness of breath or wheezing. Has not started yet., Historical      levofloxacin (LEVAQUIN) 500 MG tablet Take 500 mg by mouth daily. Start FEB 1ST 2025., Historical      levothyroxine (SYNTHROID/LEVOTHROID) 100 MCG tablet Take 100 mcg by mouth every morning, Historical      melatonin 5 MG tablet Take 10 mg by mouth at bedtime Takes 1 hour prior to Lunesta, Historical      multivitamin w/minerals (THERA-VIT-M) tablet Take 1 tablet by mouth daily, Disp-30 tablet, R-0, E-PrescribePt has supply      nicotine polacrilex (NICORETTE) 4 MG gum Place 4 mg inside cheek every hour as needed for smoking cessation, Historical      omeprazole (PRILOSEC) 40 MG DR capsule Take 40 mg by mouth every morning, Historical      ondansetron (ZOFRAN ODT) 4 MG ODT tab Take 4 mg by mouth every 8 hours as needed for vomiting or nausea, Historical      propranolol (INDERAL) 20 MG tablet Take 20 mg by mouth 3 times daily, Historical      thiamine (B-1) 100 MG tablet Take 1 tablet (100 mg) by mouth daily, Disp-30 tablet, R-0, E-Prescribe           Allergies   Allergies   Allergen Reactions    Bee Venom Swelling    Wasps [Hornets] Swelling    Amlodipine      Nightmares    Antihistamines, Chlorpheniramine-Type [Antihistamines, Chlorpheniramine-Type]     Clonidine     Compazine      Lock jaw    Diphenhydramine Muscle Pain (Myalgia) and Cramps    Hydroxyzine Cramps     Lock jaw    Metoclopramide      Tardive Dyskinesia    Penicillins      Panic attack    Prednisone Anxiety     Panic attacks.      Prochlorperazine Muscle Pain (Myalgia) and Cramps    Trazodone Nausea and Vomiting and Confusion     \"I ended up falling and feeling like I was drunk\"    Umeclidinium Bromide      Mood swings + anger.     "

## 2025-01-11 NOTE — ED NOTES
Telephone note:  Blood culture positive for micrococcus species and staph epidernidis (both noted possible contamination)  I called the patient at home on 1/11/2025 at 1150 am to inform her of these results  She states she doesn't feel well and she is anxious, but it might be anxiety- related or alcohol-related.   She is concerned about her dog ( a husky) and has no one to care for her.  I recommend she return to the ED to recheck lactate and blood cultures and for reevaluation.   The patient states that she will try to find someone to care for her dog and come to the ED as soon as she can.    MD Ambrocio Sands Alda L, MD  01/11/25 6629

## 2025-01-13 ENCOUNTER — TELEPHONE (OUTPATIENT)
Dept: BEHAVIORAL HEALTH | Facility: CLINIC | Age: 65
End: 2025-01-13

## 2025-01-13 ENCOUNTER — HOSPITAL ENCOUNTER (INPATIENT)
Facility: CLINIC | Age: 65
LOS: 2 days | Discharge: HOME | DRG: 897 | End: 2025-01-15
Attending: EMERGENCY MEDICINE | Admitting: HOSPITALIST
Payer: MEDICARE

## 2025-01-13 DIAGNOSIS — F10.139 ALCOHOL ABUSE WITH WITHDRAWAL (H): ICD-10-CM

## 2025-01-13 LAB
ALBUMIN SERPL BCG-MCNC: 4.3 G/DL (ref 3.5–5.2)
ALCOHOL BREATH TEST: 0.2 (ref 0–0.01)
ALP SERPL-CCNC: 59 U/L (ref 40–150)
ALT SERPL W P-5'-P-CCNC: 24 U/L (ref 0–50)
AMPHETAMINES UR QL SCN: ABNORMAL
ANION GAP SERPL CALCULATED.3IONS-SCNC: 22 MMOL/L (ref 7–15)
AST SERPL W P-5'-P-CCNC: 35 U/L (ref 0–45)
BACTERIA BLD CULT: ABNORMAL
BARBITURATES UR QL SCN: ABNORMAL
BASOPHILS # BLD AUTO: 0 10E3/UL (ref 0–0.2)
BASOPHILS NFR BLD AUTO: 0 %
BENZODIAZ UR QL SCN: ABNORMAL
BILIRUB SERPL-MCNC: 0.3 MG/DL
BUN SERPL-MCNC: 5 MG/DL (ref 8–23)
BZE UR QL SCN: ABNORMAL
CALCIUM SERPL-MCNC: 8.8 MG/DL (ref 8.8–10.4)
CANNABINOIDS UR QL SCN: ABNORMAL
CHLORIDE SERPL-SCNC: 96 MMOL/L (ref 98–107)
CREAT SERPL-MCNC: 0.51 MG/DL (ref 0.51–0.95)
EGFRCR SERPLBLD CKD-EPI 2021: >90 ML/MIN/1.73M2
EOSINOPHIL # BLD AUTO: 0 10E3/UL (ref 0–0.7)
EOSINOPHIL NFR BLD AUTO: 0 %
ERYTHROCYTE [DISTWIDTH] IN BLOOD BY AUTOMATED COUNT: 12.8 % (ref 10–15)
FENTANYL UR QL: ABNORMAL
GLUCOSE SERPL-MCNC: 113 MG/DL (ref 70–99)
HCO3 SERPL-SCNC: 19 MMOL/L (ref 22–29)
HCT VFR BLD AUTO: 40.7 % (ref 35–47)
HGB BLD-MCNC: 15.1 G/DL (ref 11.7–15.7)
HOLD SPECIMEN: NORMAL
IMM GRANULOCYTES # BLD: 0 10E3/UL
IMM GRANULOCYTES NFR BLD: 0 %
LYMPHOCYTES # BLD AUTO: 1.7 10E3/UL (ref 0.8–5.3)
LYMPHOCYTES NFR BLD AUTO: 31 %
MAGNESIUM SERPL-MCNC: 1.7 MG/DL (ref 1.7–2.3)
MCH RBC QN AUTO: 35.7 PG (ref 26.5–33)
MCHC RBC AUTO-ENTMCNC: 37.1 G/DL (ref 31.5–36.5)
MCV RBC AUTO: 96 FL (ref 78–100)
MONOCYTES # BLD AUTO: 1 10E3/UL (ref 0–1.3)
MONOCYTES NFR BLD AUTO: 18 %
NEUTROPHILS # BLD AUTO: 2.8 10E3/UL (ref 1.6–8.3)
NEUTROPHILS NFR BLD AUTO: 50 %
NRBC # BLD AUTO: 0 10E3/UL
NRBC BLD AUTO-RTO: 0 /100
OPIATES UR QL SCN: ABNORMAL
PCP QUAL URINE (ROCHE): ABNORMAL
PLATELET # BLD AUTO: 239 10E3/UL (ref 150–450)
POTASSIUM SERPL-SCNC: 4.1 MMOL/L (ref 3.4–5.3)
PROT SERPL-MCNC: 7.5 G/DL (ref 6.4–8.3)
RBC # BLD AUTO: 4.23 10E6/UL (ref 3.8–5.2)
SODIUM SERPL-SCNC: 137 MMOL/L (ref 135–145)
WBC # BLD AUTO: 5.5 10E3/UL (ref 4–11)

## 2025-01-13 PROCEDURE — 99207 PR APP CREDIT; MD BILLING SHARED VISIT: CPT | Mod: FS

## 2025-01-13 PROCEDURE — 99418 PROLNG IP/OBS E/M EA 15 MIN: CPT | Mod: FS | Performed by: HOSPITALIST

## 2025-01-13 PROCEDURE — 80051 ELECTROLYTE PANEL: CPT | Performed by: PHYSICIAN ASSISTANT

## 2025-01-13 PROCEDURE — 83735 ASSAY OF MAGNESIUM: CPT | Performed by: PHYSICIAN ASSISTANT

## 2025-01-13 PROCEDURE — HZ2ZZZZ DETOXIFICATION SERVICES FOR SUBSTANCE ABUSE TREATMENT: ICD-10-PCS

## 2025-01-13 PROCEDURE — 80307 DRUG TEST PRSMV CHEM ANLYZR: CPT | Performed by: PHYSICIAN ASSISTANT

## 2025-01-13 PROCEDURE — 120N000002 HC R&B MED SURG/OB UMMC

## 2025-01-13 PROCEDURE — 250N000011 HC RX IP 250 OP 636

## 2025-01-13 PROCEDURE — 87040 BLOOD CULTURE FOR BACTERIA: CPT | Performed by: PHYSICIAN ASSISTANT

## 2025-01-13 PROCEDURE — 85025 COMPLETE CBC W/AUTO DIFF WBC: CPT | Performed by: PHYSICIAN ASSISTANT

## 2025-01-13 PROCEDURE — 80053 COMPREHEN METABOLIC PANEL: CPT | Performed by: PHYSICIAN ASSISTANT

## 2025-01-13 PROCEDURE — 250N000011 HC RX IP 250 OP 636: Performed by: PHYSICIAN ASSISTANT

## 2025-01-13 PROCEDURE — 84100 ASSAY OF PHOSPHORUS: CPT

## 2025-01-13 PROCEDURE — 99223 1ST HOSP IP/OBS HIGH 75: CPT | Mod: AI | Performed by: HOSPITALIST

## 2025-01-13 PROCEDURE — 250N000013 HC RX MED GY IP 250 OP 250 PS 637: Performed by: PHYSICIAN ASSISTANT

## 2025-01-13 PROCEDURE — 36415 COLL VENOUS BLD VENIPUNCTURE: CPT | Performed by: PHYSICIAN ASSISTANT

## 2025-01-13 RX ORDER — AMOXICILLIN 250 MG
1 CAPSULE ORAL 2 TIMES DAILY PRN
Status: DISCONTINUED | OUTPATIENT
Start: 2025-01-13 | End: 2025-01-15 | Stop reason: HOSPADM

## 2025-01-13 RX ORDER — GABAPENTIN 300 MG/1
900 CAPSULE ORAL 3 TIMES DAILY
Status: DISCONTINUED | OUTPATIENT
Start: 2025-01-14 | End: 2025-01-15 | Stop reason: HOSPADM

## 2025-01-13 RX ORDER — LEVALBUTEROL TARTRATE 45 UG/1
2 AEROSOL, METERED ORAL EVERY 4 HOURS PRN
Status: DISCONTINUED | OUTPATIENT
Start: 2025-01-13 | End: 2025-01-13 | Stop reason: ALTCHOICE

## 2025-01-13 RX ORDER — FLUMAZENIL 0.1 MG/ML
0.2 INJECTION, SOLUTION INTRAVENOUS
Status: DISCONTINUED | OUTPATIENT
Start: 2025-01-13 | End: 2025-01-13

## 2025-01-13 RX ORDER — GABAPENTIN 300 MG/1
600 CAPSULE ORAL EVERY 8 HOURS
Status: DISCONTINUED | OUTPATIENT
Start: 2025-01-17 | End: 2025-01-13

## 2025-01-13 RX ORDER — ONDANSETRON 2 MG/ML
4 INJECTION INTRAMUSCULAR; INTRAVENOUS EVERY 6 HOURS PRN
Status: DISCONTINUED | OUTPATIENT
Start: 2025-01-13 | End: 2025-01-15 | Stop reason: HOSPADM

## 2025-01-13 RX ORDER — PROPRANOLOL HCL 20 MG
20 TABLET ORAL ONCE
Status: COMPLETED | OUTPATIENT
Start: 2025-01-13 | End: 2025-01-13

## 2025-01-13 RX ORDER — ACETAMINOPHEN 325 MG/1
650 TABLET ORAL EVERY 4 HOURS PRN
Status: DISCONTINUED | OUTPATIENT
Start: 2025-01-13 | End: 2025-01-15 | Stop reason: HOSPADM

## 2025-01-13 RX ORDER — GABAPENTIN 300 MG/1
900 CAPSULE ORAL EVERY 8 HOURS
Status: DISCONTINUED | OUTPATIENT
Start: 2025-01-14 | End: 2025-01-13

## 2025-01-13 RX ORDER — ONDANSETRON 2 MG/ML
4 INJECTION INTRAMUSCULAR; INTRAVENOUS ONCE
Status: COMPLETED | OUTPATIENT
Start: 2025-01-13 | End: 2025-01-13

## 2025-01-13 RX ORDER — ESZOPICLONE 3 MG/1
3 TABLET, FILM COATED ORAL AT BEDTIME
Status: DISCONTINUED | OUTPATIENT
Start: 2025-01-14 | End: 2025-01-15 | Stop reason: HOSPADM

## 2025-01-13 RX ORDER — DIAZEPAM 10 MG/1
10 TABLET ORAL EVERY 30 MIN PRN
Status: DISCONTINUED | OUTPATIENT
Start: 2025-01-13 | End: 2025-01-15 | Stop reason: HOSPADM

## 2025-01-13 RX ORDER — LEVOTHYROXINE SODIUM 100 UG/1
100 TABLET ORAL EVERY MORNING
Status: DISCONTINUED | OUTPATIENT
Start: 2025-01-14 | End: 2025-01-15 | Stop reason: HOSPADM

## 2025-01-13 RX ORDER — ACETAMINOPHEN 650 MG/1
650 SUPPOSITORY RECTAL EVERY 4 HOURS PRN
Status: DISCONTINUED | OUTPATIENT
Start: 2025-01-13 | End: 2025-01-15 | Stop reason: HOSPADM

## 2025-01-13 RX ORDER — DIAZEPAM 5 MG/1
5-20 TABLET ORAL EVERY 30 MIN PRN
Status: DISCONTINUED | OUTPATIENT
Start: 2025-01-13 | End: 2025-01-13

## 2025-01-13 RX ORDER — LIDOCAINE 4 G/G
1 PATCH TOPICAL ONCE
Status: COMPLETED | OUTPATIENT
Start: 2025-01-13 | End: 2025-01-14

## 2025-01-13 RX ORDER — ALBUTEROL SULFATE 90 UG/1
2 INHALANT RESPIRATORY (INHALATION) EVERY 4 HOURS PRN
Status: DISCONTINUED | OUTPATIENT
Start: 2025-01-13 | End: 2025-01-15 | Stop reason: HOSPADM

## 2025-01-13 RX ORDER — FLUMAZENIL 0.1 MG/ML
0.2 INJECTION, SOLUTION INTRAVENOUS
Status: DISCONTINUED | OUTPATIENT
Start: 2025-01-13 | End: 2025-01-15 | Stop reason: HOSPADM

## 2025-01-13 RX ORDER — LIDOCAINE 40 MG/G
CREAM TOPICAL
Status: DISCONTINUED | OUTPATIENT
Start: 2025-01-13 | End: 2025-01-15 | Stop reason: HOSPADM

## 2025-01-13 RX ORDER — DIAZEPAM 10 MG/2ML
5-10 INJECTION, SOLUTION INTRAMUSCULAR; INTRAVENOUS EVERY 30 MIN PRN
Status: DISCONTINUED | OUTPATIENT
Start: 2025-01-13 | End: 2025-01-15 | Stop reason: HOSPADM

## 2025-01-13 RX ORDER — AMOXICILLIN 250 MG
2 CAPSULE ORAL 2 TIMES DAILY PRN
Status: DISCONTINUED | OUTPATIENT
Start: 2025-01-13 | End: 2025-01-15 | Stop reason: HOSPADM

## 2025-01-13 RX ORDER — ONDANSETRON 4 MG/1
4 TABLET, ORALLY DISINTEGRATING ORAL EVERY 6 HOURS PRN
Status: DISCONTINUED | OUTPATIENT
Start: 2025-01-13 | End: 2025-01-15 | Stop reason: HOSPADM

## 2025-01-13 RX ORDER — MULTIPLE VITAMINS W/ MINERALS TAB 9MG-400MCG
1 TAB ORAL DAILY
Status: DISCONTINUED | OUTPATIENT
Start: 2025-01-13 | End: 2025-01-15 | Stop reason: HOSPADM

## 2025-01-13 RX ORDER — CALCIUM CARBONATE 500 MG/1
1000 TABLET, CHEWABLE ORAL 4 TIMES DAILY PRN
Status: DISCONTINUED | OUTPATIENT
Start: 2025-01-13 | End: 2025-01-15 | Stop reason: HOSPADM

## 2025-01-13 RX ORDER — FLUTICASONE FUROATE AND VILANTEROL 200; 25 UG/1; UG/1
1 POWDER RESPIRATORY (INHALATION) DAILY
Status: DISCONTINUED | OUTPATIENT
Start: 2025-01-14 | End: 2025-01-15 | Stop reason: HOSPADM

## 2025-01-13 RX ORDER — PROPRANOLOL HYDROCHLORIDE 10 MG/1
20 TABLET ORAL 3 TIMES DAILY
Status: DISCONTINUED | OUTPATIENT
Start: 2025-01-14 | End: 2025-01-15 | Stop reason: HOSPADM

## 2025-01-13 RX ORDER — GABAPENTIN 300 MG/1
900 CAPSULE ORAL ONCE
Status: COMPLETED | OUTPATIENT
Start: 2025-01-13 | End: 2025-01-13

## 2025-01-13 RX ORDER — GABAPENTIN 300 MG/1
300 CAPSULE ORAL EVERY 8 HOURS
Status: DISCONTINUED | OUTPATIENT
Start: 2025-01-19 | End: 2025-01-13

## 2025-01-13 RX ORDER — FOLIC ACID 1 MG/1
1 TABLET ORAL DAILY
Status: DISCONTINUED | OUTPATIENT
Start: 2025-01-13 | End: 2025-01-15 | Stop reason: HOSPADM

## 2025-01-13 RX ORDER — OLANZAPINE 5 MG/1
5-10 TABLET, ORALLY DISINTEGRATING ORAL EVERY 6 HOURS PRN
Status: DISCONTINUED | OUTPATIENT
Start: 2025-01-13 | End: 2025-01-15 | Stop reason: HOSPADM

## 2025-01-13 RX ORDER — GABAPENTIN 600 MG/1
1200 TABLET ORAL ONCE
Status: DISCONTINUED | OUTPATIENT
Start: 2025-01-13 | End: 2025-01-13

## 2025-01-13 RX ORDER — GABAPENTIN 100 MG/1
100 CAPSULE ORAL EVERY 8 HOURS
Status: DISCONTINUED | OUTPATIENT
Start: 2025-01-21 | End: 2025-01-13

## 2025-01-13 RX ORDER — HALOPERIDOL 5 MG/ML
2.5-5 INJECTION INTRAMUSCULAR EVERY 6 HOURS PRN
Status: DISCONTINUED | OUTPATIENT
Start: 2025-01-13 | End: 2025-01-15 | Stop reason: HOSPADM

## 2025-01-13 RX ORDER — POLYETHYLENE GLYCOL 3350 17 G/17G
17 POWDER, FOR SOLUTION ORAL 2 TIMES DAILY PRN
Status: DISCONTINUED | OUTPATIENT
Start: 2025-01-13 | End: 2025-01-15 | Stop reason: HOSPADM

## 2025-01-13 RX ADMIN — ONDANSETRON 4 MG: 2 INJECTION INTRAMUSCULAR; INTRAVENOUS at 18:52

## 2025-01-13 RX ADMIN — DIAZEPAM 10 MG: 5 TABLET ORAL at 19:14

## 2025-01-13 RX ADMIN — PROPRANOLOL HYDROCHLORIDE 20 MG: 20 TABLET ORAL at 21:30

## 2025-01-13 RX ADMIN — GABAPENTIN 900 MG: 300 CAPSULE ORAL at 21:29

## 2025-01-13 RX ADMIN — DIAZEPAM 10 MG: 5 TABLET ORAL at 17:11

## 2025-01-13 RX ADMIN — LIDOCAINE 1 PATCH: 4 PATCH TOPICAL at 22:29

## 2025-01-13 RX ADMIN — DIAZEPAM 10 MG: 5 TABLET ORAL at 15:26

## 2025-01-13 RX ADMIN — DIAZEPAM 10 MG: 5 TABLET ORAL at 20:26

## 2025-01-13 RX ADMIN — ONDANSETRON 4 MG: 4 TABLET, ORALLY DISINTEGRATING ORAL at 23:53

## 2025-01-13 RX ADMIN — DEXTROSE AND SODIUM CHLORIDE 1000 ML: 5; 900 INJECTION, SOLUTION INTRAVENOUS at 17:02

## 2025-01-13 ASSESSMENT — ACTIVITIES OF DAILY LIVING (ADL)
ADLS_ACUITY_SCORE: 59

## 2025-01-13 NOTE — ED TRIAGE NOTES
Per EMS: patient comes form home. Reports history of epilepsy and alcohol w/drawal seizures. Last drink about 90 min.ago. Called EMS as she felt she was going to have a seizure.  HR-110  BP-130 80  BS-132

## 2025-01-13 NOTE — ED PROVIDER NOTES
ED Provider Note  [facility]      History     Chief Complaint   Patient presents with    Alcohol Problem     Concerns for alcohol withdrawal seizures     HPI  65yo F pmhx alcohol abuse c/b prior w/d seizures, CPD, HCV p/w concern for w/d seizure aura and for call back for positive blood cultures.     #Seizure Aura  Patient reports drinking a liter of wine daily, with last drink 90 minutes ago.  Chart review shows the patient was admitted for alcohol withdrawal management 4 days ago, but AMA.  She states that she has been drinking daily for the last week.  She states that she feels that she may have had an unwitnessed alcohol withdrawal seizure 2 days ago.  Denies other drug use    #Positive Blood Cultures  It appears that patient's recent admission, she had an elevated lactate, that was thought likely 2/2 dehydration her alcohol use.  She had no infectious symptoms.  Notably, blood cultures positive in 1 of 2 bottles for staph epi from micropcoccus species, thought likely contaminant.  Patient denies f/c.     Past Medical History  Past Medical History:   Diagnosis Date    Anxiety     COPD (chronic obstructive pulmonary disease) (H)     COPD (chronic obstructive pulmonary disease) (H)     Hepatitis C     PTSD (post-traumatic stress disorder)     Seizures (H)     second to ETOH    Substance abuse (H)      Past Surgical History:   Procedure Laterality Date    LAPAROSCOPIC TUBAL LIGATION      ORTHOPEDIC SURGERY      Left knee    TONSILLECTOMY       eszopiclone (LUNESTA) 3 MG tablet  Fluticasone-Umeclidin-Vilanterol (TRELEGY ELLIPTA) 200-62.5-25 MCG/ACT oral inhaler  gabapentin (NEURONTIN) 600 MG tablet  levalbuterol (XOPENEX HFA) 45 MCG/ACT inhaler  levofloxacin (LEVAQUIN) 500 MG tablet  levothyroxine (SYNTHROID/LEVOTHROID) 100 MCG tablet  melatonin 5 MG tablet  multivitamin w/minerals (THERA-VIT-M) tablet  nicotine polacrilex (NICORETTE) 4 MG gum  omeprazole (PRILOSEC) 40 MG   "capsule  ondansetron (ZOFRAN ODT) 4 MG ODT tab  propranolol (INDERAL) 20 MG tablet  thiamine (B-1) 100 MG tablet      Allergies   Allergen Reactions    Bee Venom Swelling    Wasps [Hornets] Swelling    Amlodipine      Nightmares    Antihistamines, Chlorpheniramine-Type [Antihistamines, Chlorpheniramine-Type]     Clonidine     Compazine      Lock jaw    Diphenhydramine Muscle Pain (Myalgia) and Cramps    Hydroxyzine Cramps     Lock jaw    Metoclopramide      Tardive Dyskinesia    Penicillins      Panic attack    Prednisone Anxiety     Panic attacks.      Prochlorperazine Muscle Pain (Myalgia) and Cramps    Trazodone Nausea and Vomiting and Confusion     \"I ended up falling and feeling like I was drunk\"    Umeclidinium Bromide      Mood swings + anger.     Family History  Family History   Problem Relation Age of Onset    Anxiety Disorder Mother     Asthma Mother     Alcohol/Drug Father     Prostate Cancer Father     Arthritis Father     Alcohol/Drug Brother     Alcohol/Drug Brother     Hypertension Brother     Alcohol/Drug Brother     Depression Brother     Psychotic Disorder Brother      Social History   Social History     Tobacco Use    Smoking status: Every Day     Current packs/day: 0.50     Types: Cigarettes    Smokeless tobacco: Never    Tobacco comments:     Starting Chantix October 2014   Substance Use Topics    Alcohol use: Yes     Comment: 5L every 2 days    Drug use: No     Comment: stopped 1986      A medically appropriate review of systems was performed with pertinent positives and negatives noted in the HPI, and all other systems negative.    Physical Exam   BP: 127/83  Pulse: 107  Temp: 98.5  F (36.9  C)  Resp: 18  Weight: 65.8 kg (145 lb)  SpO2: 94 %  Physical Exam  Constitutional:       General: She is not in acute distress.     Appearance: Normal appearance. She is not diaphoretic.   HENT:      Head: Atraumatic.      Mouth/Throat:      Mouth: Mucous membranes are moist.   Eyes:      " Conjunctiva/sclera: Conjunctivae normal.   Cardiovascular:      Rate and Rhythm: Tachycardia present.   Pulmonary:      Effort: Pulmonary effort is normal. No respiratory distress.   Abdominal:      General: Abdomen is flat.   Musculoskeletal:      Cervical back: Neck supple.   Skin:     General: Skin is warm.   Neurological:      Mental Status: She is alert.      Comments: Mild the hand tremors and tongue fasciculations noted           ED Course, Procedures, & Data      Procedures                Results for orders placed or performed during the hospital encounter of 01/13/25   Comprehensive metabolic panel     Status: Abnormal   Result Value Ref Range    Sodium 137 135 - 145 mmol/L    Potassium 4.1 3.4 - 5.3 mmol/L    Carbon Dioxide (CO2) 19 (L) 22 - 29 mmol/L    Anion Gap 22 (H) 7 - 15 mmol/L    Urea Nitrogen 5.0 (L) 8.0 - 23.0 mg/dL    Creatinine 0.51 0.51 - 0.95 mg/dL    GFR Estimate >90 >60 mL/min/1.73m2    Calcium 8.8 8.8 - 10.4 mg/dL    Chloride 96 (L) 98 - 107 mmol/L    Glucose 113 (H) 70 - 99 mg/dL    Alkaline Phosphatase 59 40 - 150 U/L    AST 35 0 - 45 U/L    ALT 24 0 - 50 U/L    Protein Total 7.5 6.4 - 8.3 g/dL    Albumin 4.3 3.5 - 5.2 g/dL    Bilirubin Total 0.3 <=1.2 mg/dL   Magnesium     Status: Normal   Result Value Ref Range    Magnesium 1.7 1.7 - 2.3 mg/dL   CBC with platelets and differential     Status: Abnormal   Result Value Ref Range    WBC Count 5.5 4.0 - 11.0 10e3/uL    RBC Count 4.23 3.80 - 5.20 10e6/uL    Hemoglobin 15.1 11.7 - 15.7 g/dL    Hematocrit 40.7 35.0 - 47.0 %    MCV 96 78 - 100 fL    MCH 35.7 (H) 26.5 - 33.0 pg    MCHC 37.1 (H) 31.5 - 36.5 g/dL    RDW 12.8 10.0 - 15.0 %    Platelet Count 239 150 - 450 10e3/uL    % Neutrophils 50 %    % Lymphocytes 31 %    % Monocytes 18 %    % Eosinophils 0 %    % Basophils 0 %    % Immature Granulocytes 0 %    NRBCs per 100 WBC 0 <1 /100    Absolute Neutrophils 2.8 1.6 - 8.3 10e3/uL    Absolute Lymphocytes 1.7 0.8 - 5.3 10e3/uL    Absolute  Monocytes 1.0 0.0 - 1.3 10e3/uL    Absolute Eosinophils 0.0 0.0 - 0.7 10e3/uL    Absolute Basophils 0.0 0.0 - 0.2 10e3/uL    Absolute Immature Granulocytes 0.0 <=0.4 10e3/uL    Absolute NRBCs 0.0 10e3/uL   Extra Tube     Status: None    Narrative    The following orders were created for panel order Extra Tube.  Procedure                               Abnormality         Status                     ---------                               -----------         ------                     Extra Red Top Tube[[id number]]                               Final result                 Please view results for these tests on the individual orders.   Extra Red Top Tube     Status: None   Result Value Ref Range    Hold Specimen [facility]    Alcohol breath test POCT     Status: Abnormal   Result Value Ref Range    Alcohol Breath Test 0.197 (A) 0.00 - 0.01   CBC with platelets differential     Status: Abnormal    Narrative    The following orders were created for panel order CBC with platelets differential.  Procedure                               Abnormality         Status                     ---------                               -----------         ------                     CBC with platelets and d...[[id number]]  Abnormal            Final result                 Please view results for these tests on the individual orders.     Medications   thiamine (B-1) tablet 100 mg (100 mg Oral Not Given 1/13/25 1712)   folic acid (FOLVITE) tablet 1 mg (1 mg Oral Not Given 1/13/25 1712)   multivitamin w/minerals (THERA-VIT-M) tablet 1 tablet (1 tablet Oral Not Given 1/13/25 1712)   diazepam (VALIUM) tablet 5-20 mg (10 mg Oral $Given 1/13/25 2026)   dextrose 5% and 0.9% NaCl BOLUS 1,000 mL (0 mLs Intravenous Stopped 1/13/25 1923)   ondansetron (ZOFRAN) injection 4 mg (4 mg Intravenous $Given 1/13/25 1852)     Labs Ordered and Resulted from Time of ED Arrival to Time of ED Departure   COMPREHENSIVE METABOLIC PANEL - Abnormal       Result Value    Sodium  137      Potassium 4.1      Carbon Dioxide (CO2) 19 (*)     Anion Gap 22 (*)     Urea Nitrogen 5.0 (*)     Creatinine 0.51      GFR Estimate >90      Calcium 8.8      Chloride 96 (*)     Glucose 113 (*)     Alkaline Phosphatase 59      AST 35      ALT 24      Protein Total 7.5      Albumin 4.3      Bilirubin Total 0.3     CBC WITH PLATELETS AND DIFFERENTIAL - Abnormal    WBC Count 5.5      RBC Count 4.23      Hemoglobin 15.1      Hematocrit 40.7      MCV 96      MCH 35.7 (*)     MCHC 37.1 (*)     RDW 12.8      Platelet Count 239      % Neutrophils 50      % Lymphocytes 31      % Monocytes 18      % Eosinophils 0      % Basophils 0      % Immature Granulocytes 0      NRBCs per 100 WBC 0      Absolute Neutrophils 2.8      Absolute Lymphocytes 1.7      Absolute Monocytes 1.0      Absolute Eosinophils 0.0      Absolute Basophils 0.0      Absolute Immature Granulocytes 0.0      Absolute NRBCs 0.0     ALCOHOL BREATH TEST POCT - Abnormal    Alcohol Breath Test 0.197 (*)    MAGNESIUM - Normal    Magnesium 1.7     BLOOD CULTURE   BLOOD CULTURE     No orders to display          Critical care was not performed.     Medical Decision Making  The patient's presentation was of high complexity (a chronic illness severe exacerbation, progression, or side effect of treatment).    The patient's evaluation involved:  review of external note(s) from 3+ sources (see separate area of note for details)  review of 3+ test result(s) ordered prior to this encounter (see separate area of note for details)  ordering and/or review of 3+ test(s) in this encounter (see separate area of note for details)  discussion of management or test interpretation with another health professional (Detox)    The patient's management necessitated high risk (a decision regarding hospitalization).    Assessment & Plan    63yo F pmhx alcohol abuse c/b prior w/d seizures, CPD, HCV p/w concern for w/d seizure aura and for call back for positive blood cultures.     #  Concern for seizure  Patient did not have a seizure today, but she states that she has previous had alcohol withdrawal seizures.  Drinking a liter of wine per day for the last week.  Was admitted 4 days ago for detox, but AMA.  Last drink 90 minutes ago.  At present physical exam  with very subtle hand tremors and tongue fasciculations.  Breathalyzer 0.197.  Patient placed on MSSA protocol. Thiamine, folate, multi vitamin ordered.  Detox screening labs with CMP showing anion gap of 22, is likely 2/2 alcoholic ketosis; D5 NS infusing.  Will admit to for withdrawal management.     # Positive blood cultures  1 of 2 bottles.  Staph epi and micrococcus species, likely contaminant.  Similar patient without infectious symptoms.  Repeat cultures drawn today.  Otherwise patient without infectious symptoms, afebrile today, no leukocytosis.      Had planned for detox admission, but pt declined for admission citing hx of seizures. Pt will be admitted to medicine.     I have reviewed the nursing notes. I have reviewed the findings, diagnosis, plan and need for follow up with the patient.    New Prescriptions    No medications on file       Final diagnoses:   Alcohol abuse with withdrawal (H)         Barry Menjivar PA-C  [facility] EMERGENCY DEPARTMENT  1/13/2025     Barry Menjivar PA-C  01/13/25 2032       Barry Menjivar PA-C  01/13/25 2120    --    ED Attending Physician Attestation    I Renny Francisco MD, cared for this patient with the Advanced Practice Provider (MEGGAN). I personally provided a substantive portion of the care for this patient, including approving the care plan for the number and complexity of problems addressed and taking responsibility related to the risk of complications and/or morbidity or mortality of patient management. Please see the MEGGAN's documentation for full details.          Renny Francisco MD  Emergency Medicine        Renny Francisco MD  01/16/25 2364

## 2025-01-13 NOTE — ED NOTES
Bed: ED01  Expected date:   Expected time:   Means of arrival:   Comments:  [facility] 913 - 66f seizure aura

## 2025-01-14 ENCOUNTER — TELEPHONE (OUTPATIENT)
Dept: BEHAVIORAL HEALTH | Facility: CLINIC | Age: 65
End: 2025-01-14
Payer: MEDICARE

## 2025-01-14 LAB
ANION GAP SERPL CALCULATED.3IONS-SCNC: 13 MMOL/L (ref 7–15)
BACTERIA BLD CULT: NO GROWTH
BUN SERPL-MCNC: 8.8 MG/DL (ref 8–23)
CALCIUM SERPL-MCNC: 8.3 MG/DL (ref 8.8–10.4)
CHLORIDE SERPL-SCNC: 96 MMOL/L (ref 98–107)
CREAT SERPL-MCNC: 0.57 MG/DL (ref 0.51–0.95)
EGFRCR SERPLBLD CKD-EPI 2021: >90 ML/MIN/1.73M2
ERYTHROCYTE [DISTWIDTH] IN BLOOD BY AUTOMATED COUNT: 12.8 % (ref 10–15)
GLUCOSE SERPL-MCNC: 103 MG/DL (ref 70–99)
HCO3 SERPL-SCNC: 25 MMOL/L (ref 22–29)
HCT VFR BLD AUTO: 35.1 % (ref 35–47)
HGB BLD-MCNC: 12.7 G/DL (ref 11.7–15.7)
MAGNESIUM SERPL-MCNC: 1.9 MG/DL (ref 1.7–2.3)
MCH RBC QN AUTO: 35.2 PG (ref 26.5–33)
MCHC RBC AUTO-ENTMCNC: 36.2 G/DL (ref 31.5–36.5)
MCV RBC AUTO: 97 FL (ref 78–100)
PHOSPHATE SERPL-MCNC: 3.6 MG/DL (ref 2.5–4.5)
PLATELET # BLD AUTO: 193 10E3/UL (ref 150–450)
POTASSIUM SERPL-SCNC: 3.6 MMOL/L (ref 3.4–5.3)
RBC # BLD AUTO: 3.61 10E6/UL (ref 3.8–5.2)
SODIUM SERPL-SCNC: 134 MMOL/L (ref 135–145)
WBC # BLD AUTO: 5.4 10E3/UL (ref 4–11)

## 2025-01-14 PROCEDURE — 250N000013 HC RX MED GY IP 250 OP 250 PS 637

## 2025-01-14 PROCEDURE — 99233 SBSQ HOSP IP/OBS HIGH 50: CPT | Performed by: STUDENT IN AN ORGANIZED HEALTH CARE EDUCATION/TRAINING PROGRAM

## 2025-01-14 PROCEDURE — 120N000002 HC R&B MED SURG/OB UMMC

## 2025-01-14 PROCEDURE — 250N000013 HC RX MED GY IP 250 OP 250 PS 637: Performed by: STUDENT IN AN ORGANIZED HEALTH CARE EDUCATION/TRAINING PROGRAM

## 2025-01-14 PROCEDURE — 85014 HEMATOCRIT: CPT

## 2025-01-14 PROCEDURE — 83735 ASSAY OF MAGNESIUM: CPT

## 2025-01-14 PROCEDURE — 36415 COLL VENOUS BLD VENIPUNCTURE: CPT

## 2025-01-14 PROCEDURE — 80048 BASIC METABOLIC PNL TOTAL CA: CPT

## 2025-01-14 RX ORDER — LIDOCAINE 4 G/G
1 PATCH TOPICAL
Status: DISCONTINUED | OUTPATIENT
Start: 2025-01-14 | End: 2025-01-15 | Stop reason: HOSPADM

## 2025-01-14 RX ADMIN — Medication 1 TABLET: at 07:41

## 2025-01-14 RX ADMIN — NICOTINE POLACRILEX 4 MG: 4 GUM, CHEWING BUCCAL at 18:01

## 2025-01-14 RX ADMIN — GABAPENTIN 900 MG: 300 CAPSULE ORAL at 19:46

## 2025-01-14 RX ADMIN — LEVOTHYROXINE SODIUM 100 MCG: 100 TABLET ORAL at 07:47

## 2025-01-14 RX ADMIN — NICOTINE POLACRILEX 4 MG: 4 GUM, CHEWING BUCCAL at 11:12

## 2025-01-14 RX ADMIN — NICOTINE POLACRILEX 4 MG: 4 GUM, CHEWING BUCCAL at 19:51

## 2025-01-14 RX ADMIN — NICOTINE POLACRILEX 4 MG: 4 GUM, CHEWING BUCCAL at 14:57

## 2025-01-14 RX ADMIN — GABAPENTIN 900 MG: 300 CAPSULE ORAL at 07:41

## 2025-01-14 RX ADMIN — ACETAMINOPHEN 650 MG: 325 TABLET, FILM COATED ORAL at 03:29

## 2025-01-14 RX ADMIN — NICOTINE POLACRILEX 4 MG: 4 GUM, CHEWING BUCCAL at 04:52

## 2025-01-14 RX ADMIN — PROPRANOLOL HYDROCHLORIDE 20 MG: 20 TABLET ORAL at 07:46

## 2025-01-14 RX ADMIN — NICOTINE POLACRILEX 4 MG: 4 GUM, CHEWING BUCCAL at 02:02

## 2025-01-14 RX ADMIN — PROPRANOLOL HYDROCHLORIDE 20 MG: 20 TABLET ORAL at 19:52

## 2025-01-14 RX ADMIN — DIAZEPAM 10 MG: 10 TABLET ORAL at 00:04

## 2025-01-14 RX ADMIN — LIDOCAINE 1 PATCH: 4 PATCH TOPICAL at 19:52

## 2025-01-14 RX ADMIN — THIAMINE HCL TAB 100 MG 100 MG: 100 TAB at 07:41

## 2025-01-14 RX ADMIN — PROPRANOLOL HYDROCHLORIDE 20 MG: 20 TABLET ORAL at 14:04

## 2025-01-14 RX ADMIN — ESZOPICLONE 3 MG: 3 TABLET, COATED ORAL at 01:35

## 2025-01-14 RX ADMIN — Medication 5 MG: at 02:02

## 2025-01-14 RX ADMIN — NICOTINE POLACRILEX 4 MG: 4 GUM, CHEWING BUCCAL at 10:11

## 2025-01-14 RX ADMIN — NICOTINE POLACRILEX 4 MG: 4 GUM, CHEWING BUCCAL at 07:42

## 2025-01-14 RX ADMIN — NICOTINE POLACRILEX 4 MG: 4 GUM, CHEWING BUCCAL at 23:34

## 2025-01-14 RX ADMIN — Medication 5 MG: at 21:16

## 2025-01-14 RX ADMIN — NICOTINE POLACRILEX 4 MG: 4 GUM, CHEWING BUCCAL at 13:08

## 2025-01-14 RX ADMIN — ESZOPICLONE 3 MG: 3 TABLET, COATED ORAL at 23:28

## 2025-01-14 RX ADMIN — ACETAMINOPHEN 650 MG: 325 TABLET, FILM COATED ORAL at 00:22

## 2025-01-14 RX ADMIN — FOLIC ACID 1 MG: 1 TABLET ORAL at 07:41

## 2025-01-14 RX ADMIN — NICOTINE POLACRILEX 4 MG: 4 GUM, CHEWING BUCCAL at 03:13

## 2025-01-14 RX ADMIN — GABAPENTIN 900 MG: 300 CAPSULE ORAL at 14:05

## 2025-01-14 RX ADMIN — NICOTINE POLACRILEX 4 MG: 4 GUM, CHEWING BUCCAL at 06:40

## 2025-01-14 RX ADMIN — DIAZEPAM 10 MG: 10 TABLET ORAL at 12:04

## 2025-01-14 RX ADMIN — OMEPRAZOLE 40 MG: 20 CAPSULE, DELAYED RELEASE ORAL at 07:41

## 2025-01-14 RX ADMIN — NICOTINE POLACRILEX 4 MG: 4 GUM, CHEWING BUCCAL at 21:16

## 2025-01-14 ASSESSMENT — LIFESTYLE VARIABLES
PAROXYSMAL SWEATS: NO SWEAT VISIBLE
ANXIETY: MILDLY ANXIOUS
TREMOR: NOT VISIBLE, BUT CAN BE FELT FINGERTIP TO FINGERTIP
AUDITORY DISTURBANCES: NOT PRESENT
VISUAL DISTURBANCES: NOT PRESENT
AUDITORY DISTURBANCES: NOT PRESENT
HEADACHE, FULLNESS IN HEAD: NOT PRESENT
TREMOR: NOT VISIBLE, BUT CAN BE FELT FINGERTIP TO FINGERTIP
VISUAL DISTURBANCES: NOT PRESENT
ANXIETY: 2
VISUAL DISTURBANCES: NOT PRESENT
TREMOR: 2
TOTAL SCORE: 10
HEADACHE, FULLNESS IN HEAD: VERY MILD
AUDITORY DISTURBANCES: NOT PRESENT
TREMOR: 2
PAROXYSMAL SWEATS: BARELY PERCEPTIBLE SWEATING, PALMS MOIST
ORIENTATION AND CLOUDING OF SENSORIUM: ORIENTED AND CAN DO SERIAL ADDITIONS
TOTAL SCORE: 3
ORIENTATION AND CLOUDING OF SENSORIUM: ORIENTED AND CAN DO SERIAL ADDITIONS
PAROXYSMAL SWEATS: NO SWEAT VISIBLE
TREMOR: MODERATE, WITH PATIENT'S ARMS EXTENDED
ORIENTATION AND CLOUDING OF SENSORIUM: ORIENTED AND CAN DO SERIAL ADDITIONS
PAROXYSMAL SWEATS: NO SWEAT VISIBLE
TOTAL SCORE: 3
NAUSEA AND VOMITING: MILD NAUSEA WITH NO VOMITING
AGITATION: NORMAL ACTIVITY
NAUSEA AND VOMITING: NO NAUSEA AND NO VOMITING
TOTAL SCORE: 3
ANXIETY: MILDLY ANXIOUS
VISUAL DISTURBANCES: NOT PRESENT
HEADACHE, FULLNESS IN HEAD: VERY MILD
ANXIETY: 3
HEADACHE, FULLNESS IN HEAD: VERY MILD
ORIENTATION AND CLOUDING OF SENSORIUM: ORIENTED AND CAN DO SERIAL ADDITIONS
TREMOR: 3
ANXIETY: MILDLY ANXIOUS
AGITATION: NORMAL ACTIVITY
PAROXYSMAL SWEATS: NO SWEAT VISIBLE
HEADACHE, FULLNESS IN HEAD: NOT PRESENT
NAUSEA AND VOMITING: NO NAUSEA AND NO VOMITING
VISUAL DISTURBANCES: NOT PRESENT
AGITATION: SOMEWHAT MORE THAN NORMAL ACTIVITY
NAUSEA AND VOMITING: NO NAUSEA AND NO VOMITING
AUDITORY DISTURBANCES: NOT PRESENT
AGITATION: NORMAL ACTIVITY
TOTAL SCORE: 3
AGITATION: NORMAL ACTIVITY
TOTAL SCORE: 8
ANXIETY: MILDLY ANXIOUS
TACTILE DISTURBANCES: VERY MILD ITCHING, PINS AND NEEDLES, BURNING OR NUMBNESS
PAROXYSMAL SWEATS: NO SWEAT VISIBLE
NAUSEA AND VOMITING: NO NAUSEA AND NO VOMITING
HEADACHE, FULLNESS IN HEAD: NOT PRESENT
AUDITORY DISTURBANCES: NOT PRESENT
ORIENTATION AND CLOUDING OF SENSORIUM: ORIENTED AND CAN DO SERIAL ADDITIONS
AUDITORY DISTURBANCES: NOT PRESENT
NAUSEA AND VOMITING: NO NAUSEA AND NO VOMITING
ORIENTATION AND CLOUDING OF SENSORIUM: ORIENTED AND CAN DO SERIAL ADDITIONS
AGITATION: SOMEWHAT MORE THAN NORMAL ACTIVITY
VISUAL DISTURBANCES: NOT PRESENT

## 2025-01-14 ASSESSMENT — ACTIVITIES OF DAILY LIVING (ADL)
ADLS_ACUITY_SCORE: 59
ADLS_ACUITY_SCORE: 43
ADLS_ACUITY_SCORE: 59
ADLS_ACUITY_SCORE: 58
CONCENTRATING,_REMEMBERING_OR_MAKING_DECISIONS_DIFFICULTY: YES
ADLS_ACUITY_SCORE: 59
ADLS_ACUITY_SCORE: 58
ADLS_ACUITY_SCORE: 43
ADLS_ACUITY_SCORE: 59
ADLS_ACUITY_SCORE: 43
ADLS_ACUITY_SCORE: 59
ADLS_ACUITY_SCORE: 58
ADLS_ACUITY_SCORE: 59
ADLS_ACUITY_SCORE: 43
ADLS_ACUITY_SCORE: 59
ADLS_ACUITY_SCORE: 43
ADLS_ACUITY_SCORE: 58
ADLS_ACUITY_SCORE: 43
ADLS_ACUITY_SCORE: 59

## 2025-01-14 NOTE — H&P
[facility]    History and Physical - Hospitalist Service, GOLD TEAM        Date of Admission:  1/13/2025    Assessment & Plan      Jolynn Rivera is a 64 year old female with PMH notable for alcohol use disorder with prior withdrawal seizures, bipolar disorder, COPD, GERD, hepatitis C s/p treatment admitted on 1/13/2025 for alcohol withdrawal.     #Acute alcohol withdrawal  #Alcohol use disorder  #Hx of DTs  Presenting w/ LEOBARDO 0.197. Last drink ~8H prior to admission. Drinking about 1.5L wine per day. H/o alcohol withdrawal seizures, but no seizure activity today. Was recently here on 1/9 for same but left AMA as she could not find someone to care for her dog at home. Currently in online treatment program, looking to transition to an inpatient program per discussion on admission.   - CIWA w/ diazepam  - multivitamin, thiamine, folate  - SW/CC consult for assistance w/ coordinating outpatient treatment if needed  - Continue PTA gabapentin 900mg TID    #AGMA  AG 22, bicarb 19. Suspect in setting of alcohol use, poor po intake. Expect to resolve w/ alcohol abstinence and rehydration.   - BMP in AM  - S/p D5NS in ED    # Recurrent falls  Noted during 1/9 encounter. Head CT at the time normal. No recent falls reported by patient.   - PT eval and treat    #Recent positive blood cx, likely a contaminant  Seen in ED on 1/9 for alcohol withdrawal and alcohol withdrawal seizure. Blood cx drawn that day popped up positive on day 1 of incubation w/ Staph epi and micrococcus species in 1 of 2 bottles. Likely contaminant. No infectious symptoms this admission.   - Blood cx redrawn, monitor      #COPD  - PTA inhalers    #Bipolar disorder  #PTSD  #Insomnia  - PTA propranolol 20 mg TID  - PTA gabapentin 900 mg TID  - PTA Lunesta 3 mg nightly    #GERD: PTA omeprazole  #Hypothyroidism: PTA levothyroxine 100mcg daily  #Osteoarthritis of multiple joints: PTA gabapentin helps, PRN  "apap  #Hepatitis C s/p treatment            Diet: Combination Diet Regular Diet Adult  DVT Prophylaxis: Pneumatic Compression Devices  Morales Catheter: Not present  Lines: None     Cardiac Monitoring: None  Code Status: Full Code    Clinically Significant Risk Factors Present on Admission          # Hypochloremia: Lowest Cl = 96 mmol/L in last 2 days, will monitor as appropriate     # Anion Gap Metabolic Acidosis: Highest Anion Gap = 22 mmol/L in last 2 days, will monitor and treat as appropriate      # Hypertension: Noted on problem list           # Overweight: Estimated body mass index is 25.69 kg/m  as calculated from the following:    Height as of 1/9/25: 1.6 m (5' 3\").    Weight as of this encounter: 65.8 kg (145 lb).         # Financial/Environmental Concerns:           Disposition Plan     Medically Ready for Discharge: Anticipated in 2-4 Days         The patient's care was discussed with the Attending Physician, Dr. Cruz and Patient.    Wilfred Moss PA-C  Hospitalist Service, [facility]  Securely message with [employer] (more info)  Text page via [facility] Paging/Directory   See signed in provider for up to date coverage information    ______________________________________________________________________    Chief Complaint   Alcohol withdrawal    History is obtained from the patient and electronic health record    History of Present Illness   Jolynn Rivera is a 64 year old female who has PMH notable for alcohol use disorder with prior withdrawal seizures, bipolar disorder, COPD, GERD, hepatitis C s/p treatment.    Patient reports last drink was about 1 hour before she arrived to the ED.  Over the last 24 hours she drank about 2 L of wine.  Recently she has been drinking about 1.5 L of wine in a 24-hour period.  Currently, she is tremulous, feeling restless, has some mild epigastric pain, and is seeing \"the shadow movements\".  States that this is " consistent with previous episodes of withdrawal.  She mentioned she is looking to do inpatient treatment and is working on getting that set up.  She is having difficulty finding someone who is able to watch her dog for an extended period of time.  She does have somebody who will be able to go watch her dog tomorrow morning through the afternoon.  She denies any seizure activity today.      Past Medical History    Past Medical History:   Diagnosis Date    Anxiety     COPD (chronic obstructive pulmonary disease) (H)     COPD (chronic obstructive pulmonary disease) (H)     Hepatitis C     PTSD (post-traumatic stress disorder)     Seizures (H)     second to ETOH    Substance abuse (H)        Past Surgical History   Past Surgical History:   Procedure Laterality Date    LAPAROSCOPIC TUBAL LIGATION      ORTHOPEDIC SURGERY      Left knee    TONSILLECTOMY         Prior to Admission Medications   Prior to Admission Medications   Prescriptions Last Dose Informant Patient Reported? Taking?   Fluticasone-Umeclidin-Vilanterol (TRELEGY ELLIPTA) 200-62.5-25 MCG/ACT oral inhaler   Yes Yes   Sig: Inhale 1 puff into the lungs daily. Has not started yet.   eszopiclone (LUNESTA) 3 MG tablet 1/12/2025 Bedtime Self Yes Yes   Sig: Take 3 mg by mouth at bedtime   gabapentin (NEURONTIN) 600 MG tablet 1/12/2025 Evening Self Yes Yes   Sig: Take 900 mg by mouth 3 times daily   levalbuterol (XOPENEX HFA) 45 MCG/ACT inhaler   Yes Yes   Sig: Inhale 2 puffs into the lungs every 4 hours as needed for shortness of breath or wheezing. Has not started yet.   levofloxacin (LEVAQUIN) 500 MG tablet 1/12/2025 Morning  Yes Yes   Sig: Take 500 mg by mouth daily. Start FEB 1ST 2025.   levothyroxine (SYNTHROID/LEVOTHROID) 100 MCG tablet 1/12/2025 Morning Self Yes Yes   Sig: Take 100 mcg by mouth every morning   melatonin 5 MG tablet 1/12/2025 Bedtime Self Yes Yes   Sig: Take 10 mg by mouth at bedtime Takes 1 hour prior to Lunesta   multivitamin w/minerals  "(THERA-VIT-M) tablet 1/12/2025 Morning Self No Yes   Sig: Take 1 tablet by mouth daily   nicotine polacrilex (NICORETTE) 4 MG gum  Self Yes Yes   Sig: Place 4 mg inside cheek every hour as needed for smoking cessation   omeprazole (PRILOSEC) 40 MG DR capsule 1/12/2025 Morning Self Yes Yes   Sig: Take 40 mg by mouth every morning   ondansetron (ZOFRAN ODT) 4 MG ODT tab  Self Yes Yes   Sig: Take 4 mg by mouth every 8 hours as needed for vomiting or nausea   propranolol (INDERAL) 20 MG tablet 1/12/2025 Evening Self Yes Yes   Sig: Take 20 mg by mouth 3 times daily   thiamine (B-1) 100 MG tablet 1/12/2025 Morning Self No Yes   Sig: Take 1 tablet (100 mg) by mouth daily      Facility-Administered Medications: None        Review of Systems    The 10 point Review of Systems is negative other than noted in the HPI or here.     Social History   I have reviewed this patient's social history and updated it with pertinent information if needed.  Social History     Tobacco Use    Smoking status: Every Day     Current packs/day: 0.50     Types: Cigarettes    Smokeless tobacco: Never    Tobacco comments:     Starting Chantix October 2014   Substance Use Topics    Alcohol use: Yes     Comment: 5L every 2 days    Drug use: No     Comment: stopped 1986         Allergies   Allergies   Allergen Reactions    Bee Venom Swelling    Wasps [Hornets] Swelling    Amlodipine      Nightmares    Antihistamines, Chlorpheniramine-Type [Antihistamines, Chlorpheniramine-Type]     Clonidine     Compazine      Lock jaw    Diphenhydramine Muscle Pain (Myalgia) and Cramps    Hydroxyzine Cramps     Lock jaw    Metoclopramide      Tardive Dyskinesia    Penicillins      Panic attack    Prednisone Anxiety     Panic attacks.      Prochlorperazine Muscle Pain (Myalgia) and Cramps    Trazodone Nausea and Vomiting and Confusion     \"I ended up falling and feeling like I was drunk\"    Umeclidinium Bromide      Mood swings + anger. "     ------------------------------------------------------------------------     Physical Exam   Vital Signs: Temp: 98.3  F (36.8  C) Temp src: Oral BP: 135/87 Pulse: 84   Resp: 16 SpO2: 94 % O2 Device: None (Room air)    Weight: 145 lbs 0 oz    General Appearance: Patient is tremulous on gross examination.  Sitting up in bed, no acute distress.  Respiratory: Breathing comfortably on room air.  No evidence of respiratory distress.  GI: Abdomen is nondistended.  No guarding.  Skin: Warm and dry.  Other: Patient is pleasant and interactive.  Alert and oriented.  General tremulousness noted.  No focal deficits noted on gross examination.    Medical Decision Making       55 MINUTES SPENT BY ME on the date of service doing chart review, history, exam, documentation & further activities per the note.      Data   ------------------------- PAST 24 HR DATA REVIEWED -----------------------------------------------    I have personally reviewed the following data over the past 24 hrs:    5.5  \   15.1   / 239     137 96 (L) 5.0 (L) /  113 (H)   4.1 19 (L) 0.51 \     ALT: 24 AST: 35 AP: 59 TBILI: 0.3   ALB: 4.3 TOT PROTEIN: 7.5 LIPASE: N/A       Imaging results reviewed over the past 24 hrs:   No results found for this or any previous visit (from the past 24 hours).

## 2025-01-14 NOTE — PLAN OF CARE
Problem: Adult Inpatient Plan of Care  Goal: Absence of Hospital-Acquired Illness or Injury  Intervention: Identify and Manage Fall Risk  Recent Flowsheet Documentation  Taken 1/14/2025 1724 by Sommer Pérez RN  Safety Promotion/Fall Prevention:   activity supervised   clutter free environment maintained   mobility aid in reach   nonskid shoes/slippers when out of bed   patient and family education   supervised activity  Intervention: Prevent and Manage VTE (Venous Thromboembolism) Risk  Recent Flowsheet Documentation  Taken 1/14/2025 1724 by Sommer Pérez RN  VTE Prevention/Management: SCDs off (sequential compression devices)  Intervention: Prevent Infection  Recent Flowsheet Documentation  Taken 1/14/2025 1724 by Sommer Pérez RN  Infection Prevention: environmental surveillance performed  Goal: Readiness for Transition of Care  Intervention: Mutually Develop Transition Plan  Recent Flowsheet Documentation  Taken 1/14/2025 1734 by Sommer Pérez RN  Equipment Currently Used at Home: none   Goal Outcome Evaluation:

## 2025-01-14 NOTE — MEDICATION SCRIBE - ADMISSION MEDICATION HISTORY
Medication Scribe Admission Medication History    Admission medication history is complete. The information provided in this note is only as accurate as the sources available at the time of the update.    Information Source(s): Patient and CareEverywhere/SureScripts via in-person    Pertinent Information: Patient stated she hasn't taken any of her medications since yesterday due to nausea/vomiting.   Has not started either inhaler yet.     Changes made to PTA medication list:  Added: None  Deleted: None  Changed: None    Allergies reviewed with patient and updates made in EHR: yes    Medication History Completed By: Haily Olguin MA 1/13/2025 9:08 PM    PTA Med List   Medication Sig Last Dose/Taking    eszopiclone (LUNESTA) 3 MG tablet Take 3 mg by mouth at bedtime 1/12/2025 Bedtime    Fluticasone-Umeclidin-Vilanterol (TRELEGY ELLIPTA) 200-62.5-25 MCG/ACT oral inhaler Inhale 1 puff into the lungs daily. Has not started yet. Taking    gabapentin (NEURONTIN) 600 MG tablet Take 900 mg by mouth 3 times daily 1/12/2025 Evening    levalbuterol (XOPENEX HFA) 45 MCG/ACT inhaler Inhale 2 puffs into the lungs every 4 hours as needed for shortness of breath or wheezing. Has not started yet. Taking As Needed    levofloxacin (LEVAQUIN) 500 MG tablet Take 500 mg by mouth daily. Start FEB 1ST 2025. 1/12/2025 Morning    levothyroxine (SYNTHROID/LEVOTHROID) 100 MCG tablet Take 100 mcg by mouth every morning 1/12/2025 Morning    melatonin 5 MG tablet Take 10 mg by mouth at bedtime Takes 1 hour prior to Lunesta 1/12/2025 Bedtime    multivitamin w/minerals (THERA-VIT-M) tablet Take 1 tablet by mouth daily 1/12/2025 Morning    nicotine polacrilex (NICORETTE) 4 MG gum Place 4 mg inside cheek every hour as needed for smoking cessation Taking As Needed    omeprazole (PRILOSEC) 40 MG DR capsule Take 40 mg by mouth every morning 1/12/2025 Morning    ondansetron (ZOFRAN ODT) 4 MG ODT tab Take 4 mg by mouth every 8 hours as needed  for vomiting or nausea Taking As Needed    propranolol (INDERAL) 20 MG tablet Take 20 mg by mouth 3 times daily 1/12/2025 Evening    thiamine (B-1) 100 MG tablet Take 1 tablet (100 mg) by mouth daily 1/12/2025 Morning

## 2025-01-14 NOTE — TELEPHONE ENCOUNTER
R:  Pt on worklist for Detox.  [facility] reviewed and referred for Intake to have Dr Fermin review r/t seizure activity.    Intake paged Dr Fermin @ 9:44am.     Dr Fermin called intake back @ 10:04a and per review - pt being admitted to medical as an H& P is in Chart.    Intake called ED to verify, as pt is still showing they are in the ED.    Intake called ED @ 10:06am and talked to RN assigned to pt.  RN reports pt is admitting to medical.  Admit board updated and pt removed from Detox board.

## 2025-01-14 NOTE — PROGRESS NOTES
[facility]    Medicine Progress Note - Hospitalist Service, GOLD TEAM 22    Date of Admission:  1/13/2025    Assessment & Plan   Jolynn Rivera is a 64 year old female with medical history significant for alcohol use disorder with prior withdrawal seizures, bipolar disorder, COPD, GERD, hepatitis C s/p treatment, chronic bilateral back and hip pain admitted on 1/13/2025 for alcohol withdrawal.  Her symptoms have improved overall however she requires admission for further monitoring with CIWA protocol.       Acute alcohol withdrawal  Alcohol use disorder with history of DT  Presenting w/ BAC 0.197. Last drink ~8H prior to admission. Drinking about 1.5L wine per day. H/o alcohol withdrawal seizures, but no seizure activity on presentation.  Was recently here on 1/9 for same but left AMA as she could not find someone to care for her dog at home. Currently in online treatment program, looking to transition to an inpatient program per discussion on admission.   -CIWA w/ diazepam  -Multivitamin, thiamine, folate  -SW/CC consult for assistance w/ coordinating outpatient treatment if needed.  Per patient she is going to be in an intensive treatment program starting 1/20/2025.    -Continue PTA gabapentin 900mg TID  -Haldol/Zyprexa as needed      AGMA, resolved  Hyponatremia, mild  AG 22, bicarb 19 on presentation.  Sodium on presentation 137, was 132 5 days ago.  Suspect AGMA in setting of alcohol use, poor po intake.  AGMA resolved on repeat, with rehydration.  It is likely that initial sodium was high in the setting of mild hemoconcentration.  - Continue to encourage p.o. intake    Recurrent falls  Noted during 1/9 encounter. Head CT at the time normal. No recent falls reported by patient.   -PT eval and treat     Recent positive blood cx (Staphylococcus epidermidis, micrococcus), likely a contaminant  Seen in ED on 1/9 for alcohol withdrawal and alcohol withdrawal seizure.  "Blood cx drawn positive on day 1 of incubation w/ Staph epi and micrococcus species in 1 of 2 bottles. Likely contaminant. No infectious symptoms this admission.  Patient is afebrile and hemodynamically stable.  -Follow-up repeat 01/13 blood cultures, no growth after 12 hours     History of COPD  -PTA inhalers ordered as fluticasone-vilanterol and umeclidinium     Bipolar disorder  PTSD  Insomnia  - PTA propranolol 20 mg TID  - PTA gabapentin 900 mg TID  - PTA Lunesta 3 mg nightly     GERD: PTA omeprazole  Hypothyroidism: PTA levothyroxine 100mcg daily  Osteoarthritis of multiple joints: PTA gabapentin helps, PRN apap.  Lidocaine patch.  Hepatitis C s/p treatment          Diet: Combination Diet Regular Diet Adult    DVT Prophylaxis: Low Risk/Ambulatory with no VTE prophylaxis indicated  Morales Catheter: Not present  Lines: None     Cardiac Monitoring: None  Code Status: Full Code      Clinically Significant Risk Factors Present on Admission         # Hyponatremia: Lowest Na = 134 mmol/L in last 2 days, will monitor as appropriate  # Hypochloremia: Lowest Cl = 96 mmol/L in last 2 days, will monitor as appropriate  # Hypocalcemia: Lowest Ca = 8.3 mg/dL in last 2 days, will monitor and replace as appropriate    # Anion Gap Metabolic Acidosis: Highest Anion Gap = 22 mmol/L in last 2 days, will monitor and treat as appropriate      # Hypertension: Noted on problem list           # Overweight: Estimated body mass index is 25.69 kg/m  as calculated from the following:    Height as of 1/9/25: 1.6 m (5' 3\").    Weight as of this encounter: 65.8 kg (145 lb).       # Financial/Environmental Concerns:           Social Drivers of Health    Food Insecurity: High Risk (12/3/2024)    Food Insecurity     Within the past 12 months, did you worry that your food would run out before you got money to buy more?: Yes     Within the past 12 months, did the food you bought just not last and you didn t have money to get more?: No   Tobacco " Use: High Risk (12/31/2024)    Patient History     Smoking Tobacco Use: Every Day     Smokeless Tobacco Use: Never   Alcohol Use: Alcohol Misuse (4/4/2024)    AUDIT-C     Frequency of Alcohol Consumption: 4 or more times a week     Average Number of Drinks: 7 to 9     Frequency of Binge Drinking: Daily or almost daily    Received from [employer]    Physical Activity   Stress: Not on File (8/8/2021)    Received from [facility]    Stress     Stress: 0    Received from [facility]    Social Connections          Disposition Plan     Medically Ready for Discharge: Anticipated in 2-4 Days             Dinorah Berrios MD  Hospitalist Service, GOLD TEAM 22  [facility]  Securely message with [employer] (more info)  Text page via [facility] Paging/Directory   See signed in provider for up to date coverage information  ______________________________________________________________________    Interval History   Admitted overnight.  Nursing notes reviewed.  No acute overnight events since admission.  She feels improved this morning however continues to feel fatigued.  Has ongoing bilateral hip and back pain with right leg pain.  She denies any dizziness or lightheadedness.  She denies chest pain.  She reports shortness of breath is at baseline.  She always has shortness of breath due to COPD but she is still able to do all her daily activities.  She had some abdominal pain that has improved.  No nausea or vomiting.  No recent fall.  She reports inner thigh pain.  No current numbness or tingling.  But has had pain, sharp radiating down her right leg in the past.  Able to move about, run, hike, bike without concerns.    Physical Exam   Vital Signs: Temp: 98.3  F (36.8  C) Temp src: Oral BP: 134/75 Pulse: 84   Resp: 18 SpO2: 96 % O2 Device: None (Room air)    Weight: 145 lbs 0 oz    General Appearance: Awake, Alert, Oriented, No acute distress, Nontoxic appearing   Respiratory: Breathing comfortably on  room air. Lungs are clear to auscultation bilaterally.    Cardiovascular: Regular rate and rhythm, extremities perfused.   GI: Normal bowel sounds, Soft, nontender, nondistended  Extremities: No lower extremity edema.  Bilateral upper extremity tremors.   Neurology: AOX3, moving all extremities appropriately.  No tenderness to palpation of back.  Bilateral lower extremity strength intact.  Able to wiggle toes bilaterally.  Sensation intact in bilateral lower extremity.  Normal reflexes bilaterally in the lower extremities.  Negative straight leg raise test.    Medical Decision Making       60 MINUTES SPENT BY ME on the date of service doing chart review, history, exam, documentation & further activities per the note.      Data   ------------------------- PAST 24 HR DATA REVIEWED -----------------------------------------------    I have personally reviewed the following data over the past 24 hrs:    5.4  \   12.7   / 193     134 (L) 96 (L) 8.8 /  103 (H)   3.6 25 0.57 \     ALT: 24 AST: 35 AP: 59 TBILI: 0.3   ALB: 4.3 TOT PROTEIN: 7.5 LIPASE: N/A       Imaging results reviewed over the past 24 hrs:   No results found for this or any previous visit (from the past 24 hours).

## 2025-01-14 NOTE — ED NOTES
Patient doing well this shift. Up in her room independently. Up to the bathroom without difficult.y Medicated for withdrawal only 1x. Alert/oriented. Seizure pads in place. Eating/drinking.

## 2025-01-14 NOTE — TELEPHONE ENCOUNTER
S: [facility] , Provider Otto calling at 8:33 PM with clinical on a 64 year old/Female presenting for alcohol detox.    B: Pt presents for ETOH detox.   Currently reports drinking 1L of wine, daily for a week.    Patient reports last use was PTA.  Pt LEOBARDO: 197  Pt  denies hx of DT  Pt  endorses hx of seizures. Last seizure:  2 days ago (unsure if this is accurate)  Pt endorsing the following symptoms of withdrawal: Tremulous  MSSA Score: 17    Pt denies acute mental health or medical concerns.   Pt denies other drug use: None Amount/frequency: N/A    Does Pt have a detox care plan in Epic? No  Does pt present with specific needs, assistive devices, or exclusionary criteria? None  Is the patient ambulating, eating and drinking in the ED? Yes    A: Pt meets criteria to be presented for IP detox admission. Patient is voluntary    COVID Symptoms: No  If yes, COVID test required   Utox: Ordered, not yet collected  Magnesium: WNL  CMP: WNL  CBC: WNL  HCG: N/A     R: Patient cleared and ready for behavioral bed placement: Yes    Pt is meeting criteria for presentation to 3A/CD    Does Patient need a Transfer Center request created? No, Pt is located within [facility] ED, [facility] ED, or [facility] ED     8:40 PM Intake paged salbador Gilliland for review.     9:16 PM Intake received a called to report that they do not feel comfortable taking the pt tonight due to the pt having an unwitnessed seizure 2 days ago and has a low seizure threshold.  Additional concerns are the pt's heart rate is too high. Provider would like for pt to be reviewed on days. Provider did speak with ED provider due to not feeling comfortable taking the pt. Salbador Gilliland followed up with an email to Dr. Fermin for review on days.

## 2025-01-14 NOTE — PLAN OF CARE
Physical Therapy: Orders received. Chart reviewed and discussed with care team.? Physical Therapy not indicated due to report of pt being close to/at her baseline. Per conversation with RN, pt is up ambulating without assistance or need for assistance for any mobility needs in ED.? Defer discharge recommendations to medical team.? Will complete orders.

## 2025-01-15 VITALS
HEART RATE: 71 BPM | RESPIRATION RATE: 18 BRPM | SYSTOLIC BLOOD PRESSURE: 116 MMHG | TEMPERATURE: 97.8 F | DIASTOLIC BLOOD PRESSURE: 77 MMHG | BODY MASS INDEX: 25.69 KG/M2 | OXYGEN SATURATION: 95 % | WEIGHT: 145 LBS

## 2025-01-15 PROCEDURE — 250N000013 HC RX MED GY IP 250 OP 250 PS 637

## 2025-01-15 PROCEDURE — 99239 HOSP IP/OBS DSCHRG MGMT >30: CPT | Performed by: STUDENT IN AN ORGANIZED HEALTH CARE EDUCATION/TRAINING PROGRAM

## 2025-01-15 RX ADMIN — OMEPRAZOLE 40 MG: 20 CAPSULE, DELAYED RELEASE ORAL at 07:07

## 2025-01-15 RX ADMIN — THIAMINE HCL TAB 100 MG 100 MG: 100 TAB at 07:07

## 2025-01-15 RX ADMIN — PROPRANOLOL HYDROCHLORIDE 20 MG: 20 TABLET ORAL at 07:07

## 2025-01-15 RX ADMIN — Medication 1 TABLET: at 07:07

## 2025-01-15 RX ADMIN — NICOTINE POLACRILEX 4 MG: 4 GUM, CHEWING BUCCAL at 07:07

## 2025-01-15 RX ADMIN — FOLIC ACID 1 MG: 1 TABLET ORAL at 07:07

## 2025-01-15 RX ADMIN — NICOTINE POLACRILEX 4 MG: 4 GUM, CHEWING BUCCAL at 05:25

## 2025-01-15 RX ADMIN — GABAPENTIN 900 MG: 300 CAPSULE ORAL at 07:07

## 2025-01-15 RX ADMIN — LEVOTHYROXINE SODIUM 100 MCG: 100 TABLET ORAL at 07:07

## 2025-01-15 RX ADMIN — NICOTINE POLACRILEX 4 MG: 4 GUM, CHEWING BUCCAL at 08:16

## 2025-01-15 ASSESSMENT — ACTIVITIES OF DAILY LIVING (ADL)
ADLS_ACUITY_SCORE: 43

## 2025-01-15 NOTE — PLAN OF CARE
Goal Outcome Evaluation:    A&Ox4, calm and cooperative, able to make needs known with call light in reach.  Independent in room.  VSS, sats WNL on RA, continent, LBM 1/14.  C/o pain to R inner thigh, scheduled gabapentin and lidocaine patches given to manage.   No observed skin concerns.  CIWA scoring, no valium given.  LPIV patent and saline locked.  Nicotine gum given frequently.  Sleep and hydration promoted.  Continue POC.

## 2025-01-15 NOTE — PLAN OF CARE
Goal Outcome Evaluation:      Plan of Care Reviewed With: patient    Overall Patient Progress: improvingOverall Patient Progress: improving    Outcome Evaluation: cleared for discharge    Nursing Assessment:  VT: /77 (BP Location: Left arm, Patient Position: Sitting, Cuff Size: Adult Regular)   Pulse 71   Temp 97.8  F (36.6  C) (Oral)   Resp 18   Wt 65.8 kg (145 lb)   SpO2 95%   BMI 25.69 kg/m       Activity: IND    CIWA: score of 5 in AM    Medication: declined scheduled inhalers     Pain Management:  denied    Discharge Disposition:  Pt. discharged at 1035 to home, and left with personal belongings. Pt. received complete discharge paperwork and no medication was filled by discharge pharmacy. Pt. was given times of last dose for all discharge medications in writing on discharge medication sheets. Discharge teaching included home medication, activity restrictions, and up coming appointments. Pt. to follow up with [facility] Specialist in 3 weeks. Pt. had no further questions at the time of discharge and no unmet needs were identified.

## 2025-01-15 NOTE — PROGRESS NOTES
Temp:  [97.8  F (36.6  C)-98.9  F (37.2  C)] 98.3  F (36.8  C)  Pulse:  [] 82  Resp:  [16-18] 18  BP: (116-150)/(75-92) 135/92  SpO2:  [94 %-97 %] 96 % Admission Note    Reason for admission: Alcohol Withdrawal   Primary team notified of pt arrival.  Admitted from: ED  Via: W/C  Accompanied by: Staff  Belongings: Placed in closet; with patient   Admission Required Doc Completed: Yes  Teaching: Orientation to unit and call light- call light within reach, use of console, meal times, when to call for the RN, and enforced importance of safety.  IV Access: Left arm PIV  Telemetry: No  Ht./Wt.: Completed  Code Status verified on armband: Yes  2 RN Skin Assessment Completed with: Sommer ACOSTA/Anita Elizondo;luAmbu bag/Flowmeter at bedside: No    Pt status:Pt is alert and oriented x 4 and able to make her needs known, CIWA continues with score of 6 at admission, pt wanted to go out and smoke but was encouraged not to due to risk of fall, Nicotine gum was administered per pt request, pt has abrasion on her right lower forearm and a scabbed on her right knee, right arm abrasion was cleansed and  dressing applied, will continue plan of care

## 2025-01-16 ENCOUNTER — PATIENT OUTREACH (OUTPATIENT)
Dept: CARE COORDINATION | Facility: CLINIC | Age: 65
End: 2025-01-16
Payer: MEDICARE

## 2025-01-16 LAB
BACTERIA BLD CULT: NORMAL
BACTERIA BLD CULT: NORMAL

## 2025-01-16 NOTE — PROGRESS NOTES
Clinic Care Coordination Contact  Transitions of Care Outreach  No chief complaint on file.      Most Recent Admission Date: 1/13/2025   Most Recent Admission Diagnosis: Alcohol abuse with withdrawal (H) - F10.139     Most Recent Discharge Date: 1/15/2025   Most Recent Discharge Diagnosis: Alcohol abuse with withdrawal (H) - F10.139     Transitions of Care Assessment    Discharge Assessment  How are you doing now that you are home?: Patient shares that she is doing well. No question/concerns or needs at this time and feels that she has everything needed at this time.  How are your symptoms? (Red Flag symptoms escalate to triage hotline per guidelines): Improved  Do you know how to contact your clinic care team if you have future questions or changes to your health status? : Yes  Does the patient have their discharge instructions? : Yes  Does the patient have questions regarding their discharge instructions? : No  Were you started on any new medications or were there changes to any of your previous medications? : Yes  Does the patient have all of their medications?: Yes  Do you have questions regarding any of your medications? : No  Do you have all of your needed medical supplies or equipment (DME)?  (i.e. oxygen tank, CPAP, cane, etc.): Yes    Post-op ([facility] Only)  If the patient had a surgery or procedure, do they have any questions for a nurse?: No    Post-op (Clinicians Only)  Did the patient have surgery or a procedure: No      Follow up Plan     Discharge Follow-Up  Discharge follow up appointment scheduled in alignment with recommended follow up timeframe or Transitions of Risk Category? (Low = within 30 days; Moderate= within 14 days; High= within 7 days): Yes  Discharge Follow Up Appointment Date: 02/05/25  Discharge Follow Up Appointment Scheduled with?: Specialty Care Provider    Future Appointments   Date Time Provider Department Center   2/5/2025  1:00 PM Kya Rivas MD [facility]        Outpatient Plan as outlined on AVS reviewed with patient.    For any urgent concerns, please contact our 24 hour nurse triage line: [phone] ([phone])       COLLIN Zarate               non-distended/non-tender

## 2025-01-18 LAB
BACTERIA BLD CULT: NO GROWTH
BACTERIA BLD CULT: NO GROWTH

## 2025-01-28 ENCOUNTER — APPOINTMENT (OUTPATIENT)
Dept: GENERAL RADIOLOGY | Facility: CLINIC | Age: 65
DRG: 897 | End: 2025-01-28
Attending: EMERGENCY MEDICINE
Payer: MEDICARE

## 2025-01-28 ENCOUNTER — HOSPITAL ENCOUNTER (EMERGENCY)
Facility: CLINIC | Age: 65
Discharge: HOME | DRG: 897 | End: 2025-01-28
Attending: EMERGENCY MEDICINE | Admitting: EMERGENCY MEDICINE
Payer: MEDICARE

## 2025-01-28 ENCOUNTER — APPOINTMENT (OUTPATIENT)
Dept: CT IMAGING | Facility: CLINIC | Age: 65
DRG: 897 | End: 2025-01-28
Attending: FAMILY MEDICINE
Payer: MEDICARE

## 2025-01-28 VITALS
BODY MASS INDEX: 24.75 KG/M2 | WEIGHT: 145 LBS | RESPIRATION RATE: 15 BRPM | OXYGEN SATURATION: 96 % | DIASTOLIC BLOOD PRESSURE: 77 MMHG | TEMPERATURE: 98.5 F | HEART RATE: 90 BPM | HEIGHT: 64 IN | SYSTOLIC BLOOD PRESSURE: 144 MMHG

## 2025-01-28 DIAGNOSIS — F10.229 ALCOHOL DEPENDENCE WITH INTOXICATION WITH COMPLICATION (H): ICD-10-CM

## 2025-01-28 DIAGNOSIS — J44.9 CHRONIC OBSTRUCTIVE PULMONARY DISEASE, UNSPECIFIED COPD TYPE (H): ICD-10-CM

## 2025-01-28 DIAGNOSIS — R91.8 PULMONARY NODULES: ICD-10-CM

## 2025-01-28 DIAGNOSIS — R06.00 DYSPNEA, UNSPECIFIED TYPE: ICD-10-CM

## 2025-01-28 LAB
ALBUMIN SERPL BCG-MCNC: 3.9 G/DL (ref 3.5–5.2)
ALP SERPL-CCNC: 49 U/L (ref 40–150)
ALT SERPL W P-5'-P-CCNC: 28 U/L (ref 0–50)
ANION GAP SERPL CALCULATED.3IONS-SCNC: 13 MMOL/L (ref 7–15)
AST SERPL W P-5'-P-CCNC: 40 U/L (ref 0–45)
ATRIAL RATE - MUSE: 82 BPM
BASOPHILS # BLD AUTO: 0.1 10E3/UL (ref 0–0.2)
BASOPHILS NFR BLD AUTO: 1 %
BILIRUB SERPL-MCNC: 0.2 MG/DL
BUN SERPL-MCNC: 3.9 MG/DL (ref 8–23)
CALCIUM SERPL-MCNC: 9.1 MG/DL (ref 8.8–10.4)
CHLORIDE SERPL-SCNC: 102 MMOL/L (ref 98–107)
CREAT SERPL-MCNC: 0.53 MG/DL (ref 0.51–0.95)
D DIMER PPP FEU-MCNC: 1.73 UG/ML FEU (ref 0–0.5)
DIASTOLIC BLOOD PRESSURE - MUSE: NORMAL MMHG
EGFRCR SERPLBLD CKD-EPI 2021: >90 ML/MIN/1.73M2
EOSINOPHIL # BLD AUTO: 0 10E3/UL (ref 0–0.7)
EOSINOPHIL NFR BLD AUTO: 1 %
ERYTHROCYTE [DISTWIDTH] IN BLOOD BY AUTOMATED COUNT: 13.7 % (ref 10–15)
FLUAV RNA SPEC QL NAA+PROBE: NEGATIVE
FLUBV RNA RESP QL NAA+PROBE: NEGATIVE
GLUCOSE SERPL-MCNC: 95 MG/DL (ref 70–99)
HCO3 SERPL-SCNC: 23 MMOL/L (ref 22–29)
HCT VFR BLD AUTO: 38.4 % (ref 35–47)
HGB BLD-MCNC: 13.3 G/DL (ref 11.7–15.7)
HOLD SPECIMEN: NORMAL
IMM GRANULOCYTES # BLD: 0 10E3/UL
IMM GRANULOCYTES NFR BLD: 0 %
INTERPRETATION ECG - MUSE: NORMAL
LYMPHOCYTES # BLD AUTO: 1.9 10E3/UL (ref 0.8–5.3)
LYMPHOCYTES NFR BLD AUTO: 32 %
MAGNESIUM SERPL-MCNC: 1.6 MG/DL (ref 1.7–2.3)
MCH RBC QN AUTO: 34.5 PG (ref 26.5–33)
MCHC RBC AUTO-ENTMCNC: 34.6 G/DL (ref 31.5–36.5)
MCV RBC AUTO: 100 FL (ref 78–100)
MONOCYTES # BLD AUTO: 0.9 10E3/UL (ref 0–1.3)
MONOCYTES NFR BLD AUTO: 16 %
NEUTROPHILS # BLD AUTO: 2.9 10E3/UL (ref 1.6–8.3)
NEUTROPHILS NFR BLD AUTO: 50 %
NRBC # BLD AUTO: 0 10E3/UL
NRBC BLD AUTO-RTO: 0 /100
NT-PROBNP SERPL-MCNC: 43 PG/ML (ref 0–900)
P AXIS - MUSE: 53 DEGREES
PLATELET # BLD AUTO: 313 10E3/UL (ref 150–450)
POTASSIUM SERPL-SCNC: 4.1 MMOL/L (ref 3.4–5.3)
PR INTERVAL - MUSE: 162 MS
PROT SERPL-MCNC: 7.2 G/DL (ref 6.4–8.3)
QRS DURATION - MUSE: 78 MS
QT - MUSE: 372 MS
QTC - MUSE: 434 MS
R AXIS - MUSE: -16 DEGREES
RADIOLOGIST FLAGS: NORMAL
RBC # BLD AUTO: 3.85 10E6/UL (ref 3.8–5.2)
RSV RNA SPEC NAA+PROBE: NEGATIVE
SARS-COV-2 RNA RESP QL NAA+PROBE: NEGATIVE
SODIUM SERPL-SCNC: 138 MMOL/L (ref 135–145)
SYSTOLIC BLOOD PRESSURE - MUSE: NORMAL MMHG
T AXIS - MUSE: 51 DEGREES
TROPONIN T SERPL HS-MCNC: 15 NG/L
TROPONIN T SERPL HS-MCNC: 17 NG/L
VENTRICULAR RATE- MUSE: 82 BPM
WBC # BLD AUTO: 5.8 10E3/UL (ref 4–11)

## 2025-01-28 PROCEDURE — 250N000009 HC RX 250: Performed by: FAMILY MEDICINE

## 2025-01-28 PROCEDURE — 84484 ASSAY OF TROPONIN QUANT: CPT | Performed by: EMERGENCY MEDICINE

## 2025-01-28 PROCEDURE — 71275 CT ANGIOGRAPHY CHEST: CPT

## 2025-01-28 PROCEDURE — 85025 COMPLETE CBC W/AUTO DIFF WBC: CPT | Performed by: EMERGENCY MEDICINE

## 2025-01-28 PROCEDURE — 250N000011 HC RX IP 250 OP 636: Performed by: FAMILY MEDICINE

## 2025-01-28 PROCEDURE — 36415 COLL VENOUS BLD VENIPUNCTURE: CPT | Performed by: EMERGENCY MEDICINE

## 2025-01-28 PROCEDURE — 71046 X-RAY EXAM CHEST 2 VIEWS: CPT

## 2025-01-28 PROCEDURE — 83735 ASSAY OF MAGNESIUM: CPT | Performed by: EMERGENCY MEDICINE

## 2025-01-28 PROCEDURE — 999N000127 HC STATISTIC PERIPHERAL IV START W US GUIDANCE

## 2025-01-28 PROCEDURE — 93010 ELECTROCARDIOGRAM REPORT: CPT | Performed by: EMERGENCY MEDICINE

## 2025-01-28 PROCEDURE — 87637 SARSCOV2&INF A&B&RSV AMP PRB: CPT | Performed by: EMERGENCY MEDICINE

## 2025-01-28 PROCEDURE — 99284 EMERGENCY DEPT VISIT MOD MDM: CPT | Performed by: EMERGENCY MEDICINE

## 2025-01-28 PROCEDURE — 85379 FIBRIN DEGRADATION QUANT: CPT | Performed by: FAMILY MEDICINE

## 2025-01-28 PROCEDURE — 999N000040 HC STATISTIC CONSULT NO CHARGE VASC ACCESS

## 2025-01-28 PROCEDURE — 250N000013 HC RX MED GY IP 250 OP 250 PS 637: Performed by: FAMILY MEDICINE

## 2025-01-28 PROCEDURE — 83880 ASSAY OF NATRIURETIC PEPTIDE: CPT | Performed by: EMERGENCY MEDICINE

## 2025-01-28 PROCEDURE — 96365 THER/PROPH/DIAG IV INF INIT: CPT | Mod: 59 | Performed by: EMERGENCY MEDICINE

## 2025-01-28 PROCEDURE — 82565 ASSAY OF CREATININE: CPT | Performed by: EMERGENCY MEDICINE

## 2025-01-28 PROCEDURE — 93005 ELECTROCARDIOGRAM TRACING: CPT | Performed by: EMERGENCY MEDICINE

## 2025-01-28 PROCEDURE — 99285 EMERGENCY DEPT VISIT HI MDM: CPT | Mod: 25 | Performed by: EMERGENCY MEDICINE

## 2025-01-28 RX ORDER — ONDANSETRON 2 MG/ML
4 INJECTION INTRAMUSCULAR; INTRAVENOUS ONCE
Status: DISCONTINUED | OUTPATIENT
Start: 2025-01-28 | End: 2025-01-28

## 2025-01-28 RX ORDER — ONDANSETRON 4 MG/1
4 TABLET, ORALLY DISINTEGRATING ORAL ONCE
Status: COMPLETED | OUTPATIENT
Start: 2025-01-28 | End: 2025-01-28

## 2025-01-28 RX ORDER — IOPAMIDOL 755 MG/ML
100 INJECTION, SOLUTION INTRAVASCULAR ONCE
Status: COMPLETED | OUTPATIENT
Start: 2025-01-28 | End: 2025-01-28

## 2025-01-28 RX ORDER — MAGNESIUM SULFATE HEPTAHYDRATE 40 MG/ML
2 INJECTION, SOLUTION INTRAVENOUS ONCE
Status: COMPLETED | OUTPATIENT
Start: 2025-01-28 | End: 2025-01-28

## 2025-01-28 RX ORDER — LORAZEPAM 1 MG/1
1-4 TABLET ORAL EVERY 30 MIN PRN
Status: DISCONTINUED | OUTPATIENT
Start: 2025-01-28 | End: 2025-01-28 | Stop reason: HOSPADM

## 2025-01-28 RX ADMIN — NICOTINE POLACRILEX 4 MG: 4 GUM, CHEWING BUCCAL at 10:06

## 2025-01-28 RX ADMIN — IOPAMIDOL 54 ML: 755 INJECTION, SOLUTION INTRAVENOUS at 09:53

## 2025-01-28 RX ADMIN — LORAZEPAM 2 MG: 1 TABLET ORAL at 12:06

## 2025-01-28 RX ADMIN — MAGNESIUM SULFATE HEPTAHYDRATE 2 G: 40 INJECTION, SOLUTION INTRAVENOUS at 08:05

## 2025-01-28 RX ADMIN — SODIUM CHLORIDE 75 ML: 9 INJECTION, SOLUTION INTRAVENOUS at 09:55

## 2025-01-28 RX ADMIN — LORAZEPAM 2 MG: 1 TABLET ORAL at 09:32

## 2025-01-28 RX ADMIN — NICOTINE POLACRILEX 4 MG: 4 GUM, CHEWING BUCCAL at 12:08

## 2025-01-28 RX ADMIN — NICOTINE POLACRILEX 4 MG: 4 GUM, CHEWING BUCCAL at 11:14

## 2025-01-28 RX ADMIN — ONDANSETRON 4 MG: 4 TABLET, ORALLY DISINTEGRATING ORAL at 09:33

## 2025-01-28 ASSESSMENT — COLUMBIA-SUICIDE SEVERITY RATING SCALE - C-SSRS
2. HAVE YOU ACTUALLY HAD ANY THOUGHTS OF KILLING YOURSELF IN THE PAST MONTH?: NO
1. IN THE PAST MONTH, HAVE YOU WISHED YOU WERE DEAD OR WISHED YOU COULD GO TO SLEEP AND NOT WAKE UP?: NO
6. HAVE YOU EVER DONE ANYTHING, STARTED TO DO ANYTHING, OR PREPARED TO DO ANYTHING TO END YOUR LIFE?: NO

## 2025-01-28 ASSESSMENT — ACTIVITIES OF DAILY LIVING (ADL)
ADLS_ACUITY_SCORE: 58

## 2025-01-28 NOTE — ED PROVIDER NOTES
ED Provider Note  [facility]      History     Chief Complaint   Patient presents with    Alcohol Intoxication     Last drink 0500, hx of withdrawal seizures    Shortness of Breath     Pt reports shortness of breath and wheezing. Pt was prescribed an antibiotic for this issues but has not started it yet.      HPI  Jolynn Rivera is a 64 year old female with medical history significant for alcohol use disorder with prior withdrawal seizures, bipolar disorder, COPD, GERD, hepatitis C s/p treatment, chronic bilateral back and hip pain presents to the ED with a primary complaint of shortness of breath.  She is a smoker, states she is working on cutting back.  She states she has some chronic shortness of breath which has been gradually worsening over time.  She states that it was worsening even more while she was in the hospital most recently a couple of weeks ago, has been continuing.  She does have chronic cough which is unchanged, chronic white sputum which is unchanged in character or volume.  No notable chest discomfort.  She states that she feels she has been progressively wheezy though.  She states she has been prescribed over-the-counter inhalers but she does not use them because a lot of them give her side effects.  She states that she saw a pulmonologist (she actually referred to medicine oncologist) here at the [facility] who told her she should take some antibiotics, but not until February.  There is been no fevers.  No recent travel, surgery, bedrest.  She was recently hospitalized for detox a couple of weeks ago.  She does state that she was sober for a couple weeks, relapsed for 5 days ago.  She is drinking a liter and a half of wine daily, drink about an hour prior to coming in.  She is not interested in detox at this time, states that she is working on a plan to go to detox elsewhere, but needs to confirm that someone can care for her pet prior to doing this.  She does not  report any fever, nausea, vomiting, abdominal pain.  She does complain of some chronic right leg pain which she states she has been told is likely due to sciatica.  This has been ongoing for many many months.  No acute lower extremity pain over the last month or 2, unilateral swelling.  No previous history of DVT or PE.  No recent travel, surgery, bedrest.  She was hospitalized for detox a couple of weeks ago.      I did review previous pulmonology note, see this from her note with her pulmonologist, Dr. Araiza, in December 2024:  1. New multiple pulmonary lung nodule(s) and RML atelectasis. Given the characteristics on current/previous imaging and risk factors; I would classify this to be Intermediate (6-65%) risk for cancer. Three clustered nodules in the LISE has reported to grow from 5mm since 8mo ago. New RML volume loss likely from a proximal obstruction. The morphology looks infectious/inflammatory in nature. Plan to repeat CT in 3mo with empiric antibiotics. I will have her start it in February then repeat a CT in March,    Past Medical History  Past Medical History:   Diagnosis Date    Anxiety     COPD (chronic obstructive pulmonary disease) (H)     COPD (chronic obstructive pulmonary disease) (H)     Hepatitis C     PTSD (post-traumatic stress disorder)     Seizures (H)     second to ETOH    Substance abuse (H)      Past Surgical History:   Procedure Laterality Date    LAPAROSCOPIC TUBAL LIGATION      ORTHOPEDIC SURGERY      Left knee    TONSILLECTOMY       eszopiclone (LUNESTA) 3 MG tablet  Fluticasone-Umeclidin-Vilanterol (TRELEGY ELLIPTA) 200-62.5-25 MCG/ACT oral inhaler  gabapentin (NEURONTIN) 600 MG tablet  levalbuterol (XOPENEX HFA) 45 MCG/ACT inhaler  levofloxacin (LEVAQUIN) 500 MG tablet  levothyroxine (SYNTHROID/LEVOTHROID) 100 MCG tablet  melatonin 5 MG tablet  multivitamin w/minerals (THERA-VIT-M) tablet  nicotine polacrilex (NICORETTE) 4 MG gum  omeprazole (PRILOSEC) 40 MG DR capsule  ondansetron  "(ZOFRAN ODT) 4 MG ODT tab  propranolol (INDERAL) 20 MG tablet  thiamine (B-1) 100 MG tablet      Allergies   Allergen Reactions    Bee Venom Swelling    Wasps [Hornets] Swelling    Amlodipine      Nightmares    Antihistamines, Chlorpheniramine-Type [Antihistamines, Chlorpheniramine-Type]     Clonidine     Compazine      Lock jaw    Diphenhydramine Muscle Pain (Myalgia) and Cramps    Hydroxyzine Cramps     Lock jaw    Metoclopramide      Tardive Dyskinesia    Penicillins      Panic attack    Prednisone Anxiety     Panic attacks.      Prochlorperazine Muscle Pain (Myalgia) and Cramps    Trazodone Nausea and Vomiting and Confusion     \"I ended up falling and feeling like I was drunk\"    Umeclidinium Bromide      Mood swings + anger.     Family History  Family History   Problem Relation Age of Onset    Anxiety Disorder Mother     Asthma Mother     Alcohol/Drug Father     Prostate Cancer Father     Arthritis Father     Alcohol/Drug Brother     Alcohol/Drug Brother     Hypertension Brother     Alcohol/Drug Brother     Depression Brother     Psychotic Disorder Brother      Social History   Social History     Tobacco Use    Smoking status: Every Day     Current packs/day: 0.50     Types: Cigarettes    Smokeless tobacco: Never    Tobacco comments:     Starting Chantix October 2014   Substance Use Topics    Alcohol use: Yes     Comment: 5L every 2 days    Drug use: No     Comment: stopped 1986      A medically appropriate review of systems was performed with pertinent positives and negatives noted in the HPI, and all other systems negative.    Physical Exam   BP: (!) 126/96  Pulse: 86  Temp: 98.1  F (36.7  C)  Resp: 18  Height: 162.6 cm (5' 4\")  Weight: 65.8 kg (145 lb)  SpO2: 93 %  Physical Exam  Constitutional:       General: She is not in acute distress.     Appearance: Normal appearance. She is not toxic-appearing.   HENT:      Head: Atraumatic.      Mouth/Throat:      Mouth: Mucous membranes are moist.   Eyes:      " General: No scleral icterus.     Conjunctiva/sclera: Conjunctivae normal.   Cardiovascular:      Rate and Rhythm: Normal rate.      Heart sounds: Normal heart sounds.   Pulmonary:      Effort: No respiratory distress.      Breath sounds: Normal breath sounds.      Comments: She did have some upper airway sounds which cleared with cough  Abdominal:      Palpations: Abdomen is soft.      Tenderness: There is no abdominal tenderness.   Musculoskeletal:         General: No deformity.      Cervical back: Neck supple.      Right lower leg: No edema.      Left lower leg: No edema.   Skin:     General: Skin is warm.      Findings: No rash.   Neurological:      Mental Status: She is alert.           ED Course, Procedures, & Data      Procedures            EKG Interpretation:      Interpreted by Delma Burk MD  Time reviewed: 0700  Symptoms at time of EKG: sob   Rhythm: normal sinus   Rate: normal  Axis: normal  Ectopy: none  Conduction: normal  ST Segments/ T Waves: No ST-T wave changes  Q Waves: none  Comparison to prior: Unchanged    Clinical Impression: normal EKG           Results for orders placed or performed during the hospital encounter of 01/28/25   CBC with platelets and differential     Status: Abnormal   Result Value Ref Range    WBC Count 5.8 4.0 - 11.0 10e3/uL    RBC Count 3.85 3.80 - 5.20 10e6/uL    Hemoglobin 13.3 11.7 - 15.7 g/dL    Hematocrit 38.4 35.0 - 47.0 %     78 - 100 fL    MCH 34.5 (H) 26.5 - 33.0 pg    MCHC 34.6 31.5 - 36.5 g/dL    RDW 13.7 10.0 - 15.0 %    Platelet Count 313 150 - 450 10e3/uL    % Neutrophils 50 %    % Lymphocytes 32 %    % Monocytes 16 %    % Eosinophils 1 %    % Basophils 1 %    % Immature Granulocytes 0 %    NRBCs per 100 WBC 0 <1 /100    Absolute Neutrophils 2.9 1.6 - 8.3 10e3/uL    Absolute Lymphocytes 1.9 0.8 - 5.3 10e3/uL    Absolute Monocytes 0.9 0.0 - 1.3 10e3/uL    Absolute Eosinophils 0.0 0.0 - 0.7 10e3/uL    Absolute Basophils 0.1 0.0 - 0.2 10e3/uL     Absolute Immature Granulocytes 0.0 <=0.4 10e3/uL    Absolute NRBCs 0.0 10e3/uL   [place] Draw     Status: None (In process)    Narrative    The following orders were created for panel order [place] Draw.  Procedure                               Abnormality         Status                     ---------                               -----------         ------                     Extra Blue Top Tube[[id number]]                              In process                 Extra Red Top Tube[[id number]]                               In process                   Please view results for these tests on the individual orders.   Extra Tube     Status: None (In process)    Narrative    The following orders were created for panel order Extra Tube.  Procedure                               Abnormality         Status                     ---------                               -----------         ------                     Extra Red Top Tube[[id number]]                               In process                   Please view results for these tests on the individual orders.   CBC with platelets differential     Status: Abnormal    Narrative    The following orders were created for panel order CBC with platelets differential.  Procedure                               Abnormality         Status                     ---------                               -----------         ------                     CBC with platelets and d...[[id number]]  Abnormal            Final result                 Please view results for these tests on the individual orders.     Medications - No data to display  Labs Ordered and Resulted from Time of ED Arrival to Time of ED Departure   CBC WITH PLATELETS AND DIFFERENTIAL - Abnormal       Result Value    WBC Count 5.8      RBC Count 3.85      Hemoglobin 13.3      Hematocrit 38.4            MCH 34.5 (*)     MCHC 34.6      RDW 13.7      Platelet Count 313      % Neutrophils 50      % Lymphocytes 32      % Monocytes 16       % Eosinophils 1      % Basophils 1      % Immature Granulocytes 0      NRBCs per 100 WBC 0      Absolute Neutrophils 2.9      Absolute Lymphocytes 1.9      Absolute Monocytes 0.9      Absolute Eosinophils 0.0      Absolute Basophils 0.1      Absolute Immature Granulocytes 0.0      Absolute NRBCs 0.0     COMPREHENSIVE METABOLIC PANEL   TROPONIN T, HIGH SENSITIVITY   INFLUENZA A/B, RSV AND SARS-COV2 PCR   NT PROBNP INPATIENT   MAGNESIUM     Chest XR,  PA & LAT    (Results Pending)          Critical care was not performed.     Medical Decision Making  The patient's presentation was of moderate complexity (an acute illness with systemic symptoms).    The patient's evaluation involved:  review of external note(s) from 3+ sources (previous notes)  review of 3+ test result(s) ordered prior to this encounter (previous results)  ordering and/or review of 3+ test(s) in this encounter (see separate area of note for details)    The patient's management necessitated further care after sign-out to Dr Aponte (see their note for further management).    Assessment & Plan    The patient's primary complaint is wheeze, though she does complain of some shortness of breath as well.  She has no wheeze on my exam.  She had a little bit of transmitted upper airway sounds, but this primarily resolves with cough.  O2 sats are in the low to mid 90s on room air.  She is not in any distress.  She admits she feels a fair bit better currently, states she did not get a neb treatment or any other breathing treatments in the ambulance, did not take any at home.  She admits she has been prescribed inhalers at home, but states she does not like to take them because they give her side effects.  She denies increase in her cough or sputum production.  No fever.  No notable chest discomfort.  No acute or recent lower extremity symptoms that are different from her baseline.  No history of DVT or PE.  She did have a recent brief hospitalization for  detox.  EKG was unremarkable.  Chest x-ray was ordered and pending .  Basic labs are still pending.  The patient will be signed out at shift change sign out pending the above, as well as repeat evaluation..  If eval is not revealing the patient continues to not feel well, would consider potentially add a D-dimer.    Dictation Disclaimer: Some of this Note has been completed with voice-recognition dictation software. Although errors are generally corrected real-time, there is the potential for a rare error to be present in the completed chart.      I have reviewed the nursing notes. I have reviewed the findings, diagnosis, plan and need for follow up with the patient.    New Prescriptions    No medications on file       Final diagnoses:   Dyspnea, unspecified type   Chronic obstructive pulmonary disease, unspecified COPD type (H)   Alcohol dependence with intoxication with complication (H)       Delma Burk  [facility] EMERGENCY DEPARTMENT  1/28/2025     Delma Burk MD  01/28/25 0709

## 2025-01-28 NOTE — DISCHARGE INSTRUCTIONS
Home.  Your CT shows the left upper lung nodule.  Discussed with Dr. Jose G sher pulmonologist who felt you should take the antibiotics that were previously prescribed and they will call you for appointment sooner.  Return if any concerns at all.    Results for orders placed or performed during the hospital encounter of 01/28/25   Chest XR,  PA & LAT     Status: None    Narrative    EXAM: XR CHEST 2 VIEWS  LOCATION: [facility]  DATE: 1/28/2025    INDICATION: Shortness of breath  COMPARISON: CT of the chest 1/28/2025, 12/2/2024      Impression    IMPRESSION:     Cardiac silhouette is normal in size. There is conspicuous mediastinal fat and subpleural atelectasis in the medial right middle lobe, which produce indistinctness of the right atrial heart border. The vascular pedicle is normal. Minimal aortic   atheromatous calcifications at the arch.    Lung vascularity is normal. Symmetric lung inflation. A focal opacity is present in the medial left upper lobe projecting at the level of the anterior first costochondral cartilage, better characterized on CT. No airspace opacities. Mild wall thickening   of central airways around the jaime. No pleural space abnormality.   CT Chest Pulmonary Embolism w Contrast     Status: None   Result Value Ref Range    Radiologist flags Lung nodule     Narrative    EXAM: CT CHEST PULMONARY EMBOLISM W CONTRAST  LOCATION: [facility]  DATE: 1/28/2025    INDICATION: Shortness of breath. Elevated d-dimer.  COMPARISON: CTA chest 12/2/2024.  TECHNIQUE: CT chest pulmonary angiogram during arterial phase injection of IV contrast. Multiplanar reformats and MIP reconstructions were performed. Dose reduction techniques were used.   CONTRAST: 54 mL Isovue 370    FINDINGS:    ANGIOGRAM CHEST: Pulmonary arteries are normal caliber and negative for pulmonary emboli. Thoracic aorta is normal in caliber and negative for  dissection.    LUNGS AND PLEURA: Mild mosaic attenuation throughout the lungs. Cluster of previous solid nodules within the left upper lobe have become more confluent and now appear as a single nodule, now measuring 2.3 x 1.8 cm (6/#58), previously 2.1 x 1.6 cm when   measured in conglomerate on the previous exam. An adjacent 0.5 cm solid nodule previously measured 0.4 cm (6/#51). Small perifissural lymph node along the horizontal fissure is unchanged. No new nodules. Mild diffuse bronchial wall thickening. Mild   scarring and/or atelectasis within the right middle lobe. Previous bronchial impaction within the right middle lobe airways has resolved. Thin strands of adherent mucus within the lower trachea.    MEDIASTINUM/AXILLAE: Normal cardiac size. No pericardial effusion. No enlarged thoracic lymph node. Small hiatal hernia.    CORONARY ARTERY CALCIFICATION: Mild.    UPPER ABDOMEN: No acute abnormality.    MUSCULOSKELETAL: Multilevel degenerative changes of the spine. No acute bony abnormality or destructive bone lesions.      Impression    IMPRESSION:    1.  No pulmonary embolism.    2.  Mild diffuse bronchial wall thickening, suggesting airway inflammation.    3.  Mild mosaic attenuation throughout the lungs, likely reflecting areas of air trapping from small airways disease.    4.  A cluster of solid nodules within the left upper lobe have become more confluent, now appear as a single nodule, and have overall slightly increased in size since 12/2/2024. An adjacent 5 mm nodule has also slightly increased in size. A pulmonary   neoplasm is possible and pulmonary consultation and further workup is recommended if not already done.    [Recommend Follow Up: Lung nodule]    This report will be copied to the [facility] to ensure a provider acknowledges the finding.   Comprehensive metabolic panel     Status: Abnormal   Result Value Ref Range    Sodium 138 135 - 145 mmol/L    Potassium 4.1 3.4 - 5.3 mmol/L     Carbon Dioxide (CO2) 23 22 - 29 mmol/L    Anion Gap 13 7 - 15 mmol/L    Urea Nitrogen 3.9 (L) 8.0 - 23.0 mg/dL    Creatinine 0.53 0.51 - 0.95 mg/dL    GFR Estimate >90 >60 mL/min/1.73m2    Calcium 9.1 8.8 - 10.4 mg/dL    Chloride 102 98 - 107 mmol/L    Glucose 95 70 - 99 mg/dL    Alkaline Phosphatase 49 40 - 150 U/L    AST 40 0 - 45 U/L    ALT 28 0 - 50 U/L    Protein Total 7.2 6.4 - 8.3 g/dL    Albumin 3.9 3.5 - 5.2 g/dL    Bilirubin Total 0.2 <=1.2 mg/dL   Troponin T, High Sensitivity     Status: Abnormal   Result Value Ref Range    Troponin T, High Sensitivity 17 (H) <=14 ng/L   Influenza A/B, RSV and SARS-CoV2 PCR (COVID-19) Nose     Status: Normal    Specimen: Nose; Swab   Result Value Ref Range    Influenza A PCR Negative Negative    Influenza B PCR Negative Negative    RSV PCR Negative Negative    SARS CoV2 PCR Negative Negative    Narrative    Testing was performed using the Xpert Xpress CoV2/Flu/RSV Assay on the [employer] GeneXpert Instrument. This test should be ordered for the detection of SARS-CoV2, influenza, and RSV viruses in individuals with signs and symptoms of respiratory tract infection. This test is for in vitro diagnostic use under the US FDA for laboratories certified under CLIA to perform high or moderate complexity testing. This test has been US FDA cleared. A negative result does not rule out the presence of PCR inhibitors in the specimen or target RNA in concentration below the limit of detection for the assay. If only one viral target is positive but coinfection with multiple targets is suspected, the sample should be re-tested with another FDA cleared, approved, or authorized test, if coninfection would change clinical management. This test was validated by the [facility] [employer]. These laboratories are certified under the Clinical Laboratory Improvement Amendments of 1988 (CLIA-88) as qualified to perfom high complexity laboratory testing.   Nt probnp inpatient     Status:  Normal   Result Value Ref Range    N terminal Pro BNP Inpatient 43 0 - 900 pg/mL   Magnesium     Status: Abnormal   Result Value Ref Range    Magnesium 1.6 (L) 1.7 - 2.3 mg/dL   CBC with platelets and differential     Status: Abnormal   Result Value Ref Range    WBC Count 5.8 4.0 - 11.0 10e3/uL    RBC Count 3.85 3.80 - 5.20 10e6/uL    Hemoglobin 13.3 11.7 - 15.7 g/dL    Hematocrit 38.4 35.0 - 47.0 %     78 - 100 fL    MCH 34.5 (H) 26.5 - 33.0 pg    MCHC 34.6 31.5 - 36.5 g/dL    RDW 13.7 10.0 - 15.0 %    Platelet Count 313 150 - 450 10e3/uL    % Neutrophils 50 %    % Lymphocytes 32 %    % Monocytes 16 %    % Eosinophils 1 %    % Basophils 1 %    % Immature Granulocytes 0 %    NRBCs per 100 WBC 0 <1 /100    Absolute Neutrophils 2.9 1.6 - 8.3 10e3/uL    Absolute Lymphocytes 1.9 0.8 - 5.3 10e3/uL    Absolute Monocytes 0.9 0.0 - 1.3 10e3/uL    Absolute Eosinophils 0.0 0.0 - 0.7 10e3/uL    Absolute Basophils 0.1 0.0 - 0.2 10e3/uL    Absolute Immature Granulocytes 0.0 <=0.4 10e3/uL    Absolute NRBCs 0.0 10e3/uL   [place] Draw     Status: None    Narrative    The following orders were created for panel order [place] Draw.  Procedure                               Abnormality         Status                     ---------                               -----------         ------                     Extra Blue Top Tube[[id number]]                              Final result               Extra Red Top Tube[[id number]]                               Final result                 Please view results for these tests on the individual orders.   Extra Blue Top Tube     Status: None   Result Value Ref Range    Hold Specimen J[facility]    Extra Red Top Tube     Status: None   Result Value Ref Range    Hold Specimen J[facility]    Extra Tube     Status: None    Narrative    The following orders were created for panel order Extra Tube.  Procedure                               Abnormality         Status                     ---------                                -----------         ------                     Extra Red Top Tube[[id number]]                               Final result                 Please view results for these tests on the individual orders.   Extra Red Top Tube     Status: None   Result Value Ref Range    Hold Specimen [facility]    Troponin T, High Sensitivity     Status: Abnormal   Result Value Ref Range    Troponin T, High Sensitivity 15 (H) <=14 ng/L   D dimer quantitative     Status: Abnormal   Result Value Ref Range    D-Dimer Quantitative 1.73 (H) 0.00 - 0.50 ug/mL FEU    Narrative    This D-dimer assay is intended for use in conjunction with a clinical pretest probability assessment model to exclude pulmonary embolism (PE) and deep venous thrombosis (DVT) in outpatients suspected of PE or DVT. The cut-off value is 0.50 ug/mL FEU.    For patients 50 years of age or older, the application of age-adjusted cut-off values for D-Dimer may increase the specificity without significant effect on sensitivity. The literature suggested calculation age adjusted cut-off in ug/L = age in years x 10 ug/L. The results in this laboratory are reported as ug/mL rather than ug/L. The calculation for age adjusted cut off in ug/mL= age in years x 0.01 ug/mL. For example, the cut off for a 76 year old male is 76 x 0.01 ug/mL = 0.76 ug/mL (760 ug/L).    M Danelle et al. Age adjusted D-dimer cut-off levels to rule out pulmonary embolism: The ADJUST-PE Study. FIDEL 2014;311:6528-1771.; HJ Ken et al. Diagnostic accuracy of conventional or age adjusted D-dimer cutoff values in older patients with suspected venous thromboembolism. Systemic review and meta-analysis. BMJ 2013:346:f2492.   Extra Tube     Status: None    Narrative    The following orders were created for panel order Extra Tube.  Procedure                               Abnormality         Status                     ---------                               -----------         ------                     Extra  Purple Top Tube[[id number]]                            Final result                 Please view results for these tests on the individual orders.   Extra Purple Top Tube     Status: None   Result Value Ref Range    Hold Specimen [facility]    EKG 12 lead     Status: None   Result Value Ref Range    Systolic Blood Pressure  mmHg    Diastolic Blood Pressure  mmHg    Ventricular Rate 82 BPM    Atrial Rate 82 BPM    FL Interval 162 ms    QRS Duration 78 ms     ms    QTc 434 ms    P Axis 53 degrees    R AXIS -16 degrees    T Axis 51 degrees    Interpretation ECG       Sinus rhythm  Normal ECG    Unconfirmed report - interpretation of this ECG is computer generated - see medical record for final interpretation  Confirmed by - EMERGENCY ROOM, PHYSICIAN ([id number]),  Otf Ponce ([id number]) on 1/28/2025 10:52:31 AM     CBC with platelets differential     Status: Abnormal    Narrative    The following orders were created for panel order CBC with platelets differential.  Procedure                               Abnormality         Status                     ---------                               -----------         ------                     CBC with platelets and d...[[id number]]  Abnormal            Final result                 Please view results for these tests on the individual orders.

## 2025-01-28 NOTE — ED PROVIDER NOTES
Patient seen by  Initially.  Patient presented with alcohol use but also shortness of breath.  Patient workup signed out to me chest x-ray did not show any major acute findings other labs stable with troponins 17 and 15.  Patient D-dimer is elevated over 1.7 at this point we did do a CT PE protocol.  The findings noted below patient has nodules that are now coalesced into a larger nodule I discussed personally with Dr. Araiza who is a pulmonologist that is seeing the patient they will contact the patient for sooner follow-up in the meantime patient does have antibiotics or antivirals at home and she should take these and will follow-up with Dr. Araiza in the clinic.  Patient feels fine comfortable going home patient is point is comfortable discharge and will follow-up as noted.  Does understand all discharge recommendations.  Copy of the CT scan was also given in discharge.    Results for orders placed or performed during the hospital encounter of 01/28/25   Chest XR,  PA & LAT     Status: None    Narrative    EXAM: XR CHEST 2 VIEWS  LOCATION: [facility]  DATE: 1/28/2025    INDICATION: Shortness of breath  COMPARISON: CT of the chest 1/28/2025, 12/2/2024      Impression    IMPRESSION:     Cardiac silhouette is normal in size. There is conspicuous mediastinal fat and subpleural atelectasis in the medial right middle lobe, which produce indistinctness of the right atrial heart border. The vascular pedicle is normal. Minimal aortic   atheromatous calcifications at the arch.    Lung vascularity is normal. Symmetric lung inflation. A focal opacity is present in the medial left upper lobe projecting at the level of the anterior first costochondral cartilage, better characterized on CT. No airspace opacities. Mild wall thickening   of central airways around the jaime. No pleural space abnormality.   CT Chest Pulmonary Embolism w Contrast     Status: None   Result Value Ref  Range    Radiologist flags Lung nodule     Narrative    EXAM: CT CHEST PULMONARY EMBOLISM W CONTRAST  LOCATION: [facility]  DATE: 1/28/2025    INDICATION: Shortness of breath. Elevated d-dimer.  COMPARISON: CTA chest 12/2/2024.  TECHNIQUE: CT chest pulmonary angiogram during arterial phase injection of IV contrast. Multiplanar reformats and MIP reconstructions were performed. Dose reduction techniques were used.   CONTRAST: 54 mL Isovue 370    FINDINGS:    ANGIOGRAM CHEST: Pulmonary arteries are normal caliber and negative for pulmonary emboli. Thoracic aorta is normal in caliber and negative for dissection.    LUNGS AND PLEURA: Mild mosaic attenuation throughout the lungs. Cluster of previous solid nodules within the left upper lobe have become more confluent and now appear as a single nodule, now measuring 2.3 x 1.8 cm (6/#58), previously 2.1 x 1.6 cm when   measured in conglomerate on the previous exam. An adjacent 0.5 cm solid nodule previously measured 0.4 cm (6/#51). Small perifissural lymph node along the horizontal fissure is unchanged. No new nodules. Mild diffuse bronchial wall thickening. Mild   scarring and/or atelectasis within the right middle lobe. Previous bronchial impaction within the right middle lobe airways has resolved. Thin strands of adherent mucus within the lower trachea.    MEDIASTINUM/AXILLAE: Normal cardiac size. No pericardial effusion. No enlarged thoracic lymph node. Small hiatal hernia.    CORONARY ARTERY CALCIFICATION: Mild.    UPPER ABDOMEN: No acute abnormality.    MUSCULOSKELETAL: Multilevel degenerative changes of the spine. No acute bony abnormality or destructive bone lesions.      Impression    IMPRESSION:    1.  No pulmonary embolism.    2.  Mild diffuse bronchial wall thickening, suggesting airway inflammation.    3.  Mild mosaic attenuation throughout the lungs, likely reflecting areas of air trapping from small airways  disease.    4.  A cluster of solid nodules within the left upper lobe have become more confluent, now appear as a single nodule, and have overall slightly increased in size since 12/2/2024. An adjacent 5 mm nodule has also slightly increased in size. A pulmonary   neoplasm is possible and pulmonary consultation and further workup is recommended if not already done.    [Recommend Follow Up: Lung nodule]    This report will be copied to the [facility] to ensure a provider acknowledges the finding.   Comprehensive metabolic panel     Status: Abnormal   Result Value Ref Range    Sodium 138 135 - 145 mmol/L    Potassium 4.1 3.4 - 5.3 mmol/L    Carbon Dioxide (CO2) 23 22 - 29 mmol/L    Anion Gap 13 7 - 15 mmol/L    Urea Nitrogen 3.9 (L) 8.0 - 23.0 mg/dL    Creatinine 0.53 0.51 - 0.95 mg/dL    GFR Estimate >90 >60 mL/min/1.73m2    Calcium 9.1 8.8 - 10.4 mg/dL    Chloride 102 98 - 107 mmol/L    Glucose 95 70 - 99 mg/dL    Alkaline Phosphatase 49 40 - 150 U/L    AST 40 0 - 45 U/L    ALT 28 0 - 50 U/L    Protein Total 7.2 6.4 - 8.3 g/dL    Albumin 3.9 3.5 - 5.2 g/dL    Bilirubin Total 0.2 <=1.2 mg/dL   Troponin T, High Sensitivity     Status: Abnormal   Result Value Ref Range    Troponin T, High Sensitivity 17 (H) <=14 ng/L   Influenza A/B, RSV and SARS-CoV2 PCR (COVID-19) Nose     Status: Normal    Specimen: Nose; Swab   Result Value Ref Range    Influenza A PCR Negative Negative    Influenza B PCR Negative Negative    RSV PCR Negative Negative    SARS CoV2 PCR Negative Negative    Narrative    Testing was performed using the Xpert Xpress CoV2/Flu/RSV Assay on the [employer] GeneXpert Instrument. This test should be ordered for the detection of SARS-CoV2, influenza, and RSV viruses in individuals with signs and symptoms of respiratory tract infection. This test is for in vitro diagnostic use under the [place] FDA for laboratories certified under CLIA to perform high or moderate complexity testing. This test has been [place]  FDA cleared. A negative result does not rule out the presence of PCR inhibitors in the specimen or target RNA in concentration below the limit of detection for the assay. If only one viral target is positive but coinfection with multiple targets is suspected, the sample should be re-tested with another FDA cleared, approved, or authorized test, if coninfection would change clinical management. This test was validated by the [facility] [employer]. These laboratories are certified under the Clinical Laboratory Improvement Amendments of 1988 (CLIA-88) as qualified to perfom high complexity laboratory testing.   Nt probnp inpatient     Status: Normal   Result Value Ref Range    N terminal Pro BNP Inpatient 43 0 - 900 pg/mL   Magnesium     Status: Abnormal   Result Value Ref Range    Magnesium 1.6 (L) 1.7 - 2.3 mg/dL   CBC with platelets and differential     Status: Abnormal   Result Value Ref Range    WBC Count 5.8 4.0 - 11.0 10e3/uL    RBC Count 3.85 3.80 - 5.20 10e6/uL    Hemoglobin 13.3 11.7 - 15.7 g/dL    Hematocrit 38.4 35.0 - 47.0 %     78 - 100 fL    MCH 34.5 (H) 26.5 - 33.0 pg    MCHC 34.6 31.5 - 36.5 g/dL    RDW 13.7 10.0 - 15.0 %    Platelet Count 313 150 - 450 10e3/uL    % Neutrophils 50 %    % Lymphocytes 32 %    % Monocytes 16 %    % Eosinophils 1 %    % Basophils 1 %    % Immature Granulocytes 0 %    NRBCs per 100 WBC 0 <1 /100    Absolute Neutrophils 2.9 1.6 - 8.3 10e3/uL    Absolute Lymphocytes 1.9 0.8 - 5.3 10e3/uL    Absolute Monocytes 0.9 0.0 - 1.3 10e3/uL    Absolute Eosinophils 0.0 0.0 - 0.7 10e3/uL    Absolute Basophils 0.1 0.0 - 0.2 10e3/uL    Absolute Immature Granulocytes 0.0 <=0.4 10e3/uL    Absolute NRBCs 0.0 10e3/uL   [place] Draw     Status: None    Narrative    The following orders were created for panel order [place] Draw.  Procedure                               Abnormality         Status                     ---------                               -----------          ------                     Extra Blue Top Tube[[id number]]                              Final result               Extra Red Top Tube[[id number]]                               Final result                 Please view results for these tests on the individual orders.   Extra Blue Top Tube     Status: None   Result Value Ref Range    Hold Specimen [facility]    Extra Red Top Tube     Status: None   Result Value Ref Range    Hold Specimen J[facility]    Extra Tube     Status: None    Narrative    The following orders were created for panel order Extra Tube.  Procedure                               Abnormality         Status                     ---------                               -----------         ------                     Extra Red Top Tube[[id number]]                               Final result                 Please view results for these tests on the individual orders.   Extra Red Top Tube     Status: None   Result Value Ref Range    Hold Specimen [facility]    Troponin T, High Sensitivity     Status: Abnormal   Result Value Ref Range    Troponin T, High Sensitivity 15 (H) <=14 ng/L   D dimer quantitative     Status: Abnormal   Result Value Ref Range    D-Dimer Quantitative 1.73 (H) 0.00 - 0.50 ug/mL FEU    Narrative    This D-dimer assay is intended for use in conjunction with a clinical pretest probability assessment model to exclude pulmonary embolism (PE) and deep venous thrombosis (DVT) in outpatients suspected of PE or DVT. The cut-off value is 0.50 ug/mL FEU.    For patients 50 years of age or older, the application of age-adjusted cut-off values for D-Dimer may increase the specificity without significant effect on sensitivity. The literature suggested calculation age adjusted cut-off in ug/L = age in years x 10 ug/L. The results in this laboratory are reported as ug/mL rather than ug/L. The calculation for age adjusted cut off in ug/mL= age in years x 0.01 ug/mL. For example, the cut off for a 76 year old male is 76 x 0.01  ug/mL = 0.76 ug/mL (760 ug/L).    M Danelle et al. Age adjusted D-dimer cut-off levels to rule out pulmonary embolism: The ADJUST-PE Study. FIDEL 2014;311:3250-7924.; HJ Ken et al. Diagnostic accuracy of conventional or age adjusted D-dimer cutoff values in older patients with suspected venous thromboembolism. Systemic review and meta-analysis. BMJ 2013:346:f2492.   Extra Tube     Status: None    Narrative    The following orders were created for panel order Extra Tube.  Procedure                               Abnormality         Status                     ---------                               -----------         ------                     Extra Purple Top Tube[[id number]]                            Final result                 Please view results for these tests on the individual orders.   Extra Purple Top Tube     Status: None   Result Value Ref Range    Hold Specimen [facility]    EKG 12 lead     Status: None   Result Value Ref Range    Systolic Blood Pressure  mmHg    Diastolic Blood Pressure  mmHg    Ventricular Rate 82 BPM    Atrial Rate 82 BPM    NJ Interval 162 ms    QRS Duration 78 ms     ms    QTc 434 ms    P Axis 53 degrees    R AXIS -16 degrees    T Axis 51 degrees    Interpretation ECG       Sinus rhythm  Normal ECG    Unconfirmed report - interpretation of this ECG is computer generated - see medical record for final interpretation  Confirmed by - EMERGENCY ROOM, PHYSICIAN ([id number]),  Otf Ponce ([id number]) on 1/28/2025 10:52:31 AM     CBC with platelets differential     Status: Abnormal    Narrative    The following orders were created for panel order CBC with platelets differential.  Procedure                               Abnormality         Status                     ---------                               -----------         ------                     CBC with platelets and d...[[id number]]  Abnormal            Final result                 Please view results for these tests on the  individual orders.       This note was created at least in part by the use of dragon voice dictation system. Inadvertent typographical errors may still exist.  Phani Aponte MD.  Patient evaluated in the emergency department during the COVID-19 pandemic period. Careful attention to patients safety was addressed throughout the evaluation. Evaluation and treatment management was initiated with disposition made efficiently and appropriate as possible to minimize any risk of potential exposure to patient during this evaluation.         Phani Aponte MD  01/28/25 1951

## 2025-01-28 NOTE — ED NOTES
Bed: ED07  Expected date:   Expected time:   Means of arrival:   Comments:  Hold HEMS 441 65 y/o F SOB/ ETOH INTOX

## 2025-01-28 NOTE — ED TRIAGE NOTES
Pt BIBA for shortness of breath and ETOH.     Triage Assessment (Adult)       Row Name 01/28/25 0606          Triage Assessment    Airway WDL WDL        Respiratory WDL    Respiratory WDL WDL        Skin Circulation/Temperature WDL    Skin Circulation/Temperature WDL WDL        Cardiac WDL    Cardiac WDL WDL        Peripheral/Neurovascular WDL    Peripheral Neurovascular WDL WDL        Cognitive/Neuro/Behavioral WDL    Cognitive/Neuro/Behavioral WDL WDL

## 2025-01-28 NOTE — CONSULTS
"Consult received for Vascular access care.  See LDA for details. For additional needs place \"Nursing to Consult for Vascular Access\" [id number] order in EPIC.  "

## 2025-01-31 ENCOUNTER — HOSPITAL ENCOUNTER (INPATIENT)
Facility: CLINIC | Age: 65
LOS: 2 days | Discharge: HOME | DRG: 897 | End: 2025-02-02
Attending: EMERGENCY MEDICINE | Admitting: PSYCHIATRY & NEUROLOGY
Payer: MEDICARE

## 2025-01-31 DIAGNOSIS — R25.1 TREMOR: ICD-10-CM

## 2025-01-31 DIAGNOSIS — F10.139 ALCOHOL ABUSE WITH WITHDRAWAL (H): ICD-10-CM

## 2025-01-31 DIAGNOSIS — R11.0 NAUSEA: ICD-10-CM

## 2025-01-31 DIAGNOSIS — F31.30 BIPOLAR DISORDER, MOST RECENT EPISODE DEPRESSED (H): ICD-10-CM

## 2025-01-31 DIAGNOSIS — F41.1 GENERALIZED ANXIETY DISORDER: ICD-10-CM

## 2025-01-31 DIAGNOSIS — F41.9 ANXIETY: ICD-10-CM

## 2025-01-31 DIAGNOSIS — R25.3 FASCICULATION: ICD-10-CM

## 2025-01-31 DIAGNOSIS — F43.10 PTSD (POST-TRAUMATIC STRESS DISORDER): Primary | ICD-10-CM

## 2025-01-31 LAB
ALBUMIN SERPL BCG-MCNC: 4.4 G/DL (ref 3.5–5.2)
ALCOHOL BREATH TEST: 0.13 (ref 0–0.01)
ALP SERPL-CCNC: 61 U/L (ref 40–150)
ALT SERPL W P-5'-P-CCNC: 41 U/L (ref 0–50)
AMPHETAMINES UR QL SCN: ABNORMAL
ANION GAP SERPL CALCULATED.3IONS-SCNC: 24 MMOL/L (ref 7–15)
AST SERPL W P-5'-P-CCNC: ABNORMAL U/L
BARBITURATES UR QL SCN: ABNORMAL
BASOPHILS # BLD AUTO: 0 10E3/UL (ref 0–0.2)
BASOPHILS NFR BLD AUTO: 1 %
BENZODIAZ UR QL SCN: ABNORMAL
BILIRUB SERPL-MCNC: 0.5 MG/DL
BUN SERPL-MCNC: 3.8 MG/DL (ref 8–23)
BZE UR QL SCN: ABNORMAL
CALCIUM SERPL-MCNC: 9.5 MG/DL (ref 8.8–10.4)
CANNABINOIDS UR QL SCN: ABNORMAL
CHLORIDE SERPL-SCNC: 97 MMOL/L (ref 98–107)
CREAT SERPL-MCNC: 0.5 MG/DL (ref 0.51–0.95)
EGFRCR SERPLBLD CKD-EPI 2021: >90 ML/MIN/1.73M2
EOSINOPHIL # BLD AUTO: 0 10E3/UL (ref 0–0.7)
EOSINOPHIL NFR BLD AUTO: 1 %
ERYTHROCYTE [DISTWIDTH] IN BLOOD BY AUTOMATED COUNT: 13.3 % (ref 10–15)
EST. AVERAGE GLUCOSE BLD GHB EST-MCNC: 114 MG/DL
FENTANYL UR QL: ABNORMAL
GGT SERPL-CCNC: 26 U/L (ref 5–36)
GLUCOSE SERPL-MCNC: 98 MG/DL (ref 70–99)
HBA1C MFR BLD: 5.6 %
HCO3 SERPL-SCNC: 18 MMOL/L (ref 22–29)
HCT VFR BLD AUTO: 42.4 % (ref 35–47)
HGB BLD-MCNC: 15.3 G/DL (ref 11.7–15.7)
IMM GRANULOCYTES # BLD: 0 10E3/UL
IMM GRANULOCYTES NFR BLD: 0 %
LYMPHOCYTES # BLD AUTO: 2.1 10E3/UL (ref 0.8–5.3)
LYMPHOCYTES NFR BLD AUTO: 37 %
MAGNESIUM SERPL-MCNC: 1.5 MG/DL (ref 1.7–2.3)
MCH RBC QN AUTO: 34.7 PG (ref 26.5–33)
MCHC RBC AUTO-ENTMCNC: 36.1 G/DL (ref 31.5–36.5)
MCV RBC AUTO: 96 FL (ref 78–100)
MONOCYTES # BLD AUTO: 0.7 10E3/UL (ref 0–1.3)
MONOCYTES NFR BLD AUTO: 12 %
NEUTROPHILS # BLD AUTO: 2.8 10E3/UL (ref 1.6–8.3)
NEUTROPHILS NFR BLD AUTO: 50 %
NRBC # BLD AUTO: 0 10E3/UL
NRBC BLD AUTO-RTO: 0 /100
OPIATES UR QL SCN: ABNORMAL
PCP QUAL URINE (ROCHE): ABNORMAL
PLATELET # BLD AUTO: 327 10E3/UL (ref 150–450)
POTASSIUM SERPL-SCNC: 4.3 MMOL/L (ref 3.4–5.3)
PROT SERPL-MCNC: 8.3 G/DL (ref 6.4–8.3)
RBC # BLD AUTO: 4.41 10E6/UL (ref 3.8–5.2)
SODIUM SERPL-SCNC: 139 MMOL/L (ref 135–145)
TSH SERPL DL<=0.005 MIU/L-ACNC: 0.43 UIU/ML (ref 0.3–4.2)
WBC # BLD AUTO: 5.6 10E3/UL (ref 4–11)

## 2025-01-31 PROCEDURE — 128N000004 HC R&B CD ADULT

## 2025-01-31 PROCEDURE — 250N000011 HC RX IP 250 OP 636: Performed by: PHYSICIAN ASSISTANT

## 2025-01-31 PROCEDURE — 250N000013 HC RX MED GY IP 250 OP 250 PS 637: Performed by: NURSE PRACTITIONER

## 2025-01-31 PROCEDURE — 36415 COLL VENOUS BLD VENIPUNCTURE: CPT | Performed by: PHYSICIAN ASSISTANT

## 2025-01-31 PROCEDURE — 83036 HEMOGLOBIN GLYCOSYLATED A1C: CPT | Performed by: PSYCHIATRY & NEUROLOGY

## 2025-01-31 PROCEDURE — 96365 THER/PROPH/DIAG IV INF INIT: CPT | Performed by: EMERGENCY MEDICINE

## 2025-01-31 PROCEDURE — 250N000013 HC RX MED GY IP 250 OP 250 PS 637: Performed by: PSYCHIATRY & NEUROLOGY

## 2025-01-31 PROCEDURE — 82977 ASSAY OF GGT: CPT | Performed by: NURSE PRACTITIONER

## 2025-01-31 PROCEDURE — 80307 DRUG TEST PRSMV CHEM ANLYZR: CPT | Performed by: EMERGENCY MEDICINE

## 2025-01-31 PROCEDURE — 99285 EMERGENCY DEPT VISIT HI MDM: CPT | Mod: 25 | Performed by: EMERGENCY MEDICINE

## 2025-01-31 PROCEDURE — HZ2ZZZZ DETOXIFICATION SERVICES FOR SUBSTANCE ABUSE TREATMENT: ICD-10-PCS | Performed by: PSYCHIATRY & NEUROLOGY

## 2025-01-31 PROCEDURE — 83735 ASSAY OF MAGNESIUM: CPT | Performed by: PHYSICIAN ASSISTANT

## 2025-01-31 PROCEDURE — 84443 ASSAY THYROID STIM HORMONE: CPT | Performed by: NURSE PRACTITIONER

## 2025-01-31 PROCEDURE — 250N000011 HC RX IP 250 OP 636: Performed by: NURSE PRACTITIONER

## 2025-01-31 PROCEDURE — 82075 ASSAY OF BREATH ETHANOL: CPT | Performed by: EMERGENCY MEDICINE

## 2025-01-31 PROCEDURE — 85025 COMPLETE CBC W/AUTO DIFF WBC: CPT | Performed by: PHYSICIAN ASSISTANT

## 2025-01-31 PROCEDURE — 250N000013 HC RX MED GY IP 250 OP 250 PS 637: Performed by: PHYSICIAN ASSISTANT

## 2025-01-31 PROCEDURE — 99285 EMERGENCY DEPT VISIT HI MDM: CPT | Mod: FS | Performed by: EMERGENCY MEDICINE

## 2025-01-31 PROCEDURE — 99207 PR NO CHARGE LOS: CPT | Performed by: NURSE PRACTITIONER

## 2025-01-31 PROCEDURE — 82565 ASSAY OF CREATININE: CPT | Performed by: PHYSICIAN ASSISTANT

## 2025-01-31 RX ORDER — OMEPRAZOLE 20 MG/1
40 CAPSULE, DELAYED RELEASE ORAL EVERY MORNING
Status: DISCONTINUED | OUTPATIENT
Start: 2025-02-01 | End: 2025-01-31

## 2025-01-31 RX ORDER — ACETAMINOPHEN 325 MG/1
650 TABLET ORAL EVERY 4 HOURS PRN
Status: DISCONTINUED | OUTPATIENT
Start: 2025-01-31 | End: 2025-02-02 | Stop reason: HOSPADM

## 2025-01-31 RX ORDER — PROPRANOLOL HCL 20 MG
20 TABLET ORAL 3 TIMES DAILY
Status: DISCONTINUED | OUTPATIENT
Start: 2025-01-31 | End: 2025-02-02 | Stop reason: HOSPADM

## 2025-01-31 RX ORDER — HYDROXYZINE HYDROCHLORIDE 25 MG/1
25 TABLET, FILM COATED ORAL EVERY 4 HOURS PRN
Status: DISCONTINUED | OUTPATIENT
Start: 2025-01-31 | End: 2025-02-02 | Stop reason: HOSPADM

## 2025-01-31 RX ORDER — FOLIC ACID 1 MG/1
1 TABLET ORAL DAILY
Status: DISCONTINUED | OUTPATIENT
Start: 2025-01-31 | End: 2025-02-02 | Stop reason: HOSPADM

## 2025-01-31 RX ORDER — ONDANSETRON 4 MG/1
4 TABLET, ORALLY DISINTEGRATING ORAL EVERY 6 HOURS PRN
Status: DISCONTINUED | OUTPATIENT
Start: 2025-01-31 | End: 2025-02-02 | Stop reason: HOSPADM

## 2025-01-31 RX ORDER — LEVOTHYROXINE SODIUM 100 UG/1
100 TABLET ORAL EVERY MORNING
Status: DISCONTINUED | OUTPATIENT
Start: 2025-02-01 | End: 2025-02-02 | Stop reason: HOSPADM

## 2025-01-31 RX ORDER — MULTIPLE VITAMINS W/ MINERALS TAB 9MG-400MCG
1 TAB ORAL DAILY
Status: DISCONTINUED | OUTPATIENT
Start: 2025-01-31 | End: 2025-02-02 | Stop reason: HOSPADM

## 2025-01-31 RX ORDER — AMOXICILLIN 250 MG
1 CAPSULE ORAL 2 TIMES DAILY PRN
Status: DISCONTINUED | OUTPATIENT
Start: 2025-01-31 | End: 2025-02-02 | Stop reason: HOSPADM

## 2025-01-31 RX ORDER — LOPERAMIDE HYDROCHLORIDE 2 MG/1
2 CAPSULE ORAL 4 TIMES DAILY PRN
Status: DISCONTINUED | OUTPATIENT
Start: 2025-01-31 | End: 2025-02-02 | Stop reason: HOSPADM

## 2025-01-31 RX ORDER — MAGNESIUM HYDROXIDE/ALUMINUM HYDROXICE/SIMETHICONE 120; 1200; 1200 MG/30ML; MG/30ML; MG/30ML
30 SUSPENSION ORAL EVERY 4 HOURS PRN
Status: DISCONTINUED | OUTPATIENT
Start: 2025-01-31 | End: 2025-02-02 | Stop reason: HOSPADM

## 2025-01-31 RX ORDER — PANTOPRAZOLE SODIUM 40 MG/1
40 TABLET, DELAYED RELEASE ORAL
Status: DISCONTINUED | OUTPATIENT
Start: 2025-02-01 | End: 2025-02-02 | Stop reason: HOSPADM

## 2025-01-31 RX ORDER — LEVOFLOXACIN 500 MG/1
500 TABLET, FILM COATED ORAL DAILY
Status: DISCONTINUED | OUTPATIENT
Start: 2025-02-01 | End: 2025-02-02 | Stop reason: HOSPADM

## 2025-01-31 RX ORDER — LEVALBUTEROL TARTRATE 45 UG/1
2 AEROSOL, METERED ORAL EVERY 4 HOURS PRN
Status: DISCONTINUED | OUTPATIENT
Start: 2025-01-31 | End: 2025-02-02 | Stop reason: HOSPADM

## 2025-01-31 RX ORDER — DIAZEPAM 5 MG/1
5-20 TABLET ORAL EVERY 30 MIN PRN
Status: DISCONTINUED | OUTPATIENT
Start: 2025-01-31 | End: 2025-02-02

## 2025-01-31 RX ORDER — MAGNESIUM SULFATE HEPTAHYDRATE 40 MG/ML
2 INJECTION, SOLUTION INTRAVENOUS ONCE
Status: COMPLETED | OUTPATIENT
Start: 2025-01-31 | End: 2025-01-31

## 2025-01-31 RX ORDER — OLANZAPINE 5 MG/1
5 TABLET, ORALLY DISINTEGRATING ORAL ONCE
Status: COMPLETED | OUTPATIENT
Start: 2025-01-31 | End: 2025-01-31

## 2025-01-31 RX ORDER — FLUTICASONE FUROATE AND VILANTEROL 200; 25 UG/1; UG/1
1 POWDER RESPIRATORY (INHALATION) DAILY
Status: DISCONTINUED | OUTPATIENT
Start: 2025-02-01 | End: 2025-02-02 | Stop reason: HOSPADM

## 2025-01-31 RX ORDER — ESZOPICLONE 3 MG/1
3 TABLET, FILM COATED ORAL AT BEDTIME
Status: DISCONTINUED | OUTPATIENT
Start: 2025-01-31 | End: 2025-02-02 | Stop reason: HOSPADM

## 2025-01-31 RX ADMIN — DIAZEPAM 10 MG: 5 TABLET ORAL at 19:54

## 2025-01-31 RX ADMIN — OLANZAPINE 5 MG: 5 TABLET, ORALLY DISINTEGRATING ORAL at 14:32

## 2025-01-31 RX ADMIN — ONDANSETRON 4 MG: 4 TABLET, ORALLY DISINTEGRATING ORAL at 21:47

## 2025-01-31 RX ADMIN — MAGNESIUM SULFATE HEPTAHYDRATE 2 G: 40 INJECTION, SOLUTION INTRAVENOUS at 16:38

## 2025-01-31 RX ADMIN — Medication 900 MG: at 21:37

## 2025-01-31 RX ADMIN — DIAZEPAM 10 MG: 5 TABLET ORAL at 18:56

## 2025-01-31 RX ADMIN — DEXTROSE AND SODIUM CHLORIDE 500 ML: 5; 900 INJECTION, SOLUTION INTRAVENOUS at 16:43

## 2025-01-31 RX ADMIN — DIAZEPAM 10 MG: 5 TABLET ORAL at 21:37

## 2025-01-31 RX ADMIN — ESZOPICLONE 3 MG: 3 TABLET, COATED ORAL at 21:38

## 2025-01-31 RX ADMIN — Medication 10 MG: at 21:38

## 2025-01-31 RX ADMIN — ALUMINUM HYDROXIDE, MAGNESIUM HYDROXIDE, AND SIMETHICONE 30 ML: 200; 200; 20 SUSPENSION ORAL at 23:33

## 2025-01-31 RX ADMIN — PROPRANOLOL HYDROCHLORIDE 20 MG: 20 TABLET ORAL at 21:38

## 2025-01-31 RX ADMIN — DIAZEPAM 10 MG: 5 TABLET ORAL at 16:36

## 2025-01-31 ASSESSMENT — ACTIVITIES OF DAILY LIVING (ADL)
LAUNDRY: UNABLE TO COMPLETE
ADLS_ACUITY_SCORE: 68
ADLS_ACUITY_SCORE: 58
ADLS_ACUITY_SCORE: 58
HYGIENE/GROOMING: INDEPENDENT
ADLS_ACUITY_SCORE: 58
ADLS_ACUITY_SCORE: 58
ADLS_ACUITY_SCORE: 53
ORAL_HYGIENE: INDEPENDENT
ADLS_ACUITY_SCORE: 58
ADLS_ACUITY_SCORE: 53
ADLS_ACUITY_SCORE: 58
DRESS: INDEPENDENT
ADLS_ACUITY_SCORE: 58
ADLS_ACUITY_SCORE: 58

## 2025-01-31 ASSESSMENT — COLUMBIA-SUICIDE SEVERITY RATING SCALE - C-SSRS
1. IN THE PAST MONTH, HAVE YOU WISHED YOU WERE DEAD OR WISHED YOU COULD GO TO SLEEP AND NOT WAKE UP?: YES
3. HAVE YOU BEEN THINKING ABOUT HOW YOU MIGHT KILL YOURSELF?: NO
6. HAVE YOU EVER DONE ANYTHING, STARTED TO DO ANYTHING, OR PREPARED TO DO ANYTHING TO END YOUR LIFE?: YES
4. HAVE YOU HAD THESE THOUGHTS AND HAD SOME INTENTION OF ACTING ON THEM?: YES
2. HAVE YOU ACTUALLY HAD ANY THOUGHTS OF KILLING YOURSELF IN THE PAST MONTH?: YES
5. HAVE YOU STARTED TO WORK OUT OR WORKED OUT THE DETAILS OF HOW TO KILL YOURSELF? DO YOU INTEND TO CARRY OUT THIS PLAN?: NO

## 2025-01-31 ASSESSMENT — LIFESTYLE VARIABLES
INTOXICATION: 1
SKIP TO QUESTIONS 9-10: 0

## 2025-01-31 NOTE — ED NOTES
Pt reports a hx of withdrawal seizures, unable to tell exactly when was the last episode but mentioned it has been recently. Seizure pads applied. Pt is still rocking back and forth in bed, spitting up sometimes but not nauseas per pt. Pt was given prn zyprexa

## 2025-01-31 NOTE — ED NOTES
Bed: URE-E  Expected date: 1/31/25  Expected time: 12:45 PM  Means of arrival: Ambulance  Comments:  [facility] 63yo female, ETOH SI cooperative

## 2025-01-31 NOTE — ED PROVIDER NOTES
"ED Provider Note  [facility]      History     Chief Complaint   Patient presents with    Suicidal     Pt reports feeling suicidal with no plans, hx of depression and anxiety per pt    Alcohol Intoxication     Pt reports drinking a 12 pack of beers daily, last drink a couple hours prior to arrival       Alcohol Intoxication    65yo F pmhx PTSD, Bipolar disorder, COPD, and alcohol abuse w/ history of withdrawal seizures and DTs (though when is unclear) presents with requests for detox.  Notably, patient has admitted here for detox twice this month.  Most recently discharged on 1/15.  States that she was sober for 2 days, before beginning to drink again.  As such, she has been drinking 1.5 L of wine daily for the past 2 weeks, with her last drink roughly 2 hours ago.  At present, she states that she is \"sick,\" endorsing nausea, anxiety, tremors.  She is additionally passively suicidal stating that she can no longer live like this, but denies plan, and can contract for safety in the ED.  Denies drug use.    Past Medical History  Past Medical History:   Diagnosis Date    Anxiety     COPD (chronic obstructive pulmonary disease) (H)     COPD (chronic obstructive pulmonary disease) (H)     Hepatitis C     PTSD (post-traumatic stress disorder)     Seizures (H)     second to ETOH    Substance abuse (H)      Past Surgical History:   Procedure Laterality Date    LAPAROSCOPIC TUBAL LIGATION      ORTHOPEDIC SURGERY      Left knee    TONSILLECTOMY       eszopiclone (LUNESTA) 3 MG tablet  Fluticasone-Umeclidin-Vilanterol (TRELEGY ELLIPTA) 200-62.5-25 MCG/ACT oral inhaler  gabapentin (NEURONTIN) 600 MG tablet  levalbuterol (XOPENEX HFA) 45 MCG/ACT inhaler  levofloxacin (LEVAQUIN) 500 MG tablet  levothyroxine (SYNTHROID/LEVOTHROID) 100 MCG tablet  melatonin 5 MG tablet  multivitamin w/minerals (THERA-VIT-M) tablet  nicotine polacrilex (NICORETTE) 4 MG gum  omeprazole (PRILOSEC) 40 MG " "capsule  ondansetron (ZOFRAN ODT) 4 MG ODT tab  propranolol (INDERAL) 20 MG tablet  thiamine (B-1) 100 MG tablet      Allergies   Allergen Reactions    Bee Venom Swelling    Wasps [Hornets] Swelling    Amlodipine      Nightmares    Antihistamines, Chlorpheniramine-Type [Antihistamines, Chlorpheniramine-Type]     Clonidine     Compazine      Lock jaw    Diphenhydramine Muscle Pain (Myalgia) and Cramps    Hydroxyzine Cramps     Lock jaw    Metoclopramide      Tardive Dyskinesia    Penicillins      Panic attack    Prednisone Anxiety     Panic attacks.      Prochlorperazine Muscle Pain (Myalgia) and Cramps    Trazodone Nausea and Vomiting and Confusion     \"I ended up falling and feeling like I was drunk\"    Umeclidinium Bromide      Mood swings + anger.     Family History  Family History   Problem Relation Age of Onset    Anxiety Disorder Mother     Asthma Mother     Alcohol/Drug Father     Prostate Cancer Father     Arthritis Father     Alcohol/Drug Brother     Alcohol/Drug Brother     Hypertension Brother     Alcohol/Drug Brother     Depression Brother     Psychotic Disorder Brother      Social History   Social History     Tobacco Use    Smoking status: Every Day     Current packs/day: 0.50     Types: Cigarettes    Smokeless tobacco: Never    Tobacco comments:     Starting Chantix October 2014   Substance Use Topics    Alcohol use: Yes     Comment: 5L every 2 days    Drug use: No     Comment: stopped 1986      A medically appropriate review of systems was performed with pertinent positives and negatives noted in the HPI, and all other systems negative.    Physical Exam   BP:  (unable to test, pt won't sit still, rocking back and forth, will reattempt once calmer)  Pulse: 101  Temp: 98  F (36.7  C)  Resp: 18  SpO2: 98 %  Physical Exam  Constitutional:       General: She is not in acute distress.     Appearance: Normal appearance. She is not diaphoretic.   HENT:      Head: Atraumatic.      Mouth/Throat:      Mouth: " Mucous membranes are moist.   Eyes:      Conjunctiva/sclera: Conjunctivae normal.   Cardiovascular:      Rate and Rhythm: Normal rate.   Pulmonary:      Effort: No respiratory distress.   Abdominal:      General: Abdomen is flat.   Musculoskeletal:      Cervical back: Neck supple.   Skin:     General: Skin is warm.   Neurological:      Mental Status: She is alert.      Comments: Mild hand tremors bilaterally  Tongue fasciculations noted           ED Course, Procedures, & Data      Procedures                Results for orders placed or performed during the hospital encounter of 01/31/25   Alcohol breath test POCT     Status: Abnormal   Result Value Ref Range    Alcohol Breath Test 0.128 (A) 0.00 - 0.01     Medications   diazepam (VALIUM) tablet 5-20 mg (has no administration in time range)   thiamine (B-1) tablet 100 mg (has no administration in time range)   folic acid (FOLVITE) tablet 1 mg (has no administration in time range)   multivitamin w/minerals (THERA-VIT-M) tablet 1 tablet (has no administration in time range)     Labs Ordered and Resulted from Time of ED Arrival to Time of ED Departure   ALCOHOL BREATH TEST POCT - Abnormal       Result Value    Alcohol Breath Test 0.128 (*)    COMPREHENSIVE METABOLIC PANEL   MAGNESIUM     No orders to display          Critical care was not performed.     Medical Decision Making  The patient's presentation was of high complexity (a chronic illness severe exacerbation, progression, or side effect of treatment).    The patient's evaluation involved:  review of external note(s) from 3+ sources (see separate area of note for details)  review of 3+ test result(s) ordered prior to this encounter (see separate area of note for details)  ordering and/or review of 3+ test(s) in this encounter (see separate area of note for details)  discussion of management or test interpretation with another health professional (see separate area of note for details)    The patient's management  necessitated high risk (a decision regarding hospitalization).    Assessment & Plan    65yo F pmhx PTSD, Bipolar disorder, COPD, and alcohol abuse w/ history of withdrawal seizures and DTs (though when is unclear) presents for detox.  Notably, patient has admitted here for detox twice this month.  Most recently discharged on 1/15.  Was sober for 2 days, before beginning to drink again.  As such, she has been drinking 1.5 L of wine daily for the past 2 weeks, with her last drink roughly 2 hours ago.  At present, endorsing nausea, anxiety, tremors.  She is additionally passively suicidal stating that she can no longer live like this, but denies plan, and can contract for safety in the ED.  Denies drug use.  In ED patient is HDS, but she appears anxious, has hand tremors and tongue fasciculations.  Breathalyzer 0.128.  Patient placed on MSSA protocol.  Detox labs shows mild hypomagnesemia which was repleted.  CMP shows an anion gap of 24, likely 2/2 alcoholic ketosis; patient receiving D5NS.  Placed on MSSA protocol.  Thiamine, folate, multivitamin given.  Patient will be admitted to detox for medical management of her withdrawal.     I have reviewed the nursing notes. I have reviewed the findings, diagnosis, plan and need for follow up with the patient.    New Prescriptions    No medications on file       Final diagnoses:   Alcohol abuse with withdrawal (H)     Chart documentation was completed with Dragon voice-recognition software. Even though reviewed, this chart may still contain some grammatical, spelling, and word errors.     Barry Menjivar PA-C  [facility] EMERGENCY DEPARTMENT  1/31/2025     Barry Menjivar PA-C  01/31/25 2770       Palomo Zaldivar MD  01/31/25 3066

## 2025-01-31 NOTE — ED TRIAGE NOTES
Pt arrived via ambulance from home, per EMT report pt was sitting in hallway of her apt crying loudly,  called 911 as pt reported that she was feeling suicidal and that she drank too much. Pt was vomiting en route and was given zofran 4mgs IV, , VSS en route per EMT, pt did not require any meds for behavioral issues, she was cooperative but anxious. Pt reported being seen here a couple days ago for alcohol intoxication. Observed to be rocking back and forth in bed, reports it's calming foir her as she is feeling anxious.      Triage Assessment (Adult)       Row Name 01/31/25 1313          Triage Assessment    Airway WDL WDL        Respiratory WDL    Respiratory WDL WDL        Skin Circulation/Temperature WDL    Skin Circulation/Temperature WDL WDL        Cardiac WDL    Cardiac WDL WDL        Peripheral/Neurovascular WDL    Peripheral Neurovascular WDL WDL        Cognitive/Neuro/Behavioral WDL    Cognitive/Neuro/Behavioral WDL WDL

## 2025-02-01 PROBLEM — F10.239 ALCOHOL DEPENDENCE WITH WITHDRAWAL WITH COMPLICATION (H): Status: ACTIVE | Noted: 2022-07-21

## 2025-02-01 LAB
ALBUMIN SERPL BCG-MCNC: 4.4 G/DL (ref 3.5–5.2)
ALP SERPL-CCNC: 50 U/L (ref 40–150)
ALT SERPL W P-5'-P-CCNC: 33 U/L (ref 0–50)
ANION GAP SERPL CALCULATED.3IONS-SCNC: 14 MMOL/L (ref 7–15)
AST SERPL W P-5'-P-CCNC: 59 U/L (ref 0–45)
BILIRUB SERPL-MCNC: 1 MG/DL
BUN SERPL-MCNC: 5.9 MG/DL (ref 8–23)
CALCIUM SERPL-MCNC: 9.6 MG/DL (ref 8.8–10.4)
CHLORIDE SERPL-SCNC: 97 MMOL/L (ref 98–107)
CREAT SERPL-MCNC: 0.71 MG/DL (ref 0.51–0.95)
EGFRCR SERPLBLD CKD-EPI 2021: >90 ML/MIN/1.73M2
GLUCOSE SERPL-MCNC: 109 MG/DL (ref 70–99)
HCO3 SERPL-SCNC: 26 MMOL/L (ref 22–29)
MAGNESIUM SERPL-MCNC: 2.2 MG/DL (ref 1.7–2.3)
POTASSIUM SERPL-SCNC: 4 MMOL/L (ref 3.4–5.3)
PROT SERPL-MCNC: 7.7 G/DL (ref 6.4–8.3)
SODIUM SERPL-SCNC: 137 MMOL/L (ref 135–145)

## 2025-02-01 PROCEDURE — 250N000011 HC RX IP 250 OP 636: Performed by: NURSE PRACTITIONER

## 2025-02-01 PROCEDURE — 99223 1ST HOSP IP/OBS HIGH 75: CPT | Mod: AI | Performed by: PSYCHIATRY & NEUROLOGY

## 2025-02-01 PROCEDURE — 128N000004 HC R&B CD ADULT

## 2025-02-01 PROCEDURE — 83735 ASSAY OF MAGNESIUM: CPT | Performed by: PSYCHIATRY & NEUROLOGY

## 2025-02-01 PROCEDURE — 250N000013 HC RX MED GY IP 250 OP 250 PS 637: Performed by: PSYCHIATRY & NEUROLOGY

## 2025-02-01 PROCEDURE — 36415 COLL VENOUS BLD VENIPUNCTURE: CPT | Performed by: PSYCHIATRY & NEUROLOGY

## 2025-02-01 PROCEDURE — 250N000013 HC RX MED GY IP 250 OP 250 PS 637: Performed by: NURSE PRACTITIONER

## 2025-02-01 PROCEDURE — 82565 ASSAY OF CREATININE: CPT | Performed by: PSYCHIATRY & NEUROLOGY

## 2025-02-01 RX ADMIN — NICOTINE POLACRILEX 4 MG: 4 GUM, CHEWING BUCCAL at 09:49

## 2025-02-01 RX ADMIN — NICOTINE POLACRILEX 4 MG: 4 GUM, CHEWING BUCCAL at 07:17

## 2025-02-01 RX ADMIN — Medication 900 MG: at 20:12

## 2025-02-01 RX ADMIN — ACETAMINOPHEN 650 MG: 325 TABLET, FILM COATED ORAL at 20:10

## 2025-02-01 RX ADMIN — DIAZEPAM 10 MG: 5 TABLET ORAL at 02:09

## 2025-02-01 RX ADMIN — PANTOPRAZOLE SODIUM 40 MG: 40 TABLET, DELAYED RELEASE ORAL at 08:16

## 2025-02-01 RX ADMIN — PROPRANOLOL HYDROCHLORIDE 20 MG: 20 TABLET ORAL at 08:13

## 2025-02-01 RX ADMIN — DIAZEPAM 10 MG: 5 TABLET ORAL at 08:15

## 2025-02-01 RX ADMIN — PROPRANOLOL HYDROCHLORIDE 20 MG: 20 TABLET ORAL at 20:12

## 2025-02-01 RX ADMIN — LOPERAMIDE HYDROCHLORIDE 2 MG: 2 CAPSULE ORAL at 09:02

## 2025-02-01 RX ADMIN — Medication 1 TABLET: at 08:14

## 2025-02-01 RX ADMIN — NICOTINE POLACRILEX 4 MG: 4 GUM, CHEWING BUCCAL at 02:18

## 2025-02-01 RX ADMIN — LEVOFLOXACIN 500 MG: 500 TABLET, FILM COATED ORAL at 08:15

## 2025-02-01 RX ADMIN — NICOTINE POLACRILEX 4 MG: 4 GUM, CHEWING BUCCAL at 20:10

## 2025-02-01 RX ADMIN — NICOTINE POLACRILEX 4 MG: 4 GUM, CHEWING BUCCAL at 21:35

## 2025-02-01 RX ADMIN — LEVOTHYROXINE SODIUM 100 MCG: 0.1 TABLET ORAL at 08:16

## 2025-02-01 RX ADMIN — PROPRANOLOL HYDROCHLORIDE 20 MG: 20 TABLET ORAL at 13:29

## 2025-02-01 RX ADMIN — NICOTINE POLACRILEX 4 MG: 4 GUM, CHEWING BUCCAL at 14:33

## 2025-02-01 RX ADMIN — NICOTINE POLACRILEX 4 MG: 4 GUM, CHEWING BUCCAL at 12:24

## 2025-02-01 RX ADMIN — NICOTINE POLACRILEX 4 MG: 4 GUM, CHEWING BUCCAL at 11:04

## 2025-02-01 RX ADMIN — NICOTINE POLACRILEX 4 MG: 4 GUM, CHEWING BUCCAL at 22:32

## 2025-02-01 RX ADMIN — ONDANSETRON 4 MG: 4 TABLET, ORALLY DISINTEGRATING ORAL at 08:17

## 2025-02-01 RX ADMIN — Medication 10 MG: at 20:12

## 2025-02-01 RX ADMIN — NICOTINE POLACRILEX 4 MG: 4 GUM, CHEWING BUCCAL at 16:11

## 2025-02-01 RX ADMIN — Medication 900 MG: at 13:29

## 2025-02-01 RX ADMIN — THIAMINE HCL TAB 100 MG 100 MG: 100 TAB at 08:16

## 2025-02-01 RX ADMIN — NICOTINE POLACRILEX 4 MG: 4 GUM, CHEWING BUCCAL at 17:58

## 2025-02-01 RX ADMIN — Medication 900 MG: at 08:14

## 2025-02-01 RX ADMIN — FOLIC ACID 1 MG: 1 TABLET ORAL at 08:16

## 2025-02-01 RX ADMIN — NICOTINE POLACRILEX 4 MG: 4 GUM, CHEWING BUCCAL at 08:37

## 2025-02-01 ASSESSMENT — ACTIVITIES OF DAILY LIVING (ADL)
ADLS_ACUITY_SCORE: 53
ORAL_HYGIENE: INDEPENDENT
LAUNDRY: UNABLE TO COMPLETE
ADLS_ACUITY_SCORE: 53
HYGIENE/GROOMING: INDEPENDENT
ADLS_ACUITY_SCORE: 53
ADLS_ACUITY_SCORE: 53
DRESS: INDEPENDENT
ADLS_ACUITY_SCORE: 53

## 2025-02-01 NOTE — PLAN OF CARE
"  Problem: Adult Inpatient Plan of Care  Goal: Optimal Comfort and Wellbeing  Intervention: Provide Person-Centered Care  Recent Flowsheet Documentation  Taken 2/1/2025 1000 by Yoko Alex, RN  Trust Relationship/Rapport: care explained   Goal Outcome Evaluation:    Plan of Care Reviewed With: patient       Pt pleasant  and frequently at nurses desk with multiple requests, encouraged to cluster her requests . MINNAA 8/4, has chronic back pain managed by acupuncture & TENS unit application.      Hx of passive SI statements in the ED, none since admission to the unit, pt denies plan, thought or intent, contracts for safety. Plans to discharge tomorrow morning when out of detox and pursue [facility] program, referral in place.     Gabapentin helpful for back pain, has appointments scheduled post discharge for Psychiatry, Psychotherapy, Pulmonary and Acupuncture. Pt taking Empiric Abx Levaquin r/t Pulmonary nodules, declined to take scheduled  inhalers Dx COPD. Blood pressure (!) 145/97, pulse 80, temperature 97.2  F (36.2  C), temperature source Temporal, resp. rate 18, height 1.6 m (5' 3\"), weight 62.6 kg (138 lb), SpO2 95%, not currently breastfeeding. No SOB .  Anxiety managed with Propanol, pt attends [facility] program for supports.     PRN's given; Zofran, Nicotine gum, Valium. Pt spent most of the shift in the hallway, socializing with staff.    Will transport home via [employer] , no med refills required, pt has all her meds at home.    "

## 2025-02-01 NOTE — PHARMACY-ADMISSION MEDICATION HISTORY
Pharmacist Admission Medication History    Admission medication history is complete. The information provided in this note is only as accurate as the sources available at the time of the update.    Information Source(s): Patient via in-person    Pertinent Information: Pt stated she took all her medications yesterday. She mentioned that she did not start her Trellegy Ellipta or Levofloxacin. She also does not use her levalbuterol inhaler prn as it makes her panic (jittery).     Changes made to PTA medication list:  Added: None  Deleted: None  Changed: None    Allergies reviewed with patient and updates made in EHR: yes    Medication History Completed By: Jenny [facility] 1/31/2025 6:02 PM    PTA Med List   Medication Sig Last Dose/Taking    eszopiclone (LUNESTA) 3 MG tablet Take 3 mg by mouth at bedtime 1/30/2025    gabapentin (NEURONTIN) 600 MG tablet Take 900 mg by mouth 3 times daily 1/30/2025    levothyroxine (SYNTHROID/LEVOTHROID) 100 MCG tablet Take 100 mcg by mouth every morning 1/30/2025    melatonin 5 MG tablet Take 10 mg by mouth at bedtime Takes 1 hour prior to Lunesta 1/30/2025    multivitamin w/minerals (THERA-VIT-M) tablet Take 1 tablet by mouth daily 1/30/2025    nicotine polacrilex (NICORETTE) 4 MG gum Place 4 mg inside cheek every hour as needed for smoking cessation 1/30/2025    omeprazole (PRILOSEC) 40 MG DR capsule Take 40 mg by mouth every morning 1/30/2025    ondansetron (ZOFRAN ODT) 4 MG ODT tab Take 4 mg by mouth every 8 hours as needed for vomiting or nausea Past Week    propranolol (INDERAL) 20 MG tablet Take 20 mg by mouth 3 times daily 1/30/2025    thiamine (B-1) 100 MG tablet Take 1 tablet (100 mg) by mouth daily 1/30/2025

## 2025-02-01 NOTE — ED NOTES
Patient called friend to come  house keys so that they can take care of her dog while she is in detox. Keno given to security at the  with the patients name along with the name of the friend (Luis ROCHA) and given instructions for when friend comes to pick them up.

## 2025-02-01 NOTE — PROVIDER NOTIFICATION
02/01/25 4530   C-SSRS (Daily/Shift Screen)   Q2 Suicidal Thoughts (Since Last Contact) 0-->no   Q6 Suicide Behavior 0-->no   Assess Risk to Self and Maintain Safety   Behavior Management behavioral plan reviewed   Enhanced Safety Measures review medications for side effects with activity   Promote Psychosocial Wellbeing   Family/Support System Care self-care encouraged;support provided   Sleep/Rest Enhancement comfort measures;awakenings minimized;noise level reduced;relaxation techniques promoted;consistent schedule promoted;regular sleep/rest pattern promoted;medication;natural light exposure provided;reading promoted   Supportive Measures goal-setting facilitated;decision-making supported;counseling provided;problem-solving facilitated;active listening utilized;relaxation techniques promoted;self-care encouraged;self-reflection promoted;self-responsibility promoted   Establish Safety Plan and Continuity of Care   Safe Transition Promotion protective factors promoted   Environment of Care Checklist   Potentially harmful objects out of patient reach? yes   Personal belongings secured? yes   Patient dressed in hospital-provided attire only? yes   Plastic bags out of patient reach? yes   Patient care equipment (cords, cables, call bells, lines, and drains) shortened, removed, or accounted for? yes   Potentially toxic materials removed or secured? yes   Sharps container removed or secured? yes   Cabinets secured? yes   Room secured by RN per shift? yes

## 2025-02-01 NOTE — PROGRESS NOTES
01/31/25 2054   Patient Belongings   Did you bring any home meds/supplements to the hospital?  No   Patient Belongings other (see comments)   Patient Belongings Remaining with Patient other (see comments)   Patient Belongings Put in Hospital Secure Location (Security or Locker, etc.) other (see comments)   Belongings Search Yes   Clothing Search Yes   Second Staff Kyra Mccormack     STORAGE BIN:   Coat, boots  MED ROOM BIN:   Cell phone, key  A             ADMISSION:    I am responsible for any personal items that are not sent to the safe or pharmacy. [facility] is not responsible for loss, theft or damage of any property in my possession.    Patient Signature _________________________________________ Date/Time _____________________    Staff Signature ___________________________________________ Date/Time _____________________    2nd Staff person, if patient is unable/unwilling to sign    ________________________________________________________ Date/Time _____________________    DISCHARGE:    All personal items have been returned to me.    Patient Signature _________________________________________ Date/Time _____________________    Staff Signature ___________________________________________ Date/Time _____________________

## 2025-02-01 NOTE — CONSULTS
Chart reviewed for admission criteria for detox and admission orders entered as signed and held. Verified with intake that patient was eating, drinking and walking without difficulty d/t notes about spitting up.

## 2025-02-01 NOTE — DISCHARGE INSTRUCTIONS
Behavioral Discharge Planning and Instructions  THANK YOU FOR CHOOSING [facility]  3AW  [phone]    Summary: You were admitted to [facility] on 1/31/2025 for detoxification from alcohol.  A medical exam was performed that included lab work. You have met with a [facility] Counselor and opted to return home and plan to start the 55+ IOP program at [facility].  Please take care and make your recovery a daily priority, Jolynn!  It was a pleasure working with you and the entire treatment team here wishes you the very best in your recovery!     Recommendation:  You are recommended to abstain from alcohol and other drugs. A referral was made to [facility] for our 55+ IOP program. If you don't hear from a representative within 2 business days, please call [phone].     Main Diagnoses:   Alcohol use disorder, severe, dependence with withdrawal with complication including hx of seizures and DTs  Nicotine dependence with withdrawal   Hx of polysubstance use including IVDU and Hep C s/p tx in 2015  Suicidal ideation, passive, improved  Insomnia, unspecified   OMARI  Bipolar affective disorder, type 2, recent episode depressed  PTSD  Hyopomagnesemia  High anion gap metabolic acidosis  Hypochloremia   GERD  COPD  Pulmonary nodules   Elevated LFTs       Major Treatments, Procedures and Findings:  You were treated for Alcohol  detoxification using Valium . As an outpatient you will be prescribed Gabapentin , please take this medication as prescribed until it is gone. You have met with a [facility] counselor to develop a treatment plan for discharge. You had labs drawn and those results were reviewed with you. Please take a copy of your lab work with you to your next primary care provider appointment.    Symptoms to Report:  If you experience more anxiety, confusion, sleeplessness, deep sadness or thoughts of suicide, notify your treatment team or notify your primary care provider. IF ANY OF THE SYMPTOMS  YOU ARE EXPERIENCING ARE A MEDICAL EMERGENCY CALL 911 IMMEDIATELY.     Lifestyle Adjustment: Adjust your lifestyle to get enough sleep, relaxation, exercise and good nutrition. Continue to develop healthy coping skills to decrease stress and promote a sober living environment. Do not use mood altering substances including alcohol, illegal drugs or addictive medications other than what is currently prescribed.     Disposition: Home located at [address] [address].    Facts about COVID19 at www.cdc.gov/COVID19 and www.[place].gov/covid19    Keeping hands clean is one of the most important steps we can take to avoid getting sick and spreading germs to others.  Please wash your hands frequently and lather with soap for at least 20 seconds!    Follow-up Appointment:   Pulmonary Initial Wednesday February 5th arrive by 12:45 PM  Kya Rivas MD with [facility] [address] [address]   Phone [phone]    Acupuncture Thursday February 6th at 1:00 PM  Corine May LAc with [facility]  [address] [place] [address] [address] [place] [address]  Phone: [phone]    Psychotherapy Friday February 7th at 8:00 AM  Psychotherapy Friday February 21st at 8:00 AM  SHAMIR Castillo with [address] [address]  Phone: [phone] Fax: [phone]    Psychiatry Monday February 24th at 10:30 AM  Benjie Herrera MD with [address] [address]  Phone: [phone] Fax: [phone]      Recovery apps for your phone for educational purposes and to locate in person and zoom recovery meetings  Everything AA -  mahnaz is a great resource  12 Step Toolkit - educational purpose learning about the 12 steps to recovery  [place] Cloud - meeting mahnaz  AA  - meeting  "mahnaz  Meeting guide - meeting mahnaz  Quick NA meeting - meeting mahnaz  Estela- has various apps    Resources:  AA/NA meetings have returned to in-person or a hybrid combination of zoom/in-person therefore please check online to verify time.  Need Support Now? If you or someone you know is struggling or in crisis, help is available. Call or text 042 or chat ebookpie.org  AA meetings search for them at: https://aa-intergroup.org (worldwide meeting listings)  AA meetings for [place] area can be found online at: https://a[place].org (click local online meetings listings)  NA meetings for [place] area can be found online at: https://www.naminXytisota.org  (click find a meeting)  AA and NA Sponsors are excellent resources for support and you can find one at any support group meeting.   Alcoholics Anonymous (https://aa.org/): for information 24 hours/day  AA Intergroup service office in [place] (http://www.aastpaul.org/) 633.229.3712  AA Intergroup service office in [facility]: [phone]. (http://www.[employer].org/)  Narcotics Anonymous (www.nam[place].org) (539) 188-2135  https://aafairviewriverside.org/meetings  SMART Recovery - self management for addiction recovery:  www.smartrecovery.org  Pathways ~ A Health Crisis Resource & Support Center:  918.961.9189.  https://prescribetoprevent.org/patient-education/videos/  http://www.harmreduction.org  Crisis Text Line  Text 714210  You will be connected with a trained live crisis counselor to provide support. Por espanol, texto  JEANNETTE a 420616 o texto a 442-AYUDAME en [facility] Hope Line  1.800.SUICIDE [5000871]  [facility] 594-848-1029  Support Group:  AA/NA and Sponsor/support.  Fast Tracker  Linking people to mental health and substance use disorder resources  ReadyDockn.org   [place] Mental [facility] Warm Line  Peer to peer support  Monday thru Saturday, 12 pm to 10 pm  512.242.5467 or 1.154.869.3168  Text \"Support\" to " [address]  National [place] on Mental Illness (MARITZA)  041.559.4297 or 1.888.MARITZA.HELPS  Alcoholics Anonymous (www.alcoholics-anonymous.org): Check your phone book for your local chapter.  Suicide Awareness Voices of Education (SAVE) (www.save.org): 198-611-ROAX (7283)  National Suicide Prevention Line (www.mentalhealthmn.org): 074-681-FUUN (4104)  Mental Health Consumer/Survivor Network of [place] (www.mhcsn.net): 616.129.6873 or 944-133-8045  Mental Health Association of [place] (www.mentalhealth.org): 127.289.5835 or 152-983-7235   Substance Abuse and Mental Health Services (www.samhsa.gov)  [place] Opioid Prevention Coalition: www.opioidcoalition.org    [place] Recovery Connection (MRC)  [facility] connects people seeking recovery to resources that help foster and sustain long-term recovery.  Whether you are seeking resources for treatment, transportation, housing, job training, education, health care or other pathways to recovery, [facility] is a great place to start.  264.396.7011.  www.[place]oy.[employer] Pod casts for nutrition and wellness  Listen on Apple Podcasts  [employer]   Nutrition       Understand the connection between what you eat and how you feel. Hosted by licensed nutritionists and dietitians from [employer] we share practical, real-life solutions for healthier living through nutrition.     General Medication Instructions:   See your medication sheet(s) for instructions.   Take all medications as prescribed.  Make no changes unless your primary care provider suggests them.   Go to all your primary care provider visits.  Be sure to have all your required lab tests. This way, your medicines can be refilled on time.  Do not use any forms of alcohol.    Please Note: If you have any questions at anytime after you discharged please call [facility] detox unit 3A at [phone].    Here are a list of additional numbers you can call if you  are wanting to resume services through [facility]:  [facility] Assessment Intake: [phone]  [facility] Adult GIRMA Intensive Outpatient  call: [phone]  [facility] Admissions [phone]    Recovery Clinic call: [phone]  [facility]: [phone]  Medical Records call: [phone]  Billing Department call: [phone]    Please remember to take all of your behavioral discharge planning and lab paperwork to any follow up appointments, it contains your lab results, diagnosis, medication list and discharge recommendations.      THANK YOU FOR CHOOSING [facility]

## 2025-02-01 NOTE — ED PROVIDER NOTES
--    ED Attending Physician Attestation    I PALOMO BOWENS MD, MD, cared for this patient with the Advanced Practice Provider (MEGGAN). I personally provided a substantive portion of the care for this patient, including approving the care plan for the number and complexity of problems addressed and taking responsibility related to the risk of complications and/or morbidity or mortality of patient management. Please see the MEGGAN's documentation for full details.    Summary of HPI, PE, ED Course   Patient is a 64 year old female evaluated in the emergency department for alcohol dependence and withdrawal2. Exam and ED course notable for normal vital signs.  Patient anxious and tremulous.  Normal heart rate.  Normal heart sounds.  Lungs clear to auscultation bilaterally.  Abdomen soft and nontender.  Labs remarkable for mild anion gap acidosis.  Given D5 IV.  Patient also hypomagnesemic-replaced with IV magnesium.  Initiated on MSSA protocol.  She appears medically stable for detox admission.  After the completion of care in the emergency department, the patient was admitted to inpatient.      PALOMO BOWENS MD, MD  Emergency Medicine       Palomo Bowens MD  01/31/25 3249

## 2025-02-01 NOTE — PLAN OF CARE
Problem: Sleep Disturbance  Goal: Adequate Sleep/Rest  Progressing   MSSA score was 8 &2 patient received valium 10 mg x 1  Patient was observed to be sleeping for most of the night. She denied withdrawal symptoms during the second assessment . No other concerns noted or reported.

## 2025-02-01 NOTE — PLAN OF CARE
"Behavioral Team Discussion: (2/1/2025)    Continued Stay Criteria/Rationale: Patient admitted for Chemical Use Issues.  Plan: The following services will be provided to the patient; psychiatric assessment, medication management, therapeutic milieu, individual and group support, and skills groups.   Participants: 3A Provider: Dr. Jessica Haskins MD; 3A RN: Yoko Alex RN; 3A CM's:  BRITTNEE Stearns .  Summary/Recommendation: Providers will assess today for treatment recommendations, discharge planning, and aftercare plans. CM will meet with pt for discharge planning.   Medical/Physical:  Pt has history of withdrawal seizures and DTs (though when is unclear)  Pt endorsed that she she states that she is \"sick,\" endorsing nausea, anxiety, tremors.    Precautions:   Behavioral Orders   Procedures    Code 1 - Restrict to Unit    Routine Programming     As clinically indicated    Seizure precautions    Status 15     Every 15 minutes.    Suicide precautions: Suicide Risk: LOW; Clinical rationale to override score: modification to the care environment     Consider searching patient belongings according to policy for contraband or items that could be used for self-harm.     Order Specific Question:   Suicide Risk     Answer:   LOW     Order Specific Question:   Clinical rationale to override score:     Answer:   modification to the care environment    Withdrawal precautions     Rationale for change in precautions or plan: N/A  Progress: Initial.    ASAM Dimension Scale Ratings:  Dimension 1: 3 Client tolerates and rahul with withdrawal discomfort poorly. Client has severe intoxication, such that the client endangers self or others, or intoxication has not abated with less intensive levels of services. Client displays severe signs and symptoms; or risk of severe, but manageable withdrawal; or withdrawal worsening despite detox at less intensive level.  Dimension 2: 1 Client tolerates and rahul with physical discomfort " and is able to get the services that the client needs.  Dimension 3: 2 Client has difficulty with impulse control and lacks coping skills. Client has thoughts of suicide or harm to others without means; however, the thoughts may interfere with participation in some treatment activities. Client has difficulty functioning in significant life areas. Client has moderate symptoms of emotional, behavioral, or cognitive problems. Client is able to participate in most treatment activities.  Dimension 4: 2 Client displays verbal compliance, but lacks consistent behaviors; has low motivation for change; and is passively involved in treatment.  Dimension 5: 4 No awareness of the negative impact of mental health problems or substance abuse. No coping skills to arrest mental health or addiction illnesses, or prevent relapse.  Dimension 6: 4 Client has (A) Chronically antagonistic significant other, living environment, family, peer group or long-term criminal justice involvement that is harmful to recovery or treatment progress, or (B) Client has an actively antagonistic significant other, family, work, or living environment with immediate threat to the client's safety and well-being.

## 2025-02-01 NOTE — PLAN OF CARE
Problem: Adult Inpatient Plan of Care  Goal: Plan of Care Review  Description: The Plan of Care Review/Shift note should be completed every shift.  The Outcome Evaluation is a brief statement about your assessment that the patient is improving, declining, or no change.  This information will be displayed automatically on your shift  note.  Outcome: Progressing     Problem: Alcohol Withdrawal  Goal: Alcohol Withdrawal Symptom Control  Outcome: Progressing   Goal Outcome Evaluation:       Pt is a 64-year-old female with past medical history significant for PTSD, Bipolar disorder, COPD, and alcohol abuse with a history of withdrawal seizures and DTs. Pt is not able to remember the last withdrawal seizures or DTs. She presents to ED seeking detox from alcohol. Pt has been sober for 2 days, before starting to drink again.  She has been drinking a liter and a half of wine daily for the past 2 weeks. Her last drink was about two hours prior to arrival in ED. LEOBARDO = 0.128. Pt is compliant with the admission process. MSSA score  = 9. Diazepam administered with some relief. Pt is medication compliant. Will continue with same plan of care.

## 2025-02-01 NOTE — CONSULTS
[facility]  Brief Internal Medicine Note    Medicine service was consulted for medical co-management. Circumstances of recent discharge and re-admittance noted. Please refer to medicine assessment dated 1/13/2025 for recent medical evaluation, which was reviewed by this writer.    Noted pertinent diagnoses of COPD, pulmonary nodules, bipolar, PTSD, insomnia, hypothyroidism, GERD.   Labwork and vitals from this encounter also reviewed. No other recommendations at this time.    Recommendations    #Elevated AST  AST 59 on 2/1.  Likely secondary to alcohol consumption.  Recommend follow-up with PCP to monitor for resolution.     #COPD  #Enlarging pulmonary nodules  #Tobacco use disorder  Seen by pulmonology 12/31/2024.  Noted that new lung nodule had 6 to 65% risk for cancer.  Recommended empiric antibiotics with repeat CT in 3 months (March 2025).  Plan was to start empiric antibiotics on 2/1.  -Continue PTA inhalers  -Continue PTA Levaquin  -Contact medicine if respiratory status worsens    #Bipolar disorder  #PTSD  #Insomnia  -Management as per psychiatry    #Hypothyroidism  Last TSH 0.43 on 1/31/2025.  -Continue PTA Synthroid    #GERD  -Continue PTA Prilosec      No indication for medicine to re-evaluate the patient at this time. Please call the on-call medicine provider for any medical questions or concerns that may arise.     Abhijit Baron PA-C  Hospitalist Service, [facility]  Securely message with [employer] (more info)  Text page via [facility] Paging/Directory   See signed in provider for up to date coverage information
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
## 2025-02-01 NOTE — PLAN OF CARE
[facility]     Case Management Encounter:   Met with pt to discuss discharge planning. A referral has been placed for day treatment/IOP programs at [facility]. She prefers the 55+ program. If she is here on Monday, the first step (a DA) can be done before she discharges. She said she is out of detox and plans to leave tomorrow. Told her the Mental Health Access team will call her then.    Updated AVS.     Insurance:   Medicare with Medicaid backup    Legal Status:   Orders Placed This Encounter      Voluntary     SUDs Assessment Status:   Referral made to assessment center for dual diagnosis assessment     ROIs on file:  [facility] Signed 3/21/24     Living Situation:   Lives alone with cat and dog     Current Providers, Supports & Collateral:    Psychiatry   Monday February 24th at 10:30 AM  Benjie Herrera MD with [address] [address]  Phone: [phone] Fax: [phone]    Psychotherapy   Friday February 7th at 8:00 AM  Friday February 21st at 8:00 AM  SHAMIR Castillo with [address] [address]  Phone: [phone] Fax: [phone]    Pulmonary Initial Wednesday February 5th arrive by 12:45 PM  Kya Rivas MD with [facility] [address] [address]   Phone [phone]    Acupuncture Thursday February 6th at 1:00 PM  Corine May LAc with [facility]  [address] [place] [address] [address] [place] [address]  Phone: [phone]    Current Plan/Referral Status:   Return home with referral to [facility] 55+     RN updated.    Kristie Crook, SHAMIR, [facility] - Adult Inpatient Addiction Psychiatry Unit

## 2025-02-02 VITALS
SYSTOLIC BLOOD PRESSURE: 127 MMHG | BODY MASS INDEX: 24.45 KG/M2 | HEART RATE: 75 BPM | DIASTOLIC BLOOD PRESSURE: 90 MMHG | OXYGEN SATURATION: 96 % | HEIGHT: 63 IN | RESPIRATION RATE: 18 BRPM | TEMPERATURE: 97 F | WEIGHT: 138 LBS

## 2025-02-02 PROCEDURE — 250N000013 HC RX MED GY IP 250 OP 250 PS 637: Performed by: PSYCHIATRY & NEUROLOGY

## 2025-02-02 PROCEDURE — 250N000013 HC RX MED GY IP 250 OP 250 PS 637: Performed by: NURSE PRACTITIONER

## 2025-02-02 PROCEDURE — 99239 HOSP IP/OBS DSCHRG MGMT >30: CPT | Performed by: PSYCHIATRY & NEUROLOGY

## 2025-02-02 RX ADMIN — NICOTINE POLACRILEX 4 MG: 4 GUM, CHEWING BUCCAL at 05:35

## 2025-02-02 RX ADMIN — FOLIC ACID 1 MG: 1 TABLET ORAL at 08:03

## 2025-02-02 RX ADMIN — NICOTINE POLACRILEX 4 MG: 4 GUM, CHEWING BUCCAL at 10:03

## 2025-02-02 RX ADMIN — PROPRANOLOL HYDROCHLORIDE 20 MG: 20 TABLET ORAL at 13:24

## 2025-02-02 RX ADMIN — NICOTINE POLACRILEX 4 MG: 4 GUM, CHEWING BUCCAL at 08:04

## 2025-02-02 RX ADMIN — THIAMINE HCL TAB 100 MG 100 MG: 100 TAB at 08:03

## 2025-02-02 RX ADMIN — PROPRANOLOL HYDROCHLORIDE 20 MG: 20 TABLET ORAL at 08:03

## 2025-02-02 RX ADMIN — NICOTINE POLACRILEX 4 MG: 4 GUM, CHEWING BUCCAL at 12:12

## 2025-02-02 RX ADMIN — Medication 900 MG: at 13:24

## 2025-02-02 RX ADMIN — NICOTINE POLACRILEX 4 MG: 4 GUM, CHEWING BUCCAL at 12:59

## 2025-02-02 RX ADMIN — LEVOFLOXACIN 500 MG: 500 TABLET, FILM COATED ORAL at 08:03

## 2025-02-02 RX ADMIN — LOPERAMIDE HYDROCHLORIDE 2 MG: 2 CAPSULE ORAL at 09:14

## 2025-02-02 RX ADMIN — LEVOTHYROXINE SODIUM 100 MCG: 0.1 TABLET ORAL at 08:02

## 2025-02-02 RX ADMIN — PANTOPRAZOLE SODIUM 40 MG: 40 TABLET, DELAYED RELEASE ORAL at 08:03

## 2025-02-02 RX ADMIN — Medication 1 TABLET: at 08:01

## 2025-02-02 RX ADMIN — Medication 900 MG: at 08:02

## 2025-02-02 ASSESSMENT — ACTIVITIES OF DAILY LIVING (ADL)
HYGIENE/GROOMING: INDEPENDENT
ADLS_ACUITY_SCORE: 53
LAUNDRY: UNABLE TO COMPLETE
DRESS: INDEPENDENT
ADLS_ACUITY_SCORE: 53
ADLS_ACUITY_SCORE: 53
ORAL_HYGIENE: INDEPENDENT
ADLS_ACUITY_SCORE: 53

## 2025-02-02 NOTE — PLAN OF CARE
Problem: Alcohol Withdrawal  Goal: Alcohol Withdrawal Symptom Control  Outcome: Progressing   Goal Outcome Evaluation:       Pt.appeared asleep through the night. Breathing was even and unlabored. Scored 1 on MSSA. No prn medication administered overnight. Will continue to monitor.

## 2025-02-02 NOTE — PLAN OF CARE
"Goal Outcome Evaluation:    Plan of Care Reviewed With: patient        Pt visible in the milieu, socialized with peers while watching TV, made and received phone calls. Pt denied all psych mental health symptoms and committed for safety. Pt scored 3 and 3 for MSSA and will be out of detox tomorrow. Adequate fluids and food intake, voiding freely and no BM concern. Denied shortness of breath and vital sign stable. /81 (BP Location: Left arm)   Pulse 72   Temp 97.8  F (36.6  C) (Oral)   Resp 16   Ht 1.6 m (5' 3\")   Wt 62.6 kg (138 lb)   SpO2 96%   BMI 24.45 kg/m               "

## 2025-02-02 NOTE — DISCHARGE SUMMARY
"Psychiatric Discharge Summary    Jolynn Rivera MRN# [id number]   Age: 64 year old YOB: 1960     Date of Admission:  1/31/2025  Date of Discharge:  2/2/2025  2:09 PM  Admitting Physician:  Jessica Haskins DO  Discharge Physician:  Jessica Haskins DO         Event Leading to Hospitalization:   Jolynn is a 64 year old female with history of polysubstance use, bipolar disorder, PTSD who presented to ED seeking detox from alcohol.      Chart review completed including ED notes from this encounter, ED visit on 1/28 for dyspnea and alcohol use; behavioral health visits for psychiatry and therapy on 1/21 for anxiety and PTSD at [facility] [place]; medical admission 1/13-1/15 for alcohol withdrawal, possible seizure, recurring falls, discharged home; pulmonology notes including clinic visit on 12/31/24 for pulmonary nodules and RML atelectasis with plan to have repeat CT in 3 months with empiric antibiotic regimen starting in February and scan in March; previous admissions to  including most recent in April of 2024.      Per ED physician note: 65yo F pmhx PTSD, Bipolar disorder, COPD, and alcohol abuse w/ history of withdrawal seizures and DTs (though when is unclear) presents with requests for detox.  Notably, patient has admitted here for detox twice this month.  Most recently discharged on 1/15.  States that she was sober for 2 days, before beginning to drink again.  As such, she has been drinking 1.5 L of wine daily for the past 2 weeks, with her last drink roughly 2 hours ago.  At present, she states that she is \"sick,\" endorsing nausea, anxiety, tremors.  She is additionally passively suicidal stating that she can no longer live like this, but denies plan, and can contract for safety in the ED.  Denies drug use.      Upon interview: Patient confirms information above, reports that she was last on this unit in April that she has maintained sobriety on and off for a few weeks, reports his most recent relapse " "has been about 2 weeks where she has been drinking 1.5 L of wine a day.  She also smokes cigarettes.  Denies any other substance use and reports that she \"quit all illegal drugs 36 years ago\".  She has tolerance to alcohol, drinks more intended, difficulty cutting down and withdrawal symptoms when she stops drinking including some anxiety, feeling tired, having sweating and had some visual hallucinations last night \"looked like 1950's cartoon ads\".  She also reports a history of withdrawal seizures.  She believes that she uses alcohol to help cope with her PTSD, has been working with her outpatient care team to address this and feels that otherwise her mental health has been relatively stable.  She reports that she had made some passive suicidal statements while she was in the ED because she \"felt so horrible\" physically that it caused her to feel poorly mentally.  She denies having any current SI, she identified her dog is a significant protective factor for her.  She has been taking her medications for her mental health, does feel like they are helpful, understands that she will not be able to get her Lunesta for sleep while she is on the unit given unit policy around certain controlled substances as it is a detox unit.  She states that she had been in an outpatient program with [facility] that was online and she got kicked out of it due to relapsing, she does like to engage with [facility] but notes that their program has \"gone downhill\" and she is not interested in engaging with any form of AA program.  She does have extensive support in the community including a psychiatrist, therapist and IHS worker and funding through [employer].  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  She notes that her psychiatrist sent a referral for an outpatient program with [facility], she is not sure if it has been send yet, " writer will also submit a referral so that case management staff can help follow up with this that she states that her goals for hospitalization are to complete detox and then engage in intensive outpatient program to help support her mental health and in turn promote sobriety.          See Admission note by Jessica Haskins DO on 2/1/25 for additional details.          Diagnoses:     Alcohol use disorder, severe, dependence with withdrawal with complication including hx of seizures and DTs  Nicotine dependence with withdrawal   Hx of polysubstance use including IVDU and Hep C s/p tx in 2015  Suicidal ideation, passive, improved  Insomnia, unspecified   OMARI  Bipolar affective disorder, type 2, recent episode depressed  PTSD  Hyopomagnesemia  High anion gap metabolic acidosis  Hypochloremia   GERD  COPD  Pulmonary nodules   Elevated LFTs    Clinically Significant Risk Factors          # Hypochloremia: Lowest Cl = 97 mmol/L in last 2 days, will monitor as appropriate          # Hypertension: Noted on problem list                # Financial/Environmental Concerns:              Labs:     Recent Results (from the past week)   Comprehensive metabolic panel    Collection Time: 01/28/25  6:30 AM   Result Value Ref Range    Sodium 138 135 - 145 mmol/L    Potassium 4.1 3.4 - 5.3 mmol/L    Carbon Dioxide (CO2) 23 22 - 29 mmol/L    Anion Gap 13 7 - 15 mmol/L    Urea Nitrogen 3.9 (L) 8.0 - 23.0 mg/dL    Creatinine 0.53 0.51 - 0.95 mg/dL    GFR Estimate >90 >60 mL/min/1.73m2    Calcium 9.1 8.8 - 10.4 mg/dL    Chloride 102 98 - 107 mmol/L    Glucose 95 70 - 99 mg/dL    Alkaline Phosphatase 49 40 - 150 U/L    AST 40 0 - 45 U/L    ALT 28 0 - 50 U/L    Protein Total 7.2 6.4 - 8.3 g/dL    Albumin 3.9 3.5 - 5.2 g/dL    Bilirubin Total 0.2 <=1.2 mg/dL   Troponin T, High Sensitivity    Collection Time: 01/28/25  6:30 AM   Result Value Ref Range    Troponin T, High Sensitivity 17 (H) <=14 ng/L   Nt probnp inpatient    Collection Time:  01/28/25  6:30 AM   Result Value Ref Range    N terminal Pro BNP Inpatient 43 0 - 900 pg/mL   Magnesium    Collection Time: 01/28/25  6:30 AM   Result Value Ref Range    Magnesium 1.6 (L) 1.7 - 2.3 mg/dL   CBC with platelets and differential    Collection Time: 01/28/25  6:30 AM   Result Value Ref Range    WBC Count 5.8 4.0 - 11.0 10e3/uL    RBC Count 3.85 3.80 - 5.20 10e6/uL    Hemoglobin 13.3 11.7 - 15.7 g/dL    Hematocrit 38.4 35.0 - 47.0 %     78 - 100 fL    MCH 34.5 (H) 26.5 - 33.0 pg    MCHC 34.6 31.5 - 36.5 g/dL    RDW 13.7 10.0 - 15.0 %    Platelet Count 313 150 - 450 10e3/uL    % Neutrophils 50 %    % Lymphocytes 32 %    % Monocytes 16 %    % Eosinophils 1 %    % Basophils 1 %    % Immature Granulocytes 0 %    NRBCs per 100 WBC 0 <1 /100    Absolute Neutrophils 2.9 1.6 - 8.3 10e3/uL    Absolute Lymphocytes 1.9 0.8 - 5.3 10e3/uL    Absolute Monocytes 0.9 0.0 - 1.3 10e3/uL    Absolute Eosinophils 0.0 0.0 - 0.7 10e3/uL    Absolute Basophils 0.1 0.0 - 0.2 10e3/uL    Absolute Immature Granulocytes 0.0 <=0.4 10e3/uL    Absolute NRBCs 0.0 10e3/uL   Extra Blue Top Tube    Collection Time: 01/28/25  6:31 AM   Result Value Ref Range    Hold Specimen JIC    Extra Red Top Tube    Collection Time: 01/28/25  6:31 AM   Result Value Ref Range    Hold Specimen JIC    Extra Red Top Tube    Collection Time: 01/28/25  6:31 AM   Result Value Ref Range    Hold Specimen JIC    D dimer quantitative    Collection Time: 01/28/25  6:31 AM   Result Value Ref Range    D-Dimer Quantitative 1.73 (H) 0.00 - 0.50 ug/mL FEU   Influenza A/B, RSV and SARS-CoV2 PCR (COVID-19) Nose    Collection Time: 01/28/25  6:32 AM    Specimen: Nose; Swab   Result Value Ref Range    Influenza A PCR Negative Negative    Influenza B PCR Negative Negative    RSV PCR Negative Negative    SARS CoV2 PCR Negative Negative   EKG 12 lead    Collection Time: 01/28/25  6:54 AM   Result Value Ref Range    Systolic Blood Pressure  mmHg    Diastolic Blood Pressure   mmHg    Ventricular Rate 82 BPM    Atrial Rate 82 BPM    KS Interval 162 ms    QRS Duration 78 ms     ms    QTc 434 ms    P Axis 53 degrees    R AXIS -16 degrees    T Axis 51 degrees    Interpretation ECG       Sinus rhythm  Normal ECG    Unconfirmed report - interpretation of this ECG is computer generated - see medical record for final interpretation  Confirmed by - EMERGENCY ROOM, PHYSICIAN ([id number]),  Otf Ponce ([id number]) on 1/28/2025 10:52:31 AM     Troponin T, High Sensitivity    Collection Time: 01/28/25  9:25 AM   Result Value Ref Range    Troponin T, High Sensitivity 15 (H) <=14 ng/L   Extra Purple Top Tube    Collection Time: 01/28/25  9:25 AM   Result Value Ref Range    Hold Specimen JIC    CT Chest Pulmonary Embolism w Contrast    Collection Time: 01/28/25 10:03 AM   Result Value Ref Range    Radiologist flags Lung nodule    Alcohol breath test POCT    Collection Time: 01/31/25  1:43 PM   Result Value Ref Range    Alcohol Breath Test 0.128 (A) 0.00 - 0.01   Comprehensive metabolic panel    Collection Time: 01/31/25  3:30 PM   Result Value Ref Range    Sodium 139 135 - 145 mmol/L    Potassium 4.3 3.4 - 5.3 mmol/L    Carbon Dioxide (CO2) 18 (L) 22 - 29 mmol/L    Anion Gap 24 (H) 7 - 15 mmol/L    Urea Nitrogen 3.8 (L) 8.0 - 23.0 mg/dL    Creatinine 0.50 (L) 0.51 - 0.95 mg/dL    GFR Estimate >90 >60 mL/min/1.73m2    Calcium 9.5 8.8 - 10.4 mg/dL    Chloride 97 (L) 98 - 107 mmol/L    Glucose 98 70 - 99 mg/dL    Alkaline Phosphatase 61 40 - 150 U/L    AST      ALT 41 0 - 50 U/L    Protein Total 8.3 6.4 - 8.3 g/dL    Albumin 4.4 3.5 - 5.2 g/dL    Bilirubin Total 0.5 <=1.2 mg/dL   Magnesium    Collection Time: 01/31/25  3:30 PM   Result Value Ref Range    Magnesium 1.5 (L) 1.7 - 2.3 mg/dL   CBC with platelets and differential    Collection Time: 01/31/25  3:30 PM   Result Value Ref Range    WBC Count 5.6 4.0 - 11.0 10e3/uL    RBC Count 4.41 3.80 - 5.20 10e6/uL    Hemoglobin 15.3 11.7 - 15.7 g/dL     Hematocrit 42.4 35.0 - 47.0 %    MCV 96 78 - 100 fL    MCH 34.7 (H) 26.5 - 33.0 pg    MCHC 36.1 31.5 - 36.5 g/dL    RDW 13.3 10.0 - 15.0 %    Platelet Count 327 150 - 450 10e3/uL    % Neutrophils 50 %    % Lymphocytes 37 %    % Monocytes 12 %    % Eosinophils 1 %    % Basophils 1 %    % Immature Granulocytes 0 %    NRBCs per 100 WBC 0 <1 /100    Absolute Neutrophils 2.8 1.6 - 8.3 10e3/uL    Absolute Lymphocytes 2.1 0.8 - 5.3 10e3/uL    Absolute Monocytes 0.7 0.0 - 1.3 10e3/uL    Absolute Eosinophils 0.0 0.0 - 0.7 10e3/uL    Absolute Basophils 0.0 0.0 - 0.2 10e3/uL    Absolute Immature Granulocytes 0.0 <=0.4 10e3/uL    Absolute NRBCs 0.0 10e3/uL   GGT    Collection Time: 01/31/25  3:30 PM   Result Value Ref Range    GGT 26 5 - 36 U/L   TSH with free T4 reflex    Collection Time: 01/31/25  3:30 PM   Result Value Ref Range    TSH 0.43 0.30 - 4.20 uIU/mL   Hemoglobin A1c    Collection Time: 01/31/25  3:30 PM   Result Value Ref Range    Estimated Average Glucose 114 <117 mg/dL    Hemoglobin A1C 5.6 <5.7 %   Urine Drug Screen Panel    Collection Time: 01/31/25  6:58 PM   Result Value Ref Range    Amphetamines Urine Screen Negative Screen Negative    Barbituates Urine Screen Negative Screen Negative    Benzodiazepine Urine Screen Positive (A) Screen Negative    Cannabinoids Urine Screen Negative Screen Negative    Cocaine Urine Screen Negative Screen Negative    Fentanyl Qual Urine Screen Negative Screen Negative    Opiates Urine Screen Negative Screen Negative    PCP Urine Screen Negative Screen Negative   Magnesium    Collection Time: 02/01/25  7:59 AM   Result Value Ref Range    Magnesium 2.2 1.7 - 2.3 mg/dL   Comprehensive metabolic panel    Collection Time: 02/01/25  7:59 AM   Result Value Ref Range    Sodium 137 135 - 145 mmol/L    Potassium 4.0 3.4 - 5.3 mmol/L    Carbon Dioxide (CO2) 26 22 - 29 mmol/L    Anion Gap 14 7 - 15 mmol/L    Urea Nitrogen 5.9 (L) 8.0 - 23.0 mg/dL    Creatinine 0.71 0.51 - 0.95 mg/dL    GFR  Estimate >90 >60 mL/min/1.73m2    Calcium 9.6 8.8 - 10.4 mg/dL    Chloride 97 (L) 98 - 107 mmol/L    Glucose 109 (H) 70 - 99 mg/dL    Alkaline Phosphatase 50 40 - 150 U/L    AST 59 (H) 0 - 45 U/L    ALT 33 0 - 50 U/L    Protein Total 7.7 6.4 - 8.3 g/dL    Albumin 4.4 3.5 - 5.2 g/dL    Bilirubin Total 1.0 <=1.2 mg/dL          Consults:   IM consult placed for management of other medical concerns, per consult note on 2/1/25:   Medicine service was consulted for medical co-management. Circumstances of recent discharge and re-admittance noted. Please refer to medicine assessment dated 1/13/2025 for recent medical evaluation, which was reviewed by this writer.     Noted pertinent diagnoses of COPD, pulmonary nodules, bipolar, PTSD, insomnia, hypothyroidism, GERD.   Labwork and vitals from this encounter also reviewed. No other recommendations at this time.     Recommendations     #Elevated AST  AST 59 on 2/1.  Likely secondary to alcohol consumption.  Recommend follow-up with PCP to monitor for resolution.      #COPD  #Enlarging pulmonary nodules  #Tobacco use disorder  Seen by pulmonology 12/31/2024.  Noted that new lung nodule had 6 to 65% risk for cancer.  Recommended empiric antibiotics with repeat CT in 3 months (March 2025).  Plan was to start empiric antibiotics on 2/1.  -Continue PTA inhalers  -Continue PTA Levaquin  -Contact medicine if respiratory status worsens     #Bipolar disorder  #PTSD  #Insomnia  -Management as per psychiatry     #Hypothyroidism  Last TSH 0.43 on 1/31/2025.  -Continue PTA Synthroid     #GERD  -Continue PTA Prilosec        No indication for medicine to re-evaluate the patient at this time. Please call the on-call medicine provider for any medical questions or concerns that may arise.      Abhijit Baron PA-C  Hospitalist Service, [place] [facility] Course:   Jolynn Rivera is a 64 year old female with history of  polysubstance use, bipolar disorder, PTSD who presented to ED seeking detox from alcohol. Medically cleared in ED, admitted to 3A as voluntary patient. Drinking 1.5L of wine daily for past 2 weeks, EtOH breath test 0.128(H), UDS positive for benzodiazepines. MSSA with diazepam started for alcohol withdrawal. Withdrawal and seizure precautions in place, reports history of seizures and DTs. Admission labs ordered and reviewed those resulted. IM consult placed. Had made some passive SI statements in ED, denies having any further SI thoughts upon arrival to unit and improvement in sobriety, denying HI. PTA medications continued as appropriate, holding PTA lunesta given controlled substance/hypnotic, can resume on discharge. Pt reports goals for hospitalization are to complete detox, engage with 55+ outpatient program and continue to work with her supports through psychiatry, therapy, IHS and [facility].     Jolynn Rivera did participate in groups and was visible in the milieu. The patient's symptoms of alcohol withdrawal improved. She was out of detox on 2/2 and requested to discharge home.     Today Jolynn Rivera reports having no thoughts of harming self or others. In addition, she has notable risk factors for self-harm, including age, single status, anxiety, and substance abuse. However, risk is mitigated by commitment to friends and dog, absence of past attempts, ability to volunteer a safety plan, history of seeking help when needed, and pt is future oriented and denying SI. Therefore, based on all available evidence including the factors cited above, she does not appear to be at imminent risk for self-harm, does not meet criteria for a 72-hr hold, and therefore remains appropriate for ongoing outpatient level of care.     Jolynn Rivera was  discharged  to home. At the time of discharge Jolynn Rivera was determined to not be a danger to herself or others.          Discharge Medications:     Discharge  "Medication List as of 2/2/2025  1:56 PM        CONTINUE these medications which have NOT CHANGED    Details   eszopiclone (LUNESTA) 3 MG tablet Take 3 mg by mouth at bedtime, Historical      gabapentin (NEURONTIN) 600 MG tablet Take 900 mg by mouth 3 times daily, Historical      levothyroxine (SYNTHROID/LEVOTHROID) 100 MCG tablet Take 100 mcg by mouth every morning, Historical      melatonin 5 MG tablet Take 10 mg by mouth at bedtime Takes 1 hour prior to Lunesta, Historical      multivitamin w/minerals (THERA-VIT-M) tablet Take 1 tablet by mouth daily, Disp-30 tablet, R-0, E-PrescribePt has supply      nicotine polacrilex (NICORETTE) 4 MG gum Place 4 mg inside cheek every hour as needed for smoking cessation, Historical      omeprazole (PRILOSEC) 40 MG DR capsule Take 40 mg by mouth every morning, Historical      ondansetron (ZOFRAN ODT) 4 MG ODT tab Take 4 mg by mouth every 8 hours as needed for vomiting or nausea, Historical      propranolol (INDERAL) 20 MG tablet Take 20 mg by mouth 3 times daily, Historical      thiamine (B-1) 100 MG tablet Take 1 tablet (100 mg) by mouth daily, Disp-30 tablet, R-0, E-Prescribe      Fluticasone-Umeclidin-Vilanterol (TRELEGY ELLIPTA) 200-62.5-25 MCG/ACT oral inhaler Inhale 1 puff into the lungs daily. Has not started yet., Historical      levalbuterol (XOPENEX HFA) 45 MCG/ACT inhaler Inhale 2 puffs into the lungs every 4 hours as needed for shortness of breath or wheezing. Has not started yet., Historical      levofloxacin (LEVAQUIN) 500 MG tablet Take 500 mg by mouth daily. Start FEB 1ST 2025., Historical                  Psychiatric Examination:   Appearance:  awake, alert and adequately groomed  Attitude:  cooperative  Eye Contact:  good  Mood:   \"doing good\"  Affect:  appropriate and in normal range and mood congruent  Speech:  clear, coherent and normal prosody  Psychomotor Behavior:  no evidence of tardive dyskinesia, dystonia, or tics  Thought Process:   tangential at " baseline  Associations:  no loose associations  Thought Content:  no evidence of suicidal ideation or homicidal ideation and no evidence of psychotic thought  Insight:  fair  Judgment:  intact  Oriented to:  time, person, and place  Attention Span and Concentration:  intact  Recent and Remote Memory:  intact  Language: English, fluent  Fund of Knowledge: appropriate  Muscle Strength and Tone: normal  Gait and Station: Normal         Discharge Plan:   Recommendation:  You are recommended to abstain from alcohol and other drugs. A referral was made to [facility] for our 55+ IOP program. If you don't hear from a representative within 2 business days, please call [phone].     Disposition: Home located at [address] [place] [address].     Follow-up Appointment:   Pulmonary Initial Wednesday February 5th arrive by 12:45 PM  Kya Rivas MD with [facility] [address] [address]     Phone [phone]     Acupuncture Thursday February 6th at 1:00 PM  Corine May LAc with [facility]  [address] [place] [address] [address] [place] [address]  Phone: [phone]     Psychotherapy Friday February 7th at 8:00 AM  Psychotherapy Friday February 21st at 8:00 AM  SHAMIR Castillo with [place] [address]  Phone: [phone] Fax: [phone]     Psychiatry Monday February 24th at 10:30 AM  Benjie Herrera MD with [address] [address]  Phone: [phone] Fax: [phone]      Attestation:  Patient has been seen and evaluated by me, Jessica Haskins DO on day of discharge. 34 minutes were spent in coordination of discharge planning.

## 2025-02-02 NOTE — PLAN OF CARE
"  Problem: Adult Behavioral Health Plan of Care  Goal: Plan of Care Review  Recent Flowsheet Documentation  Taken 2/2/2025 [id number] by Yoko Alex RN  Patient Agreement with Plan of Care: agrees   Goal Outcome Evaluation:    Plan of Care Reviewed With: patient        Pt out of detox since 0815 this AM and is not scoring to receive any Valium since yesterday, she is therefore no longer on alcohol monitoring, she will be discharging to home. Blood pressure 127/90, pulse 75, temperature 97  F (36.1  C), temperature source Temporal, resp. rate 18, height 1.6 m (5' 3\"), weight 62.6 kg (138 lb), SpO2 96%, not currently breastfeeding.  Pt denies pain, anxiety or depression, spent most of the shift in the milieu socializing and engaging with peers and staff, she is parag stable, no SI/HI.  Imodium given for loose stool with relief, pt will set up a PCP appointment after discharge.    Plans to enroll at [address]. Discharging non med. instructions reviewed with pt.Belongings returned, pt escorted off the unit.           "

## 2025-02-03 ENCOUNTER — PATIENT OUTREACH (OUTPATIENT)
Dept: CARE COORDINATION | Facility: CLINIC | Age: 65
End: 2025-02-03
Payer: MEDICARE

## 2025-02-03 NOTE — PROGRESS NOTES
Clinic Care Coordination Contact  Transitions of Care Outreach  Chief Complaint   Patient presents with    Clinic Care Coordination - Post Hospital       Most Recent Admission Date: 1/31/2025   Most Recent Admission Diagnosis: Alcohol abuse with withdrawal (H) - F10.139     Most Recent Discharge Date: 2/2/2025   Most Recent Discharge Diagnosis: Alcohol abuse with withdrawal (H) - F10.139  PTSD (post-traumatic stress disorder) - F43.10  Bipolar disorder, most recent episode depressed (H) - F31.30  Generalized anxiety disorder - F41.1  Nausea - R11.0  Anxiety - F41.9  Tremor - R25.1  Fasciculation - R25.3     Transitions of Care Assessment    Discharge Assessment  How are you doing now that you are home?: Patient shares that she is doing well and is waiting to hear from the 55 plus program she's been referred to. Writer provided number to reach out to them if she doesn't hear anything. No questions/concerns or needs at this time. Patient shares that she is grateful for the wonderful care she receives at [facility].  How are your symptoms? (Red Flag symptoms escalate to triage hotline per guidelines): Improved  Do you know how to contact your clinic care team if you have future questions or changes to your health status? : Yes  Does the patient have their discharge instructions? : Yes  Does the patient have questions regarding their discharge instructions? : No  Were you started on any new medications or were there changes to any of your previous medications? : Yes  Does the patient have all of their medications?: Yes  Do you have questions regarding any of your medications? : No  Do you have all of your needed medical supplies or equipment (DME)?  (i.e. oxygen tank, CPAP, cane, etc.): Yes    Post-op ([facility] Only)  If the patient had a surgery or procedure, do they have any questions for a nurse?: No    Post-op (Clinicians Only)  Did the patient have surgery or a procedure: No        Follow up Plan     Discharge  Follow-Up  Discharge follow up appointment scheduled in alignment with recommended follow up timeframe or Transitions of Risk Category? (Low = within 30 days; Moderate= within 14 days; High= within 7 days): Yes  Discharge Follow Up Appointment Date: 02/07/25  Discharge Follow Up Appointment Scheduled with?: Specialty Care Provider (Therapy)    Future Appointments   Date Time Provider Department Center   2/5/2025  1:00 PM Kya Rivas MD [facility]       Outpatient Plan as outlined on AVS reviewed with patient.    For any urgent concerns, please contact our 24 hour nurse triage line: [phone] ([phone])       COLLIN Zarate

## 2025-02-05 ENCOUNTER — PRE VISIT (OUTPATIENT)
Dept: PULMONOLOGY | Facility: CLINIC | Age: 65
End: 2025-02-05
Payer: MEDICARE

## 2025-02-10 ENCOUNTER — TELEPHONE (OUTPATIENT)
Dept: PULMONOLOGY | Facility: CLINIC | Age: 65
End: 2025-02-10
Payer: MEDICARE

## 2025-02-10 NOTE — TELEPHONE ENCOUNTER
2/12/2025 8:03AM  Left Voicemail (2nd Attempt) MAX ATTEMPTS REACHED- LVM for the patient to call back and schedule the following:    Appointment type: Return [facility]  Provider: Alexandria Sands PA-C  Return date: After 2/21 CT and PFT  Specialty phone number: [phone]  Additional appointment(s) needed: NA  Additonal Notes: 2/12 MAX ATTEMPTS REACHED- LVM for pt schedule Return CCSL w/ Alexandria Sands after 2/21 CT and PFT. [facility]   Rheumatology, Gastroenterology, Nephrology, Infectious Disease, Pulmonology  [facility]         2/10/2025 12:16PM  Left Voicemail (1st Attempt) and Sent Mychart (1st Attempt) for the patient to call back and schedule the following:    Appointment type: Return [facility]CS[facility]  Provider: Alexandria Sands PA-C  Return date: After 2/21 CT and PFT  Specialty phone number: [phone]  Additional appointment(s) needed: NA  Additonal Notes: 2/10 LVM and MYC for pt to schedule Return CCSL w/ Alexandria Sands after 2/21 CT and PFT. [facility]   Rheumatology, Gastroenterology, Nephrology, Infectious Disease, Pulmonology  [facility]        ----- Message from Anita DANIELSON sent at 2/10/2025 12:06 PM CST -----  Regarding: RE: Pt mentions fungus, was told to be seen soon for PFT + CT, but MD visits booked until April  Unfortunately he is booked until April. She can see Alexandria Sands for follow-up instead after 2/21 CT/PFTs to discuss.     MG team - can you assist?  ----- Message -----  From: Jeanette Schmitt  Sent: 2/10/2025  12:04 PM CST  To: Anita Castro RN  Subject: FW: Pt mentions fungus, was told to be seen #      ----- Message -----  From: Anselmo Chamorro  Sent: 2/10/2025  11:25 AM CST  To: Chip Araiza MD; Jeanette Schmitt  Subject: Pt mentions fungus, was told to be seen soon#    Hello and good morning, team,    This patient called to schedule her PFT & CT soon, but it  looks like Dr. Araiza is booking out until April.    The patient mentions she was told to schedule something soon due to fungus in her lung but I am not seeing any earlier openings.    In the meantime, the patient requested setting up the PFT and CT for next Friday (02/21). How should we proceed with regards to the MD visit?    - CLAUDIA Hernández, LGSW  Sr. Clinic Coordinator ([facility]Him)    [facility]    [phone] (Option 5, Option 2)

## 2025-02-17 ENCOUNTER — VIRTUAL VISIT (OUTPATIENT)
Dept: BEHAVIORAL HEALTH | Facility: CLINIC | Age: 65
End: 2025-02-17
Payer: MEDICARE

## 2025-02-17 ENCOUNTER — HOSPITAL ENCOUNTER (EMERGENCY)
Facility: CLINIC | Age: 65
Discharge: HOME | End: 2025-02-17
Attending: FAMILY MEDICINE | Admitting: FAMILY MEDICINE
Payer: MEDICARE

## 2025-02-17 ENCOUNTER — APPOINTMENT (OUTPATIENT)
Dept: GENERAL RADIOLOGY | Facility: CLINIC | Age: 65
End: 2025-02-17
Attending: FAMILY MEDICINE
Payer: MEDICARE

## 2025-02-17 VITALS
DIASTOLIC BLOOD PRESSURE: 88 MMHG | RESPIRATION RATE: 16 BRPM | OXYGEN SATURATION: 92 % | TEMPERATURE: 97.8 F | HEART RATE: 73 BPM | SYSTOLIC BLOOD PRESSURE: 111 MMHG

## 2025-02-17 DIAGNOSIS — J44.1 COPD WITH ACUTE EXACERBATION (H): ICD-10-CM

## 2025-02-17 DIAGNOSIS — F31.30 BIPOLAR DISORDER, MOST RECENT EPISODE DEPRESSED (H): ICD-10-CM

## 2025-02-17 DIAGNOSIS — F43.10 PTSD (POST-TRAUMATIC STRESS DISORDER): Primary | ICD-10-CM

## 2025-02-17 DIAGNOSIS — F41.1 GENERALIZED ANXIETY DISORDER: ICD-10-CM

## 2025-02-17 LAB
BASOPHILS # BLD AUTO: 0 10E3/UL (ref 0–0.2)
BASOPHILS NFR BLD AUTO: 1 %
EOSINOPHIL # BLD AUTO: 0 10E3/UL (ref 0–0.7)
EOSINOPHIL NFR BLD AUTO: 1 %
ERYTHROCYTE [DISTWIDTH] IN BLOOD BY AUTOMATED COUNT: 12.4 % (ref 10–15)
FLUAV RNA SPEC QL NAA+PROBE: NEGATIVE
FLUBV RNA RESP QL NAA+PROBE: NEGATIVE
HCT VFR BLD AUTO: 42.6 % (ref 35–47)
HGB BLD-MCNC: 15.2 G/DL (ref 11.7–15.7)
IMM GRANULOCYTES # BLD: 0 10E3/UL
IMM GRANULOCYTES NFR BLD: 0 %
LYMPHOCYTES # BLD AUTO: 2.6 10E3/UL (ref 0.8–5.3)
LYMPHOCYTES NFR BLD AUTO: 41 %
MAGNESIUM SERPL-MCNC: 1.8 MG/DL (ref 1.7–2.3)
MCH RBC QN AUTO: 34.2 PG (ref 26.5–33)
MCHC RBC AUTO-ENTMCNC: 35.7 G/DL (ref 31.5–36.5)
MCV RBC AUTO: 96 FL (ref 78–100)
MONOCYTES # BLD AUTO: 0.6 10E3/UL (ref 0–1.3)
MONOCYTES NFR BLD AUTO: 10 %
NEUTROPHILS # BLD AUTO: 3 10E3/UL (ref 1.6–8.3)
NEUTROPHILS NFR BLD AUTO: 47 %
NRBC # BLD AUTO: 0 10E3/UL
NRBC BLD AUTO-RTO: 0 /100
NT-PROBNP SERPL-MCNC: <36 PG/ML (ref 0–900)
PLATELET # BLD AUTO: 329 10E3/UL (ref 150–450)
RBC # BLD AUTO: 4.44 10E6/UL (ref 3.8–5.2)
RSV RNA SPEC NAA+PROBE: NEGATIVE
SARS-COV-2 RNA RESP QL NAA+PROBE: NEGATIVE
WBC # BLD AUTO: 6.3 10E3/UL (ref 4–11)

## 2025-02-17 PROCEDURE — 87637 SARSCOV2&INF A&B&RSV AMP PRB: CPT | Performed by: FAMILY MEDICINE

## 2025-02-17 PROCEDURE — 36415 COLL VENOUS BLD VENIPUNCTURE: CPT | Performed by: FAMILY MEDICINE

## 2025-02-17 PROCEDURE — 83880 ASSAY OF NATRIURETIC PEPTIDE: CPT | Performed by: FAMILY MEDICINE

## 2025-02-17 PROCEDURE — 94640 AIRWAY INHALATION TREATMENT: CPT | Performed by: FAMILY MEDICINE

## 2025-02-17 PROCEDURE — 85049 AUTOMATED PLATELET COUNT: CPT | Performed by: FAMILY MEDICINE

## 2025-02-17 PROCEDURE — 250N000011 HC RX IP 250 OP 636: Performed by: FAMILY MEDICINE

## 2025-02-17 PROCEDURE — 71046 X-RAY EXAM CHEST 2 VIEWS: CPT

## 2025-02-17 PROCEDURE — 99207 PR NO CHARGE LOS: CPT | Mod: 95 | Performed by: SOCIAL WORKER

## 2025-02-17 PROCEDURE — 250N000009 HC RX 250: Performed by: FAMILY MEDICINE

## 2025-02-17 PROCEDURE — 99284 EMERGENCY DEPT VISIT MOD MDM: CPT | Mod: 25 | Performed by: FAMILY MEDICINE

## 2025-02-17 PROCEDURE — 83735 ASSAY OF MAGNESIUM: CPT | Performed by: FAMILY MEDICINE

## 2025-02-17 PROCEDURE — 99284 EMERGENCY DEPT VISIT MOD MDM: CPT | Performed by: FAMILY MEDICINE

## 2025-02-17 RX ORDER — AZITHROMYCIN 250 MG/1
TABLET, FILM COATED ORAL
Qty: 6 TABLET | Refills: 0 | Status: ON HOLD | OUTPATIENT
Start: 2025-02-17 | End: 2025-02-22

## 2025-02-17 RX ORDER — LEVALBUTEROL INHALATION SOLUTION 1.25 MG/3ML
1.25 SOLUTION RESPIRATORY (INHALATION) ONCE
Status: COMPLETED | OUTPATIENT
Start: 2025-02-17 | End: 2025-02-17

## 2025-02-17 RX ORDER — LEVALBUTEROL TARTRATE 45 UG/1
2 AEROSOL, METERED ORAL EVERY 4 HOURS PRN
Qty: 15 G | Refills: 1 | Status: ON HOLD | OUTPATIENT
Start: 2025-02-17

## 2025-02-17 RX ORDER — ONDANSETRON 4 MG/1
4 TABLET, ORALLY DISINTEGRATING ORAL ONCE
Status: COMPLETED | OUTPATIENT
Start: 2025-02-17 | End: 2025-02-17

## 2025-02-17 RX ADMIN — ONDANSETRON 4 MG: 4 TABLET, ORALLY DISINTEGRATING ORAL at 15:35

## 2025-02-17 RX ADMIN — LEVALBUTEROL HYDROCHLORIDE 1.25 MG: 1.25 SOLUTION RESPIRATORY (INHALATION) at 15:36

## 2025-02-17 ASSESSMENT — ANXIETY QUESTIONNAIRES
5. BEING SO RESTLESS THAT IT IS HARD TO SIT STILL: NOT AT ALL
GAD7 TOTAL SCORE: 6
7. FEELING AFRAID AS IF SOMETHING AWFUL MIGHT HAPPEN: SEVERAL DAYS
6. BECOMING EASILY ANNOYED OR IRRITABLE: SEVERAL DAYS
1. FEELING NERVOUS, ANXIOUS, OR ON EDGE: SEVERAL DAYS
IF YOU CHECKED OFF ANY PROBLEMS ON THIS QUESTIONNAIRE, HOW DIFFICULT HAVE THESE PROBLEMS MADE IT FOR YOU TO DO YOUR WORK, TAKE CARE OF THINGS AT HOME, OR GET ALONG WITH OTHER PEOPLE: VERY DIFFICULT
2. NOT BEING ABLE TO STOP OR CONTROL WORRYING: SEVERAL DAYS
4. TROUBLE RELAXING: SEVERAL DAYS
3. WORRYING TOO MUCH ABOUT DIFFERENT THINGS: SEVERAL DAYS

## 2025-02-17 ASSESSMENT — COLUMBIA-SUICIDE SEVERITY RATING SCALE - C-SSRS
2. HAVE YOU ACTUALLY HAD ANY THOUGHTS OF KILLING YOURSELF IN THE PAST MONTH?: YES
4. HAVE YOU HAD THESE THOUGHTS AND HAD SOME INTENTION OF ACTING ON THEM?: YES
1. IN THE PAST MONTH, HAVE YOU WISHED YOU WERE DEAD OR WISHED YOU COULD GO TO SLEEP AND NOT WAKE UP?: NO
REASONS FOR IDEATION LIFETIME: DOES NOT APPLY
6. HAVE YOU EVER DONE ANYTHING, STARTED TO DO ANYTHING, OR PREPARED TO DO ANYTHING TO END YOUR LIFE?: NO
2. HAVE YOU ACTUALLY HAD ANY THOUGHTS OF KILLING YOURSELF LIFETIME?: YES
1. IN THE PAST MONTH, HAVE YOU WISHED YOU WERE DEAD OR WISHED YOU COULD GO TO SLEEP AND NOT WAKE UP?: YES
3. HAVE YOU BEEN THINKING ABOUT HOW YOU MIGHT KILL YOURSELF?: NO
5. HAVE YOU STARTED TO WORK OUT OR WORKED OUT THE DETAILS OF HOW TO KILL YOURSELF? DO YOU INTEND TO CARRY OUT THIS PLAN?: NO

## 2025-02-17 ASSESSMENT — PATIENT HEALTH QUESTIONNAIRE - PHQ9
SUM OF ALL RESPONSES TO PHQ QUESTIONS 1-9: 9
SUM OF ALL RESPONSES TO PHQ QUESTIONS 1-9: 9
10. IF YOU CHECKED OFF ANY PROBLEMS, HOW DIFFICULT HAVE THESE PROBLEMS MADE IT FOR YOU TO DO YOUR WORK, TAKE CARE OF THINGS AT HOME, OR GET ALONG WITH OTHER PEOPLE: SOMEWHAT DIFFICULT

## 2025-02-17 ASSESSMENT — ACTIVITIES OF DAILY LIVING (ADL)
ADLS_ACUITY_SCORE: 58

## 2025-02-17 NOTE — ED TRIAGE NOTES
Per EMS, pt c/o SOB. Pt has hx of alcohol use, COPD, MH, seizure withdrawal from alcohol. VSS. Duoneb given en route.

## 2025-02-17 NOTE — CONFIDENTIAL NOTE
"[facility]        PATIENT'S NAME: Jolynn Rivera  PREFERRED NAME: Jolynn  PRONOUNS: she/her/hers    MRN: [id number]  : 1960  ADDRESS: [address] [place] [address] [id number] [place], [place] [address]T. NUMBER:  [id number]  DATE OF SERVICE: 25  START TIME: 1130  END TIME: 1230  PREFERRED PHONE: [phone]  May we leave a program related message: Yes  EMERGENCY CONTACT: was not obtained  .  SERVICE MODALITY:  Video Visit:      Provider verified identity through the following two step process.  Patient provided:  Patient     Telemedicine Visit: The patient's condition can be safely assessed and treated via synchronous audio and visual telemedicine encounter.      Reason for Telemedicine Visit: Patient has requested telehealth visit    Originating Site (Patient Location): Patient's home    Distant Site (Provider Location): Provider Remote Setting- Home Office    Consent:  The patient/guardian has verbally consented to: the potential risks and benefits of telemedicine (video visit) versus in person care; bill my insurance or make self-payment for services provided; and responsibility for payment of non-covered services.     Patient would like the video invitation sent by:  Send to e-mail at: [email or web address]    Mode of Communication:  Video Conference via [employer]well    Distant Location (Provider):  Off-site    As the provider I attest to compliance with applicable laws and regulations related to telemedicine.    UNIVERSAL ADULT Mental Health DIAGNOSTIC ASSESSMENT    Identifying Information:  Patient is a 64 year old,  individual.  Patient was referred for an assessment by current Behavioral Health Provider.  Patient attended the session alone.     Chief Complaint:   The reason for seeking services at this time is: \"over 56 mental health out patient program\".  The problem(s) began 62. Pt. stated, \"I just need to figure out this mental health shit.\"    Patient has attempted " "to resolve these concerns in the past through EMDR, individual Therapy, Detox and  duel DX treatment.   patient stated, that  she wants to get into the \"root of her mental health problems  and if that can be done, then the drinking can stop.\"  Was discharged from detox on 1/15/25.  Patient has a history of polysubstance use, Bipolar Disorder, PTSD, OMARI  and Mood Disorder.    Social/Family History:  Patient reported they grew up in [facility].  They were raised by biological parents. per medical record patient grew up in [place] in [place], [place].  Parents were always together.  Patient reported that their childhood was \"growing up with Mom who was a psychopath and Dad was a drunk.\" Mother is . Father is in his 90's. Patient described their current relationships with family of origin as in current contact. Has two brothers. One brother in contact with and the other she does not.    The patient describes their cultural background as ,  Liberian, Welsh and Cheondoism. Cultural influences and impact on patient's life structure, values, norms, and healthcare: LGBTQ.  Contextual influences on patient's health include: Health- Seeking Factors multiple health issues.     These factors will be addressed in the Preliminary Treatment plan. Patient identified their preferred language to be English. Patient reported they does not need the assistance of an  or other support involved in therapy.     Patient reported had no significant delays in developmental tasks.   Patient's highest education level was college graduate.  Patient identified the following learning problems: none reported.  Modifications will not be used to assist communication in therapy.  Patient reports they are  able to understand written materials.    Patient reported the following relationship history.  Patient's current relationship status is single.   Patient identified their sexual orientation as padilla.  Patient " reported having  no child(stan). Patient identified no one as part of their support system.  Patient identified the quality of these relationships as inconsistent.    Patient's current living/housing situation involves staying in own home/apartment.  The immediate members of family and household include me, 62,myself and they report that housing is stable.    Patient is currently disabled.  Patient reports their finances are obtained through SSDI disability. Patient does identify finances as a current stressor.      Patient reported that they have not been involved with the legal system.  Patient does not report being under probation/ parole/ jurisdiction.    Patient's Strengths and Limitations:  Patient identified the following strengths or resources that will help them succeed in treatment:  and sober support group / recovery support  Things that may interfere with the patient's success in treatment include: lack of family support.     Assessments:  The following assessments were completed by patient for this visit:  PHQ9:       3/16/2016    10:00 AM 7/8/2016     9:00 AM 8/16/2016     8:40 AM 10/5/2016     9:40 AM 11/11/2016     1:40 PM 6/27/2017     8:55 AM 2/17/2025     9:18 AM   PHQ-9 SCORE   PHQ-9 Total Score MyChart       9 (Mild depression)   PHQ-9 Total Score 8 12 6 8 4 20 9        Patient-reported     GAD2:       2/17/2025     9:38 AM   OMARI-2   Feeling nervous, anxious, or on edge 1   Not being able to stop or control worrying 1   OMARI-2 Total Score 2        Patient-reported     GAD7:       11/3/2014    12:00 PM   OMARI-7 SCORE   Total Score BEH Adult 15     OMARI-7 Scoring 6  indicating mild anxiety.  Flowsheet would not populate on 2/17/25.  CAGE-AID:       2/17/2025     9:41 AM   CAGE-AID Total Score   Total Score 3    Total Score MyChart 3 (A total score of 2 or greater is considered clinically significant)       Patient-reported     PROMIS 10-Global Health (all questions and answers displayed):        2/17/2025     9:41 AM   PROMIS 10   In general, would you say your health is: Good   In general, would you say your quality of life is: Good   In general, how would you rate your physical health? Fair   In general, how would you rate your mental health, including your mood and your ability to think? Fair   In general, how would you rate your satisfaction with your social activities and relationships? Fair   In general, please rate how well you carry out your usual social activities and roles Fair   To what extent are you able to carry out your everyday physical activities such as walking, climbing stairs, carrying groceries, or moving a chair? Mostly   In the past 7 days, how often have you been bothered by emotional problems such as feeling anxious, depressed, or irritable? Often   In the past 7 days, how would you rate your fatigue on average? Moderate   In the past 7 days, how would you rate your pain on average, where 0 means no pain, and 10 means worst imaginable pain? 6   In general, would you say your health is: 3   In general, would you say your quality of life is: 3   In general, how would you rate your physical health? 2   In general, how would you rate your mental health, including your mood and your ability to think? 2   In general, how would you rate your satisfaction with your social activities and relationships? 2   In general, please rate how well you carry out your usual social activities and roles. (This includes activities at home, at work and in your community, and responsibilities as a parent, child, spouse, employee, friend, etc.) 2   To what extent are you able to carry out your everyday physical activities such as walking, climbing stairs, carrying groceries, or moving a chair? 4   In the past 7 days, how often have you been bothered by emotional problems such as feeling anxious, depressed, or irritable? 4   In the past 7 days, how would you rate your fatigue on average? 3   In the past  7 days, how would you rate your pain on average, where 0 means no pain, and 10 means worst imaginable pain? 6   Global Mental Health Score 9    Global Physical Health Score 12    PROMIS TOTAL - SUBSCORES 21        Patient-reported     [place] Suicide Severity Rating Scale (Lifetime/Recent)      12/2/2024    12:26 PM 1/9/2025    12:23 AM 1/13/2025     2:47 PM 1/28/2025     6:07 AM 1/31/2025     1:18 PM 1/31/2025     8:57 PM 2/17/2025    11:00 AM   [place] Suicide Severity Rating (Lifetime/Recent)   Q1 Wish to be Dead (Lifetime)      No No   Q2 Non-Specific Active Suicidal Thoughts (Lifetime)      Yes Yes   Most Severe Ideation Rating (Lifetime)      1 1   Most Severe Ideation Description (Lifetime)      Passive SI ideation passive SI ideation   Frequency (Lifetime)      1 1   Duration (Lifetime)      1 1   Controllability (Lifetime)      1 1   Protective Factors  (Lifetime)      1 1   Reasons for Ideation (Lifetime)      0 0   Q1 Wished to be Dead (Past Month) 0-->no 1-->yes 0-->no 0-->no 1-->yes 1-->yes 1-->yes   Q2 Suicidal Thoughts (Past Month) 0-->no 1-->yes 0-->no 0-->no 1-->yes 1-->yes 1-->yes   Q3 Suicidal Thought Method  1-->yes   0-->no 0-->no 0-->no   Q4 Suicidal Intent without Specific Plan  0-->no   1-->yes 1-->yes 1-->yes   Q5 Suicide Intent with Specific Plan  0-->no   0-->no 0-->no 0-->no   Q6 Suicide Behavior (Lifetime) 0-->no 0-->no 0-->no 0-->no 1-->yes 0-->no 0-->no   If yes to Q6, within past 3 months?     0-->no 0-->no 0-->no   Level of Risk per Screen no risks indicated moderate risk no risks indicated no risks indicated high risk high risk high risk   Suicidal/Self-Injurious Behavior (3 mo)      other preparatory acts to kill self    Suicidal/Self-Injurious Behavior (Life)      other preparatory acts to kill self    Suicidal Ideation(Most Severe Past Mnth)      suicidal thoughts    Activating Events (Recent)      recent loss(es) or other significant negative event(s) (legal, financial,  "relationship, etc.)    Describe (Recent Loss/Negative Event)      Five deaths, a cat and a dog  within two years.    Treatment History      previous psychiatric diagnoses and treatments    Do you have guns available to you?      No    Other Risk Factors      None    Clinical Status (Recent)      major depressive episode    Protective Factors (Recent)      identifies reasons for living    Other Protective Factors      \"I have 30 more years to live    Describe Suicidal/Self-Inj/Aggress Behav      None        Personal and Family Medical History:  Patient does report a family history of mental health concerns.  Patient reports family history includes Alcohol/Drug in her brother, brother, brother, and father; Anxiety Disorder in her mother; Arthritis in her father; Asthma in her mother; Depression in her brother; Hypertension in her brother; Prostate Cancer in her father; Psychotic Disorder in her brother..     Patient does report Mental Health Diagnosis and/or Treatment.  Patient Patient reported the following previous diagnoses which include(s): an Anxiety Disorder, Depression, and PTSD.  Patient reported symptoms began years ago.   Patient has received mental health services in the past: [facility] with   [facility], case management with  [facility], therapy with  [facility], day treatment with  [facility] EMDR, and psychiatry with  Dr. Thurman  at [facility].   Psychiatric Hospitalizations: .  Patient denies a history of civil commitment.  Patient is receiving other mental health services.  These include [facility] with  [facility], case management with  [facility] and [facility], psychotherapy with  [facility] through [facility], and  medication management through [facility].     Psychotherapy  at 8:00 AM  Psychotherapy  at 8:00 AM  SHAMIR Castillo with [facility]  [address] [facility] [address]  Phone: " [phone] Fax: [phone]     Psychiatry Monday February 24th at 10:30 AM  Benjie Herrera MD with [facility] [id number]  Phone: [phone] Fax: [phone]       Patient has had a physical exam to rule out medical causes for current symptoms.  Date of last physical exam was within the past year. Symptoms have developed since last physical exam and client was encouraged to follow up with PCP.   The patient has a [facility] Primary Care Provider, who is named Carmela Srinivasan.  Patient reports the following current medical concerns: currently has the flu .  Patient denies any issues with pain..   There are not significant appetite / nutritional concerns / weight changes.   Patient does not report a history of head injury / trauma / cognitive impairment.      Current Outpatient Medications   Medication Sig Dispense Refill    eszopiclone (LUNESTA) 3 MG tablet Take 3 mg by mouth at bedtime      Fluticasone-Umeclidin-Vilanterol (TRELEGY ELLIPTA) 200-62.5-25 MCG/ACT oral inhaler Inhale 1 puff into the lungs daily. Has not started yet.      gabapentin (NEURONTIN) 600 MG tablet Take 900 mg by mouth 3 times daily      levalbuterol (XOPENEX HFA) 45 MCG/ACT inhaler Inhale 2 puffs into the lungs every 4 hours as needed for shortness of breath or wheezing. Has not started yet.      levofloxacin (LEVAQUIN) 500 MG tablet Take 500 mg by mouth daily. Start FEB 1ST 2025.      levothyroxine (SYNTHROID/LEVOTHROID) 100 MCG tablet Take 100 mcg by mouth every morning      melatonin 5 MG tablet Take 10 mg by mouth at bedtime Takes 1 hour prior to Lunesta      multivitamin w/minerals (THERA-VIT-M) tablet Take 1 tablet by mouth daily 30 tablet 0    nicotine polacrilex (NICORETTE) 4 MG gum Place 4 mg inside cheek every hour as needed for smoking cessation      omeprazole (PRILOSEC) 40 MG DR capsule Take 40 mg by mouth every morning      ondansetron (ZOFRAN ODT) 4 MG ODT tab Take 4 mg by  "mouth every 8 hours as needed for vomiting or nausea      propranolol (INDERAL) 20 MG tablet Take 20 mg by mouth 3 times daily      thiamine (B-1) 100 MG tablet Take 1 tablet (100 mg) by mouth daily 30 tablet 0     No current facility-administered medications for this visit.     Medication Adherence:  Patient reports taking.  taking psychiatric medications as prescribed.    Patient Allergies:    Allergies   Allergen Reactions    Bee Venom Swelling    Wasps [Hornets] Swelling    Amlodipine      Nightmares    Antihistamines, Chlorpheniramine-Type [Antihistamines, Chlorpheniramine-Type]     Clonidine     Compazine      Lock jaw    Diphenhydramine Muscle Pain (Myalgia) and Cramps    Hydroxyzine Cramps     Lock jaw    Metoclopramide      Tardive Dyskinesia    Penicillins      Panic attack    Prednisone Anxiety     Panic attacks.      Prochlorperazine Muscle Pain (Myalgia) and Cramps    Trazodone Nausea and Vomiting and Confusion     \"I ended up falling and feeling like I was drunk\"    Umeclidinium Bromide      Mood swings + anger.       Medical History:    Past Medical History:   Diagnosis Date    Anxiety     COPD (chronic obstructive pulmonary disease) (H)     COPD (chronic obstructive pulmonary disease) (H)     Hepatitis C     PTSD (post-traumatic stress disorder)     Seizures (H)     second to ETOH    Substance abuse (H)          Current Mental Status Exam:   Appearance:  Appropriate    Eye Contact:  Good   Psychomotor:  Agitated       Gait / station:  no problem  Attitude / Demeanor: Cooperative   Speech      Rate / Production: Hyperverbal  Talkative      Volume:  Normal  volume      Language:  intact  Mood:   Irritable   Affect:   Expansive    Thought Content: Clear   Thought Process: Coherent  Tangential       Associations: No loosening of associations  Insight:   Good  and Fair   Judgment:  Intact   Orientation:  All  Attention/concentration: Good and Fair    Substance Use:   Patient did report a family history of " substance use concerns; see medical history section for details.  Patient has received chemical dependency treatment in the past at [facility] and through [place] [facility].     Patient has ever been to detox.      Patient is currently receiving the following services:  indicated that she is participating in the program called:([facility]) a outpatient  program although  has only attended 1 week due to acquiring the flu.    Had participated in a program called [facility] (a online recovery program) but patient indicated she was asked to leave that program.          Substance History of use Age of first use Date of last use     Pattern and duration of use (include amounts and frequency)   Alcohol used in the past   18 02/02/25 daily   Cannabis   never used          Amphetamines   never used        Cocaine/crack    never used          Hallucinogens never used            Inhalants never used            Heroin used in the past   20 01/01/86     Other Opiates never used        Benzodiazepine   never used        Barbiturates never used        Over the counter meds never used        Caffeine currently use 2      Nicotine  currently use 18 02/17/25    Other substances not listed above:  Identify:  never used          Patient reported the following problems as a result of their substance use: academic; financial problems; occupational/vocational problems; relationship problems.  Patient reports obtaining substances since recent hospitalization at [facility].    Substance Use:   Patient stated that she has not had anything to drink for approximately 2 weeks.    Based on the CAGE score of 2 and clinical interview there  are indications of drug or alcohol abuse.   Indicated that she has  already completed a substance abuse assessment .    Significant Losses / Trauma / Abuse / Neglect Issues:   Patient did not serve in the .  There are indications or report of significant loss, trauma, abuse or neglect issues related to:  death of mother (2 yrs ago natural causes) and a good friend one yr. ago was murdered.   Patient has not been a victim of exploitation.  Concerns for possible neglect are not present.     Safety Assessment:   Patient denies current or past homicidal ideation and behaviors.  Patient denies current or past suicidal ideation and behaviors.  Patient denies current or past self-injurious behaviors.  Patient denied risk behaviors associated with substance use.  Patient reported substance use associated with mental health symptoms.  Patient denied current or past personal safety concerns.    Patient denies past of current/recent assaultive behaviors.    Patient denied a history of sexual assault behaviors.     Patient reports there are not   firearms in the house.    Patient reports the following protective factors:  forward or future oriented thinking; dedication to family or friends; safe and stable environment; regular sleep; effectively controls impulses; regular physical activity; sense of belonging; purpose; secure attachment; help seeking behaviors when distressed; abstinence from substances; adherence with prescribed medication; agreement to use safety plan; living with other people; daily obligations; structured day; uses community crisis resources; effective problem solving skills; commitment to well being; sense of meaning; positive social skills; healthy fear of risky behaviors or pain; financial stability; strong sense of self worth or esteem; sense of personal control or determination; access to a variety of clinical interventions and pets; other    Risk Plan:  See Recommendations for Safety and Risk Management Plan    Review of Symptoms per patient report:   Depression: Lack of interest or pleasure in doing things, Feeling sad, down, or depressed, Low self-worth, Difficulties concentrating, Excessive or inappropriate guilt, Feelings of helplessness, Ruminations, Withdrawn, and Frequent crying  Olivia:  No  Symptoms  Psychosis: No Symptoms  Anxiety: Social anxiety and Irritability  Panic:  No symptoms  Post Traumatic Stress Disorder:  Experienced traumatic event  as a child    Eating Disorder: No Symptoms  ADD / ADHD:  No symptoms  Conduct Disorder: No symptoms  Autism Spectrum Disorder: No symptoms  Obsessive Compulsive Disorder: No Symptoms  Personality Disorders:  None    Patient reports the following compulsive behaviors and treatment history: None.      Diagnostic Criteria:   Generalized Anxiety Disorder  B. The person finds it difficult to control the worry.   - Being easily fatigued.    - Difficulty concentrating or mind going blank.    - Irritability.    - Sleep disturbance (difficulty falling or staying asleep, or restless unsatisfying sleep).   E. The anxiety, worry, or physical symptoms cause clinically significant distress or impairment in social, occupational, or other important areas of functioning.  Bipolar I Manic Episode   - more talkative than usual or pressure to keep talking    - increase in goal-directed activity  E. The episode is sufficiently severe enough to cause marked impairment in social or occupational functioning or to necessitate hospitalization to prevent harm to self or others, or there are psychotic features Post- Traumatic Stress Disorder  A. The person has been exposed to a traumatic event in which both of the following were present:     (1) the person experienced, witnessed, or was confronted with an event or events that involved actual or threatened death or serious injury, or a threat to the physical integrity of self or others  B. The traumatic event is persistently reexperienced in one (or more) of the following ways:     - Recurrent and intrusive distressing recollections of the event, including images, thoughts, or perceptions. Note: In young children, repetitive play may occur in which themes or aspects of the trauma are expressed.   C. Persistent avoidance of stimuli  associated with the trauma and numbing of general responsiveness (not present before the trauma), as indicated by three (or more) of the following:  D. Persistent symptoms of increased arousal (not present before the trauma), as indicated by two (or more) of the following:  E. Duration of the disturbance is more than 1 month.  F. The disturbance causes clinically significant distress or impairment in social, occupational, or other important areas of functioning.    Functional Status:  Patient reports the following functional impairments:  relationship(s).     Adult  Programmatic care:  Current LOCUS was assigned and patient needs the following level of care based on score 19.  1. Does the patient have a history of vulnerability such as being teased, picked on, or other indications of potential safety issues with other residents?  No    2. Does this patient have a history of being the victim of abuse? No history of abuse reported or documented.    3. Does this patient have a history of victimizing others? No     4. Does the patient have a history of boundary violations?  No.    5. Does the patient have a history of other sexual acting out behaviors (e.g grooming)?   No    6. Does the patient have a history of threats to self or others? Fire setting, running away or other self-injurious behaviors?    No    7. Does the patient s history indicate the need for special precautions or particular staffing patterns in the facility?  No      NOTE: If this screening indicates that the patient is at risk to harm self or others, notify staff at referral location.    Clinical Summary:  1. Psychosocial Factors:  Substance use history/concerns, Interpersonal concerns.  Cultural and Contextual Factors: LGBTQ  2. Principal DSM5 Diagnoses  (Sustained by DSM5 Criteria Listed Above): (F31.30) 296.52 Bipolar I Disorder Current or Most Recent Episode Depressed, Moderate  300.02 (F41.1) Generalized Anxiety Disorder.   PTSD (  "posttraumatic stress disorder.) (F43.10)  3. Other Diagnoses that is relevant to services:    Alcohol use disorder   4. Provisional Diagnosis:  None .  5. Prognosis: Expect Improvement.  6. Likely consequences of symptoms if not treated:  worsen if not treated.  7. Patient strengths include:  has a previous history of therapy .     Recommendations:     1. Plan for Safety and Risk Management:   Safety and Risk: Recommended that patient call 911 or go to the local ED should there be a change in any of these risk factors        Report to child / adult protection services was NA.     2. Patient's identified sexual orientation concerns will be addressed by  therapist .     3. Initial Treatment will focus on:    Depressed Mood -    Anxiety -    Mood Instability -    Alcohol / Substance Use - abstain from Alcohol use.     4. Resources/Service Plan:    services are not indicated.   Modifications to assist communication are not indicated.   Additional disability accommodations are not indicated.      5. Collaboration:   Collaboration / coordination of treatment will be initiated with the following support professionals: outpatient therapist and psychiatry.      6.  Referrals:   The following referral(s) will be initiated: Day Treatment.       A Release of Information has been obtained for the following: primary care physician, outpatient therapist, and psychiatry.    Clinical Substantiation/medical necessity for the above recommendations:  .  Patient is a 64 year old,  individual.The reason for seeking services at this time is: \"over 55 mental health out patient program\".  The problem(s) began [birth year]. Pt. stated, \"I just need to figure out this mental health shit.\"  Patient has attempted to resolve these concerns in the past through EMDR, individual Therapy, Detox and  duel DX treatment.  patient stated, that  she wants to get into the \"root of her mental health problems  and if that can be done, then " "the drinking can stop.\"  Was discharged from detox on 1/15/25.  Patient has a history of polysubstance use, Bipolar Disorder, PTSD, OMARI  and Mood Disorder.  Current symptoms include: little interest and pleasure in doing things, feeling down, trouble falling asleep, having little energy, poor appetite, feeling bad about self, trouble concentrating, feeling nervous and anxious, not being able to control worry, trouble relaxing, becoming irritable, feeling afraid something awful might happen, use of alcohol.  Patient will benefit from IOP    55+ to reduce the chronicity and intensity of symptoms which have impacted daily functioning.      GIRMA:    GIRMA:  Discussed the general effects of drugs and alcohol on health and well-being. Provider gave patient printed information about the  effects of chemical use on their health and well being. Recommendations:   maintain sobriety and attend outpatient group  she began attending called: (Will work for Recovery)  in order to maintain sobriety.    8. Records:   These were not available for review at time of assessment.   Information in this assessment was obtained from the medical record and  provided by patient who is a good historian.    Patient will have open access to their mental health medical record.    9.   Interactive Complexity: No    10. Safety Plan:   JoseMikel Safety Plan      Creation Date: 1/24/24 Last Update Date: 2/2/25      Step 1: Warning signs:    Warning Signs    Lots of loss    Increased desire to use alcohol    Allow myself to feel the feels    Rumination of thoughts      Step 2: Internal coping strategies - Things I can do to take my mind off my problems without contacting another person:    Strategies    Gratitude List    Dog out for a walk    TIPP    Rosalind    Exercise    Eating Healthy    Cooking      Step 3: People and social settings that provide distraction:    Name Contact Information    Dog walking friends        Places    [employer] "      Step 4: People whom I can ask for help during a crisis:    Name Contact Information    Iwona     ESTHER Support Person     Call button       Step 5: Professionals or agencies I can contact during a crisis:    Clinician/Agency Name Phone Emergency Contact    [facility] [phone]       Local Emergency Department Emergency Department Address Emergency Department Phone    [facility] Emergency Room [place], -[phone]      Suicide Prevention Lifeline Phone: Call or Text 309  Crisis Text Line: Text HOME to 683668     Step 6: Making the environment safer (plan for lethal means safety):   Did not identify any lethal methods     Optional: What is most important to me and worth living for?:   My dog        Lev Safety Plan. Jannet Garcia and Navneet Morin. Used with permission of the authors.          Provider Name/ Credentials:    Ruthy BARKSDALE  2025      LOCUS Worksheet     Name: Jolynn Rivera MRN: [id number]    : 1960      Gender:  female    PMI:  N/a   Provider Name: Ruthy BARKSDALE     Provider NPI:  [id number]    Actual level of Care Provided:  out-patient Therapy    Service(s) receiving or referred to:   Adult   Mental Health IOP  55+    Reason for Variance: N/A      Rating completed by:   Ruthy BARKSDALE      I. Risk of Harm:   2      Low Risk of Harm    II. Functional Status:   3      Moderate Impairment    III. Co-Morbidity:   3      Significant Co-Morbidity    IV - A. Recovery Environment - Level of Stress:   2      Mildly Stressful Environment    IV - B. Recovery Environment - Level of Support:   3      Limited Support in Environment    V. Treatment and Recovery History:   3      Moderate to Equivocal Response to Treatment and Recovery Management    VI. Engagement and Recovery Project:   3      Limited Engagement and Recovery       19 Composite Score    Level of Care Recommendation:   17 to 19       High Intensity Community Based Services

## 2025-02-17 NOTE — ED NOTES
Bed: ED02  Expected date: 2/17/25  Expected time: 2:30 PM  Means of arrival: Ambulance  Comments:  Ang Osborn 63 y/o F SOB

## 2025-02-17 NOTE — ED PROVIDER NOTES
"    [place] EMERGENCY DEPARTMENT ([facility])    2/17/25       ED PROVIDER NOTE   ED 2   History     Chief Complaint   Patient presents with    Shortness of Breath     The history is provided by the patient and medical records.     Jolynn Rivera is a 64 year old female history of COPD, alcohol use disorder who presents the emergency department with fever, shortness of breath, generalized body aches, nausea, and wheezing and coughing worsening over the course of the week.  She states that she lives in a building with a lots of antibiotics years and that they have been getting sick lately.  She states that her neighbor caught the flu.  Patient developed similar symptoms herself with nausea, generalized bodyaches, fevers as well as wheezing, is concerned that she has the flu herself.  She states that she had gone to detox and quit drinking but drank a couple drinks because her nausea was so bad.  She is concerned as symptoms are similar to last year when she was admitted x 5 days and she was very sick at the time, does not want to end up in the hospital again so came for evaluation.  She is concerned about the wheezing, states that it is very bad and makes her stop breathing.  She is however unable to use albuterol as it gives her panic attacks.  She notes that some doctor prescribed her a medication containing albuterol and she refused to take it.  She also was prescribed a steroid, states that she cannot take that either because it makes her \"crazy\" and that she would kill somebody if she takes it.  Today she got a nebulizer which helped en route, much to her surprise. She states that the cough initially was dry, but now she is able to have more forceful coughs which have enabled her to produce sputum.  As for her underlying lung issues, she is undergoing a workup for lung nodules.  She does not have a nebulizer at home.    Past Medical History  Past Medical History:   Diagnosis Date    Anxiety     COPD " "(chronic obstructive pulmonary disease) (H)     COPD (chronic obstructive pulmonary disease) (H)     Hepatitis C     PTSD (post-traumatic stress disorder)     Seizures (H)     second to ETOH    Substance abuse (H)      Past Surgical History:   Procedure Laterality Date    LAPAROSCOPIC TUBAL LIGATION      ORTHOPEDIC SURGERY      Left knee    TONSILLECTOMY       azithromycin (ZITHROMAX Z-AVI) 250 MG tablet  levalbuterol (XOPENEX HFA) 45 MCG/ACT inhaler  eszopiclone (LUNESTA) 3 MG tablet  Fluticasone-Umeclidin-Vilanterol (TRELEGY ELLIPTA) 200-62.5-25 MCG/ACT oral inhaler  gabapentin (NEURONTIN) 600 MG tablet  levalbuterol (XOPENEX HFA) 45 MCG/ACT inhaler  levofloxacin (LEVAQUIN) 500 MG tablet  levothyroxine (SYNTHROID/LEVOTHROID) 100 MCG tablet  melatonin 5 MG tablet  multivitamin w/minerals (THERA-VIT-M) tablet  nicotine polacrilex (NICORETTE) 4 MG gum  omeprazole (PRILOSEC) 40 MG DR capsule  ondansetron (ZOFRAN ODT) 4 MG ODT tab  propranolol (INDERAL) 20 MG tablet  thiamine (B-1) 100 MG tablet      Allergies   Allergen Reactions    Bee Venom Swelling    Wasps [Hornets] Swelling    Amlodipine      Nightmares    Antihistamines, Chlorpheniramine-Type [Antihistamines, Chlorpheniramine-Type]     Clonidine     Compazine      Lock jaw    Diphenhydramine Muscle Pain (Myalgia) and Cramps    Hydroxyzine Cramps     Lock jaw    Metoclopramide      Tardive Dyskinesia    Penicillins      Panic attack    Prednisone Anxiety     Panic attacks.      Prochlorperazine Muscle Pain (Myalgia) and Cramps    Trazodone Nausea and Vomiting and Confusion     \"I ended up falling and feeling like I was drunk\"    Umeclidinium Bromide      Mood swings + anger.     Family History  Family History   Problem Relation Age of Onset    Anxiety Disorder Mother     Asthma Mother     Alcohol/Drug Father     Prostate Cancer Father     Arthritis Father     Alcohol/Drug Brother     Alcohol/Drug Brother     Hypertension Brother     Alcohol/Drug Brother     " Depression Brother     Psychotic Disorder Brother      Social History   Social History     Tobacco Use    Smoking status: Every Day     Current packs/day: 0.50     Types: Cigarettes    Smokeless tobacco: Never    Tobacco comments:     Starting Chantix October 2014   Substance Use Topics    Alcohol use: Yes     Comment: 5L every 2 days    Drug use: No     Comment: stopped 1986      A medically appropriate review of systems was performed with pertinent positives and negatives noted in the HPI, and all other systems negative.    Physical Exam   BP: 105/80  Pulse: 73  Temp: 97.8  F (36.6  C)  Resp: 16  SpO2: 93 %  Physical Exam  Vitals and nursing note reviewed.   Constitutional:       General: She is not in acute distress.     Appearance: Normal appearance. She is not diaphoretic.   HENT:      Head: Atraumatic.      Mouth/Throat:      Mouth: Mucous membranes are moist.   Eyes:      General: No scleral icterus.     Conjunctiva/sclera: Conjunctivae normal.   Cardiovascular:      Rate and Rhythm: Normal rate.      Heart sounds: Normal heart sounds.   Pulmonary:      Effort: Pulmonary effort is normal. No respiratory distress.      Breath sounds: Examination of the right-upper field reveals wheezing. Examination of the left-upper field reveals wheezing. Wheezing present.      Comments: Moving air well, speaks in complete sentences.  She has faint wheezes at end expiration in the upper lobes bilaterally  Abdominal:      General: Abdomen is flat.   Musculoskeletal:      Cervical back: Neck supple.      Right lower leg: No tenderness. No edema.      Left lower leg: No tenderness. No edema.   Skin:     General: Skin is warm.      Findings: No rash.   Neurological:      General: No focal deficit present.      Mental Status: She is alert and oriented to person, place, and time.           ED Course, Procedures, & Data      Procedures                Results for orders placed or performed during the hospital encounter of 02/17/25    XR Chest 2 Views     Status: None    Narrative    EXAM: XR CHEST 2 VIEWS  LOCATION: [facility]  DATE: 2/17/2025    INDICATION: cough  COMPARISON: 1/28/2025      Impression    IMPRESSION: Heart size within normal limits. Nonsegmental atelectasis/parenchymal scarring right base. Left lung clear. Degenerative change lumbar spine.   Magnesium     Status: Normal   Result Value Ref Range    Magnesium 1.8 1.7 - 2.3 mg/dL   Nt probnp inpatient (BNP)     Status: Normal   Result Value Ref Range    N terminal Pro BNP Inpatient <36 0 - 900 pg/mL   Influenza A/B, RSV and SARS-CoV2 PCR (COVID-19) Nasopharyngeal     Status: Normal    Specimen: Nasopharyngeal; Swab   Result Value Ref Range    Influenza A PCR Negative Negative    Influenza B PCR Negative Negative    RSV PCR Negative Negative    SARS CoV2 PCR Negative Negative    Narrative    Testing was performed using the Xpert Xpress CoV2/Flu/RSV Assay on the Cepheid GeneXpert Instrument. This test should be ordered for the detection of SARS-CoV2, influenza, and RSV viruses in individuals with signs and symptoms of respiratory tract infection. This test is for in vitro diagnostic use under the [place] FDA for laboratories certified under CLIA to perform high or moderate complexity testing. This test has been [place] FDA cleared. A negative result does not rule out the presence of PCR inhibitors in the specimen or target RNA in concentration below the limit of detection for the assay. If only one viral target is positive but coinfection with multiple targets is suspected, the sample should be re-tested with another FDA cleared, approved, or authorized test, if coninfection would change clinical management. This test was validated by the [facility]. These laboratories are certified under the Clinical Laboratory Improvement Amendments of 1988 (CLIA-88) as qualified to perfom high complexity laboratory testing.   CBC with  platelets and differential     Status: Abnormal   Result Value Ref Range    WBC Count 6.3 4.0 - 11.0 10e3/uL    RBC Count 4.44 3.80 - 5.20 10e6/uL    Hemoglobin 15.2 11.7 - 15.7 g/dL    Hematocrit 42.6 35.0 - 47.0 %    MCV 96 78 - 100 fL    MCH 34.2 (H) 26.5 - 33.0 pg    MCHC 35.7 31.5 - 36.5 g/dL    RDW 12.4 10.0 - 15.0 %    Platelet Count 329 150 - 450 10e3/uL    % Neutrophils 47 %    % Lymphocytes 41 %    % Monocytes 10 %    % Eosinophils 1 %    % Basophils 1 %    % Immature Granulocytes 0 %    NRBCs per 100 WBC 0 <1 /100    Absolute Neutrophils 3.0 1.6 - 8.3 10e3/uL    Absolute Lymphocytes 2.6 0.8 - 5.3 10e3/uL    Absolute Monocytes 0.6 0.0 - 1.3 10e3/uL    Absolute Eosinophils 0.0 0.0 - 0.7 10e3/uL    Absolute Basophils 0.0 0.0 - 0.2 10e3/uL    Absolute Immature Granulocytes 0.0 <=0.4 10e3/uL    Absolute NRBCs 0.0 10e3/uL   CBC with platelets differential     Status: Abnormal    Narrative    The following orders were created for panel order CBC with platelets differential.  Procedure                               Abnormality         Status                     ---------                               -----------         ------                     CBC with platelets and d...[[id number]]  Abnormal            Final result                 Please view results for these tests on the individual orders.     Medications   levalbuterol (XOPENEX) neb solution 1.25 mg (1.25 mg Nebulization $Given 2/17/25 1536)   ondansetron (ZOFRAN ODT) ODT tab 4 mg (4 mg Oral $Given 2/17/25 1535)     Labs Ordered and Resulted from Time of ED Arrival to Time of ED Departure   CBC WITH PLATELETS AND DIFFERENTIAL - Abnormal       Result Value    WBC Count 6.3      RBC Count 4.44      Hemoglobin 15.2      Hematocrit 42.6      MCV 96      MCH 34.2 (*)     MCHC 35.7      RDW 12.4      Platelet Count 329      % Neutrophils 47      % Lymphocytes 41      % Monocytes 10      % Eosinophils 1      % Basophils 1      % Immature Granulocytes 0      NRBCs  per 100 WBC 0      Absolute Neutrophils 3.0      Absolute Lymphocytes 2.6      Absolute Monocytes 0.6      Absolute Eosinophils 0.0      Absolute Basophils 0.0      Absolute Immature Granulocytes 0.0      Absolute NRBCs 0.0     MAGNESIUM - Normal    Magnesium 1.8     NT PROBNP INPATIENT - Normal    N terminal Pro BNP Inpatient <36     INFLUENZA A/B, RSV AND SARS-COV2 PCR - Normal    Influenza A PCR Negative      Influenza B PCR Negative      RSV PCR Negative      SARS CoV2 PCR Negative     COMPREHENSIVE METABOLIC PANEL     XR Chest 2 Views   Final Result   IMPRESSION: Heart size within normal limits. Nonsegmental atelectasis/parenchymal scarring right base. Left lung clear. Degenerative change lumbar spine.             Critical care was not performed.     Medical Decision Making  The patient's presentation was of moderate complexity (an acute illness with systemic symptoms).    The patient's evaluation involved:  ordering and/or review of 3+ test(s) in this encounter (see separate area of note for details)  independent interpretation of testing performed by another health professional (chest x-ray images personally reviewed and reviewed radiologist interpretation)    The patient's management necessitated moderate risk (prescription drug management including medications given in the ED).    Assessment & Plan    64-year-old female with a history of COPD presenting with acute onset of chills, cough, myalgias, wheezing.  Differential diagnosis initially considered includes acute viral respiratory illness such as COVID-19 or other more common viral illness, bacterial upper respiratory infection, bronchitis, sinusitis, pharyngitis, pneumonia, seasonal allergies, clinically much less likely pneumothorax, PE, CHF.  Exam patient's vitals are normal.  She is not in respiratory distress, her oxygen saturation is 93% on room air while she converses with me.  She does have end expiratory wheezes in the upper lobes.  Her physical  exam is otherwise completely normal as documented above.  No respiratory testing for COVID influenza RSV negative.  CBC normal.  BNP normal.  Chest x-ray without acute infiltrate pneumothorax or other acute findings.  Was treated with a Xopenex nebulizer (she reports severe anxiety and tachycardia with albuterol), and her lungs are clear coughing decreased patient feeling much better.  Peers to have a mild exacerbation of COPD potentially related to acute viral or other respiratory infection.  Plan to treat with Xopenex inhaler, Z-Avi, she has scheduled follow-up with pulmonary later this month.  We discussed the indications for emergency department return and follow-up.  Stable for discharge.      I have reviewed the nursing notes. I have reviewed the findings, diagnosis, plan and need for follow up with the patient.    New Prescriptions    AZITHROMYCIN (ZITHROMAX Z-AVI) 250 MG TABLET    Two tablets on the first day, then one tablet daily for the next 4 days    LEVALBUTEROL (XOPENEX HFA) 45 MCG/ACT INHALER    Inhale 2 puffs into the lungs every 4 hours as needed for shortness of breath or wheezing.       Final diagnoses:   COPD with acute exacerbation (H)     I, Nickie Bowden, am serving as a trained medical scribe to document services personally performed by Trever Akins MD based on the provider's statements to me on February 17, 2025.  This document has been checked and approved by the attending provider.    ITrever MD, was physically present and have reviewed and verified the accuracy of this note documented by Nickie Bowden, medical scribe.      Trever Akins MD   [facility] EMERGENCY DEPARTMENT  2/17/2025        Trever Akins MD  02/17/25 [id number]

## 2025-02-19 ENCOUNTER — TELEPHONE (OUTPATIENT)
Dept: BEHAVIORAL HEALTH | Facility: CLINIC | Age: 65
End: 2025-02-19

## 2025-02-19 ENCOUNTER — APPOINTMENT (OUTPATIENT)
Dept: CT IMAGING | Facility: CLINIC | Age: 65
DRG: 897 | End: 2025-02-19
Attending: EMERGENCY MEDICINE
Payer: MEDICARE

## 2025-02-19 ENCOUNTER — HOSPITAL ENCOUNTER (INPATIENT)
Facility: CLINIC | Age: 65
DRG: 897 | End: 2025-02-19
Attending: EMERGENCY MEDICINE | Admitting: PSYCHIATRY & NEUROLOGY
Payer: MEDICARE

## 2025-02-19 DIAGNOSIS — R10.10 UPPER ABDOMINAL PAIN: ICD-10-CM

## 2025-02-19 DIAGNOSIS — R11.2 NAUSEA AND VOMITING, UNSPECIFIED VOMITING TYPE: Primary | ICD-10-CM

## 2025-02-19 DIAGNOSIS — J44.1 COPD WITH ACUTE EXACERBATION (H): ICD-10-CM

## 2025-02-19 DIAGNOSIS — F31.30 BIPOLAR DISORDER, MOST RECENT EPISODE DEPRESSED (H): ICD-10-CM

## 2025-02-19 DIAGNOSIS — E03.9 HYPOTHYROIDISM, UNSPECIFIED TYPE: ICD-10-CM

## 2025-02-19 DIAGNOSIS — R09.89 RUNNY NOSE: ICD-10-CM

## 2025-02-19 DIAGNOSIS — R56.9 ALCOHOL WITHDRAWAL SEIZURE WITH COMPLICATION (H): ICD-10-CM

## 2025-02-19 DIAGNOSIS — R19.7 DIARRHEA, UNSPECIFIED TYPE: ICD-10-CM

## 2025-02-19 DIAGNOSIS — M15.9 OSTEOARTHRITIS OF MULTIPLE JOINTS, UNSPECIFIED OSTEOARTHRITIS TYPE: ICD-10-CM

## 2025-02-19 DIAGNOSIS — R45.851 SUICIDAL IDEATION: ICD-10-CM

## 2025-02-19 DIAGNOSIS — R05.9 COUGH, UNSPECIFIED TYPE: ICD-10-CM

## 2025-02-19 DIAGNOSIS — F10.229 ALCOHOL DEPENDENCE WITH INTOXICATION WITH COMPLICATION (H): ICD-10-CM

## 2025-02-19 DIAGNOSIS — F41.1 GENERALIZED ANXIETY DISORDER: ICD-10-CM

## 2025-02-19 DIAGNOSIS — K21.9 GASTROESOPHAGEAL REFLUX DISEASE WITHOUT ESOPHAGITIS: ICD-10-CM

## 2025-02-19 DIAGNOSIS — F10.921 ALCOHOL INTOXICATION WITH DELIRIUM: ICD-10-CM

## 2025-02-19 DIAGNOSIS — F17.200 TOBACCO USE DISORDER: ICD-10-CM

## 2025-02-19 DIAGNOSIS — R10.13 ABDOMINAL PAIN, EPIGASTRIC: ICD-10-CM

## 2025-02-19 DIAGNOSIS — J02.9 ACUTE PHARYNGITIS, UNSPECIFIED ETIOLOGY: ICD-10-CM

## 2025-02-19 DIAGNOSIS — F10.939 ALCOHOL WITHDRAWAL SEIZURE WITH COMPLICATION (H): ICD-10-CM

## 2025-02-19 PROBLEM — Z86.59 HISTORY OF SEIZURE DUE TO ALCOHOL WITHDRAWAL: Status: ACTIVE | Noted: 2025-02-19

## 2025-02-19 PROBLEM — Z87.898 HISTORY OF SEIZURE DUE TO ALCOHOL WITHDRAWAL: Status: ACTIVE | Noted: 2025-02-19

## 2025-02-19 PROBLEM — Z86.59 HISTORY OF ALCOHOL WITHDRAWAL DELIRIUM: Status: ACTIVE | Noted: 2025-02-19

## 2025-02-19 LAB
ALBUMIN SERPL BCG-MCNC: 4.5 G/DL (ref 3.5–5.2)
ALBUMIN UR-MCNC: 30 MG/DL
ALCOHOL BREATH TEST: 0.2 (ref 0–0.01)
ALP SERPL-CCNC: 65 U/L (ref 40–150)
ALT SERPL W P-5'-P-CCNC: 43 U/L (ref 0–50)
AMPHETAMINES UR QL SCN: ABNORMAL
ANION GAP SERPL CALCULATED.3IONS-SCNC: 18 MMOL/L (ref 7–15)
APPEARANCE UR: CLEAR
AST SERPL W P-5'-P-CCNC: 58 U/L (ref 0–45)
ATRIAL RATE - MUSE: 78 BPM
BARBITURATES UR QL SCN: ABNORMAL
BASOPHILS # BLD AUTO: 0.1 10E3/UL (ref 0–0.2)
BASOPHILS NFR BLD AUTO: 1 %
BENZODIAZ UR QL SCN: ABNORMAL
BILIRUB SERPL-MCNC: 0.3 MG/DL
BILIRUB UR QL STRIP: NEGATIVE
BUN SERPL-MCNC: 4.8 MG/DL (ref 8–23)
BZE UR QL SCN: ABNORMAL
CALCIUM SERPL-MCNC: 9.3 MG/DL (ref 8.8–10.4)
CANNABINOIDS UR QL SCN: ABNORMAL
CHLORIDE SERPL-SCNC: 96 MMOL/L (ref 98–107)
COLOR UR AUTO: YELLOW
CREAT SERPL-MCNC: 0.54 MG/DL (ref 0.51–0.95)
DIASTOLIC BLOOD PRESSURE - MUSE: NORMAL MMHG
EGFRCR SERPLBLD CKD-EPI 2021: >90 ML/MIN/1.73M2
EOSINOPHIL # BLD AUTO: 0 10E3/UL (ref 0–0.7)
EOSINOPHIL NFR BLD AUTO: 0 %
ERYTHROCYTE [DISTWIDTH] IN BLOOD BY AUTOMATED COUNT: 12.6 % (ref 10–15)
EST. AVERAGE GLUCOSE BLD GHB EST-MCNC: 117 MG/DL
ETHANOL SERPL-MCNC: 0.24 G/DL
FENTANYL UR QL: ABNORMAL
FLUAV RNA SPEC QL NAA+PROBE: NEGATIVE
FLUBV RNA RESP QL NAA+PROBE: NEGATIVE
GGT SERPL-CCNC: 28 U/L (ref 5–36)
GLUCOSE SERPL-MCNC: 101 MG/DL (ref 70–99)
GLUCOSE UR STRIP-MCNC: NEGATIVE MG/DL
HBA1C MFR BLD: 5.7 %
HCO3 SERPL-SCNC: 24 MMOL/L (ref 22–29)
HCT VFR BLD AUTO: 41.7 % (ref 35–47)
HGB BLD-MCNC: 15.1 G/DL (ref 11.7–15.7)
HGB UR QL STRIP: NEGATIVE
IMM GRANULOCYTES # BLD: 0 10E3/UL
IMM GRANULOCYTES NFR BLD: 0 %
INTERPRETATION ECG - MUSE: NORMAL
KETONES UR STRIP-MCNC: ABNORMAL MG/DL
LEUKOCYTE ESTERASE UR QL STRIP: NEGATIVE
LIPASE SERPL-CCNC: 26 U/L (ref 13–60)
LYMPHOCYTES # BLD AUTO: 2.7 10E3/UL (ref 0.8–5.3)
LYMPHOCYTES NFR BLD AUTO: 27 %
MAGNESIUM SERPL-MCNC: 1.7 MG/DL (ref 1.7–2.3)
MCH RBC QN AUTO: 35.2 PG (ref 26.5–33)
MCHC RBC AUTO-ENTMCNC: 36.2 G/DL (ref 31.5–36.5)
MCV RBC AUTO: 97 FL (ref 78–100)
MONOCYTES # BLD AUTO: 0.7 10E3/UL (ref 0–1.3)
MONOCYTES NFR BLD AUTO: 7 %
MUCOUS THREADS #/AREA URNS LPF: PRESENT /LPF
NEUTROPHILS # BLD AUTO: 6.4 10E3/UL (ref 1.6–8.3)
NEUTROPHILS NFR BLD AUTO: 65 %
NITRATE UR QL: NEGATIVE
NRBC # BLD AUTO: 0 10E3/UL
NRBC BLD AUTO-RTO: 0 /100
OPIATES UR QL SCN: ABNORMAL
P AXIS - MUSE: 62 DEGREES
PCP QUAL URINE (ROCHE): ABNORMAL
PH UR STRIP: 6 [PH] (ref 5–7)
PLATELET # BLD AUTO: 348 10E3/UL (ref 150–450)
POTASSIUM SERPL-SCNC: 4.3 MMOL/L (ref 3.4–5.3)
PR INTERVAL - MUSE: 156 MS
PROT SERPL-MCNC: 8.1 G/DL (ref 6.4–8.3)
QRS DURATION - MUSE: 68 MS
QT - MUSE: 398 MS
QTC - MUSE: 453 MS
R AXIS - MUSE: -21 DEGREES
RADIOLOGIST FLAGS: NORMAL
RBC # BLD AUTO: 4.29 10E6/UL (ref 3.8–5.2)
RBC URINE: 1 /HPF
RSV RNA SPEC NAA+PROBE: NEGATIVE
SARS-COV-2 RNA RESP QL NAA+PROBE: NEGATIVE
SODIUM SERPL-SCNC: 138 MMOL/L (ref 135–145)
SP GR UR STRIP: 1.01 (ref 1–1.03)
SQUAMOUS EPITHELIAL: <1 /HPF
SYSTOLIC BLOOD PRESSURE - MUSE: NORMAL MMHG
T AXIS - MUSE: 46 DEGREES
TROPONIN T SERPL HS-MCNC: 15 NG/L
TROPONIN T SERPL HS-MCNC: 17 NG/L
TSH SERPL DL<=0.005 MIU/L-ACNC: 0.68 UIU/ML (ref 0.3–4.2)
UROBILINOGEN UR STRIP-MCNC: NORMAL MG/DL
VENTRICULAR RATE- MUSE: 78 BPM
WBC # BLD AUTO: 9.8 10E3/UL (ref 4–11)
WBC URINE: <1 /HPF

## 2025-02-19 PROCEDURE — 84443 ASSAY THYROID STIM HORMONE: CPT | Performed by: CLINICAL NURSE SPECIALIST

## 2025-02-19 PROCEDURE — 36415 COLL VENOUS BLD VENIPUNCTURE: CPT | Performed by: EMERGENCY MEDICINE

## 2025-02-19 PROCEDURE — 250N000013 HC RX MED GY IP 250 OP 250 PS 637: Performed by: INTERNAL MEDICINE

## 2025-02-19 PROCEDURE — 96376 TX/PRO/DX INJ SAME DRUG ADON: CPT | Performed by: EMERGENCY MEDICINE

## 2025-02-19 PROCEDURE — 93005 ELECTROCARDIOGRAM TRACING: CPT | Performed by: EMERGENCY MEDICINE

## 2025-02-19 PROCEDURE — 80053 COMPREHEN METABOLIC PANEL: CPT | Performed by: EMERGENCY MEDICINE

## 2025-02-19 PROCEDURE — 93010 ELECTROCARDIOGRAM REPORT: CPT | Performed by: EMERGENCY MEDICINE

## 2025-02-19 PROCEDURE — 83690 ASSAY OF LIPASE: CPT | Performed by: EMERGENCY MEDICINE

## 2025-02-19 PROCEDURE — 258N000003 HC RX IP 258 OP 636: Performed by: EMERGENCY MEDICINE

## 2025-02-19 PROCEDURE — 83735 ASSAY OF MAGNESIUM: CPT | Performed by: EMERGENCY MEDICINE

## 2025-02-19 PROCEDURE — 81001 URINALYSIS AUTO W/SCOPE: CPT | Performed by: EMERGENCY MEDICINE

## 2025-02-19 PROCEDURE — HZ2ZZZZ DETOXIFICATION SERVICES FOR SUBSTANCE ABUSE TREATMENT: ICD-10-PCS | Performed by: PSYCHIATRY & NEUROLOGY

## 2025-02-19 PROCEDURE — 80307 DRUG TEST PRSMV CHEM ANLYZR: CPT | Performed by: EMERGENCY MEDICINE

## 2025-02-19 PROCEDURE — 250N000011 HC RX IP 250 OP 636: Performed by: INTERNAL MEDICINE

## 2025-02-19 PROCEDURE — 82977 ASSAY OF GGT: CPT | Performed by: CLINICAL NURSE SPECIALIST

## 2025-02-19 PROCEDURE — 250N000013 HC RX MED GY IP 250 OP 250 PS 637: Performed by: CLINICAL NURSE SPECIALIST

## 2025-02-19 PROCEDURE — 99285 EMERGENCY DEPT VISIT HI MDM: CPT | Mod: 25 | Performed by: EMERGENCY MEDICINE

## 2025-02-19 PROCEDURE — 84484 ASSAY OF TROPONIN QUANT: CPT | Performed by: EMERGENCY MEDICINE

## 2025-02-19 PROCEDURE — 87637 SARSCOV2&INF A&B&RSV AMP PRB: CPT | Performed by: EMERGENCY MEDICINE

## 2025-02-19 PROCEDURE — 96375 TX/PRO/DX INJ NEW DRUG ADDON: CPT | Performed by: EMERGENCY MEDICINE

## 2025-02-19 PROCEDURE — 250N000013 HC RX MED GY IP 250 OP 250 PS 637: Performed by: EMERGENCY MEDICINE

## 2025-02-19 PROCEDURE — 250N000009 HC RX 250: Performed by: EMERGENCY MEDICINE

## 2025-02-19 PROCEDURE — 82077 ASSAY SPEC XCP UR&BREATH IA: CPT | Performed by: EMERGENCY MEDICINE

## 2025-02-19 PROCEDURE — 250N000011 HC RX IP 250 OP 636: Performed by: EMERGENCY MEDICINE

## 2025-02-19 PROCEDURE — 83036 HEMOGLOBIN GLYCOSYLATED A1C: CPT | Performed by: CLINICAL NURSE SPECIALIST

## 2025-02-19 PROCEDURE — 85025 COMPLETE CBC W/AUTO DIFF WBC: CPT | Performed by: EMERGENCY MEDICINE

## 2025-02-19 PROCEDURE — 96365 THER/PROPH/DIAG IV INF INIT: CPT | Mod: 59 | Performed by: EMERGENCY MEDICINE

## 2025-02-19 PROCEDURE — 128N000004 HC R&B CD ADULT

## 2025-02-19 PROCEDURE — 96361 HYDRATE IV INFUSION ADD-ON: CPT | Performed by: EMERGENCY MEDICINE

## 2025-02-19 PROCEDURE — 99285 EMERGENCY DEPT VISIT HI MDM: CPT | Performed by: EMERGENCY MEDICINE

## 2025-02-19 PROCEDURE — 74177 CT ABD & PELVIS W/CONTRAST: CPT

## 2025-02-19 PROCEDURE — 99207 PR NO CHARGE LOS: CPT | Performed by: CLINICAL NURSE SPECIALIST

## 2025-02-19 RX ORDER — DIAZEPAM 5 MG/1
5-20 TABLET ORAL EVERY 30 MIN PRN
Status: DISCONTINUED | OUTPATIENT
Start: 2025-02-19 | End: 2025-02-21 | Stop reason: HOSPADM

## 2025-02-19 RX ORDER — ACETAMINOPHEN 325 MG/1
650 TABLET ORAL EVERY 4 HOURS PRN
Status: DISCONTINUED | OUTPATIENT
Start: 2025-02-19 | End: 2025-02-21 | Stop reason: HOSPADM

## 2025-02-19 RX ORDER — ONDANSETRON 2 MG/ML
4 INJECTION INTRAMUSCULAR; INTRAVENOUS ONCE
Status: COMPLETED | OUTPATIENT
Start: 2025-02-19 | End: 2025-02-19

## 2025-02-19 RX ORDER — AMOXICILLIN 250 MG
1 CAPSULE ORAL 2 TIMES DAILY PRN
Status: DISCONTINUED | OUTPATIENT
Start: 2025-02-19 | End: 2025-02-21 | Stop reason: HOSPADM

## 2025-02-19 RX ORDER — ONDANSETRON 4 MG/1
4 TABLET, ORALLY DISINTEGRATING ORAL EVERY 6 HOURS PRN
Status: DISCONTINUED | OUTPATIENT
Start: 2025-02-19 | End: 2025-02-20

## 2025-02-19 RX ORDER — MULTIPLE VITAMINS W/ MINERALS TAB 9MG-400MCG
1 TAB ORAL DAILY
Status: DISCONTINUED | OUTPATIENT
Start: 2025-02-19 | End: 2025-02-20

## 2025-02-19 RX ORDER — HYDROXYZINE HYDROCHLORIDE 25 MG/1
25 TABLET, FILM COATED ORAL EVERY 4 HOURS PRN
Status: DISCONTINUED | OUTPATIENT
Start: 2025-02-19 | End: 2025-02-21 | Stop reason: HOSPADM

## 2025-02-19 RX ORDER — LOPERAMIDE HYDROCHLORIDE 2 MG/1
2 CAPSULE ORAL 4 TIMES DAILY PRN
Status: DISCONTINUED | OUTPATIENT
Start: 2025-02-19 | End: 2025-02-21 | Stop reason: HOSPADM

## 2025-02-19 RX ORDER — IOPAMIDOL 755 MG/ML
100 INJECTION, SOLUTION INTRAVASCULAR ONCE
Status: COMPLETED | OUTPATIENT
Start: 2025-02-19 | End: 2025-02-19

## 2025-02-19 RX ORDER — OMEPRAZOLE 20 MG/1
40 CAPSULE, DELAYED RELEASE ORAL EVERY MORNING
Status: DISCONTINUED | OUTPATIENT
Start: 2025-02-20 | End: 2025-02-19

## 2025-02-19 RX ORDER — LEVOTHYROXINE SODIUM 100 UG/1
100 TABLET ORAL
Status: DISCONTINUED | OUTPATIENT
Start: 2025-02-20 | End: 2025-02-21 | Stop reason: HOSPADM

## 2025-02-19 RX ORDER — FOLIC ACID 1 MG/1
1 TABLET ORAL DAILY
Status: DISCONTINUED | OUTPATIENT
Start: 2025-02-19 | End: 2025-02-21 | Stop reason: HOSPADM

## 2025-02-19 RX ORDER — PROPRANOLOL HCL 20 MG
20 TABLET ORAL 3 TIMES DAILY
Status: DISCONTINUED | OUTPATIENT
Start: 2025-02-19 | End: 2025-02-21 | Stop reason: HOSPADM

## 2025-02-19 RX ORDER — PANTOPRAZOLE SODIUM 40 MG/1
40 TABLET, DELAYED RELEASE ORAL
Status: DISCONTINUED | OUTPATIENT
Start: 2025-02-19 | End: 2025-02-21 | Stop reason: HOSPADM

## 2025-02-19 RX ORDER — MAGNESIUM SULFATE 1 G/100ML
1 INJECTION INTRAVENOUS ONCE
Status: COMPLETED | OUTPATIENT
Start: 2025-02-19 | End: 2025-02-19

## 2025-02-19 RX ADMIN — PROPRANOLOL HYDROCHLORIDE 20 MG: 20 TABLET ORAL at 20:21

## 2025-02-19 RX ADMIN — ONDANSETRON 4 MG: 2 INJECTION INTRAMUSCULAR; INTRAVENOUS at 12:32

## 2025-02-19 RX ADMIN — IOPAMIDOL 64 ML: 755 INJECTION, SOLUTION INTRAVENOUS at 12:50

## 2025-02-19 RX ADMIN — DIAZEPAM 10 MG: 5 TABLET ORAL at 14:50

## 2025-02-19 RX ADMIN — SODIUM CHLORIDE, POTASSIUM CHLORIDE, SODIUM LACTATE AND CALCIUM CHLORIDE 1000 ML: 600; 310; 30; 20 INJECTION, SOLUTION INTRAVENOUS at 11:46

## 2025-02-19 RX ADMIN — SODIUM CHLORIDE 17 ML: 9 INJECTION, SOLUTION INTRAVENOUS at 12:51

## 2025-02-19 RX ADMIN — Medication 1 TABLET: at 13:21

## 2025-02-19 RX ADMIN — FOLIC ACID 1 MG: 1 TABLET ORAL at 13:21

## 2025-02-19 RX ADMIN — MAGNESIUM SULFATE HEPTAHYDRATE 1 G: 1 INJECTION, SOLUTION INTRAVENOUS at 12:34

## 2025-02-19 RX ADMIN — PANTOPRAZOLE SODIUM 40 MG: 40 TABLET, DELAYED RELEASE ORAL at 19:42

## 2025-02-19 RX ADMIN — DIAZEPAM 10 MG: 5 TABLET ORAL at 13:21

## 2025-02-19 RX ADMIN — DIAZEPAM 10 MG: 5 TABLET ORAL at 17:41

## 2025-02-19 RX ADMIN — ONDANSETRON 4 MG: 2 INJECTION INTRAMUSCULAR; INTRAVENOUS at 17:41

## 2025-02-19 RX ADMIN — DIAZEPAM 10 MG: 5 TABLET ORAL at 20:21

## 2025-02-19 RX ADMIN — Medication 900 MG: at 20:22

## 2025-02-19 RX ADMIN — Medication 3 MG: at 21:39

## 2025-02-19 RX ADMIN — DIAZEPAM 20 MG: 5 TABLET ORAL at 19:42

## 2025-02-19 ASSESSMENT — ACTIVITIES OF DAILY LIVING (ADL)
ADLS_ACUITY_SCORE: 68
ADLS_ACUITY_SCORE: 58
HYGIENE/GROOMING: INDEPENDENT
ADLS_ACUITY_SCORE: 58
ORAL_HYGIENE: INDEPENDENT
ADLS_ACUITY_SCORE: 58
ADLS_ACUITY_SCORE: 68
DRESS: INDEPENDENT
ADLS_ACUITY_SCORE: 58
LAUNDRY: WITH SUPERVISION
ADLS_ACUITY_SCORE: 58
ADLS_ACUITY_SCORE: 68
ADLS_ACUITY_SCORE: 58

## 2025-02-19 ASSESSMENT — COLUMBIA-SUICIDE SEVERITY RATING SCALE - C-SSRS
2. HAVE YOU ACTUALLY HAD ANY THOUGHTS OF KILLING YOURSELF IN THE PAST MONTH?: YES
1. IN THE PAST MONTH, HAVE YOU WISHED YOU WERE DEAD OR WISHED YOU COULD GO TO SLEEP AND NOT WAKE UP?: YES
6. HAVE YOU EVER DONE ANYTHING, STARTED TO DO ANYTHING, OR PREPARED TO DO ANYTHING TO END YOUR LIFE?: YES
4. HAVE YOU HAD THESE THOUGHTS AND HAD SOME INTENTION OF ACTING ON THEM?: NO
5. HAVE YOU STARTED TO WORK OUT OR WORKED OUT THE DETAILS OF HOW TO KILL YOURSELF? DO YOU INTEND TO CARRY OUT THIS PLAN?: NO
3. HAVE YOU BEEN THINKING ABOUT HOW YOU MIGHT KILL YOURSELF?: NO

## 2025-02-19 ASSESSMENT — LIFESTYLE VARIABLES: SKIP TO QUESTIONS 9-10: 0

## 2025-02-19 NOTE — ED TRIAGE NOTES
Pt BIBA for home for alcohol withdrawal and anxiety.    Pt shares she has not been feeling well lately and hasn't had a drink but did have a few glasses of wine this morning.    Pt notes history of withdrawal seizures.    Nauseous so 4mg of PO zofran given.  .

## 2025-02-19 NOTE — TELEPHONE ENCOUNTER
S: [facility] Sina , Provider Jaylen calling at 5:22 PM   with clinical on a 64 year old/Female presenting for alcohol detox.     B: Pt presents for ETOH detox.   Currently reports drinking 1 liter of wine daily  for 3 weeks .    Patient reports last use was Prior to arrival .  Pt LEOBARDO: 0.24  Pt  denies hx of DT  Pt  endorses hx of seizures. Last seizure:  unknown   Pt endorsing the following symptoms of withdrawal: Tremulous, Anxious, and Vomiting  MSSA Score:  18    Pt endorses acute mental health or medical concerns.   Pt denies other drug use: None Amount/frequency: N/A    Does Pt have a detox care plan in [facility]? No   Does pt present with specific needs, assistive devices, or exclusionary criteria? None  Is the patient ambulating, eating and drinking in the ED? Yes     A: Pt meets criteria to be presented for IP detox admission. Patient is voluntary    COVID Symptoms: No  If yes, COVID test required   Utox: Ordered, not yet collected  Magnesium: WNL  CMP: WNL  CBC: WNL  HCG: N/A     R: Patient cleared and ready for behavioral bed placement: Yes    Pt is meeting criteria for presentation to 3A/MICD    Does Patient need a Transfer Center request created? No, Pt is located within [facility] ED, [facility] ED, or [place] ED     5:33 PM Paged Florec[facility] to review for unit 3A/CD/MICD/Jeny     6:05 PM Florecita accepted pt to 3A/CD/Jeny     6:09 PM Intake called unit 3A - spoke with CRN -informed pt is in queue.     6:12 PM intake called ED with bed placement information

## 2025-02-19 NOTE — ED NOTES
Bed: [facility]  Expected date: 2/19/25  Expected time: 10:25 AM  Means of arrival:   Comments:  Darren Morrow  64 Y Female ETOH Withdrawals

## 2025-02-19 NOTE — ED PROVIDER NOTES
"ED Provider Note  [facility]      History     Chief Complaint   Patient presents with    Alcohol Intoxication          HPI  Jolynn Rivera is a 64 year old female with a history of alcohol use disorder with withdrawal seizures, polysubstance use, bipolar disorder, PTSD, COPD who presents to the emergency department via EMS for alcohol intoxication.    Patient states she starting today, she has been nauseous with vomiting, and her brain feels \"weird\".  Patient had a couple glasses of alcohol this morning. She reports drinking a \"few glasses\" since discharge from detox on 2/2/25.  Patient has abdominal pain in her upper belly.  Patient has been having diarrhea, no blood or black stools.  Patient had a fever for the last couple of days, states it was up to 102.  Patient has runny nose, sore throat, and cough, states she was tested last time she was here. Patient also has a little bit of burning with urination. Patient is taking her medication, and patient notes she was recently put on new lung medication which she has not yet started.  Patient has been more anxious the last couple of days, when asked if she had thoughts of hurting herself she states \"a little\".      Physical Exam   BP: 106/63  Pulse: 86  Temp: 98  F (36.7  C)  Resp: 16  SpO2: 94 %  Physical Exam  General: patient is alert and oriented, anxious appearing  Head: atraumatic and normocephalic   EENT: Dry mucus membranes, sclera anicteric   neck: supple   Cardiovascular: regular rate and rhythm, extremities warm and well perfused, no lower extremity edema  Pulmonary: lungs clear to auscultation bilaterally   Abdomen: soft, tenderness to palpation across the upper abdomen with voluntary guarding   musculoskeletal: normal range of motion   Neurological: alert and oriented, moving all extremities symmetrically, gait normal   Skin: warm, dry     ED Course, Procedures, & Data      Procedures            EKG Interpretation:  "     Interpreted by Selena Cabezas MD  Time reviewed: 1148  Symptoms at time of EKG: epigastric pain   Rhythm: normal sinus   Rate: normal  Axis: normal  Ectopy: none  Conduction: normal  ST Segments/ T Waves: No ST-T wave changes  Q Waves: none  Comparison to prior: Unchanged    Clinical Impression: normal EKG            No results found for any visits on 02/19/25.  Medications - No data to display  Labs Ordered and Resulted from Time of ED Arrival to Time of ED Departure - No data to display  No orders to display          Critical care was not performed.     Medical Decision Making  The patient's presentation was of high complexity (an acute health issue posing potential threat to life or bodily function).    The patient's evaluation involved:  ordering and/or review of 3+ test(s) in this encounter (see separate area of note for details)  discussion of management or test interpretation with another health professional (DEC)    The patient's management necessitated moderate risk (prescription drug management including medications given in the ED) and high risk (a decision regarding hospitalization).    Assessment & Plan    Ms. Rivera is a 64 year old female with a history of alcohol use disorder with withdrawal seizures, polysubstance use, bipolar disorder, PTSD, COPD who presents to the emergency department via EMS for alcohol intoxication, nausea, vomiting, abdominal pain and anxiety.  Upon arrival she is noted to be hemodynamically stable, afebrile and in no respiratory distress.  Differential diagnosis includes but is not limited to alcoholic gastritis, peptic ulcer disease, GERD, pancreatitis, hepatitis, biliary disease, intra-abdominal infection.  Laboratory evaluation shows no significant electrolyte abnormalities, mildly elevated anion gap of 18, AST of 58, similar to baseline, lipase within normal limits.  Initial troponin was 17, delta is pending.  She is noted to be intoxicated with a breath alcohol  of 0.24 and question the extent of her alcohol use recently as she reports only having a few drinks.  During her DEC evaluation she reported she has been drinking for the last 3 weeks and up to a liter a day.  She was placed on MSSA protocol, given Zofran, folate and multivitamin, she has a reported allergy to thiamine.  She did have Abdominal CT which shows mild inflammatory changes.  In the setting of her vomiting and diarrhea, suspect infectious versus inflammatory in nature.  Low suspicion for ischemia.  I did review liver lesions with her from CT for outpatient follow up. Additionally she reports that she has been feeling much more anxious and having some suicidal thoughts.  DEC assessment was placed and endorses passive SI without any particular plan.  Will plan to referral for dual diagnosis MICD treatment.    I have reviewed the nursing notes. I have reviewed the findings, diagnosis, plan and need for follow up with the patient.    New Prescriptions    No medications on file       Final diagnoses:   Alcohol intoxication with delirium   Suicidal ideation   I, Joni Luke, am serving as a trained medical scribe to document services personally performed by Selena West MD, based to the provider's statements to me.     I, Selena West MD, was physically present and have reviewed and verified the accuracy of this note documented by Joni Luke.     Selena West MD  [facility] EMERGENCY DEPARTMENT  2/19/2025     Selena West MD  02/19/25 4730

## 2025-02-19 NOTE — MEDICATION SCRIBE - ADMISSION MEDICATION HISTORY
Medication Scribe Admission Medication History    Admission medication history is complete. The information provided in this note is only as accurate as the sources available at the time of the update.    Information Source(s): Patient and CareEverywhere/SureScripts via in-person    Pertinent Information: Patient reports self management of medications.    Azithromycin 250 daily was prescribed to patient two days ago and dispensed on 02/18/25. However, Patient reports she has not yet picked up this medication. She also reports not picking up Albuterol inhaler which was dispensed the same day. She reports taking all other medications on PTA as directed.     Patient has Allergy to Umeclidinum and reports she is not taking TRILLEGY ELLIPTA because of side effects. Trillegy ellipta inhaler removed from PTA.    Changes made to PTA medication list:    Added: None    Deleted:Pt reported not taking---Fluticasone inhaler                                                                Levofloxacin 500 mg tabs (Completed treatment)  Changed: None    Allergies reviewed with patient and updates made in EHR: yes    Medication History Completed By: Zhen Lopez 2/19/2025 2:50 PM    PTA Med List   Medication Sig Last Dose/Taking    azithromycin (ZITHROMAX Z-AVI) 250 MG tablet Two tablets on the first day, then one tablet daily for the next 4 days Unknown    eszopiclone (LUNESTA) 3 MG tablet Take 3 mg by mouth at bedtime 2/18/2025    gabapentin (NEURONTIN) 600 MG tablet Take 900 mg by mouth 3 times daily 2/18/2025    levalbuterol (XOPENEX HFA) 45 MCG/ACT inhaler Inhale 2 puffs into the lungs every 4 hours as needed for shortness of breath or wheezing. Unknown    levothyroxine (SYNTHROID/LEVOTHROID) 100 MCG tablet Take 100 mcg by mouth every morning 2/18/2025    melatonin 5 MG tablet Take 10 mg by mouth at bedtime Takes 1 hour prior to Lunesta 2/18/2025    multivitamin w/minerals (THERA-VIT-M) tablet Take 1 tablet by mouth daily  2/18/2025    nicotine polacrilex (NICORETTE) 4 MG gum Place 4 mg inside cheek every hour as needed for smoking cessation 2/18/2025    omeprazole (PRILOSEC) 40 MG DR capsule Take 40 mg by mouth every morning 2/18/2025    ondansetron (ZOFRAN ODT) 4 MG ODT tab Take 4 mg by mouth every 8 hours as needed for vomiting or nausea Taking As Needed    propranolol (INDERAL) 20 MG tablet Take 20 mg by mouth 3 times daily 2/18/2025    thiamine (B-1) 100 MG tablet Take 1 tablet (100 mg) by mouth daily 2/18/2025

## 2025-02-19 NOTE — CONSULTS
Diagnostic Evaluation Consultation  Crisis Assessment    Patient Name: Jolynn Rivera  Age:  64 year old  Legal Sex: female  Gender Identity: female  Pronouns: she/her/hers  Race: White  Ethnicity: Not  or   Language: English    Patient was assessed: In person   Crisis Assessment Start Date: 02/19/25  Crisis Assessment Start Time: 1344  Crisis Assessment Stop Time: 1400  Patient location: [facility] Emergency Department      ED12    Referral Data and Chief Complaint  Jolynn Rivera presents to the ED via EMS. Patient is presenting to the ED for the following concerns: Substance use, Worsening psychosocial stress, Anxiety, Depression, Suicidal ideation. Factors that make the mental health crisis life threatening or complex are: Alcohol use/withdrawal, anxiety, medical issues.    Informed Consent and Assessment Methods  Explained the crisis assessment process, including applicable information disclosures and limits to confidentiality, assessed understanding of the process, and obtained consent to proceed with the assessment.  Assessment methods included conducting a formal interview with patient, review of medical records, collaboration with medical staff, and obtaining relevant collateral information from family and community providers when available.  : done     History of the Crisis   Pt with history, per chart, of multiple diagnoses including but not limited to PTSD, Depression, Anxiety, Alcohol Use Disorder. She presents today via EMS reporting etoh withdrawal and anxiety. Her breath alcohol at time of assessment is .24. She presents as anxious and distractible. She states that she came in today because 'I'm so sick!' She reports that she thought she had the flu but tested negative. She has been feeling nauseous and unwell and states that she ended up drinking alcohol to 'deal with it' after one week of sobriety. She starts out by saying she only drank this morning, then she includes  yesterday then she says probably for the last week. She drinks wine. She reports that she came in today so she can get help with feeling better. She reports recent passive SI but denies plan/intent. She denies hx suicide attempts. She dnies thoughts of harming others. She is future-oriented. She states that she found a recovery program called [facility] and that she feels so hopeful about their program. She feels comfortable and supported there and feels like it is a great fit for her. She plans to return there when feeling better. She is prescribed psychiatric medications and takes as prescribed. She has a therapist who she sees regularly and an [facility] worker through her CADI waiver.    Brief Psychosocial History  Family:  Single, Children no  Support System:  Neighbor, Friend  Employment Status:  disabled  Source of Income:  disability  Financial Environmental Concerns:  none  Current Hobbies:     Barriers in Personal Life:       Significant Clinical History  Current Anxiety Symptoms:  anxious, racing thoughts  Current Depression/Trauma:  difficulty concentrating, helplessness, thoughts of death/suicide  Current Somatic Symptoms:  anxious, racing thoughts  Current Psychosis/Thought Disturbance:  forgetful, distractability  Current Eating Symptoms:     Chemical Use History:  Alcohol: Daily  Last Use:: 02/19/25  Benzodiazepines: None  Opiates: None  Cocaine: None  Marijuana: None  Other Use: None   Past diagnosis:  Anxiety Disorder, Depression, Substance Use Disorder  Family history:  No known history of mental health or chemical health concerns  Past treatment:  Individual therapy, Case management, Primary Care, Psychiatric Medication Management, Inpatient Hospitalization  Details of most recent treatment:  Pt currently attending individual therapy and med mgmt. Has CADI waiver services with an S worker who she sees weekly. She just completed a DA and has been referred to 's 55+ ADT program.  Other  relevant history:       Have there been any medication changes in the past two weeks:  no       Is the patient compliant with medications:  yes        Collateral Information  Is there collateral information: No      Risk Assessment  [place] Suicide Severity Rating Scale Full Clinical Version:  Suicidal Ideation  Q6 Suicide Behavior (Lifetime): yes    [place] Suicide Severity Rating Scale Recent:   Suicidal Ideation (Recent)  Q1 Wished to be Dead (Past Month): yes  Q2 Suicidal Thoughts (Past Month): yes  Q3 Suicidal Thought Method: no  Q4 Suicidal Intent without Specific Plan: no  Q5 Suicide Intent with Specific Plan: no  If yes to Q6, within past 3 months?: no  Level of Risk per Screen: moderate risk  Intensity of Ideation (Recent)  Most Severe Ideation Rating (Past 1 Month): 2  Frequency (Past 1 Month): Less than once a week  Duration (Past 1 Month): Fleeting, few seconds or minutes  Controllability (Past 1 Month): Easily able to control thoughts  Deterrents (Past 1 Month): Deterrents definitely stopped you from attempting suicide  Reasons for Ideation (Past 1 Month): Mostly to end or stop the pain (You couldn't go on living with the pain or how you were feeling)  Suicidal Behavior (Recent)  Actual Attempt (Past 3 Months): No  Total Number of Actual Attempts (Past 3 Months): 0  Has subject engaged in non-suicidal self-injurious behavior? (Past 3 Months): No  Interrupted Attempts (Past 3 Months): No  Total Number of Interrupted Attempts (Past 3 Months): 0  Aborted or Self-Interrupted Attempt (Past 3 Months): No  Total Number of Aborted or Self-Interrupted Attempts (Past 3 Months): 0  Preparatory Acts or Behavior (Past 3 Months): No  Total Number of Preparatory Acts (Past 3 Months): 0    Environmental or Psychosocial Events: ongoing abuse of substances  Protective Factors: Protective Factors: lives in a responsibly safe and stable environment, able to access care without barriers, sense of belonging, sense of  self-efficacy and/or positive self-esteem, reality testing ability, optimistic outlook - identification of future goals, help seeking, supportive ongoing medical and mental health care relationships, good treatment engagement, sense of importance of health and wellness    Does the patient have thoughts of harming others? Feels Like Hurting Others: no  Previous Attempt to Hurt Others: no  Is the patient engaging in sexually inappropriate behavior?: no  Does Patient have a known history of aggressive behavior: No  Has aggression occurred as a result of MH concerns/diagnosis: n/a  Does patient have history of aggression in hospital: n/a    Is the patient engaging in sexually inappropriate behavior?  no        Mental Status Exam   Affect: Appropriate, Labile  Appearance: Disheveled  Attention Span/Concentration: Inattentive  Eye Contact: Engaged    Fund of Knowledge: Appropriate   Language /Speech Content: Fluent  Language /Speech Volume: Normal  Language /Speech Rate/Productions: Normal  Recent Memory: Variable  Remote Memory: Variable  Mood: Anxious, Normal  Orientation to Person: Yes   Orientation to Place: Yes  Orientation to Time of Day: Yes  Orientation to Date: Yes     Situation (Do they understand why they are here?): Yes  Psychomotor Behavior: Normal  Thought Content: Clear  Thought Form: Intact    Medication  Psychotropic medications:   Medication Orders - Psychiatric (From admission, onward)      Start     Dose/Rate Route Frequency Ordered Stop    02/19/25 1303  diazepam (VALIUM) tablet 5-20 mg         5-20 mg Oral EVERY 30 MIN PRN 02/19/25 1303               Current Care Team  Patient Care Team:  Carmela Srinivasan MD as PCP - General (Family Medicine)  Karon Hastings LP as Psychologist (Psychology)  Chip Araiza MD as MD (Pulmonary & Critical Care Medicine)  Rosibel Tatum, PhD LP as Psychologist (Licensed Mental Health)  Chip Araiza MD as Assigned Pulmonology Provider  Alexandria Sands,  [facility] as Physician Assistant (Pulmonary Disease)    Diagnosis  Patient Active Problem List   Diagnosis Code    Polysubstance abuse (H) F19.10    Chronic hepatitis C (H) B18.2    COPD (chronic obstructive pulmonary disease) (H) J44.9    GERD (gastroesophageal reflux disease) K21.9    Mood disorder F39    Chemical dependency (H) F19.20    Posttraumatic stress disorder F43.10    Nicotine abuse Z72.0    Alcohol use disorder, mild, abuse F10.10    Hx of psychiatric care Z92.89    Alcohol withdrawal, uncomplicated (H) F10.930    Sinusitis, unspecified chronicity, unspecified location J32.9    Alcohol withdrawal syndrome without complication (H) F10.930    Elevated LFTs R79.89    Drug withdrawal seizure with complication (H) F19.939, R56.9    Alcohol dependence with withdrawal with complication (H) F10.239    Alcoholic ketoacidosis E87.29    Alcohol use disorder F10.90    Vomiting and diarrhea R11.10, R19.7    Seizure (H) R56.9    Alcoholic intoxication with complication F10.929    Acute alcoholic intoxication in alcoholism with complication (H) F10.229    Hypoxia R09.02    Elevated troponin R79.89    Acne rosacea L71.9    Acquired hypothyroidism E03.9    Alcohol abuse, in remission F10.11    Atelectasis of right lung J98.11    Bee sting allergy Z91.030    Bipolar 1 disorder, manic, moderate (H) F31.12    Chronic hip pain, right M25.551, G89.29    History of hepatitis C Z86.19    Left knee pain M25.562    Onychomycosis B35.1    Osteoarthritis of multiple joints M15.9    Primary hypertension I10    Psychophysiological insomnia F51.04    Tear of lateral meniscus of right knee S83.281A    Tobacco use disorder F17.200    Alcohol use disorder, severe, dependence (H) F10.20    Tobacco dependence F17.200    Bipolar disorder, most recent episode depressed (H) F31.30    Acute hypoxic respiratory failure (H) J96.01    Chronic obstructive pulmonary disease (H) J44.9    COPD with acute exacerbation (H) J44.1    Gastroesophageal reflux  disease without esophagitis K21.9    Generalized anxiety disorder F41.1    PTSD (post-traumatic stress disorder) F43.10    Lactic acidosis E87.20    Alcohol withdrawal seizure with complication (H) F10.939, R56.9    Alcohol abuse with withdrawal (H) F10.139       Primary Problem This Admission  Active Hospital Problems    Generalized anxiety disorder      Clinical Summary and Substantiation of Recommendations   Clinical Substantiation:  Pt here today with alcohol intoxication and concerns for withdrawal, ED MD recommending admission for detox. Pt is drinking wine daily and breath alcohol upon arrival to ED was .24. She has hx mental illness and today endorses anxiety and intermittent passive SI without plan/intent. She has is appropriate for outpatient level of MH services and is encouraged to follow up with established providers and current referrals. Pt has upcoming appts with [place] [facility] [place]. She works with her S worker weekly. She has been referred and contacted about starting 's 55+ ADT program but has been informed that she must be sober before starting the program. Pt plans to continue attending recovery program [facility].    Goals for crisis stabilization:  Detox for withdrawal management    Next steps for Care Team:       Treatment Objectives Addressed:  rapport building, processing feelings, identifying an appropriate aftercare plan    Therapeutic Interventions:  Engaged in guided discovery, explored patient's perspectives and helped expand them through socratic dialogue.    Has a specific means been identified for suicidal/homicide actions: No    Patient coping skills attempted to reduce the crisis:  Seeking help in the ED.    Disposition  Recommended referrals:          Reviewed case and recommendations with attending provider. Attending Name: Selena Cabezas MD       Attending concurs with disposition: yes       Patient and/or validated legal guardian  concurs with disposition:   yes       Final disposition:   (Detox admission)    Legal status: Voluntary/Patient has signed consent for treatment    Assessment Details   Total duration spent with the patient: 16 min     CPT code(s) utilized: 55369 - Psychotherapy (with patient) - 30 (16-37*) min    Jerica Rothman [facility], Psychotherapist  DEC - Triage & Transition Services  Callback: [phone]

## 2025-02-20 VITALS
TEMPERATURE: 97.7 F | DIASTOLIC BLOOD PRESSURE: 95 MMHG | WEIGHT: 138 LBS | HEIGHT: 63 IN | HEART RATE: 76 BPM | OXYGEN SATURATION: 97 % | RESPIRATION RATE: 16 BRPM | SYSTOLIC BLOOD PRESSURE: 148 MMHG | BODY MASS INDEX: 24.45 KG/M2

## 2025-02-20 PROCEDURE — 250N000013 HC RX MED GY IP 250 OP 250 PS 637: Performed by: CLINICAL NURSE SPECIALIST

## 2025-02-20 PROCEDURE — 99222 1ST HOSP IP/OBS MODERATE 55: CPT

## 2025-02-20 PROCEDURE — 99222 1ST HOSP IP/OBS MODERATE 55: CPT | Mod: AI | Performed by: PSYCHIATRY & NEUROLOGY

## 2025-02-20 PROCEDURE — 250N000013 HC RX MED GY IP 250 OP 250 PS 637: Performed by: INTERNAL MEDICINE

## 2025-02-20 PROCEDURE — 250N000013 HC RX MED GY IP 250 OP 250 PS 637: Performed by: EMERGENCY MEDICINE

## 2025-02-20 PROCEDURE — 250N000013 HC RX MED GY IP 250 OP 250 PS 637

## 2025-02-20 PROCEDURE — 128N000004 HC R&B CD ADULT

## 2025-02-20 PROCEDURE — 250N000013 HC RX MED GY IP 250 OP 250 PS 637: Performed by: PSYCHIATRY & NEUROLOGY

## 2025-02-20 PROCEDURE — 250N000011 HC RX IP 250 OP 636: Performed by: CLINICAL NURSE SPECIALIST

## 2025-02-20 RX ORDER — LIDOCAINE 4 G/G
2 PATCH TOPICAL
Status: DISCONTINUED | OUTPATIENT
Start: 2025-02-20 | End: 2025-02-21 | Stop reason: HOSPADM

## 2025-02-20 RX ORDER — ESZOPICLONE 3 MG/1
3 TABLET, FILM COATED ORAL AT BEDTIME
Status: DISCONTINUED | OUTPATIENT
Start: 2025-02-20 | End: 2025-02-21 | Stop reason: HOSPADM

## 2025-02-20 RX ORDER — AZITHROMYCIN 500 MG/1
500 TABLET, FILM COATED ORAL ONCE
Status: COMPLETED | OUTPATIENT
Start: 2025-02-20 | End: 2025-02-20

## 2025-02-20 RX ORDER — LEVALBUTEROL TARTRATE 45 UG/1
2 AEROSOL, METERED ORAL EVERY 4 HOURS PRN
Status: DISCONTINUED | OUTPATIENT
Start: 2025-02-20 | End: 2025-02-20

## 2025-02-20 RX ORDER — PHENOBARBITAL 16.2 MG/1
16.2 TABLET ORAL AT BEDTIME
Status: DISCONTINUED | OUTPATIENT
Start: 2025-02-20 | End: 2025-02-21 | Stop reason: HOSPADM

## 2025-02-20 RX ORDER — MAGNESIUM HYDROXIDE/ALUMINUM HYDROXICE/SIMETHICONE 120; 1200; 1200 MG/30ML; MG/30ML; MG/30ML
30 SUSPENSION ORAL EVERY 4 HOURS PRN
Status: DISCONTINUED | OUTPATIENT
Start: 2025-02-20 | End: 2025-02-21 | Stop reason: HOSPADM

## 2025-02-20 RX ORDER — OMEPRAZOLE 20 MG/1
40 CAPSULE, DELAYED RELEASE ORAL EVERY MORNING
Status: DISCONTINUED | OUTPATIENT
Start: 2025-02-21 | End: 2025-02-20

## 2025-02-20 RX ORDER — ONDANSETRON 4 MG/1
4 TABLET, ORALLY DISINTEGRATING ORAL EVERY 8 HOURS PRN
Status: DISCONTINUED | OUTPATIENT
Start: 2025-02-20 | End: 2025-02-21 | Stop reason: HOSPADM

## 2025-02-20 RX ORDER — MULTIPLE VITAMINS W/ MINERALS TAB 9MG-400MCG
1 TAB ORAL DAILY
Status: DISCONTINUED | OUTPATIENT
Start: 2025-02-21 | End: 2025-02-21 | Stop reason: HOSPADM

## 2025-02-20 RX ORDER — AZITHROMYCIN 250 MG/1
250 TABLET, FILM COATED ORAL DAILY
Status: DISCONTINUED | OUTPATIENT
Start: 2025-02-21 | End: 2025-02-21 | Stop reason: HOSPADM

## 2025-02-20 RX ADMIN — FOLIC ACID 1 MG: 1 TABLET ORAL at 08:38

## 2025-02-20 RX ADMIN — LEVOTHYROXINE SODIUM 100 MCG: 0.1 TABLET ORAL at 08:37

## 2025-02-20 RX ADMIN — NICOTINE POLACRILEX 4 MG: 4 GUM, CHEWING BUCCAL at 04:32

## 2025-02-20 RX ADMIN — NICOTINE POLACRILEX 4 MG: 4 GUM, CHEWING BUCCAL at 08:44

## 2025-02-20 RX ADMIN — Medication 900 MG: at 14:45

## 2025-02-20 RX ADMIN — DIAZEPAM 10 MG: 5 TABLET ORAL at 08:35

## 2025-02-20 RX ADMIN — NICOTINE POLACRILEX 4 MG: 4 GUM, CHEWING BUCCAL at 16:19

## 2025-02-20 RX ADMIN — PANTOPRAZOLE SODIUM 40 MG: 40 TABLET, DELAYED RELEASE ORAL at 08:38

## 2025-02-20 RX ADMIN — NICOTINE POLACRILEX 4 MG: 4 GUM, CHEWING BUCCAL at 14:48

## 2025-02-20 RX ADMIN — DIAZEPAM 10 MG: 5 TABLET ORAL at 00:32

## 2025-02-20 RX ADMIN — NICOTINE POLACRILEX 4 MG: 4 GUM, CHEWING BUCCAL at 02:32

## 2025-02-20 RX ADMIN — THIAMINE HCL TAB 100 MG 100 MG: 100 TAB at 15:09

## 2025-02-20 RX ADMIN — NICOTINE POLACRILEX 4 MG: 4 GUM, CHEWING BUCCAL at 20:31

## 2025-02-20 RX ADMIN — NICOTINE POLACRILEX 4 MG: 4 GUM, CHEWING BUCCAL at 18:16

## 2025-02-20 RX ADMIN — ONDANSETRON 4 MG: 4 TABLET, ORALLY DISINTEGRATING ORAL at 08:38

## 2025-02-20 RX ADMIN — Medication 900 MG: at 20:31

## 2025-02-20 RX ADMIN — NICOTINE POLACRILEX 4 MG: 4 GUM, CHEWING BUCCAL at 21:52

## 2025-02-20 RX ADMIN — PROPRANOLOL HYDROCHLORIDE 20 MG: 20 TABLET ORAL at 20:31

## 2025-02-20 RX ADMIN — LIDOCAINE 4% 2 PATCH: 40 PATCH TOPICAL at 20:33

## 2025-02-20 RX ADMIN — Medication 900 MG: at 08:37

## 2025-02-20 RX ADMIN — Medication 3 MG: at 20:31

## 2025-02-20 RX ADMIN — LOPERAMIDE HYDROCHLORIDE 2 MG: 2 CAPSULE ORAL at 09:20

## 2025-02-20 RX ADMIN — Medication 1 TABLET: at 08:38

## 2025-02-20 RX ADMIN — PROPRANOLOL HYDROCHLORIDE 20 MG: 20 TABLET ORAL at 08:38

## 2025-02-20 RX ADMIN — NICOTINE POLACRILEX 4 MG: 4 GUM, CHEWING BUCCAL at 00:32

## 2025-02-20 RX ADMIN — AZITHROMYCIN DIHYDRATE 500 MG: 500 TABLET ORAL at 12:55

## 2025-02-20 RX ADMIN — NICOTINE POLACRILEX 4 MG: 4 GUM, CHEWING BUCCAL at 10:31

## 2025-02-20 RX ADMIN — PROPRANOLOL HYDROCHLORIDE 20 MG: 20 TABLET ORAL at 14:45

## 2025-02-20 RX ADMIN — NICOTINE POLACRILEX 4 MG: 4 GUM, CHEWING BUCCAL at 12:55

## 2025-02-20 RX ADMIN — DIAZEPAM 10 MG: 5 TABLET ORAL at 04:58

## 2025-02-20 RX ADMIN — LOPERAMIDE HYDROCHLORIDE 2 MG: 2 CAPSULE ORAL at 18:16

## 2025-02-20 ASSESSMENT — ACTIVITIES OF DAILY LIVING (ADL)
ADLS_ACUITY_SCORE: 45
ADLS_ACUITY_SCORE: 45
DRESS: SCRUBS (BEHAVIORAL HEALTH)
ADLS_ACUITY_SCORE: 45
LAUNDRY: WITH SUPERVISION
ADLS_ACUITY_SCORE: 45
ADLS_ACUITY_SCORE: 45
HYGIENE/GROOMING: INDEPENDENT
ADLS_ACUITY_SCORE: 45
HYGIENE/GROOMING: INDEPENDENT
ORAL_HYGIENE: INDEPENDENT
ADLS_ACUITY_SCORE: 45
DRESS: INDEPENDENT
ADLS_ACUITY_SCORE: 45
ORAL_HYGIENE: INDEPENDENT
ADLS_ACUITY_SCORE: 45
LAUNDRY: WITH SUPERVISION

## 2025-02-20 NOTE — PROGRESS NOTES
02/19/25 2045   Patient Belongings   Did you bring any home meds/supplements to the hospital?  No   Patient Belongings other (see comments)   Belongings Search Yes   Clothing Search Yes   Second Staff Carolyn Link     STORAGE BIN:   Backpack,  cords, laptop, ipad    MED ROOM BIN:   Id, keys, cell phone  A             ADMISSION:    I am responsible for any personal items that are not sent to the safe or pharmacy. [facility] is not responsible for loss, theft or damage of any property in my possession.    Patient Signature _________________________________________ Date/Time _____________________    Staff Signature ___________________________________________ Date/Time _____________________    2nd Staff person, if patient is unable/unwilling to sign    ________________________________________________________ Date/Time _____________________    DISCHARGE:    All personal items have been returned to me.    Patient Signature _________________________________________ Date/Time _____________________    Staff Signature ___________________________________________ Date/Time _____________________

## 2025-02-20 NOTE — DISCHARGE INSTRUCTIONS
Behavioral Discharge Planning and Instructions  THANK YOU FOR CHOOSING [facility]  3A  [phone]    Summary: You were admitted to Station 3A on 2/19/2025 for detoxification from alcohol.  A medical exam was performed that included lab work. You have met with a [facility] Counselor and opted to ***.  Please take care and make your recovery a daily priority, Jolynn!  It was a pleasure working with you and the entire treatment team here wishes you the very best in your recovery!     Recommendation:  ***    Main Diagnoses:    {Substance Related Use Disorders:430054}    Major Treatments, Procedures and Findings:  You were treated for alcohol detoxification using valium administered based on the [facility] protocol. You have met with a [facility] counselor to develop a treatment plan for discharge. You had labs drawn and those results were reviewed with you. Please take a copy of your lab work with you to your next primary care provider appointment.    Symptoms to Report:  If you experience more anxiety, confusion, sleeplessness, deep sadness or thoughts of suicide, notify your treatment team or notify your primary care provider. IF ANY OF THE SYMPTOMS YOU ARE EXPERIENCING ARE A MEDICAL EMERGENCY CALL 911 IMMEDIATELY.     Lifestyle Adjustment: Adjust your lifestyle to get enough sleep, relaxation, exercise and good nutrition. Continue to develop healthy coping skills to decrease stress and promote a sober living environment. Do not use mood altering substances including alcohol, illegal drugs or addictive medications other than what is currently prescribed.     Disposition: ***    Facts about COVID19 at www.cdc.gov/COVID19 and www.[place].gov/covid19    Keeping hands clean is one of the most important steps we can take to avoid getting sick and spreading germs to others.  Please wash your hands frequently and lather with soap for at least 20 seconds!    Follow-up Appointments:   Full PFT  Friday February 21st, 2025 2:45 PM  [facility]  [place] [facility] [place]  [address]  3rd Floor  [facility] [id number] [phone]    CT CHEST W/O CONTRAST  Friday February 21st, 2025 4:25 PM  [facility] [place]  [address]  1st Floor  [facility] [id number] [phone]    Carmela Srinivasan MD   On Wednesday March 5th, 2025 at 1:20PM  [facility]   [address]   [address]   [place] [address]   [phone]     Recovery apps for your phone for educational purposes and to locate in person and zoom recovery meetings  Everything AA -  mahnaz is a great resource  12 Step Toolkit - educational purpose learning about the 12 steps to recovery  [place] Cloud - meeting mahnaz  AA  - meeting mahnaz  Meeting guide - meeting mahnaz  Quick NA meeting - meeting mahnaz  Estela- has various apps    Patient Navigation Hub  [facility] Navigators work to be your point-of-contact for trustworthy and compassionate care from Inpatient services to [facility] Programmatic Care. We will provide resources and communication to help guide you into programmatic care. Ultimately, our goal is to be the one-stop-shop of communication, coordination, and support for your journey to programmatic care.  Phone: [phone]    Resources:  AA/NA meetings have returned to in-person or a hybrid combination of zoom/in-person therefore please check online to verify*  Need Support Now? If you or someone you know is struggling or in crisis, help is available. Call or text 988 or chat [employer].org  AA meetings search for them at: https://aa-intergroup.org (worldwide meeting listings)  AA meetings for [place] area can be found online at: https://[place].org (click local online meetings listings)  NA meetings for [place] area can be found online at: https://www.nam[place].org  (click find a meeting)  AA and NA Sponsors are excellent resources for support and you can find one at any support group  "meeting.   Alcoholics Anonymous (https://aa.org/): for information 24 hours/day  AA Intergroup service office in [place] (http://www.aastpaul.org/) 408.392.9699  AA Intergroup service office in [facility]: 869.530.7040. (http://www.a[place].org/)  Narcotics Anonymous (www.nam[place].org) (721) 912-7784  https://aafairviewriverside.org/meetings  SMART Recovery - self management for addiction recovery:  www.smartrecovery.org  Pathways ~ A Health Crisis Resource & Support Center:  812.739.8011.  https://prescribetoprevent.org/patient-education/videos/  http://www.SoftRunreduction.org  Crisis Text Line  Text 475236  You will be connected with a trained live crisis counselor to provide support. Por espanol, texto  JEANNETTE a 233802 o texto a 442-AYUDAME en WhatsApp  [place] Hope Line  1.070.SUICIDE [9448984]  [facility] [phone]  Support Group:  AA/NA and Sponsor/support.  Fast Tracker  Linking people to mental health and substance use disorder resources  Register My InfoÂ®.org   [facility] Warm Line  Peer to peer support  Monday thru Saturday, 12 pm to 10 pm  987.068.2938 or 2.401.307.6987  Text \"Support\" to 29310  National [place] on Mental Illness (MARITZA)  229.045.8896 or 1.888.MARITZA.HELPS  Alcoholics Anonymous (www.alcoholics-anonymous.org): Check your phone book for your local chapter.  Suicide Awareness Voices of Education (SAVE) (www.save.org): 301-551-UKAE (2740)  National Suicide Prevention Line (www.mentalhealthmn.org): 719-689-TVKG (8549)  Mental Health Consumer/Survivor Network of [place] (www.mhcsn.net): 899.540.8146 or 340-968-9107  Mental Health Association of [place] (www.mentalhealth.org): 605.932.7703 or 980-342-1666   Substance Abuse and Mental Health Services (www.samhsa.gov)  [place] Opioid Prevention Coalition: www.opioidcoalition.org    [place] Recovery Connection (MRC)  MRC connects people seeking recovery to resources that help foster and sustain " long-term recovery.  Whether you are seeking resources for treatment, transportation, housing, job training, education, health care or other pathways to recovery, [facility] is a great place to start.  629.173.4233.  www.[facility].org    Great Pod casts for nutrition and wellness  Listen on Apple Podcasts  Dishing Up Nutrition   [employer]   Nutrition       Understand the connection between what you eat and how you feel. Hosted by licensed nutritionists and dietitians from [employer] we share practical, real-life solutions for healthier living through nutrition.     General Medication Instructions:   See your medication sheet(s) for instructions.   Take all medications as prescribed.  Make no changes unless your primary care provider suggests them.   Go to all your primary care provider visits.  Be sure to have all your required lab tests. This way, your medicines can be refilled on time.  Do not use any forms of alcohol.    Please Note: If you have any questions at anytime after you discharged please call [facility] detox unit 3A at [phone].    Here are a list of additional numbers you can call if you are wanting to resume services through [facility]:  [facility] Assessment Intake: [phone]  [facility] Adult GIRMA Intensive Outpatient  call: [phone]  [facility] Admissions [phone]    Recovery Clinic call: [phone]  [facility]: [phone]  Medical Records call: [phone]  Billing Department call: [phone]    Please remember to take all of your behavioral discharge planning and lab paperwork to any follow up appointments, it contains your lab results, diagnosis, medication list and discharge recommendations.      THANK YOU FOR CHOOSING [facility]    [facility] detox unit 3A at [phone].    Here are a list of additional numbers you can call if you are [facility] to [facility]:  [facility] Assessment Intake: [phone]  [facility] Adult GIRMA Intensive Outpatient  call: [phone]  [facility] Admissions [phone]    Recovery Clinic call: [phone]  [facility]: [phone]  Medical Records call: [phone]  Billing Department call: [phone]    Please remember to take all of your behavioral discharge planning and lab paperwork to any follow up appointments, it contains your lab results, diagnosis, medication list and discharge recommendations.      THANK YOU FOR CHOOSING [facility]

## 2025-02-20 NOTE — PLAN OF CARE
"  Problem: Alcohol Withdrawal  Goal: Alcohol Withdrawal Symptom Control  Outcome: Progressing     Problem: Depressive Symptoms  Goal: Depressive Symptoms  Description: Signs and symptoms of listed problems will be absent or manageable.  Outcome: Progressing   Goal Outcome Evaluation:       Jolynn is a 64 year old female pt.admitted from [facility] adult ED due to alcohol intoxication, abuse, and dependence. Pt.reports she has chronic history of alcohol abuse that stems from childhood trauma. She reports being discharged from this facility about 2 weeks prior. She is voluntary and seeking for detox.     Pt.arrived on the unit from the ED on a wheelchair. She appeared much intoxicated complaining about being brought to the wrong unit. She reports she has been \"sick\" and should have been admitted \"on the hospital side. All you guys ever gonna do is give me valium. I'm really sick\". She was cooperative with admission search and interview. Stated she was only at the ED because \"I'm sick\".    She denied SI/SIB/hallucinations. Reports she only makes suicidal statements when drunk. Endorsed history of withdrawal seizures. No DTs history reported.     Started on MSSA with valium protocol to manage targeted acute alcohol withdrawal symptoms. Scored 7 & 10 on admission MSSA. Received 10mg prn valium.     Staff initiated Q15 minutes safety rounds. No safety or behavioral concerns observed or reported. Will continue to monitor.                  "

## 2025-02-20 NOTE — PROGRESS NOTES
Reviewed chart for admission to Station 3A for detox. Patient meets criteria for admission, with Dr. Fermin as attending. She verbalized passive suicidal thoughts upon arrival and had a DEC assessment. She denies plan or intent, and is help-seeking. She does not have a history of suicide attempts. At this time, she would benefit greatly from detoxing safely, and following up with outpatient psychiatry for mental health concerns. Entered orders for admission.

## 2025-02-20 NOTE — PROGRESS NOTES
"[address]     Case Management Encounter: Met with team, discussed patient progress, discharge plan and any impediments to discharge.    Writer met with the patient to discuss discharge and aftercare planning. Patient reports that she was sober for one week after discharging from detox on 2/2/2025. Patient reports that during that week of sobriety she attended two AA meetings at [facility]. Patient reports that she stopped attending meetings after a week due to getting sick physically. Patient reports after a week of sickness, she returned to use hoping the alcohol would help with her nausea symptoms. Patient reports that she has been drinking \"a few glasses\" of wine daily for the past week. Patient reports that she completed intake for [facility]-occur[facility]. Patient reports that she is awaiting a callback on when she can start attending group. Patient reports that she does not have sober social supports therefore she plans to return to the meetings at [facility]. Patient is on disability and lives with her dog in [place], [place]. Writer reviewed [facility] Safety Plan with the patient and gave her a hard copy.    Insurance: Medicare     Legal Status:  Voluntary   County: NA  File Number: NA  Start and expiration date of commitment: NA    SUDs Assessment Status: Not needed.     ROIs on file: None at this time.     Living Situation: Lives in [place], [place] with her dog.     Current Providers, Supports & Collateral:  PCP, therapist, psychiatrist and sober friends.  PCP: Carmela Srinivasan MD at [facility]   Therapist: CLAUDIA Castillo, LICSW at [facility]  Psychiatrist: Dr. Olman Herrera at [facility]    Current Plan/Referral Status: Detox only.     Safety Plan Status: Completed.      Anastasiya Delvalle [facility] Adult [facility] Addiction Psychiatry Unit           "

## 2025-02-20 NOTE — PLAN OF CARE
"Goal Outcome Evaluation:    MSSA scores today are 8 and 5. 10mg valium administered x 1. Total valium administered since arrival to the hospital = 90 mg. Pt is mildly tremulous, mildly sweaty, reports an overall improving appetite and denies hallucinations. Patient confirmed history of withdrawal seizures, as well as falls related to intoxication saying the last seizure was within the past 3 months (doesn't remember specifics), and last fall reported to be a month ago. Seizure pads surrounding bed, fall precautions in place.     Patient rated pain a 5/10, which she reported as baseline for her (d/t Hx back and hip issues). Patient reports low energy and exhaustion d/t not getting adequate amount of sleep. Patient experienced nausea and diarrhea after breakfast, and was given Zofran x1 and Imodium x1. Vitals are indicative of HTN this morning (169/103), was given morning meds and vitals rechecked at 0915. BP recheck = 145/97. Afternoon vitals indicate HTN (188/98). Rechecked BP = 130/87. Patient denies visual disturbances, chest pain, headache, SOB.  Patient reports in the past that her BP tends to \"go up and down\" (fluctuate) during detox.    Pt reports anxiety rated 7 of 10 in severity and depression 5 of 10 in severity. Endorses feeling happiness, hopefulness, and said \"not really\" when asked about feelings of sadness, powerlessness, and lonliness. Pt denies any suicidal ideation plans or intent.  Endorses willingness to alert staff if she develops any SI/SIB while hospitalized (contracts for safety).     Pt reports plan for after detox as going to outpatient program ([place]) and made follow-up doctors appointments.     Pt reports no further concerns at this time.     Vitals:    02/20/25 0745 02/20/25 0916 02/20/25 1215 02/20/25 1245   BP: (!) 169/103 (!) 145/97 (!) 188/98 130/87   BP Location: Left arm Right arm Left arm Left arm   Patient Position: Sitting Sitting Sitting Sitting   Cuff Size: Adult Regular " Adult Regular Adult Regular Adult Regular   Pulse: 76 82 67 70   Resp: 16 16 16    Temp: 98.5  F (36.9  C) 98.2  F (36.8  C) 98.2  F (36.8  C)    TempSrc: Oral Oral Oral    SpO2: 94% 94% 97%    Weight:       Height:

## 2025-02-20 NOTE — H&P
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 Examination:   Appearance: awake, alert  Attitude: cooperative and pleasant  Eye Contact: good  Mood:  \"jumping for mark compared to yesterday\"  Affect: mood congruent and full range  Speech:  clear, coherent and normal prosody, sarcastic at times  Language: fluent in English  Psychomotor Behavior:  no evidence of tardive dyskinesia, dystonia, or tics. Pt has a noticeable neck tremor.   Gait/Station: normal  Thought Process:  linear, logical, goal oriented  Associations:  no loose associations  Thought Content:  Denying SI/HI/AVH; no evidence of psychotic thinking  Insight:  good  Judgement: intact  Oriented to:  time, person, and place  Attention Span and Concentration:  intact  Recent and Remote Memory:  intact  Fund of Knowledge: appropriate           Physical Exam:   Please refer to physical exam completed by ED provider, Dr Cabezas, on 2/19. I agree with the findings and assessment and have no additional findings to add at this time.     "

## 2025-02-20 NOTE — PLAN OF CARE
Behavioral Team Discussion: (2/20/2025)    Continued Stay Criteria/Rationale: Patient admitted for Chemical Use Issues.  Plan: The following services will be provided to the patient; psychiatric assessment, medication management, therapeutic milieu, individual and group support, and skills groups.   Participants: 3A Provider: Lenora Elizalde MD; 3A RN: Ezequiel Cardozo RN; 3A LADC: BRITTNEE Bell.  Summary/Recommendation: Providers will assess today for treatment recommendations, discharge planning, and aftercare plans. LADC will meet with pt for discharge planning.   Medical/Physical: Patient reported following medical symptoms in the ED: abdominal pain, diarrhea, fever, runny nose, sore throat, cough, and burning while she urinates. No acute medical concerns reported.  Precautions:   Behavioral Orders   Procedures    Code 1 - Restrict to Unit    Routine Programming     As clinically indicated    Seizure precautions    Status 15     Every 15 minutes.    Suicide precautions: Suicide Risk: LOW; Clinical rationale to override score: modification to the care environment, response to medication, lack of access to a plan for self-harm     .     Order Specific Question:   Suicide Risk     Answer:   LOW     Order Specific Question:   Clinical rationale to override score:     Answer:   modification to the care environment     Order Specific Question:   Clinical rationale to override score:     Answer:   response to medication     Order Specific Question:   Clinical rationale to override score:     Answer:   lack of access to a plan for self-harm    Withdrawal precautions     Rationale for change in precautions or plan: N/A  Progress: Initial.    ASAM Dimension Scale Ratings:  Dimension 1: 3 Client tolerates and rahul with withdrawal discomfort poorly. Client has severe intoxication, such that the client endangers self or others, or intoxication has not abated with less intensive levels of services. Client displays  severe signs and symptoms; or risk of severe, but manageable withdrawal; or withdrawal worsening despite detox at less intensive level.  Dimension 2: 1 Client tolerates and rahul with physical discomfort and is able to get the services that the client needs.  Dimension 3: 2 Client has difficulty with impulse control and lacks coping skills. Client has thoughts of suicide or harm to others without means; however, the thoughts may interfere with participation in some treatment activities. Client has difficulty functioning in significant life areas. Client has moderate symptoms of emotional, behavioral, or cognitive problems. Client is able to participate in most treatment activities.  Dimension 4: 2 Client displays verbal compliance, but lacks consistent behaviors; has low motivation for change; and is passively involved in treatment.  Dimension 5: 4 No awareness of the negative impact of mental health problems or substance abuse. No coping skills to arrest mental health or addiction illnesses, or prevent relapse.  Dimension 6: 4 Client has (A) Chronically antagonistic significant other, living environment, family, peer group or long-term criminal justice involvement that is harmful to recovery or treatment progress, or (B) Client has an actively antagonistic significant other, family, work, or living environment with immediate threat to the client's safety and well-being.

## 2025-02-20 NOTE — CONSULTS
"[facility]  Consult Note - Hospitalist Service  Date of Admission:  2/19/2025  Consult Requested by: BILL Manuel CNP   Reason for Consult: \"detox\"    Assessment & Plan   Jolynn Rivera is a 64 year old female admitted on 2/19/2025. She has a PMHx notable for alcohol use disorder c/b withdrawal seizure and DTs, bipolar disorder, PTSD, insomnia, COPD, GERD, hepatitis C s/p treatment, osteoarthritis, and hypothyroidism. She is currently admitted to [facility] for alcohol detox.     Alcohol use disorder  Acute alcohol withdrawal  Hx of withdrawal seizure  Hx of DTs  Patient reports drinking \"few glasses\" since discharge from detox on 2/2/25. Endorses history of withdrawal seizures and DT's.   - psychiatry primary   - agree with vitamin supplementation including of thiamine and folate  - continue MSSA with valium  - PRN Tylenol, Maalox, hydroxyzine  - counseling, group therapies, community resources  - PTA regimen: gabapentin 900 mg TID     Bipolar disorder  PTSD  Insomnia   - psychiatry primary  - PTA regimen: propranolol 20 mg TID, gabapentin 900 mg TID, lunesta 3 mg nightly, melatonin 10 mg nightly     COPD with recent exacerbation   Known enlarging pulmonary nodules   Tobacco use disorder   Recently in ED on 2/17 for presumed COPD exacerbation, COVID/flu/RSV negative at that time. Recently stopped taking her PTA Trillegy Ellipta inhalers on her own d/t side effects, no longer takes any scheduled inhalers. She was discharged from ED with azithromycin Z-pack however patient has not started. Reports continued runny nose, sore throat, cough, and reported fevers of 102 at home. COVID/flu/RSV negative again on this presentation. Has scheduled follow-up with OP pulmonology later this month. O2 saturations stable this AM, afebrile since presentation.    - start Z-pack prescribed by ED provider on 2/17  - wants to hold off on Xopenex due to cost   - follow-up with OP " pulm as scheduled     Anion gap metabolic acidosis  AG 18 upon admission, remainder of electrolytes largely within normal limits. Suspect abnormality is d/t excessive alcohol consumption in the setting of poor solute intake.   - encourage alcohol cessation and oral/fluid intake     Elevated AST   Epigastric abdominal pain - improving  Diarrhea - improving   Nausea - improving  AST 58 on admission (stable from prior), remainder of LFTs WNL. Likely due to alcohol use. Expect this will trend down with cessation of alcohol. Endorsed abdominal pain in epigastric region and diarrhea on ED presentation. CT abdomen obtained with findings compatible to colitis and hepatic steatosis with numerous sub-centimeter hypo-attenuating liver lesions too small to characterize.   - encourage alcohol cessation  - follow up OP with PCP to ensure downtrending of LFTs and consideration of repeat abdominal MRI     Elevated blood pressure  SBP of 100s-170s. Patient does not have a history of hypertension nor do they take any medications for this issue at home. I suspect this is due to the acute alcohol withdrawal process and will normalize as the withdrawal resolves.   - notify medicine if SBP > 170 despite adequate treatment of withdrawal   - if BP fails to normalize despite completion of withdrawal, patient should follow-up with PCP within 1 week of discharge     GERD: continue PTA Omeprazole 40 mg daily or formulary equivalent   Hepatitis C s/p treatment: noted  Hypothyroidism: continue PTA Levothyroxine 100 mcg daily  Osteoarthritis: PTA gabapentin helps, PRN tylenol     Patient is stable at this time, Internal Medicine will sign off. Please reach out with any future questions or concerns.     Lorrie Morales DNP, ACNPC-AG  Internal Medicine MEGGAN Hospitalist  [facility]      Clinically Significant Risk Factors Present on Admission          # Hypochloremia: Lowest Cl = 96 mmol/L in last 2 days, will monitor as appropriate           # Hypertension: Noted on problem list               # Financial/Environmental Concerns: none         Lorrie Morales NP  Hospitalist Service  Securely message with [employer] (more info)  Text page via [facility] Paging/Directory   ______________________________________________________________________    Chief Complaint   Alcohol detox    History is obtained from the patient and electronic health record    History of Present Illness   Jolynn Rivera is a 64 year old female admitted on 2/19/2025. She has a PMHx notable for alcohol use disorder c/b withdrawal seizure and DTs, bipolar disorder, PTSD, insomnia, COPD, GERD, hepatitis C s/p treatment, osteoarthritis, and hypothyroidism. She is currently admitted to [facility] for alcohol detox.     Jolynn was seen in the [facility]. She says that she feels much better today. Says that when she was sick a few weeks ago, it triggered her to start drinking again. She says that she's found a support group that is similar to AA that she really likes. Nausea, diarrhea, and abdominal pain improved. Still experiencing cough although improved. Says that she would like to start the z-pack but wants to hold off on xopenex PRN due to cost. Discussed following up with OP Pulm that is currently scheduled and making an appointment with her PCP to follow up on liver lesions seen on CT. All questions and concerns addressed at this time.       Past Medical History    Past Medical History:   Diagnosis Date    Anxiety     COPD (chronic obstructive pulmonary disease) (H)     COPD (chronic obstructive pulmonary disease) (H)     Hepatitis C     PTSD (post-traumatic stress disorder)     Seizures (H)     second to ETOH    Substance abuse (H)        Past Surgical History   Past Surgical History:   Procedure Laterality Date    LAPAROSCOPIC TUBAL LIGATION      ORTHOPEDIC SURGERY      Left knee    TONSILLECTOMY         Medications   Medications Prior to Admission   Medication Sig Dispense Refill Last  Dose/Taking    azithromycin (ZITHROMAX Z-AVI) 250 MG tablet Two tablets on the first day, then one tablet daily for the next 4 days 6 tablet 0 Unknown    eszopiclone (LUNESTA) 3 MG tablet Take 3 mg by mouth at bedtime   2/18/2025    gabapentin (NEURONTIN) 600 MG tablet Take 900 mg by mouth 3 times daily   2/18/2025    levalbuterol (XOPENEX HFA) 45 MCG/ACT inhaler Inhale 2 puffs into the lungs every 4 hours as needed for shortness of breath or wheezing. 15 g 1 Unknown    levothyroxine (SYNTHROID/LEVOTHROID) 100 MCG tablet Take 100 mcg by mouth every morning   2/18/2025    melatonin 5 MG tablet Take 10 mg by mouth at bedtime Takes 1 hour prior to Lunesta   2/18/2025    multivitamin w/minerals (THERA-VIT-M) tablet Take 1 tablet by mouth daily 30 tablet 0 2/18/2025    nicotine polacrilex (NICORETTE) 4 MG gum Place 4 mg inside cheek every hour as needed for smoking cessation   2/18/2025    omeprazole (PRILOSEC) 40 MG DR capsule Take 40 mg by mouth every morning   2/18/2025    ondansetron (ZOFRAN ODT) 4 MG ODT tab Take 4 mg by mouth every 8 hours as needed for vomiting or nausea   Taking As Needed    propranolol (INDERAL) 20 MG tablet Take 20 mg by mouth 3 times daily   2/18/2025    thiamine (B-1) 100 MG tablet Take 1 tablet (100 mg) by mouth daily 30 tablet 0 2/18/2025         Physical Exam   Vital Signs: Temp: 98.2  F (36.8  C) Temp src: Oral BP: (!) 145/97 Pulse: 82   Resp: 16 SpO2: 94 % O2 Device: None (Room air)    Weight: 138 lbs 0 oz    GENERAL: Alert and awake. Answering questions appropriately. Oriented x 3. NAD. Pleasant and conversational   HEENT: Anicteric sclera. EOMI. Mucous membranes moist   CARDIOVASCULAR: RRR. S1, S2. No murmurs, rubs, or gallops.   RESPIRATORY: Effort normal on RA. Clear to auscultation bilaterally, no rales, rhonchi or wheezes  GI: Abdomen soft, non-tender abdomen without rebound or guarding, normoactive bowel sounds present  MUSCULOSKELETAL: Moves all extremities.   EXTREMITIES: No  peripheral edema. No calf asymmetry, erythema, or tenderness.   NEUROLOGICAL: No focal deficits. Moving all extremities symmetrically.   SKIN: Intact. Warm and dry. No jaundice. No rashes on exposed skin  PSYCH: Affect appropriate     Medical Decision Making       60 MINUTES SPENT BY ME on the date of service doing chart review, history, exam, documentation & further activities per the note.      Data   Imaging results reviewed over the past 24 hrs:   Recent Results (from the past 24 hours)   CT Abdomen Pelvis w Contrast   Result Value    Radiologist flags MRI abdomen with contrast    Narrative    EXAM: CT ABDOMEN PELVIS W CONTRAST  LOCATION: [facility]  DATE: 2/19/2025    INDICATION: abdominal pain, vomiting  COMPARISON: None.  TECHNIQUE: CT scan of the abdomen and pelvis was performed following injection of IV contrast. Multiplanar reformats were obtained. Dose reduction techniques were used.  CONTRAST: 64mL Isovue 370    FINDINGS:     Mildly limited examination due to patient motion which obscures structures in the lower abdomen/pelvis.    LOWER CHEST: Unremarkable.     HEPATOBILIARY: Hepatic steatosis. Numerous subcentimeter hypoattenuating lesions are seen throughout the liver, too small to characterize. No biliary dilation. Normal gallbladder.     PANCREAS: Normal.     SPLEEN: Normal.     ADRENAL GLANDS: Normal.     KIDNEYS/BLADDER: Normal kidneys. No hydronephrosis. Normal urinary bladder.      BOWEL: No obstruction. Low-attenuation wall thickening of the descending and rectosigmoid colon. No pneumoperitoneum or free fluid.     LYMPH NODES: No pathologically enlarged lymph nodes.    VASCULATURE: Nonaneurysmal aorta with moderate aortoiliac calcifications.     PELVIC ORGANS: No pelvic masses.     MUSCULOSKELETAL: Multilevel degenerative disc disease of the thoracolumbar spine.       Impression    IMPRESSION:  1.  Mildly limited examination due to patient motion  which obscures structures in the lower abdomen/pelvis.  2.  Low-attenuation wall thickening of the descending and rectosigmoid colon compatible with colitis, possibly ischemic, infectious, or inflammatory. Correlate clinically.  3.  Hepatic steatosis. Numerous subcentimeter hypoattenuating liver lesions are too small to characterize. Recommend further evaluation with nonemergent/outpatient MRI.    [Recommend Follow Up: MRI abdomen with contrast]    This report will be copied to the [facility] to ensure a provider acknowledges the finding.       Recent Labs   Lab 02/19/25  1134 02/17/25  1534   WBC 9.8 6.3   HGB 15.1 15.2   MCV 97 96    329     --    POTASSIUM 4.3  --    CHLORIDE 96*  --    CO2 24  --    BUN 4.8*  --    CR 0.54  --    ANIONGAP 18*  --    ELVIRA 9.3  --    *  --    ALBUMIN 4.5  --    PROTTOTAL 8.1  --    BILITOTAL 0.3  --    ALKPHOS 65  --    ALT 43  --    AST 58*  --    LIPASE 26  --

## 2025-02-20 NOTE — PLAN OF CARE
Problem: Alcohol Withdrawal  Goal: Alcohol Withdrawal Symptom Control  2/20/2025 0655 by Ottoniel Santiago RN  Outcome: Progressing  2/19/2025 2222 by Ottoniel Santiago RN  Outcome: Progressing  2/19/2025 2221 by Ottoniel Santiago RN  Outcome: Progressing   Goal Outcome Evaluation:    Pt.was awake; in and out of her room throughout night shift. She reports she does not sleep while still in acute alcohol withdrawal. She has shown great improvement in her presentation between the time she was admitted until now. Denied SI/SIB/hallucinations. Scored 10 & 9 on MSSA. Received 20mg prn valium. Apart from insomnia, there was no safety or behavioral concerns observed or reported. Will continue to monitor.

## 2025-02-20 NOTE — PROGRESS NOTES
Pt has the following appointments in place for after discharge.     Follow-up Appointments:   Full PFT  Friday February 21st, 2025 2:45 PM  [facility] Pulmonary Function Testing [address]  3rd Floor  [facility] [id number] [phone]    CT CHEST W/O CONTRAST  Friday February 21st, 2025 4:25 PM  [facility] [address]  [address] [facility] [id number] [phone]    Carmela Srinivasan MD   On Wednesday March 5th, 2025 at 1:20PM  [facility]   [address]   [address]   [place] [address]   [phone]

## 2025-02-21 ENCOUNTER — ANCILLARY PROCEDURE (OUTPATIENT)
Dept: CT IMAGING | Facility: CLINIC | Age: 65
End: 2025-02-21
Attending: INTERNAL MEDICINE
Payer: MEDICARE

## 2025-02-21 VITALS
WEIGHT: 138 LBS | RESPIRATION RATE: 16 BRPM | OXYGEN SATURATION: 96 % | BODY MASS INDEX: 24.45 KG/M2 | HEART RATE: 78 BPM | HEIGHT: 63 IN | SYSTOLIC BLOOD PRESSURE: 170 MMHG | TEMPERATURE: 97.3 F | DIASTOLIC BLOOD PRESSURE: 92 MMHG

## 2025-02-21 DIAGNOSIS — R91.8 PULMONARY NODULES: ICD-10-CM

## 2025-02-21 PROCEDURE — 250N000013 HC RX MED GY IP 250 OP 250 PS 637: Performed by: EMERGENCY MEDICINE

## 2025-02-21 PROCEDURE — 250N000013 HC RX MED GY IP 250 OP 250 PS 637: Performed by: PSYCHIATRY & NEUROLOGY

## 2025-02-21 PROCEDURE — 250N000013 HC RX MED GY IP 250 OP 250 PS 637

## 2025-02-21 PROCEDURE — 250N000013 HC RX MED GY IP 250 OP 250 PS 637: Performed by: CLINICAL NURSE SPECIALIST

## 2025-02-21 PROCEDURE — 71250 CT THORAX DX C-: CPT | Performed by: RADIOLOGY

## 2025-02-21 PROCEDURE — 99238 HOSP IP/OBS DSCHRG MGMT 30/<: CPT | Performed by: PSYCHIATRY & NEUROLOGY

## 2025-02-21 PROCEDURE — 250N000011 HC RX IP 250 OP 636: Performed by: PSYCHIATRY & NEUROLOGY

## 2025-02-21 RX ORDER — LEVOTHYROXINE SODIUM 100 UG/1
100 TABLET ORAL EVERY MORNING
Qty: 30 TABLET | Refills: 0 | Status: SHIPPED | OUTPATIENT
Start: 2025-02-21 | End: 2025-02-21

## 2025-02-21 RX ORDER — LEVALBUTEROL TARTRATE 45 UG/1
2 AEROSOL, METERED ORAL EVERY 4 HOURS PRN
Qty: 15 G | Refills: 0 | Status: SHIPPED | OUTPATIENT
Start: 2025-02-21

## 2025-02-21 RX ORDER — AZITHROMYCIN 250 MG/1
250 TABLET, FILM COATED ORAL DAILY
Qty: 3 TABLET | Refills: 0 | Status: SHIPPED | OUTPATIENT
Start: 2025-02-21 | End: 2025-02-24

## 2025-02-21 RX ORDER — ONDANSETRON 4 MG/1
4 TABLET, ORALLY DISINTEGRATING ORAL EVERY 8 HOURS PRN
Qty: 10 TABLET | Refills: 0 | Status: SHIPPED | OUTPATIENT
Start: 2025-02-21 | End: 2025-02-21

## 2025-02-21 RX ORDER — GABAPENTIN 600 MG/1
900 TABLET ORAL 3 TIMES DAILY
Status: SHIPPED
Start: 2025-02-21

## 2025-02-21 RX ORDER — ESZOPICLONE 3 MG/1
3 TABLET, FILM COATED ORAL AT BEDTIME
Status: SHIPPED
Start: 2025-02-21

## 2025-02-21 RX ORDER — LEVOTHYROXINE SODIUM 100 UG/1
100 TABLET ORAL EVERY MORNING
Status: SHIPPED
Start: 2025-02-21

## 2025-02-21 RX ORDER — OMEPRAZOLE 40 MG/1
40 CAPSULE, DELAYED RELEASE ORAL EVERY MORNING
Qty: 30 CAPSULE | Refills: 0 | Status: SHIPPED | OUTPATIENT
Start: 2025-02-21 | End: 2025-02-21

## 2025-02-21 RX ORDER — LEVALBUTEROL TARTRATE 45 UG/1
2 AEROSOL, METERED ORAL EVERY 4 HOURS PRN
Qty: 15 G | Refills: 0 | Status: SHIPPED | OUTPATIENT
Start: 2025-02-21 | End: 2025-02-21

## 2025-02-21 RX ORDER — FOLIC ACID 1 MG/1
1 TABLET ORAL DAILY
Status: SHIPPED
Start: 2025-02-21

## 2025-02-21 RX ORDER — LIDOCAINE 4 G/G
2 PATCH TOPICAL EVERY 24 HOURS
Status: SHIPPED
Start: 2025-02-21

## 2025-02-21 RX ORDER — PROPRANOLOL HCL 20 MG
20 TABLET ORAL 3 TIMES DAILY
Status: SHIPPED
Start: 2025-02-21

## 2025-02-21 RX ORDER — OMEPRAZOLE 40 MG/1
40 CAPSULE, DELAYED RELEASE ORAL EVERY MORNING
Status: SHIPPED
Start: 2025-02-21

## 2025-02-21 RX ORDER — LANOLIN ALCOHOL/MO/W.PET/CERES
100 CREAM (GRAM) TOPICAL DAILY
Status: SHIPPED
Start: 2025-02-21

## 2025-02-21 RX ORDER — LANOLIN ALCOHOL/MO/W.PET/CERES
100 CREAM (GRAM) TOPICAL DAILY
Qty: 30 TABLET | Refills: 0 | Status: SHIPPED | OUTPATIENT
Start: 2025-02-21 | End: 2025-02-21

## 2025-02-21 RX ORDER — LIDOCAINE 4 G/G
2 PATCH TOPICAL EVERY 24 HOURS
Qty: 60 PATCH | Refills: 0 | Status: SHIPPED | OUTPATIENT
Start: 2025-02-21 | End: 2025-02-21

## 2025-02-21 RX ORDER — MULTIPLE VITAMINS W/ MINERALS TAB 9MG-400MCG
1 TAB ORAL DAILY
Status: SHIPPED
Start: 2025-02-21

## 2025-02-21 RX ORDER — ONDANSETRON 4 MG/1
4 TABLET, ORALLY DISINTEGRATING ORAL EVERY 8 HOURS PRN
Status: SHIPPED
Start: 2025-02-21

## 2025-02-21 RX ORDER — PROPRANOLOL HCL 20 MG
20 TABLET ORAL 3 TIMES DAILY
Qty: 90 TABLET | Refills: 0 | Status: SHIPPED | OUTPATIENT
Start: 2025-02-21 | End: 2025-02-21

## 2025-02-21 RX ORDER — FOLIC ACID 1 MG/1
1 TABLET ORAL DAILY
Qty: 30 TABLET | Refills: 0 | Status: SHIPPED | OUTPATIENT
Start: 2025-02-21 | End: 2025-02-21

## 2025-02-21 RX ORDER — GABAPENTIN 600 MG/1
900 TABLET ORAL 3 TIMES DAILY
Qty: 135 TABLET | Refills: 0 | Status: SHIPPED | OUTPATIENT
Start: 2025-02-21 | End: 2025-02-21

## 2025-02-21 RX ADMIN — FOLIC ACID 1 MG: 1 TABLET ORAL at 08:21

## 2025-02-21 RX ADMIN — PROPRANOLOL HYDROCHLORIDE 20 MG: 20 TABLET ORAL at 08:20

## 2025-02-21 RX ADMIN — ONDANSETRON 4 MG: 4 TABLET, ORALLY DISINTEGRATING ORAL at 08:20

## 2025-02-21 RX ADMIN — Medication 1 TABLET: at 08:20

## 2025-02-21 RX ADMIN — NICOTINE POLACRILEX 4 MG: 4 GUM, CHEWING BUCCAL at 08:21

## 2025-02-21 RX ADMIN — NICOTINE POLACRILEX 4 MG: 4 GUM, CHEWING BUCCAL at 04:42

## 2025-02-21 RX ADMIN — NICOTINE POLACRILEX 4 MG: 4 GUM, CHEWING BUCCAL at 07:12

## 2025-02-21 RX ADMIN — AZITHROMYCIN DIHYDRATE 250 MG: 250 TABLET ORAL at 08:21

## 2025-02-21 RX ADMIN — Medication 900 MG: at 08:20

## 2025-02-21 RX ADMIN — THIAMINE HCL TAB 100 MG 100 MG: 100 TAB at 08:20

## 2025-02-21 RX ADMIN — LEVOTHYROXINE SODIUM 100 MCG: 0.1 TABLET ORAL at 08:21

## 2025-02-21 RX ADMIN — LOPERAMIDE HYDROCHLORIDE 2 MG: 2 CAPSULE ORAL at 08:21

## 2025-02-21 RX ADMIN — PANTOPRAZOLE SODIUM 40 MG: 40 TABLET, DELAYED RELEASE ORAL at 08:20

## 2025-02-21 ASSESSMENT — ACTIVITIES OF DAILY LIVING (ADL)
ADLS_ACUITY_SCORE: 45
DRESS: INDEPENDENT;SCRUBS (BEHAVIORAL HEALTH)
ORAL_HYGIENE: INDEPENDENT
HYGIENE/GROOMING: INDEPENDENT
ADLS_ACUITY_SCORE: 45
LAUNDRY: WITH SUPERVISION
ADLS_ACUITY_SCORE: 45
ADLS_ACUITY_SCORE: 45

## 2025-02-21 NOTE — PLAN OF CARE
Problem: Alcohol Withdrawal  Goal: Alcohol Withdrawal Symptom Control  Outcome: Progressing   Goal Outcome Evaluation:       Pt.appeared asleep and comfortable through the night. Breath sounds were equal and normal. Scored 3 on MSSA. No prn medication administered. No safety or behavioral concerns observed or reported. Will continue to monitor.

## 2025-02-21 NOTE — DISCHARGE SUMMARY
"Psychiatric Discharge Summary    Jolynn Rivera MRN# [id number]   Age: 64 year old YOB: 1960     Date of Admission:  2/19/2025  Date of Discharge:  2/21/2025  Admitting Physician:  Lenora Elizalde MD  Discharge Physician:  Lenora Elizalde MD (Contact: [phone])         Event Leading to Hospitalization:   Per H&P:    Per ED Provider Note dated 2/19:  Jolynn Rivera is a 64 year old female with a history of alcohol use disorder with withdrawal seizures, polysubstance use, bipolar disorder, PTSD, COPD who presents to the emergency department via EMS for alcohol intoxication.     Patient states she starting today, she has been nauseous with vomiting, and her brain feels \"weird\".  Patient had a couple glasses of alcohol this morning. She reports drinking a \"few glasses\" since discharge from detox on 2/2/25.  Patient has abdominal pain in her upper belly.  Patient has been having diarrhea, no blood or black stools.  Patient had a fever for the last couple of days, states it was up to 102.  Patient has runny nose, sore throat, and cough, states she was tested last time she was here. Patient also has a little bit of burning with urination. Patient is taking her medication, and patient notes she was recently put on new lung medication which she has not yet started.  Patient has been more anxious the last couple of days, when asked if she had thoughts of hurting herself she states \"a little.\"     Per DEC Assessment:   Pt with history, per chart, of multiple diagnoses including but not limited to PTSD, Depression, Anxiety, Alcohol Use Disorder. She presents today via EMS reporting etoh withdrawal and anxiety. Her breath alcohol at time of assessment is .24. She presents as anxious and distractible. She states that she came in today because 'I'm so sick!' She reports that she thought she had the flu but tested negative. She has been feeling nauseous and unwell and states that she ended up drinking " "alcohol to 'deal with it' after one week of sobriety. She starts out by saying she only drank this morning, then she includes yesterday then she says probably for the last week. She drinks wine. She reports that she came in today so she can get help with feeling better. She reports recent passive SI but denies plan/intent. She denies hx suicide attempts. She dnies thoughts of harming others. She is future-oriented. She states that she found a recovery program called [facility] and that she feels so hopeful about their program. She feels comfortable and supported there and feels like it is a great fit for her. She plans to return there when feeling better. She is prescribed psychiatric medications and takes as prescribed. She has a therapist who she sees regularly and an S worker through her CADI waiver.         Per my interview with patient:     \"Since the last detox, I was really happy, having a great time with my dog, and then I got really sick, hit by this virus, which was my worst trigger. I have a lot set in place.\"      \"When I got here [in ED] I was sicker than a dog.\"     Today, feeling shaky and weak. Patient wants to leave tomorrow as early as possible so that she can attend appointments.      Onset of alcohol use at age 18. Became problematic in her teens.  Alcohol quantity: 7 liters of wine (1.5 liters per day)  Alcohol use duration: 1 week  Longest period of sobriety was: 5.5 years  Estimated number of detoxes: Dozen  History of CD treatment: three inpatient and three OP  BAL in ED: 0.203  Urine drug screen: POSITIVE for benzodiazepines, otherwise negative  MAT: naltrexone (ineffective despite taking max for one year). \"I will not take Antabuse. It is horrible for my body.\" Concerned about taking Campral given her history of allergies.   Recent stressors: Several recent losses- \"I have never been good at grief\"     Patient has tolerance, withdrawal, progressive use, loss of control, " "spending more time and more amount than intended. Patient has made attempts to quit, is experiencing cravings, and reports negative consequences.  Patient does not paul.     Patient does have a history of seizures. She suspects that she has had one seizure in the past three months.   Patient does have a history of delirium tremens.     Patient does have a history of overdose.  Patient does have a history of IV use 36 years ago.  Patient does have a history of Hep C but was treated for it. No history of HIV, Hep B.     For MH: She says that she \"actually has a pretty cool life.\" Is not reporting significant depression or anhedonia.        See Admission note by Lenora Elizalde MD on 2/20/25 for additional details.          Diagnoses:     Alcohol use disorder, severe,withdrawal resolved, dependence with withdrawal with complication including hx of seizures and DTs  Nicotine dependence with withdrawal   Hx of polysubstance use including IVDU and Hep C s/p tx in 2015  Insomnia, unspecified   OMARI  Bipolar affective disorder, type 2, recent episode depressed  PTSD  Hyopomagnesemia  High anion gap metabolic acidosis  Hypochloremia   GERD  COPD  Pulmonary nodules   Elevated LFTs  Elevated troponin  Opiate Use Disorder, severe, in sustained remission         Labs:     Recent Results (from the past week)   Influenza A/B, RSV and SARS-CoV2 PCR (COVID-19) Nasopharyngeal    Collection Time: 02/17/25  3:29 PM    Specimen: Nasopharyngeal; Swab   Result Value Ref Range    Influenza A PCR Negative Negative    Influenza B PCR Negative Negative    RSV PCR Negative Negative    SARS CoV2 PCR Negative Negative   Magnesium    Collection Time: 02/17/25  3:34 PM   Result Value Ref Range    Magnesium 1.8 1.7 - 2.3 mg/dL   Nt probnp inpatient (BNP)    Collection Time: 02/17/25  3:34 PM   Result Value Ref Range    N terminal Pro BNP Inpatient <36 0 - 900 pg/mL   CBC with platelets and differential    Collection Time: 02/17/25  3:34 PM "   Result Value Ref Range    WBC Count 6.3 4.0 - 11.0 10e3/uL    RBC Count 4.44 3.80 - 5.20 10e6/uL    Hemoglobin 15.2 11.7 - 15.7 g/dL    Hematocrit 42.6 35.0 - 47.0 %    MCV 96 78 - 100 fL    MCH 34.2 (H) 26.5 - 33.0 pg    MCHC 35.7 31.5 - 36.5 g/dL    RDW 12.4 10.0 - 15.0 %    Platelet Count 329 150 - 450 10e3/uL    % Neutrophils 47 %    % Lymphocytes 41 %    % Monocytes 10 %    % Eosinophils 1 %    % Basophils 1 %    % Immature Granulocytes 0 %    NRBCs per 100 WBC 0 <1 /100    Absolute Neutrophils 3.0 1.6 - 8.3 10e3/uL    Absolute Lymphocytes 2.6 0.8 - 5.3 10e3/uL    Absolute Monocytes 0.6 0.0 - 1.3 10e3/uL    Absolute Eosinophils 0.0 0.0 - 0.7 10e3/uL    Absolute Basophils 0.0 0.0 - 0.2 10e3/uL    Absolute Immature Granulocytes 0.0 <=0.4 10e3/uL    Absolute NRBCs 0.0 10e3/uL   Alcohol breath test POCT    Collection Time: 02/19/25 11:28 AM   Result Value Ref Range    Alcohol Breath Test 0.203 (A) 0.00 - 0.01   Comprehensive metabolic panel    Collection Time: 02/19/25 11:34 AM   Result Value Ref Range    Sodium 138 135 - 145 mmol/L    Potassium 4.3 3.4 - 5.3 mmol/L    Carbon Dioxide (CO2) 24 22 - 29 mmol/L    Anion Gap 18 (H) 7 - 15 mmol/L    Urea Nitrogen 4.8 (L) 8.0 - 23.0 mg/dL    Creatinine 0.54 0.51 - 0.95 mg/dL    GFR Estimate >90 >60 mL/min/1.73m2    Calcium 9.3 8.8 - 10.4 mg/dL    Chloride 96 (L) 98 - 107 mmol/L    Glucose 101 (H) 70 - 99 mg/dL    Alkaline Phosphatase 65 40 - 150 U/L    AST 58 (H) 0 - 45 U/L    ALT 43 0 - 50 U/L    Protein Total 8.1 6.4 - 8.3 g/dL    Albumin 4.5 3.5 - 5.2 g/dL    Bilirubin Total 0.3 <=1.2 mg/dL   Magnesium    Collection Time: 02/19/25 11:34 AM   Result Value Ref Range    Magnesium 1.7 1.7 - 2.3 mg/dL   CBC with platelets and differential    Collection Time: 02/19/25 11:34 AM   Result Value Ref Range    WBC Count 9.8 4.0 - 11.0 10e3/uL    RBC Count 4.29 3.80 - 5.20 10e6/uL    Hemoglobin 15.1 11.7 - 15.7 g/dL    Hematocrit 41.7 35.0 - 47.0 %    MCV 97 78 - 100 fL    MCH  35.2 (H) 26.5 - 33.0 pg    MCHC 36.2 31.5 - 36.5 g/dL    RDW 12.6 10.0 - 15.0 %    Platelet Count 348 150 - 450 10e3/uL    % Neutrophils 65 %    % Lymphocytes 27 %    % Monocytes 7 %    % Eosinophils 0 %    % Basophils 1 %    % Immature Granulocytes 0 %    NRBCs per 100 WBC 0 <1 /100    Absolute Neutrophils 6.4 1.6 - 8.3 10e3/uL    Absolute Lymphocytes 2.7 0.8 - 5.3 10e3/uL    Absolute Monocytes 0.7 0.0 - 1.3 10e3/uL    Absolute Eosinophils 0.0 0.0 - 0.7 10e3/uL    Absolute Basophils 0.1 0.0 - 0.2 10e3/uL    Absolute Immature Granulocytes 0.0 <=0.4 10e3/uL    Absolute NRBCs 0.0 10e3/uL   Lipase    Collection Time: 02/19/25 11:34 AM   Result Value Ref Range    Lipase 26 13 - 60 U/L   Troponin T, High Sensitivity    Collection Time: 02/19/25 11:34 AM   Result Value Ref Range    Troponin T, High Sensitivity 17 (H) <=14 ng/L   Ethyl Alcohol Level    Collection Time: 02/19/25 11:34 AM   Result Value Ref Range    Alcohol ethyl 0.24 (H) <=0.01 g/dL   Hemoglobin A1c    Collection Time: 02/19/25 11:34 AM   Result Value Ref Range    Estimated Average Glucose 117 (H) <117 mg/dL    Hemoglobin A1C 5.7 (H) <5.7 %   Influenza A/B, RSV and SARS-CoV2 PCR (COVID-19) Nose    Collection Time: 02/19/25 11:39 AM    Specimen: Nose; Swab   Result Value Ref Range    Influenza A PCR Negative Negative    Influenza B PCR Negative Negative    RSV PCR Negative Negative    SARS CoV2 PCR Negative Negative   EKG 12-lead, tracing only    Collection Time: 02/19/25 11:48 AM   Result Value Ref Range    Systolic Blood Pressure  mmHg    Diastolic Blood Pressure  mmHg    Ventricular Rate 78 BPM    Atrial Rate 78 BPM    SD Interval 156 ms    QRS Duration 68 ms     ms    QTc 453 ms    P Axis 62 degrees    R AXIS -21 degrees    T Axis 46 degrees    Interpretation ECG       Sinus rhythm  Normal ECG    Unconfirmed report - interpretation of this ECG is computer generated - see medical record for final interpretation  Confirmed by - EMERGENCY ROOM,  PHYSICIAN ([id number]),  Otf Ponce ([id number]) on 2/19/2025 1:17:50 PM     CT Abdomen Pelvis w Contrast    Collection Time: 02/19/25  1:00 PM   Result Value Ref Range    Radiologist flags MRI abdomen with contrast    Troponin T, High Sensitivity    Collection Time: 02/19/25  2:33 PM   Result Value Ref Range    Troponin T, High Sensitivity 15 (H) <=14 ng/L   GGT    Collection Time: 02/19/25  2:33 PM   Result Value Ref Range    GGT 28 5 - 36 U/L   TSH with free T4 reflex    Collection Time: 02/19/25  2:33 PM   Result Value Ref Range    TSH 0.68 0.30 - 4.20 uIU/mL   UA with Microscopic reflex to Culture    Collection Time: 02/19/25  5:29 PM    Specimen: Urine, NOS   Result Value Ref Range    Color Urine Yellow Colorless, Straw, Light Yellow, Yellow    Appearance Urine Clear Clear    Glucose Urine Negative Negative mg/dL    Bilirubin Urine Negative Negative    Ketones Urine Trace (A) Negative mg/dL    Specific Gravity Urine 1.015 1.003 - 1.035    Blood Urine Negative Negative    pH Urine 6.0 5.0 - 7.0    Protein Albumin Urine 30 (A) Negative mg/dL    Urobilinogen Urine Normal Normal, 2.0 mg/dL    Nitrite Urine Negative Negative    Leukocyte Esterase Urine Negative Negative    Mucus Urine Present (A) None Seen /LPF    RBC Urine 1 <=2 /HPF    WBC Urine <1 <=5 /HPF    Squamous Epithelials Urine <1 <=1 /HPF   Urine Drug Screen Panel    Collection Time: 02/19/25  5:29 PM   Result Value Ref Range    Amphetamines Urine Screen Negative Screen Negative    Barbituates Urine Screen Negative Screen Negative    Benzodiazepine Urine Screen Positive (A) Screen Negative    Cannabinoids Urine Screen Negative Screen Negative    Cocaine Urine Screen Negative Screen Negative    Fentanyl Qual Urine Screen Negative Screen Negative    Opiates Urine Screen Negative Screen Negative    PCP Urine Screen Negative Screen Negative          Consults:   Consultation during this admission received from internal medicine on 2/20/25:    Assessment &  "Plan  Jolynn Rivera is a 64 year old female admitted on 2/19/2025. She has a PMHx notable for alcohol use disorder c/b withdrawal seizure and DTs, bipolar disorder, PTSD, insomnia, COPD, GERD, hepatitis C s/p treatment, osteoarthritis, and hypothyroidism. She is currently admitted to [facility] for alcohol detox.      Alcohol use disorder  Acute alcohol withdrawal  Hx of withdrawal seizure  Hx of DTs  Patient reports drinking \"few glasses\" since discharge from detox on 2/2/25. Endorses history of withdrawal seizures and DT's.   - psychiatry primary   - agree with vitamin supplementation including of thiamine and folate  - continue MSSA with valium  - PRN Tylenol, Maalox, hydroxyzine  - counseling, group therapies, community resources  - PTA regimen: gabapentin 900 mg TID      Bipolar disorder  PTSD  Insomnia   - psychiatry primary  - PTA regimen: propranolol 20 mg TID, gabapentin 900 mg TID, lunesta 3 mg nightly, melatonin 10 mg nightly      COPD with recent exacerbation   Known enlarging pulmonary nodules   Tobacco use disorder   Recently in ED on 2/17 for presumed COPD exacerbation, COVID/flu/RSV negative at that time. Recently stopped taking her PTA Trillegy Ellipta inhalers on her own d/t side effects, no longer takes any scheduled inhalers. She was discharged from ED with azithromycin Z-pack however patient has not started. Reports continued runny nose, sore throat, cough, and reported fevers of 102 at home. COVID/flu/RSV negative again on this presentation. Has scheduled follow-up with OP pulmonology later this month. O2 saturations stable this AM, afebrile since presentation.    - start Z-pack prescribed by ED provider on 2/17  - wants to hold off on Xopenex due to cost   - follow-up with OP pulm as scheduled      Anion gap metabolic acidosis  AG 18 upon admission, remainder of electrolytes largely within normal limits. Suspect abnormality is d/t excessive alcohol consumption in the setting of poor solute " intake.   - encourage alcohol cessation and oral/fluid intake      Elevated AST   Epigastric abdominal pain - improving  Diarrhea - improving   Nausea - improving  AST 58 on admission (stable from prior), remainder of LFTs WNL. Likely due to alcohol use. Expect this will trend down with cessation of alcohol. Endorsed abdominal pain in epigastric region and diarrhea on ED presentation. CT abdomen obtained with findings compatible to colitis and hepatic steatosis with numerous sub-centimeter hypo-attenuating liver lesions too small to characterize.   - encourage alcohol cessation  - follow up OP with PCP to ensure downtrending of LFTs and consideration of repeat abdominal MRI      Elevated blood pressure  SBP of 100s-170s. Patient does not have a history of hypertension nor do they take any medications for this issue at home. I suspect this is due to the acute alcohol withdrawal process and will normalize as the withdrawal resolves.   - notify medicine if SBP > 170 despite adequate treatment of withdrawal   - if BP fails to normalize despite completion of withdrawal, patient should follow-up with PCP within 1 week of discharge      GERD: continue PTA Omeprazole 40 mg daily or formulary equivalent   Hepatitis C s/p treatment: noted  Hypothyroidism: continue PTA Levothyroxine 100 mcg daily  Osteoarthritis: PTA gabapentin helps, PRN tylenol      Patient is stable at this time, Internal Medicine will sign off. Please reach out with any future questions or concerns.      Lorrie Morales DNP, [facility]-  Internal Medicine [facility] Course:   Patient was admitted to [facility] with attending Lenora Elizalde MD as a voluntary patient. The patient was placed under status 15 (15 minute checks) to ensure patient safety.     MSSA protocol was initiated due to the patient's history of alcohol abuse and concern for withdrawal symptoms.  Over the course of hospitalization,  patient was treated with Valium to manage withdrawal. Last dose of Valium was given on 2/20. She completed detox on 2/21. She required a TOTAL of 90mg of Valium since arrival in ED. She has not experienced any significant symptoms of withdrawal since that time. She experienced no complications associated with withdrawal. Hydroxyzine and Trazodone were utilized prn for management of anxiety and sleep, respectively. Patient was treated with multivitamin, thiamine, and folic acid daily. She was evaluated by IM (see above). She was medically cleared at time of discharge. Anti-craving medications were discussed, and she elected to discuss Campral further with her OP provider first before starting. She is not interested in Antabuse or naltrexone. PTA medications were continued without changes.     Patient denied alcohol cravings at time of discharge. She understands risks associated with relapse. Appears to be motivated to maintain sobriety upon discharge.      She did participate in groups and was visible in the milieu.     The patient's symptoms of withdrawal fully resolved.     Patient was released to home. At the time of discharge, patient was determined to not be a danger to himself or others. She consistently denied SI/HI, and remained future oriented. Denied access to firearms.     Because this patient meets criteria for an Alcohol Use Disorder, I performed the following brief intervention on the date of this note:              1) Expressed concern that the patient is drinking at unhealthy levels known to increase their risk of alcohol related problems              2) Gave feedback linking alcohol use and health, including personalized feedback explaining how alcohol use can interact with their medical and/or psychiatric problems, and with prescribed medications.              3) Advised patient to abstain.          Discharge Medications:     Current Discharge Medication List        START taking these medications     Details   folic acid (FOLVITE) 1 MG tablet Take 1 tablet (1 mg) by mouth daily.  Qty: 30 tablet, Refills: 0    Associated Diagnoses: Alcohol withdrawal seizure with complication (H)      Lidocaine (LIDOCARE) 4 % Patch Place 2 patches over 12 hours onto the skin every 24 hours. To prevent lidocaine toxicity, patient should be patch free for 12 hrs daily.  Qty: 60 patch, Refills: 0    Associated Diagnoses: Osteoarthritis of multiple joints, unspecified osteoarthritis type           CONTINUE these medications which have CHANGED    Details   azithromycin (ZITHROMAX Z-AVI) 250 MG tablet Take 1 tablet (250 mg) by mouth daily for 3 days.  Qty: 3 tablet, Refills: 0    Associated Diagnoses: COPD with acute exacerbation (H)      gabapentin (NEURONTIN) 600 MG tablet Take 1.5 tablets (900 mg) by mouth 3 times daily.  Qty: 135 tablet, Refills: 0    Associated Diagnoses: Alcohol withdrawal seizure with complication (H)      levalbuterol (XOPENEX HFA) 45 MCG/ACT inhaler Inhale 2 puffs into the lungs every 4 hours as needed for shortness of breath or wheezing.  Qty: 15 g, Refills: 0    Associated Diagnoses: COPD with acute exacerbation (H)      levothyroxine (SYNTHROID/LEVOTHROID) 100 MCG tablet Take 1 tablet (100 mcg) by mouth every morning.  Qty: 30 tablet, Refills: 0    Associated Diagnoses: Hypothyroidism, unspecified type      nicotine polacrilex (NICORETTE) 4 MG gum Place 1 each (4 mg) inside cheek every hour as needed for nicotine withdrawal symptoms.  Qty: 220 each, Refills: 0    Associated Diagnoses: Tobacco use disorder      omeprazole (PRILOSEC) 40 MG DR capsule Take 1 capsule (40 mg) by mouth every morning.  Qty: 30 capsule, Refills: 0    Associated Diagnoses: Gastroesophageal reflux disease without esophagitis      ondansetron (ZOFRAN ODT) 4 MG ODT tab Take 1 tablet (4 mg) by mouth every 8 hours as needed for vomiting or nausea.  Qty: 10 tablet, Refills: 0    Associated Diagnoses: Gastroesophageal reflux disease  without esophagitis      propranolol (INDERAL) 20 MG tablet Take 1 tablet (20 mg) by mouth 3 times daily.  Qty: 90 tablet, Refills: 0    Associated Diagnoses: Generalized anxiety disorder      thiamine (B-1) 100 MG tablet Take 1 tablet (100 mg) by mouth daily.  Qty: 30 tablet, Refills: 0    Associated Diagnoses: Alcohol dependence with intoxication with complication (H)           CONTINUE these medications which have NOT CHANGED    Details   eszopiclone (LUNESTA) 3 MG tablet Take 3 mg by mouth at bedtime      melatonin 5 MG tablet Take 10 mg by mouth at bedtime Takes 1 hour prior to Lunesta      multivitamin w/minerals (THERA-VIT-M) tablet Take 1 tablet by mouth daily  Qty: 30 tablet, Refills: 0    Comments: Pt has supply  Associated Diagnoses: Alcohol dependence with intoxication with complication (H)                  Psychiatric Examination:   Appearance:  awake, alert and adequately groomed  Attitude:  cooperative  Eye Contact:  good  Mood:  good  Affect:  appropriate and in normal range  Speech:  clear, coherent  Psychomotor Behavior:  no evidence of tardive dyskinesia, dystonia, or tics  Thought Process:  logical, linear, and goal oriented  Associations:  no loose associations  Thought Content:  no evidence of suicidal ideation or homicidal ideation and no evidence of psychotic thought  Insight:  good  Judgment:  intact  Oriented to:  time, person, and place  Attention Span and Concentration:  intact  Recent and Remote Memory:  intact  Language: Able to name objects, Able to repeat phrases, and Able to read and write  Fund of Knowledge: appropriate  Muscle Strength and Tone: normal  Gait and Station: Normal         Discharge Plan:   Summary: You were admitted to [facility] on 2/19/2025 for detoxification from alcohol.  A medical exam was performed that included lab work. You have met with a [facility] Counselor and opted to attend [facility].  Please take care and make your recovery a daily priority,  Jolynn!  It was a pleasure working with you and the entire treatment team here wishes you the very best in your recovery!      Recommendation:  You are recommended to abstain from the use of alcohol or other mood-altering chemicals. You are recommended to complete a outpatient substance use program such as [facility] and follow their continuing care recommendations.     [facility]  [address] [place] [address]  [phone]     Main Diagnoses:    303.90 (F10.20) Alcohol Use Disorder Severe, in withdrawal, dependence with withdrawal with complication including hx of seizures and DTs  Nicotine dependence with withdrawal   Hx of polysubstance use including IVDU and Hep C s/p tx in 2015  Insomnia, unspecified   OMARI  Bipolar affective disorder, type 2, recent episode depressed  PTSD  Hyopomagnesemia  High anion gap metabolic acidosis  Hypochloremia   GERD  COPD  Pulmonary nodules   Elevated LFTs  Elevated troponin  Opiate Use Disorder, severe, in sustained remission     Major Treatments, Procedures and Findings:  You were treated for alcohol detoxification using valium administered based on the [facility] protocol. You have met with a [facility] counselor to develop a treatment plan for discharge. You had labs drawn and those results were reviewed with you. Please take a copy of your lab work with you to your next primary care provider appointment.     Symptoms to Report:  If you experience more anxiety, confusion, sleeplessness, deep sadness or thoughts of suicide, notify your treatment team or notify your primary care provider. IF ANY OF THE SYMPTOMS YOU ARE EXPERIENCING ARE A MEDICAL EMERGENCY CALL 911 IMMEDIATELY.      Lifestyle Adjustment: Adjust your lifestyle to get enough sleep, relaxation, exercise and good nutrition. Continue to develop healthy coping skills to decrease stress and promote a sober living environment. Do not use mood altering substances including  alcohol, illegal drugs or addictive medications other than what is currently prescribed.      Disposition: Return home and attend older adult group.     Facts about COVID19 at www.cdc.gov/COVID19 and www.[place].gov/covid19     Keeping hands clean is one of the most important steps we can take to avoid getting sick and spreading germs to others.  Please wash your hands frequently and lather with soap for at least 20 seconds!     Follow-up Appointments:   Full PFT  Friday February 21st, 2025 2:45 PM  [facility] Pulmonary Function Testing [address] [address]  3rd Floor  [facility] [id number] [phone]     CT CHEST W/O CONTRAST  Friday February 21st, 2025 4:25 PM  [facility] [address]  1st Floor  [facility] [id number] [phone]     Carmela Srinivasan MD   On Wednesday March 5th, 2025 at 1:20PM  [facility]   [address]   [address]   [place] [address]   [phone]      Recovery apps for your phone for educational purposes and to locate in person and zoom recovery meetings  Everything AA -  mahnaz is a great resource  12 Step Toolkit - educational purpose learning about the 12 steps to recovery  [place] [employer] - meeting mahnaz  AA  - meeting mahnaz  Meeting guide - meeting mahnaz  Quick NA meeting - meeting mahnaz  Estela- has various apps     Patient Navigation Hub  [facility] work to be your point-of-contact for trustworthy and compassionate care from Inpatient services to [facility] Programmatic Care. We will provide resources and communication to help guide you into programmatic care. Ultimately, our goal is to be the one-stop-shop of communication, coordination, and support for your journey to programmatic care.  Phone: [phone]     Resources:  AA/NA meetings have returned to in-person or a hybrid combination of zoom/in-person therefore please check online to verify*  Need Support Now?  "If you or someone you know is struggling or in crisis, help is available. Call or text 988 or chat 98Data Symmetry.org  AA meetings search for them at: https://aaGlampingHub.comintergroup.org (worldwide meeting listings)  AA meetings for [place] area can be found online at: https://aamin[place].org (click local online meetings listings)  NA meetings for [place] area can be found online at: https://www.nam[place].org  (click find a meeting)  AA and NA Sponsors are excellent resources for support and you can find one at any support group meeting.   Alcoholics Anonymous (https://aa.org/): for information 24 hours/day  AA Intergroup service office in [place] (http://www.aasCES Acquisition CorpauLPATH.org/) 801.740.6103  AA Intergroup service office in [facility]: [phone]. (http://www.aaVIPstore.com.org/)  Narcotics Anonymous (www.[employer] [employer].org) (526) 704-2031  https://aafairviewriverside.org/meetings  SMART Recovery - self management for addiction recovery:  www.smartrecovery.org  Pathways ~ A Health Crisis Resource & Support Center:  633.398.5213.  https://prescribetoprevent.org/patient-education/videos/  http://www.harmreduction.org  Crisis Text Line  Text 323192  You will be connected with a trained live crisis counselor to provide support. Por espanol, texto  JEANNETTE a 201283 o texto a 442-AYUDAME en WhatsApp  [place] Hope Line  1.800.SUICIDE [4431662]  [facility] 348-053-4497  Support Group:  AA/NA and Sponsor/support.  Fast Tracker  Linking people to mental health and substance use disorder resources  Drizlyn.org   [place] Mental Health Warm Line  Peer to peer support  Monday thru Saturday, 12 pm to 10 pm  118.715.4245 or 1.539.832.0802  Text \"Support\" to 87592  National [place] on Mental Illness (MARITZA)  181.467.9983 or 1.888.MARITZA.HELPS  Alcoholics Anonymous (www.alcoholics-anonymous.org): Check your phone book for your local chapter.  Suicide Awareness Voices of Education (SAVE) (www.save.org): 028-765-SAVE " (6377)  National Suicide Prevention Line (www.mentalhealthmn.org): 180-362-FLOO (4368)  Mental Health Consumer/Survivor Network of [place] (www.mhcsn.net): 105.758.1156 or 621-614-9032  Mental Health Association of [place] (www.mentalhealth.org): 703.812.8521 or 482-768-0286   Substance Abuse and Mental Health Services (www.samhsa.gov)  [place] Opioid Prevention Coalition: www.opioidcoalition.org     [place] Recovery Connection (MRC)  [facility] connects people seeking recovery to resources that help foster and sustain long-term recovery.  Whether you are seeking resources for treatment, transportation, housing, job training, education, health care or other pathways to recovery, [facility] is a great place to start.  176.471.9806.  www.minnesotarecGamerDNA.30 Second Showcase Pod casts for nutrition and wellness  Listen on Apple Podcasts  Dishing Up Nutrition   [employer]   Nutrition       Understand the connection between what you eat and how you feel. Hosted by licensed nutritionists and dietitians from [employer] we share practical, real-life solutions for healthier living through nutrition.      General Medication Instructions:   See your medication sheet(s) for instructions.   Take all medications as prescribed.  Make no changes unless your primary care provider suggests them.   Go to all your primary care provider visits.  Be sure to have all your required lab tests. This way, your medicines can be refilled on time.  Do not use any forms of alcohol.     Please Note: If you have any questions at anytime after you discharged please call [facility] detox unit 3A at [phone].     Here are a list of additional numbers you can call if you are wanting to resume services through [facility]:  [facility] Assessment Intake: [phone]  [facility] Adult GIRMA Intensive Outpatient  call: [phone]  [facility] Admissions [phone]    Recovery Clinic  call: [phone]  [facility]: [phone]  Medical Records call: [phone]  Billing Department call: [phone]     Please remember to take all of your behavioral discharge planning and lab paperwork to any follow up appointments, it contains your lab results, diagnosis, medication list and discharge recommendations.       THANK YOU FOR CHOOSING [facility]     Attestation:  The patient has been seen and evaluated by me,  Lenora Elizalde MD

## 2025-02-21 NOTE — PLAN OF CARE
Goal Outcome Evaluation:    Evaluation Outcome: Pt has completed detox.    Patient has not required any valium for alcohol detox since 02/20 0835. All MSSA scores since that time have been less than 8. Pt is now removed from alcohol detox monitoring status. Await disposition likely to home later today.

## 2025-02-21 NOTE — PLAN OF CARE
Goal Outcome Evaluation:    Outcome Evaluation: Pt is discharged.    Pt given copy of their discharge instructions and medication administration instructions. All discharge plans and labs were discussed with patient. Pt reports no questions at this time regarding discharge plans, labs or medications. Pt denies any suicidal ideation, plans or intent at this time. Patient discharge assisted via GYPSY NUNEZ directly to the lobby. Patient plan is to attend her provider appointments and go to treatment. Patient is discharged at this time.

## 2025-02-21 NOTE — PROGRESS NOTES
"[address]     Case Management Encounter: Met with team, discussed patient progress, discharge plan and any impediments to discharge.        Writer met with the patient to discuss discharge and aftercare planning. Patient reports that she was sober for one week after discharging from detox on 2/2/2025. Patient reports that during that week of sobriety she attended two AA meetings at [facility]. Patient reports that she stopped attending meetings after a week due to getting sick physically. Patient reports after a week of sickness, she returned to use hoping the alcohol would help with her nausea symptoms. Patient reports that she has been drinking \"a few glasses\" of wine daily for the past week. Patient reports that she completed intake for [facility] 55+ co-occurring program. Patient reports that she is awaiting a callback on when she can start attending group. Patient reports that she does not have sober social supports therefore she plans to return to the meetings at [facility] for Recovery. Patient is on disability and lives with her dog in [place], [place]. Writer reviewed JoseMikel Safety Plan with the patient and gave her a hard copy.    Insurance: Medicare     Legal Status:  Voluntary   County: NA  File Number: NA  Start and expiration date of commitment: NA    SUDs Assessment Status: Not needed.     ROIs on file: None at this time.     Living Situation: Lives in [place], [place] with her dog.     Current Providers, Supports & Collateral:  PCP, therapist, psychiatrist and sober friends.  PCP: Carmela Srinivasan MD at [facility]   Therapist: CLAUDIA Castillo, LICSW at [facility]  Psychiatrist: Dr. Olman Herrera at [facility]    Current Plan/Referral Status: Detox only.     Safety Plan Status: Completed.      Anastasiya Delvalle [facility] - Adult Inpatient Addiction Psychiatry Unit           "

## 2025-02-21 NOTE — PLAN OF CARE
Problem: Alcohol Withdrawal  Goal: Alcohol Withdrawal Symptom Control  Outcome: Progressing   Goal Outcome Evaluation:    Polite and cooperative during evening shift, MSSA scores 3 and 4. Pt polite and cooperative most of shift, but becomes agitated around 2200 about her roommate and wanted to change rooms. Charge RN notified.    She does decline HS phenobarbital and states she doesn't know why it was ordered. Also states she only wants one dose of Protonix daily instead of 2, and refused dose at dinner.

## 2025-02-23 ENCOUNTER — PATIENT OUTREACH (OUTPATIENT)
Dept: CARE COORDINATION | Facility: CLINIC | Age: 65
End: 2025-02-23
Payer: MEDICARE

## 2025-02-23 NOTE — PROGRESS NOTES
Clinic Care Coordination Contact  Transitions of Care Outreach  Chief Complaint   Patient presents with    Clinic Care Coordination - Post Hospital       Most Recent Admission Date: 2/19/2025   Most Recent Admission Diagnosis: Suicidal ideation - R45.851  Alcohol intoxication with delirium - F10.921     Most Recent Discharge Date: 2/21/2025   Most Recent Discharge Diagnosis: Alcohol intoxication with delirium - F10.921  Suicidal ideation - R45.851  Nausea and vomiting, unspecified vomiting type - R11.2  Upper abdominal pain - R10.10  Diarrhea, unspecified type - R19.7  Runny nose - R09.89  Acute pharyngitis, unspecified etiology - J02.9  Cough, unspecified type - R05.9  Abdominal pain, epigastric - R10.13  Alcohol dependence with intoxication with complication (H) - F10.229  COPD with acute exacerbation (H) - J44.1  Alcohol withdrawal seizure with complication (H) - F10.939, R56.9  Tobacco use disorder - F17.200  Gastroesophageal reflux disease without esophagitis - K21.9  Generalized anxiety disorder - F41.1  Hypothyroidism, unspecified type - E03.9  Osteoarthritis of multiple joints, unspecified osteoarthritis type - M15.9  Bipolar disorder, most recent episode depressed (H) - F31.30     Transitions of Care Assessment    Discharge Assessment  How are you doing now that you are home?: well  How are your symptoms? (Red Flag symptoms escalate to triage hotline per guidelines): Improved  Do you know how to contact your clinic care team if you have future questions or changes to your health status? : Yes  Does the patient have their discharge instructions? : Yes  Does the patient have questions regarding their discharge instructions? : No  Were you started on any new medications or were there changes to any of your previous medications? : Yes  Does the patient have all of their medications?: Yes  Do you have questions regarding any of your medications? : No  Do you have all of your needed medical supplies or equipment  (DME)?  (i.e. oxygen tank, CPAP, cane, etc.): Yes                  Follow up Plan     Discharge Follow-Up  Discharge follow up appointment scheduled in alignment with recommended follow up timeframe or Transitions of Risk Category? (Low = within 30 days; Moderate= within 14 days; High= within 7 days): Yes    Future Appointments   Date Time Provider Department [place]   3/3/2025  1:20 PM Alexandria Sands PA-C [facility]       Outpatient Plan as outlined on AVS reviewed with patient.    For any urgent concerns, please contact our 24 hour nurse triage line: [phone] ([phone])       IRAM Mtz

## 2025-02-24 ENCOUNTER — TELEPHONE (OUTPATIENT)
Dept: BEHAVIORAL HEALTH | Facility: CLINIC | Age: 65
End: 2025-02-24
Payer: MEDICARE

## 2025-02-24 NOTE — TELEPHONE ENCOUNTER
----- Message from Jaz STEINBERG sent at 2/24/2025  9:41 AM CST -----  Regarding: Referral for IOP/DT-3; 55 plus afternoons  Adult Mental Health Programmatic Care Schedule Request    Patient Name: Jolynn Rivera  Location of programming: [facility]  Start Date: 3/6/2025    Adult Program Group: Adult Program Group: IOP/DT 3  55+ Track [[id number]]  Schedule: M, W, Th, F 1pm-4pm  12 hours per week for 9 weeks  Number of visits to be scheduled: 36 days    Attending Provider (MD):  Dr. Pierre Becerra ([place])  Visit Type:  In-Person    Accommodations Needed: No  Alerts Identified/Substantiation: No  Consulted with Supervisor: No

## 2025-02-26 ENCOUNTER — TELEPHONE (OUTPATIENT)
Dept: BEHAVIORAL HEALTH | Facility: CLINIC | Age: 65
End: 2025-02-26
Payer: MEDICARE

## 2025-02-27 ENCOUNTER — TELEPHONE (OUTPATIENT)
Dept: BEHAVIORAL HEALTH | Facility: CLINIC | Age: 65
End: 2025-02-27
Payer: MEDICARE

## 2025-02-27 NOTE — TELEPHONE ENCOUNTER
[facility] Social Work       Referral Date: 2/24/25    Reason for Referral:  Therapy/Psychiatry    Referral Status: (urgent or general)  General    Method of Communication:   Phone call    Plan or goal of contact/ previous contact information:   SW left  with contact information and requested a return call.    Follow up required:   SW will attempt outreach x2 in 2-5 business days.    Work done on case:   N/a    Important Information and next steps:   N/a    COLLIN Mata   - [facility]  [email or web address]  [phone]

## 2025-03-03 ENCOUNTER — VIRTUAL VISIT (OUTPATIENT)
Dept: PULMONOLOGY | Facility: CLINIC | Age: 65
End: 2025-03-03
Attending: INTERNAL MEDICINE
Payer: MEDICARE

## 2025-03-03 VITALS — WEIGHT: 134 LBS | BODY MASS INDEX: 23.74 KG/M2 | HEIGHT: 63 IN

## 2025-03-03 DIAGNOSIS — R91.8 PULMONARY NODULES: Primary | ICD-10-CM

## 2025-03-03 PROCEDURE — 1126F AMNT PAIN NOTED NONE PRSNT: CPT | Performed by: PHYSICIAN ASSISTANT

## 2025-03-03 PROCEDURE — 98007 SYNCH AUDIO-VIDEO EST HI 40: CPT | Performed by: PHYSICIAN ASSISTANT

## 2025-03-03 ASSESSMENT — PAIN SCALES - GENERAL: PAINLEVEL_OUTOF10: NO PAIN (0)

## 2025-03-03 NOTE — NURSING NOTE
Current patient location: [address]  [facility] [address]    Is the patient currently in the state of [place]? YES    Visit mode:VIDEO    If the visit is dropped, the patient can be reconnected by: VIDEO VISIT: Send to e-mail at: [email or web address]    Will anyone else be joining the visit? NO  (If patient encounters technical issues they should call [phone] :[phone])    How would you like to obtain your AVS? MyChart    Are changes needed to the allergy or medication list? Pt stated no changes to allergies and Pt stated no med changes    Are refills needed on medications prescribed by this physician? NO    Rooming Documentation:  Questionnaire(s) completed    Reason for visit: RECHECK     Toshia BLUNT

## 2025-03-03 NOTE — PROGRESS NOTES
Virtual Visit Details    Type of service:  Video Visit       Originating Location (pt. Location): Home    Distant Location (provider location):  On-site  Platform used for Video Visit: Olinda

## 2025-03-03 NOTE — PATIENT INSTRUCTIONS
Use levalbuterol inhaler every 2 hours as needed when having increased shortness of breath and cough  Quit smoking!    Will discuss you case with Dr. Araiza.

## 2025-03-03 NOTE — PROGRESS NOTES
"LUNG NODULE & INTERVENTIONAL PULMONARY CLINIC  [facility]     Jolynn Rivera MRN# [id number]   Age: 64 year old YOB: 1960     Reason for Consultation: lung nodule(s)    Requesting Physician: Chip Araiza MD  [address] [place] [address]       Assessment and Plan:    1.LISE conglomeration of nodules. Partially calcified opacity initially noted 5/2023, stable on CT chest 4/2024. Increased from 6mm to 9mm on CT chest 12/2024. Seen with Dr. Araiza 12/31/2025, ordered levaquin 500mg x 10 days. More confluent on CT chest 1/28/2025 when in ED with pneumonia, measuring 23 x 18mm. It's unclear if she took antibiotics at that time. Went to ED with AECOPD 2/17/2025, prescribed zpack (does not tolerate systemic steroids, makes her \"crazy\"). Stable on CT chest 2/21/2025 when compared to 1/28/2025.Discussed follow up options including surveillance, PET or biopsy. If considering biopsy would need to be done under general anesthesia per previous bronchoscopy note 7/2023.   D/w Dr. Araiza, recommend PET. Ordered and will call pt to review plan at that time. If significant PET avid will proceed with bronchoscopic biopsy.     2. RML volume loss. Improved on CT chest 1/2025 when compared to CT chest 12/2024,prevous bronchial impaction within the RML airways has resolved.     3. COPD. Unable to tolerate ICS, systemic steroids, LAMA, or ROB/LABA. This very much limits our mgmt of her COPD. She can only tolerate levalbuterol PRN but doesn't feel she needs it  because she typically doesn't feel short of breath unless sick with URI. Encouraged her to use it every 2 hours PRN if having URI symptoms.      4. Tobacco use. Trying to quit smoking, has gum which works great. Unable to tolerate patches.       Alexandria Sands PA-C  Interventional and General Pulmonology  Department of Pulmonary, Allergy, Critical Care and Sleep Medicine   [facility]     51 minutes spent reviewing " chart, reviewing test results, talking with and examining patient, formulating plan, and documentation on the day of the encounter.          History:     Jolynn Rivera is a 64 year old female with who is here for lung nodule(s). Last seen with Dr. Araiza 12/31/2024.     CT chest with new rml volume loss and LISE nodules. Has had frequent aspiration from GERD and alcohol use. GERD is better controlled now on PPI and is sober for the past ~ 1 month.   Shortness of breath is stable and minimal. Not taking Trelegy, too concerned to take it because of the steroid and albuterol component. Systemic steroids make her feel like she could kill someone. Nebulized levalbuterol doesn't bother her, but inhaled albuterol caused panic attacks. She had PRN xopenex but never feels short of breath so she doesn't use it. Admits to not doing much cardio lately though.   She reports having had trouble with moderate sedation in the past and usually required anesthesia for procedures.     - Personal hx of cancer: no  - Family hx of cancer: no  - Exposure hx: Denies asbestos or radon exposure. Might have mold in her apartment building.   - Tobacco hx: Current Smoker, 3ppd now down to 0.5ppd.   - My interpretation of the images relevant for this visit includes: nodules   - My interpretation of the PFT's relevant for this visit includes: obstruction       Culprit Nodule(s):   LISE clustered nodularity     Other active medical problems include:   - has COPD. Stable on inhalers    - h/o HCV, IV drug abuse and seizures          Past Medical History:      Past Medical History:   Diagnosis Date    Anxiety     COPD (chronic obstructive pulmonary disease) (H)     COPD (chronic obstructive pulmonary disease) (H)     Hepatitis C     PTSD (post-traumatic stress disorder)     Seizures (H)     second to ETOH    Substance abuse (H)            Past Surgical History:      Past Surgical History:   Procedure Laterality Date    LAPAROSCOPIC TUBAL LIGATION       "ORTHOPEDIC SURGERY      Left knee    TONSILLECTOMY            Social History:     Social History     Tobacco Use    Smoking status: Every Day     Current packs/day: 0.50     Types: Cigarettes    Smokeless tobacco: Never    Tobacco comments:     Starting Chantix October 2014   Substance Use Topics    Alcohol use: Yes     Comment: 5L every 2 days          Family History:     Family History   Problem Relation Age of Onset    Anxiety Disorder Mother     Asthma Mother     Alcohol/Drug Father     Prostate Cancer Father     Arthritis Father     Alcohol/Drug Brother     Alcohol/Drug Brother     Hypertension Brother     Alcohol/Drug Brother     Depression Brother     Psychotic Disorder Brother            Allergies:      Allergies   Allergen Reactions    Bee Venom Swelling    Wasps [Hornets] Swelling    Amlodipine      Nightmares    Antihistamines, Chlorpheniramine-Type [Antihistamines, Chlorpheniramine-Type]     Clonidine     Compazine      Lock jaw    Diphenhydramine Muscle Pain (Myalgia) and Cramps    Hydroxyzine Cramps     Lock jaw    Metoclopramide      Tardive Dyskinesia    Penicillins      Panic attack    Prednisone Anxiety     Panic attacks.      Prochlorperazine Muscle Pain (Myalgia) and Cramps    Trazodone Nausea and Vomiting and Confusion     \"I ended up falling and feeling like I was drunk\"    Umeclidinium Bromide      Mood swings + anger.          Medications:     Current Outpatient Medications   Medication Sig Dispense Refill    eszopiclone (LUNESTA) 3 MG tablet Take 1 tablet (3 mg) by mouth at bedtime.      folic acid (FOLVITE) 1 MG tablet Take 1 tablet (1 mg) by mouth daily.      gabapentin (NEURONTIN) 600 MG tablet Take 1.5 tablets (900 mg) by mouth 3 times daily.      levalbuterol (XOPENEX HFA) 45 MCG/ACT inhaler Inhale 2 puffs into the lungs every 4 hours as needed for shortness of breath or wheezing. 15 g 0    levothyroxine (SYNTHROID/LEVOTHROID) 100 MCG tablet Take 1 tablet (100 mcg) by mouth every " "morning.      Lidocaine (LIDOCARE) 4 % Patch Place 2 patches over 12 hours onto the skin every 24 hours. To prevent lidocaine toxicity, patient should be patch free for 12 hrs daily.      melatonin 5 MG tablet Take 10 mg by mouth at bedtime Takes 1 hour prior to Lunesta      multivitamin w/minerals (THERA-VIT-M) tablet Take 1 tablet by mouth daily.      nicotine polacrilex (NICORETTE) 4 MG gum Place 1 each (4 mg) inside cheek every hour as needed for nicotine withdrawal symptoms.      omeprazole (PRILOSEC) 40 MG DR capsule Take 1 capsule (40 mg) by mouth every morning.      ondansetron (ZOFRAN ODT) 4 MG ODT tab Take 1 tablet (4 mg) by mouth every 8 hours as needed for vomiting or nausea.      propranolol (INDERAL) 20 MG tablet Take 1 tablet (20 mg) by mouth 3 times daily.      thiamine (B-1) 100 MG tablet Take 1 tablet (100 mg) by mouth daily.       No current facility-administered medications for this visit.            Physical Exam:   Ht 1.6 m (5' 3\")   Wt 60.8 kg (134 lb)   BMI 23.74 kg/m    Wt Readings from Last 4 Encounters:   03/03/25 60.8 kg (134 lb)   01/28/25 65.8 kg (145 lb)   01/13/25 65.8 kg (145 lb)   01/09/25 65.8 kg (145 lb)     General: Well appearing  Lungs: Nonlabored breathing  Neuro: Answering questions appropriately  Psych: Normal affect         Imaging/Lab Data   All laboratory and imaging data reviewed.    No results found for this or any previous visit (from the past 24 hours).         "

## 2025-03-06 ENCOUNTER — TELEPHONE (OUTPATIENT)
Dept: BEHAVIORAL HEALTH | Facility: CLINIC | Age: 65
End: 2025-03-06
Payer: MEDICARE

## 2025-03-06 NOTE — TELEPHONE ENCOUNTER
----- Message from Kathrin Awad sent at 3/6/2025  9:13 AM CST -----  Regarding: RE: Referral for IOP/DT-3; 55 plus afternoons  Please reschedule this start date for tomorrow, 03/07/2025. Please remove from the ROBERTO for today.    Thank you!  ----- Message -----  From: Jaz Tineo  Sent: 2/24/2025   9:43 AM CST  To: [facility]; #  Subject: Referral for IOP/DT-3; 55 plus afternoons        Adult Mental Health Programmatic Care Schedule Request    Patient Name: Jolynn Rivera  Location of programming: [facility]  Start Date: 3/6/2025    Adult Program Group: Adult Program Group: IOP/DT 3  55+ Track [[id number]]  Schedule: M, W, Th, F 1pm-4pm  12 hours per week for 9 weeks  Number of visits to be scheduled: 36 days    Attending Provider (MD):  Dr. Pierre Becerra ([place])  Visit Type:  In-Person    Accommodations Needed: No  Alerts Identified/Substantiation: No  Consulted with Supervisor: No

## 2025-04-05 ENCOUNTER — HOSPITAL ENCOUNTER (INPATIENT)
Facility: CLINIC | Age: 65
LOS: 2 days | Discharge: HOME | DRG: 897 | End: 2025-04-07
Attending: EMERGENCY MEDICINE | Admitting: PSYCHIATRY & NEUROLOGY
Payer: MEDICARE

## 2025-04-05 ENCOUNTER — TELEPHONE (OUTPATIENT)
Dept: BEHAVIORAL HEALTH | Facility: CLINIC | Age: 65
End: 2025-04-05

## 2025-04-05 DIAGNOSIS — E83.42 HYPOMAGNESEMIA: ICD-10-CM

## 2025-04-05 DIAGNOSIS — F10.221 ALCOHOL DEPENDENCE WITH INTOXICATION DELIRIUM (H): ICD-10-CM

## 2025-04-05 DIAGNOSIS — E87.1 HYPONATREMIA: ICD-10-CM

## 2025-04-05 DIAGNOSIS — F10.930 ALCOHOL WITHDRAWAL SYNDROME WITHOUT COMPLICATION (H): ICD-10-CM

## 2025-04-05 LAB
ALBUMIN SERPL BCG-MCNC: 4.5 G/DL (ref 3.5–5.2)
ALCOHOL BREATH TEST: 0.15 (ref 0–0.01)
ALP SERPL-CCNC: 52 U/L (ref 40–150)
ALT SERPL W P-5'-P-CCNC: 33 U/L (ref 0–50)
AMPHETAMINES UR QL SCN: NORMAL
ANION GAP SERPL CALCULATED.3IONS-SCNC: 14 MMOL/L (ref 7–15)
ANION GAP SERPL CALCULATED.3IONS-SCNC: 20 MMOL/L (ref 7–15)
AST SERPL W P-5'-P-CCNC: 46 U/L (ref 0–45)
BARBITURATES UR QL SCN: NORMAL
BASOPHILS # BLD AUTO: 0 10E3/UL (ref 0–0.2)
BASOPHILS NFR BLD AUTO: 1 %
BENZODIAZ UR QL SCN: NORMAL
BILIRUB SERPL-MCNC: 0.4 MG/DL
BUN SERPL-MCNC: 5.5 MG/DL (ref 8–23)
BUN SERPL-MCNC: 5.6 MG/DL (ref 8–23)
BZE UR QL SCN: NORMAL
CALCIUM SERPL-MCNC: 8.4 MG/DL (ref 8.8–10.4)
CALCIUM SERPL-MCNC: 8.8 MG/DL (ref 8.8–10.4)
CANNABINOIDS UR QL SCN: NORMAL
CHLORIDE SERPL-SCNC: 89 MMOL/L (ref 98–107)
CHLORIDE SERPL-SCNC: 96 MMOL/L (ref 98–107)
CREAT SERPL-MCNC: 0.45 MG/DL (ref 0.51–0.95)
CREAT SERPL-MCNC: 0.49 MG/DL (ref 0.51–0.95)
EGFRCR SERPLBLD CKD-EPI 2021: >90 ML/MIN/1.73M2
EGFRCR SERPLBLD CKD-EPI 2021: >90 ML/MIN/1.73M2
EOSINOPHIL # BLD AUTO: 0 10E3/UL (ref 0–0.7)
EOSINOPHIL NFR BLD AUTO: 0 %
ERYTHROCYTE [DISTWIDTH] IN BLOOD BY AUTOMATED COUNT: 12.4 % (ref 10–15)
FENTANYL UR QL: NORMAL
GLUCOSE SERPL-MCNC: 84 MG/DL (ref 70–99)
GLUCOSE SERPL-MCNC: 90 MG/DL (ref 70–99)
HCO3 SERPL-SCNC: 20 MMOL/L (ref 22–29)
HCO3 SERPL-SCNC: 22 MMOL/L (ref 22–29)
HCT VFR BLD AUTO: 37.9 % (ref 35–47)
HGB BLD-MCNC: 14.2 G/DL (ref 11.7–15.7)
IMM GRANULOCYTES # BLD: 0 10E3/UL
IMM GRANULOCYTES NFR BLD: 0 %
LYMPHOCYTES # BLD AUTO: 2 10E3/UL (ref 0.8–5.3)
LYMPHOCYTES NFR BLD AUTO: 33 %
MAGNESIUM SERPL-MCNC: 1.4 MG/DL (ref 1.7–2.3)
MAGNESIUM SERPL-MCNC: 2.4 MG/DL (ref 1.7–2.3)
MCH RBC QN AUTO: 35.2 PG (ref 26.5–33)
MCHC RBC AUTO-ENTMCNC: 37.5 G/DL (ref 31.5–36.5)
MCV RBC AUTO: 94 FL (ref 78–100)
MONOCYTES # BLD AUTO: 0.8 10E3/UL (ref 0–1.3)
MONOCYTES NFR BLD AUTO: 13 %
NEUTROPHILS # BLD AUTO: 3.4 10E3/UL (ref 1.6–8.3)
NEUTROPHILS NFR BLD AUTO: 54 %
NRBC # BLD AUTO: 0 10E3/UL
NRBC BLD AUTO-RTO: 0 /100
OPIATES UR QL SCN: NORMAL
PCP QUAL URINE (ROCHE): NORMAL
PLATELET # BLD AUTO: 226 10E3/UL (ref 150–450)
POTASSIUM SERPL-SCNC: 3.7 MMOL/L (ref 3.4–5.3)
POTASSIUM SERPL-SCNC: 5.3 MMOL/L (ref 3.4–5.3)
PROT SERPL-MCNC: 7.8 G/DL (ref 6.4–8.3)
RBC # BLD AUTO: 4.03 10E6/UL (ref 3.8–5.2)
SODIUM SERPL-SCNC: 129 MMOL/L (ref 135–145)
SODIUM SERPL-SCNC: 132 MMOL/L (ref 135–145)
WBC # BLD AUTO: 6.2 10E3/UL (ref 4–11)

## 2025-04-05 PROCEDURE — 128N000004 HC R&B CD ADULT

## 2025-04-05 PROCEDURE — 82075 ASSAY OF BREATH ETHANOL: CPT | Performed by: EMERGENCY MEDICINE

## 2025-04-05 PROCEDURE — 99285 EMERGENCY DEPT VISIT HI MDM: CPT | Performed by: EMERGENCY MEDICINE

## 2025-04-05 PROCEDURE — 258N000003 HC RX IP 258 OP 636: Performed by: EMERGENCY MEDICINE

## 2025-04-05 PROCEDURE — 36415 COLL VENOUS BLD VENIPUNCTURE: CPT | Performed by: EMERGENCY MEDICINE

## 2025-04-05 PROCEDURE — 250N000011 HC RX IP 250 OP 636

## 2025-04-05 PROCEDURE — 83735 ASSAY OF MAGNESIUM: CPT | Performed by: EMERGENCY MEDICINE

## 2025-04-05 PROCEDURE — 96365 THER/PROPH/DIAG IV INF INIT: CPT | Performed by: EMERGENCY MEDICINE

## 2025-04-05 PROCEDURE — 85025 COMPLETE CBC W/AUTO DIFF WBC: CPT | Performed by: EMERGENCY MEDICINE

## 2025-04-05 PROCEDURE — 96361 HYDRATE IV INFUSION ADD-ON: CPT | Performed by: EMERGENCY MEDICINE

## 2025-04-05 PROCEDURE — 80053 COMPREHEN METABOLIC PANEL: CPT | Performed by: EMERGENCY MEDICINE

## 2025-04-05 PROCEDURE — 250N000013 HC RX MED GY IP 250 OP 250 PS 637: Performed by: EMERGENCY MEDICINE

## 2025-04-05 PROCEDURE — 250N000013 HC RX MED GY IP 250 OP 250 PS 637: Performed by: PSYCHIATRY & NEUROLOGY

## 2025-04-05 PROCEDURE — 250N000011 HC RX IP 250 OP 636: Performed by: EMERGENCY MEDICINE

## 2025-04-05 PROCEDURE — 250N000013 HC RX MED GY IP 250 OP 250 PS 637

## 2025-04-05 PROCEDURE — 97150 GROUP THERAPEUTIC PROCEDURES: CPT | Mod: GO

## 2025-04-05 PROCEDURE — 99222 1ST HOSP IP/OBS MODERATE 55: CPT | Mod: AI | Performed by: PSYCHIATRY & NEUROLOGY

## 2025-04-05 PROCEDURE — HZ2ZZZZ DETOXIFICATION SERVICES FOR SUBSTANCE ABUSE TREATMENT: ICD-10-PCS | Performed by: PSYCHIATRY & NEUROLOGY

## 2025-04-05 PROCEDURE — 99285 EMERGENCY DEPT VISIT HI MDM: CPT | Mod: 25 | Performed by: EMERGENCY MEDICINE

## 2025-04-05 PROCEDURE — 99222 1ST HOSP IP/OBS MODERATE 55: CPT | Performed by: PHYSICIAN ASSISTANT

## 2025-04-05 PROCEDURE — 80307 DRUG TEST PRSMV CHEM ANLYZR: CPT | Performed by: EMERGENCY MEDICINE

## 2025-04-05 RX ORDER — ONDANSETRON 4 MG/1
4 TABLET, ORALLY DISINTEGRATING ORAL ONCE
Status: COMPLETED | OUTPATIENT
Start: 2025-04-05 | End: 2025-04-05

## 2025-04-05 RX ORDER — DIAZEPAM 5 MG/1
5-20 TABLET ORAL EVERY 30 MIN PRN
Status: DISCONTINUED | OUTPATIENT
Start: 2025-04-05 | End: 2025-04-07

## 2025-04-05 RX ORDER — HYDROXYZINE HYDROCHLORIDE 25 MG/1
25 TABLET, FILM COATED ORAL EVERY 4 HOURS PRN
Status: DISCONTINUED | OUTPATIENT
Start: 2025-04-05 | End: 2025-04-07 | Stop reason: HOSPADM

## 2025-04-05 RX ORDER — MAGNESIUM SULFATE HEPTAHYDRATE 40 MG/ML
2 INJECTION, SOLUTION INTRAVENOUS ONCE
Status: COMPLETED | OUTPATIENT
Start: 2025-04-05 | End: 2025-04-05

## 2025-04-05 RX ORDER — DIAZEPAM 5 MG/1
5-20 TABLET ORAL EVERY 30 MIN PRN
Status: DISCONTINUED | OUTPATIENT
Start: 2025-04-05 | End: 2025-04-05

## 2025-04-05 RX ORDER — LIDOCAINE 4 G/G
2 PATCH TOPICAL DAILY PRN
Status: DISCONTINUED | OUTPATIENT
Start: 2025-04-05 | End: 2025-04-07 | Stop reason: HOSPADM

## 2025-04-05 RX ORDER — MULTIPLE VITAMINS W/ MINERALS TAB 9MG-400MCG
1 TAB ORAL DAILY
Status: DISCONTINUED | OUTPATIENT
Start: 2025-04-05 | End: 2025-04-05

## 2025-04-05 RX ORDER — FOLIC ACID 1 MG/1
1 TABLET ORAL DAILY
Status: DISCONTINUED | OUTPATIENT
Start: 2025-04-05 | End: 2025-04-07 | Stop reason: HOSPADM

## 2025-04-05 RX ORDER — ATENOLOL 50 MG/1
50 TABLET ORAL DAILY PRN
Status: DISCONTINUED | OUTPATIENT
Start: 2025-04-05 | End: 2025-04-07

## 2025-04-05 RX ORDER — PROPRANOLOL HCL 20 MG
20 TABLET ORAL 3 TIMES DAILY
Status: DISCONTINUED | OUTPATIENT
Start: 2025-04-05 | End: 2025-04-05

## 2025-04-05 RX ORDER — FOLIC ACID 1 MG/1
1 TABLET ORAL DAILY
Status: DISCONTINUED | OUTPATIENT
Start: 2025-04-05 | End: 2025-04-05

## 2025-04-05 RX ORDER — AMOXICILLIN 250 MG
1 CAPSULE ORAL 2 TIMES DAILY PRN
Status: DISCONTINUED | OUTPATIENT
Start: 2025-04-05 | End: 2025-04-07 | Stop reason: HOSPADM

## 2025-04-05 RX ORDER — LOPERAMIDE HYDROCHLORIDE 2 MG/1
2 CAPSULE ORAL 4 TIMES DAILY PRN
Status: DISCONTINUED | OUTPATIENT
Start: 2025-04-05 | End: 2025-04-07 | Stop reason: HOSPADM

## 2025-04-05 RX ORDER — ACETAMINOPHEN 325 MG/1
650 TABLET ORAL EVERY 4 HOURS PRN
Status: DISCONTINUED | OUTPATIENT
Start: 2025-04-05 | End: 2025-04-07 | Stop reason: HOSPADM

## 2025-04-05 RX ORDER — MAGNESIUM HYDROXIDE/ALUMINUM HYDROXICE/SIMETHICONE 120; 1200; 1200 MG/30ML; MG/30ML; MG/30ML
30 SUSPENSION ORAL EVERY 4 HOURS PRN
Status: DISCONTINUED | OUTPATIENT
Start: 2025-04-05 | End: 2025-04-07 | Stop reason: HOSPADM

## 2025-04-05 RX ORDER — ONDANSETRON 4 MG/1
4 TABLET, ORALLY DISINTEGRATING ORAL EVERY 6 HOURS PRN
Status: DISCONTINUED | OUTPATIENT
Start: 2025-04-05 | End: 2025-04-07 | Stop reason: HOSPADM

## 2025-04-05 RX ORDER — MULTIPLE VITAMINS W/ MINERALS TAB 9MG-400MCG
1 TAB ORAL DAILY
Status: DISCONTINUED | OUTPATIENT
Start: 2025-04-05 | End: 2025-04-07 | Stop reason: HOSPADM

## 2025-04-05 RX ORDER — OMEPRAZOLE 20 MG/1
40 CAPSULE, DELAYED RELEASE ORAL EVERY MORNING
Status: DISCONTINUED | OUTPATIENT
Start: 2025-04-05 | End: 2025-04-07 | Stop reason: HOSPADM

## 2025-04-05 RX ORDER — ONDANSETRON 4 MG/1
4 TABLET, ORALLY DISINTEGRATING ORAL EVERY 8 HOURS PRN
Status: DISCONTINUED | OUTPATIENT
Start: 2025-04-05 | End: 2025-04-05

## 2025-04-05 RX ORDER — PROPRANOLOL HCL 20 MG
20 TABLET ORAL 3 TIMES DAILY
Status: DISCONTINUED | OUTPATIENT
Start: 2025-04-05 | End: 2025-04-07 | Stop reason: HOSPADM

## 2025-04-05 RX ORDER — LEVOTHYROXINE SODIUM 100 UG/1
100 TABLET ORAL EVERY MORNING
Status: DISCONTINUED | OUTPATIENT
Start: 2025-04-05 | End: 2025-04-07 | Stop reason: HOSPADM

## 2025-04-05 RX ADMIN — PROPRANOLOL HYDROCHLORIDE 20 MG: 20 TABLET ORAL at 13:18

## 2025-04-05 RX ADMIN — GABAPENTIN 900 MG: 600 TABLET, FILM COATED ORAL at 13:18

## 2025-04-05 RX ADMIN — DIAZEPAM 5 MG: 5 TABLET ORAL at 06:51

## 2025-04-05 RX ADMIN — GABAPENTIN 900 MG: 600 TABLET, FILM COATED ORAL at 19:48

## 2025-04-05 RX ADMIN — DIAZEPAM 5 MG: 5 TABLET ORAL at 16:17

## 2025-04-05 RX ADMIN — NICOTINE POLACRILEX 4 MG: 4 GUM, CHEWING BUCCAL at 22:46

## 2025-04-05 RX ADMIN — THIAMINE HCL TAB 100 MG 100 MG: 100 TAB at 07:56

## 2025-04-05 RX ADMIN — NICOTINE POLACRILEX 4 MG: 4 GUM, CHEWING BUCCAL at 21:25

## 2025-04-05 RX ADMIN — NICOTINE POLACRILEX 4 MG: 4 GUM, CHEWING BUCCAL at 16:19

## 2025-04-05 RX ADMIN — PROPRANOLOL HYDROCHLORIDE 20 MG: 20 TABLET ORAL at 19:48

## 2025-04-05 RX ADMIN — NICOTINE POLACRILEX 4 MG: 4 GUM, CHEWING BUCCAL at 15:07

## 2025-04-05 RX ADMIN — DIAZEPAM 15 MG: 5 TABLET ORAL at 03:36

## 2025-04-05 RX ADMIN — ACETAMINOPHEN 650 MG: 325 TABLET, FILM COATED ORAL at 16:22

## 2025-04-05 RX ADMIN — GABAPENTIN 900 MG: 600 TABLET, FILM COATED ORAL at 06:18

## 2025-04-05 RX ADMIN — ONDANSETRON 4 MG: 4 TABLET, ORALLY DISINTEGRATING ORAL at 13:19

## 2025-04-05 RX ADMIN — NICOTINE POLACRILEX 4 MG: 4 GUM, CHEWING BUCCAL at 10:34

## 2025-04-05 RX ADMIN — FOLIC ACID 1 MG: 1 TABLET ORAL at 07:57

## 2025-04-05 RX ADMIN — PROPRANOLOL HYDROCHLORIDE 20 MG: 20 TABLET ORAL at 06:18

## 2025-04-05 RX ADMIN — ONDANSETRON 4 MG: 4 TABLET, ORALLY DISINTEGRATING ORAL at 02:59

## 2025-04-05 RX ADMIN — DIAZEPAM 10 MG: 5 TABLET ORAL at 06:17

## 2025-04-05 RX ADMIN — MAGNESIUM SULFATE HEPTAHYDRATE 2 G: 40 INJECTION, SOLUTION INTRAVENOUS at 05:57

## 2025-04-05 RX ADMIN — DIAZEPAM 10 MG: 5 TABLET ORAL at 04:06

## 2025-04-05 RX ADMIN — SODIUM CHLORIDE 1000 ML: 0.9 INJECTION, SOLUTION INTRAVENOUS at 06:39

## 2025-04-05 RX ADMIN — ONDANSETRON 4 MG: 4 TABLET, ORALLY DISINTEGRATING ORAL at 06:34

## 2025-04-05 RX ADMIN — DIAZEPAM 10 MG: 5 TABLET ORAL at 05:02

## 2025-04-05 RX ADMIN — Medication 1 TABLET: at 07:56

## 2025-04-05 RX ADMIN — DIAZEPAM 10 MG: 5 TABLET ORAL at 08:03

## 2025-04-05 RX ADMIN — NICOTINE POLACRILEX 4 MG: 4 GUM, CHEWING BUCCAL at 19:48

## 2025-04-05 RX ADMIN — NICOTINE POLACRILEX 4 MG: 4 GUM, CHEWING BUCCAL at 07:57

## 2025-04-05 RX ADMIN — NICOTINE POLACRILEX 4 MG: 4 GUM, CHEWING BUCCAL at 18:21

## 2025-04-05 RX ADMIN — NICOTINE POLACRILEX 4 MG: 4 GUM, CHEWING BUCCAL at 14:09

## 2025-04-05 RX ADMIN — Medication 3 MG: at 22:45

## 2025-04-05 RX ADMIN — OMEPRAZOLE 40 MG: 20 CAPSULE, DELAYED RELEASE ORAL at 07:56

## 2025-04-05 RX ADMIN — DIAZEPAM 5 MG: 5 TABLET ORAL at 13:19

## 2025-04-05 ASSESSMENT — ACTIVITIES OF DAILY LIVING (ADL)
ADLS_ACUITY_SCORE: 45
ADLS_ACUITY_SCORE: 45
ADLS_ACUITY_SCORE: 58
DRESS: SCRUBS (BEHAVIORAL HEALTH)
ADLS_ACUITY_SCORE: 45
ADLS_ACUITY_SCORE: 58
ADLS_ACUITY_SCORE: 45
ADLS_ACUITY_SCORE: 58
HYGIENE/GROOMING: INDEPENDENT
ADLS_ACUITY_SCORE: 58
ADLS_ACUITY_SCORE: 58
ADLS_ACUITY_SCORE: 45
ADLS_ACUITY_SCORE: 58
ADLS_ACUITY_SCORE: 68
LAUNDRY: WITH SUPERVISION
ORAL_HYGIENE: INDEPENDENT
ADLS_ACUITY_SCORE: 45
ADLS_ACUITY_SCORE: 58

## 2025-04-05 ASSESSMENT — PATIENT HEALTH QUESTIONNAIRE - PHQ9: SUM OF ALL RESPONSES TO PHQ9 QUESTIONS 1 & 2: 4

## 2025-04-05 ASSESSMENT — LIFESTYLE VARIABLES: SKIP TO QUESTIONS 9-10: 0

## 2025-04-05 NOTE — ED PROVIDER NOTES
"  History     Chief Complaint   Patient presents with    Alcohol Problem    Anxiety     HPI  Jolynn Rivera is a 64 year old female who presents with altered mental status.  Patient endorsed alcohol use this past day.  Patient also reporting feeling anxious and nauseous.  She reports that she usually drinks a 5 L box of wine every 2 days.  Patient concerned she is having withdrawal.  She does desire detox admission.    Additional history obtained from EMS given difficulty of complete history from the patient due to mental status change.  EMS reported bringing the patient in from home, noted that patient reported having too many drinks, gave 4 mg ondansetron, glucose of 164.      Physical Exam   BP: 137/68  Pulse: 72  Temp: 98.6  F (37  C)  Resp: 18  Height: 162.6 cm (5' 4\")  Weight: 60.8 kg (134 lb)  SpO2: 97 %      Physical Exam  General: smells of EtOH, no acute distress, rocking forward and back  HENT: MMM, no oropharyngeal lesions. Atraumatic head.   Eyes: PERRL, normal sclerae, nystagmus present  Neck: non-tender, supple  Cardio: Regular rate. Regular rhythm. Extremities well perfused  Resp: Normal work of breathing, normal respiratory rate  Abdomen: no tenderness, non-distended, no rebound, no guarding  Neuro: alert, slow to respond. Slurred speech. Confused. CN II-XII grossly intact. Grossly normal strength and sensation.   MSK: no deformities. Grossly normal ROM in extremities.   Integumentary/Skin: no rash visualized, normal color    ED Course      Procedures              Labs Ordered and Resulted from Time of ED Arrival to Time of ED Departure   COMPREHENSIVE METABOLIC PANEL - Abnormal       Result Value    Sodium 129 (*)     Potassium 3.7      Carbon Dioxide (CO2) 20 (*)     Anion Gap 20 (*)     Urea Nitrogen 5.5 (*)     Creatinine 0.45 (*)     GFR Estimate >90      Calcium 8.8      Chloride 89 (*)     Glucose 90      Alkaline Phosphatase 52      AST 46 (*)     ALT 33      Protein Total 7.8      Albumin " 4.5      Bilirubin Total 0.4     MAGNESIUM - Abnormal    Magnesium 1.4 (*)    CBC WITH PLATELETS AND DIFFERENTIAL - Abnormal    WBC Count 6.2      RBC Count 4.03      Hemoglobin 14.2      Hematocrit 37.9      MCV 94      MCH 35.2 (*)     MCHC 37.5 (*)     RDW 12.4      Platelet Count 226      % Neutrophils 54      % Lymphocytes 33      % Monocytes 13      % Eosinophils 0      % Basophils 1      % Immature Granulocytes 0      NRBCs per 100 WBC 0      Absolute Neutrophils 3.4      Absolute Lymphocytes 2.0      Absolute Monocytes 0.8      Absolute Eosinophils 0.0      Absolute Basophils 0.0      Absolute Immature Granulocytes 0.0      Absolute NRBCs 0.0     ALCOHOL BREATH TEST POCT - Abnormal    Alcohol Breath Test 0.154 (*)    URINE DRUG SCREEN PANEL - Normal    Amphetamines Urine Screen Negative      Barbituates Urine Screen Negative      Benzodiazepine Urine Screen Negative      Cannabinoids Urine Screen Negative      Cocaine Urine Screen Negative      Fentanyl Qual Urine Screen Negative      Opiates Urine Screen Negative      PCP Urine Screen Negative     BASIC METABOLIC PANEL   MAGNESIUM     No orders to display          Medical Decision Making  The patient's presentation was of high complexity (a chronic illness severe exacerbation, progression, or side effect of treatment).    The patient's evaluation involved:  an assessment requiring an independent historian (see separate area of note for details)  Ordering/review of 3+ tests  discussion of management or test interpretation with another health professional (Behavioral Health Intake)    The patient's management necessitated high risk (a decision regarding hospitalization)      Assessments & Plan    Patient arriving with altered mental status, with reason to suspect alcohol or other drug intoxication as etiology. Exam without findings suggestive of trauma, non-focal. Breath EtOH 0.154. Glucose normal per EMS report. Nursing notes reviewed.     AMS likely due to  intoxication delirium but cannot immediately rule out other dangerous etiologies of AMS. Plan close clinical monitoring of the patient and her mental status for clearing of intoxicating substance. With approriate clearing, the patient would likely be able to be discharged. If not appropriately clearing, plan broadening of work-up, potentially including CT head and serum labs.     With monitoring, mental status cleared appropriately.  Clinical picture consistent with mental status change secondary to alcohol intoxication.  Patient did develop alcohol withdrawal symptoms, was treated with diazepam via [facility] withdrawal protocol.    Labs notable for mildly low magnesium at 1.4, somewhat low sodium at 129, mildly elevated anion gap at 20.  NS bolus and magnesium supplementation given.  Redraw chemistry panel and magnesium level pending.    With period of monitoring, the patient continues to clear appropriately but is not yet clinically sober at this time. Patient admitted to Detox and signed out to Behavioral Health Intake.     Patient signed out to oncoming ED provider with plan to follow-up repeat magnesium and BMP, notify behavioral health intake of medical clearance after lab results.    Final diagnoses:   Alcohol dependence with intoxication delirium (H)   Alcohol withdrawal syndrome without complication (H)   Hypomagnesemia   Hyponatremia     New Prescriptions    No medications on file       --  Bertin Sierra MD   Emergency Medicine   [facility] EMERGENCY DEPARTMENT  4/5/2025     Bertin Sierra MD  04/05/25 0718

## 2025-04-05 NOTE — ED NOTES
Emergency Department I-PASS Sign-out      Illness Severity: Stable    Patient Summary:  64 year old female with pertinent PMH of EtOH use disorder who presented with EtOH intoxication -> withdrawal.     ED Course/treatment plan: on MSSA. Chem panel with increased AG and low Magnesium. NS bolus and Mg supplementation given.     Clinical Impression:  No diagnosis found.    Edited by: Bertin Sierra MD at 4/5/2025 0636    Action List:  -To do:  Labs: recheck Mg and BMP    Situational Awareness & Contingency Planning:  Code Status (Most recent):  Prior    Disposition:  admitted to the detox service pending medical clearance with recheck Mg and BMP    Edited by: Bertin Sierra MD at 4/5/2025 6573    Synthesis & Events after sign-out:  Repeat magnesium elevated at 2.4.  Basic metabolic shows mild hyponatremia, sodium of 132.  Urine drug screen negative.        Leonor Albrecht MD   Emergency Medicine      Leonor Albrecht MD  04/05/25 9057

## 2025-04-05 NOTE — TELEPHONE ENCOUNTER
S: [facility] , Provider Rigo calling at 7:12 AM with clinical on 64 year old/female presenting for alcohol detox.     B: Pt presents for ETOH detox.   Currently reports drinking 2L wine daily for while.    Patient reports last use was last 24 hours.  Pt LEOBARDO: 0.154  Pt  endorses hx of DT  Pt  endorses hx of seizures. Last seizure:  years ago.  Pt endorsing the following symptoms of withdrawal: Tremulous, Anxious, and Nausea  MSSA Score: 15    Pt denies acute mental health or medical concerns.   Pt denies other drug use: None Amount/frequency: N/A    Does Pt have a detox care plan in [facility]? No  Does pt present with specific needs, assistive devices, or exclusionary criteria? None  Is the patient ambulating, eating and drinking in the ED? Yes    A: Pt meets criteria to be presented for IP detox admission. Patient is voluntary    COVID Symptoms: No  If yes, COVID test required   Utox: Negative  Magnesium: Abnormalities: 1.4  waiting on redraw.  CMP: Ordered, not yet collected - waiting on redraw.  CBC: Abnormalities: MCH: 35.2; MCHC: 37.5  HCG: N/A     R: Patient cleared and ready for behavioral bed placement: Yes; cleared once labs come back.    Pt is meeting criteria for presentation to 3A/CD    Does Patient need a Transfer Center request created? No, Pt is located within [facility] ED, [facility] ED, or [facility]      Magnesium and CMP need to be redrawn before medically cleared.     9:28 AM Per call from Dr Albrecht, pt is now medically cleared for detox.   9:31 AM Paged [facility] to review pt for admission to 3A.   10:23 AM 2nd page to [facility].  10:28 AM [facility] accepts pt for admission to [facility].  10:46 AM Informed [facility] of pt in queue.   10:48 AM Provided ED with placement.       Final Disposition: [facility]

## 2025-04-05 NOTE — ED TRIAGE NOTES
"BIBA H427. Pt was picked up from her home today. Pt has been using alcohol tonight. Pt become anxious after having \"too many drinks\". Pt felt nauseated as well and received 4 mg of Zofran from EMS. VSS. .  Pt reports hx seizures.        "

## 2025-04-05 NOTE — PLAN OF CARE
Goal Outcome Evaluation:    Plan of Care Reviewed With: patient      S-(situation): Voluntary admit from [facility] for alcohol use    B-(background): Patient presents here for alcohol detox.  Currently reports drinking 2L wine daily for a while.  Reports last use was last 24 hours prior to admission.  Patient endorses history of DTs, endorses history of seizures.  Arrived ED with ETOH of 0.154.  Patient endorsing the following symptoms of withdrawals: Tremulous, Anxious, and Nausea.  Patient denies acute mental health or medical concerns.  Patient denied other drug use.    A-(assessment): Patient pleasant and cooperativeon upon arrived the unit.  Flat affect, denies SI/SIB, endorsed depression/anxiety.  MSSA on admission 9, received 5 mg valium per MSSA protocol.  Fair appetite, medication complaint.     COVID Symptoms: No, if yes, COVID test required    Utox : Negative    Magnesium: Abnormalitis: 1.4 (Re-draw)    CMP:waiting on redraw    CBC: Abnormalities: MCH:35.2; MCHC; 37.5    HCG: N/A    Vital Signs: T 98.4  /  R  16  /  HR  71  /  B/P  154/84  /  02 sat 95% Ra     R-(recommendations): MSSA with valium    Lab draw 4/5/25: BMP, CK TOTAL, TSH, Lipids,     Psychiatry, Internal Medicine, Case Management to meet with patient    Patient would like detox only and attend recovery group

## 2025-04-05 NOTE — CONSULTS
[facility]  Consult Note - Hospitalist Service  Date of Admission:  4/5/2025  Consult Requested by: Dr. Fermin  Reason for Consult: Medical co-management    Assessment & Plan   Ms. Jolynn Rivera is a 63 yo female with hx of COPD, pulmonary nodules, hypothyroidism, GERD, Hep C s/p tx, chronic back pain with R sided sciatica, bipolar d/o, PTSD, and etoh use disorder admitted to  psychiatric detox unit after presenting to ED with etoh abuse and desire for medically supervised etoh withdrawal. Internal medicine consulted for medical co-management.     # Etoh use disorder and sz withdrawal ppx: Management per psychiatry primary team.      # Hyponatremia: Improving. Initial Na 129, with repeat 132, initially thought to be hypovolemic from poor fluid intake PTA other than etoh; however, pt states she drinks a lot of sparkling water.  - Repeat BMP tomorrow am.     # COPD  # Pulmonary nodules  Followed OP by [facility] Pul[facility]ology, last vist 3/3/25. Pt unable to tolerate steroids or any inhalers, except prn levalbuterol that she states she needs infrequently. Most recent CT chest with stable pulm nodules compared to prior.   - Follow up with PET scan of chest as ordered in near future.  - Prn levalbuterol    # Other chronic, stable medical conditions:  # HTN: Continue PTA propranolol, of which she also takes for mental health issues.  # GERD: Continue PTA daily PPI  # Hypothyroidism: Continue PTA levothyroxine  # Chronic back pain with sciatica: Continue PTA gabapentin and lidocaine patches       The patient's care was discussed with the Patient and communicated to primary team via this note . Medicine will follow up on repeat BMP tomorrow am and if Na improved or WNL, will sign off. Please feel free to call with questions.     Rock Aponte PA-C  Hospitalist Service  Securely message with [facility] (more info)  Text page via [employer] Paging/Directory    ______________________________________________________________________    Chief Complaint   Etoh withdrawal.     History is obtained from the patient and electronic health record    History of Present Illness   Please refer to psychiatric H&P in charting by Dr. Alex dated 4/5/25 for full details. In brief, Ms. Jolynn Rivera is a 65 yo female with hx of COPD, pulmonary nodules, hypothyroidism, GERD, Hep C s/p tx, chronic back pain with R sided sciatica, bipolar d/o, PTSD, and etoh use disorder admitted to  psychiatric detox unit after presenting to ED with etoh abuse and desire for medically supervised etoh withdrawal. Internal medicine consulted for medical co-management.     Currently, pt very tremulous and has little appetite but denies recent vomiting. Denies fevers, chills, chest pain, SOB, abd pain, bowel and bladder concerns.       Past Medical History    Past Medical History:   Diagnosis Date    Anxiety     COPD (chronic obstructive pulmonary disease) (H)     COPD (chronic obstructive pulmonary disease) (H)     Hepatitis C     PTSD (post-traumatic stress disorder)     Seizures (H)     second to ETOH    Substance abuse (H)        Past Surgical History   Past Surgical History:   Procedure Laterality Date    LAPAROSCOPIC TUBAL LIGATION      ORTHOPEDIC SURGERY      Left knee    TONSILLECTOMY         Medications   Medications Prior to Admission   Medication Sig Dispense Refill Last Dose/Taking    eszopiclone (LUNESTA) 3 MG tablet Take 1 tablet (3 mg) by mouth at bedtime.   4/4/2025    levothyroxine (SYNTHROID/LEVOTHROID) 100 MCG tablet Take 1 tablet (100 mcg) by mouth every morning.   4/4/2025    melatonin 5 MG tablet Take 10 mg by mouth at bedtime Takes 1 hour prior to Lunesta   4/4/2025    multivitamin w/minerals (THERA-VIT-M) tablet Take 1 tablet by mouth daily.   4/4/2025    omeprazole (PRILOSEC) 40 MG DR capsule Take 1 capsule (40 mg) by mouth every morning.   4/4/2025    propranolol (INDERAL) 20 MG  tablet Take 1 tablet (20 mg) by mouth 3 times daily.   4/4/2025    thiamine (B-1) 100 MG tablet Take 1 tablet (100 mg) by mouth daily.   4/4/2025    gabapentin (NEURONTIN) 600 MG tablet Take 1.5 tablets (900 mg) by mouth 3 times daily. (Patient taking differently: Take 1,200 mg by mouth 3 times daily.)       Lidocaine (LIDOCARE) 4 % Patch Place 2 patches over 12 hours onto the skin every 24 hours. To prevent lidocaine toxicity, patient should be patch free for 12 hrs daily. (Patient taking differently: Place 2 patches onto the skin daily as needed for moderate pain. To prevent lidocaine toxicity, patient should be patch free for 12 hrs daily.)   Unknown    nicotine polacrilex (NICORETTE) 4 MG gum Place 1 each (4 mg) inside cheek every hour as needed for nicotine withdrawal symptoms.   Unknown    ondansetron (ZOFRAN ODT) 4 MG ODT tab Take 1 tablet (4 mg) by mouth every 8 hours as needed for vomiting or nausea.   Unknown          Review of Systems    The 10 point Review of Systems is negative other than noted in the HPI or here.     Social History   I have reviewed this patient's social history and updated it with pertinent information if needed.  Social History     Tobacco Use    Smoking status: Every Day     Current packs/day: 0.50     Types: Cigarettes    Smokeless tobacco: Never    Tobacco comments:     Starting Chantix October 2014   Substance Use Topics    Alcohol use: Yes     Comment: 5L every 2 days    Drug use: No     Comment: stopped 1986         Family History   I have reviewed this patient's family history and updated it with pertinent information if needed.  Family History   Problem Relation Age of Onset    Anxiety Disorder Mother     Asthma Mother     Alcohol/Drug Father     Prostate Cancer Father     Arthritis Father     Alcohol/Drug Brother     Alcohol/Drug Brother     Hypertension Brother     Alcohol/Drug Brother     Depression Brother     Psychotic Disorder Brother          Allergies   Allergies  "  Allergen Reactions    Bee Venom Swelling    Wasps [Hornets] Swelling    Amlodipine      Nightmares    Antihistamines, Chlorpheniramine-Type [Antihistamines, Chlorpheniramine-Type]     Clonidine     Compazine      Lock jaw    Diphenhydramine Muscle Pain (Myalgia) and Cramps    Hydroxyzine Cramps     Lock jaw    Metoclopramide      Tardive Dyskinesia    Penicillins      Panic attack    Prednisone Anxiety     Panic attacks.      Prochlorperazine Muscle Pain (Myalgia) and Cramps    Trazodone Nausea and Vomiting and Confusion     \"I ended up falling and feeling like I was drunk\"    Umeclidinium Bromide      Mood swings + anger.        Physical Exam   Vital Signs: Temp: 98.4  F (36.9  C) Temp src: Oral BP: (!) 154/84 Pulse: 69   Resp: 16 SpO2: 94 % O2 Device: None (Room air)    Weight: 135 lbs 2.27 oz  GEN: In NAD  HEENT: NCAT; PERRL; sclerae non-icteric  LUNGS: CTAB  CV: RRR  ABD: +BSs; SNTND  EXT: No BLE edema  SKIN: No acute rashes noted on exposed areas.  NEURO: AAOx3; CNs grossly intact; No acute focal deficits noted.        Medical Decision Making       35 MINUTES SPENT BY ME on the date of service doing chart review, history, exam, documentation & further activities per the note.      Data   CMP  Recent Labs   Lab 04/05/25  0809 04/05/25  0439   * 129*   POTASSIUM 5.3 3.7   CHLORIDE 96* 89*   CO2 22 20*   ANIONGAP 14 20*   GLC 84 90   BUN 5.6* 5.5*   CR 0.49* 0.45*   GFRESTIMATED >90 >90   ELVIRA 8.4* 8.8   MAG 2.4* 1.4*   PROTTOTAL  --  7.8   ALBUMIN  --  4.5   BILITOTAL  --  0.4   ALKPHOS  --  52   AST  --  46*   ALT  --  33     CBC  Recent Labs   Lab 04/05/25  0439   WBC 6.2   RBC 4.03   HGB 14.2   HCT 37.9   MCV 94   MCH 35.2*   MCHC 37.5*   RDW 12.4        INRNo lab results found in last 7 days.  Arterial Blood GasNo lab results found in last 7 days.   "

## 2025-04-05 NOTE — PHARMACY-ADMISSION MEDICATION HISTORY
Pharmacist Admission Medication History    Admission medication history is complete. The information provided in this note is only as accurate as the sources available at the time of the update.    Medication reconciliation/reorder completed by provider prior to medication history? No    Information Source(s): Patient and Patient's pharmacy via in-person    Pertinent Information: Discussed medication history with patient and updated list accordingly. Patient reported recently increasing gabapentin from 900mg three times daily to 1200mg three times daily with outpatient provider.    Changes made to PTA medication list:  Added: None  Deleted: folic acid, levalbuterol  Changed: Gabapentin from 900mg TO 1200mg    Allergies reviewed with patient and updates made in EHR: yes    Medication History Completed By: LEXUS OGDEN RPH 4/5/2025 11:24 AM    Prior to Admission medications    Medication Sig Last Dose Taking? Auth Provider Long Term End Date   eszopiclone (LUNESTA) 3 MG tablet Take 1 tablet (3 mg) by mouth at bedtime. 4/4/2025 Yes Lenora Elizalde MD No    levothyroxine (SYNTHROID/LEVOTHROID) 100 MCG tablet Take 1 tablet (100 mcg) by mouth every morning. 4/4/2025 Yes Lenora Elizalde MD Yes    melatonin 5 MG tablet Take 10 mg by mouth at bedtime Takes 1 hour prior to Lunesta 4/4/2025 Yes Unknown, Entered By History     multivitamin w/minerals (THERA-VIT-M) tablet Take 1 tablet by mouth daily. 4/4/2025 Yes Lenora Elizalde MD     omeprazole (PRILOSEC) 40 MG DR capsule Take 1 capsule (40 mg) by mouth every morning. 4/4/2025 Yes Lenora Elizalde MD     propranolol (INDERAL) 20 MG tablet Take 1 tablet (20 mg) by mouth 3 times daily. 4/4/2025 Yes Lenora Elizalde MD Yes    thiamine (B-1) 100 MG tablet Take 1 tablet (100 mg) by mouth daily. 4/4/2025 Yes Lenora Elizalde MD     gabapentin (NEURONTIN) 600 MG tablet Take 1.5 tablets (900 mg) by mouth 3 times daily.  Patient taking  differently: Take 1,200 mg by mouth 3 times daily.   Lenora Elizalde MD Yes    Lidocaine (LIDOCARE) 4 % Patch Place 2 patches over 12 hours onto the skin every 24 hours. To prevent lidocaine toxicity, patient should be patch free for 12 hrs daily.  Patient taking differently: Place 2 patches onto the skin daily as needed for moderate pain. To prevent lidocaine toxicity, patient should be patch free for 12 hrs daily. Unknown  Lenora Elizalde MD     nicotine polacrilex (NICORETTE) 4 MG gum Place 1 each (4 mg) inside cheek every hour as needed for nicotine withdrawal symptoms. Unknown  Lenora Elizalde MD     ondansetron (ZOFRAN ODT) 4 MG ODT tab Take 1 tablet (4 mg) by mouth every 8 hours as needed for vomiting or nausea. Unknown  Lenora Elizalde MD No

## 2025-04-05 NOTE — PROGRESS NOTES
04/05/25 1445   Patient Belongings   Did you bring any home meds/supplements to the hospital?  No   Patient Belongings other (see comments)   Belongings Search Yes   Clothing Search Yes   Second Staff Kolby SAHA     Coat, backpack, pants w/strings in storage  Cell, wallet,  cord/no plug in med room  [place] in [facility], EBT, [place] ID, 2 medical, $1.00 in security  A               Admission:  I am responsible for any personal items that are not sent to the safe or pharmacy.  [facility] is not responsible for loss, theft or damage of any property in my possession.    Signature:  _________________________________ Date: _______  Time: _____                                              Staff Signature:  ____________________________ Date: ________  Time: _____      2nd Staff person, if patient is unable/unwilling to sign:    Signature: ________________________________ Date: ________  Time: _____     Discharge:  [facility] has returned all of my personal belongings:    Signature: _________________________________ Date: ________  Time: _____                                          Staff Signature:  ____________________________ Date: ________  Time: _____

## 2025-04-05 NOTE — CONSULTS
Brief Psychiatry Note   Chart and labs reviewed. Patient meets criteria for 3A detox admission to detox from [facility] emergency room. Orders for admission placed.     Most Recent 2 LFT's:  Recent Labs   Lab Test 04/05/25  0439 02/19/25  1134   AST 46* 58*   ALT 33 43   ALKPHOS 52 65   BILITOTAL 0.4 0.3       BILL Hoover CNP

## 2025-04-05 NOTE — H&P
Reason for admission:     Patient was admitted due to      HPI:     64 male    Patient reports that she first started drinking age 18. She states that her drinking became problematic in her 40's. Since then she has been in treatment around 6 times and in detox about 12 times. Her most recent detox was on this unit in February. From there she went to [email or web address] for outpatient CD treatment. She reports that she relapsed shortly after that and did not complete that program.     Patient has been drinking daily for the past several weeks. She drinks 1-2 liters of wine per day.     Patient has a history of blackouts and has alcohol related seizures, but denies recent seizures. Patient has no history of DUI.    Patient reports that she is diagnosed with bipolar 2 disorder and PTSD, as well as anxiety and panic. She has no prior psychiatric admissions. She is currently on a regimen of Gabapentin, Propanolol for anxiety.      According to ER report:  HPI  Jolynn Rivera is a 64 year old female who presents with altered mental status.  Patient endorsed alcohol use this past day.  Patient also reporting feeling anxious and nauseous.  She reports that she usually drinks a 5 L box of wine every 2 days.  Patient concerned she is having withdrawal.  She does desire detox admission.     Additional history obtained from EMS given difficulty of complete history from the patient due to mental status change.  EMS reported bringing the patient in from home, noted that patient reported having too many drinks, gave 4 mg ondansetron, glucose of 164.     Patient arriving with altered mental status, with reason to suspect alcohol or other drug intoxication as etiology. Exam without findings suggestive of trauma, non-focal. Breath EtOH 0.154. Glucose normal per EMS report. Nursing notes reviewed.      AMS likely due to intoxication delirium but cannot immediately rule out other dangerous etiologies of AMS. Plan close clinical  monitoring of the patient and her mental status for clearing of intoxicating substance. With approriate clearing, the patient would likely be able to be discharged. If not appropriately clearing, plan broadening of work-up, potentially including CT head and serum labs.      With monitoring, mental status cleared appropriately.  Clinical picture consistent with mental status change secondary to alcohol intoxication.  Patient did develop alcohol withdrawal symptoms, was treated with diazepam via [facility] withdrawal protocol.     Labs notable for mildly low magnesium at 1.4, somewhat low sodium at 129, mildly elevated anion gap at 20.  NS bolus and magnesium supplementation given.  Redraw chemistry panel and magnesium level pending.     With period of monitoring, the patient continues to clear appropriately but is not yet clinically sober at this time. Patient admitted to Detox and signed out to Behavioral Health Intake.      Patient signed out to oncoming ED provider with plan to follow-up repeat magnesium and BMP, notify behavioral health intake of medical clearance after lab results.    Bertin Sirera MD   Emergency Medicine   [facility] EMERGENCY DEPARTMENT  4/5/2025          Past Psychiatric History:     As above        Substance Use and History:     Denies drug use, but  used heroine 40 years ago.        Past Medical History:   PAST MEDICAL HISTORY:   Past Medical History:   Diagnosis Date    Anxiety     COPD (chronic obstructive pulmonary disease) (H)     COPD (chronic obstructive pulmonary disease) (H)     Hepatitis C     PTSD (post-traumatic stress disorder)     Seizures (H)     second to ETOH    Substance abuse (H)        PAST SURGICAL HISTORY:   Past Surgical History:   Procedure Laterality Date    LAPAROSCOPIC TUBAL LIGATION      ORTHOPEDIC SURGERY      Left knee    TONSILLECTOMY               Family History:   FAMILY HISTORY:   Family History   Problem Relation Age of Onset    Anxiety Disorder  Mother     Asthma Mother     Alcohol/Drug Father     Prostate Cancer Father     Arthritis Father     Alcohol/Drug Brother     Alcohol/Drug Brother     Hypertension Brother     Alcohol/Drug Brother     Depression Brother     Psychotic Disorder Brother            Social History:   Please see the full psychosocial profile from the clinical treatment coordinator.   SOCIAL HISTORY:   Social History     Tobacco Use    Smoking status: Every Day     Current packs/day: 0.50     Types: Cigarettes    Smokeless tobacco: Never    Tobacco comments:     Starting Chantix October 2014   Substance Use Topics    Alcohol use: Yes     Comment: 5L every 2 days     Patient lives alone. She is on disability.  She has no children or current romantic involvement.          PTA Medications:     Medications Prior to Admission   Medication Sig Dispense Refill Last Dose/Taking    eszopiclone (LUNESTA) 3 MG tablet Take 1 tablet (3 mg) by mouth at bedtime.   4/4/2025    levothyroxine (SYNTHROID/LEVOTHROID) 100 MCG tablet Take 1 tablet (100 mcg) by mouth every morning.   4/4/2025    melatonin 5 MG tablet Take 10 mg by mouth at bedtime Takes 1 hour prior to Lunesta   4/4/2025    multivitamin w/minerals (THERA-VIT-M) tablet Take 1 tablet by mouth daily.   4/4/2025    omeprazole (PRILOSEC) 40 MG DR capsule Take 1 capsule (40 mg) by mouth every morning.   4/4/2025    propranolol (INDERAL) 20 MG tablet Take 1 tablet (20 mg) by mouth 3 times daily.   4/4/2025    thiamine (B-1) 100 MG tablet Take 1 tablet (100 mg) by mouth daily.   4/4/2025    gabapentin (NEURONTIN) 600 MG tablet Take 1.5 tablets (900 mg) by mouth 3 times daily. (Patient taking differently: Take 1,200 mg by mouth 3 times daily.)       Lidocaine (LIDOCARE) 4 % Patch Place 2 patches over 12 hours onto the skin every 24 hours. To prevent lidocaine toxicity, patient should be patch free for 12 hrs daily. (Patient taking differently: Place 2 patches onto the skin daily as needed for moderate  pain. To prevent lidocaine toxicity, patient should be patch free for 12 hrs daily.)   Unknown    nicotine polacrilex (NICORETTE) 4 MG gum Place 1 each (4 mg) inside cheek every hour as needed for nicotine withdrawal symptoms.   Unknown    ondansetron (ZOFRAN ODT) 4 MG ODT tab Take 1 tablet (4 mg) by mouth every 8 hours as needed for vomiting or nausea.   Unknown            Current Medications:     Current Facility-Administered Medications   Medication Dose Route Frequency Provider Last Rate Last Admin    folic acid (FOLVITE) tablet 1 mg  1 mg Oral Daily Scott Alex MD   1 mg at 04/05/25 0757    gabapentin (NEURONTIN) half-tab 900 mg  900 mg Oral TID Scott Alex MD   900 mg at 04/05/25 0618    levothyroxine (SYNTHROID/LEVOTHROID) tablet 100 mcg  100 mcg Oral Bertin Rubio MD        multivitamin w/minerals (THERA-VIT-M) tablet 1 tablet  1 tablet Oral Daily Scott Alex MD   1 tablet at 04/05/25 0756    omeprazole (PriLOSEC) CR capsule 40 mg  40 mg Oral Bertin Rubio MD   40 mg at 04/05/25 0756    propranolol (INDERAL) tablet 20 mg  20 mg Oral TID Scott Alex MD   20 mg at 04/05/25 0618    thiamine (B-1) tablet 100 mg  100 mg Oral Daily Scott Alex MD   100 mg at 04/05/25 0756     Current Facility-Administered Medications   Medication Dose Route Frequency Provider Last Rate Last Admin    acetaminophen (TYLENOL) tablet 650 mg  650 mg Oral Q4H PRN Talat Zavaleta APRN CNP        alum & mag hydroxide-simethicone (MAALOX) suspension 30 mL  30 mL Oral Q4H PRN Talat Zavaleta APRN CNP        atenolol (TENORMIN) tablet 50 mg  50 mg Oral Daily PRN Talat Zavaleta APRN CNP        diazepam (VALIUM) tablet 5-20 mg  5-20 mg Oral Q30 Min PRN Talat Zavaleta APRN CNP        hydrOXYzine HCl (ATARAX) tablet 25 mg  25 mg Oral Q4H PRN Talat Zavaleta APRN CNP        loperamide (IMODIUM) capsule 2 mg  2 mg Oral 4x Daily PRN Talat Zavaleta, APRN CNP         "melatonin tablet 3 mg  3 mg Oral At Bedtime PRN Talat Zavaleta, BILL DEAN        nicotine polacrilex (NICORETTE) gum 4 mg  4 mg Buccal Q1H PRN Bertin Sierra MD   4 mg at 04/05/25 1034    ondansetron (ZOFRAN ODT) ODT tab 4 mg  4 mg Oral Q6H PRN Talat Zavaleta, BILL DEAN        senna-docusate (SENOKOT-S/PERICOLACE) 8.6-50 MG per tablet 1 tablet  1 tablet Oral BID PRN Talat Zavaleta, APRN CNP                Allergies:     Allergies   Allergen Reactions    Bee Venom Swelling    Wasps [Hornets] Swelling    Amlodipine      Nightmares    Antihistamines, Chlorpheniramine-Type [Antihistamines, Chlorpheniramine-Type]     Clonidine     Compazine      Lock jaw    Diphenhydramine Muscle Pain (Myalgia) and Cramps    Hydroxyzine Cramps     Lock jaw    Metoclopramide      Tardive Dyskinesia    Penicillins      Panic attack    Prednisone Anxiety     Panic attacks.      Prochlorperazine Muscle Pain (Myalgia) and Cramps    Trazodone Nausea and Vomiting and Confusion     \"I ended up falling and feeling like I was drunk\"    Umeclidinium Bromide      Mood swings + anger.          Labs:     Recent Results (from the past 72 hours)   Alcohol breath test POCT    Collection Time: 04/05/25  3:23 AM   Result Value Ref Range    Alcohol Breath Test 0.154 (A) 0.00 - 0.01   Comprehensive metabolic panel    Collection Time: 04/05/25  4:39 AM   Result Value Ref Range    Sodium 129 (L) 135 - 145 mmol/L    Potassium 3.7 3.4 - 5.3 mmol/L    Carbon Dioxide (CO2) 20 (L) 22 - 29 mmol/L    Anion Gap 20 (H) 7 - 15 mmol/L    Urea Nitrogen 5.5 (L) 8.0 - 23.0 mg/dL    Creatinine 0.45 (L) 0.51 - 0.95 mg/dL    GFR Estimate >90 >60 mL/min/1.73m2    Calcium 8.8 8.8 - 10.4 mg/dL    Chloride 89 (L) 98 - 107 mmol/L    Glucose 90 70 - 99 mg/dL    Alkaline Phosphatase 52 40 - 150 U/L    AST 46 (H) 0 - 45 U/L    ALT 33 0 - 50 U/L    Protein Total 7.8 6.4 - 8.3 g/dL    Albumin 4.5 3.5 - 5.2 g/dL    Bilirubin Total 0.4 <=1.2 mg/dL   Magnesium    Collection Time: " 04/05/25  4:39 AM   Result Value Ref Range    Magnesium 1.4 (L) 1.7 - 2.3 mg/dL   CBC with platelets and differential    Collection Time: 04/05/25  4:39 AM   Result Value Ref Range    WBC Count 6.2 4.0 - 11.0 10e3/uL    RBC Count 4.03 3.80 - 5.20 10e6/uL    Hemoglobin 14.2 11.7 - 15.7 g/dL    Hematocrit 37.9 35.0 - 47.0 %    MCV 94 78 - 100 fL    MCH 35.2 (H) 26.5 - 33.0 pg    MCHC 37.5 (H) 31.5 - 36.5 g/dL    RDW 12.4 10.0 - 15.0 %    Platelet Count 226 150 - 450 10e3/uL    % Neutrophils 54 %    % Lymphocytes 33 %    % Monocytes 13 %    % Eosinophils 0 %    % Basophils 1 %    % Immature Granulocytes 0 %    NRBCs per 100 WBC 0 <1 /100    Absolute Neutrophils 3.4 1.6 - 8.3 10e3/uL    Absolute Lymphocytes 2.0 0.8 - 5.3 10e3/uL    Absolute Monocytes 0.8 0.0 - 1.3 10e3/uL    Absolute Eosinophils 0.0 0.0 - 0.7 10e3/uL    Absolute Basophils 0.0 0.0 - 0.2 10e3/uL    Absolute Immature Granulocytes 0.0 <=0.4 10e3/uL    Absolute NRBCs 0.0 10e3/uL   Urine Drug Screen Panel    Collection Time: 04/05/25  5:03 AM   Result Value Ref Range    Amphetamines Urine Screen Negative Screen Negative    Barbituates Urine Screen Negative Screen Negative    Benzodiazepine Urine Screen Negative Screen Negative    Cannabinoids Urine Screen Negative Screen Negative    Cocaine Urine Screen Negative Screen Negative    Fentanyl Qual Urine Screen Negative Screen Negative    Opiates Urine Screen Negative Screen Negative    PCP Urine Screen Negative Screen Negative   Basic metabolic panel    Collection Time: 04/05/25  8:09 AM   Result Value Ref Range    Sodium 132 (L) 135 - 145 mmol/L    Potassium 5.3 3.4 - 5.3 mmol/L    Chloride 96 (L) 98 - 107 mmol/L    Carbon Dioxide (CO2) 22 22 - 29 mmol/L    Anion Gap 14 7 - 15 mmol/L    Urea Nitrogen 5.6 (L) 8.0 - 23.0 mg/dL    Creatinine 0.49 (L) 0.51 - 0.95 mg/dL    GFR Estimate >90 >60 mL/min/1.73m2    Calcium 8.4 (L) 8.8 - 10.4 mg/dL    Glucose 84 70 - 99 mg/dL   Magnesium    Collection Time: 04/05/25  8:09  "AM   Result Value Ref Range    Magnesium 2.4 (H) 1.7 - 2.3 mg/dL          Physical Exam:     BP (!) 154/84 (Patient Position: Chair, Cuff Size: Adult Regular)   Pulse 69   Temp 98.4  F (36.9  C) (Oral)   Resp 16   Ht 1.6 m (5' 3\")   Wt 61.3 kg (135 lb 2.3 oz)   SpO2 94%   BMI 23.94 kg/m    Weight is 135 lbs 2.27 oz  Body mass index is 23.94 kg/m .    Physical Exam:  Gen: No acute distress  Skin: No diaphoresis or rash  Neuro: No abnormal movements         Physical ROS:   The remainder of 10-point review of systems was negative except as noted in HPI.             Mental Status Exam:     Mental Status  Patient is casually dressed  Hygiene good  Speech fluent  Thought Process logical  Thought Content:  No suicidal ideation,    No homicidal ideation,   No ideas of reference,    No loose associations,    No auditory hallucinations,     No visual hallucinations   No delusions  Psychomotor: No agitation or slowing  Cognition:  Alert and oriented to time place and person  Attention good  Concentration good  Memory normal including recent and remote memory  Mood:  mild depression  Affect: mood congruent  Judgement: normal  Eye contact good  Cooperation good  Language normal  Fund of knowledge normal  Musculoskeletal normal gait with no abnormal movements         Diagnoses:   Alcohol withdrawal syndrome without complication (H)    Patient Active Problem List   Diagnosis    Polysubstance abuse (H)    Chronic hepatitis C (H)    COPD (chronic obstructive pulmonary disease) (H)    GERD (gastroesophageal reflux disease)    Mood disorder    Chemical dependency (H)    Posttraumatic stress disorder    Nicotine abuse    Alcohol use disorder, mild, abuse    Hx of psychiatric care    Alcohol withdrawal, uncomplicated (H)    Sinusitis, unspecified chronicity, unspecified location    Alcohol withdrawal syndrome without complication (H)    Elevated LFTs    Drug withdrawal seizure with complication (H)    Alcohol dependence with " withdrawal with complication (H)    Alcoholic ketoacidosis    Alcohol use disorder    Vomiting and diarrhea    Seizure (H)    Alcoholic intoxication with complication    Acute alcoholic intoxication in alcoholism with complication (H)    Hypoxia    Elevated troponin    Acne rosacea    Acquired hypothyroidism    Alcohol abuse, in remission    Atelectasis of right lung    Bee sting allergy    Bipolar 1 disorder, manic, moderate (H)    Chronic hip pain, right    History of hepatitis C    Left knee pain    Onychomycosis    Osteoarthritis of multiple joints    Primary hypertension    Psychophysiological insomnia    Tear of lateral meniscus of right knee    Tobacco use disorder    Alcohol use disorder, severe, dependence (H)    Tobacco dependence    Bipolar disorder, most recent episode depressed (H)    Acute hypoxic respiratory failure (H)    Chronic obstructive pulmonary disease (H)    COPD with acute exacerbation (H)    Gastroesophageal reflux disease without esophagitis    Generalized anxiety disorder    PTSD (post-traumatic stress disorder)    Lactic acidosis    Alcohol withdrawal seizure with complication (H)    Alcohol abuse with withdrawal (H)    Suicidal ideation    Alcohol intoxication with delirium    History of alcohol withdrawal delirium    History of seizure due to alcohol withdrawal    Hypomagnesemia    Hyponatremia    Alcohol dependence with intoxication delirium (H)              Assessment:              Plan:     Medication:  Continue Gabapentin    Valium detox protocols    Consults: Hospitalist will be consulted if medical issues arise      Multidisciplinary Interventions:  to gather collateral information, coordinate care with outpatient providers and begin follow up planning      Disposition:Encourage outpatient CD treatment         More than 40 minutes spent on this visit with more than 50% time spent on coordination of care with staff, reviewing medical record, psychoeducation,  providing supportive therapy regarding coping with chronic mental illness, entering orders and preparing documentation for the visit    Scott Alex MD

## 2025-04-06 LAB
ANION GAP SERPL CALCULATED.3IONS-SCNC: 14 MMOL/L (ref 7–15)
BUN SERPL-MCNC: 12.8 MG/DL (ref 8–23)
CALCIUM SERPL-MCNC: 9.1 MG/DL (ref 8.8–10.4)
CHLORIDE SERPL-SCNC: 100 MMOL/L (ref 98–107)
CHOLEST SERPL-MCNC: 162 MG/DL
CK SERPL-CCNC: 387 U/L (ref 26–192)
CREAT SERPL-MCNC: 0.62 MG/DL (ref 0.51–0.95)
EGFRCR SERPLBLD CKD-EPI 2021: >90 ML/MIN/1.73M2
GLUCOSE SERPL-MCNC: 100 MG/DL (ref 70–99)
HCO3 SERPL-SCNC: 21 MMOL/L (ref 22–29)
HDLC SERPL-MCNC: 101 MG/DL
HOLD SPECIMEN: NORMAL
LDLC SERPL CALC-MCNC: 50 MG/DL
NONHDLC SERPL-MCNC: 61 MG/DL
POTASSIUM SERPL-SCNC: 3.8 MMOL/L (ref 3.4–5.3)
SODIUM SERPL-SCNC: 135 MMOL/L (ref 135–145)
TRIGL SERPL-MCNC: 57 MG/DL
TSH SERPL DL<=0.005 MIU/L-ACNC: 1.78 UIU/ML (ref 0.3–4.2)

## 2025-04-06 PROCEDURE — H2032 ACTIVITY THERAPY, PER 15 MIN: HCPCS

## 2025-04-06 PROCEDURE — 84132 ASSAY OF SERUM POTASSIUM: CPT | Performed by: PHYSICIAN ASSISTANT

## 2025-04-06 PROCEDURE — 250N000013 HC RX MED GY IP 250 OP 250 PS 637: Performed by: PSYCHIATRY & NEUROLOGY

## 2025-04-06 PROCEDURE — 84443 ASSAY THYROID STIM HORMONE: CPT

## 2025-04-06 PROCEDURE — 128N000004 HC R&B CD ADULT

## 2025-04-06 PROCEDURE — 80048 BASIC METABOLIC PNL TOTAL CA: CPT | Performed by: PHYSICIAN ASSISTANT

## 2025-04-06 PROCEDURE — 250N000013 HC RX MED GY IP 250 OP 250 PS 637

## 2025-04-06 PROCEDURE — 82550 ASSAY OF CK (CPK): CPT

## 2025-04-06 PROCEDURE — 82465 ASSAY BLD/SERUM CHOLESTEROL: CPT

## 2025-04-06 PROCEDURE — 97150 GROUP THERAPEUTIC PROCEDURES: CPT | Mod: GO

## 2025-04-06 PROCEDURE — 36415 COLL VENOUS BLD VENIPUNCTURE: CPT | Performed by: PHYSICIAN ASSISTANT

## 2025-04-06 PROCEDURE — 250N000013 HC RX MED GY IP 250 OP 250 PS 637: Performed by: EMERGENCY MEDICINE

## 2025-04-06 RX ORDER — GABAPENTIN 600 MG/1
1200 TABLET ORAL 3 TIMES DAILY
Status: DISCONTINUED | OUTPATIENT
Start: 2025-04-06 | End: 2025-04-07 | Stop reason: HOSPADM

## 2025-04-06 RX ADMIN — PROPRANOLOL HYDROCHLORIDE 20 MG: 20 TABLET ORAL at 20:13

## 2025-04-06 RX ADMIN — GABAPENTIN 1200 MG: 600 TABLET, FILM COATED ORAL at 20:13

## 2025-04-06 RX ADMIN — NICOTINE POLACRILEX 4 MG: 4 GUM, CHEWING BUCCAL at 06:42

## 2025-04-06 RX ADMIN — NICOTINE POLACRILEX 4 MG: 4 GUM, CHEWING BUCCAL at 16:39

## 2025-04-06 RX ADMIN — NICOTINE POLACRILEX 4 MG: 4 GUM, CHEWING BUCCAL at 10:41

## 2025-04-06 RX ADMIN — NICOTINE POLACRILEX 4 MG: 4 GUM, CHEWING BUCCAL at 14:37

## 2025-04-06 RX ADMIN — NICOTINE POLACRILEX 4 MG: 4 GUM, CHEWING BUCCAL at 17:58

## 2025-04-06 RX ADMIN — NICOTINE POLACRILEX 4 MG: 4 GUM, CHEWING BUCCAL at 09:26

## 2025-04-06 RX ADMIN — NICOTINE POLACRILEX 4 MG: 4 GUM, CHEWING BUCCAL at 20:15

## 2025-04-06 RX ADMIN — LEVOTHYROXINE SODIUM 100 MCG: 100 TABLET ORAL at 08:00

## 2025-04-06 RX ADMIN — NICOTINE POLACRILEX 4 MG: 4 GUM, CHEWING BUCCAL at 05:24

## 2025-04-06 RX ADMIN — ACETAMINOPHEN 650 MG: 325 TABLET, FILM COATED ORAL at 22:37

## 2025-04-06 RX ADMIN — NICOTINE POLACRILEX 4 MG: 4 GUM, CHEWING BUCCAL at 22:37

## 2025-04-06 RX ADMIN — FOLIC ACID 1 MG: 1 TABLET ORAL at 08:00

## 2025-04-06 RX ADMIN — NICOTINE POLACRILEX 4 MG: 4 GUM, CHEWING BUCCAL at 13:22

## 2025-04-06 RX ADMIN — GABAPENTIN 1200 MG: 600 TABLET, FILM COATED ORAL at 14:34

## 2025-04-06 RX ADMIN — NICOTINE POLACRILEX 4 MG: 4 GUM, CHEWING BUCCAL at 21:37

## 2025-04-06 RX ADMIN — OMEPRAZOLE 40 MG: 20 CAPSULE, DELAYED RELEASE ORAL at 08:01

## 2025-04-06 RX ADMIN — GABAPENTIN 900 MG: 600 TABLET, FILM COATED ORAL at 08:00

## 2025-04-06 RX ADMIN — Medication 1 TABLET: at 08:01

## 2025-04-06 RX ADMIN — NICOTINE POLACRILEX 4 MG: 4 GUM, CHEWING BUCCAL at 18:58

## 2025-04-06 RX ADMIN — Medication 3 MG: at 20:15

## 2025-04-06 RX ADMIN — PROPRANOLOL HYDROCHLORIDE 20 MG: 20 TABLET ORAL at 08:01

## 2025-04-06 RX ADMIN — NICOTINE POLACRILEX 4 MG: 4 GUM, CHEWING BUCCAL at 12:15

## 2025-04-06 RX ADMIN — THIAMINE HCL TAB 100 MG 100 MG: 100 TAB at 08:01

## 2025-04-06 RX ADMIN — NICOTINE POLACRILEX 4 MG: 4 GUM, CHEWING BUCCAL at 08:01

## 2025-04-06 RX ADMIN — PROPRANOLOL HYDROCHLORIDE 20 MG: 20 TABLET ORAL at 14:36

## 2025-04-06 ASSESSMENT — ACTIVITIES OF DAILY LIVING (ADL)
ADLS_ACUITY_SCORE: 45
DRESS: SCRUBS (BEHAVIORAL HEALTH)
ADLS_ACUITY_SCORE: 45
ADLS_ACUITY_SCORE: 45
LAUNDRY: WITH SUPERVISION
ADLS_ACUITY_SCORE: 45
ORAL_HYGIENE: INDEPENDENT
ADLS_ACUITY_SCORE: 45
HYGIENE/GROOMING: INDEPENDENT
HYGIENE/GROOMING: INDEPENDENT
ADLS_ACUITY_SCORE: 45

## 2025-04-06 NOTE — PROGRESS NOTES
[address]     Daily Encounter: Met with team, discussed patient progress, discharge plan and any impediments to discharge.    Writer met with patient on this date to discuss discharge planning and after care plans, patient stated she would like to attend IOP for her mental health at . Patient stated she has been in contact with them and plans to schedule an intake date when she is back from her vacation. At this time patient is declining all case management resources and services.     Insurance: Medicare      Legal Status:  Voluntary    County: N/A  File Number: N/A  Start and expiration date of commitment: N/A    SUDs Assessment Status: None is needed at this time.     ROIs on file: None     Living Situation: Patient reports she lives in a home and reports the home is stable.      Current Providers, Supports & Collateral:  None    Current Plan/Referral Status: [facility]     Safety Plan Status: safety plan was printed out and copy given to the patient      BRITTNEE ERICKSON  [address] - Adult Inpatient Addiction Psychiatry Unit

## 2025-04-06 NOTE — PLAN OF CARE
Behavioral Team Discussion: (4/6/2025)    Continued Stay Criteria/Rationale: Patient admitted for  Alcohol Use Disorder.  Plan: The following services will be provided to the patient; psychiatric assessment, medication management, therapeutic milieu, individual and group support, and skills groups.   Participants: 3A Provider: Darren Fermin; 3A RN: Rey Cardozo; 3A LADC: Marika Bhandari MS, [facility]   Summary/Recommendation: Providers will assess today for treatment recommendations, discharge planning, and aftercare plans.  LADC will meet with pt for discharge planning.   Medical/Physical: Patient denies any medical or physical concerns at this time.  Precautions:   Behavioral Orders   Procedures    Code 1 - Restrict to Unit    Routine Programming     As clinically indicated    Status 15     Every 15 minutes.    Withdrawal precautions     Rationale for change in precautions or plan: N/A  Progress: Initial.    ASAM Dimension Scale Ratings:  Dimension 1: 3 Client tolerates and rahul with withdrawal discomfort poorly. Client has severe intoxication, such that the client endangers self or others, or intoxication has not abated with less intensive levels of services. Client displays severe signs and symptoms; or risk of severe, but manageable withdrawal; or withdrawal worsening despite detox at less intensive level.  Dimension 2: 1 Client tolerates and rahul with physical discomfort and is able to get the services that the client needs.  Dimension 3: 2 Client has difficulty with impulse control and lacks coping skills. Client has thoughts of suicide or harm to others without means; however, the thoughts may interfere with participation in some treatment activities. Client has difficulty functioning in significant life areas. Client has moderate symptoms of emotional, behavioral, or cognitive problems. Client is able to participate in most treatment activities.  Dimension 4: 3 Client displays inconsistent compliance, minimal  awareness of either the client's addiction or mental disorder, and is minimally cooperative.  Dimension 5: 3 Client has poor recognition and understanding of relapse and recidivism issues and displays moderately high vulnerability for further substance use or mental health problems. Client has few coping skills and rarely applies coping skills.  Dimension 6: 3 Client is not engaged in structured, meaningful activity and the client's peers, family, significant other, and living environment are unsupportive, or there is significant criminal justice system involvement.

## 2025-04-06 NOTE — DISCHARGE INSTRUCTIONS
Behavioral Discharge Planning and Instructions  THANK YOU FOR CHOOSING [address]  [phone]    Summary: You were admitted to [facility] on 4/5/25 for detoxification from alcohol.  A medical exam was performed that included lab work. You have met with a [facility] Counselor and opted to access treatment resources for chemical health independently at this time. Please take care and make your recovery a daily priority, Jolynn!  It was a pleasure working with you and the entire treatment team here wishes you the very best in your recovery!     Recommendation:  It is recommended that you abstain from alcohol and other mood-altering chemicals. You have reported scheduling substance use treatment services independently at this time.    Main Diagnoses:          Patient Active Problem List   Diagnosis    Polysubstance abuse (H)    Chronic hepatitis C (H)    COPD (chronic obstructive pulmonary disease) (H)    GERD (gastroesophageal reflux disease)    Mood disorder    Chemical dependency (H)    Posttraumatic stress disorder    Nicotine abuse    Alcohol use disorder, mild, abuse    Hx of psychiatric care    Alcohol withdrawal, uncomplicated (H)    Sinusitis, unspecified chronicity, unspecified location    Alcohol withdrawal syndrome without complication (H)    Elevated LFTs    Drug withdrawal seizure with complication (H)    Alcohol dependence with withdrawal with complication (H)    Alcoholic ketoacidosis    Alcohol use disorder    Vomiting and diarrhea    Seizure (H)    Alcoholic intoxication with complication    Acute alcoholic intoxication in alcoholism with complication (H)    Hypoxia    Elevated troponin    Acne rosacea    Acquired hypothyroidism    Alcohol abuse, in remission    Atelectasis of right lung    Bee sting allergy    Bipolar 1 disorder, manic, moderate (H)    Chronic hip pain, right    History of hepatitis C    Left knee pain    Onychomycosis    Osteoarthritis of multiple joints    Primary  hypertension    Psychophysiological insomnia    Tear of lateral meniscus of right knee    Tobacco use disorder    Alcohol use disorder, severe, dependence (H)    Tobacco dependence    Bipolar disorder, most recent episode depressed (H)    Acute hypoxic respiratory failure (H)    Chronic obstructive pulmonary disease (H)    COPD with acute exacerbation (H)    Gastroesophageal reflux disease without esophagitis    Generalized anxiety disorder    PTSD (post-traumatic stress disorder)    Lactic acidosis    Alcohol withdrawal seizure with complication (H)    Alcohol abuse with withdrawal (H)    Suicidal ideation    Alcohol intoxication with delirium    History of alcohol withdrawal delirium    History of seizure due to alcohol withdrawal    Hypomagnesemia    Hyponatremia    Alcohol dependence with intoxication delirium (H)           Major Treatments, Procedures and Findings:  You were treated for alcohol detoxification using Valium . You have met with a [facility] counselor to develop a treatment plan for discharge. You had labs drawn and those results were reviewed with you. Please take a copy of your lab work with you to your next primary care provider appointment.    Symptoms to Report:  If you experience more anxiety, confusion, sleeplessness, deep sadness or thoughts of suicide, notify your treatment team or notify your primary care provider. IF ANY OF THE SYMPTOMS YOU ARE EXPERIENCING ARE A MEDICAL EMERGENCY CALL 911 IMMEDIATELY.     Lifestyle Adjustment: Adjust your lifestyle to get enough sleep, relaxation, exercise and good nutrition. Continue to develop healthy coping skills to decrease stress and promote a sober living environment. Do not use mood altering substances including alcohol, illegal drugs or addictive medications other than what is currently prescribed.     Disposition: Released to home    Facts about COVID19 at www.cdc.gov/COVID19 and www.[place].gov/covid19    Keeping hands clean is one of the most important  steps we can take to avoid getting sick and spreading germs to others.  Please wash your hands frequently and lather with soap for at least 20 seconds!    Follow-up Appointment:   You are aware you should make a follow up appointment with your primary care doctor for medical and medication management    Pt will make a follow up appointment after discharge.  Recovery apps for your phone for educational purposes and to locate in person and zoom recovery meetings  Everything AA -  mahnaz is a great resource  12 Step Toolkit - educational purpose learning about the 12 steps to recovery  [place] Cloud - meeting mahnaz  AA  - meeting mahnaz  Meeting guide - meeting mahnaz  Quick NA meeting - meeting mahnaz  Estela- has various apps    Patient Navigation Hub  [facility] Navigators work to be your point-of-contact for trustworthy and compassionate care from Inpatient services to [facility] Programmatic Care. We will provide resources and communication to help guide you into programmatic care. Ultimately, our goal is to be the one-stop-shop of communication, coordination, and support for your journey to programmatic care.  Phone: [phone]    Resources:  AA/NA meetings have returned to in-person or a hybrid combination of zoom/in-person therefore please check online to verify time.  Need Support Now? If you or someone you know is struggling or in crisis, help is available. Call or text 988 or chat Traxian.org  AA meetings search for them at: https://aa-intergroup.org (worldwide meeting listings)  AA meetings for [place] area can be found online at: https://[place].org (click local online meetings listings)  NA meetings for [place] area can be found online at: https://www.nam[place].org  (click find a meeting)  AA and NA Sponsors are excellent resources for support and you can find one at any support group meeting.   Alcoholics Anonymous (https://aa.org/): for information 24 hours/day  AA Intergroup service  "office in [place] (http://www.aastpaul.org/) 155.194.9637  AA Intergroup service office in [facility]: [phone]. (http://www.a[place].org/)  Narcotics Anonymous (www.nam[place].org) (446) 318-9818  https://aafairviewriverside.org/meetings  SMART Recovery - self management for addiction recovery:  www.smartrecovery.org  Pathways ~ A Health Crisis Resource & Support Center:  893.336.5982.  https://prescribetoprevent.org/patient-education/videos/  http://www.harmreduction.org  Crisis Text Line  Text 867465  You will be connected with a trained live crisis counselor to provide support. Por espanol, texto  JEANNETTE a 562597 o texto a 442-AYUDAME en WhatsApp  [place] Hope Line  1.532.SUICIDE [6718421]  [facility] [phone]  Support Group:  AA/NA and Sponsor/support.  Fast Tracker  Linking people to mental health and substance use disorder resources  Healthiest You.Blackberry   [place] Mental Health Warm Line  Peer to peer support  Monday thru Saturday, 12 pm to 10 pm  686.530.6943 or 7.938.461.9068  Text \"Support\" to 84630  National [place] on [place]ness (MARITZA)  027.066.3005 or 1.888.MARITZA.HELPS  Alcoholics Anonymous (www.alcoholics-anonymous.org): Check your phone book for your local chapter.  Suicide Awareness Voices of Education (SAVE) (www.save.org): 665-524-LRMZ (7283)  National Suicide Prevention Line (www.mentalhealthmn.org): 091-401-IRSF (1146)  Mental Health Consumer/Survivor Network of [place] (www.mhcsn.net): 904.637.8312 or 618-277-5064  Mental Health Association of [place] (www.mentalhealth.org): 696.219.7340 or 453-717-0005   Substance Abuse and Mental Health Services (www.samhsa.gov)  [place] Opioid Prevention Coalition: www.opioidcoalition.org    [place] Recovery Connection (MRC)  [facility] connects people seeking recovery to resources that help foster and sustain long-term recovery.  Whether you are seeking resources for treatment, transportation, housing, job training, " education, health care or other pathways to recovery, [facility] is a great place to start.  527.432.4593.  www.[facility].org    Great Pod casts for nutrition and wellness  Listen on Apple Podcasts  Dishing Up Nutrition   [employer]   Nutrition       Understand the connection between what you eat and how you feel. Hosted by licensed nutritionists and dietitians from [employer] we share practical, real-life solutions for healthier living through nutrition.     General Medication Instructions:   See your medication sheet(s) for instructions.   Take all medications as prescribed.  Make no changes unless your primary care provider suggests them.   Go to all your primary care provider visits.  Be sure to have all your required lab tests. This way, your medicines can be refilled on time.  Do not use any forms of alcohol.    Please Note: If you have any questions at anytime after you discharged please call [facility] detox unit [facility] at [phone].    Here are a list of additional numbers you can call if you are wanting to resume services through [facility]:  [facility] Assessment Intake: [phone]  [facility] Adult [facility] Intensive Outpatient  call: [phone]  [facility] Admissions [phone]    Recovery Clinic call: [phone]  [facility]: [phone]  Medical Records call: [phone]  Billing Department call: [phone]    Please remember to take all of your behavioral discharge planning and lab paperwork to any follow up appointments, it contains your lab results, diagnosis, medication list and discharge recommendations.      THANK YOU FOR CHOOSING [facility]

## 2025-04-06 NOTE — PLAN OF CARE
"  Problem: Alcohol Withdrawal  Goal: Alcohol Withdrawal Symptom Control  Outcome: Progressing     Problem: Sleep Disturbance  Goal: Adequate Sleep/Rest  Outcome: Progressing   Goal Outcome Evaluation:ongoing       Patient is being monitored for alcohol withdrawal, MSSA score 1 and 1. RN did not administer any prn meds alcohol withdrawal. Patient slept for 6 hrs of the shift. Staff to continue 15 min monitor.      Blood pressure (!) 142/89, pulse 63, temperature 98.4  F (36.9  C), temperature source Oral, resp. rate 16, height 1.6 m (5' 3\"), weight 61.3 kg (135 lb 2.3 oz), SpO2 95%, not currently breastfeeding.             "

## 2025-04-06 NOTE — PLAN OF CARE
Problem: Alcohol Withdrawal  Goal: Alcohol Withdrawal Symptom Control  Outcome: Progressing   Goal Outcome Evaluation:    Pt was monitored alcohol withdrawal symptoms.   Pt is anxious, slightly tremulous and nauseous. Received prn valium 5mg at 1600 and nothing for withdrawal at 2000.  Feeling better at this this time. Only endorsing mild anxiety and tremors. Hypertensive, /93. Received scheduled gabapentin and propranolol.  Taking adequate care of ADLs. Appetite good. Chronic right leg pain managed with prn tylenol.    MSSA: 8, 4. Valium 10mg given x2  B/P: 152/93, T: 99, P: 62, R: 16

## 2025-04-06 NOTE — PLAN OF CARE
Problem: Alcohol Withdrawal  Goal: Alcohol Withdrawal Symptom Control  Outcome: Progressing   Goal Outcome Evaluation:    Plan of Care Reviewed With: patient      Pt was monitored for alcohol withdrawal symptoms.  She denied any withdrawals at this time stating she feels better.   Social on the unit, participating in unit activities. No SI or depression.   Appetite good.     MSSA:1 and 1 no valium given this shift. Last received valium at 2012 yesterday. Will take out of detox at this time.  B/P: 151/82, T: 97.5, P: 61, R: 16

## 2025-04-06 NOTE — PLAN OF CARE
Goal Outcome Evaluation:    Plan of Care Reviewed With: patient      Patient had a good shift, visible in milieu.  Bright affect, denies SI/SIB, depression/anxiety.  MSSA 5 and 4, no valium given.  Patient socialized and watched T.V with peers.  Attended group meetings and participated in activities.  Good appetite, medication compliant; patient denies pain.  Presently awake in room, will continue to monitor closely.

## 2025-04-06 NOTE — PLAN OF CARE
"  Rehab Group    Start time: 13:20  End time: 14:05  Patient time total: 45 minutes    attended full group    10 attended   Group Type: occupational therapy   Group Topic Covered: coping skills, mindfulness, and relaxation      Group Session Detail:  Patient actively participated in a progressive muscle relaxation group in order to promote: positive relaxation and coping skills, encourage insight and self-reflection, promote a positive change, manage behaviors, and improve focus. In addition, patient was guided through proper deep breathing techniques and gentle full body movements/stretches to further promote relaxation.      Patient Response/Contribution:  cooperative with task, listened actively, attentive, and actively engaged     Patient Detail:  Patient reported during check-in that \"arts and crafts, walking my dog, and rock-climbing\" are three activities which promote relaxation/calm in her life. Patient was able to follow verbal directions for the relaxation sampler activity; appearing to remain an active participant until conclusion. Patient remained focused, cooperative, and pleasant throughout duration. No safety or behavioral concerns noted in group setting this date.        [id number] OT Group (2 or more in attendance)      Patient Active Problem List   Diagnosis    Polysubstance abuse (H)    Chronic hepatitis C (H)    COPD (chronic obstructive pulmonary disease) (H)    GERD (gastroesophageal reflux disease)    Mood disorder    Chemical dependency (H)    Posttraumatic stress disorder    Nicotine abuse    Alcohol use disorder, mild, abuse    Hx of psychiatric care    Alcohol withdrawal, uncomplicated (H)    Sinusitis, unspecified chronicity, unspecified location    Alcohol withdrawal syndrome without complication (H)    Elevated LFTs    Drug withdrawal seizure with complication (H)    Alcohol dependence with withdrawal with complication (H)    Alcoholic ketoacidosis    Alcohol use disorder    Vomiting and " diarrhea    Seizure (H)    Alcoholic intoxication with complication    Acute alcoholic intoxication in alcoholism with complication (H)    Hypoxia    Elevated troponin    Acne rosacea    Acquired hypothyroidism    Alcohol abuse, in remission    Atelectasis of right lung    Bee sting allergy    Bipolar 1 disorder, manic, moderate (H)    Chronic hip pain, right    History of hepatitis C    Left knee pain    Onychomycosis    Osteoarthritis of multiple joints    Primary hypertension    Psychophysiological insomnia    Tear of lateral meniscus of right knee    Tobacco use disorder    Alcohol use disorder, severe, dependence (H)    Tobacco dependence    Bipolar disorder, most recent episode depressed (H)    Acute hypoxic respiratory failure (H)    Chronic obstructive pulmonary disease (H)    COPD with acute exacerbation (H)    Gastroesophageal reflux disease without esophagitis    Generalized anxiety disorder    PTSD (post-traumatic stress disorder)    Lactic acidosis    Alcohol withdrawal seizure with complication (H)    Alcohol abuse with withdrawal (H)    Suicidal ideation    Alcohol intoxication with delirium    History of alcohol withdrawal delirium    History of seizure due to alcohol withdrawal    Hypomagnesemia    Hyponatremia    Alcohol dependence with intoxication delirium (H)

## 2025-04-06 NOTE — PLAN OF CARE
"Rehab Group     Start time: 1645  End time: 1730  Patient time total: 45 minutes     attended full group     #9 attended   Group Type: occupational therapy   Group Topic Covered: cognitive activities, healthy leisure time, and social skills   Group Session Detail: OT: Education cognitive wellness and interactive social activity (Scattegories) to increase concentration, focus, attention to task/detail, task follow through, frustration tolerance, memory recall, healthy leisure engagement, coping with stress, heathy distraction engagement, sober activity engagement, cognitive wellness, social wellness, and social engagement    Patient Response/Contribution:  cooperative with task and listened actively; cooperative; socially engaged; polite; semi-blocked   Patient Detail: Pt reported during check-in that \"reading neuroscience books\" is a healthy activity they enjoy to support their cognitive wellness. Pt sat among peers for presented activity and engaged in reciprocal social interactions with peers. Pt able to follow verbal directions for novel task but struggled to identify responses. Pt verbalized understanding of importance of cognitive wellness in a healthy lifestyle.        [id number] OT Group (2 or more in attendance)    Patient Active Problem List   Diagnosis    Polysubstance abuse (H)    Chronic hepatitis C (H)    COPD (chronic obstructive pulmonary disease) (H)    GERD (gastroesophageal reflux disease)    Mood disorder    Chemical dependency (H)    Posttraumatic stress disorder    Nicotine abuse    Alcohol use disorder, mild, abuse    Hx of psychiatric care    Alcohol withdrawal, uncomplicated (H)    Sinusitis, unspecified chronicity, unspecified location    Alcohol withdrawal syndrome without complication (H)    Elevated LFTs    Drug withdrawal seizure with complication (H)    Alcohol dependence with withdrawal with complication (H)    Alcoholic ketoacidosis    Alcohol use disorder    Vomiting and diarrhea    " Seizure (H)    Alcoholic intoxication with complication    Acute alcoholic intoxication in alcoholism with complication (H)    Hypoxia    Elevated troponin    Acne rosacea    Acquired hypothyroidism    Alcohol abuse, in remission    Atelectasis of right lung    Bee sting allergy    Bipolar 1 disorder, manic, moderate (H)    Chronic hip pain, right    History of hepatitis C    Left knee pain    Onychomycosis    Osteoarthritis of multiple joints    Primary hypertension    Psychophysiological insomnia    Tear of lateral meniscus of right knee    Tobacco use disorder    Alcohol use disorder, severe, dependence (H)    Tobacco dependence    Bipolar disorder, most recent episode depressed (H)    Acute hypoxic respiratory failure (H)    Chronic obstructive pulmonary disease (H)    COPD with acute exacerbation (H)    Gastroesophageal reflux disease without esophagitis    Generalized anxiety disorder    PTSD (post-traumatic stress disorder)    Lactic acidosis    Alcohol withdrawal seizure with complication (H)    Alcohol abuse with withdrawal (H)    Suicidal ideation    Alcohol intoxication with delirium    History of alcohol withdrawal delirium    History of seizure due to alcohol withdrawal    Hypomagnesemia    Hyponatremia    Alcohol dependence with intoxication delirium (H)

## 2025-04-06 NOTE — PROGRESS NOTES
Rehab Group    Start time: 1645  End time: 1730  Patient time total: 45 minutes    attended full group    #9 attended   Group Type: recreation   Group Topic Covered: activity therapy, healthy leisure time, and social skills       Group Session Detail:  TR leisure group     Patient Response/Contribution:  cooperative with task, attentive, and actively engaged       Patient Detail:    Pt attended the structured Therapeutic Recreation group, participating in a group activity. Pt participated in group discussion and leisure activity as a healthy outlet to gain self-esteem, manage behaviors, improve social skills, and decrease isolation.  Pt remained focused and engaged throughout group activity.  Pt participated in the group discussion about healthy outlets and participated in the group activity, contributing to the clues and descriptions for the game.       Activity Therapy Per 15 min ()      Patient Active Problem List   Diagnosis    Polysubstance abuse (H)    Chronic hepatitis C (H)    COPD (chronic obstructive pulmonary disease) (H)    GERD (gastroesophageal reflux disease)    Mood disorder    Chemical dependency (H)    Posttraumatic stress disorder    Nicotine abuse    Alcohol use disorder, mild, abuse    Hx of psychiatric care    Alcohol withdrawal, uncomplicated (H)    Sinusitis, unspecified chronicity, unspecified location    Alcohol withdrawal syndrome without complication (H)    Elevated LFTs    Drug withdrawal seizure with complication (H)    Alcohol dependence with withdrawal with complication (H)    Alcoholic ketoacidosis    Alcohol use disorder    Vomiting and diarrhea    Seizure (H)    Alcoholic intoxication with complication    Acute alcoholic intoxication in alcoholism with complication (H)    Hypoxia    Elevated troponin    Acne rosacea    Acquired hypothyroidism    Alcohol abuse, in remission    Atelectasis of right lung    Bee sting allergy    Bipolar 1 disorder, manic, moderate (H)    Chronic  hip pain, right    History of hepatitis C    Left knee pain    Onychomycosis    Osteoarthritis of multiple joints    Primary hypertension    Psychophysiological insomnia    Tear of lateral meniscus of right knee    Tobacco use disorder    Alcohol use disorder, severe, dependence (H)    Tobacco dependence    Bipolar disorder, most recent episode depressed (H)    Acute hypoxic respiratory failure (H)    Chronic obstructive pulmonary disease (H)    COPD with acute exacerbation (H)    Gastroesophageal reflux disease without esophagitis    Generalized anxiety disorder    PTSD (post-traumatic stress disorder)    Lactic acidosis    Alcohol withdrawal seizure with complication (H)    Alcohol abuse with withdrawal (H)    Suicidal ideation    Alcohol intoxication with delirium    History of alcohol withdrawal delirium    History of seizure due to alcohol withdrawal    Hypomagnesemia    Hyponatremia    Alcohol dependence with intoxication delirium (H)

## 2025-04-07 VITALS
SYSTOLIC BLOOD PRESSURE: 156 MMHG | WEIGHT: 135.14 LBS | OXYGEN SATURATION: 97 % | BODY MASS INDEX: 23.95 KG/M2 | HEART RATE: 71 BPM | HEIGHT: 63 IN | DIASTOLIC BLOOD PRESSURE: 94 MMHG | RESPIRATION RATE: 16 BRPM | TEMPERATURE: 98.1 F

## 2025-04-07 PROCEDURE — 250N000013 HC RX MED GY IP 250 OP 250 PS 637: Performed by: PSYCHIATRY & NEUROLOGY

## 2025-04-07 PROCEDURE — 250N000013 HC RX MED GY IP 250 OP 250 PS 637

## 2025-04-07 PROCEDURE — 99239 HOSP IP/OBS DSCHRG MGMT >30: CPT | Performed by: PSYCHIATRY & NEUROLOGY

## 2025-04-07 PROCEDURE — 250N000013 HC RX MED GY IP 250 OP 250 PS 637: Performed by: EMERGENCY MEDICINE

## 2025-04-07 RX ADMIN — PROPRANOLOL HYDROCHLORIDE 20 MG: 20 TABLET ORAL at 08:44

## 2025-04-07 RX ADMIN — NICOTINE POLACRILEX 4 MG: 4 GUM, CHEWING BUCCAL at 08:46

## 2025-04-07 RX ADMIN — FOLIC ACID 1 MG: 1 TABLET ORAL at 08:46

## 2025-04-07 RX ADMIN — OMEPRAZOLE 40 MG: 20 CAPSULE, DELAYED RELEASE ORAL at 08:45

## 2025-04-07 RX ADMIN — THIAMINE HCL TAB 100 MG 100 MG: 100 TAB at 08:46

## 2025-04-07 RX ADMIN — GABAPENTIN 1200 MG: 600 TABLET, FILM COATED ORAL at 08:45

## 2025-04-07 RX ADMIN — ACETAMINOPHEN 650 MG: 325 TABLET, FILM COATED ORAL at 04:58

## 2025-04-07 RX ADMIN — NICOTINE POLACRILEX 4 MG: 4 GUM, CHEWING BUCCAL at 07:33

## 2025-04-07 RX ADMIN — NICOTINE POLACRILEX 4 MG: 4 GUM, CHEWING BUCCAL at 05:05

## 2025-04-07 RX ADMIN — LEVOTHYROXINE SODIUM 100 MCG: 100 TABLET ORAL at 08:46

## 2025-04-07 RX ADMIN — Medication 1 TABLET: at 08:45

## 2025-04-07 RX ADMIN — LOPERAMIDE HYDROCHLORIDE 2 MG: 2 CAPSULE ORAL at 10:51

## 2025-04-07 RX ADMIN — NICOTINE POLACRILEX 4 MG: 4 GUM, CHEWING BUCCAL at 10:34

## 2025-04-07 ASSESSMENT — ACTIVITIES OF DAILY LIVING (ADL)
ADLS_ACUITY_SCORE: 45

## 2025-04-07 NOTE — DISCHARGE SUMMARY
Psychiatric Discharge Summary    Jolynn Rivera MRN# [id number]   Age: 64 year old YOB: 1960     Date of Admission:  4/5/2025  Date of Discharge:  4/7/2025 11:20 AM  Admitting Physician:  Scott Alex MD  Discharge Physician:  Jessica Haskins DO         Event Leading to Hospitalization:   64 male     Patient reports that she first started drinking age 18. She states that her drinking became problematic in her 40's. Since then she has been in treatment around 6 times and in detox about 12 times. Her most recent detox was on this unit in February. From there she went to [facility] for outpatient CD treatment. She reports that she relapsed shortly after that and did not complete that program.     Patient has been drinking daily for the past several weeks. She drinks 1-2 liters of wine per day.      Patient has a history of blackouts and has alcohol related seizures, but denies recent seizures. Patient has no history of DUI.     Patient reports that she is diagnosed with bipolar 2 disorder and PTSD, as well as anxiety and panic. She has no prior psychiatric admissions. She is currently on a regimen of Gabapentin, Propanolol for anxiety.        According to ER report:  HPI  Jolynn Rivera is a 64 year old female who presents with altered mental status.  Patient endorsed alcohol use this past day.  Patient also reporting feeling anxious and nauseous.  She reports that she usually drinks a 5 L box of wine every 2 days.  Patient concerned she is having withdrawal.  She does desire detox admission.     Additional history obtained from EMS given difficulty of complete history from the patient due to mental status change.  EMS reported bringing the patient in from home, noted that patient reported having too many drinks, gave 4 mg ondansetron, glucose of 164.     Patient arriving with altered mental status, with reason to suspect alcohol or other drug intoxication as etiology. Exam without findings  suggestive of trauma, non-focal. Breath EtOH 0.154. Glucose normal per EMS report. Nursing notes reviewed.      AMS likely due to intoxication delirium but cannot immediately rule out other dangerous etiologies of AMS. Plan close clinical monitoring of the patient and her mental status for clearing of intoxicating substance. With approriate clearing, the patient would likely be able to be discharged. If not appropriately clearing, plan broadening of work-up, potentially including CT head and serum labs.      With monitoring, mental status cleared appropriately.  Clinical picture consistent with mental status change secondary to alcohol intoxication.  Patient did develop alcohol withdrawal symptoms, was treated with diazepam via [facility] withdrawal protocol.     Labs notable for mildly low magnesium at 1.4, somewhat low sodium at 129, mildly elevated anion gap at 20.  NS bolus and magnesium supplementation given.  Redraw chemistry panel and magnesium level pending.     With period of monitoring, the patient continues to clear appropriately but is not yet clinically sober at this time. Patient admitted to Detox and signed out to Behavioral Health Intake.      Patient signed out to oncoming ED provider with plan to follow-up repeat magnesium and BMP, notify behavioral health intake of medical clearance after lab results.     Bertin Sierra MD   Emergency Medicine   [facility] EMERGENCY DEPARTMENT  4/5/2025          See Admission note by Scott Alex MD on 4/5/25 for additional details.          Diagnoses:     Alcohol dependence with withdrawal with complication including history of seizures and DTs  Nicotine dependence with withdrawal   Hx of polysubstance use including IVDU and hx of Hep C s/p tx in 2015  Hx of opioid dependence in sustained remission   OMARI  Insomnia  BPAD, type 2, most recent episode depressed  PTSD  GERD  COPD  Pulmonary nodules   Hyponatremia  HTN  Hypothyroidism   Chronic back pain  with sciatica       Clinically Significant Risk Factors                   # Hypertension: Noted on problem list                # Financial/Environmental Concerns:                  Labs:     Recent Results (from the past week)   Alcohol breath test POCT    Collection Time: 04/05/25  3:23 AM   Result Value Ref Range    Alcohol Breath Test 0.154 (A) 0.00 - 0.01   Comprehensive metabolic panel    Collection Time: 04/05/25  4:39 AM   Result Value Ref Range    Sodium 129 (L) 135 - 145 mmol/L    Potassium 3.7 3.4 - 5.3 mmol/L    Carbon Dioxide (CO2) 20 (L) 22 - 29 mmol/L    Anion Gap 20 (H) 7 - 15 mmol/L    Urea Nitrogen 5.5 (L) 8.0 - 23.0 mg/dL    Creatinine 0.45 (L) 0.51 - 0.95 mg/dL    GFR Estimate >90 >60 mL/min/1.73m2    Calcium 8.8 8.8 - 10.4 mg/dL    Chloride 89 (L) 98 - 107 mmol/L    Glucose 90 70 - 99 mg/dL    Alkaline Phosphatase 52 40 - 150 U/L    AST 46 (H) 0 - 45 U/L    ALT 33 0 - 50 U/L    Protein Total 7.8 6.4 - 8.3 g/dL    Albumin 4.5 3.5 - 5.2 g/dL    Bilirubin Total 0.4 <=1.2 mg/dL   Magnesium    Collection Time: 04/05/25  4:39 AM   Result Value Ref Range    Magnesium 1.4 (L) 1.7 - 2.3 mg/dL   CBC with platelets and differential    Collection Time: 04/05/25  4:39 AM   Result Value Ref Range    WBC Count 6.2 4.0 - 11.0 10e3/uL    RBC Count 4.03 3.80 - 5.20 10e6/uL    Hemoglobin 14.2 11.7 - 15.7 g/dL    Hematocrit 37.9 35.0 - 47.0 %    MCV 94 78 - 100 fL    MCH 35.2 (H) 26.5 - 33.0 pg    MCHC 37.5 (H) 31.5 - 36.5 g/dL    RDW 12.4 10.0 - 15.0 %    Platelet Count 226 150 - 450 10e3/uL    % Neutrophils 54 %    % Lymphocytes 33 %    % Monocytes 13 %    % Eosinophils 0 %    % Basophils 1 %    % Immature Granulocytes 0 %    NRBCs per 100 WBC 0 <1 /100    Absolute Neutrophils 3.4 1.6 - 8.3 10e3/uL    Absolute Lymphocytes 2.0 0.8 - 5.3 10e3/uL    Absolute Monocytes 0.8 0.0 - 1.3 10e3/uL    Absolute Eosinophils 0.0 0.0 - 0.7 10e3/uL    Absolute Basophils 0.0 0.0 - 0.2 10e3/uL    Absolute Immature Granulocytes 0.0  <=0.4 10e3/uL    Absolute NRBCs 0.0 10e3/uL   Urine Drug Screen Panel    Collection Time: 04/05/25  5:03 AM   Result Value Ref Range    Amphetamines Urine Screen Negative Screen Negative    Barbituates Urine Screen Negative Screen Negative    Benzodiazepine Urine Screen Negative Screen Negative    Cannabinoids Urine Screen Negative Screen Negative    Cocaine Urine Screen Negative Screen Negative    Fentanyl Qual Urine Screen Negative Screen Negative    Opiates Urine Screen Negative Screen Negative    PCP Urine Screen Negative Screen Negative   Basic metabolic panel    Collection Time: 04/05/25  8:09 AM   Result Value Ref Range    Sodium 132 (L) 135 - 145 mmol/L    Potassium 5.3 3.4 - 5.3 mmol/L    Chloride 96 (L) 98 - 107 mmol/L    Carbon Dioxide (CO2) 22 22 - 29 mmol/L    Anion Gap 14 7 - 15 mmol/L    Urea Nitrogen 5.6 (L) 8.0 - 23.0 mg/dL    Creatinine 0.49 (L) 0.51 - 0.95 mg/dL    GFR Estimate >90 >60 mL/min/1.73m2    Calcium 8.4 (L) 8.8 - 10.4 mg/dL    Glucose 84 70 - 99 mg/dL   Magnesium    Collection Time: 04/05/25  8:09 AM   Result Value Ref Range    Magnesium 2.4 (H) 1.7 - 2.3 mg/dL   Lipid panel    Collection Time: 04/06/25  7:28 AM   Result Value Ref Range    Cholesterol 162 <200 mg/dL    Triglycerides 57 <150 mg/dL    Direct Measure  >=50 mg/dL    LDL Cholesterol Calculated 50 <100 mg/dL    Non HDL Cholesterol 61 <130 mg/dL   TSH with free T4 reflex and/or T3 as indicated    Collection Time: 04/06/25  7:28 AM   Result Value Ref Range    TSH 1.78 0.30 - 4.20 uIU/mL   CK total    Collection Time: 04/06/25  7:28 AM   Result Value Ref Range     (H) 26 - 192 U/L   Basic metabolic panel    Collection Time: 04/06/25  7:28 AM   Result Value Ref Range    Sodium 135 135 - 145 mmol/L    Potassium 3.8 3.4 - 5.3 mmol/L    Chloride 100 98 - 107 mmol/L    Carbon Dioxide (CO2) 21 (L) 22 - 29 mmol/L    Anion Gap 14 7 - 15 mmol/L    Urea Nitrogen 12.8 8.0 - 23.0 mg/dL    Creatinine 0.62 0.51 - 0.95 mg/dL     GFR Estimate >90 >60 mL/min/1.73m2    Calcium 9.1 8.8 - 10.4 mg/dL    Glucose 100 (H) 70 - 99 mg/dL   Extra Purple Top Tube    Collection Time: 04/06/25  7:28 AM   Result Value Ref Range    Hold Specimen JIC               Consults:   [facility]  Consult Note - Hospitalist Service  Date of Admission:  4/5/2025  Consult Requested by: Dr. Fermin  Reason for Consult: Medical co-management        Assessment & Plan  Ms. Jolynn Rivera is a 63 yo female with hx of COPD, pulmonary nodules, hypothyroidism, GERD, Hep C s/p tx, chronic back pain with R sided sciatica, bipolar d/o, PTSD, and etoh use disorder admitted to  psychiatric detox unit after presenting to ED with etoh abuse and desire for medically supervised etoh withdrawal. Internal medicine consulted for medical co-management.      # Etoh use disorder and sz withdrawal ppx: Management per psychiatry primary team.       # Hyponatremia: Improving. Initial Na 129, with repeat 132, initially thought to be hypovolemic from poor fluid intake PTA other than etoh; however, pt states she drinks a lot of sparkling water.  - Repeat BMP tomorrow am.      # COPD  # Pulmonary nodules  Followed OP by [facility], last vist 3/3/25. Pt unable to tolerate steroids or any inhalers, except prn levalbuterol that she states she needs infrequently. Most recent CT chest with stable pulm nodules compared to prior.   - Follow up with PET scan of chest as ordered in near future.  - Prn levalbuterol     # Other chronic, stable medical conditions:  # HTN: Continue PTA propranolol, of which she also takes for mental health issues.  # GERD: Continue PTA daily PPI  # Hypothyroidism: Continue PTA levothyroxine  # Chronic back pain with sciatica: Continue PTA gabapentin and lidocaine patches           The patient's care was discussed with the Patient and communicated to primary team via this note . Medicine will follow up on repeat BMP tomorrow am  and if Na improved or WNL, will sign off. Please feel free to call with questions.      Rock Aponte PA-C  Hospitalist Service         Hospital Course:   Jolynn Rivera was admitted to [facility] as a voluntary patient. The patient was placed under status 15 (15 minute checks) to ensure patient safety.   MSSA protocol was initiated due to the patient's history of alcohol abuse and concern for withdrawal symptoms. Admission labs obtained and IM consulted as above. PTA medications continued as appropriate.     The patient's symptoms of alcohol withdrawal improved. She was out of detox on 4/7 and requested to discharge home with plan to pursue IOP upon return from her camping trip up [place]. She declined to start MAT for AUD, has been considering acamprosate but wants to defer to her PCP.     Today Jolynn Rivera reports having no thoughts of harming self or others. In addition, she has notable risk factors for self-harm, including age, single status, anxiety, and substance abuse. However, risk is mitigated by commitment to family and her dog, absence of past attempts, ability to volunteer a safety plan, history of seeking help when needed, and patient is future oriented and denying SI. Therefore, based on all available evidence including the factors cited above, she does not appear to be at imminent risk for self-harm, does not meet criteria for a 72-hr hold, and therefore remains appropriate for ongoing outpatient level of care.     Jolynn Rivera was  discharged  to home. At the time of discharge Jolynn Rivera was determined to not be a danger to herself or others.          Discharge Medications:     Current Discharge Medication List        CONTINUE these medications which have NOT CHANGED    Details   eszopiclone (LUNESTA) 3 MG tablet Take 1 tablet (3 mg) by mouth at bedtime.    Associated Diagnoses: Bipolar disorder, most recent episode depressed (H)      levothyroxine (SYNTHROID/LEVOTHROID) 100 MCG  "tablet Take 1 tablet (100 mcg) by mouth every morning.    Associated Diagnoses: Hypothyroidism, unspecified type      melatonin 5 MG tablet Take 10 mg by mouth at bedtime Takes 1 hour prior to Lunesta      multivitamin w/minerals (THERA-VIT-M) tablet Take 1 tablet by mouth daily.    Associated Diagnoses: Alcohol dependence with intoxication with complication (H)      omeprazole (PRILOSEC) 40 MG DR capsule Take 1 capsule (40 mg) by mouth every morning.    Associated Diagnoses: Gastroesophageal reflux disease without esophagitis      propranolol (INDERAL) 20 MG tablet Take 1 tablet (20 mg) by mouth 3 times daily.    Associated Diagnoses: Generalized anxiety disorder      thiamine (B-1) 100 MG tablet Take 1 tablet (100 mg) by mouth daily.    Associated Diagnoses: Alcohol dependence with intoxication with complication (H)      gabapentin (NEURONTIN) 600 MG tablet Take 1.5 tablets (900 mg) by mouth 3 times daily.    Associated Diagnoses: Alcohol withdrawal seizure with complication (H)      Lidocaine (LIDOCARE) 4 % Patch Place 2 patches over 12 hours onto the skin every 24 hours. To prevent lidocaine toxicity, patient should be patch free for 12 hrs daily.    Associated Diagnoses: Osteoarthritis of multiple joints, unspecified osteoarthritis type      nicotine polacrilex (NICORETTE) 4 MG gum Place 1 each (4 mg) inside cheek every hour as needed for nicotine withdrawal symptoms.    Associated Diagnoses: Tobacco use disorder      ondansetron (ZOFRAN ODT) 4 MG ODT tab Take 1 tablet (4 mg) by mouth every 8 hours as needed for vomiting or nausea.    Associated Diagnoses: Gastroesophageal reflux disease without esophagitis                  Psychiatric Examination:   Appearance:  awake, alert and adequately groomed  Attitude:  cooperative  Eye Contact:  good  Mood:   \"crabby\" some irritability but overall stable recently   Affect:  appropriate and in normal range and mood congruent  Speech:  clear, coherent and normal " prosody  Psychomotor Behavior:  no evidence of tardive dyskinesia, dystonia, or tics  Thought Process:  logical, linear, and goal oriented  Associations:  no loose associations  Thought Content:  no evidence of suicidal ideation or homicidal ideation and no evidence of psychotic thought  Insight:  fair  Judgment:  intact  Oriented to:  time, person, and place  Attention Span and Concentration:  intact  Recent and Remote Memory:  intact  Language: English, fluent  Fund of Knowledge: appropriate  Muscle Strength and Tone: normal  Gait and Station: Normal         Discharge Plan:   Recommendation:  It is recommended that you abstain from alcohol and other mood-altering chemicals. You have reported scheduling substance use treatment services independently at this time.    Disposition: Released to home    Follow-up Appointment:   You are aware you should make a follow up appointment with your primary care doctor for medical and medication management     Pt will make a follow up appointment after discharge.     Attestation:  Patient has been seen and evaluated by me, Jessica Haskins DO on day of discharge. 33 minutes were spent in coordination of discharge planning.

## 2025-04-07 NOTE — PLAN OF CARE
"Goal Outcome Evaluation:    Plan of Care Reviewed With: patient          Pt flat and depressed, states she is ready to go home and get ready for her twice a year sober camping trip with her channing and dog IRIS. Pt is alert, has all her meds and will not be discharging with meds, vitals stable, discharge instructions reviewed with patient, Pt intends to resume [facility], and IOP at [facility].  Pt denies pain, Anxiety, and is on medication for depression and BP.     Pt encouraged to make a follow up appointment with PCP after discharge for follow up care.    Pt has a virtual appointment with Dr Sands on April 10th at 9.05 with the Lung doctor.    She plans to continue with  \"Will work for recovery\" x3 weekly and connect with her sober community, EMDR and therapy supports.    Belongings returned, pt escorted off the unit by [facility]. Transporting home via [facility].    Denies SI/HI.  "

## 2025-04-07 NOTE — PLAN OF CARE
Problem: Sleep Disturbance  Goal: Adequate Sleep/Rest  Outcome: Progressing   Goal Outcome Evaluation:          Patient continue on out of detox. No behavior concern. Continue on 15 min status.

## 2025-04-07 NOTE — PROGRESS NOTES
[facility]   Daily Encounter: Met with team, discussed patient progress, discharge plan and any impediments to discharge.     4/6/25: Marika Bhandari LADC met with patient on this date to discuss discharge planning and after care plans, patient stated she would like to attend IOP for her mental health at . Patient stated she has been in contact with them and plans to schedule an intake date when she is back from her vacation. At this time patient is declining all case management resources and services.     4/7/25: Met with team, discussed patient progress, discharge plan and any impediments to discharge. Writer met with patient and attempted to discuss plans. Patient was unwilling to speak at length with writer but did confirm prior arrangements and basic information. Prior to discharge on this day, writer confirmed update to [facility].      Insurance: Medicare       Legal Status:  Voluntary    County: N/A  File Number: N/A  Start and expiration date of commitment: N/A     SUDs Assessment Status: None is needed at this time.      ROIs on file: None      Living Situation: Patient reports she lives in a home and reports the home is stable.       Current Providers, Supports & Collateral: None     Current Plan/Referral Status: Patient reports accessing [facility] independently at this time     Safety Plan Status: Jose Morin Safety Plan updated      BRITTNEE Jara  [facility] - Adult Inpatient Addiction Psychiatry Unit

## 2025-04-09 ENCOUNTER — PATIENT OUTREACH (OUTPATIENT)
Dept: CARE COORDINATION | Facility: CLINIC | Age: 65
End: 2025-04-09
Payer: MEDICARE

## 2025-04-09 NOTE — PROGRESS NOTES
[facility]: Transitions of Care Outreach  Chief Complaint   Patient presents with    Clinic Care Coordination - Post Hospital       Most Recent Admission Date: 4/5/2025   Most Recent Admission Diagnosis: Hypomagnesemia - E83.42  Hyponatremia - E87.1  Alcohol dependence with intoxication delirium (H) - F10.221  Alcohol withdrawal syndrome without complication (H) - F10.930     Most Recent Discharge Date: 4/7/2025   Most Recent Discharge Diagnosis: Alcohol dependence with intoxication delirium (H) - F10.221  Alcohol withdrawal syndrome without complication (H) - F10.930  Hypomagnesemia - E83.42  Hyponatremia - E87.1     Transitions of Care Assessment    Discharge Assessment  How are you doing now that you are home?: Writer spoke to patient who is doing well, she has no questions or concerns. Has an appointment Friday-4/11/1025 with her therapist.  How are your symptoms? (Red Flag symptoms escalate to triage hotline per guidelines): Improved  Do you know how to contact your clinic care team if you have future questions or changes to your health status? : Yes  Does the patient have their discharge instructions? : Yes  Does the patient have questions regarding their discharge instructions? : No  Were you started on any new medications or were there changes to any of your previous medications? : Yes  Does the patient have all of their medications?: Yes  Do you have questions regarding any of your medications? : No  Do you have all of your needed medical supplies or equipment (DME)?  (i.e. oxygen tank, CPAP, cane, etc.): Yes                Follow up Plan   Follow-up Appointment:   You are aware you should make a follow up appointment with your primary care doctor for medical and medication management     Pt will make a follow up appointment after discharge.  Discharge Follow-Up  Discharge follow up appointment scheduled in alignment with recommended follow up timeframe or Transitions of Risk Category?  (Low = within 30 days; Moderate= within 14 days; High= within 7 days): Yes  Discharge Follow Up Appointment Date: 04/11/25    Future Appointments   Date Time Provider Department Center   4/10/2025  9:20 AM Alexandria Sands PA-C [facility]       Outpatient Plan as outlined on AVS reviewed with patient.    For any urgent concerns, please contact our 24 hour nurse triage line: [phone] ([phone])       COLLIN Gauthier

## 2025-04-19 ENCOUNTER — HOSPITAL ENCOUNTER (EMERGENCY)
Facility: CLINIC | Age: 65
Discharge: HOME | DRG: 897 | End: 2025-04-19
Attending: EMERGENCY MEDICINE | Admitting: EMERGENCY MEDICINE
Payer: MEDICARE

## 2025-04-19 ENCOUNTER — TELEPHONE (OUTPATIENT)
Dept: BEHAVIORAL HEALTH | Facility: CLINIC | Age: 65
End: 2025-04-19

## 2025-04-19 ENCOUNTER — HOSPITAL ENCOUNTER (INPATIENT)
Facility: CLINIC | Age: 65
LOS: 2 days | Discharge: HOME | DRG: 897 | End: 2025-04-22
Attending: EMERGENCY MEDICINE | Admitting: PSYCHIATRY & NEUROLOGY
Payer: MEDICARE

## 2025-04-19 ENCOUNTER — APPOINTMENT (OUTPATIENT)
Dept: CT IMAGING | Facility: CLINIC | Age: 65
DRG: 897 | End: 2025-04-19
Payer: MEDICARE

## 2025-04-19 VITALS
RESPIRATION RATE: 18 BRPM | DIASTOLIC BLOOD PRESSURE: 90 MMHG | HEART RATE: 117 BPM | TEMPERATURE: 98.5 F | SYSTOLIC BLOOD PRESSURE: 151 MMHG | OXYGEN SATURATION: 95 %

## 2025-04-19 DIAGNOSIS — F10.90 ALCOHOL USE DISORDER: ICD-10-CM

## 2025-04-19 DIAGNOSIS — F10.229 ALCOHOL DEPENDENCE WITH INTOXICATION WITH COMPLICATION (H): ICD-10-CM

## 2025-04-19 DIAGNOSIS — F17.200 TOBACCO USE DISORDER: ICD-10-CM

## 2025-04-19 DIAGNOSIS — F41.9 ANXIETY: ICD-10-CM

## 2025-04-19 DIAGNOSIS — M25.562 LEFT KNEE PAIN, UNSPECIFIED CHRONICITY: Primary | ICD-10-CM

## 2025-04-19 DIAGNOSIS — F10.221 ALCOHOL DEPENDENCE WITH INTOXICATION DELIRIUM (H): ICD-10-CM

## 2025-04-19 DIAGNOSIS — F10.930 ALCOHOL WITHDRAWAL, UNCOMPLICATED (H): ICD-10-CM

## 2025-04-19 LAB
ALBUMIN SERPL BCG-MCNC: 4.6 G/DL (ref 3.5–5.2)
ALCOHOL BREATH TEST: 0.29 (ref 0–0.01)
ALP SERPL-CCNC: 53 U/L (ref 40–150)
ALT SERPL W P-5'-P-CCNC: 26 U/L (ref 0–50)
AMPHETAMINES UR QL SCN: ABNORMAL
ANION GAP SERPL CALCULATED.3IONS-SCNC: 20 MMOL/L (ref 7–15)
AST SERPL W P-5'-P-CCNC: 39 U/L (ref 0–45)
BARBITURATES UR QL SCN: ABNORMAL
BASOPHILS # BLD AUTO: 0 10E3/UL (ref 0–0.2)
BASOPHILS NFR BLD AUTO: 0 %
BENZODIAZ UR QL SCN: ABNORMAL
BILIRUB SERPL-MCNC: 0.3 MG/DL
BUN SERPL-MCNC: 8 MG/DL (ref 8–23)
BZE UR QL SCN: ABNORMAL
CALCIUM SERPL-MCNC: 9 MG/DL (ref 8.8–10.4)
CANNABINOIDS UR QL SCN: ABNORMAL
CHLORIDE SERPL-SCNC: 93 MMOL/L (ref 98–107)
CREAT SERPL-MCNC: 0.51 MG/DL (ref 0.51–0.95)
EGFRCR SERPLBLD CKD-EPI 2021: >90 ML/MIN/1.73M2
EOSINOPHIL # BLD AUTO: 0 10E3/UL (ref 0–0.7)
EOSINOPHIL NFR BLD AUTO: 0 %
ERYTHROCYTE [DISTWIDTH] IN BLOOD BY AUTOMATED COUNT: 12.5 % (ref 10–15)
ETHANOL SERPL-MCNC: 0.19 G/DL
FENTANYL UR QL: ABNORMAL
GLUCOSE SERPL-MCNC: 91 MG/DL (ref 70–99)
HCO3 SERPL-SCNC: 19 MMOL/L (ref 22–29)
HCT VFR BLD AUTO: 38.2 % (ref 35–47)
HGB BLD-MCNC: 14.1 G/DL (ref 11.7–15.7)
IMM GRANULOCYTES # BLD: 0 10E3/UL
IMM GRANULOCYTES NFR BLD: 0 %
LYMPHOCYTES # BLD AUTO: 2.2 10E3/UL (ref 0.8–5.3)
LYMPHOCYTES NFR BLD AUTO: 32 %
MAGNESIUM SERPL-MCNC: 1.7 MG/DL (ref 1.7–2.3)
MCH RBC QN AUTO: 35.1 PG (ref 26.5–33)
MCHC RBC AUTO-ENTMCNC: 36.9 G/DL (ref 31.5–36.5)
MCV RBC AUTO: 95 FL (ref 78–100)
MONOCYTES # BLD AUTO: 0.7 10E3/UL (ref 0–1.3)
MONOCYTES NFR BLD AUTO: 11 %
NEUTROPHILS # BLD AUTO: 3.8 10E3/UL (ref 1.6–8.3)
NEUTROPHILS NFR BLD AUTO: 56 %
NRBC # BLD AUTO: 0 10E3/UL
NRBC BLD AUTO-RTO: 0 /100
OPIATES UR QL SCN: ABNORMAL
PCP QUAL URINE (ROCHE): ABNORMAL
PLATELET # BLD AUTO: 275 10E3/UL (ref 150–450)
POTASSIUM SERPL-SCNC: 4 MMOL/L (ref 3.4–5.3)
PROT SERPL-MCNC: 8 G/DL (ref 6.4–8.3)
RBC # BLD AUTO: 4.02 10E6/UL (ref 3.8–5.2)
SODIUM SERPL-SCNC: 132 MMOL/L (ref 135–145)
WBC # BLD AUTO: 6.7 10E3/UL (ref 4–11)

## 2025-04-19 PROCEDURE — 250N000013 HC RX MED GY IP 250 OP 250 PS 637

## 2025-04-19 PROCEDURE — 83735 ASSAY OF MAGNESIUM: CPT

## 2025-04-19 PROCEDURE — 82077 ASSAY SPEC XCP UR&BREATH IA: CPT

## 2025-04-19 PROCEDURE — 36415 COLL VENOUS BLD VENIPUNCTURE: CPT

## 2025-04-19 PROCEDURE — 250N000011 HC RX IP 250 OP 636

## 2025-04-19 PROCEDURE — 70450 CT HEAD/BRAIN W/O DYE: CPT

## 2025-04-19 PROCEDURE — 80307 DRUG TEST PRSMV CHEM ANLYZR: CPT

## 2025-04-19 PROCEDURE — 85004 AUTOMATED DIFF WBC COUNT: CPT

## 2025-04-19 PROCEDURE — 82040 ASSAY OF SERUM ALBUMIN: CPT

## 2025-04-19 PROCEDURE — 99285 EMERGENCY DEPT VISIT HI MDM: CPT | Performed by: EMERGENCY MEDICINE

## 2025-04-19 PROCEDURE — 99284 EMERGENCY DEPT VISIT MOD MDM: CPT | Mod: 25 | Performed by: EMERGENCY MEDICINE

## 2025-04-19 PROCEDURE — 82075 ASSAY OF BREATH ETHANOL: CPT | Performed by: EMERGENCY MEDICINE

## 2025-04-19 PROCEDURE — 96361 HYDRATE IV INFUSION ADD-ON: CPT | Performed by: EMERGENCY MEDICINE

## 2025-04-19 PROCEDURE — 96360 HYDRATION IV INFUSION INIT: CPT | Performed by: EMERGENCY MEDICINE

## 2025-04-19 PROCEDURE — 258N000003 HC RX IP 258 OP 636

## 2025-04-19 PROCEDURE — 99285 EMERGENCY DEPT VISIT HI MDM: CPT | Mod: GC | Performed by: EMERGENCY MEDICINE

## 2025-04-19 RX ORDER — ONDANSETRON 4 MG/1
4 TABLET, ORALLY DISINTEGRATING ORAL ONCE
Status: COMPLETED | OUTPATIENT
Start: 2025-04-19 | End: 2025-04-19

## 2025-04-19 RX ORDER — FOLIC ACID 1 MG/1
1 TABLET ORAL DAILY
Status: DISCONTINUED | OUTPATIENT
Start: 2025-04-19 | End: 2025-04-19 | Stop reason: HOSPADM

## 2025-04-19 RX ORDER — DIAZEPAM 5 MG/1
5-20 TABLET ORAL EVERY 30 MIN PRN
Status: DISCONTINUED | OUTPATIENT
Start: 2025-04-19 | End: 2025-04-19 | Stop reason: HOSPADM

## 2025-04-19 RX ORDER — MULTIPLE VITAMINS W/ MINERALS TAB 9MG-400MCG
1 TAB ORAL DAILY
Status: DISCONTINUED | OUTPATIENT
Start: 2025-04-19 | End: 2025-04-19 | Stop reason: HOSPADM

## 2025-04-19 RX ADMIN — ONDANSETRON 4 MG: 4 TABLET, ORALLY DISINTEGRATING ORAL at 19:37

## 2025-04-19 RX ADMIN — Medication 1 TABLET: at 17:26

## 2025-04-19 RX ADMIN — DIAZEPAM 10 MG: 5 TABLET ORAL at 19:17

## 2025-04-19 RX ADMIN — SODIUM CHLORIDE, SODIUM LACTATE, POTASSIUM CHLORIDE, AND CALCIUM CHLORIDE 1000 ML: .6; .31; .03; .02 INJECTION, SOLUTION INTRAVENOUS at 18:00

## 2025-04-19 RX ADMIN — ONDANSETRON 4 MG: 4 TABLET, ORALLY DISINTEGRATING ORAL at 17:03

## 2025-04-19 RX ADMIN — FOLIC ACID 1 MG: 1 TABLET ORAL at 17:26

## 2025-04-19 RX ADMIN — DIAZEPAM 5 MG: 5 TABLET ORAL at 17:26

## 2025-04-19 ASSESSMENT — LIFESTYLE VARIABLES: INTOXICATION: 1

## 2025-04-19 ASSESSMENT — COLUMBIA-SUICIDE SEVERITY RATING SCALE - C-SSRS
1. IN THE PAST MONTH, HAVE YOU WISHED YOU WERE DEAD OR WISHED YOU COULD GO TO SLEEP AND NOT WAKE UP?: NO
2. HAVE YOU ACTUALLY HAD ANY THOUGHTS OF KILLING YOURSELF IN THE PAST MONTH?: NO
6. HAVE YOU EVER DONE ANYTHING, STARTED TO DO ANYTHING, OR PREPARED TO DO ANYTHING TO END YOUR LIFE?: NO

## 2025-04-19 ASSESSMENT — ACTIVITIES OF DAILY LIVING (ADL)
ADLS_ACUITY_SCORE: 58

## 2025-04-19 NOTE — ED PROVIDER NOTES
ED Provider Note  [facility]      History     Chief Complaint   Patient presents with    Alcohol Intoxication     Nausea vomiting, drank a liter of vodka. Vomited up blood at home       Alcohol Intoxication    Jolynn Rivera is a 65 year old female with a past medical history of alcohol use disorder, who presents with alcohol intoxication seeking detox.  She reports she has been drinking for the last couple of weeks reporting a liter and a half of wine per day, without other drug use.  Last drink was this afternoon prior to arrival.  She has a history of seizures last seizure a month ago from withdrawal.  She has a history of DTs last episode before last admission to detox.  She has not have any current but reports visual hallucinations occasionally while detoxing from alcohol.  Hallucinations.  She also reports that she hit her head recently but is unsure of exactly when.  She is not able to report if she lost consciousness, she found out she hit her head by seeing the blood on the wall.      Physical Exam   BP: 121/70  Pulse: 77  Temp: 98  F (36.7  C)  Resp: 16  SpO2: 93 %  Physical Exam  Constitutional:       General: She is not in acute distress.     Appearance: Normal appearance. She is not diaphoretic.   HENT:      Head: Atraumatic.        Comments: 1 cm laceration with scab and dried blood in the hair appreciated on the right aspect of the skull     Mouth/Throat:      Mouth: Mucous membranes are moist.   Eyes:      General: No scleral icterus.     Conjunctiva/sclera: Conjunctivae normal.   Cardiovascular:      Rate and Rhythm: Normal rate.      Heart sounds: Normal heart sounds.   Pulmonary:      Effort: No respiratory distress.      Breath sounds: Normal breath sounds.   Abdominal:      General: Abdomen is flat.   Musculoskeletal:      Cervical back: Neck supple.   Skin:     General: Skin is warm.      Findings: No rash.   Neurological:      Mental Status: She is alert.            ED Course, Procedures, & Data      Procedures            Results for orders placed or performed during the hospital encounter of 04/19/25   Alcohol breath test POCT     Status: Abnormal   Result Value Ref Range    Alcohol Breath Test 0.289 (A) 0.00 - 0.01     Medications - No data to display  Labs Ordered and Resulted from Time of ED Arrival to Time of ED Departure - No data to display  No orders to display          Critical care was not performed.     Medical Decision Making  The patient's presentation was of moderate complexity (a chronic illness mild to moderate exacerbation, progression, or side effect of treatment).    The patient's evaluation involved:  ordering and/or review of 3+ test(s) in this encounter (see separate area of note for details)    The patient's management necessitated high risk (a decision regarding hospitalization).    Assessment & Plan    Patient presenting with alcohol intoxication seeking detox with small head laceration. Vitals in the ED stable. Physical exam small laceration with hemostasis achieved on right aspect of scalp.  Nursing notes reviewed.    CBC within normal limits.  CMP mildly hyponatremic, elevated anion gap to 20.  Magnesium within normal limits.  UDS positive for benzodiazepines.  Blood alcohol level 0.19.  CT head without contrast negative for acute abnormality.  Chronic microvascular ischemic changes appreciated.    In the ED, the patient's symptoms were managed with Zofran, IV LR, MSSA protocol, with improvement in symptoms upon reassessment.  Patient was unable to get a bed and detox center here so she was patient discharged to [place] detox.  Patient's friend brought her home medications for her stay there.  Patient clinically sober during her discharge and was provided a taxi to detox facility.    The complete clinical picture is most consistent with alcohol intoxication, alcohol withdrawal. After counseling on the diagnosis, work-up, and treatment plan,  the patient was discharged to [facility]. The patient was advised to return to the ED if worsening symptoms, new concerning symptoms, or any urgent health concerns. Patient seen and discussed with attending physician Dr. Caicedo who agrees with my plan of care.    --    ED Attending Physician Attestation    I John Caicedo DO, cared for this patient with the Advanced Practice Provider (MEGGAN). I personally provided a substantive portion of the care for this patient, including approving the care plan for the number and complexity of problems addressed and taking responsibility related to the risk of complications and/or morbidity or mortality of patient management. Please see the MEGGAN's documentation for full details.          John Caicedo DO  Emergency Medicine        Final diagnoses:   None     New Prescriptions    No medications on file     --  John Whitehead PA-C   Emergency Medicine   [facility] EMERGENCY DEPARTMENT  4/19/2025      I have reviewed the nursing notes. I have reviewed the findings, diagnosis, plan and need for follow up with the patient.    New Prescriptions    No medications on file       Final diagnoses:   None         [facility] EMERGENCY DEPARTMENT  4/19/2025     John Whitehead  04/19/25 2045       John Caicedo DO  04/19/25 2154

## 2025-04-19 NOTE — TELEPHONE ENCOUNTER
S: [facility] , Provider Charley calling at 5:15 PM with clinical on 65 year old/female presenting for alcohol detox.     B: Pt presents for ETOH detox.   Currently reports drinking 1.5 l of wine for the past couple weeks.    Patient reports last use was this afternoon.  Pt LEOBARDO: 0.289  Pt  denies hx of DT  Pt  endorses hx of seizures. Last seizure:  a month ago  Pt endorsing the following symptoms of withdrawal:vomiting   MSSA Score: 10    Pt denies acute mental health or medical concerns.  Pt reports she vomited blood this morning.  Not currently vomiting, pt is medically cleared.  Pt denies other drug use: None Amount/frequency: N/A    Does Pt have a detox care plan in [facility]? No  Does pt present with specific needs, assistive devices, or exclusionary criteria? None  Is the patient ambulating, eating and drinking in the ED? Yes    A: Pt meets criteria to be presented for IP detox admission. Patient is voluntary    COVID Symptoms: No  If yes, COVID test required   Utox: Positive for benzos  Magnesium: WNL  CMP: Abnormalities: Sodium 132, CO2 19, Anion Gap 20, Chloride 93  CBC: Abnormalities: MCH 35.1, MCHC 36.9  HCG: N/A     R: Patient cleared and ready for behavioral bed placement: Yes    Pt is meeting criteria for presentation to 3A/CD    Does Patient need a Transfer Center request created? No, Pt is located within [facility] ED, [facility] ED, or [place] ED    R: 5:32 PM received call from JESENIA Felxi stating they need to block a bed on the unit and they no longer are able to take any F pt's.  I clarified with JESENIA, I just got clinical on this pt and I now need to call the ED and tell them 3A no longer has a bed which JESENIA stated yes.  She stated there is no way to move pt's around to open up another F bed.    5:35 PM called Dr Whitehead in the ED and updated him 3A no longer has a bed available for pt.    The ED will need to seek alternative detox placement for pt, as there is no option for 3A.    5:45 PM called 3A to verify  there is no way to open a bed for pt.  CRN reports there is no appropriate F bed.  Pt removed from detox worklist, as bed is no longer available per 3A.

## 2025-04-19 NOTE — ED TRIAGE NOTES
Triage Assessment (Adult)       Row Name 04/19/25 9746          Triage Assessment    Airway WDL WDL        Respiratory WDL    Respiratory WDL WDL        Skin Circulation/Temperature WDL    Skin Circulation/Temperature WDL WDL        Cardiac WDL    Cardiac WDL WDL        Peripheral/Neurovascular WDL    Peripheral Neurovascular WDL WDL        Cognitive/Neuro/Behavioral WDL    Cognitive/Neuro/Behavioral WDL WDL

## 2025-04-19 NOTE — MEDICATION SCRIBE - ADMISSION MEDICATION HISTORY
Medication Scribe Admission Medication History    Admission medication history is complete. The information provided in this note is only as accurate as the sources available at the time of the update.    Information Source(s): Patient and CareEverywhere/SureScripts via in-person    Pertinent Information: N/A    Changes made to PTA medication list:  Added: None  Deleted: None  Changed: melatonin changed from 10mg at bedtime to 5mg at bedtime     Allergies reviewed with patient and updates made in EHR: yes    Medication History Completed By: Fernanda Guzman 4/19/2025 6:38 PM    PTA Med List   Medication Sig Last Dose/Taking    eszopiclone (LUNESTA) 3 MG tablet Take 1 tablet (3 mg) by mouth at bedtime. 4/18/2025 Bedtime    gabapentin (NEURONTIN) 600 MG tablet Take 1.5 tablets (900 mg) by mouth 3 times daily. (Patient taking differently: Take 1,200 mg by mouth 3 times daily.) 4/19/2025 Morning    levothyroxine (SYNTHROID/LEVOTHROID) 100 MCG tablet Take 1 tablet (100 mcg) by mouth every morning. 4/19/2025 Morning    Lidocaine (LIDOCARE) 4 % Patch Place 2 patches over 12 hours onto the skin every 24 hours. To prevent lidocaine toxicity, patient should be patch free for 12 hrs daily. (Patient taking differently: Place 2 patches onto the skin daily as needed for moderate pain. To prevent lidocaine toxicity, patient should be patch free for 12 hrs daily.) Past Month    melatonin 5 MG tablet Take 5 mg by mouth at bedtime. Takes 1 hour prior to Lunesta 4/18/2025 Bedtime    multivitamin w/minerals (THERA-VIT-M) tablet Take 1 tablet by mouth daily. 4/19/2025 Morning    nicotine polacrilex (NICORETTE) 4 MG gum Place 1 each (4 mg) inside cheek every hour as needed for nicotine withdrawal symptoms. Past Month    omeprazole (PRILOSEC) 40 MG DR capsule Take 1 capsule (40 mg) by mouth every morning. 4/19/2025 Morning    ondansetron (ZOFRAN ODT) 4 MG ODT tab Take 1 tablet (4 mg) by mouth every 8 hours as needed for vomiting or  nausea. Past Month    propranolol (INDERAL) 20 MG tablet Take 1 tablet (20 mg) by mouth 3 times daily. 4/19/2025 Morning    thiamine (B-1) 100 MG tablet Take 1 tablet (100 mg) by mouth daily. 4/19/2025 Morning

## 2025-04-19 NOTE — ED NOTES
Bed: RICARDO  Expected date: 4/19/25  Expected time:   Means of arrival:   Comments:  Ang 429 64 y/o F ETOH vomiting blood

## 2025-04-19 NOTE — ED TRIAGE NOTES
Patient presents with EMS, she has been drinking. Had a liter of vodka over a day. Vomiting this morning, states she vomited blood. EMS reports they saw what looked like cleaned up blood on the floor at the home. No history of esophageal varices.  4 mg iv zofran given  20g placed   Patient is anxious, denied need for meds.  Blood sugar 94.

## 2025-04-20 ENCOUNTER — APPOINTMENT (OUTPATIENT)
Dept: GENERAL RADIOLOGY | Facility: CLINIC | Age: 65
DRG: 897 | End: 2025-04-20
Attending: EMERGENCY MEDICINE
Payer: MEDICARE

## 2025-04-20 ENCOUNTER — TELEPHONE (OUTPATIENT)
Dept: BEHAVIORAL HEALTH | Facility: CLINIC | Age: 65
End: 2025-04-20

## 2025-04-20 LAB
ALBUMIN SERPL BCG-MCNC: 4.5 G/DL (ref 3.5–5.2)
ALCOHOL BREATH TEST: 0 (ref 0–0.01)
ALP SERPL-CCNC: 51 U/L (ref 40–150)
ALT SERPL W P-5'-P-CCNC: 29 U/L (ref 0–50)
AMPHETAMINES UR QL SCN: ABNORMAL
ANION GAP SERPL CALCULATED.3IONS-SCNC: 18 MMOL/L (ref 7–15)
AST SERPL W P-5'-P-CCNC: 34 U/L (ref 0–45)
BARBITURATES UR QL SCN: ABNORMAL
BASOPHILS # BLD AUTO: 0.1 10E3/UL (ref 0–0.2)
BASOPHILS NFR BLD AUTO: 1 %
BENZODIAZ UR QL SCN: ABNORMAL
BILIRUB SERPL-MCNC: 0.5 MG/DL
BUN SERPL-MCNC: 8 MG/DL (ref 8–23)
BZE UR QL SCN: ABNORMAL
CALCIUM SERPL-MCNC: 9.4 MG/DL (ref 8.8–10.4)
CANNABINOIDS UR QL SCN: ABNORMAL
CHLORIDE SERPL-SCNC: 92 MMOL/L (ref 98–107)
CREAT SERPL-MCNC: 0.56 MG/DL (ref 0.51–0.95)
EGFRCR SERPLBLD CKD-EPI 2021: >90 ML/MIN/1.73M2
EOSINOPHIL # BLD AUTO: 0 10E3/UL (ref 0–0.7)
EOSINOPHIL NFR BLD AUTO: 0 %
ERYTHROCYTE [DISTWIDTH] IN BLOOD BY AUTOMATED COUNT: 12.6 % (ref 10–15)
FENTANYL UR QL: ABNORMAL
GLUCOSE SERPL-MCNC: 81 MG/DL (ref 70–99)
HCO3 SERPL-SCNC: 22 MMOL/L (ref 22–29)
HCT VFR BLD AUTO: 39.3 % (ref 35–47)
HGB BLD-MCNC: 14.5 G/DL (ref 11.7–15.7)
IMM GRANULOCYTES # BLD: 0 10E3/UL
IMM GRANULOCYTES NFR BLD: 0 %
LYMPHOCYTES # BLD AUTO: 1.5 10E3/UL (ref 0.8–5.3)
LYMPHOCYTES NFR BLD AUTO: 24 %
MAGNESIUM SERPL-MCNC: 1.9 MG/DL (ref 1.7–2.3)
MCH RBC QN AUTO: 35.9 PG (ref 26.5–33)
MCHC RBC AUTO-ENTMCNC: 36.9 G/DL (ref 31.5–36.5)
MCV RBC AUTO: 97 FL (ref 78–100)
MONOCYTES # BLD AUTO: 0.9 10E3/UL (ref 0–1.3)
MONOCYTES NFR BLD AUTO: 14 %
NEUTROPHILS # BLD AUTO: 3.8 10E3/UL (ref 1.6–8.3)
NEUTROPHILS NFR BLD AUTO: 61 %
NRBC # BLD AUTO: 0 10E3/UL
NRBC BLD AUTO-RTO: 0 /100
OPIATES UR QL SCN: ABNORMAL
PCP QUAL URINE (ROCHE): ABNORMAL
PLATELET # BLD AUTO: 254 10E3/UL (ref 150–450)
POTASSIUM SERPL-SCNC: 4.4 MMOL/L (ref 3.4–5.3)
PROT SERPL-MCNC: 8 G/DL (ref 6.4–8.3)
RBC # BLD AUTO: 4.04 10E6/UL (ref 3.8–5.2)
SODIUM SERPL-SCNC: 132 MMOL/L (ref 135–145)
WBC # BLD AUTO: 6.3 10E3/UL (ref 4–11)

## 2025-04-20 PROCEDURE — 36415 COLL VENOUS BLD VENIPUNCTURE: CPT | Performed by: EMERGENCY MEDICINE

## 2025-04-20 PROCEDURE — 250N000013 HC RX MED GY IP 250 OP 250 PS 637: Performed by: PSYCHIATRY & NEUROLOGY

## 2025-04-20 PROCEDURE — 128N000004 HC R&B CD ADULT

## 2025-04-20 PROCEDURE — 99207 PR NO BILLABLE SERVICE THIS VISIT: CPT | Performed by: PHYSICIAN ASSISTANT

## 2025-04-20 PROCEDURE — H2032 ACTIVITY THERAPY, PER 15 MIN: HCPCS

## 2025-04-20 PROCEDURE — 250N000013 HC RX MED GY IP 250 OP 250 PS 637: Performed by: EMERGENCY MEDICINE

## 2025-04-20 PROCEDURE — 83735 ASSAY OF MAGNESIUM: CPT | Performed by: EMERGENCY MEDICINE

## 2025-04-20 PROCEDURE — 85004 AUTOMATED DIFF WBC COUNT: CPT | Performed by: EMERGENCY MEDICINE

## 2025-04-20 PROCEDURE — 80307 DRUG TEST PRSMV CHEM ANLYZR: CPT | Performed by: EMERGENCY MEDICINE

## 2025-04-20 PROCEDURE — 97150 GROUP THERAPEUTIC PROCEDURES: CPT | Mod: GO

## 2025-04-20 PROCEDURE — 82947 ASSAY GLUCOSE BLOOD QUANT: CPT | Performed by: EMERGENCY MEDICINE

## 2025-04-20 PROCEDURE — 73562 X-RAY EXAM OF KNEE 3: CPT | Mod: LT

## 2025-04-20 PROCEDURE — HZ2ZZZZ DETOXIFICATION SERVICES FOR SUBSTANCE ABUSE TREATMENT: ICD-10-PCS | Performed by: PSYCHIATRY & NEUROLOGY

## 2025-04-20 PROCEDURE — 99223 1ST HOSP IP/OBS HIGH 75: CPT | Performed by: PSYCHIATRY & NEUROLOGY

## 2025-04-20 RX ORDER — PROPRANOLOL HCL 20 MG
20 TABLET ORAL 3 TIMES DAILY
Status: DISCONTINUED | OUTPATIENT
Start: 2025-04-20 | End: 2025-04-20

## 2025-04-20 RX ORDER — ONDANSETRON 4 MG/1
4 TABLET, ORALLY DISINTEGRATING ORAL EVERY 8 HOURS PRN
Status: DISCONTINUED | OUTPATIENT
Start: 2025-04-20 | End: 2025-04-22 | Stop reason: HOSPADM

## 2025-04-20 RX ORDER — LORAZEPAM 1 MG/1
1-4 TABLET ORAL EVERY 30 MIN PRN
Status: DISCONTINUED | OUTPATIENT
Start: 2025-04-20 | End: 2025-04-20

## 2025-04-20 RX ORDER — ATENOLOL 50 MG/1
50 TABLET ORAL DAILY PRN
Status: DISCONTINUED | OUTPATIENT
Start: 2025-04-20 | End: 2025-04-20

## 2025-04-20 RX ORDER — ACETAMINOPHEN 500 MG
500-1000 TABLET ORAL EVERY 6 HOURS PRN
COMMUNITY

## 2025-04-20 RX ORDER — HYDROXYZINE HYDROCHLORIDE 25 MG/1
25 TABLET, FILM COATED ORAL EVERY 4 HOURS PRN
Status: DISCONTINUED | OUTPATIENT
Start: 2025-04-20 | End: 2025-04-22 | Stop reason: HOSPADM

## 2025-04-20 RX ORDER — GABAPENTIN 300 MG/1
1200 CAPSULE ORAL ONCE
Status: COMPLETED | OUTPATIENT
Start: 2025-04-20 | End: 2025-04-20

## 2025-04-20 RX ORDER — LIDOCAINE 4 G/G
1 PATCH TOPICAL
Status: DISCONTINUED | OUTPATIENT
Start: 2025-04-20 | End: 2025-04-20

## 2025-04-20 RX ORDER — ACETAMINOPHEN 325 MG/1
650 TABLET ORAL EVERY 4 HOURS PRN
Status: DISCONTINUED | OUTPATIENT
Start: 2025-04-20 | End: 2025-04-22 | Stop reason: HOSPADM

## 2025-04-20 RX ORDER — PANTOPRAZOLE SODIUM 40 MG/1
40 TABLET, DELAYED RELEASE ORAL
Status: DISCONTINUED | OUTPATIENT
Start: 2025-04-20 | End: 2025-04-22 | Stop reason: HOSPADM

## 2025-04-20 RX ORDER — GABAPENTIN 300 MG/1
900 CAPSULE ORAL ONCE
Status: DISCONTINUED | OUTPATIENT
Start: 2025-04-20 | End: 2025-04-20

## 2025-04-20 RX ORDER — LEVOTHYROXINE SODIUM 100 UG/1
100 TABLET ORAL EVERY MORNING
Status: DISCONTINUED | OUTPATIENT
Start: 2025-04-20 | End: 2025-04-22 | Stop reason: HOSPADM

## 2025-04-20 RX ORDER — MAGNESIUM HYDROXIDE/ALUMINUM HYDROXICE/SIMETHICONE 120; 1200; 1200 MG/30ML; MG/30ML; MG/30ML
30 SUSPENSION ORAL EVERY 4 HOURS PRN
Status: DISCONTINUED | OUTPATIENT
Start: 2025-04-20 | End: 2025-04-22 | Stop reason: HOSPADM

## 2025-04-20 RX ORDER — OMEPRAZOLE 20 MG/1
40 CAPSULE, DELAYED RELEASE ORAL EVERY MORNING
Status: DISCONTINUED | OUTPATIENT
Start: 2025-04-20 | End: 2025-04-20

## 2025-04-20 RX ORDER — LOPERAMIDE HYDROCHLORIDE 2 MG/1
2 CAPSULE ORAL 4 TIMES DAILY PRN
COMMUNITY

## 2025-04-20 RX ORDER — FOLIC ACID 1 MG/1
1 TABLET ORAL DAILY
Status: DISCONTINUED | OUTPATIENT
Start: 2025-04-20 | End: 2025-04-22 | Stop reason: HOSPADM

## 2025-04-20 RX ORDER — LEVALBUTEROL TARTRATE 45 UG/1
2 AEROSOL, METERED ORAL EVERY 4 HOURS PRN
COMMUNITY

## 2025-04-20 RX ORDER — GABAPENTIN 600 MG/1
1200 TABLET ORAL 3 TIMES DAILY
Status: DISCONTINUED | OUTPATIENT
Start: 2025-04-20 | End: 2025-04-20

## 2025-04-20 RX ORDER — DIAZEPAM 5 MG/1
5-20 TABLET ORAL EVERY 30 MIN PRN
Status: DISCONTINUED | OUTPATIENT
Start: 2025-04-20 | End: 2025-04-22 | Stop reason: HOSPADM

## 2025-04-20 RX ORDER — PROPRANOLOL HCL 20 MG
20 TABLET ORAL 3 TIMES DAILY
Status: DISCONTINUED | OUTPATIENT
Start: 2025-04-20 | End: 2025-04-22 | Stop reason: HOSPADM

## 2025-04-20 RX ORDER — MULTIPLE VITAMINS W/ MINERALS TAB 9MG-400MCG
1 TAB ORAL DAILY
Status: DISCONTINUED | OUTPATIENT
Start: 2025-04-20 | End: 2025-04-20

## 2025-04-20 RX ORDER — MULTIPLE VITAMINS W/ MINERALS TAB 9MG-400MCG
1 TAB ORAL DAILY
Status: DISCONTINUED | OUTPATIENT
Start: 2025-04-21 | End: 2025-04-22 | Stop reason: HOSPADM

## 2025-04-20 RX ORDER — LIDOCAINE 4 G/G
2 PATCH TOPICAL DAILY PRN
Status: DISCONTINUED | OUTPATIENT
Start: 2025-04-20 | End: 2025-04-22 | Stop reason: HOSPADM

## 2025-04-20 RX ORDER — LIDOCAINE 4 G/G
1 PATCH TOPICAL DAILY PRN
COMMUNITY

## 2025-04-20 RX ORDER — GABAPENTIN 600 MG/1
1200 TABLET ORAL 3 TIMES DAILY
COMMUNITY

## 2025-04-20 RX ORDER — GABAPENTIN 600 MG/1
1200 TABLET ORAL 3 TIMES DAILY
Status: DISCONTINUED | OUTPATIENT
Start: 2025-04-20 | End: 2025-04-22 | Stop reason: HOSPADM

## 2025-04-20 RX ADMIN — ACETAMINOPHEN 650 MG: 325 TABLET, FILM COATED ORAL at 09:16

## 2025-04-20 RX ADMIN — NICOTINE POLACRILEX 4 MG: 4 GUM, CHEWING BUCCAL at 06:32

## 2025-04-20 RX ADMIN — PROPRANOLOL HYDROCHLORIDE 20 MG: 20 TABLET ORAL at 16:03

## 2025-04-20 RX ADMIN — LEVOTHYROXINE SODIUM 100 MCG: 100 TABLET ORAL at 11:34

## 2025-04-20 RX ADMIN — NICOTINE POLACRILEX 4 MG: 4 GUM, CHEWING BUCCAL at 08:15

## 2025-04-20 RX ADMIN — NICOTINE POLACRILEX 4 MG: 4 GUM, CHEWING BUCCAL at 12:01

## 2025-04-20 RX ADMIN — DIAZEPAM 10 MG: 5 TABLET ORAL at 03:24

## 2025-04-20 RX ADMIN — NICOTINE POLACRILEX 4 MG: 4 GUM, CHEWING BUCCAL at 20:24

## 2025-04-20 RX ADMIN — NICOTINE POLACRILEX 2 MG: 2 GUM, CHEWING BUCCAL at 05:10

## 2025-04-20 RX ADMIN — GABAPENTIN 1200 MG: 600 TABLET, FILM COATED ORAL at 11:34

## 2025-04-20 RX ADMIN — PANTOPRAZOLE SODIUM 40 MG: 40 TABLET, DELAYED RELEASE ORAL at 11:34

## 2025-04-20 RX ADMIN — NICOTINE POLACRILEX 4 MG: 4 GUM, CHEWING BUCCAL at 15:24

## 2025-04-20 RX ADMIN — GABAPENTIN 1200 MG: 300 CAPSULE ORAL at 01:50

## 2025-04-20 RX ADMIN — NICOTINE POLACRILEX 4 MG: 4 GUM, CHEWING BUCCAL at 22:07

## 2025-04-20 RX ADMIN — PROPRANOLOL HYDROCHLORIDE 20 MG: 20 TABLET ORAL at 11:34

## 2025-04-20 RX ADMIN — LIDOCAINE 4% 1 PATCH: 40 PATCH TOPICAL at 01:49

## 2025-04-20 RX ADMIN — DIAZEPAM 10 MG: 5 TABLET ORAL at 06:09

## 2025-04-20 RX ADMIN — FOLIC ACID 1 MG: 1 TABLET ORAL at 08:15

## 2025-04-20 RX ADMIN — Medication 1 TABLET: at 08:15

## 2025-04-20 RX ADMIN — NICOTINE POLACRILEX 4 MG: 4 GUM, CHEWING BUCCAL at 09:18

## 2025-04-20 RX ADMIN — NICOTINE POLACRILEX 4 MG: 4 GUM, CHEWING BUCCAL at 17:54

## 2025-04-20 RX ADMIN — PROPRANOLOL HYDROCHLORIDE 20 MG: 20 TABLET ORAL at 20:25

## 2025-04-20 RX ADMIN — GABAPENTIN 1200 MG: 600 TABLET, FILM COATED ORAL at 20:24

## 2025-04-20 RX ADMIN — THIAMINE HCL TAB 100 MG 100 MG: 100 TAB at 11:34

## 2025-04-20 RX ADMIN — NICOTINE POLACRILEX 4 MG: 4 GUM, CHEWING BUCCAL at 18:53

## 2025-04-20 RX ADMIN — NICOTINE POLACRILEX 2 MG: 2 GUM, CHEWING ORAL at 04:17

## 2025-04-20 RX ADMIN — GABAPENTIN 1200 MG: 600 TABLET, FILM COATED ORAL at 16:03

## 2025-04-20 RX ADMIN — Medication 3 MG: at 20:25

## 2025-04-20 RX ADMIN — DIAZEPAM 10 MG: 5 TABLET ORAL at 12:30

## 2025-04-20 RX ADMIN — NICOTINE POLACRILEX 4 MG: 4 GUM, CHEWING BUCCAL at 16:42

## 2025-04-20 RX ADMIN — ACETAMINOPHEN 650 MG: 325 TABLET, FILM COATED ORAL at 04:57

## 2025-04-20 RX ADMIN — DIAZEPAM 10 MG: 5 TABLET ORAL at 01:03

## 2025-04-20 RX ADMIN — NICOTINE POLACRILEX 4 MG: 4 GUM, CHEWING BUCCAL at 14:10

## 2025-04-20 RX ADMIN — NICOTINE POLACRILEX 4 MG: 4 GUM, CHEWING BUCCAL at 10:45

## 2025-04-20 ASSESSMENT — ACTIVITIES OF DAILY LIVING (ADL)
HYGIENE/GROOMING: INDEPENDENT
ADLS_ACUITY_SCORE: 45
LAUNDRY: WITH SUPERVISION
ADLS_ACUITY_SCORE: 45
DRESS: SCRUBS (BEHAVIORAL HEALTH)
ADLS_ACUITY_SCORE: 45
ADLS_ACUITY_SCORE: 68
HYGIENE/GROOMING: INDEPENDENT
ADLS_ACUITY_SCORE: 45
ADLS_ACUITY_SCORE: 58
ADLS_ACUITY_SCORE: 45
ORAL_HYGIENE: INDEPENDENT
ADLS_ACUITY_SCORE: 68
ADLS_ACUITY_SCORE: 58
ADLS_ACUITY_SCORE: 58
DRESS: INDEPENDENT
ADLS_ACUITY_SCORE: 45
ORAL_HYGIENE: INDEPENDENT
ADLS_ACUITY_SCORE: 45
ADLS_ACUITY_SCORE: 58
ORAL_HYGIENE: INDEPENDENT
HYGIENE/GROOMING: INDEPENDENT
ADLS_ACUITY_SCORE: 58
ADLS_ACUITY_SCORE: 45
ADLS_ACUITY_SCORE: 45
DRESS: INDEPENDENT

## 2025-04-20 ASSESSMENT — COLUMBIA-SUICIDE SEVERITY RATING SCALE - C-SSRS
2. HAVE YOU ACTUALLY HAD ANY THOUGHTS OF KILLING YOURSELF IN THE PAST MONTH?: NO
1. IN THE PAST MONTH, HAVE YOU WISHED YOU WERE DEAD OR WISHED YOU COULD GO TO SLEEP AND NOT WAKE UP?: NO
6. HAVE YOU EVER DONE ANYTHING, STARTED TO DO ANYTHING, OR PREPARED TO DO ANYTHING TO END YOUR LIFE?: YES

## 2025-04-20 ASSESSMENT — LIFESTYLE VARIABLES: SKIP TO QUESTIONS 9-10: 0

## 2025-04-20 NOTE — PROGRESS NOTES
[address]     Daily Encounter: Met with team, discussed patient progress, discharge plan and any impediments to discharge.    Writer met with patient to discuss discharge planning and aftercare plans, patient stated that she has a trip to attend in 3 weeks and wants to attend the trip then go to [facility] for her mental health at . Patient stated she has a EMDR therapist at this time and looks forward to working with the therapist. At this time patient is declining all services and resources.     Insurance: Medicare      Legal Status:  Voluntary    County: N/A  File Number: N/A  Start and expiration date of commitment: N/A    SUDs Assessment Status: None is needed at this time.     ROIs on file: None at this time.     Living Situation: Patient reports she lives in home and reports the home is stable.      Current Providers, Supports & Collateral:  None    Current Plan/Referral Status: Detox Only      Safety Plan Status: Pt declined a copy      BRITTNEE ERICKSON  [address] - Adult Inpatient Addiction Psychiatry Unit

## 2025-04-20 NOTE — TELEPHONE ENCOUNTER
S: [facility] , Provider Sascha calling at 2:19 AM with clinical on 65 year old/female presenting for alcohol detox.     B: Pt presents for ETOH detox.   Currently reports drinking 1 1/2 liter of wm daily.    Patient reports last use was 4/19/25.  Pt LEOBARDO: 0  Pt  denies hx of DT  Pt  endorses hx of seizures. Last seizure:  6 months ago  Pt endorsing the following symptoms of withdrawal: Tremulous  MSSA Score: 3    Pt denies acute mental health or medical concerns.   Pt denies other drug use: (!) ALCOHOL Amount/frequency:  Daily    Does Pt have a detox care plan in [facility]? No  Does pt present with specific needs, assistive devices, or exclusionary criteria? None  Is the patient ambulating, eating and drinking in the ED? Yes    A: Pt meets criteria to be presented for IP detox admission. Patient is voluntary    COVID Symptoms: No  If yes, COVID test required   Utox: Negative  Magnesium: WNL  CMP: Abnormalities: See Labs  CBC: Abnormalities: MCH: 35.9, MCHC: 36.9  HCG: N/A     R: Patient cleared and ready for behavioral bed placement: Yes    Pt is meeting criteria for presentation to 3A/CD    Does Patient need a Transfer Center request created? No, Pt is located within [facility] ED, [facility] ED, or [place] ED     3:37 AM - Called [facility] to request a phone consult.  4:04 AM - Dr. Cerda accepts pt. Pt placed in queue for 3A.    R: Final disposition: KATHLEEN / Dr. Haskins / CD    4:01 AM - Notified unit CRN.  4:23 AM - Notified ED RN Elvi Marin completed    No further action needed at this time.

## 2025-04-20 NOTE — DISCHARGE INSTRUCTIONS
DISCHARGE RESOURCES:  -SMART Recovery - self management for addiction recovery:  www.smartrecovery.org    -Pathways ~ A Health Crisis Resource & Support Center: 719.667.1494.  -[facility] [phone]   -[facility] Behavioral Intake [phone] or [phone].  -Crisis Intervention: 150.139.1051 or 617-428-0112 (TTY: 994.702.3970).  Call anytime.  -Suicide Awareness Voices of Education (SAVE) (www.save.org): 612-287-DZQU (7444)  -National Suicide Prevention Line (www.mentalhealthmn.org): 967-461-QBTT (1946)  -National [place] on Mental Illness (www.mn.sera.org): 911.795.6086 or 980-349-0432.  -Zjgf6anno: text the word LIFE to 50619 for immediate support and crisis intervention  -Mental Health Consumer/Survivor Network of [place] (www.mhcsn.net): 399.764.9819 or 712-925-9140  -Mental Health Association of [place] (www.mentalhealth.org): 327.799.6384 or 924-057-7191     -Substance Abuse and Mental Health Services (www.samhsa.gov)  -Harm Reduction Coalition (www. Harmreduction.org)  -www.prescribetoprevent.org or http://prescribetoprevent.org/video  -Poison control 9-238-476-9736   -[place] Opioid Prevention Coalition: www.opioidcoalition.org      Sober Support Group Information:  AA/NA & Sponsor/Support  -Alcoholics Anonymous (www.alcoholics-anonymous.org): for local information 24 hours/day  -AA Intergroup service office in [place] (http://www.aastpaul.org/) 269.321.9986  -AA Intergroup service office in [facility]: 686.962.7477. (http://www.a[place].org/)  -Narcotics Anonymous (www.nam[place].org) (359) 903-9843   -Sober Fun Activities: www.soberUeeeU.comactivities.[employer]/[facility]//[facility] Recovery Connection (MRC)  [facility] connects people seeking recovery to resources that help foster and sustain long-term recovery.  Whether you are seeking resources for treatment, transportation, housing, job training, education, health care or other pathways to recovery, [facility] is  a great place to start.    Phone: 700.531.1581. www.[place]Tagwhat.[employer] (Great listing of all types of recovery and non-recovery related resources)      General Medication Instructions:   See your medication sheet(s) for instructions.   Take all medicines as directed.  Make no changes unless your doctor suggests them.   Go to all your doctor visits.  Be sure to have all your required lab tests. This way, your medicines can be refilled on time.  Do not use any drugs not prescribed by your provider.  AA/NA and Sponsors are excellent resources for support  Avoid alcohol.      Any follow up concerns:  Nursing questions call the Unit 3A-[facility] [phone]  Medical Record call [phone]  Outpatient Behavioral Intake call [phone]  LP+ Wait List/Bed Availability call [phone]

## 2025-04-20 NOTE — DISCHARGE INSTRUCTIONS
Behavioral Discharge Planning and Instructions  THANK YOU FOR CHOOSING [facility] BART  3AW  [phone]    Summary: You were admitted to Station 3A on 4/20/2025 for detoxification from alcohol.  A medical exam was performed that included lab work. You have met with a [facility] Counselor and opted to detox only with plan to start IOP after a camping trip.  Please take care and make your recovery a daily priority, Jolynn!  It was a pleasure working with you and the entire treatment team here wishes you the very best in your recovery!     Recommendation:  It is recommended that you abstain from use of all mood-altering chemicals unless prescribed by a medical professional.      Substance use treatment services were recommended, but you have declined these services at this time. If at anytime after discharge you would like to pursue these services at [facility] please contact us at [phone].     Main Diagnoses:   Alcohol dependence with withdrawal with complication including history of seizures and DTs  Nicotine dependence with withdrawal   Hx of polysubstance use including IVDU and hx of Hep C s/p tx in 2015  Hx of opioid dependence in sustained remission   OMARI  Insomnia  BPAD, type 2, most recent episode depressed  PTSD      Major Treatments, Procedures and Findings:  You were treated for Alcohol  detoxification using Valium . You have met with a [facility] counselor to develop a treatment plan for discharge. You had labs drawn and those results were reviewed with you. Please take a copy of your lab work with you to your next primary care provider appointment.    Symptoms to Report:  If you experience more anxiety, confusion, sleeplessness, deep sadness or thoughts of suicide, notify your treatment team or notify your primary care provider. IF ANY OF THE SYMPTOMS YOU ARE EXPERIENCING ARE A MEDICAL EMERGENCY CALL 911 IMMEDIATELY.     Lifestyle Adjustment:   Health Action Plan:  1.Create a daily schedule  2. Eat  Healthy  3. Plan Enjoyable Sober Activities  4. Use Problem Solving Skills and Deal with Issues as they Arise.   5. Be Physically Active  6. Take your medications as prescribed  7. Get enough restful sleep  8. Practice Relaxation  9. Spend time with Supportive People  10. No use of alcohol, illegal drugs or addictive medications other than what is currently prescribed.   11.AA, NA Sponsor are excellent resources for support  12. Explore how  you can utilize spirituality in your recovery        Disposition: Return home.    Facts about COVID19 at www.cdc.gov/COVID19 and www.[place].gov/covid19    Keeping hands clean is one of the most important steps we can take to avoid getting sick and spreading germs to others.  Please wash your hands frequently and lather with soap for at least 20 seconds!    Follow-up Appointment:   You are aware you should make a follow up appointment with your primary care doctor for medical and medication management     Recovery apps for your phone for educational purposes and to locate in person and zoom recovery meetings  Everything AA -  mahnaz is a great resource  12 Step Toolkit - educational purpose learning about the 12 steps to recovery  Gap Cloud - meeting mahnaz  AA  - meeting mahnaz  Meeting guide - meeting mahnaz  Quick NA meeting - meeting mahnaz  Estela- has various apps    Patient Navigation Hub  [facility] s Navigators work to be your point-of-contact for trustworthy and compassionate care from Inpatient services to [facility] Programmatic Care. We will provide resources and communication to help guide you into programmatic care. Ultimately, our goal is to be the one-stop-shop of communication, coordination, and support for your journey to programmatic care.  Phone: [phone]    Resources:  AA/NA meetings have returned to in-person or a hybrid combination of zoom/in-person therefore please check online to verify time.  Need Support Now? If you or someone you know is  "struggling or in crisis, help is available. Call or text 497 or chat 98[employer].org  AA meetings search for them at: https://aa-intergroup.org (worldwide meeting listings)  AA meetings for [place] area can be found online at: https://a[place].org (click local online meetings listings)  NA meetings for [place] area can be found online at: https://www.nam[place].org  (click find a meeting)  AA and NA Sponsors are excellent resources for support and you can find one at any support group meeting.   Alcoholics Anonymous (https://aa.org/): for information 24 hours/day  AA Intergroup service office in [place] (http://www.aastpaul.org/) [phone]  AA Intergroup service office in [facility]: [phone]. (http://www.aaNovoDynamics.org/)  Narcotics Anonymous (www.Bluepay[place].org) (334) 380-9396  https://aafairviewriverside.org/meetings  SMART Recovery - self management for addiction recovery:  www.smartrecovery.org  Pathways ~ A Health Crisis Resource & Support Center:  929.639.7642.  https://prescribetoprevent.org/patient-education/videos/  http://www.harmreduction.org  Crisis Text Line  Text 977466  You will be connected with a trained live crisis counselor to provide support. Por espanol, texto  JEANNETTE a 537111 o texto a 442-AYUDAME en WhatsApp  [place] Hope Line  1.800.SUICIDE [5642363]  [facility] [phone]  Support Group:  AA/NA and Sponsor/support.  Fast Tracker  Linking people to mental health and substance use disorder resources  [employer].org   [place] Mental Health Warm Line  Peer to peer support  Monday thru Saturday, 12 pm to 10 pm  448.034.9002 or 7.683.466.7834  Text \"Support\" to 98646  National [place] on Mental Illness (MARITZA)  840.593.9515 or 1.888.MARITZA.HELPS  Alcoholics Anonymous (www.alcoholics-anonymous.org): Check your phone book for your local chapter.  Suicide Awareness Voices of Education (SAVE) (www.save.org): 705-848-IFOQ (2249)  National Suicide " Prevention Line (www.mentalhealthmn.org): 479-837-OFML (7586)  Mental Health Consumer/Survivor Network of [place] (www.mhcsn.net): 158.505.6694 or 347-925-4847  Mental Health Association of [place] (www.mentalhealth.org): 255.198.2384 or 282-990-7218   Substance Abuse and Mental Health Services (www.sama.gov)  [place] Opioid Prevention Coalition: www.opioidcoalition.org    [place] Recovery Connection (MRC)  [facility] connects people seeking recovery to resources that help foster and sustain long-term recovery.  Whether you are seeking resources for treatment, transportation, housing, job training, education, health care or other pathways to recovery, [facility] is a great place to start.  624.241.5693.  www.minnesotarecSEPMAG Technologiesy.Hubub    Great Pod casts for nutrition and wellness  Listen on Apple Podcasts  Dishing Up Nutrition   [employer]   Nutrition       Understand the connection between what you eat and how you feel. Hosted by licensed nutritionists and dietitians from [employer] we share practical, real-life solutions for healthier living through nutrition.     General Medication Instructions:   See your medication sheet(s) for instructions.   Take all medications as prescribed.  Make no changes unless your primary care provider suggests them.   Go to all your primary care provider visits.  Be sure to have all your required lab tests. This way, your medicines can be refilled on time.  Do not use any forms of alcohol.    Please Note: If you have any questions at anytime after you discharged please call [facility] detox unit 3A at [phone].    Here are a list of additional numbers you can call if you are wanting to resume services through [facility]:  [facility] Assessment Intake: [phone]  [facility] Adult GIRMA Intensive Outpatient  call: [phone]  [facility] Admissions [phone]    Recovery Clinic call: [phone]  [place]  [facility]: [phone]  Medical Records call: [phone]  Billing Department call: [phone]    Please remember to take all of your behavioral discharge planning and lab paperwork to any follow up appointments, it contains your lab results, diagnosis, medication list and discharge recommendations.      THANK YOU FOR CHOOSING [facility]

## 2025-04-20 NOTE — H&P
"[facility]   Psychiatric History and Physical.    Chief complaint and reason for admission:     Detox from alcohol    History of present illness: per ED doctor's note: Jolynn Rivera is a 65 year old female with a history notable for alcohol use disorder, polysubstance abuse, bipolar disorder, anxiety, hypothyroidism who presents to the ED with knee pain and concerns of alcohol withdrawal. She drinks 1-1/2 L of wine per day.  They have a history of withdrawal symptoms and have had a seizure from alcohol in the past.  Last drink was earlier today.  No other drug use.  No suicidal or homicidal ideation.  She does note left knee pain, she believes she fell after blacking out.  No recent illness.  Patient was here earlier today for detox, had head CT done because she had recent fall. This was negative.  Initial plan was to go to detox however the bed was not available. No other symptoms noted.     During visit with this provider: reports longstanding hx of alcohol abuse. Went through detox at this facility only 2 weeks ago, but relapsed immediately and had been consuming 1.5 litre of wine daily. Reported experiencing symptoms of withdrawal when tried to quit drinking on her own: sweats, shakes. Denied current experiencing DTs or seizures. Stated that her plan is to detox and \"go camping, after that I will enroll into 55+ program at [facility]\".     Past psychiatric history: Patient reports that she first started drinking age 18. She states that her drinking became problematic in her 40's. Since then she has been in treatment around 6 times and in detox about 12 times. The patient has tolerance, withdrawal, progressive use, loss of control, spending more time using substances.  The patient has made attempts to quit, is experiencing cravings, reports negative consequences. Reports hx of DTs and seizures. Her most recent detox was on this unit in this April. From there [facility]. She reports that she relapsed shortly after that and did not complete that program.     Denies drug use, but used heroine 40 years ago.     Past medical history:    Anxiety      COPD (chronic obstructive pulmonary disease) (H)      COPD (chronic obstructive pulmonary disease) (H)      Hepatitis C      PTSD (post-traumatic stress disorder)      Seizures (H)       second to ETOH    Substance abuse (H)      Past Surgical History:   Procedure Laterality Date    LAPAROSCOPIC TUBAL LIGATION        ORTHOPEDIC SURGERY         Left knee    TONSILLECTOMY       Family and social history:   Family History   Problem Relation Age of Onset    Anxiety Disorder Mother      Asthma Mother      Alcohol/Drug Father      Prostate Cancer Father      Arthritis Father      Alcohol/Drug Brother      Alcohol/Drug Brother      Hypertension Brother      Alcohol/Drug Brother      Depression Brother      Psychotic Disorder Brother      Patient is , no children. Lives with her pets. Says that she is on disability and has some cleaning service.         Medications:     Current Facility-Administered Medications   Medication Dose Route Frequency Provider Last Rate Last Admin    folic acid (FOLVITE) tablet 1 mg  1 mg Oral Daily Kinsey Cerda MD   1 mg at 04/20/25 0815    gabapentin (NEURONTIN) tablet 1,200 mg  1,200 mg Oral TID Abdullahi Beltran MD   1,200 mg at 04/20/25 1134    levothyroxine (SYNTHROID/LEVOTHROID) tablet 100 mcg  100 mcg Oral QAM Abdullahi Beltran MD   100 mcg at 04/20/25 1134    [START ON 4/21/2025] multivitamin w/minerals (THERA-VIT-M) tablet 1 tablet  1 tablet Oral Daily Abdullahi Beltran MD        pantoprazole (PROTONIX) EC tablet 40 mg  40 mg Oral QAM AC Kinsey Cerda MD   40 mg at 04/20/25 1134    propranolol (INDERAL) tablet 20 mg  20 mg Oral TID Abdullahi Beltran MD   20 mg at 04/20/25 1134    thiamine (B-1) tablet 100 mg  100 mg Oral Daily Abdullahi Beltran  "MD   100 mg at 04/20/25 1134          Allergies:     Allergies   Allergen Reactions    Bee Venom Swelling    Wasps [Hornets] Swelling    Amlodipine      Nightmares    Antihistamines, Chlorpheniramine-Type [Antihistamines, Chlorpheniramine-Type]     Clonidine     Compazine      Lock jaw    Diphenhydramine Muscle Pain (Myalgia) and Cramps    Hydroxyzine Cramps     Lock jaw    Metoclopramide      Tardive Dyskinesia    Penicillins      Panic attack    Prednisone Anxiety     Panic attacks.      Prochlorperazine Muscle Pain (Myalgia) and Cramps    Trazodone Nausea and Vomiting and Confusion     \"I ended up falling and feeling like I was drunk\"    Umeclidinium Bromide      Mood swings + anger.          Labs:     Recent Results (from the past 24 hours)   Alcohol breath test POCT    Collection Time: 04/19/25  1:15 PM   Result Value Ref Range    Alcohol Breath Test 0.289 (A) 0.00 - 0.01   Comprehensive metabolic panel    Collection Time: 04/19/25  4:51 PM   Result Value Ref Range    Sodium 132 (L) 135 - 145 mmol/L    Potassium 4.0 3.4 - 5.3 mmol/L    Carbon Dioxide (CO2) 19 (L) 22 - 29 mmol/L    Anion Gap 20 (H) 7 - 15 mmol/L    Urea Nitrogen 8.0 8.0 - 23.0 mg/dL    Creatinine 0.51 0.51 - 0.95 mg/dL    GFR Estimate >90 >60 mL/min/1.73m2    Calcium 9.0 8.8 - 10.4 mg/dL    Chloride 93 (L) 98 - 107 mmol/L    Glucose 91 70 - 99 mg/dL    Alkaline Phosphatase 53 40 - 150 U/L    AST 39 0 - 45 U/L    ALT 26 0 - 50 U/L    Protein Total 8.0 6.4 - 8.3 g/dL    Albumin 4.6 3.5 - 5.2 g/dL    Bilirubin Total 0.3 <=1.2 mg/dL   Magnesium    Collection Time: 04/19/25  4:51 PM   Result Value Ref Range    Magnesium 1.7 1.7 - 2.3 mg/dL   Alcohol level blood    Collection Time: 04/19/25  4:51 PM   Result Value Ref Range    Alcohol ethyl 0.19 (H) <=0.01 g/dL   CBC with platelets and differential    Collection Time: 04/19/25  4:51 PM   Result Value Ref Range    WBC Count 6.7 4.0 - 11.0 10e3/uL    RBC Count 4.02 3.80 - 5.20 10e6/uL    Hemoglobin 14.1 " 11.7 - 15.7 g/dL    Hematocrit 38.2 35.0 - 47.0 %    MCV 95 78 - 100 fL    MCH 35.1 (H) 26.5 - 33.0 pg    MCHC 36.9 (H) 31.5 - 36.5 g/dL    RDW 12.5 10.0 - 15.0 %    Platelet Count 275 150 - 450 10e3/uL    % Neutrophils 56 %    % Lymphocytes 32 %    % Monocytes 11 %    % Eosinophils 0 %    % Basophils 0 %    % Immature Granulocytes 0 %    NRBCs per 100 WBC 0 <1 /100    Absolute Neutrophils 3.8 1.6 - 8.3 10e3/uL    Absolute Lymphocytes 2.2 0.8 - 5.3 10e3/uL    Absolute Monocytes 0.7 0.0 - 1.3 10e3/uL    Absolute Eosinophils 0.0 0.0 - 0.7 10e3/uL    Absolute Basophils 0.0 0.0 - 0.2 10e3/uL    Absolute Immature Granulocytes 0.0 <=0.4 10e3/uL    Absolute NRBCs 0.0 10e3/uL   Urine Drug Screen Panel    Collection Time: 04/19/25  4:56 PM   Result Value Ref Range    Amphetamines Urine Screen Negative Screen Negative    Barbituates Urine Screen Negative Screen Negative    Benzodiazepine Urine Screen Positive (A) Screen Negative    Cannabinoids Urine Screen Negative Screen Negative    Cocaine Urine Screen Negative Screen Negative    Fentanyl Qual Urine Screen Negative Screen Negative    Opiates Urine Screen Negative Screen Negative    PCP Urine Screen Negative Screen Negative   Comprehensive metabolic panel    Collection Time: 04/20/25 12:50 AM   Result Value Ref Range    Sodium 132 (L) 135 - 145 mmol/L    Potassium 4.4 3.4 - 5.3 mmol/L    Carbon Dioxide (CO2) 22 22 - 29 mmol/L    Anion Gap 18 (H) 7 - 15 mmol/L    Urea Nitrogen 8.0 8.0 - 23.0 mg/dL    Creatinine 0.56 0.51 - 0.95 mg/dL    GFR Estimate >90 >60 mL/min/1.73m2    Calcium 9.4 8.8 - 10.4 mg/dL    Chloride 92 (L) 98 - 107 mmol/L    Glucose 81 70 - 99 mg/dL    Alkaline Phosphatase 51 40 - 150 U/L    AST 34 0 - 45 U/L    ALT 29 0 - 50 U/L    Protein Total 8.0 6.4 - 8.3 g/dL    Albumin 4.5 3.5 - 5.2 g/dL    Bilirubin Total 0.5 <=1.2 mg/dL   Magnesium    Collection Time: 04/20/25 12:50 AM   Result Value Ref Range    Magnesium 1.9 1.7 - 2.3 mg/dL   CBC with platelets and  "differential    Collection Time: 04/20/25 12:50 AM   Result Value Ref Range    WBC Count 6.3 4.0 - 11.0 10e3/uL    RBC Count 4.04 3.80 - 5.20 10e6/uL    Hemoglobin 14.5 11.7 - 15.7 g/dL    Hematocrit 39.3 35.0 - 47.0 %    MCV 97 78 - 100 fL    MCH 35.9 (H) 26.5 - 33.0 pg    MCHC 36.9 (H) 31.5 - 36.5 g/dL    RDW 12.6 10.0 - 15.0 %    Platelet Count 254 150 - 450 10e3/uL    % Neutrophils 61 %    % Lymphocytes 24 %    % Monocytes 14 %    % Eosinophils 0 %    % Basophils 1 %    % Immature Granulocytes 0 %    NRBCs per 100 WBC 0 <1 /100    Absolute Neutrophils 3.8 1.6 - 8.3 10e3/uL    Absolute Lymphocytes 1.5 0.8 - 5.3 10e3/uL    Absolute Monocytes 0.9 0.0 - 1.3 10e3/uL    Absolute Eosinophils 0.0 0.0 - 0.7 10e3/uL    Absolute Basophils 0.1 0.0 - 0.2 10e3/uL    Absolute Immature Granulocytes 0.0 <=0.4 10e3/uL    Absolute NRBCs 0.0 10e3/uL   Alcohol breath test POCT    Collection Time: 04/20/25  1:43 AM   Result Value Ref Range    Alcohol Breath Test 0.000 0.00 - 0.01   Urine Drug Screen Panel    Collection Time: 04/20/25  4:05 AM   Result Value Ref Range    Amphetamines Urine Screen Negative Screen Negative    Barbituates Urine Screen Negative Screen Negative    Benzodiazepine Urine Screen Positive (A) Screen Negative    Cannabinoids Urine Screen Negative Screen Negative    Cocaine Urine Screen Negative Screen Negative    Fentanyl Qual Urine Screen Negative Screen Negative    Opiates Urine Screen Negative Screen Negative    PCP Urine Screen Negative Screen Negative          Psychiatric Examination:     BP (!) 176/77   Pulse 82   Temp 98.5  F (36.9  C) (Oral)   Resp 16   Ht 1.6 m (5' 3\")   Wt 62.1 kg (137 lb)   SpO2 95%   BMI 24.27 kg/m    Weight is 137 lbs 0 oz  Body mass index is 24.27 kg/m .                                             Appearance: awake, alert, dressed in hospital scrubs, and appeared as age stated  Attitude:  cooperative  Eye Contact:  fair  Mood:  anxious  Affect:  constricted mobility  Speech: "  clear, coherent  Psychomotor Behavior:  no evidence of tardive dyskinesia, dystonia, or tics  Throught Process:  logical, linear, and goal oriented  Associations:  no loose associations  Thought Content:  no evidence of suicidal ideation or homicidal ideation and no evidence of psychotic thought  Insight:  partial  Judgement:  limited  Oriented to:  time, person, and place  Attention Span and Concentration:  fair  Recent and Remote Memory:  fair       Review of systems:     For physical examination and 12 point review of system please, see note of John Baron,  from 4/20/2025.  I reviewed that note and agree with it.         DIagnoses:     Alcohol dependence with withdrawal with complication including history of seizures and DTs  Nicotine dependence with withdrawal   Hx of polysubstance use including IVDU and hx of Hep C s/p tx in 2015  Hx of opioid dependence in sustained remission   OMARI  Insomnia  BPAD, type 2, most recent episode depressed  PTSD  GERD  COPD  Pulmonary nodules   Hyponatremia  HTN  Hypothyroidism   Chronic back pain with sciatica          Plan:     Will treat alcohol withdrawal with Valium, will be seen by IM. Will restart PTA meds. Patient plans to enroll into 55+ program after discharge.    Total time spent was 57 minutes. Over 50% of times was spent counseling and coordination of care regarding coping skills, medication and discharge planning.

## 2025-04-20 NOTE — CONSULTS
Circumstances of recent discharge and re-admittance noted. Please refer to recent medicine consult in chart dated 4/5/25, which was reviewed.  Diagnoses: alcohol use disorder and withdrawal, HTN, COPD, GERD, hypothyroidism, chronic pain.     Home medications still need to be reordered by primary team, including her propranolol as BP is somewhat elevated.     Please re-consult with acute concerns.     Marsha Delaney PA-C  Hospitalist Service  Contact information available via [facility] Paging/Directory

## 2025-04-20 NOTE — PLAN OF CARE
S: The Patient arrived voluntarily at  0330 on April 20, 2025, in room 319-2, as a chemical dependency Patient (CD) from the [facility] Emergency Department. She is under the care of Dr. Cerda.     B:According to the information obtained from the emergency department notes, the RN-to-RN report, and the nursing assessment on the unit, Jolynn Rivera is a 65-year-old female with a past medical history of alcohol use disorder who presents with alcohol intoxication seeking detox. She reports she has been drinking for the last couple of weeks, reporting a liter and a half of wine per day, without other drug use. Her last drink was this afternoon(4/19) before arrival. She has a history of seizures; the previous seizure was a month ago from withdrawal. She has a history of DTs, the last episode before her previous admission to detox. She also reports hitting her head recently but is unsure exactly when. She is not able to report if she lost consciousness; she found out she hit her head by seeing the blood on the wall. Her goal is detox only because she is planning on going on a Sober Camp Trip with others with the same issue.    Lab  COVID Symptoms: No  Utox: Negative  Magnesium: WNL  CMP: Abnormalities: See Labs  CBC: Abnormalities: MCH: 35.9, MCHC: 36.9  HCG: N/A    A: The Patient willingly participated in the admission process and safety search. She denied experiencing suicidal thoughts, hallucinations currently, or homicidal thoughts but expressed feelings of anxiety and depression. The Patient has difficulty sleeping and has diarrhea but is eating okay. She has a history of detox hospitalizations, attendance in residential treatment programs and outpatient care, and a one-night stay at the hospital for mental health-related issues. She is a long-time drinker, which became a problem in her 40s. The Patient lives in [facility] and is disabled. She went to [facility], another detox facility, but left because she could not take the  medication that they provided for the detoxification process.      R: A team of psychiatrists, an internal medicine provider, and a licensed alcohol and drug counselor (LADC) will be responsible for today's patient care. The primary nurse will follow the [facility] protocol to assess the Patient and administer Valium as needed. The Team will assist the Patient in developing coping skills, setting daily goals, and conducting 15-minute safety checks.

## 2025-04-20 NOTE — PLAN OF CARE
Problem: Alcohol Withdrawal  Goal: Alcohol Withdrawal Symptom Control  Outcome: Progressing   Goal Outcome Evaluation:    The Patient's MSSA score was 9. She received 10 mg of Valium during the night to manage symptoms of alcohol withdrawal. The Team conducted safety checks every 15 minutes without any problems.

## 2025-04-20 NOTE — PLAN OF CARE
"  Rehab Group    Start time: 13:20  End time: 14:05  Patient time total: 45 minutes    attended full group    #6 attended   Group Type: occupational therapy   Group Topic Covered: balanced lifestyle and coping skills     Group Session Detail:  Participants were encouraged to create a visual representation to showcase their chosen personal positive affirmation. Educational discussion was facilitated identifying the benefits of engaging in positive self-talk and affirmations; such benefits include (but not limited too): shifting mindsets from negative to positive and constructive thoughts, foster resilience, change our inner dialogue to lead a more optimistic outlook on life, and improve self-perception and self-confidence.      Patient Response/Contribution:  cooperative with task, socially appropriate, listened actively, attentive, and actively engaged     Patient Detail:  Patient remained an attentive and engaged participant throughout full duration of group. Patient actively engaged in all of the components of group; appearing comfortable sharing thoughts and ideas with peers. Patient was able to select a personal positive affirmation, \"I am one with the universe.\" Patient appears to find comfort and mark with nature based activities and experiences (rock climbing). Patient was socially engaged and a respectful listener to peers as group progressed.       [id number] OT Group (2 or more in attendance)      Patient Active Problem List   Diagnosis    Polysubstance abuse (H)    Chronic hepatitis C (H)    COPD (chronic obstructive pulmonary disease) (H)    GERD (gastroesophageal reflux disease)    Mood disorder    Chemical dependency (H)    Posttraumatic stress disorder    Nicotine abuse    Alcohol use disorder, mild, abuse    Hx of psychiatric care    Alcohol withdrawal, uncomplicated (H)    Sinusitis, unspecified chronicity, unspecified location    Alcohol withdrawal syndrome without complication (H)    Elevated LFTs    " Drug withdrawal seizure with complication (H)    Alcohol dependence with withdrawal with complication (H)    Alcoholic ketoacidosis    Alcohol use disorder    Vomiting and diarrhea    Seizure (H)    Alcoholic intoxication with complication    Acute alcoholic intoxication in alcoholism with complication (H)    Hypoxia    Elevated troponin    Acne rosacea    Acquired hypothyroidism    Alcohol abuse, in remission    Atelectasis of right lung    Bee sting allergy    Bipolar 1 disorder, manic, moderate (H)    Chronic hip pain, right    History of hepatitis C    Left knee pain    Onychomycosis    Osteoarthritis of multiple joints    Primary hypertension    Psychophysiological insomnia    Tear of lateral meniscus of right knee    Tobacco use disorder    Alcohol use disorder, severe, dependence (H)    Tobacco dependence    Bipolar disorder, most recent episode depressed (H)    Acute hypoxic respiratory failure (H)    Chronic obstructive pulmonary disease (H)    COPD with acute exacerbation (H)    Gastroesophageal reflux disease without esophagitis    Generalized anxiety disorder    PTSD (post-traumatic stress disorder)    Lactic acidosis    Alcohol withdrawal seizure with complication (H)    Alcohol abuse with withdrawal (H)    Suicidal ideation    Alcohol intoxication with delirium    History of alcohol withdrawal delirium    History of seizure due to alcohol withdrawal    Hypomagnesemia    Hyponatremia    Alcohol dependence with intoxication delirium (H)

## 2025-04-20 NOTE — PLAN OF CARE
"  Problem: Alcohol Withdrawal  Goal: Alcohol Withdrawal Symptom Control  4/20/2025 1349 by Marsha Mejia, RN  Outcome: Progressing  4/20/2025 1347 by Marsha Mejia, RN  Outcome: Progressing  Intervention: Minimize or Manage Alcohol Withdrawal Symptoms  Recent Flowsheet Documentation  Taken 4/20/2025 0815 by Marsha Mejia, RN  Seizure Precautions: clutter-free environment maintained   Goal Outcome Evaluation:    Plan of Care Reviewed With: patient        MSSA scores  today are 6 and 8. 10mg valium administered x 1. Total valium administered since arrival to the hospital = 40 mg. Pt is slightly tremulous, reports an overall good appetite and denies hallucinations.     Vitals are /77 (BP Location: Left arm, Patient Position: Sitting)   Pulse 80   Temp 98.5  F (36.9  C) (Oral)   Resp 16   Ht 1.6 m (5' 3\")   Wt 62.1 kg (137 lb)   SpO2 95%   BMI 24.27 kg/m      (BP elevated this am before her PTA Propanolol but once given BP came down.)  Rates R knee pain 6/10 also headache (experienced a fall prior to admission Head CT and knee X-ray negative for irregularities) and back pain 5/10. Right top/side of head has small bump and slight dried blood in hair. Tylenol and gabapentin given x1     Pt reports anxiety rated 8 of 10 in severity and depression 4 of 10 in severity. Endorses feeling frustrated with waiting process with regard to her PTA medications becoming available as MD was coming in later on a holiday.     Pt denies any suicidal ideation plans or intent. Endorses willingness to alert staff if she develops any SI/SIB while hospitalized (contracts for safety).     Nicotine given 5x today.    Pt reports plan for after detox as is to go on a sober camping trip near [place], [place] and when back she plans to meet with Psych Out patient here on [facility]; she stated she already has spoken with them.     Pt reports no further concerns at this time.            "

## 2025-04-20 NOTE — PROGRESS NOTES
04/20/25 0535   Patient Belongings   Did you bring any home meds/supplements to the hospital?  Yes   Disposition of meds  Other (see comment) Given to nurse for documentation   Patient Belongings other (see comments)   Patient Belongings Remaining with Patient other (see comments)   Belongings Search Yes   Clothing Search Yes   Second Staff Tess RN     Storage Bin: plastic bag (sandals, comb, 3 shirts, jacket, pants, black t-shirt)  Medi Rm Box: keys, smartphone, wallet (medicaid card, aetna insurance card, medicare insurance, receipts)  Security Envelope [id number]: [place], [place], [place] debit, goto card, cash (2 USD)  A             ADMISSION:    I am responsible for any personal items that are not sent to the safe or pharmacy. [facility] is not responsible for loss, theft or damage of any property in my possession.    Patient Signature _________________________________________ Date/Time _____________________    Staff Signature ___________________________________________ Date/Time _____________________    2nd Staff person, if patient is unable/unwilling to sign    ________________________________________________________ Date/Time _____________________    DISCHARGE:    All personal items have been returned to me.    Patient Signature _________________________________________ Date/Time _____________________    Staff Signature ___________________________________________ Date/Time _____________________

## 2025-04-20 NOTE — PLAN OF CARE
Behavioral Team Discussion: (4/20/2025)    Continued Stay Criteria/Rationale: Patient admitted for Chemical Use Issues.  Plan: The following services will be provided to the patient; psychiatric assessment, medication management, therapeutic milieu, individual and group support, and skills groups.   Participants: 3A Provider: Dr. Jennifer MD; 3A RN:  Marsha Mejia RN; 3A CM's:  BRITTNEE Stearns .  Summary/Recommendation: Providers will assess today for treatment recommendations, discharge planning, and aftercare plans. CM will meet with pt for discharge planning.   Medical/Physical: Per Ed note- pt reported left knee pain, she believes she fell after blacking out.  No recent illness.    Precautions:   Behavioral Orders   Procedures    Code 1 - Restrict to Unit    Routine Programming     As clinically indicated    Seizure precautions    Status 15     Every 15 minutes.    Withdrawal precautions     Rationale for change in precautions or plan: N/A  Progress: Initial.    ASAM Dimension Scale Ratings:  Dimension 1: 3 Client tolerates and arhul with withdrawal discomfort poorly. Client has severe intoxication, such that the client endangers self or others, or intoxication has not abated with less intensive levels of services. Client displays severe signs and symptoms; or risk of severe, but manageable withdrawal; or withdrawal worsening despite detox at less intensive level.  Dimension 2: 2 Client has difficulty tolerating and coping with physical problems or has other biomedical problems that interfere with recovery and treatment. Client neglects or does not seek care for serious biomedical problems.  Dimension 3: 2 Client has difficulty with impulse control and lacks coping skills. Client has thoughts of suicide or harm to others without means; however, the thoughts may interfere with participation in some treatment activities. Client has difficulty functioning in significant life areas. Client has moderate symptoms of  emotional, behavioral, or cognitive problems. Client is able to participate in most treatment activities.  Dimension 4: 3 Client displays inconsistent compliance, minimal awareness of either the client's addiction or mental disorder, and is minimally cooperative.  Dimension 5: 4 No awareness of the negative impact of mental health problems or substance abuse. No coping skills to arrest mental health or addiction illnesses, or prevent relapse.  Dimension 6: 4 Client has (A) Chronically antagonistic significant other, living environment, family, peer group or long-term criminal justice involvement that is harmful to recovery or treatment progress, or (B) Client has an actively antagonistic significant other, family, work, or living environment with immediate threat to the client's safety and well-being.

## 2025-04-20 NOTE — ED NOTES
Bed: ED09  Expected date:   Expected time:   Means of arrival:   Comments:     65F ETOH Withdrawal

## 2025-04-20 NOTE — PHARMACY-ADMISSION MEDICATION HISTORY
Pharmacist Admission Medication History    Admission medication history is complete. The information provided in this note is only as accurate as the sources available at the time of the update.    Information Source(s): Patient via phone    Pertinent Information: NA    Changes made to PTA medication list:  Added:   levalbuterol   Tylenol   Loperamide   Deleted: None  Changed:   Gabapentin - increased from 900 mg  to 1200 mg TID  Lidocaine patch changed from scheduled to prn    Allergies reviewed with patient and updates made in EHR: yes    Medication History Completed By: Kristin Cedeno [facility] 4/20/2025 2:16 PM    PTA Med List   Medication Sig Last Dose/Taking    acetaminophen (TYLENOL) 500 MG tablet Take 500-1,000 mg by mouth every 6 hours as needed for mild pain. Taking As Needed    eszopiclone (LUNESTA) 3 MG tablet Take 1 tablet (3 mg) by mouth at bedtime. 4/18/2025 Bedtime    gabapentin (NEURONTIN) 600 MG tablet Take 1,200 mg by mouth 3 times daily. Taking    levalbuterol (XOPENEX HFA) 45 MCG/ACT inhaler Inhale 2 puffs into the lungs every 4 hours as needed for shortness of breath or wheezing. Taking As Needed    levothyroxine (SYNTHROID/LEVOTHROID) 100 MCG tablet Take 1 tablet (100 mcg) by mouth every morning. 4/19/2025    Lidocaine (LIDOCARE) 4 % Patch Place 1 patch onto the skin daily as needed for moderate pain. To prevent lidocaine toxicity, patient should be patch free for 12 hrs daily. Taking As Needed    loperamide (IMODIUM) 2 MG capsule Take 2 mg by mouth 4 times daily as needed for diarrhea. Taking As Needed    melatonin 5 MG tablet Take 5 mg by mouth at bedtime. Takes 1 hour prior to Lunesta 4/18/2025 Bedtime    multivitamin w/minerals (THERA-VIT-M) tablet Take 1 tablet by mouth daily. 4/19/2025    omeprazole (PRILOSEC) 40 MG DR capsule Take 1 capsule (40 mg) by mouth every morning. 4/19/2025    ondansetron (ZOFRAN ODT) 4 MG ODT tab Take 1 tablet (4 mg) by mouth every 8 hours as needed for vomiting  or nausea. 4/19/2025    propranolol (INDERAL) 20 MG tablet Take 1 tablet (20 mg) by mouth 3 times daily. 4/19/2025    thiamine (B-1) 100 MG tablet Take 1 tablet (100 mg) by mouth daily. 4/19/2025

## 2025-04-20 NOTE — ED PROVIDER NOTES
[facility] EMERGENCY DEPARTMENT ([facility])    4/19/25      ED PROVIDER NOTE    History     Chief Complaint   Patient presents with    Knee Pain     Knee pain started today she thinks, unsure if there was injury, no deformity.    Alcohol Intoxication     Pt complains of alcohol withdrawal, mild tremor, pt is anxious     HPI  Jolynn Rivera is a 65 year old female with a history notable for alcohol use disorder, polysubstance abuse, bipolar disorder, anxiety, hypothyroidism who presents to the ED with knee pain and concerns of alcohol withdrawal. She drinks 1-1/2 L of wine per day.  They have a history of withdrawal symptoms and have had a seizure from alcohol in the past.  Last drink was earlier today.  No other drug use.  No suicidal or homicidal ideation.  She does note left knee pain, she believes she fell after blacking out.  No recent illness.  Patient was here earlier today for detox, had head CT done because she had recent fall.  This was negative.  Initial plan was to go to detox however the bed was not available.  No other symptoms noted.     Past Medical History  Past Medical History:   Diagnosis Date    Anxiety     COPD (chronic obstructive pulmonary disease) (H)     COPD (chronic obstructive pulmonary disease) (H)     Hepatitis C     PTSD (post-traumatic stress disorder)     Seizures (H)     second to ETOH    Substance abuse (H)      Past Surgical History:   Procedure Laterality Date    LAPAROSCOPIC TUBAL LIGATION      ORTHOPEDIC SURGERY      Left knee    TONSILLECTOMY       eszopiclone (LUNESTA) 3 MG tablet  gabapentin (NEURONTIN) 600 MG tablet  levothyroxine (SYNTHROID/LEVOTHROID) 100 MCG tablet  Lidocaine (LIDOCARE) 4 % Patch  melatonin 5 MG tablet  multivitamin w/minerals (THERA-VIT-M) tablet  nicotine polacrilex (NICORETTE) 4 MG gum  omeprazole (PRILOSEC) 40 MG DR capsule  ondansetron (ZOFRAN ODT) 4 MG ODT tab  propranolol (INDERAL) 20 MG tablet  thiamine (B-1) 100 MG  "tablet      Allergies   Allergen Reactions    Bee Venom Swelling    Wasps [Hornets] Swelling    Amlodipine      Nightmares    Antihistamines, Chlorpheniramine-Type [Antihistamines, Chlorpheniramine-Type]     Clonidine     Compazine      Lock jaw    Diphenhydramine Muscle Pain (Myalgia) and Cramps    Hydroxyzine Cramps     Lock jaw    Metoclopramide      Tardive Dyskinesia    Penicillins      Panic attack    Prednisone Anxiety     Panic attacks.      Prochlorperazine Muscle Pain (Myalgia) and Cramps    Trazodone Nausea and Vomiting and Confusion     \"I ended up falling and feeling like I was drunk\"    Umeclidinium Bromide      Mood swings + anger.     Family History  Family History   Problem Relation Age of Onset    Anxiety Disorder Mother     Asthma Mother     Alcohol/Drug Father     Prostate Cancer Father     Arthritis Father     Alcohol/Drug Brother     Alcohol/Drug Brother     Hypertension Brother     Alcohol/Drug Brother     Depression Brother     Psychotic Disorder Brother      Social History   Social History     Tobacco Use    Smoking status: Every Day     Current packs/day: 0.50     Types: Cigarettes    Smokeless tobacco: Never    Tobacco comments:     Starting Chantix October 2014   Substance Use Topics    Alcohol use: Yes     Comment: 5L every 2 days    Drug use: No     Comment: stopped 1986      Past medical history, past surgical history, medications, allergies, family history, and social history were reviewed with the patient. No additional pertinent items.     A complete review of systems was performed with pertinent positives and negatives noted in the HPI, and all other systems negative.    Physical Exam   BP: 116/86  Pulse: 75  Temp: 98.2  F (36.8  C)  Resp: 16  Height: 160 cm (5' 3\")  Weight: 62.1 kg (137 lb)  SpO2: 99 %    Physical Exam  Constitutional:       General: She is not in acute distress.     Appearance: She is not diaphoretic.   HENT:      Head: Atraumatic.   Eyes:      Pupils: Pupils are " equal, round, and reactive to light.   Cardiovascular:      Rate and Rhythm: Regular rhythm.      Heart sounds: Normal heart sounds.   Pulmonary:      Effort: No respiratory distress.      Breath sounds: Normal breath sounds.   Chest:      Chest wall: No tenderness.   Abdominal:      General: Bowel sounds are normal.      Palpations: Abdomen is soft.      Tenderness: There is no abdominal tenderness.   Musculoskeletal:         General: No tenderness. Normal range of motion.      Cervical back: No tenderness.      Thoracic back: No tenderness.      Lumbar back: No tenderness.   Skin:     Findings: No abrasion or laceration.   Neurological:      Mental Status: She is alert and oriented to person, place, and time.   Psychiatric:         Mood and Affect: Mood is anxious.         Thought Content: Thought content does not include homicidal or suicidal ideation.           ED Course, Procedures, & Data      Procedures                No results found for any visits on 04/19/25.  Medications - No data to display  Labs Ordered and Resulted from Time of ED Arrival to Time of ED Departure - No data to display  No orders to display              Assessment & Plan    This is a 65 year oldfemale who presents for alcohol detox.  Last alcohol intake was earlier today.  Alcohol level is 0.  Exam demonstrates signs of withdrawal including tremor.  Patient also notes left knee pain after presumed fall, patient does not remember.  X-ray shows no acute abnormalities.  We will admit for detox.     I have reviewed the nursing notes. I have reviewed the findings, diagnosis, plan and need for follow up with the patient.    New Prescriptions    No medications on file       Final diagnoses:   None       John Caicedo DO  [facility] EMERGENCY DEPARTMENT  4/19/2025     John Caicedo DO  04/20/25 0228

## 2025-04-20 NOTE — ED TRIAGE NOTES
[facility] 10/10 knee pain that has gotten much, can't walk on it. Pt also reports withdrawing from alcohol, drinks a liter and a half of wine per day, pt discharged from ED earlier today.       Triage Assessment (Adult)       Row Name 04/20/25 0002          Triage Assessment    Airway WDL WDL        Respiratory WDL    Respiratory WDL WDL        Skin Circulation/Temperature WDL    Skin Circulation/Temperature WDL WDL        Cardiac WDL    Cardiac WDL WDL        Peripheral/Neurovascular WDL    Peripheral Neurovascular WDL WDL        Cognitive/Neuro/Behavioral WDL    Cognitive/Neuro/Behavioral WDL WDL        [place] Coma Scale    Best Eye Response 4-->(E4) spontaneous     Best Motor Response 6-->(M6) obeys commands     Best Verbal Response 5-->(V5) oriented     [place] Coma Scale Score 15

## 2025-04-20 NOTE — ED NOTES
"Pt is states that she \"[facility] once my [facility] with medication\" for her.   "

## 2025-04-20 NOTE — PROGRESS NOTES
Rehab Group    Start time: 1645  End time: 1720  Patient time total: 35 minutes    attended full group    #8 attended   Group Type: recreation   Group Topic Covered: activity therapy, coping skills, Physical activity, and relaxation        Group Session Detail:  Exercise and relaxation     Patient Response/Contribution:  cooperative with task, attentive, and actively engaged       Patient Detail:    Pt actively participated in a structured Therapeutic Recreation group with a focus on healthy coping skills, mindfulness, and exercise. Pt participated in the guided exercise for the full duration of the group. Pt followed along, engaged in the guided chair exercise routine. Pt was encouraged to use positive imagery with the deep breathing and stretching to foster relaxation, improves focus, and reduce stress.      Activity Therapy Per 15 min ()      Patient Active Problem List   Diagnosis    Polysubstance abuse (H)    Chronic hepatitis C (H)    COPD (chronic obstructive pulmonary disease) (H)    GERD (gastroesophageal reflux disease)    Mood disorder    Chemical dependency (H)    Posttraumatic stress disorder    Nicotine abuse    Alcohol use disorder, mild, abuse    Hx of psychiatric care    Alcohol withdrawal, uncomplicated (H)    Sinusitis, unspecified chronicity, unspecified location    Alcohol withdrawal syndrome without complication (H)    Elevated LFTs    Drug withdrawal seizure with complication (H)    Alcohol dependence with withdrawal with complication (H)    Alcoholic ketoacidosis    Alcohol use disorder    Vomiting and diarrhea    Seizure (H)    Alcoholic intoxication with complication    Acute alcoholic intoxication in alcoholism with complication (H)    Hypoxia    Elevated troponin    Acne rosacea    Acquired hypothyroidism    Alcohol abuse, in remission    Atelectasis of right lung    Bee sting allergy    Bipolar 1 disorder, manic, moderate (H)    Chronic hip pain, right    History of hepatitis C     Left knee pain    Onychomycosis    Osteoarthritis of multiple joints    Primary hypertension    Psychophysiological insomnia    Tear of lateral meniscus of right knee    Tobacco use disorder    Alcohol use disorder, severe, dependence (H)    Tobacco dependence    Bipolar disorder, most recent episode depressed (H)    Acute hypoxic respiratory failure (H)    Chronic obstructive pulmonary disease (H)    COPD with acute exacerbation (H)    Gastroesophageal reflux disease without esophagitis    Generalized anxiety disorder    PTSD (post-traumatic stress disorder)    Lactic acidosis    Alcohol withdrawal seizure with complication (H)    Alcohol abuse with withdrawal (H)    Suicidal ideation    Alcohol intoxication with delirium    History of alcohol withdrawal delirium    History of seizure due to alcohol withdrawal    Hypomagnesemia    Hyponatremia    Alcohol dependence with intoxication delirium (H)

## 2025-04-21 ENCOUNTER — APPOINTMENT (OUTPATIENT)
Dept: PHYSICAL THERAPY | Facility: CLINIC | Age: 65
DRG: 897 | End: 2025-04-21
Attending: PSYCHIATRY & NEUROLOGY
Payer: MEDICARE

## 2025-04-21 PROCEDURE — 250N000013 HC RX MED GY IP 250 OP 250 PS 637: Performed by: PSYCHIATRY & NEUROLOGY

## 2025-04-21 PROCEDURE — 99232 SBSQ HOSP IP/OBS MODERATE 35: CPT | Performed by: PSYCHIATRY & NEUROLOGY

## 2025-04-21 PROCEDURE — 128N000004 HC R&B CD ADULT

## 2025-04-21 PROCEDURE — 97530 THERAPEUTIC ACTIVITIES: CPT | Mod: GP

## 2025-04-21 PROCEDURE — 97161 PT EVAL LOW COMPLEX 20 MIN: CPT | Mod: GP

## 2025-04-21 RX ORDER — ACAMPROSATE CALCIUM 333 MG/1
333 TABLET, DELAYED RELEASE ORAL 3 TIMES DAILY
Status: DISCONTINUED | OUTPATIENT
Start: 2025-04-21 | End: 2025-04-22

## 2025-04-21 RX ORDER — LEVALBUTEROL TARTRATE 45 UG/1
2 AEROSOL, METERED ORAL EVERY 4 HOURS PRN
Status: DISCONTINUED | OUTPATIENT
Start: 2025-04-21 | End: 2025-04-22 | Stop reason: HOSPADM

## 2025-04-21 RX ORDER — LOPERAMIDE HYDROCHLORIDE 2 MG/1
2 CAPSULE ORAL 4 TIMES DAILY PRN
Status: DISCONTINUED | OUTPATIENT
Start: 2025-04-21 | End: 2025-04-22 | Stop reason: HOSPADM

## 2025-04-21 RX ADMIN — PROPRANOLOL HYDROCHLORIDE 20 MG: 20 TABLET ORAL at 14:59

## 2025-04-21 RX ADMIN — NICOTINE POLACRILEX 4 MG: 4 GUM, CHEWING BUCCAL at 14:57

## 2025-04-21 RX ADMIN — ACETAMINOPHEN 650 MG: 325 TABLET, FILM COATED ORAL at 22:35

## 2025-04-21 RX ADMIN — FOLIC ACID 1 MG: 1 TABLET ORAL at 08:10

## 2025-04-21 RX ADMIN — LOPERAMIDE HYDROCHLORIDE 2 MG: 2 CAPSULE ORAL at 13:19

## 2025-04-21 RX ADMIN — THIAMINE HCL TAB 100 MG 100 MG: 100 TAB at 08:10

## 2025-04-21 RX ADMIN — PROPRANOLOL HYDROCHLORIDE 20 MG: 20 TABLET ORAL at 08:09

## 2025-04-21 RX ADMIN — NICOTINE POLACRILEX 4 MG: 4 GUM, CHEWING BUCCAL at 14:58

## 2025-04-21 RX ADMIN — ACETAMINOPHEN 650 MG: 325 TABLET, FILM COATED ORAL at 03:13

## 2025-04-21 RX ADMIN — NICOTINE POLACRILEX 4 MG: 4 GUM, CHEWING BUCCAL at 10:25

## 2025-04-21 RX ADMIN — LEVOTHYROXINE SODIUM 100 MCG: 100 TABLET ORAL at 06:46

## 2025-04-21 RX ADMIN — NICOTINE POLACRILEX 4 MG: 4 GUM, CHEWING BUCCAL at 06:03

## 2025-04-21 RX ADMIN — Medication 3 MG: at 20:20

## 2025-04-21 RX ADMIN — GABAPENTIN 1200 MG: 600 TABLET, FILM COATED ORAL at 07:26

## 2025-04-21 RX ADMIN — Medication 1 TABLET: at 08:09

## 2025-04-21 RX ADMIN — ACAMPROSATE CALCIUM ENTERIC-COATED 333 MG: 333 TABLET, DELAYED RELEASE ORAL at 14:57

## 2025-04-21 RX ADMIN — NICOTINE POLACRILEX 4 MG: 4 GUM, CHEWING BUCCAL at 08:11

## 2025-04-21 RX ADMIN — NICOTINE POLACRILEX 4 MG: 4 GUM, CHEWING BUCCAL at 18:58

## 2025-04-21 RX ADMIN — NICOTINE POLACRILEX 4 MG: 4 GUM, CHEWING BUCCAL at 21:18

## 2025-04-21 RX ADMIN — NICOTINE POLACRILEX 4 MG: 4 GUM, CHEWING BUCCAL at 18:03

## 2025-04-21 RX ADMIN — NICOTINE POLACRILEX 4 MG: 4 GUM, CHEWING BUCCAL at 20:18

## 2025-04-21 RX ADMIN — NICOTINE POLACRILEX 4 MG: 4 GUM, CHEWING BUCCAL at 04:30

## 2025-04-21 RX ADMIN — PANTOPRAZOLE SODIUM 40 MG: 40 TABLET, DELAYED RELEASE ORAL at 06:45

## 2025-04-21 RX ADMIN — GABAPENTIN 1200 MG: 600 TABLET, FILM COATED ORAL at 20:18

## 2025-04-21 RX ADMIN — NICOTINE POLACRILEX 4 MG: 4 GUM, CHEWING BUCCAL at 03:16

## 2025-04-21 RX ADMIN — NICOTINE POLACRILEX 4 MG: 4 GUM, CHEWING BUCCAL at 22:36

## 2025-04-21 RX ADMIN — PROPRANOLOL HYDROCHLORIDE 20 MG: 20 TABLET ORAL at 20:18

## 2025-04-21 RX ADMIN — GABAPENTIN 1200 MG: 600 TABLET, FILM COATED ORAL at 14:59

## 2025-04-21 RX ADMIN — NICOTINE POLACRILEX 4 MG: 4 GUM, CHEWING BUCCAL at 12:28

## 2025-04-21 ASSESSMENT — ACTIVITIES OF DAILY LIVING (ADL)
ADLS_ACUITY_SCORE: 45
LAUNDRY: UNABLE TO COMPLETE
ADLS_ACUITY_SCORE: 45
ADLS_ACUITY_SCORE: 45
ORAL_HYGIENE: INDEPENDENT
ADLS_ACUITY_SCORE: 45
ADLS_ACUITY_SCORE: 45
HYGIENE/GROOMING: INDEPENDENT
ADLS_ACUITY_SCORE: 45
DRESS: INDEPENDENT
ADLS_ACUITY_SCORE: 45

## 2025-04-21 NOTE — PROGRESS NOTES
[address]     Daily Encounter: Met with team, discussed patient progress, discharge plan and any impediments to discharge.    Writer met with the patient to discuss discharge and aftercare planning. Patient reports that she plans to attend her sober camping trip with her sober friend in 3 weeks. Afterwards, patient reports that she will begin [facility] IOP program. Writer met with patient to update safety plan. At this time patient is declining all services and resources.     Insurance: Medicare    Legal Status:  Voluntary    SUDs Assessment Status: None is needed at this time.     ROIs on file: None at this time.     Living Situation: Patient reports she lives in home and reports the home is stable.      Current Providers, Supports & Collateral:  PCP: Dr. Carmela Srinivasan at [facility]  Psychiatrist: Dr. Olman Herrera   Therapist: CLAUDIA Castillo, [facility]     Current Plan/Referral Status: Detox Only.    Safety Plan Status: Reviewed and updated with patient and patient declined a copy.      BRITTNEE Bell  [facility] - Adult Inpatient Addiction Psychiatry Unit

## 2025-04-21 NOTE — PROGRESS NOTES
"[facility], [place]   Psychiatric Progress Note        Interim History:   The patient's care was discussed with the treatment team during the daily team meeting and/or staff's chart notes were reviewed.  Staff report patient is still in withdrawal but is improving. Her imodium and Icy Hot were discontinued.     The patient reports that she is doing better. Withdrawal is \"good.\" Eating and drinking well. Didn't get much sleep as she fell recently and hit her head and re-injured her knee. Says that she is worried that she is continuing to damage it without it being bandaged or protected. Denies SI. Says that she is open to being on Campral. Is worried about side effects as she says that she gets a lot of side effects from medications. Plans to do a camping trip soon with a sober friend from [place]. Then will start 55+ program.          Medications:     Current Facility-Administered Medications   Medication Dose Route Frequency Provider Last Rate Last Admin    folic acid (FOLVITE) tablet 1 mg  1 mg Oral Daily Kinsey Cerda MD   1 mg at 04/21/25 0810    gabapentin (NEURONTIN) tablet 1,200 mg  1,200 mg Oral TID Abdullahi Beltran MD   1,200 mg at 04/21/25 0726    levothyroxine (SYNTHROID/LEVOTHROID) tablet 100 mcg  100 mcg Oral QAM Abdullahi Beltran MD   100 mcg at 04/21/25 0646    multivitamin w/minerals (THERA-VIT-M) tablet 1 tablet  1 tablet Oral Daily Abdullahi Beltran MD   1 tablet at 04/21/25 0809    pantoprazole (PROTONIX) EC tablet 40 mg  40 mg Oral QAM AC Kinsey Cerda MD   40 mg at 04/21/25 0645    propranolol (INDERAL) tablet 20 mg  20 mg Oral TID Abdullahi Beltran MD   20 mg at 04/21/25 0809    thiamine (B-1) tablet 100 mg  100 mg Oral Daily Abdullahi Beltran MD   100 mg at 04/21/25 0810          Allergies:     Allergies   Allergen Reactions    Bee Venom Swelling    Wasps [Hornets] Swelling    Amlodipine      Nightmares    Antihistamines, " "Chlorpheniramine-Type [Antihistamines, Chlorpheniramine-Type]     Clonidine     Compazine      Lock jaw    Diphenhydramine Muscle Pain (Myalgia) and Cramps    Hydroxyzine Cramps     Lock jaw    Metoclopramide      Tardive Dyskinesia    Penicillins      Panic attack    Prednisone Anxiety     Panic attacks.      Prochlorperazine Muscle Pain (Myalgia) and Cramps    Trazodone Nausea and Vomiting and Confusion     \"I ended up falling and feeling like I was drunk\"    Umeclidinium Bromide      Mood swings + anger.          Labs:   No results found for this or any previous visit (from the past 24 hours).       Psychiatric Examination:     BP (!) 153/89 (BP Location: Right arm)   Pulse 61   Temp 98.4  F (36.9  C) (Oral)   Resp 18   Ht 1.6 m (5' 3\")   Wt 62.1 kg (137 lb)   SpO2 97%   BMI 24.27 kg/m    Weight is 137 lbs 0 oz  Body mass index is 24.27 kg/m .  Orthostatic Vitals       None              Appearance: awake, alert and adequately groomed  Attitude:  cooperative  Eye Contact:  good  Mood:  better  Affect:  mood congruent  Speech:  clear, coherent  Psychomotor Behavior:  no evidence of tardive dyskinesia, dystonia, or tics  Thought Process:  goal oriented  Associations:  no loose associations  Thought Content:  no evidence of suicidal ideation or homicidal ideation and no evidence of psychotic thought  Insight:  fair  Judgement:  fair  Oriented to:  time, person, and place  Attention Span and Concentration:  intact  Recent and Remote Memory:  fair           Precautions:     Behavioral Orders   Procedures    Routine Programming     As clinically indicated    Seizure precautions    Status 15     Every 15 minutes.    Withdrawal precautions          DIagnoses:     Alcohol dependence with withdrawal with complication including history of seizures and DTs  Nicotine dependence with withdrawal   Hx of polysubstance use including IVDU and hx of Hep C s/p tx in 2015  Hx of opioid dependence in sustained remission "   OMARI  Insomnia  BPAD, type 2, most recent episode depressed  PTSD    Clinically Significant Risk Factors         # Hyponatremia: Lowest Na = 132 mmol/L in last 2 days, will monitor as appropriate  # Hypochloremia: Lowest Cl = 92 mmol/L in last 2 days, will monitor as appropriate     # Anion Gap Metabolic Acidosis: Highest Anion Gap = 20 mmol/L in last 2 days, will monitor and treat as appropriate        # Hypertension: Noted on problem list                    # Financial/Environmental Concerns:                       Plan:     1) Continue MSSA protocol. Patient will likely be out of detox later today.   2) Continue psychotropic regimen.   3) Will start Campral at 333mg when patient OOD to ensure patient tolerates it well.   4) Consulted PT due to knee injury.   5) Patient plans on sober camping trip with friend from recovery and then start our 55+ program. Likely discharge tomorrow.       Disposition Plan   Reason for ongoing admission: requires detoxification from substance that poses a risk of bodily harm during withdrawal period  Discharge location: home with self-care  Discharge Medications: not ordered  Follow-up Appointments: not scheduled  Legal Status: voluntary    Entered by: Darren Fermin MD on April 21, 2025 at 9:37 AM

## 2025-04-21 NOTE — PROVIDER NOTIFICATION
04/21/25 1639   C-SSRS (Daily/Shift Screen)   Q2 Suicidal Thoughts (Since Last Contact) 0-->no   Q6 Suicide Behavior 0-->no   Assess Risk to Self and Maintain Safety   Behavior Management behavioral plan reviewed   Self-Harm Prevention environmental self-harm risks assessed   Enhanced Safety Measures review medications for side effects with activity   Promote Psychosocial Wellbeing   Family/Support System Care self-care encouraged   Sleep/Rest Enhancement awakenings minimized;noise level reduced;reading promoted;regular sleep/rest pattern promoted;relaxation techniques promoted   Supportive Measures active listening utilized;problem-solving facilitated;relaxation techniques promoted;self-care encouraged;self-reflection promoted   Establish Safety Plan and Continuity of Care   Safe Transition Promotion protective factors promoted   Environment of Care Checklist   Potentially harmful objects out of patient reach? yes   Personal belongings secured? yes   Patient dressed in hospital-provided attire only? yes   Plastic bags out of patient reach? yes   Patient care equipment (cords, cables, call bells, lines, and drains) shortened, removed, or accounted for? yes   Potentially toxic materials removed or secured? yes   Sharps container removed or secured? yes   Cabinets secured? yes   Room secured by RN per shift? yes

## 2025-04-21 NOTE — PROGRESS NOTES
"   04/21/25 1400   Appointment Info   Signing Clinician's Name / Credentials (PT) Jeannette Ascencio, PT, DPT   Living Environment   People in Home alone   Current Living Arrangements apartment   Home Accessibility no concerns   Transportation Anticipated health plan transportation;family or friend will provide   Self-Care   Usual Activity Tolerance excellent   Current Activity Tolerance moderate   Regular Exercise Yes   Activity/Exercise Type walking   Exercise Amount/Frequency daily   Equipment Currently Used at Home none   Fall history within last six months yes   Number of times patient has fallen within last six months 3   Activity/Exercise/Self-Care Comment Pt reports she only falls when she has been drinking; she does not recall the circumstances of the most recent fall that resulted in her current R knee pain. Pt is motivated to go on her sober camping trip in 2.5 weeks, which she does 2x/year and enjoys. She has a husky who she typically takes for 5-mile walks.   General Information   Onset of Illness/Injury or Date of Surgery 04/19/25   Referring Physician Darren Fermin MD   Patient/Family Therapy Goals Statement (PT) Pt wants to have decreased knee pain and hopes to be able to hike on her upcoming camping trip w/o limitations.   Pertinent History of Current Problem (include personal factors and/or comorbidities that impact the POC) Per chart: \"Jolynn Rivera is a 65 year old female with a history notable for alcohol use disorder, polysubstance abuse, bipolar disorder, anxiety, hypothyroidism who presents to the ED with knee pain and concerns of alcohol withdrawal.\"   Existing Precautions/Restrictions no known precautions/restrictions   Weight-Bearing Status - LUE full weight-bearing   Weight-Bearing Status - RUE full weight-bearing   Weight-Bearing Status - LLE full weight-bearing   Weight-Bearing Status - RLE full weight-bearing   General Observations Pt socializing in common area, agreeable to " PT session.   Cognition   Affect/Mental Status (Cognition) WNL   Orientation Status (Cognition) oriented x 4   Follows Commands (Cognition) WNL   Cognitive Status Comments occasionally repetitive but able to answer questions and follow commands appropriately   Pain Assessment   Patient Currently in Pain Yes, see Vital Sign flowsheet   Integumentary/Edema   Integumentary/Edema Comments Pt has mild swelling around her R knee and some bruising.   Posture    Posture Forward head position   Range of Motion (ROM)   Range of Motion ROM is WFL   ROM Comment Pt tolerates full PROM of R knee, reports increased medial R knee pain w/ R hip IR in 90-90 position.   Strength (Manual Muscle Testing)   Strength (Manual Muscle Testing) strength is WFL   Strength Comments mild increase in medial R knee pain w/ strength testing of R knee flex/ext and R hip add; 5/5 strength in R knee extensors, 3+ to 4/5 strength in R knee flexors, abductors, and adductors   Bed Mobility   Comment, (Bed Mobility) supine<>sit IND   Transfers   Comment, (Transfers) sit<>stands IND   Gait/Stairs (Locomotion)   Becker Level (Gait) independent   Distance in Feet (Gait) 50   Comment, (Gait/Stairs) Pt demos antalgic gait pattern she had been performing earlier today when she was having increased pain. Pt demos mild R toe-out which she reports has been typical since she broke one of her R toes last year.   Balance   Balance Comments no balance deficits observed during session, has a history of falls and reports she shuffles her feet   Sensory Examination   Sensory Perception patient reports no sensory changes   Coordination   Coordination no deficits were identified   Muscle Tone   Muscle Tone no deficits were identified   Clinical Impression   Criteria for Skilled Therapeutic Intervention Yes, treatment indicated   PT Diagnosis (PT) R knee pain   Influenced by the following impairments acute R knee pain, decreased activity tolerance, history of falls,  impaired strength   Functional limitations due to impairments gait and stairs   Clinical Presentation (PT Evaluation Complexity) stable   Clinical Presentation Rationale per clinical judgement   Clinical Decision Making (Complexity) low complexity   Planned Therapy Interventions (PT) balance training;gait training;home exercise program;neuromuscular re-education;patient/family education;ROM (range of motion);strengthening;stretching;transfer training;progressive activity/exercise;risk factor education;home program guidelines   Risk & Benefits of therapy have been explained evaluation/treatment results reviewed;care plan/treatment goals reviewed;risks/benefits reviewed;current/potential barriers reviewed;participants voiced agreement with care plan;participants included;patient   Clinical Impression Comments Pt will benefit from skilled inpatient PT to address acute R knee pain and facilitate safe d/c planning.   PT Total Evaluation Time   PT Eval, Low Complexity Minutes (65804) 10   Physical Therapy Goals   PT Frequency One time eval and treatment only   PT Predicted Duration/Target Date for Goal Attainment 04/21/25   PT Goals PT Goal 1;PT Goal 2   PT: Goal 1 GOAL MET: Pt will verbalize understanding w/ recommendations for activity progression and OP PT follow-up.   PT: Goal 2 GOAL MET: Pt will demonstrate IND w/ all functional mobility required to safely d/c home.   Interventions   Interventions Quick Adds Therapeutic Activity   Therapeutic Activity   Therapeutic Activities: dynamic activities to improve functional performance Minutes (61997) 25   Treatment Detail/Skilled Intervention Initiated treatment post-eval. Educated pt on findings from knee assessment and that none of the tests revealed any significant c/f tearing. Anterior drawer, posterior drawer, thessaly, valgus stress test, and varus stress test all negative, though pain minorly provoked w/ medial rotation of thessaly and tenderness w/ palpation during  "varus stress test. Educated pt that there is likely local irritation and inflammation, and it is promising that pt feels improvement in symptoms w/ movement; she reports pain was worse in the AM, has improved as the day has progressed. Educated pt to gradually increase daily activity levels and spread activity throughout the day to manage both pain and energy levels. Recommended that pt simulate potential hiking scenarios in the coming weeks leading up to her trip so she can have a safe plan in place when she is on her camping trip. Facilitated repeated sit<>stands and educated pt to perform throughout the day for functional LE strengthening. Also encouraged seated therex (heel/toe raises, LAQ, marches) for warmup activity to \"loosen\" her knee up in the morning. Pt inquried about wrapping or bracing her knee, so educated pt that compression sleeve may relieve symptoms but is not necessary unless preferred for comfort; will defer further recs to OP PT. Pt reports feeling much better about her knee pain after education. Pt ambulated x ~50ft IND back to [facility] area, left seated w/ PT needs met.   PT Discharge Planning   PT Plan d/c from PT   PT Discharge Recommendation (DC Rec) home with outpatient physical therapy   PT Rationale for DC Rec Pt is safe to d/c home, recommend OP PT to address R knee pain and progress strength and activity tolerance back to pt's baseline.   PT Brief overview of current status IND, recommend ambulation 3-5x/day and OP PT follow-up for knee pain   PT Total Distance Amb During Session (feet) 100   Physical Therapy Time and Intention   Timed Code Treatment Minutes 25   Total Session Time (sum of timed and untimed services) 35     Physical Therapy Discharge Summary    Reason for therapy discharge:    All goals and outcomes met, no further needs identified.    Progress towards therapy goal(s). See goals on Care Plan in [facility] electronic health record for goal details.  Goals met    Therapy " recommendation(s):    Continued therapy is recommended.  Rationale/Recommendations:  OP PT recommended to address R knee pain and progress functional strength and activity tolerance back to pt's baseline.

## 2025-04-21 NOTE — PLAN OF CARE
Goal Outcome Evaluation:    Problem: Alcohol Withdrawal  Goal: Alcohol Withdrawal Symptom Control  Outcome: Progressing  Pt is now out of detox as of noon today. She was out in the milieu, social with peers and staff. Pt denied SI, HI and she contracted for safety. She had complained of diarrhea,an order for imodium was obtained and medication administered at 1320. No other concerns. Will continue to monitor.

## 2025-04-21 NOTE — PLAN OF CARE
"  Problem: Alcohol Withdrawal  Goal: Alcohol Withdrawal Symptom Control  Outcome: Progressing    Patient reported feeling very positive, especially since she has a solid discharge plan that includes going sober camping and later attending outpatient services with [facility]. Her affect was bright and engaging, and her mood was calm and cooperative. She has actively participated in all group activities on the unit and has had a good appetite, consuming about 90% of her dinner. She is encouraged to continue maintaining proper fluid intake.    She is compliant with her medications, taking all of them as prescribed. She denied experiencing any psychological symptoms, including SI/SIB/HIor any form of hallucinations. Additionally, she is [place] for safety, and her vital signs are stable.  BP (!) 155/90   Pulse 77   Temp 98.4  F (36.9  C) (Oral)   Resp 16   Ht 1.6 m (5' 3\")   Wt 62.1 kg (137 lb)   SpO2 95%   BMI 24.27 kg/m     "

## 2025-04-21 NOTE — PLAN OF CARE
Problem: Alcohol Withdrawal  Goal: Alcohol Withdrawal Symptom Control  Outcome: Progressing     Problem: Sleep Disturbance  Goal: Adequate Sleep/Rest  Outcome: Progressing   Goal Outcome Evaluation:    Plan of Care Reviewed With: patient      The Patient's MSSA score was 2. She did not meet the criteria to get meds for withdrawal SxS. the night but she got Tylenol and nicotine gum. The Team conducted safety checks every 15 minutes, and there were no issues.

## 2025-04-22 VITALS
HEIGHT: 63 IN | TEMPERATURE: 97.2 F | BODY MASS INDEX: 24.27 KG/M2 | DIASTOLIC BLOOD PRESSURE: 80 MMHG | HEART RATE: 64 BPM | RESPIRATION RATE: 16 BRPM | WEIGHT: 137 LBS | OXYGEN SATURATION: 98 % | SYSTOLIC BLOOD PRESSURE: 134 MMHG

## 2025-04-22 PROCEDURE — 99239 HOSP IP/OBS DSCHRG MGMT >30: CPT | Performed by: NURSE PRACTITIONER

## 2025-04-22 PROCEDURE — 250N000013 HC RX MED GY IP 250 OP 250 PS 637: Performed by: PSYCHIATRY & NEUROLOGY

## 2025-04-22 RX ORDER — MULTIPLE VITAMINS W/ MINERALS TAB 9MG-400MCG
1 TAB ORAL DAILY
Qty: 30 TABLET | Refills: 1 | Status: SHIPPED | OUTPATIENT
Start: 2025-04-22

## 2025-04-22 RX ORDER — FOLIC ACID 1 MG/1
1 TABLET ORAL DAILY
Qty: 30 TABLET | Refills: 1 | Status: SHIPPED | OUTPATIENT
Start: 2025-04-22

## 2025-04-22 RX ORDER — LANOLIN ALCOHOL/MO/W.PET/CERES
100 CREAM (GRAM) TOPICAL DAILY
Qty: 30 TABLET | Refills: 0 | Status: SHIPPED | OUTPATIENT
Start: 2025-04-22

## 2025-04-22 RX ADMIN — NICOTINE POLACRILEX 4 MG: 4 GUM, CHEWING BUCCAL at 05:22

## 2025-04-22 RX ADMIN — THIAMINE HCL TAB 100 MG 100 MG: 100 TAB at 08:34

## 2025-04-22 RX ADMIN — LEVOTHYROXINE SODIUM 100 MCG: 100 TABLET ORAL at 06:44

## 2025-04-22 RX ADMIN — NICOTINE POLACRILEX 4 MG: 4 GUM, CHEWING BUCCAL at 06:41

## 2025-04-22 RX ADMIN — PANTOPRAZOLE SODIUM 40 MG: 40 TABLET, DELAYED RELEASE ORAL at 06:41

## 2025-04-22 RX ADMIN — GABAPENTIN 1200 MG: 600 TABLET, FILM COATED ORAL at 08:34

## 2025-04-22 RX ADMIN — NICOTINE POLACRILEX 4 MG: 4 GUM, CHEWING BUCCAL at 08:36

## 2025-04-22 RX ADMIN — Medication 1 TABLET: at 08:33

## 2025-04-22 RX ADMIN — FOLIC ACID 1 MG: 1 TABLET ORAL at 08:34

## 2025-04-22 RX ADMIN — NICOTINE POLACRILEX 4 MG: 4 GUM, CHEWING BUCCAL at 11:04

## 2025-04-22 RX ADMIN — PROPRANOLOL HYDROCHLORIDE 20 MG: 20 TABLET ORAL at 08:34

## 2025-04-22 ASSESSMENT — ACTIVITIES OF DAILY LIVING (ADL)
ADLS_ACUITY_SCORE: 45
HYGIENE/GROOMING: INDEPENDENT
ADLS_ACUITY_SCORE: 45
DRESS: INDEPENDENT
ADLS_ACUITY_SCORE: 45
LAUNDRY: UNABLE TO COMPLETE
ADLS_ACUITY_SCORE: 45
ADLS_ACUITY_SCORE: 45
ORAL_HYGIENE: INDEPENDENT
ADLS_ACUITY_SCORE: 45

## 2025-04-22 NOTE — PLAN OF CARE
Goal Outcome Evaluation:        Out of Alcohol Detox yesterday  S: Patient scored less than 8 for greater than 24 hours, pt has not required medications for withdrawal for greater than 24 hrs.  B: Patient admitted for alcohol withdrawal and detoxification  A: Patient is stable in Alcohol withdrawal process AEB MSSA scores <8>24 hrs.  R: Patient is removed from Detox status as per unit protocol.

## 2025-04-22 NOTE — PLAN OF CARE
Problem: Adult Inpatient Plan of Care  Goal: Optimal Comfort and Wellbeing  Intervention: Provide Person-Centered Care  Recent Flowsheet Documentation  Taken 4/22/2025 1007 by Yoko Alex RN  Trust Relationship/Rapport: care explained   Goal Outcome Evaluation:    Plan of Care Reviewed With: patient        Pt alert, social, visible and engaged on the unit. MSSA 2, pt denies withdrawal symptoms, met with PT , pt able to ambulate indepedently without devices, she will be out of detox at 12.30, looking forward to discharge  non med this afternoon.    Pt has a lift pass for transportation. She plans to resume will work for [facility] after her camping trip. Pt denies pain, SI/HI. Endorsing a good appetite, med compliant except for Campral which she states gave her bad side effects after taking the dose yesterday.

## 2025-04-22 NOTE — PROGRESS NOTES
[address]     Daily Encounter: Met with team, discussed patient progress, discharge plan and any impediments to discharge.    Patient will be discharging this afternoon and reports that she has a lyft pass that she'll utilize. Patient reports that she will continue going to meetings at [employer] [facility], go on the sober camping trip, then start IOP treatment afterwards. Patient declined any further case management needs or services at this time.    Insurance: Medicare    Legal Status:  Voluntary    SUDs Assessment Status: None is needed at this time.     ROIs on file: None at this time.     Living Situation: Patient reports she lives in home and reports the home is stable.      Current Providers, Supports & Collateral:  PCP: Dr. Carmela Srinivasan at [facility]  Psychiatrist: Dr. Olman Herrera   Therapist: CLAUDIA Castillo, [facility]     Current Plan/Referral Status: Detox Only.    Safety Plan Status: Reviewed and updated with patient and patient declined a copy.      [facility]astasiya Delvalle [facility]  [address] - Adult Inpatient Addiction Psychiatry Unit

## 2025-04-22 NOTE — PLAN OF CARE
Goal Outcome Evaluation:    Plan of Care Reviewed With: patient        Pt visible in the milieu, socialized with peers while watching TV, made and received phone calls and attended and participated in group therapy. Pt denied all mental health psych symptoms and committed for safety. Pt upset and was irritable and stated it is because of the side effect of campral she started today and stated she will not take it again. Pt is out of detox and ready to discharge home tomorrow.   Pt /94 at 1600 and asymptomatic and refused recheck immediately. BP at 2000 150/80 and pt received schedule propanolol with 1200 gabapentin. Pt asymptomatic and denied pain and shortness of breath. Adequate fluids and food intake, voiding freely and no BM concern per pt.

## 2025-04-22 NOTE — PLAN OF CARE
Goal Outcome Evaluation:       Discharge note:    Patient alert and oriented x 3. Patient cleared to d/c home by Dr. Fermin. BS present and active, passing flatus LBM today. Patient voiding well without difficulty. Patient denies pain . Discharge instructions and medication reviewed with patient and she verbalized understanding.  Patient did not take discharge meds.    Patient is out of detox monitoring status. All patient belongings from room, locker, and security sent with patient.     This RN went over discharge instructions, teachings, patient's labs, and unit recommendations with patient. This RN went over patient's prescribed medications being sent home with patient from the discharge pharmacy. Patient verbalizes and demonstrates understanding of all teachings. Patient verbalizes understanding of medication instructions and indications. Patient denies thoughts of harm towards self or others. Patient denies suicidal ideation, plan or intent. Patient denies access to guns. Patient denies auditory/visual hallucinations. Pt denies any current medication side effects or medical issues at this time.     Patient encouraged to make a follow-up appointment with Primary Care Provider within one week of discharge.     Patient denies any further questions and is now discharged at this time.   Patient discharged at 1335 to Home.  Patient escorted off the unit by Psych associate, Hank.

## 2025-04-22 NOTE — PLAN OF CARE
Goal Outcome Evaluation:    Plan of Care Reviewed With: patient        Pt slept for 5 hours and no prn given during the night. Pt is out of detox and ready to discharge today. Safety checks completed per protocol.

## 2025-04-22 NOTE — DISCHARGE SUMMARY
"Psychiatric Discharge Summary    Jolynn Rivera MRN# [id number]   Age: 65 year old YOB: 1960     Date of Admission:  4/19/2025  Date of Discharge:  4/22/2025  Admitting Physician:  Darren Fermin MD  Discharge Physician:  BILL Mast CNP (Contact: [phone])         Event Leading to Hospitalization:      From H&P 4/20/2025:    Chief complaint & reason for admission:    Detox from alcohol     History of present illness: per ED doctor's note: Jolynn Rivera is a 65 year old female with a history notable for alcohol use disorder, polysubstance abuse, bipolar disorder, anxiety, hypothyroidism who presents to the ED with knee pain and concerns of alcohol withdrawal. She drinks 1-1/2 L of wine per day.  They have a history of withdrawal symptoms and have had a seizure from alcohol in the past.  Last drink was earlier today.  No other drug use.  No suicidal or homicidal ideation.  She does note left knee pain, she believes she fell after blacking out.  No recent illness.  Patient was here earlier today for detox, had head CT done because she had recent fall. This was negative.  Initial plan was to go to detox however the bed was not available. No other symptoms noted.      During visit with this provider: reports longstanding hx of alcohol abuse. Went through detox at this facility only 2 weeks ago, but relapsed immediately and had been consuming 1.5 litre of wine daily. Reported experiencing symptoms of withdrawal when tried to quit drinking on her own: sweats, shakes. Denied current experiencing DTs or seizures. Stated that her plan is to detox and \"go camping, after that I will enroll into 55+ program at [place]\".      Past psychiatric history: Patient reports that she first started drinking age 18. She states that her drinking became problematic in her 40's. Since then she has been in treatment around 6 times and in detox about 12 times. The patient has tolerance, withdrawal, " progressive use, loss of control, spending more time using substances.  The patient has made attempts to quit, is experiencing cravings, reports negative consequences. Reports hx of DTs and seizures. Her most recent detox was on this unit in this April. From there she went to [place] for outpatient CD treatment. She reports that she relapsed shortly after that and did not complete that program.      Denies drug use, but used heroine 40 years ago.      Past medical history:    Anxiety      COPD (chronic obstructive pulmonary disease) (H)      COPD (chronic obstructive pulmonary disease) (H)      Hepatitis C      PTSD (post-traumatic stress disorder)      Seizures (H)       second to ETOH    Substance abuse (H)              Past Surgical History:   Procedure Laterality Date    LAPAROSCOPIC TUBAL LIGATION        ORTHOPEDIC SURGERY         Left knee    TONSILLECTOMY          Family and social history:         Family History   Problem Relation Age of Onset    Anxiety Disorder Mother      Asthma Mother      Alcohol/Drug Father      Prostate Cancer Father      Arthritis Father      Alcohol/Drug Brother      Alcohol/Drug Brother      Hypertension Brother      Alcohol/Drug Brother      Depression Brother      Psychotic Disorder Brother        Patient is , no children. Lives with her pets. Says that she is on disability and has some cleaning service.      See full admission note by Dr. Beltran 4/20/2025 for details.           Diagnoses:     Alcohol dependence with withdrawal with complication including history of seizures and DTs  Nicotine dependence with withdrawal   History of polysubstance use including IVDU and history of hepatitis C status post treatment in 2015  History of opioid dependence in sustained remission   Generalized anxiety disorder  Insomnia  Bipolar disorder type 2, most recent episode depressed  PTSD  GERD  COPD  Pulmonary nodules   Hyponatremia  Hypertension  Hypothyroidism   Chronic  back pain with sciatica          Labs & Results:      Latest Reference Range & Units 04/19/25 13:15 04/19/25 16:51 04/19/25 16:56   Sodium 135 - 145 mmol/L  132 (L)    Potassium 3.4 - 5.3 mmol/L  4.0    Chloride 98 - 107 mmol/L  93 (L)    Carbon Dioxide (CO2) 22 - 29 mmol/L  19 (L)    Urea Nitrogen 8.0 - 23.0 mg/dL  8.0    Creatinine 0.51 - 0.95 mg/dL  0.51    GFR Estimate >60 mL/min/1.73m2  >90    Calcium 8.8 - 10.4 mg/dL  9.0    Anion Gap 7 - 15 mmol/L  20 (H)    Magnesium 1.7 - 2.3 mg/dL  1.7    Albumin 3.5 - 5.2 g/dL  4.6    Protein Total 6.4 - 8.3 g/dL  8.0    Alkaline Phosphatase 40 - 150 U/L  53    ALT 0 - 50 U/L  26    AST 0 - 45 U/L  39    Bilirubin Total <=1.2 mg/dL  0.3    Glucose 70 - 99 mg/dL  91    WBC 4.0 - 11.0 10e3/uL  6.7    Hemoglobin 11.7 - 15.7 g/dL  14.1    Hematocrit 35.0 - 47.0 %  38.2    Platelet Count 150 - 450 10e3/uL  275    RBC Count 3.80 - 5.20 10e6/uL  4.02    MCV 78 - 100 fL  95    MCH 26.5 - 33.0 pg  35.1 (H)    MCHC 31.5 - 36.5 g/dL  36.9 (H)    RDW 10.0 - 15.0 %  12.5    % Neutrophils %  56    % Lymphocytes %  32    % Monocytes %  11    % Eosinophils %  0    % Basophils %  0    % Immature Granulocytes %  0    Absolute Basophils 0.0 - 0.2 10e3/uL  0.0    Absolute Eosinophils 0.0 - 0.7 10e3/uL  0.0    Absolute Immature Granulocytes <=0.4 10e3/uL  0.0    Absolute Lymphocytes 0.8 - 5.3 10e3/uL  2.2    Absolute Monocytes 0.0 - 1.3 10e3/uL  0.7    Absolute Neutrophils 1.6 - 8.3 10e3/uL  3.8    Absolute NRBCs 10e3/uL  0.0    NRBCs per 100 WBC <1 /100  0    Alcohol Breath Test 0.00 - 0.01  0.289 !     Amphetamine Qual Urine Screen Negative    Screen Negative   Fentanyl Qual Urine Screen Negative    Screen Negative   Cocaine Urine Screen Negative    Screen Negative   Benzodiazepine Urine Screen Negative    Screen Positive !   Opiates Qualitative Urine Screen Negative    Screen Negative   PCP Urine Screen Negative    Screen Negative   Cannabinoids Qual Urine Screen Negative    Screen  Negative   Barbiturates Qual Urine Screen Negative    Screen Negative   Alcohol ethyl <=0.01 g/dL  0.19 (H)      EXAM: CT HEAD W/O CONTRAST  LOCATION: [facility]  DATE: 4/19/2025     INDICATION: fall  COMPARISON: 1/9/2025  TECHNIQUE: Routine CT Head without IV contrast. Multiplanar reformats. Dose reduction techniques were used.     FINDINGS:  INTRACRANIAL CONTENTS: No intracranial hemorrhage, extraaxial collection, or mass effect.  No CT evidence of acute infarct. Mild presumed chronic small vessel ischemic changes. Mild generalized volume loss. No hydrocephalus.      VISUALIZED ORBITS/SINUSES/MASTOIDS: No intraorbital abnormality. No paranasal sinus mucosal disease. No middle ear or mastoid effusion.     BONES/SOFT TISSUES: No acute abnormality.                                                                   IMPRESSION:  1.  No CT evidence for acute intracranial process.  2.  Brain atrophy and presumed chronic microvascular ischemic changes as above.    --------------------------------------------------------------------------------------------------------------    EXAM: XR KNEE LEFT 3 VIEWS  LOCATION: [facility]  DATE: 4/20/2025     INDICATION: L knee injury  COMPARISON: None.                                                                   IMPRESSION: No displaced fracture or dislocation. No significant knee joint effusion.     Latest Reference Range & Units 04/20/25 00:50 04/20/25 01:43 04/20/25 04:05   Sodium 135 - 145 mmol/L 132 (L)     Potassium 3.4 - 5.3 mmol/L 4.4     Chloride 98 - 107 mmol/L 92 (L)     Carbon Dioxide (CO2) 22 - 29 mmol/L 22     Urea Nitrogen 8.0 - 23.0 mg/dL 8.0     Creatinine 0.51 - 0.95 mg/dL 0.56     GFR Estimate >60 mL/min/1.73m2 >90     Calcium 8.8 - 10.4 mg/dL 9.4     Anion Gap 7 - 15 mmol/L 18 (H)     Magnesium 1.7 - 2.3 mg/dL 1.9     Albumin 3.5 - 5.2 g/dL 4.5     Protein Total 6.4 - 8.3  g/dL 8.0     Alkaline Phosphatase 40 - 150 U/L 51     ALT 0 - 50 U/L 29     AST 0 - 45 U/L 34     Bilirubin Total <=1.2 mg/dL 0.5     Glucose 70 - 99 mg/dL 81     WBC 4.0 - 11.0 10e3/uL 6.3     Hemoglobin 11.7 - 15.7 g/dL 14.5     Hematocrit 35.0 - 47.0 % 39.3     Platelet Count 150 - 450 10e3/uL 254     RBC Count 3.80 - 5.20 10e6/uL 4.04     MCV 78 - 100 fL 97     MCH 26.5 - 33.0 pg 35.9 (H)     MCHC 31.5 - 36.5 g/dL 36.9 (H)     RDW 10.0 - 15.0 % 12.6     % Neutrophils % 61     % Lymphocytes % 24     % Monocytes % 14     % Eosinophils % 0     % Basophils % 1     % Immature Granulocytes % 0     Absolute Basophils 0.0 - 0.2 10e3/uL 0.1     Absolute Eosinophils 0.0 - 0.7 10e3/uL 0.0     Absolute Immature Granulocytes <=0.4 10e3/uL 0.0     Absolute Lymphocytes 0.8 - 5.3 10e3/uL 1.5     Absolute Monocytes 0.0 - 1.3 10e3/uL 0.9     Absolute Neutrophils 1.6 - 8.3 10e3/uL 3.8     Absolute NRBCs 10e3/uL 0.0     NRBCs per 100 WBC <1 /100 0     Alcohol Breath Test 0.00 - 0.01   0.000    Amphetamine Qual Urine Screen Negative    Screen Negative   Fentanyl Qual Urine Screen Negative    Screen Negative   Cocaine Urine Screen Negative    Screen Negative   Benzodiazepine Urine Screen Negative    Screen Positive !   Opiates Qualitative Urine Screen Negative    Screen Negative   PCP Urine Screen Negative    Screen Negative   Cannabinoids Qual Urine Screen Negative    Screen Negative   Barbiturates Qual Urine Screen Negative    Screen Negative          Consults:     Internal Medicine Note 4/20/2025:    Circumstances of recent discharge and re-admittance noted. Please refer to recent medicine consult in chart dated 4/5/25, which was reviewed.  Diagnoses: alcohol use disorder and withdrawal, HTN, COPD, GERD, hypothyroidism, chronic pain.      Home medications still need to be reordered by primary team, including her propranolol as BP is somewhat elevated.      Please re-consult with acute concerns.          Hospital Course:     Jolynn  JAMAAL Rivera was admitted to [address] with attending Darren Fermin MD as a voluntary patient. The patient was placed under status 15 (15 minute checks) to ensure patient safety.     MSSA protocol was initiated due to the patient's history of alcohol abuse and concern for withdrawal symptoms.  She received 15 mg of Valium on day 1 and 40 mg of Valium on day 2.  Thereafter her withdrawal subsided, and the MSSA was discontinued.      Prior to admission medications were continued with the exception of Lunesta.  Campral was initiated, but she reported feeling drowsy and then angry after taking it, so it was discontinued.  She declined the opportunity for Naltrexone or Antabuse.      Jolynn Rivera did participate in groups and was visible in the milieu.  She was calm and cooperative during interactions with staff.  She appeared motivated for treatment and recovery.      Jolynn Rivera was discharged to home with plans to attend [facility] and the 55+ IOP program at [facility].  She has outpatient therapy and also reports recently establishing care with an EMDR therapist.  At the time of discharge Jolynn Rivera was determined to not be a danger to herself or others.          Discharge Medications:      Details   folic acid (FOLVITE) 1 MG tablet Take 1 tablet (1 mg) by mouth daily.  Qty: 30 tablet, Refills: 1        multivitamin w/minerals (THERA-VIT-M) tablet Take 1 tablet by mouth daily.  Qty: 30 tablet, Refills: 1      nicotine polacrilex (NICORETTE) 4 MG gum Place 1 each (4 mg) inside cheek every hour as needed for nicotine withdrawal symptoms.  Qty: 100 each, Refills: 1      thiamine (B-1) 100 MG tablet Take 1 tablet (100 mg) by mouth daily.  Qty: 30 tablet, Refills: 0        acetaminophen (TYLENOL) 500 MG tablet Take 500-1,000 mg by mouth every 6 hours as needed for mild pain.      gabapentin (NEURONTIN) 600 MG tablet Take 1,200 mg by mouth 3 times daily.      levalbuterol (XOPENEX  "HFA) 45 MCG/ACT inhaler Inhale 2 puffs into the lungs every 4 hours as needed for shortness of breath or wheezing.      levothyroxine (SYNTHROID/LEVOTHROID) 100 MCG tablet Take 1 tablet (100 mcg) by mouth every morning.      Lidocaine (LIDOCARE) 4 % Patch Place 1 patch onto the skin daily as needed for moderate pain. To prevent lidocaine toxicity, patient should be patch free for 12 hrs daily.      loperamide (IMODIUM) 2 MG capsule Take 2 mg by mouth 4 times daily as needed for diarrhea.      melatonin 5 MG tablet Take 5 mg by mouth at bedtime.       omeprazole (PRILOSEC) 40 MG DR capsule Take 1 capsule (40 mg) by mouth every morning.      ondansetron (ZOFRAN ODT) 4 MG ODT tab Take 1 tablet (4 mg) by mouth every 8 hours as needed for vomiting or nausea.      propranolol (INDERAL) 20 MG tablet Take 1 tablet (20 mg) by mouth 3 times daily.             Psychiatric Examination:     Appearance: awake, alert and adequately groomed  Attitude:  cooperative  Eye Contact:  good  Mood:  \"good\"  Affect:  normal range, mood congruent  Speech:  clear, coherent  Psychomotor Behavior:  no evidence of tardive dyskinesia, dystonia, or tics  Thought Process:  goal oriented, linear  Associations:  no loose associations  Thought Content:  no evidence of suicidal ideation or homicidal ideation and no evidence of psychotic thought  Insight:  fair  Judgement:  fair  Oriented to:  date, time, person, and place  Attention Span and Concentration:  intact  Recent and Remote Memory:  fair    BP (!) 150/80   Pulse 71   Temp 98.6  F (37  C) (Oral)   Resp 16   Ht 1.6 m (5' 3\")   Wt 62.1 kg (137 lb)   SpO2 95%   BMI 24.27 kg/m           Discharge Plan:     Per discharge AVS:    Summary: You were admitted to [facility] on 4/20/2025 for detoxification from alcohol.  A medical exam was performed that included lab work. You have met with a [facility] Counselor and opted to detox only with plan to start IOP after a camping trip.  Please take care and " make your recovery a daily priority, Jolynn!  It was a pleasure working with you and the entire treatment team here wishes you the very best in your recovery!     Recommendation:  It is recommended that you abstain from use of all mood-altering chemicals unless prescribed by a medical professional.      Substance use treatment services were recommended, but you have declined these services at this time. If at anytime after discharge you would like to pursue these services at [facility] please contact us at [phone].     Major Treatments, Procedures and Findings:  You were treated for Alcohol  detoxification using Valium . You have met with a [facility] counselor to develop a treatment plan for discharge. You had labs drawn and those results were reviewed with you. Please take a copy of your lab work with you to your next primary care provider appointment.    Symptoms to Report:  If you experience more anxiety, confusion, sleeplessness, deep sadness or thoughts of suicide, notify your treatment team or notify your primary care provider. IF ANY OF THE SYMPTOMS YOU ARE EXPERIENCING ARE A MEDICAL EMERGENCY CALL 911 IMMEDIATELY.     Lifestyle Adjustment: Adjust your lifestyle to get enough sleep, relaxation, exercise and good nutrition. Continue to develop healthy coping skills to decrease stress and promote a sober living environment. Do not use mood altering substances including alcohol, illegal drugs or addictive medications other than what is currently prescribed.     Disposition: Return home.    Follow-up Appointment:   You are aware you should make a follow up appointment with your primary care doctor for medical and medication management       Attestation:  The patient has been seen and evaluated by me, BILL Mast CNP  Discharge time > 30 minutes

## 2025-04-23 ENCOUNTER — PATIENT OUTREACH (OUTPATIENT)
Dept: CARE COORDINATION | Facility: CLINIC | Age: 65
End: 2025-04-23
Payer: MEDICARE

## 2025-04-23 DIAGNOSIS — Z09 HOSPITAL DISCHARGE FOLLOW-UP: ICD-10-CM

## 2025-04-23 NOTE — PROGRESS NOTES
[facility]: Transitions of Care Outreach  Chief Complaint   Patient presents with    Clinic Care Coordination - Post Hospital       Most Recent Admission Date: 4/19/2025   Most Recent Admission Diagnosis: Alcohol use disorder - F10.90     Most Recent Discharge Date: 4/22/2025   Most Recent Discharge Diagnosis: Alcohol use disorder - F10.90  Left knee pain, unspecified chronicity - M25.562  Anxiety - F41.9  Alcohol dependence with intoxication with complication (H) - F10.229  Tobacco use disorder - F17.200  Alcohol dependence with intoxication delirium (H) - F10.221     Transitions of Care Assessment    Discharge Assessment  How are you doing now that you are home?: I am doing good. I am about to take my dog on a walk.  How are your symptoms? (Red Flag symptoms escalate to triage hotline per guidelines): Improved  Do you know how to contact your clinic care team if you have future questions or changes to your health status? : Yes  Does the patient have their discharge instructions? : Yes  Does the patient have questions regarding their discharge instructions? : No  Were you started on any new medications or were there changes to any of your previous medications? : Yes  Does the patient have all of their medications?: No (see comment) (Pt said she did not  her medication because it has a bad side effect.)  Do you have questions regarding any of your medications? : No                  Follow up Plan     Discharge Follow-Up  Discharge follow up appointment scheduled in alignment with recommended follow up timeframe or Transitions of Risk Category? (Low = within 30 days; Moderate= within 14 days; High= within 7 days): Yes  Discharge Follow Up Appointment Date: 05/06/25  Discharge Follow Up Appointment Scheduled with?: Primary Care Provider    No future appointments.    Outpatient Plan as outlined on AVS reviewed with patient.    For any urgent concerns, please contact our 24 hour nurse triage  line: [phone] ([phone])         IRAM Pinon  [phone]  [facility]

## 2025-04-24 ENCOUNTER — TELEPHONE (OUTPATIENT)
Dept: PHARMACY | Facility: OTHER | Age: 65
End: 2025-04-24
Payer: MEDICARE

## 2025-04-24 NOTE — TELEPHONE ENCOUNTER
MTM referral from: Transitions of Care (recent hospital discharge, TCU discharge, or ED visit)    MTM referral outreach attempt #1 on April 24, 2025 at 10:08 AM      Outcome: Patient is not interested at this time because . Patient has no questions or concers regarding medications at this time.      Use reyes  for the carrier/Plan on the flowsheet      Priya Caldera CMA  MTM

## 2025-05-14 ENCOUNTER — VIRTUAL VISIT (OUTPATIENT)
Dept: ADDICTION MEDICINE | Facility: CLINIC | Age: 65
End: 2025-05-14
Payer: MEDICARE

## 2025-05-14 DIAGNOSIS — F10.20 ALCOHOL USE DISORDER, SEVERE, DEPENDENCE (H): Primary | ICD-10-CM

## 2025-05-14 DIAGNOSIS — F41.9 ANXIETY: ICD-10-CM

## 2025-05-14 DIAGNOSIS — R56.9 ALCOHOL WITHDRAWAL SEIZURE WITH COMPLICATION (H): ICD-10-CM

## 2025-05-14 DIAGNOSIS — F10.939 ALCOHOL WITHDRAWAL SEIZURE WITH COMPLICATION (H): ICD-10-CM

## 2025-05-14 DIAGNOSIS — B18.2 CHRONIC HEPATITIS C WITHOUT HEPATIC COMA (H): ICD-10-CM

## 2025-05-14 DIAGNOSIS — F11.11 HISTORY OF OPIOID ABUSE (H): ICD-10-CM

## 2025-05-14 DIAGNOSIS — F43.10 PTSD (POST-TRAUMATIC STRESS DISORDER): ICD-10-CM

## 2025-05-14 DIAGNOSIS — F10.139 ALCOHOL ABUSE WITH WITHDRAWAL (H): ICD-10-CM

## 2025-05-14 DIAGNOSIS — F10.221 ALCOHOL DEPENDENCE WITH INTOXICATION DELIRIUM (H): ICD-10-CM

## 2025-05-14 DIAGNOSIS — F31.12 BIPOLAR 1 DISORDER, MANIC, MODERATE (H): ICD-10-CM

## 2025-05-14 DIAGNOSIS — F32.A DEPRESSION, UNSPECIFIED DEPRESSION TYPE: ICD-10-CM

## 2025-05-14 PROBLEM — F10.90 ALCOHOL USE DISORDER: Status: RESOLVED | Noted: 2023-04-15 | Resolved: 2025-05-14

## 2025-05-14 PROBLEM — R09.02 HYPOXIA: Status: RESOLVED | Noted: 2024-12-02 | Resolved: 2025-05-14

## 2025-05-14 PROBLEM — F10.921 ALCOHOL INTOXICATION WITH DELIRIUM: Status: RESOLVED | Noted: 2025-02-19 | Resolved: 2025-05-14

## 2025-05-14 PROBLEM — F10.930 ALCOHOL WITHDRAWAL SYNDROME WITHOUT COMPLICATION (H): Status: RESOLVED | Noted: 2022-04-20 | Resolved: 2025-05-14

## 2025-05-14 PROBLEM — F10.929 ALCOHOLIC INTOXICATION WITH COMPLICATION: Status: RESOLVED | Noted: 2023-11-17 | Resolved: 2025-05-14

## 2025-05-14 PROCEDURE — 1126F AMNT PAIN NOTED NONE PRSNT: CPT | Mod: 95

## 2025-05-14 PROCEDURE — G2211 COMPLEX E/M VISIT ADD ON: HCPCS | Mod: 95

## 2025-05-14 PROCEDURE — 98002 SYNCH AUDIO-VIDEO NEW MOD 45: CPT

## 2025-05-14 ASSESSMENT — PAIN SCALES - GENERAL: PAINLEVEL_OUTOF10: NO PAIN (0)

## 2025-05-14 NOTE — NURSING NOTE
Current patient location: [address]  [place] [address]    Is the patient currently in the state of [place]? YES    Visit mode: VIDEO    If the visit is dropped, the patient can be reconnected by:VIDEO VISIT: Text to cell phone:   Telephone Information:   Mobile [phone]       Will anyone else be joining the visit? NO  (If patient encounters technical issues they should call [phone])    Are changes needed to the allergy or medication list? Pt stated no changes to allergies and Pt stated no med changes    Are refills needed on medications prescribed by this physician? NO    Rooming Documentation:  Questionnaire(s) completed    Reason for visit: Consult    Jennifer Carter [facility]    Care team has reviewed attendance agreement with patient. Patient advised that two failed appointments within 6 months may lead to termination of current episode of care.

## 2025-05-14 NOTE — PROGRESS NOTES
"Virtual Visit Details    Type of service:  Video Visit   Joined the call at 5/14/2025, 1:02:28 pm.  Left the call at 5/14/2025, 1:48:40 pm.    Originating Location (pt. Location): Home    Distant Location (provider location):  Off-site  Platform used for Video Visit: Olinda POMPA                                                      Jolynn Rivera is a 65 year old female who presents for  initial visit for addiction consultation and management   due to concerns for Alcohol Use Disorder (AUD)    Visit performed Virtual, via video    HPI:   Jolynn Rivera is a 65 year old female with history of Bipolar 1, Hepatitis C, COPD, elevated LFT's, GERD, OMARI, DT's, withdrawal seizure,  Hypertension, insomnia, use who presents for referral for a Medicare GIRMA eval  management options.    Started with alcohol use at 17 yo.  Had a history of heroin use 37 years ago. \"I use alcohol to self medicate.\"  Alcohol use became problematic in 40's. Last several years has gone to treatment, and detox several times.  \"At this point frustrated, I feel like my mental health and my life are going really well\" but still struggling with alcohol use.  Currently has a CADI waiver, engaged in psychiatry and therapy, working on finding an EMDR program that accepts medicare as a payment.      Grateful for [facility] providers and staff.  Engaged in psychiatry, therapist, MD,  Unable to find EMDR that takes Medicare.  Jolynn has had episodes of abstinence, her longest period of abstinence was 5 1/2 years.  She has used smart recovery and is attending meetings at [facility] for recovery.  Jolynn has been to treatment through [facility] and felt the program was beneficial.  She is currently taking 100 mg naltrexone prescribed by her PCP, and gabapentin is helpful for her arthritis, and anxiety.  She's used campral in the past but experienced Mood changes as a side effect.  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  She has an apartment and a Husky dog.  Jolynn is considering going back to school for Masters/PhD. This summer she is looking forward to attending a Glamping trip with a friend from [place].  Historically this has been an abstinent event that she feels will be helpful in her recovery.  Jolynn does use cigarettes, nicotine gum has reduced to 10 cigarettes/ day from 3 packs day.    Jolynn has a mental health history of; CPSTD, Bipolar 2, anxiety depression.  She has psychiatry through [facility], and therapist Aung Massey, [facility], [facility].  She feels she is on an effective medication combination and is happy with her current care team.              Substance Use History:   \"Have you ever had any history with [...] use?\" And \"When was your last use?  ALCOHOL - Last use 1 month, after detox.   CANNABIS - experiemental,  Doesn't enjoy  PRESCRIPTION STIMULANTS (includes Ritalin, Adderall, Vyvanse) - denies  COCAINE/CRACK - denies  METH/AMPHETAMINES (includes ecstacy, MDMA/gabrielle) - denies  OPIATES - 37 years ago.   BENZODIAZEPINES (includes Ativan, Klonopin, Xanax) - denies, history of   KRATOM (mild opioid and stimulant effects) - denies  KETAMINE - denies  HALLUCINOGENS (includes DXM) - history of in 80\"s   BEHAVIORAL (Gambling, Eating d/o, Compulsivity) - denies, hist compusex  History of treatment - multiple inpatient and IOP's  NICOTINE  Cigarettes: current  Chew/snus: denies  Vaping: Doesn't work, over vape  Past NRT/medication use: Ni      Previous withdrawal treatment episodes (e.g. detox): [facility]  Previous GIRMA treatment programs: inpatient and outpatient.   Hospitalizations or overdose: 3 times on heroin in the 80's  Medical complications from substance use: hep c,   IV Drug use?: heroin in 1980's  Previous Medication for Addiction Tx: acamprosate, naltrexone  Longest period " "of full abstinence: 5  Activities that have previously supported abstinence: smart recovery, will work for recovery, therapy  Current Recovery Activities: Will work for recovery      Infectious disease screening  Hep C:    Hepatitis C Antibody   Date Value Ref Range Status   07/20/2022 Reactive (A) Nonreactive Final     Comment:     A reactive result indicates one of the following   1) Current HCV infection   2) Past HCV infection that has resolved or   3) False positivity.     The CDC recommends that a reactive result should be followed by Nucleic acid testing for HCV RNA. If HCV RNA is detected, that indicates current HCV infection.   If HCV RNA is not detected, that indicates either past, resolved HCV infection, or false HCV antibody positivity.       HIV:  No results found for: \"HIAGAB\"        Pregnancy Status menopause    Psychiatric History (per patient report and problem list review)  Past diagnoses - CPTSD, Bipolar 2, depression, anxiety   Current or past psychiatrist: [facility] Benjie Herrera  Current or past therapist: CLAUDIA Castillo, [facility].   Hospitalizations/TMS/ECT -   Suicide Attempts -   Medication trials -       PHQ-9 scores:      11/11/2016     1:40 PM 6/27/2017     8:55 AM 2/17/2025     9:18 AM   PHQ   PHQ-9 Total Score 4  20  9    Q9: Thoughts of better off dead/self-harm past 2 weeks Not at all  Not at all  Not at all       Patient-reported    Data saved with a previous flowsheet row definition     OMARI-7 scores:      11/3/2014    12:00 PM   OMARI-7 SCORE   Total Score BEH Adult 15        Data saved with a previous flowsheet row definition         SOCIAL HISTORY:  Housing status: apartment  Employment status: Disabled  Relationship status: Single  Children: none  Legal concerns related to use: none  Contact information up to date?     3rd Party Involvement  (please obtain JUANY if pt would like to include)      Medical History:    Patient Active Problem List    Diagnosis Date Noted    " Hypomagnesemia 04/05/2025     Priority: Medium    Hyponatremia 04/05/2025     Priority: Medium    Alcohol dependence with intoxication delirium (H) 04/05/2025     Priority: Medium    Suicidal ideation 02/19/2025     Priority: Medium    Alcohol intoxication with delirium 02/19/2025     Priority: Medium    History of alcohol withdrawal delirium 02/19/2025     Priority: Medium    History of seizure due to alcohol withdrawal 02/19/2025     Priority: Medium    Alcohol abuse with withdrawal (H) 01/13/2025     Priority: Medium    Lactic acidosis 01/09/2025     Priority: Medium    Alcohol withdrawal seizure with complication (H) 01/09/2025     Priority: Medium    Hypoxia 12/02/2024     Priority: Medium    Elevated troponin 12/02/2024     Priority: Medium    Chronic hip pain, right 06/18/2024     Priority: Medium    Acute hypoxic respiratory failure (H) 04/19/2024     Priority: Medium    COPD with acute exacerbation (H) 04/19/2024     Priority: Medium    Acute alcoholic intoxication in alcoholism with complication (H) 04/04/2024     Priority: Medium    Psychophysiological insomnia 02/06/2024     Priority: Medium    Alcohol use disorder, severe, dependence (H) 12/06/2023     Priority: Medium    Seizure (H) 11/17/2023     Priority: Medium    Alcoholic intoxication with complication 11/17/2023     Priority: Medium    Vomiting and diarrhea 08/30/2023     Priority: Medium    Atelectasis of right lung 07/20/2023     Priority: Medium    Alcohol use disorder 04/15/2023     Priority: Medium    Alcoholic ketoacidosis 02/09/2023     Priority: Medium    Elevated LFTs 07/21/2022     Priority: Medium    Drug withdrawal seizure with complication (H) 07/21/2022     Priority: Medium    Alcohol dependence with withdrawal with complication (H) 07/21/2022     Priority: Medium    Alcohol withdrawal syndrome without complication (H) 04/20/2022     Priority: Medium    Alcohol withdrawal, uncomplicated (H) 03/16/2022     Priority: Medium     Sinusitis, unspecified chronicity, unspecified location 03/16/2022     Priority: Medium    Primary hypertension 12/21/2021     Priority: Medium    Onychomycosis 10/26/2021     Priority: Medium    Osteoarthritis of multiple joints 04/27/2021     Priority: Medium    Acquired hypothyroidism 08/20/2020     Priority: Medium     11/27/21: We restarted her synthroid but it could be held for a while since TSH off medication was so close to normal      Bipolar disorder, most recent episode depressed (H) 03/17/2020     Priority: Medium    Left knee pain 02/28/2020     Priority: Medium    Tear of lateral meniscus of right knee 03/04/2019     Priority: Medium    Chronic obstructive pulmonary disease (H) 12/06/2018     Priority: Medium    Gastroesophageal reflux disease without esophagitis 12/06/2018     Priority: Medium    Tobacco use disorder 11/08/2017     Priority: Medium    Generalized anxiety disorder 10/13/2017     Priority: Medium    Hx of psychiatric care 07/22/2016     Priority: Medium       PAST PSYCH MED TRIALS                                            Effexor (venlafaxine),   Depakote and Depakote ER (valproate),   Risperdal (risperidone)   Seroquel (quetiapine)  Mirtazapine up to 7.5 mg - short trial, no SE but didn't work as well as Seroquel  Trivel                 Alcohol use disorder, mild, abuse 03/16/2016     Priority: Medium    Posttraumatic stress disorder 06/03/2015     Priority: Medium    Nicotine abuse 06/03/2015     Priority: Medium    Chemical dependency (H) 05/13/2015     Priority: Medium    Mood disorder 03/10/2015     Priority: Medium    Alcohol abuse, in remission 12/22/2014     Priority: Medium    Polysubstance abuse (H) 10/23/2014     Priority: Medium    Chronic hepatitis C (H) 10/23/2014     Priority: Medium    COPD (chronic obstructive pulmonary disease) (H) 10/23/2014     Priority: Medium    GERD (gastroesophageal reflux disease) 10/23/2014     Priority: Medium    Tobacco dependence 05/12/2013      Priority: Medium    Acne rosacea 05/09/2013     Priority: Medium    Bipolar 1 disorder, manic, moderate (H) 10/17/2012     Priority: Medium     Reports cycling every 3-4 months.  Client reports being able to cope with kim, has      Bee sting allergy 07/19/2012     Priority: Medium    History of hepatitis C 07/19/2012     Priority: Medium     S/p successful harvoni treatment 2014 at [place]      PTSD (post-traumatic stress disorder) 07/19/2012     Priority: Medium       Past Medical History:   Diagnosis Date    Anxiety     COPD (chronic obstructive pulmonary disease) (H)     COPD (chronic obstructive pulmonary disease) (H)     Hepatitis C     PTSD (post-traumatic stress disorder)     Seizures (H)     second to ETOH    Substance abuse (H)        Past Surgical History:   Procedure Laterality Date    LAPAROSCOPIC TUBAL LIGATION      ORTHOPEDIC SURGERY      Left knee    TONSILLECTOMY           Family History   Problem Relation Age of Onset    Anxiety Disorder Mother     Asthma Mother     Alcohol/Drug Father     Prostate Cancer Father     Arthritis Father     Alcohol/Drug Brother     Alcohol/Drug Brother     Hypertension Brother     Alcohol/Drug Brother     Depression Brother     Psychotic Disorder Brother          Current Outpatient Medications   Medication Sig Dispense Refill    acetaminophen (TYLENOL) 500 MG tablet Take 500-1,000 mg by mouth every 6 hours as needed for mild pain.      folic acid (FOLVITE) 1 MG tablet Take 1 tablet (1 mg) by mouth daily. 30 tablet 1    gabapentin (NEURONTIN) 600 MG tablet Take 1,200 mg by mouth 3 times daily.      levalbuterol (XOPENEX HFA) 45 MCG/ACT inhaler Inhale 2 puffs into the lungs every 4 hours as needed for shortness of breath or wheezing.      levothyroxine (SYNTHROID/LEVOTHROID) 100 MCG tablet Take 1 tablet (100 mcg) by mouth every morning.      Lidocaine (LIDOCARE) 4 % Patch Place 1 patch onto the skin daily as needed for moderate pain. To prevent lidocaine toxicity,  "patient should be patch free for 12 hrs daily.      loperamide (IMODIUM) 2 MG capsule Take 2 mg by mouth 4 times daily as needed for diarrhea.      melatonin 5 MG tablet Take 5 mg by mouth at bedtime. Takes 1 hour prior to Lunesta      multivitamin w/minerals (THERA-VIT-M) tablet Take 1 tablet by mouth daily. 30 tablet 1    nicotine polacrilex (NICORETTE) 4 MG gum Place 1 each (4 mg) inside cheek every hour as needed for nicotine withdrawal symptoms. 100 each 1    omeprazole (PRILOSEC) 40 MG DR capsule Take 1 capsule (40 mg) by mouth every morning.      ondansetron (ZOFRAN ODT) 4 MG ODT tab Take 1 tablet (4 mg) by mouth every 8 hours as needed for vomiting or nausea.      propranolol (INDERAL) 20 MG tablet Take 1 tablet (20 mg) by mouth 3 times daily.      thiamine (B-1) 100 MG tablet Take 1 tablet (100 mg) by mouth daily. 30 tablet 0         Allergies   Allergen Reactions    Bee Venom Swelling    Wasps [Hornets] Swelling    Amlodipine      Nightmares    Antihistamines, Chlorpheniramine-Type [Antihistamines, Chlorpheniramine-Type]     Clonidine     Compazine      Lock jaw    Diphenhydramine Muscle Pain (Myalgia) and Cramps    Hydroxyzine Cramps     Lock jaw    Metoclopramide      Tardive Dyskinesia    Penicillins      Panic attack    Prednisone Anxiety     Panic attacks.      Prochlorperazine Muscle Pain (Myalgia) and Cramps    Trazodone Nausea and Vomiting and Confusion     \"I ended up falling and feeling like I was drunk\"    Umeclidinium Bromide      Mood swings + anger.           OBJECTIVE                                                      EXAM    There were no vitals taken for this visit.    GENERAL: healthy, alert and no distress  EYES: Eyes grossly normal to inspection, PERRL and conjunctivae and sclerae normal  RESP: No respiratory distress  MENTAL STATUS EXAM  Appearance/Behavior: No appearant distress  Speech: Normal  Mood/Affect: flat  Insight: Fair      LAB  Recent UDS Labs (may not contain today's lab " "data)  No results found for: \"BUP\", \"BZO\", \"BAR\", \"JANIS\", \"MAMP\", \"AMP\", \"MDMA\", \"MTD\", \"IHD338\", \"OXY\", \"PCP\", \"THC\", \"TEMP\", \"SGPOCT\"        Hepatic Function  AST   Date Value Ref Range Status   04/20/2025 34 0 - 45 U/L Final   09/03/2016 114 (H) 0 - 45 U/L Final     ALT   Date Value Ref Range Status   04/20/2025 29 0 - 50 U/L Final   09/03/2016 80 (H) 0 - 50 U/L Final     Bilirubin Total   Date Value Ref Range Status   04/20/2025 0.5 <=1.2 mg/dL Final   09/03/2016 0.4 0.2 - 1.3 mg/dL Final     Albumin   Date Value Ref Range Status   04/20/2025 4.5 3.5 - 5.2 g/dL Final   12/09/2022 4.5 3.4 - 5.0 g/dL Final   09/03/2016 3.8 3.4 - 5.0 g/dL Final     INR   Date Value Ref Range Status   11/17/2023 0.97 0.85 - 1.15 Final   09/03/2016 1.00 0.86 - 1.14 Final       CBC  WBC   Date Value Ref Range Status   09/03/2016 4.6 4.0 - 11.0 10e9/L Final     WBC Count   Date Value Ref Range Status   04/20/2025 6.3 4.0 - 11.0 10e3/uL Final     RBC Count   Date Value Ref Range Status   04/20/2025 4.04 3.80 - 5.20 10e6/uL Final   09/03/2016 4.36 3.8 - 5.2 10e12/L Final     Hemoglobin   Date Value Ref Range Status   04/20/2025 14.5 11.7 - 15.7 g/dL Final   09/03/2016 15.1 11.7 - 15.7 g/dL Final     Hematocrit   Date Value Ref Range Status   04/20/2025 39.3 35.0 - 47.0 % Final   09/03/2016 43.0 35.0 - 47.0 % Final     MCV   Date Value Ref Range Status   04/20/2025 97 78 - 100 fL Final   09/03/2016 99 78 - 100 fl Final     MCH   Date Value Ref Range Status   04/20/2025 35.9 (H) 26.5 - 33.0 pg Final   09/03/2016 34.6 (H) 26.5 - 33.0 pg Final     MCHC   Date Value Ref Range Status   04/20/2025 36.9 (H) 31.5 - 36.5 g/dL Final   09/03/2016 35.1 31.5 - 36.5 g/dL Final     Platelet Count   Date Value Ref Range Status   04/20/2025 254 150 - 450 10e3/uL Final   09/03/2016 209 150 - 450 10e9/L Final     RDW   Date Value Ref Range Status   04/20/2025 12.6 10.0 - 15.0 % Final   09/03/2016 12.0 10.0 - 15.0 % Final       Today's lab data  No results " found for any visits on 05/14/25.        [facility] Pharmacy Data Base Reviewed;    Consistent with patient reports and Epic records.    04/28/2025 04/24/2025 1 Eszopiclone 3 Mg Tablet 30.00 30 Th Kli [id number] Gen (2588) 0/2 1.00 [place]   04/24/2025 04/24/2025 1 Gabapentin 600 Mg Tablet 180.00 30 Th Kli [id number] Gen (2588) 0/2  [place]   03/20/2025 02/24/2025 1 Eszopiclone 3 Mg Tablet 30.00 30 Th Kli [id number] Gen (2588) 0/2 1.00 LME [place]   02/24/2025 01/21/2025 1 Eszopiclone 3 Mg Tablet 30.00                      A/P                                                      ASSESSMENT/PLAN:  1. Alcohol use disorder, severe, dependence (H) (Primary)  -needs improvement, last use one month ago  -continue with naltrexone 100 mg prescribed by PCP  -continue with gabapentin as prescribed by PCP  -encouraged recovery activities  -medicare GIRMA evaluation order placed and provided with phone number for scheduling  -schedule GIRMA evaluation and follow all recommendations  - Adult Mental Health  Referral; Future  -follow up as needed.  Will continue with pharmacotherapy through PCP       2. Alcohol abuse with withdrawal (H)  3. Alcohol dependence with intoxication delirium (H)  4. Alcohol withdrawal seizure with complication (H)  5. Alcoholic ketoacidosis  -no change.  All alcohol cessation episodes should be done under medical supervision  -last use one month ago  -continue with strict abstinence        4. Chronic hepatitis C without hepatic coma (H)  S/p treatment. [facility] [place] [facility] 2014 through [facility]  Hep C RNA 2023 undetectable       6. Bipolar 1 disorder, manic, moderate (H)  7. PTSD (post-traumatic stress disorder)  8. Depression, unspecified depression type  9. Anxiety  -no change  -eszopiclone 3 mg tablet, gabapentin per psychiatry   -continue follow up with psychiatry   -continue with psychotherapy and follow all recommendations    10. History of opioid abuse (H)  -remote history of IV heroin  use (1980) in sustained remission      Continued Complex Management  The longitudinal plan of care for Alcohol Use Disorder (AUD) was addressed during this visit. Due to the added complexity in care, I will continue to support Jolynn in the subsequent management and with ongoing continuity of care.      Counseled the patient on the importance of having a recovery program in addition to medication to manage recovery.  Components include avoiding isolating, having willingness to change, avoiding triggers and managing cravings. Encouraged having some type of sober network and practicing honesty with trusted support person(s). Encouraged other services such as counseling, 12 step or other self-help organizations.          RTC   As needed.  No follow up recommended as pt has pharmacotherapy through PCP and would like to continue with PCP's care         Hali Manzo NP  [facility] Addiction Medicine  [phone]      Answers submitted by the patient for this visit:  Patient Health Questionnaire (Submitted on 5/14/2025)  If you checked off any problems, how difficult have these problems made it for you to do your work, take care of things at home, or get along with other people?: Somewhat difficult  PHQ9 TOTAL SCORE: 9

## 2025-05-15 ENCOUNTER — PATIENT OUTREACH (OUTPATIENT)
Dept: CARE COORDINATION | Facility: CLINIC | Age: 65
End: 2025-05-15
Payer: MEDICARE

## 2025-05-19 ENCOUNTER — TELEPHONE (OUTPATIENT)
Dept: BEHAVIORAL HEALTH | Facility: CLINIC | Age: 65
End: 2025-05-19

## 2025-05-19 NOTE — TELEPHONE ENCOUNTER
"Pt is a(n) adult (18+ out of HS) Seeking as eval for Adult GIRMA Assessment for primary substance use treatment (OP GIRMA programs including Co-occurring).  Appointment scheduled by:  Patient.  (self-pay - complete Cost Estimate)  Caller name:  Jolynn Rivera    Caller phone #: [phone]  Legal Guardianship Reviewed?  No  Honoring Choices Notified?  No  Brief reason for appt:  Pt referred for eval by KELLEE GOLD      needed?  NO    Contact information verified/updated: Yes    If appt is for adult GIRMA program location, confirm you have verified the location and address with the patient referring to the template header.  No    Jaylen Mackenzie    \"We have scheduled your evaluation. In the event that your insurance coverage comes back as out of network, you may receive a call to cancel your appointment and direct you to your insurance company for in-network coverage.\"    Disclaimer regarding insurance read to patient?  Yes  Informed patient [place] are for programming that is in person in the [place] area?  Yes - proceed with scheduling  Reviewed waitlist option with pt?  Yes and patient placed on wait list   "

## 2025-05-28 ENCOUNTER — PATIENT OUTREACH (OUTPATIENT)
Dept: CARE COORDINATION | Facility: CLINIC | Age: 65
End: 2025-05-28
Payer: MEDICARE

## 2025-06-15 ENCOUNTER — HEALTH MAINTENANCE LETTER (OUTPATIENT)
Age: 65
End: 2025-06-15

## 2025-06-21 ENCOUNTER — HOSPITAL ENCOUNTER (INPATIENT)
Facility: CLINIC | Age: 65
LOS: 3 days | Discharge: HOME | DRG: 897 | End: 2025-06-24
Attending: EMERGENCY MEDICINE | Admitting: PSYCHIATRY & NEUROLOGY
Payer: MEDICARE

## 2025-06-21 ENCOUNTER — TELEPHONE (OUTPATIENT)
Dept: BEHAVIORAL HEALTH | Facility: CLINIC | Age: 65
End: 2025-06-21

## 2025-06-21 DIAGNOSIS — F10.939 ALCOHOL WITHDRAWAL SYNDROME WITH COMPLICATION (H): ICD-10-CM

## 2025-06-21 DIAGNOSIS — F10.221 ALCOHOL DEPENDENCE WITH INTOXICATION DELIRIUM (H): Primary | ICD-10-CM

## 2025-06-21 PROBLEM — F10.20 ALCOHOL DEPENDENCE (H): Status: ACTIVE | Noted: 2025-06-21

## 2025-06-21 LAB
ALBUMIN SERPL BCG-MCNC: 4.8 G/DL (ref 3.5–5.2)
ALCOHOL BREATH TEST: 0.26 (ref 0–0.01)
ALP SERPL-CCNC: 56 U/L (ref 40–150)
ALT SERPL W P-5'-P-CCNC: 27 U/L (ref 0–50)
AMPHETAMINES UR QL SCN: NORMAL
ANION GAP SERPL CALCULATED.3IONS-SCNC: 20 MMOL/L (ref 7–15)
AST SERPL W P-5'-P-CCNC: 53 U/L (ref 0–45)
BARBITURATES UR QL SCN: NORMAL
BASOPHILS # BLD AUTO: 0 10E3/UL (ref 0–0.2)
BASOPHILS NFR BLD AUTO: 1 %
BENZODIAZ UR QL SCN: NORMAL
BILIRUB SERPL-MCNC: 0.4 MG/DL
BUN SERPL-MCNC: 9.2 MG/DL (ref 8–23)
BZE UR QL SCN: NORMAL
CALCIUM SERPL-MCNC: 9.1 MG/DL (ref 8.8–10.4)
CANNABINOIDS UR QL SCN: NORMAL
CHLORIDE SERPL-SCNC: 96 MMOL/L (ref 98–107)
CREAT SERPL-MCNC: 0.57 MG/DL (ref 0.51–0.95)
EGFRCR SERPLBLD CKD-EPI 2021: >90 ML/MIN/1.73M2
EOSINOPHIL # BLD AUTO: 0.1 10E3/UL (ref 0–0.7)
EOSINOPHIL NFR BLD AUTO: 2 %
ERYTHROCYTE [DISTWIDTH] IN BLOOD BY AUTOMATED COUNT: 12.1 % (ref 10–15)
ETHANOL SERPL-MCNC: 0.24 G/DL
FENTANYL UR QL: NORMAL
GLUCOSE SERPL-MCNC: 107 MG/DL (ref 70–99)
HCO3 SERPL-SCNC: 19 MMOL/L (ref 22–29)
HCT VFR BLD AUTO: 43.9 % (ref 35–47)
HGB BLD-MCNC: 16.2 G/DL (ref 11.7–15.7)
IMM GRANULOCYTES # BLD: 0 10E3/UL
IMM GRANULOCYTES NFR BLD: 0 %
LYMPHOCYTES # BLD AUTO: 3 10E3/UL (ref 0.8–5.3)
LYMPHOCYTES NFR BLD AUTO: 46 %
MAGNESIUM SERPL-MCNC: 1.8 MG/DL (ref 1.7–2.3)
MCH RBC QN AUTO: 34.2 PG (ref 26.5–33)
MCHC RBC AUTO-ENTMCNC: 36.9 G/DL (ref 31.5–36.5)
MCV RBC AUTO: 93 FL (ref 78–100)
MONOCYTES # BLD AUTO: 0.6 10E3/UL (ref 0–1.3)
MONOCYTES NFR BLD AUTO: 9 %
NEUTROPHILS # BLD AUTO: 2.8 10E3/UL (ref 1.6–8.3)
NEUTROPHILS NFR BLD AUTO: 43 %
NRBC # BLD AUTO: 0 10E3/UL
NRBC BLD AUTO-RTO: 0 /100
OPIATES UR QL SCN: NORMAL
PCP QUAL URINE (ROCHE): NORMAL
PLATELET # BLD AUTO: 225 10E3/UL (ref 150–450)
POTASSIUM SERPL-SCNC: 3.9 MMOL/L (ref 3.4–5.3)
PROT SERPL-MCNC: 8.2 G/DL (ref 6.4–8.3)
RBC # BLD AUTO: 4.74 10E6/UL (ref 3.8–5.2)
SODIUM SERPL-SCNC: 135 MMOL/L (ref 135–145)
WBC # BLD AUTO: 6.5 10E3/UL (ref 4–11)

## 2025-06-21 PROCEDURE — 80307 DRUG TEST PRSMV CHEM ANLYZR: CPT | Performed by: EMERGENCY MEDICINE

## 2025-06-21 PROCEDURE — 82075 ASSAY OF BREATH ETHANOL: CPT | Performed by: EMERGENCY MEDICINE

## 2025-06-21 PROCEDURE — 99285 EMERGENCY DEPT VISIT HI MDM: CPT | Mod: 25 | Performed by: EMERGENCY MEDICINE

## 2025-06-21 PROCEDURE — 82310 ASSAY OF CALCIUM: CPT | Performed by: EMERGENCY MEDICINE

## 2025-06-21 PROCEDURE — 93005 ELECTROCARDIOGRAM TRACING: CPT | Performed by: EMERGENCY MEDICINE

## 2025-06-21 PROCEDURE — 83735 ASSAY OF MAGNESIUM: CPT | Performed by: EMERGENCY MEDICINE

## 2025-06-21 PROCEDURE — 36415 COLL VENOUS BLD VENIPUNCTURE: CPT | Performed by: EMERGENCY MEDICINE

## 2025-06-21 PROCEDURE — 258N000003 HC RX IP 258 OP 636: Performed by: EMERGENCY MEDICINE

## 2025-06-21 PROCEDURE — 82077 ASSAY SPEC XCP UR&BREATH IA: CPT | Performed by: EMERGENCY MEDICINE

## 2025-06-21 PROCEDURE — 128N000004 HC R&B CD ADULT

## 2025-06-21 PROCEDURE — 85004 AUTOMATED DIFF WBC COUNT: CPT | Performed by: EMERGENCY MEDICINE

## 2025-06-21 PROCEDURE — 250N000013 HC RX MED GY IP 250 OP 250 PS 637: Performed by: EMERGENCY MEDICINE

## 2025-06-21 PROCEDURE — HZ2ZZZZ DETOXIFICATION SERVICES FOR SUBSTANCE ABUSE TREATMENT: ICD-10-PCS | Performed by: PSYCHIATRY & NEUROLOGY

## 2025-06-21 PROCEDURE — 99285 EMERGENCY DEPT VISIT HI MDM: CPT | Performed by: EMERGENCY MEDICINE

## 2025-06-21 PROCEDURE — 96374 THER/PROPH/DIAG INJ IV PUSH: CPT | Performed by: EMERGENCY MEDICINE

## 2025-06-21 PROCEDURE — 250N000011 HC RX IP 250 OP 636: Performed by: EMERGENCY MEDICINE

## 2025-06-21 RX ORDER — MAGNESIUM HYDROXIDE/ALUMINUM HYDROXICE/SIMETHICONE 120; 1200; 1200 MG/30ML; MG/30ML; MG/30ML
30 SUSPENSION ORAL EVERY 4 HOURS PRN
Status: DISCONTINUED | OUTPATIENT
Start: 2025-06-21 | End: 2025-06-24 | Stop reason: HOSPADM

## 2025-06-21 RX ORDER — LOPERAMIDE HYDROCHLORIDE 2 MG/1
2 CAPSULE ORAL 4 TIMES DAILY PRN
Status: DISCONTINUED | OUTPATIENT
Start: 2025-06-21 | End: 2025-06-24 | Stop reason: HOSPADM

## 2025-06-21 RX ORDER — ONDANSETRON 4 MG/1
4 TABLET, ORALLY DISINTEGRATING ORAL EVERY 8 HOURS PRN
Status: DISCONTINUED | OUTPATIENT
Start: 2025-06-21 | End: 2025-06-24 | Stop reason: HOSPADM

## 2025-06-21 RX ORDER — OLANZAPINE 10 MG/2ML
10 INJECTION, POWDER, FOR SOLUTION INTRAMUSCULAR 3 TIMES DAILY PRN
Status: DISCONTINUED | OUTPATIENT
Start: 2025-06-21 | End: 2025-06-24 | Stop reason: HOSPADM

## 2025-06-21 RX ORDER — LEVOTHYROXINE SODIUM 50 UG/1
100 TABLET ORAL EVERY MORNING
Status: DISCONTINUED | OUTPATIENT
Start: 2025-06-22 | End: 2025-06-24 | Stop reason: HOSPADM

## 2025-06-21 RX ORDER — GABAPENTIN 600 MG/1
1200 TABLET ORAL 3 TIMES DAILY
Status: DISCONTINUED | OUTPATIENT
Start: 2025-06-22 | End: 2025-06-24 | Stop reason: HOSPADM

## 2025-06-21 RX ORDER — PROPRANOLOL HCL 20 MG
20 TABLET ORAL 3 TIMES DAILY
Status: DISCONTINUED | OUTPATIENT
Start: 2025-06-22 | End: 2025-06-24 | Stop reason: HOSPADM

## 2025-06-21 RX ORDER — CALCIUM CARBONATE 500 MG/1
500 TABLET, CHEWABLE ORAL DAILY PRN
Status: DISCONTINUED | OUTPATIENT
Start: 2025-06-21 | End: 2025-06-24 | Stop reason: HOSPADM

## 2025-06-21 RX ORDER — DIAZEPAM 5 MG/1
5-20 TABLET ORAL EVERY 30 MIN PRN
Status: DISCONTINUED | OUTPATIENT
Start: 2025-06-21 | End: 2025-06-24 | Stop reason: HOSPADM

## 2025-06-21 RX ORDER — GABAPENTIN 100 MG/1
100 CAPSULE ORAL EVERY 6 HOURS PRN
Status: DISCONTINUED | OUTPATIENT
Start: 2025-06-21 | End: 2025-06-24 | Stop reason: HOSPADM

## 2025-06-21 RX ORDER — ONDANSETRON 4 MG/1
4 TABLET, ORALLY DISINTEGRATING ORAL ONCE
Status: COMPLETED | OUTPATIENT
Start: 2025-06-21 | End: 2025-06-21

## 2025-06-21 RX ORDER — POLYETHYLENE GLYCOL 3350 17 G
2 POWDER IN PACKET (EA) ORAL
Status: DISCONTINUED | OUTPATIENT
Start: 2025-06-21 | End: 2025-06-21 | Stop reason: ALTCHOICE

## 2025-06-21 RX ORDER — ACETAMINOPHEN 325 MG/1
650 TABLET ORAL EVERY 4 HOURS PRN
Status: DISCONTINUED | OUTPATIENT
Start: 2025-06-21 | End: 2025-06-24 | Stop reason: HOSPADM

## 2025-06-21 RX ORDER — OLANZAPINE 10 MG/1
10 TABLET, FILM COATED ORAL 3 TIMES DAILY PRN
Status: DISCONTINUED | OUTPATIENT
Start: 2025-06-21 | End: 2025-06-24 | Stop reason: HOSPADM

## 2025-06-21 RX ORDER — AMOXICILLIN 250 MG
1 CAPSULE ORAL 2 TIMES DAILY PRN
Status: DISCONTINUED | OUTPATIENT
Start: 2025-06-21 | End: 2025-06-24 | Stop reason: HOSPADM

## 2025-06-21 RX ORDER — OMEPRAZOLE 20 MG/1
40 CAPSULE, DELAYED RELEASE ORAL EVERY MORNING
Status: DISCONTINUED | OUTPATIENT
Start: 2025-06-22 | End: 2025-06-24 | Stop reason: HOSPADM

## 2025-06-21 RX ORDER — TRAZODONE HYDROCHLORIDE 50 MG/1
50 TABLET ORAL
Status: DISCONTINUED | OUTPATIENT
Start: 2025-06-21 | End: 2025-06-24 | Stop reason: HOSPADM

## 2025-06-21 RX ORDER — DIAZEPAM 10 MG/2ML
5 INJECTION, SOLUTION INTRAMUSCULAR; INTRAVENOUS ONCE
Status: COMPLETED | OUTPATIENT
Start: 2025-06-21 | End: 2025-06-21

## 2025-06-21 RX ADMIN — CALCIUM CARBONATE (ANTACID) CHEW TAB 500 MG 500 MG: 500 CHEW TAB at 22:41

## 2025-06-21 RX ADMIN — DIAZEPAM 10 MG: 5 TABLET ORAL at 21:14

## 2025-06-21 RX ADMIN — NICOTINE POLACRILEX 4 MG: 4 GUM, CHEWING BUCCAL at 20:42

## 2025-06-21 RX ADMIN — DIAZEPAM 5 MG: 5 TABLET ORAL at 22:42

## 2025-06-21 RX ADMIN — DIAZEPAM 5 MG: 5 INJECTION, SOLUTION INTRAMUSCULAR; INTRAVENOUS at 21:41

## 2025-06-21 RX ADMIN — SODIUM CHLORIDE 1000 ML: 0.9 INJECTION, SOLUTION INTRAVENOUS at 21:57

## 2025-06-21 RX ADMIN — ONDANSETRON 4 MG: 4 TABLET, ORALLY DISINTEGRATING ORAL at 20:43

## 2025-06-21 RX ADMIN — DIAZEPAM 10 MG: 5 TABLET ORAL at 19:15

## 2025-06-21 ASSESSMENT — ACTIVITIES OF DAILY LIVING (ADL)
ADLS_ACUITY_SCORE: 58
ADLS_ACUITY_SCORE: 68
ADLS_ACUITY_SCORE: 58

## 2025-06-21 NOTE — ED PROVIDER NOTES
[facility] EMERGENCY DEPARTMENT ([facility])    6/21/25      ED PROVIDER NOTE      History     Chief Complaint   Patient presents with    Alcohol Intoxication     Here for detox     HPI  Jolynn Rivera is a 65 year old female with a history of alcohol use disorder complicated by DTs and seizures, polysubstance abuse, bipolar disorder, anxiety, hypothyroidism, who presents to the ED via EMS from home seeking alcohol detox. Patient reports she wants detox because she has been drinking too much wine. Patient states today she drank 2 bottles of wine which is pretty usual for her. Patient notes history of withdrawal seizures. Patient reports hallucination history and delirium tremens but not currently. Patient states she started drinking 1 month ago after getting out of detox from here.     Past Medical History  Past Medical History:   Diagnosis Date    Anxiety     COPD (chronic obstructive pulmonary disease) (H)     COPD (chronic obstructive pulmonary disease) (H)     Hepatitis C     PTSD (post-traumatic stress disorder)     Seizures (H)     second to ETOH    Substance abuse (H)      Past Surgical History:   Procedure Laterality Date    LAPAROSCOPIC TUBAL LIGATION      ORTHOPEDIC SURGERY      Left knee    TONSILLECTOMY       acetaminophen (TYLENOL) 500 MG tablet  folic acid (FOLVITE) 1 MG tablet  gabapentin (NEURONTIN) 600 MG tablet  levalbuterol (XOPENEX HFA) 45 MCG/ACT inhaler  levothyroxine (SYNTHROID/LEVOTHROID) 100 MCG tablet  Lidocaine (LIDOCARE) 4 % Patch  loperamide (IMODIUM) 2 MG capsule  melatonin 5 MG tablet  multivitamin w/minerals (THERA-VIT-M) tablet  nicotine polacrilex (NICORETTE) 4 MG gum  omeprazole (PRILOSEC) 40 MG DR capsule  ondansetron (ZOFRAN ODT) 4 MG ODT tab  propranolol (INDERAL) 20 MG tablet  thiamine (B-1) 100 MG tablet      Allergies   Allergen Reactions    Bee Venom Swelling    Wasps [Hornets] Swelling    Amlodipine      Nightmares    Antihistamines, Chlorpheniramine-Type  "[Antihistamines, Chlorpheniramine-Type]     Clonidine     Compazine      Lock jaw    Diphenhydramine Muscle Pain (Myalgia) and Cramps    Hydroxyzine Cramps     Lock jaw    Metoclopramide      Tardive Dyskinesia    Penicillins      Panic attack    Prednisone Anxiety     Panic attacks.      Prochlorperazine Muscle Pain (Myalgia) and Cramps    Trazodone Nausea and Vomiting and Confusion     \"I ended up falling and feeling like I was drunk\"    Umeclidinium Bromide      Mood swings + anger.     Family History  Family History   Problem Relation Age of Onset    Anxiety Disorder Mother     Asthma Mother     Alcohol/Drug Father     Prostate Cancer Father     Arthritis Father     Alcohol/Drug Brother     Alcohol/Drug Brother     Hypertension Brother     Alcohol/Drug Brother     Depression Brother     Psychotic Disorder Brother      Social History   Social History     Tobacco Use    Smoking status: Every Day     Current packs/day: 0.50     Types: Cigarettes    Smokeless tobacco: Never    Tobacco comments:     Starting Chantix October 2014   Substance Use Topics    Alcohol use: Yes     Comment: 1.5 L wine per day    Drug use: No     Comment: stopped 1986      Past medical history, past surgical history, medications, allergies, family history, and social history were reviewed with the patient. No additional pertinent items.   A complete review of systems was performed with pertinent positives and negatives noted in the HPI, and all other systems negative.    Physical Exam   BP: 104/66  Pulse: 75  Temp: 98.4  F (36.9  C)  Resp: 20  Height: 157.5 cm (5' 2\")  Weight: 61.8 kg (136 lb 4.8 oz)  SpO2: 94 %  Physical Exam  Vitals and nursing note reviewed.   Constitutional:       General: She is not in acute distress.     Appearance: Normal appearance. She is well-developed.   HENT:      Head: Normocephalic and atraumatic.   Eyes:      General: No scleral icterus.     Conjunctiva/sclera: Conjunctivae normal.   Cardiovascular:      Rate " and Rhythm: Normal rate.   Pulmonary:      Effort: Pulmonary effort is normal. No respiratory distress.   Abdominal:      General: Abdomen is flat.   Musculoskeletal:      Cervical back: Normal range of motion and neck supple.   Skin:     General: Skin is warm and dry.      Findings: No rash.   Neurological:      Mental Status: She is alert and oriented to person, place, and time.             ED Course, Procedures, & Data      Procedures            Results for orders placed or performed during the hospital encounter of 06/21/25   Alcohol breath test POCT   Result Value Ref Range    Alcohol Breath Test 0.264 (A) 0.00 - 0.01     Medications - No data to display       Critical care was not performed.     Medical Decision Making  The patient's presentation was of high complexity (a chronic illness severe exacerbation, progression, or side effect of treatment).    The patient's evaluation involved:  ordering and/or review of 3+ test(s) in this encounter (see separate area of note for details)  discussion of management or test interpretation with another health professional (behavioral health intake)    The patient's management necessitated high risk (a decision regarding hospitalization).    Assessment & Plan      65 year old female with a history of alcohol use disorder complicated by DTs and seizures, polysubstance abuse, bipolar disorder, anxiety, hypothyroidism, who presents to the ED via EMS from home seeking alcohol detox.  Vital signs stable and afebrile including normal pulse ox at home percent on room air.  Medical screening labs drawn and sent essentially unremarkable.  Patient started on MSSA protocol and case discussed with behavioral intake with agreement to admit to inpatient status for alcohol detox.    I have reviewed the nursing notes. I have reviewed the findings, diagnosis, plan and need for follow up with the patient.    New Prescriptions    No medications on file       Final diagnoses:   Alcohol  withdrawal syndrome with complication (H)       Terrence Hickey MD  [facility] EMERGENCY DEPARTMENT  6/21/2025     Terrence Hickey MD  06/23/25 0055

## 2025-06-21 NOTE — ED TRIAGE NOTES
Patient arrives with EMS, from home. She reports drinking a bottle of wine today and wants detox. Was complaining of nausea. Got 4mg zofran IV, IV in left hand 18 g, VSS        Triage Assessment (Adult)       Row Name 06/21/25 5796          Triage Assessment    Airway WDL WDL        Respiratory WDL    Respiratory WDL WDL        Skin Circulation/Temperature WDL    Skin Circulation/Temperature WDL WDL        Cardiac WDL    Cardiac WDL WDL        Peripheral/Neurovascular WDL    Peripheral Neurovascular WDL WDL        Cognitive/Neuro/Behavioral WDL    Cognitive/Neuro/Behavioral WDL WDL

## 2025-06-22 LAB
ATRIAL RATE - MUSE: 67 BPM
CHOLEST SERPL-MCNC: 164 MG/DL
DIASTOLIC BLOOD PRESSURE - MUSE: NORMAL MMHG
EST. AVERAGE GLUCOSE BLD GHB EST-MCNC: 117 MG/DL
HBA1C MFR BLD: 5.7 %
HDLC SERPL-MCNC: 104 MG/DL
INTERPRETATION ECG - MUSE: NORMAL
LDLC SERPL CALC-MCNC: 50 MG/DL
NONHDLC SERPL-MCNC: 60 MG/DL
P AXIS - MUSE: 68 DEGREES
PR INTERVAL - MUSE: 156 MS
QRS DURATION - MUSE: 80 MS
QT - MUSE: 408 MS
QTC - MUSE: 431 MS
R AXIS - MUSE: -6 DEGREES
SYSTOLIC BLOOD PRESSURE - MUSE: NORMAL MMHG
T AXIS - MUSE: 80 DEGREES
TRIGL SERPL-MCNC: 52 MG/DL
VENTRICULAR RATE- MUSE: 67 BPM

## 2025-06-22 PROCEDURE — 80061 LIPID PANEL: CPT | Performed by: PSYCHIATRY & NEUROLOGY

## 2025-06-22 PROCEDURE — 99221 1ST HOSP IP/OBS SF/LOW 40: CPT | Performed by: PSYCHIATRY & NEUROLOGY

## 2025-06-22 PROCEDURE — 250N000013 HC RX MED GY IP 250 OP 250 PS 637: Performed by: PSYCHIATRY & NEUROLOGY

## 2025-06-22 PROCEDURE — 36415 COLL VENOUS BLD VENIPUNCTURE: CPT | Performed by: PSYCHIATRY & NEUROLOGY

## 2025-06-22 PROCEDURE — 83036 HEMOGLOBIN GLYCOSYLATED A1C: CPT | Performed by: PSYCHIATRY & NEUROLOGY

## 2025-06-22 PROCEDURE — 250N000013 HC RX MED GY IP 250 OP 250 PS 637: Performed by: EMERGENCY MEDICINE

## 2025-06-22 PROCEDURE — 99222 1ST HOSP IP/OBS MODERATE 55: CPT | Performed by: NURSE PRACTITIONER

## 2025-06-22 PROCEDURE — 250N000011 HC RX IP 250 OP 636: Performed by: PSYCHIATRY & NEUROLOGY

## 2025-06-22 PROCEDURE — 128N000004 HC R&B CD ADULT

## 2025-06-22 RX ORDER — FOLIC ACID 1 MG/1
1 TABLET ORAL DAILY
Status: DISCONTINUED | OUTPATIENT
Start: 2025-06-22 | End: 2025-06-24 | Stop reason: HOSPADM

## 2025-06-22 RX ORDER — MULTIPLE VITAMINS W/ MINERALS TAB 9MG-400MCG
1 TAB ORAL DAILY
Status: DISCONTINUED | OUTPATIENT
Start: 2025-06-22 | End: 2025-06-24 | Stop reason: HOSPADM

## 2025-06-22 RX ORDER — LIDOCAINE 4 G/G
1 PATCH TOPICAL DAILY PRN
Status: DISCONTINUED | OUTPATIENT
Start: 2025-06-22 | End: 2025-06-24 | Stop reason: HOSPADM

## 2025-06-22 RX ADMIN — LEVOTHYROXINE SODIUM 100 MCG: 50 TABLET ORAL at 08:14

## 2025-06-22 RX ADMIN — NICOTINE POLACRILEX 4 MG: 4 GUM, CHEWING BUCCAL at 17:35

## 2025-06-22 RX ADMIN — PROPRANOLOL HYDROCHLORIDE 20 MG: 20 TABLET ORAL at 20:28

## 2025-06-22 RX ADMIN — DIAZEPAM 10 MG: 5 TABLET ORAL at 03:27

## 2025-06-22 RX ADMIN — OMEPRAZOLE 40 MG: 20 CAPSULE, DELAYED RELEASE ORAL at 08:14

## 2025-06-22 RX ADMIN — GABAPENTIN 100 MG: 100 CAPSULE ORAL at 00:05

## 2025-06-22 RX ADMIN — NICOTINE POLACRILEX 4 MG: 4 GUM, CHEWING BUCCAL at 08:23

## 2025-06-22 RX ADMIN — DIAZEPAM 10 MG: 5 TABLET ORAL at 05:47

## 2025-06-22 RX ADMIN — NICOTINE POLACRILEX 4 MG: 4 GUM, CHEWING BUCCAL at 12:43

## 2025-06-22 RX ADMIN — LOPERAMIDE HYDROCHLORIDE 2 MG: 2 CAPSULE ORAL at 08:47

## 2025-06-22 RX ADMIN — GABAPENTIN 1200 MG: 600 TABLET, FILM COATED ORAL at 08:14

## 2025-06-22 RX ADMIN — PROPRANOLOL HYDROCHLORIDE 20 MG: 20 TABLET ORAL at 08:15

## 2025-06-22 RX ADMIN — ONDANSETRON 4 MG: 4 TABLET, ORALLY DISINTEGRATING ORAL at 08:07

## 2025-06-22 RX ADMIN — ALUMINUM HYDROXIDE, MAGNESIUM HYDROXIDE, AND SIMETHICONE 30 ML: 200; 200; 20 SUSPENSION ORAL at 02:55

## 2025-06-22 RX ADMIN — FOLIC ACID 1 MG: 1 TABLET ORAL at 08:14

## 2025-06-22 RX ADMIN — DIAZEPAM 10 MG: 5 TABLET ORAL at 08:15

## 2025-06-22 RX ADMIN — NICOTINE POLACRILEX 4 MG: 4 GUM, CHEWING BUCCAL at 10:06

## 2025-06-22 RX ADMIN — NICOTINE POLACRILEX 4 MG: 4 GUM, CHEWING BUCCAL at 14:15

## 2025-06-22 RX ADMIN — NICOTINE POLACRILEX 4 MG: 4 GUM, CHEWING BUCCAL at 20:29

## 2025-06-22 RX ADMIN — NICOTINE POLACRILEX 4 MG: 4 GUM, CHEWING BUCCAL at 03:54

## 2025-06-22 RX ADMIN — Medication 1 TABLET: at 11:38

## 2025-06-22 RX ADMIN — PROPRANOLOL HYDROCHLORIDE 20 MG: 20 TABLET ORAL at 13:35

## 2025-06-22 RX ADMIN — NICOTINE POLACRILEX 4 MG: 4 GUM, CHEWING BUCCAL at 11:38

## 2025-06-22 RX ADMIN — THIAMINE HCL TAB 100 MG 100 MG: 100 TAB at 08:14

## 2025-06-22 RX ADMIN — GABAPENTIN 1200 MG: 600 TABLET, FILM COATED ORAL at 20:26

## 2025-06-22 RX ADMIN — DIAZEPAM 5 MG: 5 TABLET ORAL at 11:38

## 2025-06-22 RX ADMIN — DIAZEPAM 10 MG: 5 TABLET ORAL at 01:38

## 2025-06-22 RX ADMIN — DIAZEPAM 10 MG: 5 TABLET ORAL at 00:00

## 2025-06-22 RX ADMIN — ONDANSETRON 4 MG: 4 TABLET, ORALLY DISINTEGRATING ORAL at 01:37

## 2025-06-22 RX ADMIN — NICOTINE POLACRILEX 4 MG: 4 GUM, CHEWING BUCCAL at 00:01

## 2025-06-22 RX ADMIN — DIAZEPAM 10 MG: 5 TABLET ORAL at 16:26

## 2025-06-22 RX ADMIN — ACETAMINOPHEN 650 MG: 325 TABLET ORAL at 00:05

## 2025-06-22 RX ADMIN — CALCIUM CARBONATE (ANTACID) CHEW TAB 500 MG 500 MG: 500 CHEW TAB at 02:04

## 2025-06-22 RX ADMIN — GABAPENTIN 1200 MG: 600 TABLET, FILM COATED ORAL at 11:38

## 2025-06-22 RX ADMIN — NICOTINE POLACRILEX 4 MG: 4 GUM, CHEWING BUCCAL at 18:57

## 2025-06-22 RX ADMIN — NICOTINE POLACRILEX 4 MG: 4 GUM, CHEWING BUCCAL at 16:28

## 2025-06-22 ASSESSMENT — ACTIVITIES OF DAILY LIVING (ADL)
ADLS_ACUITY_SCORE: 68
HYGIENE/GROOMING: INDEPENDENT
ADLS_ACUITY_SCORE: 45
ADLS_ACUITY_SCORE: 68
ADLS_ACUITY_SCORE: 45
ORAL_HYGIENE: INDEPENDENT
ADLS_ACUITY_SCORE: 45
ADLS_ACUITY_SCORE: 68
ADLS_ACUITY_SCORE: 45
DRESS: INDEPENDENT
ADLS_ACUITY_SCORE: 45
ADLS_ACUITY_SCORE: 45
ADLS_ACUITY_SCORE: 68
LAUNDRY: WITH SUPERVISION
ADLS_ACUITY_SCORE: 45
ADLS_ACUITY_SCORE: 45
ADLS_ACUITY_SCORE: 68
ADLS_ACUITY_SCORE: 45
ADLS_ACUITY_SCORE: 68

## 2025-06-22 ASSESSMENT — LIFESTYLE VARIABLES: SKIP TO QUESTIONS 9-10: 0

## 2025-06-22 NOTE — PROGRESS NOTES
06/21/25 1514   Patient Belongings   Did you bring any home meds/supplements to the hospital?  No   Patient Belongings other (see comments)   Belongings Search Yes   Second Staff More and Nuk     Storage Bin:Short, T-shirt , Top and hat  Meed Room Box Bin : Cell phone and keys   A               Admission:  I am responsible for any personal items that are not sent to the safe or pharmacy.  [facility] is not responsible for loss, theft or damage of any property in my possession.    Signature:  _________________________________ Date: _______  Time: _____                                              Staff Signature:  ____________________________ Date: ________  Time: _____      2nd Staff person, if patient is unable/unwilling to sign:    Signature: ________________________________ Date: ________  Time: _____     Discharge:  [facility] has returned all of my personal belongings:    Signature: _________________________________ Date: ________  Time: _____                                          Staff Signature:  ____________________________ Date: ________  Time: _____

## 2025-06-22 NOTE — TELEPHONE ENCOUNTER
S: [facility] , Provider Christianson calling at 8:37 PM with clinical on 65 year old/female presenting for alcohol detox.     B: Pt presents for ETOH detox.   Currently reports drinking bottle of wine daily for 1 month.    Patient reports last use was prior to admission.  Pt LEOBARDO: .2  Pt  endorses hx of DT  Pt  denies hx of seizures. Last seizure: N/A  Pt endorsing the following symptoms of withdrawal: still under influence  MSSA Score: 10mg valium    Pt denies acute mental health or medical concerns.   Pt denies other drug use: None Amount/frequency: N/A    Does Pt have a detox care plan in [facility]? No  Does pt present with specific needs, assistive devices, or exclusionary criteria? None  Is the patient ambulating, eating and drinking in the ED? Yes    A: Pt meets criteria to be presented for IP detox admission. Patient is voluntary    COVID Symptoms: No  If yes, COVID test required   Utox: Negative  Magnesium: WNL  CMP: Abnormalities: CO2 19, Anion Gap 20  CBC: Abnormalities: Hemoglobin 16.2, MCH 34.2, MCHC 36.9  HCG: N/A     R: Patient cleared and ready for behavioral bed placement: Yes    Pt is meeting criteria for presentation to 3A/CD    Does Patient need a Transfer Center request created? No, Pt is located within [facility] ED, [facility] ED, or [facility]     9:13 PM Provider accepted pt to 3A/ Jen[facility]/ CD.    9:23 PM Admit board updated, pt placed and worklist edited.    9:50 PM Report info exchanged.    11:30 PM Indicia completed.

## 2025-06-22 NOTE — PLAN OF CARE
"Triage & Transition Services, [address]     Case Management Encounter:   Writer met with patient in room. Patient reports she has initiated the process of starting OP programming with [facility] but has not had a recent GIRMA assessment, stating she believes it is \"just a money maker.\" Patient reports multiple past treatment episodes and has previously led [facility] groups. Longest period of sobriety reported is five years. Patient states she has \"basically given up on sobriety\" and presents to detox due to a history of seizures.    Insurance:   Medicare and [place]    Legal Status:   Voluntary     SUDs Assessment Status:   Will need for programming.     Safety Plan Status:  Patient advised to complete form and return to staff for EPIC entry    Living Situation:   Patient lives alone with her \"high maintenance\" Husky which has been a deterrent for participating in programming.     Current Providers, Supports & Collateral:      PCP: Dr. Carmela Srinivasan at [facility]  Psychiatrist: Dr. Olman Herrera   Therapist: CLAUDIA Castillo, [facility]      Current Plan/Referral Status:   Patient would like to discuss programmatic care vs OP GIRMA treatment more in depth.     Josette Pardo, LPCC, LADC, LPC ([place])  [facility] - Adult Inpatient Addiction Psychiatry Unit      "

## 2025-06-22 NOTE — PLAN OF CARE
Pt endorsing moderate to severe subjective symptoms. Reports dry heaves, nausea, high anxiety, sweating and visible body shakes. Poor sleep, did not prn trazodone, has sensitivity to trazodone a nd hydroxyzine.      0000: MSSA:11, valium 10mg  0130: MSSA: 14, valium 10mg   0327: MSSA: 11 valium 10mg  0547: MSSA: 14, valium 10mg given    Problem: Alcohol Withdrawal  Goal: Alcohol Withdrawal Symptom Control  Outcome: Not Progressing   Goal Outcome Evaluation:

## 2025-06-22 NOTE — CONSULTS
[facility]  Consult Note - Hospitalist Service  Date of Admission:  6/21/2025  Consult Requested by: Psychiatry   Reason for Consult: Medical co-management of detox     Assessment & Plan   Jolynn Rivera is a 65 year old female with past medical history significant for alcohol use disorder, withdrawal seizures, HTN, COPD, pulmonary nodules, HCV s/p treatment, hypothyroidism, GERD, chronic back pain, bipolar disorder, PTSD, anxiety admitted to station 3A for alcohol withdrawal and detox.      # Alcohol withdrawal, hx of alcohol use disorder   MSSA 10 this shift.  Reports a hx of withdrawal seizures, unknown when last one occurred.  On gabapentin 1200mg TID, has been on this long term.   - Seizure precautions   - Continue MSSA   - Folvite, multi-vites, thiamine supplementation   - Further management per Psychiatry     # Elevated BPs   In setting of ETOH withdrawal.  161/87 this shift.  Documented history of HTN but appears she is on propranolol for anxiety, which can also help with elevated BPs.   - Continue PTA propranolol     - Allergy to clonidine, will not order this  - Monitor per unit routine     # Mild anion gap acidosis   Likely 2/2 dehydration in setting of ETOH use.  AG 20, CO2 19.  Expect resolution with improvement in PO intake and hydration.     # COPD   # Pulmonary nodules   Followed OP by [facility], last vist 3/3/25. Pt unable to tolerate steroids or any inhalers, except prn levalbuterol that she states she needs infrequently. Most recent CT chest with stable pulm nodules compared to prior.  Currently symptoms are at baseline, reports rare use of rescue inhaler.    - Follow up with PET scan (not yet completed)  - Continue PRN levalbuterol     # Prediabetes   Hgb A1c 5.7 this admission.    - Follow up with PCP for further management     # HCV s/p treatment   Noted.  Mildly elevated AST 53 likely due to ETOH use.     # GERD   - Continue PTA PPI     #  "Hypothyroidism   Last TSH in 4/2025 wnl.    - Continue PTA levothyroxine     # Chronic back pain   # Sciatica   - Continue PTA gabapentin as above   - Continue lidocaine patches     # Bipolar disorder  # Anxiety   # PTSD  - Management per Psychiatry     Medicine will sign off, no further recommendations at this time.  Please feel free to reconsult if patient's symptoms worsen or if new problems arise.  Thank you for the opportunity to care for this patient.         Clinically Significant Risk Factors Present on Admission          # Hypochloremia: Lowest Cl = 96 mmol/L in last 2 days, will monitor as appropriate     # Anion Gap Metabolic Acidosis: Highest Anion Gap = 20 mmol/L in last 2 days, will monitor and treat as appropriate      # Hypertension: Noted on problem list               # Financial/Environmental Concerns:           Evelyn Proctor [facility]  Hospitalist Service  Securely message with [employer])  Text page via [facility] Paging/Directory   ______________________________________________________________________    Chief Complaint   \"I'm doing okay, hoping to do outpatient treatment\"     History is obtained from the patient and chart review.      History of Present Illness   Jolynn Rivera is a 65 year old female with past medical history significant for alcohol use disorder, withdrawal seizures, HTN, COPD, pulmonary nodules, HCV s/p treatment, hypothyroidism, GERD, chronic back pain, bipolar disorder, PTSD, anxiety admitted to station 3A for alcohol withdrawal and detox.      Jolynn is seen in her room.  She is doing well this morning.  She is feeling tired, typically does not rest well here.  She reports being on propranolol for her labile BPs and for anxiety.  Says she doesn't tolerate SSRIs but the propranolol works very well for her.  She reports being on gabapentin \"for everything it's indicated for\".  She has COPD and her symptoms are well controlled, uses her inhaler infrequently.  Very active and " usually walks about 5 miles per day with her dog.  She currently denies chest pain, dyspnea, abdominal pain, and vomiting.        Past Medical History    Past Medical History:   Diagnosis Date    Anxiety     COPD (chronic obstructive pulmonary disease) (H)     COPD (chronic obstructive pulmonary disease) (H)     Hepatitis C     PTSD (post-traumatic stress disorder)     Seizures (H)     second to ETOH    Substance abuse (H)        Past Surgical History   Past Surgical History:   Procedure Laterality Date    LAPAROSCOPIC TUBAL LIGATION      ORTHOPEDIC SURGERY      Left knee    TONSILLECTOMY         Medications   I have reviewed this patient's current medications       Review of Systems    The 10 point Review of Systems is negative other than noted in the HPI or here.      Physical Exam   Vital Signs: Temp: 98  F (36.7  C) Temp src: Oral BP: (!) 161/87 Pulse: 70   Resp: 16 SpO2: 97 % O2 Device: None (Room air)    Weight: 136 lbs 0 oz    GENERAL: Alert and oriented x 3. Well nourished, well developed.  Thin body habitus.  No acute distress.    HEENT: Normocephalic, atraumatic. Anicteric sclera. Mucous membranes moist.   CV: RRR. S1, S2. No murmurs appreciated.   RESPIRATORY: Effort normal on room air.  Lungs CTAB with no wheezing, rales, or rhonchi.   GI: Abdomen soft and non distended, bowel sounds present x all 4 quadrants. No tenderness, rebound, or guarding.   NEUROLOGICAL: No focal deficits. Follows commands.  Strength equal in upper and lower extremities.   MUSCULOSKELETAL: No joint swelling or tenderness. Moves all extremities.   EXTREMITIES: No gross deformities. No peripheral edema.   SKIN: Grossly warm, dry, and intact. No jaundice. No rashes.       Medical Decision Making       40 MINUTES SPENT BY ME on the date of service doing chart review, history, exam, documentation & further activities per the note.      Data     I have personally reviewed the following data over the past 24 hrs:    6.5  \   16.2 (H)    / 225     135 96 (L) 9.2 /  107 (H)   3.9 19 (L) 0.57 \     ALT: 27 AST: 53 (H) AP: 56 TBILI: 0.4   ALB: 4.8 TOT PROTEIN: 8.2 LIPASE: N/A     TSH: N/A T4: N/A A1C: 5.7 (H)       Imaging results reviewed over the past 24 hrs:   No results found for this or any previous visit (from the past 24 hours).

## 2025-06-22 NOTE — PLAN OF CARE
RN Assessment:    Pt presented with euthymic affect. Pt appeared calm, and pt was cooperative while interacting with writer. Pt was alert and oriented x 4. Pt denied having SI, HI, thoughts of SIB, and hallucinations. Pt endorsed having chronic generalized body pain. Pt given regularly scheduled medication for pain. Pt had no urgent medical concerns. Pt endorsed not sleeping well last night due to not having some of PTA medications ordered. Pt feels the medications that are currently ordered are working well. No medication side effects endorsed by pt or observed by writer. Pt was present in the milieu. Continue to monitor for safety and changes in medical condition.       MSSA Scores this shift:    0809: 10    1134: 8      PRN Medications passed this shift:    0807: Zofran ODT 4 mg Sublingually for nausea. This medication was effective per pt report.     0815: Valium 10 mg PO for MSSA score of 10.    0823: Nicotine gum 4 mg for nicotine withdrawal symptoms.     0847: Imodium 2 mg PO for diarrhea.     1006: Nicotine gum 4 mg for nicotine withdrawal symptoms.     1138: Valium 5 mg PO for MSSA score of 8.    1138: Nicotine gum 4 mg PO for nicotine withdrawal symptoms.     1243: Nicotine gum 4 mg PO for nicotine withdrawal symptoms.     1415: Nicotine gum 4 mg PO for nicotine withdrawal symptoms.

## 2025-06-22 NOTE — PROGRESS NOTES
SBAR    S = Situation:   Arely came to the [facility] ED by EMS seeking voluntary admission to detox from alcohol.     B  = Background:   Pt is 66 yo female who has gone through detox from alcohol on 3A a few time. Last here April of this year. Pt states she was discharged home from here. Instead of going straight to treatment from here, she went on a trip that she had planned already. Then went to treatment after coming back from the trip. Reports being sober from April to some part of this month before she relapsed and started drinking 1.5 liters of wine daily.   Pt endorsed hx of w/d seizure and DTs.   Utox negative  No valium given in ED as pt was still intoxicated.      A  =  Assessment:   Pt arrived on the unit towards end of evening shift.  Took over her care at the beginning of this shift. Pt cooperated with the search process. She presented with full body shakes and visible anxiety. She denied feeling suicidal but expressed regrets over her relapse and having to come back to detox. She is interested in sober living after detox.        R =   Request or Recommendation:   Pt was started on MSSA with valium. She will get routine blood draw in the morning and meet with the rest of the care team.

## 2025-06-22 NOTE — H&P
Reason for admission:     Patient was admitted for alcohol detox      HPI:     65 year old female    Patient states that she has been drinking since her teen years, but states that her drinking has been problematic for the past 20 years. She states that she has been in treatment numerous prior treatments. Her longest sobriety was for 5 years.    For the past 2 years she has had more difficulty maintaining sobriety, but has managed to have periods up to 2 months. Most recently she relapsed 2 weeks ago and has been drinking daily since then. She drinks over a liter of wine a day.    Patient has a history of blackouts and seizures, but no history DUI.    Patient states that she is diagnosed with bipolar 2 and PTSD. She is on a regimen of Gabapentin which she states is helpful, and she denies current problems with depression or manic symptoms.    Patient denies suicidal or homicidal thoughts and has no evidence of kim currently.      According to ER report:    Jolynn Rivera is a 65 year old female with a history of alcohol use disorder complicated by DTs and seizures, polysubstance abuse, bipolar disorder, anxiety, hypothyroidism, who presents to the ED via EMS from home seeking alcohol detox. Patient reports she wants detox because she has been drinking too much wine. Patient states today she drank 2 bottles of wine which is pretty usual for her. Patient notes history of withdrawal seizures. Patient reports hallucination history and delirium tremens but not currently. Patient states she started drinking 1 month ago after getting out of detox from here.     Terrence Hickey MD  [facility] EMERGENCY DEPARTMENT  6/21/2025                Past Psychiatric History:     As above        Substance Use and History:       Patient denies other drug use in many years        Past Medical History:     PAST MEDICAL HISTORY:   Past Medical History:   Diagnosis Date    Anxiety     COPD (chronic obstructive pulmonary  disease) (H)     COPD (chronic obstructive pulmonary disease) (H)     Hepatitis C     PTSD (post-traumatic stress disorder)     Seizures (H)     second to ETOH    Substance abuse (H)        PAST SURGICAL HISTORY:   Past Surgical History:   Procedure Laterality Date    LAPAROSCOPIC TUBAL LIGATION      ORTHOPEDIC SURGERY      Left knee    TONSILLECTOMY               Family History:     FAMILY HISTORY:   Family History   Problem Relation Age of Onset    Anxiety Disorder Mother     Asthma Mother     Alcohol/Drug Father     Prostate Cancer Father     Arthritis Father     Alcohol/Drug Brother     Alcohol/Drug Brother     Hypertension Brother     Alcohol/Drug Brother     Depression Brother     Psychotic Disorder Brother            Social History:     Please see the full psychosocial profile from the clinical treatment coordinator.   SOCIAL HISTORY:   Social History     Tobacco Use    Smoking status: Every Day     Current packs/day: 0.50     Types: Cigarettes    Smokeless tobacco: Never    Tobacco comments:     Starting Chantix October 2014   Substance Use Topics    Alcohol use: Yes     Comment: 1.5 L wine per day     Patient lives in a rented apartment. She is unemployed and survives on disability. She has no current romantic involvement and no children. She has occasional contact with her father.         PTA Medications:     Medications Prior to Admission   Medication Sig Dispense Refill Last Dose/Taking    acetaminophen (TYLENOL) 500 MG tablet Take 500-1,000 mg by mouth every 6 hours as needed for mild pain.       folic acid (FOLVITE) 1 MG tablet Take 1 tablet (1 mg) by mouth daily. 30 tablet 1     gabapentin (NEURONTIN) 600 MG tablet Take 1,200 mg by mouth 3 times daily.       levalbuterol (XOPENEX HFA) 45 MCG/ACT inhaler Inhale 2 puffs into the lungs every 4 hours as needed for shortness of breath or wheezing.       levothyroxine (SYNTHROID/LEVOTHROID) 100 MCG tablet Take 1 tablet (100 mcg) by mouth every morning.        Lidocaine (LIDOCARE) 4 % Patch Place 1 patch onto the skin daily as needed for moderate pain. To prevent lidocaine toxicity, patient should be patch free for 12 hrs daily.       loperamide (IMODIUM) 2 MG capsule Take 2 mg by mouth 4 times daily as needed for diarrhea.       melatonin 5 MG tablet Take 5 mg by mouth at bedtime. Takes 1 hour prior to Lunesta       multivitamin w/minerals (THERA-VIT-M) tablet Take 1 tablet by mouth daily. 30 tablet 1     nicotine polacrilex (NICORETTE) 4 MG gum Place 1 each (4 mg) inside cheek every hour as needed for nicotine withdrawal symptoms. 100 each 1     omeprazole (PRILOSEC) 40 MG DR capsule Take 1 capsule (40 mg) by mouth every morning.       ondansetron (ZOFRAN ODT) 4 MG ODT tab Take 1 tablet (4 mg) by mouth every 8 hours as needed for vomiting or nausea.       propranolol (INDERAL) 20 MG tablet Take 1 tablet (20 mg) by mouth 3 times daily.       thiamine (B-1) 100 MG tablet Take 1 tablet (100 mg) by mouth daily. 30 tablet 0             Current Medications:     Current Facility-Administered Medications   Medication Dose Route Frequency Provider Last Rate Last Admin    gabapentin (NEURONTIN) tablet 1,200 mg  1,200 mg Oral TID cSott Alex MD        levothyroxine (SYNTHROID/LEVOTHROID) tablet 100 mcg  100 mcg Oral QAM Scott Alex MD        omeprazole (PriLOSEC) CR capsule 40 mg  40 mg Oral QAM Scott Alex MD        propranolol (INDERAL) tablet 20 mg  20 mg Oral TID Scott Alex MD         Current Facility-Administered Medications   Medication Dose Route Frequency Provider Last Rate Last Admin    acetaminophen (TYLENOL) tablet 650 mg  650 mg Oral Q4H PRN Scott Alex MD   650 mg at 06/22/25 0005    alum & mag hydroxide-simethicone (MAALOX) suspension 30 mL  30 mL Oral Q4H PRN Scott Alex MD   30 mL at 06/22/25 0255    calcium carbonate (TUMS) chewable tablet 500 mg  500 mg Oral Daily PRN Terrence Hickey MD   500 mg at  "06/22/25 0204    diazepam (VALIUM) tablet 5-20 mg  5-20 mg Oral Q30 Min PRN Terrence Hickey MD   10 mg at 06/22/25 0547    gabapentin (NEURONTIN) capsule 100 mg  100 mg Oral Q6H PRN Scott Alex MD   100 mg at 06/22/25 0005    loperamide (IMODIUM) capsule 2 mg  2 mg Oral 4x Daily PRN Scott Alex MD        nicotine polacrilex (NICORETTE) gum 4 mg  4 mg Buccal Q1H PRN Scott Alex MD   4 mg at 06/22/25 0354    OLANZapine (zyPREXA) tablet 10 mg  10 mg Oral TID PRN Scott Alex MD        Or    OLANZapine (zyPREXA) injection 10 mg  10 mg Intramuscular TID PRN Scott Alex MD        ondansetron (ZOFRAN ODT) ODT tab 4 mg  4 mg Oral Q8H PRN Scott Alex MD   4 mg at 06/22/25 0137    senna-docusate (SENOKOT-S/PERICOLACE) 8.6-50 MG per tablet 1 tablet  1 tablet Oral BID PRN Scott Alex MD        traZODone (DESYREL) tablet 50 mg  50 mg Oral At Bedtime PRN Scott Alex MD                Allergies:     Allergies   Allergen Reactions    Bee Venom Swelling    Wasps [Hornets] Swelling    Amlodipine      Nightmares    Antihistamines, Chlorpheniramine-Type [Antihistamines, Chlorpheniramine-Type]     Clonidine     Compazine      Lock jaw    Diphenhydramine Muscle Pain (Myalgia) and Cramps    Hydroxyzine Cramps     Lock jaw    Metoclopramide      Tardive Dyskinesia    Penicillins      Panic attack    Prednisone Anxiety     Panic attacks.      Prochlorperazine Muscle Pain (Myalgia) and Cramps    Trazodone Nausea and Vomiting and Confusion     \"I ended up falling and feeling like I was drunk\"    Umeclidinium Bromide      Mood swings + anger.            Labs:     Recent Results (from the past 72 hours)   Alcohol breath test POCT    Collection Time: 06/21/25  5:12 PM   Result Value Ref Range    Alcohol Breath Test 0.264 (A) 0.00 - 0.01   EKG 12-lead, tracing only    Collection Time: 06/21/25  6:38 PM   Result Value Ref Range    Systolic Blood Pressure  mmHg    Diastolic " Blood Pressure  mmHg    Ventricular Rate 67 BPM    Atrial Rate 67 BPM    NE Interval 156 ms    QRS Duration 80 ms     ms    QTc 431 ms    P Axis 68 degrees    R AXIS -6 degrees    T Axis 80 degrees    Interpretation ECG Sinus rhythm with sinus arrhythmia  Normal ECG      Comprehensive metabolic panel    Collection Time: 06/21/25  7:16 PM   Result Value Ref Range    Sodium 135 135 - 145 mmol/L    Potassium 3.9 3.4 - 5.3 mmol/L    Carbon Dioxide (CO2) 19 (L) 22 - 29 mmol/L    Anion Gap 20 (H) 7 - 15 mmol/L    Urea Nitrogen 9.2 8.0 - 23.0 mg/dL    Creatinine 0.57 0.51 - 0.95 mg/dL    GFR Estimate >90 >60 mL/min/1.73m2    Calcium 9.1 8.8 - 10.4 mg/dL    Chloride 96 (L) 98 - 107 mmol/L    Glucose 107 (H) 70 - 99 mg/dL    Alkaline Phosphatase 56 40 - 150 U/L    AST 53 (H) 0 - 45 U/L    ALT 27 0 - 50 U/L    Protein Total 8.2 6.4 - 8.3 g/dL    Albumin 4.8 3.5 - 5.2 g/dL    Bilirubin Total 0.4 <=1.2 mg/dL   Magnesium    Collection Time: 06/21/25  7:16 PM   Result Value Ref Range    Magnesium 1.8 1.7 - 2.3 mg/dL   Ethanol Level Blood    Collection Time: 06/21/25  7:16 PM   Result Value Ref Range    Ethanol Level Blood 0.24 (H) <=0.01 g/dL   CBC with platelets and differential    Collection Time: 06/21/25  7:16 PM   Result Value Ref Range    WBC Count 6.5 4.0 - 11.0 10e3/uL    RBC Count 4.74 3.80 - 5.20 10e6/uL    Hemoglobin 16.2 (H) 11.7 - 15.7 g/dL    Hematocrit 43.9 35.0 - 47.0 %    MCV 93 78 - 100 fL    MCH 34.2 (H) 26.5 - 33.0 pg    MCHC 36.9 (H) 31.5 - 36.5 g/dL    RDW 12.1 10.0 - 15.0 %    Platelet Count 225 150 - 450 10e3/uL    % Neutrophils 43 %    % Lymphocytes 46 %    % Monocytes 9 %    % Eosinophils 2 %    % Basophils 1 %    % Immature Granulocytes 0 %    NRBCs per 100 WBC 0 <1 /100    Absolute Neutrophils 2.8 1.6 - 8.3 10e3/uL    Absolute Lymphocytes 3.0 0.8 - 5.3 10e3/uL    Absolute Monocytes 0.6 0.0 - 1.3 10e3/uL    Absolute Eosinophils 0.1 0.0 - 0.7 10e3/uL    Absolute Basophils 0.0 0.0 - 0.2 10e3/uL     "Absolute Immature Granulocytes 0.0 <=0.4 10e3/uL    Absolute NRBCs 0.0 10e3/uL   Urine Drug Screen Panel    Collection Time: 06/21/25  7:21 PM   Result Value Ref Range    Amphetamines Urine Screen Negative Screen Negative    Barbituates Urine Screen Negative Screen Negative    Benzodiazepine Urine Screen Negative Screen Negative    Cannabinoids Urine Screen Negative Screen Negative    Cocaine Urine Screen Negative Screen Negative    Fentanyl Qual Urine Screen Negative Screen Negative    Opiates Urine Screen Negative Screen Negative    PCP Urine Screen Negative Screen Negative            Physical Exam:     BP (!) 146/83 (Patient Position: Sitting)   Pulse 91   Temp 98  F (36.7  C) (Oral)   Resp 16   Ht 1.6 m (5' 3\")   Wt 61.7 kg (136 lb)   SpO2 97%   BMI 24.09 kg/m    Weight is 136 lbs 0 oz  Body mass index is 24.09 kg/m .    Physical Exam:  Gen: No acute distress  Skin: No diaphoresis or rash  Neuro: No abnormal movements         Physical ROS:     Patient complains of generalized aching, myalgias. The remainder of 10-point review of systems was negative except as noted in HPI.             Mental Status Exam:     Mental Status  Patient is casually dressed  Hygiene good  Speech fluent  Thought Process logical  Thought Content:  No suicidal ideation,    No homicidal ideation,   No ideas of reference,    No loose associations,    No auditory hallucinations,     No visual hallucinations   No delusions  Psychomotor: No agitation or slowing  Cognition:  Alert and oriented to time place and person  Attention good  Concentration good  Memory normal including recent and remote memory  Mood:  euthymic  Affect: mood congruent  Judgement: normal  Eye contact good  Cooperation good  Language normal  Fund of knowledge normal  Musculoskeletal normal gait with no abnormal movements         Diagnoses:     Alcohol dependence (H)    Patient Active Problem List   Diagnosis    Polysubstance abuse (H)    Chronic hepatitis C (H)    " COPD (chronic obstructive pulmonary disease) (H)    GERD (gastroesophageal reflux disease)    Mood disorder    Chemical dependency (H)    Nicotine abuse    Alcohol withdrawal, uncomplicated (H)    Sinusitis, unspecified chronicity, unspecified location    Elevated LFTs    Drug withdrawal seizure with complication (H)    Alcohol dependence with withdrawal with complication (H)    Alcoholic ketoacidosis    Vomiting and diarrhea    Seizure (H)    Acute alcoholic intoxication in alcoholism with complication (H)    Elevated troponin    Acne rosacea    Acquired hypothyroidism    Atelectasis of right lung    Bee sting allergy    Bipolar 1 disorder, manic, moderate (H)    Chronic hip pain, right    History of hepatitis C    Left knee pain    Onychomycosis    Osteoarthritis of multiple joints    Primary hypertension    Psychophysiological insomnia    Tear of lateral meniscus of right knee    Tobacco use disorder    Alcohol use disorder, severe, dependence (H)    Bipolar disorder, most recent episode depressed (H)    Acute hypoxic respiratory failure (H)    Chronic obstructive pulmonary disease (H)    COPD with acute exacerbation (H)    Gastroesophageal reflux disease without esophagitis    Generalized anxiety disorder    PTSD (post-traumatic stress disorder)    Lactic acidosis    Alcohol withdrawal seizure with complication (H)    Alcohol abuse with withdrawal (H)    Suicidal ideation    History of alcohol withdrawal delirium    History of seizure due to alcohol withdrawal    Hypomagnesemia    Hyponatremia    Alcohol dependence with intoxication delirium (H)    Alcohol dependence (H)              Assessment:     Patient presents for alcohol detox         Plan:         Medication:  Valium detox protocols  Resume Gabapentin    Consults: Hospitalist will be consulted if medical issues arise      Multidisciplinary Interventions:  to gather collateral information, coordinate care with outpatient providers and begin  follow up planning      Disposition:Home with follow up        More than 40 minutes spent on this visit with more than 50% time spent on coordination of care with staff, reviewing medical record, psychoeducation, providing supportive therapy regarding coping with chronic mental illness, entering orders and preparing documentation for the visit    Scott Alex MD

## 2025-06-23 PROCEDURE — 99232 SBSQ HOSP IP/OBS MODERATE 35: CPT | Performed by: PSYCHIATRY & NEUROLOGY

## 2025-06-23 PROCEDURE — 250N000013 HC RX MED GY IP 250 OP 250 PS 637: Performed by: PSYCHIATRY & NEUROLOGY

## 2025-06-23 PROCEDURE — 90853 GROUP PSYCHOTHERAPY: CPT | Performed by: COUNSELOR

## 2025-06-23 PROCEDURE — 250N000011 HC RX IP 250 OP 636: Performed by: PSYCHIATRY & NEUROLOGY

## 2025-06-23 PROCEDURE — 128N000004 HC R&B CD ADULT

## 2025-06-23 RX ORDER — DISULFIRAM 250 MG/1
250 TABLET ORAL DAILY
Qty: 30 TABLET | Refills: 3 | Status: SHIPPED | OUTPATIENT
Start: 2025-06-23

## 2025-06-23 RX ADMIN — NICOTINE POLACRILEX 4 MG: 4 GUM, CHEWING BUCCAL at 14:54

## 2025-06-23 RX ADMIN — GABAPENTIN 1200 MG: 600 TABLET, FILM COATED ORAL at 12:29

## 2025-06-23 RX ADMIN — NICOTINE POLACRILEX 4 MG: 4 GUM, CHEWING BUCCAL at 12:32

## 2025-06-23 RX ADMIN — NICOTINE POLACRILEX 4 MG: 4 GUM, CHEWING BUCCAL at 18:19

## 2025-06-23 RX ADMIN — NICOTINE POLACRILEX 4 MG: 4 GUM, CHEWING BUCCAL at 20:39

## 2025-06-23 RX ADMIN — PROPRANOLOL HYDROCHLORIDE 20 MG: 20 TABLET ORAL at 13:44

## 2025-06-23 RX ADMIN — NICOTINE POLACRILEX 4 MG: 4 GUM, CHEWING BUCCAL at 10:42

## 2025-06-23 RX ADMIN — NICOTINE POLACRILEX 4 MG: 4 GUM, CHEWING BUCCAL at 05:56

## 2025-06-23 RX ADMIN — NICOTINE POLACRILEX 4 MG: 4 GUM, CHEWING BUCCAL at 08:07

## 2025-06-23 RX ADMIN — ONDANSETRON 4 MG: 4 TABLET, ORALLY DISINTEGRATING ORAL at 03:41

## 2025-06-23 RX ADMIN — FOLIC ACID 1 MG: 1 TABLET ORAL at 05:56

## 2025-06-23 RX ADMIN — GABAPENTIN 1200 MG: 600 TABLET, FILM COATED ORAL at 05:56

## 2025-06-23 RX ADMIN — LOPERAMIDE HYDROCHLORIDE 2 MG: 2 CAPSULE ORAL at 09:58

## 2025-06-23 RX ADMIN — GABAPENTIN 1200 MG: 600 TABLET, FILM COATED ORAL at 20:39

## 2025-06-23 RX ADMIN — NICOTINE POLACRILEX 4 MG: 4 GUM, CHEWING BUCCAL at 21:52

## 2025-06-23 RX ADMIN — NICOTINE POLACRILEX 4 MG: 4 GUM, CHEWING BUCCAL at 03:41

## 2025-06-23 RX ADMIN — LEVOTHYROXINE SODIUM 100 MCG: 50 TABLET ORAL at 05:56

## 2025-06-23 RX ADMIN — PROPRANOLOL HYDROCHLORIDE 20 MG: 20 TABLET ORAL at 08:07

## 2025-06-23 RX ADMIN — NICOTINE POLACRILEX 4 MG: 4 GUM, CHEWING BUCCAL at 16:17

## 2025-06-23 RX ADMIN — PROPRANOLOL HYDROCHLORIDE 20 MG: 20 TABLET ORAL at 20:39

## 2025-06-23 RX ADMIN — THIAMINE HCL TAB 100 MG 100 MG: 100 TAB at 05:56

## 2025-06-23 RX ADMIN — Medication 1 TABLET: at 05:56

## 2025-06-23 RX ADMIN — OMEPRAZOLE 40 MG: 20 CAPSULE, DELAYED RELEASE ORAL at 05:56

## 2025-06-23 RX ADMIN — ACETAMINOPHEN 650 MG: 325 TABLET ORAL at 09:05

## 2025-06-23 ASSESSMENT — ACTIVITIES OF DAILY LIVING (ADL)
ADLS_ACUITY_SCORE: 45
ORAL_HYGIENE: INDEPENDENT
ADLS_ACUITY_SCORE: 45
HYGIENE/GROOMING: INDEPENDENT
ADLS_ACUITY_SCORE: 45
DRESS: INDEPENDENT
LAUNDRY: WITH SUPERVISION

## 2025-06-23 NOTE — PLAN OF CARE
Goal Outcome Evaluation:      Group Attendance:  attended full group    Time session began: 4:45 pm  Time session ended: 5:30 pm  Patient's total time in group: 45    Total # Attendees   6   Group Type psychotherapeutic     Group Topic Covered substance use and co-morbidity     Group Session Detail Process     Patient's response to the group topic/interactions:  cooperative with task, supportive of peers, socially appropriate, attentive, and actively engaged     Patient Details: She wants a 55 plus outpatient group, she wants EMDR for trauma history, she enjoys nature and her [place] dog.           75019 - Group psychotherapy - 1 Session  Patient Active Problem List   Diagnosis    Polysubstance abuse (H)    Chronic hepatitis C (H)    COPD (chronic obstructive pulmonary disease) (H)    GERD (gastroesophageal reflux disease)    Mood disorder    Chemical dependency (H)    Nicotine abuse    Alcohol withdrawal, uncomplicated (H)    Sinusitis, unspecified chronicity, unspecified location    Elevated LFTs    Drug withdrawal seizure with complication (H)    Alcohol dependence with withdrawal with complication (H)    Alcoholic ketoacidosis    Vomiting and diarrhea    Seizure (H)    Acute alcoholic intoxication in alcoholism with complication (H)    Elevated troponin    Acne rosacea    Acquired hypothyroidism    Atelectasis of right lung    Bee sting allergy    Bipolar 1 disorder, manic, moderate (H)    Chronic hip pain, right    History of hepatitis C    Left knee pain    Onychomycosis    Osteoarthritis of multiple joints    Primary hypertension    Psychophysiological insomnia    Tear of lateral meniscus of right knee    Tobacco use disorder    Alcohol use disorder, severe, dependence (H)    Bipolar disorder, most recent episode depressed (H)    Acute hypoxic respiratory failure (H)    Chronic obstructive pulmonary disease (H)    COPD with acute exacerbation (H)    Gastroesophageal reflux disease without esophagitis     Generalized anxiety disorder    PTSD (post-traumatic stress disorder)    Lactic acidosis    Alcohol withdrawal seizure with complication (H)    Alcohol abuse with withdrawal (H)    Suicidal ideation    History of alcohol withdrawal delirium    History of seizure due to alcohol withdrawal    Hypomagnesemia    Hyponatremia    Alcohol dependence with intoxication delirium (H)    Alcohol dependence (H)

## 2025-06-23 NOTE — PROGRESS NOTES
"[facility], [place]   Psychiatric Progress Note        Interim History:   The patient's care was discussed with the treatment team during the daily team meeting and/or staff's chart notes were reviewed. Staff report patient has a withdrawal seizure history. Has been admitted recently with the same plan.     Sleep: 5.5 hours (06/23/25 0621)    The patient reports that she is doing better. Eating better. Did get some sleep although she was \"a little sweaty last night.\" Mood is good. Denies SI. Says that she can't do inpatient due to her housing and her dog, but she has arranged coverage for her dog so she can attend [facility]. Hopeful for our 55+ program. Open to using Antabuse PRN. Gabapentin helps some with cravings. Said that Naltrexone \"was not helping.\"          Medications:     Current Facility-Administered Medications   Medication Dose Route Frequency Provider Last Rate Last Admin    folic acid (FOLVITE) tablet 1 mg  1 mg Oral Daily Scott Alex MD   1 mg at 06/23/25 0556    gabapentin (NEURONTIN) tablet 1,200 mg  1,200 mg Oral TID Scott Alex MD   1,200 mg at 06/23/25 0556    levothyroxine (SYNTHROID/LEVOTHROID) tablet 100 mcg  100 mcg Oral QAM Scott Alex MD   100 mcg at 06/23/25 0556    multivitamin w/minerals (THERA-VIT-M) tablet 1 tablet  1 tablet Oral Daily Scott Alex MD   1 tablet at 06/23/25 0556    omeprazole (PriLOSEC) CR capsule 40 mg  40 mg Oral QAM Scott Alex MD   40 mg at 06/23/25 0556    propranolol (INDERAL) tablet 20 mg  20 mg Oral TID Scott Alex MD   20 mg at 06/23/25 0807    thiamine (B-1) tablet 100 mg  100 mg Oral Daily Scott Alex MD   100 mg at 06/23/25 0556          Allergies:     Allergies   Allergen Reactions    Bee Venom Swelling    Wasps [Hornets] Swelling    Amlodipine      Nightmares    Antihistamines, Chlorpheniramine-Type [Antihistamines, Chlorpheniramine-Type]     Clonidine     Compazine      " "Lock jaw    Diphenhydramine Muscle Pain (Myalgia) and Cramps    Hydroxyzine Cramps     Lock jaw    Metoclopramide      Tardive Dyskinesia    Penicillins      Panic attack    Prednisone Anxiety     Panic attacks.      Prochlorperazine Muscle Pain (Myalgia) and Cramps    Trazodone Nausea and Vomiting and Confusion     \"I ended up falling and feeling like I was drunk\"    Umeclidinium Bromide      Mood swings + anger.          Labs:   No results found for this or any previous visit (from the past 24 hours).       Psychiatric Examination:     /80   Pulse 64   Temp 97.8  F (36.6  C) (Oral)   Resp 16   Ht 1.6 m (5' 3\")   Wt 61.7 kg (136 lb)   SpO2 98%   BMI 24.09 kg/m    Weight is 136 lbs 0 oz  Body mass index is 24.09 kg/m .  Orthostatic Vitals       None              Appearance: awake, alert and adequately groomed  Attitude:  cooperative  Eye Contact:  good  Mood:  better  Affect:  mood congruent  Speech:  clear, coherent  Psychomotor Behavior:  no evidence of tardive dyskinesia, dystonia, or tics  Thought Process:  goal oriented  Associations:  no loose associations  Thought Content:  no evidence of suicidal ideation or homicidal ideation and no evidence of psychotic thought  Insight:  fair  Judgement:  fair  Oriented to:  time, person, and place  Attention Span and Concentration:  intact  Recent and Remote Memory:  fair           Precautions:     Behavioral Orders   Procedures    Code 1 - Restrict to Unit    Routine Programming     As clinically indicated    Status 15     Every 15 minutes.          DIagnoses:     Alcohol use disorder, severe  Alcohol withdrawal with unspecified complication   PTSD  History of MDD versus BAD  Nicotine dependence with current use     Clinically Significant Risk Factors          # Hypochloremia: Lowest Cl = 96 mmol/L in last 2 days, will monitor as appropriate     # Anion Gap Metabolic Acidosis: Highest Anion Gap = 20 mmol/L in last 2 days, will monitor and treat as " appropriate        # Hypertension: Noted on problem list                    # Financial/Environmental Concerns:                       Plan:     1) Continue MSSA protocol.   2) Continue gabapentin and Inderal. Patient gets benefit from gabapentin in terms of cravings as well as anxiety.   3) Patient open to IOP and hopeful for our 55+ program.   4) Antabuse prescribed for discharge.       Disposition Plan   Reason for ongoing admission: requires detoxification from substance that poses a risk of bodily harm during withdrawal period  Discharge location: home with self-care  Discharge Medications: ordered.  Follow-up Appointments: not scheduled  Legal Status: None      Entered by: Darren Fermin MD on June 23, 2025 at 10:53 AM

## 2025-06-23 NOTE — PROGRESS NOTES
"[facility]     Daily Encounter: Met with team, discussed patient progress, discharge plan and any impediments to discharge.    Pt shares she would like to attend a dual diagnosis program, maybe with [facility]. Pt was referred and scheduled to get a [facility] 55+ plus program in the past and shared she never followed up because she had a planned \"sober camping trip.\" She also shared that she cannot be gone from her home much more than 3 hours a day due to her dog but is agreeable that she could attend from 9-12.Pt shares that there are not enough resources for \"older chronic alcoholics\" and won't attend a program if there are any \"20 yr olds.\" Pt also shared she would like to restart EMDR however due to barriers with medicare she has struggled to finds a fitting provider. Pt was encouraged to complete [facility] paperwork.   Legal Status:  Voluntary        SUDs Assessment Status: Needs update for [facility] treatment     ROIs on file: None     Living Situation: Lives alone in [facility]     Current Providers, Supports & Collateral:    PCP: Dr. Carmela Srinivasan at [facility]  Psychiatrist: Dr. Olman Herrera   Therapist: CLAUDIA Castillo, [facility]     Current Plan/Referral Status: Unsure at this time.      Safety Plan Status: reviewed and updated with patient      Lesley Cleary, [facility] - Adult Inpatient Addiction Psychiatry Unit           "
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
## 2025-06-23 NOTE — DISCHARGE INSTRUCTIONS
Behavioral Discharge Planning and Instructions  THANK YOU FOR CHOOSING [facility]  3AW  [phone]    Summary: You were admitted to [facility] on 6/21/25 for detoxification from alcohol.  A medical exam was performed that included lab work. You have met with a [facility] Counselor and opted to attend intensive outpatient treatment.  Please take care and make your recovery a daily priority, Jolynn!  It was a pleasure working with you and the entire treatment team here wishes you the very best in your recovery!     Recommendation:  Please abstain from the use of all mood altering chemicals. Please follow up with [facility] and follow any and all recommendations.     [facility] 55+ IOP [facility]  [address]   [address]   [facility] [address]  [phone]    Main Diagnoses:    303.90 (F10.20) Alcohol Use Disorder Severe    Major Treatments, Procedures and Findings:  You were treated for alcohol detoxification using valium. As an outpatient you will be prescribed medication, please take this medication as prescribed until it is gone. You have met with a [facility] counselor to develop a treatment plan for discharge. You had labs drawn and those results were reviewed with you. Please take a copy of your lab work with you to your next primary care provider appointment.    Symptoms to Report:  If you experience more anxiety, confusion, sleeplessness, deep sadness or thoughts of suicide, notify your treatment team or notify your primary care provider. IF ANY OF THE SYMPTOMS YOU ARE EXPERIENCING ARE A MEDICAL EMERGENCY CALL 911 IMMEDIATELY.     Lifestyle Adjustment: Adjust your lifestyle to get enough sleep, relaxation, exercise and good nutrition. Continue to develop healthy coping skills to decrease stress and promote a sober living environment. Do not use mood altering substances including alcohol, illegal drugs or addictive medications other than what is currently prescribed.     Disposition:  Home    Facts about COVID19 at www.cdc.gov/COVID19 and www.[place].gov/covid19    Keeping hands clean is one of the most important steps we can take to avoid getting sick and spreading germs to others.  Please wash your hands frequently and lather with soap for at least 20 seconds!    Follow-up Appointment:   Appointment Date/Time: 07/03/25    Psychiatrist/Primary Care Giver: Carmela Srinivasan MD    Address: [facility]    Phone Number:       Recovery apps for your phone for educational purposes and to locate in person and zoom recovery meetings  Everything AA -  mahnaz is a great resource  12 Step Toolkit - educational purpose learning about the 12 steps to recovery  [place] Cloud - meeting mahnaz  AA  - meeting mahnaz  Meeting guide - meeting mahnaz  Quick NA meeting - meeting mahnaz  Estela- has various apps    Patient Navigation Hub  [facility] Navigators work to be your point-of-contact for trustworthy and compassionate care from Inpatient services to [facility] Programmatic Care. We will provide resources and communication to help guide you into programmatic care. Ultimately, our goal is to be the one-stop-shop of communication, coordination, and support for your journey to programmatic care.  Phone: [phone]    Resources:  AA/NA meetings have returned to in-person or a hybrid combination of zoom/in-person therefore please check online to verify*  Need Support Now? If you or someone you know is struggling or in crisis, help is available. Call or text 698 or chat Cloudmarkline.org  AA meetings search for them at: https://aa-intergroup.org (worldwide meeting listings)  AA meetings for [place] area can be found online at: https://a[place].org (click local online meetings listings)  NA meetings for [place] area can be found online at: https://www.nam[place].org  (click find a meeting)  AA and NA Sponsors are excellent resources for support and you can find one at any support group meeting.   Alcoholics  "Anonymous (https://aa.org/): for information 24 hours/day  AA Intergroup service office in [place] (http://www.aastpaul.org/) 637.934.7904  AA Intergroup service office in [facility]: 917.534.2016. (http://www.a[place].org/)  Narcotics Anonymous (www.nam[place].org) (454) 532-7509  https://aafairviewriverside.org/meetings  SMART Recovery - self management for addiction recovery:  www.smartrecovery.org  Pathways ~ A Health Crisis Resource & Support Center:  413.544.6933.  https://prescribetoprevent.org/patient-education/videos/  http://www.InstaEDUreduction.org  Crisis Text Line  Text 485073  You will be connected with a trained live crisis counselor to provide support. Por espanol, texto  JEANNETTE a 905520 o texto a 442-AYUDAME en WhatsApp  [place] Hope Line  1.364.SUICIDE [8423353]  [facility] [phone]  Support Group:  AA/NA and Sponsor/support.  Fast Tracker  Linking people to mental health and substance use disorder resources  [employer].org   [place] Mental [facility] Warm Line  Peer to peer support  Monday thru Saturday, 12 pm to 10 pm  971.701.4913 or 2.870.886.2487  Text \"Support\" to 86276  National [place] on Mental Illness (MARITZA)  161.557.6039 or 1.888.MARITZA.HELPS  Alcoholics Anonymous (www.alcoholics-anonymous.org): Check your phone book for your local chapter.  Suicide Awareness Voices of Education (SAVE) (www.save.org): 369-964-IIVJ (4083)  National Suicide Prevention Line (www.mentalhealthmn.org): 314-238-WGMK (0240)  Mental Health Consumer/Survivor Network of [place] (www.mhcsn.net): 963.212.7851 or 480-752-7251  Mental Health Association of [place] (www.mentalhealth.org): 766.921.7230 or 957-024-3733   Substance Abuse and Mental Health Services (www.samhsa.gov)  [place] Opioid Prevention Coalition: www.opioidcoalition.org    [place] Recovery Connection (MRC)  MRC connects people seeking recovery to resources that help foster and sustain long-term recovery.  Whether " you are seeking resources for treatment, transportation, housing, job training, education, health care or other pathways to recovery, [facility] is a great place to start.  507.825.3109.  www.[facility].org    Great Pod casts for nutrition and wellness  Listen on Apple Podcasts  Dishing Up Nutrition   [employer]   Nutrition       Understand the connection between what you eat and how you feel. Hosted by licensed nutritionists and dietitians from [employer] we share practical, real-life solutions for healthier living through nutrition.     General Medication Instructions:   See your medication sheet(s) for instructions.   Take all medications as prescribed.  Make no changes unless your primary care provider suggests them.   Go to all your primary care provider visits.  Be sure to have all your required lab tests. This way, your medicines can be refilled on time.  Do not use any forms of alcohol.    Please Note: If you have any questions at anytime after you discharged please call [facility] detox unit 3A at [phone].    Here are a list of additional numbers you can call if you are wanting to resume services through [facility]:  [facility] Assessment Intake: [phone]  [facility] Adult GIRMA Intensive Outpatient  call: [phone]  [facility] Admissions [phone]    Recovery Clinic call: [phone]  [facility]: [phone]  Medical Records call: [phone]  Billing Department call: [phone]    Please remember to take all of your behavioral discharge planning and lab paperwork to any follow up appointments, it contains your lab results, diagnosis, medication list and discharge recommendations.      THANK YOU FOR CHOOSING [facility]

## 2025-06-23 NOTE — PLAN OF CARE
Pt denied alcohol withdrawal symptoms stating she feels good and just wants to sleep. Slept until about 0330. Can't sleep any more. No pain or discomfort. No valium given.    MSSA:1 and 1. No valium given this shift. Requested and got prn zofran.  B/P: 122/67, T: 98.1, P: 67, R: 16   B/P: 129/84, T: 97.6, P: 63, R: 16     Problem: Alcohol Withdrawal  Goal: Alcohol Withdrawal Symptom Control  Outcome: Progressing   Goal Outcome Evaluation:

## 2025-06-23 NOTE — PLAN OF CARE
"  Problem: Alcohol Withdrawal  Goal: Alcohol Withdrawal Symptom Control  Outcome: Progressing     Patient was present and actively engaged in the common area for assessment and unit groups this evening. She spent most of her time watching TV and socializing with peers. She took all medications as prescribed and required PRN Valium for withdrawal symptoms. Valium was administered twice during this shift for a mild scale assessment (MSSA) of 8 and 8. Throughout the entire shift, she  remained vitally stable and was diligent about using her nicotine gum. She plans to discharge home and follow up with outpatient treatment at [facility], if possible.    /80 (BP Location: Left arm)   Pulse 60   Temp 98.6  F (37  C) (Oral)   Resp 16   Ht 1.6 m (5' 3\")   Wt 61.7 kg (136 lb)   SpO2 96%   BMI 24.09 kg/m       "

## 2025-06-24 VITALS
WEIGHT: 136 LBS | SYSTOLIC BLOOD PRESSURE: 154 MMHG | BODY MASS INDEX: 24.1 KG/M2 | DIASTOLIC BLOOD PRESSURE: 83 MMHG | OXYGEN SATURATION: 98 % | RESPIRATION RATE: 16 BRPM | TEMPERATURE: 97.5 F | HEIGHT: 63 IN | HEART RATE: 61 BPM

## 2025-06-24 PROCEDURE — 250N000013 HC RX MED GY IP 250 OP 250 PS 637: Performed by: PSYCHIATRY & NEUROLOGY

## 2025-06-24 PROCEDURE — 99239 HOSP IP/OBS DSCHRG MGMT >30: CPT | Performed by: PSYCHIATRY & NEUROLOGY

## 2025-06-24 RX ADMIN — NICOTINE POLACRILEX 4 MG: 4 GUM, CHEWING BUCCAL at 08:35

## 2025-06-24 RX ADMIN — NICOTINE POLACRILEX 4 MG: 4 GUM, CHEWING BUCCAL at 12:54

## 2025-06-24 RX ADMIN — NICOTINE POLACRILEX 4 MG: 4 GUM, CHEWING BUCCAL at 00:21

## 2025-06-24 RX ADMIN — NICOTINE POLACRILEX 4 MG: 4 GUM, CHEWING BUCCAL at 04:28

## 2025-06-24 RX ADMIN — THIAMINE HCL TAB 100 MG 100 MG: 100 TAB at 06:13

## 2025-06-24 RX ADMIN — Medication 1 TABLET: at 06:12

## 2025-06-24 RX ADMIN — FOLIC ACID 1 MG: 1 TABLET ORAL at 06:12

## 2025-06-24 RX ADMIN — NICOTINE POLACRILEX 4 MG: 4 GUM, CHEWING BUCCAL at 06:17

## 2025-06-24 RX ADMIN — PROPRANOLOL HYDROCHLORIDE 20 MG: 20 TABLET ORAL at 08:34

## 2025-06-24 RX ADMIN — OMEPRAZOLE 40 MG: 20 CAPSULE, DELAYED RELEASE ORAL at 06:13

## 2025-06-24 RX ADMIN — LEVOTHYROXINE SODIUM 100 MCG: 50 TABLET ORAL at 06:11

## 2025-06-24 RX ADMIN — GABAPENTIN 1200 MG: 600 TABLET, FILM COATED ORAL at 06:11

## 2025-06-24 RX ADMIN — CALCIUM CARBONATE (ANTACID) CHEW TAB 500 MG 500 MG: 500 CHEW TAB at 04:55

## 2025-06-24 RX ADMIN — NICOTINE POLACRILEX 4 MG: 4 GUM, CHEWING BUCCAL at 11:10

## 2025-06-24 ASSESSMENT — ACTIVITIES OF DAILY LIVING (ADL)
ADLS_ACUITY_SCORE: 45

## 2025-06-24 ASSESSMENT — ANXIETY QUESTIONNAIRES
3. WORRYING TOO MUCH ABOUT DIFFERENT THINGS: MORE THAN HALF THE DAYS
IF YOU CHECKED OFF ANY PROBLEMS ON THIS QUESTIONNAIRE, HOW DIFFICULT HAVE THESE PROBLEMS MADE IT FOR YOU TO DO YOUR WORK, TAKE CARE OF THINGS AT HOME, OR GET ALONG WITH OTHER PEOPLE: SOMEWHAT DIFFICULT
1. FEELING NERVOUS, ANXIOUS, OR ON EDGE: SEVERAL DAYS
GAD7 TOTAL SCORE: 9
6. BECOMING EASILY ANNOYED OR IRRITABLE: SEVERAL DAYS
4. TROUBLE RELAXING: SEVERAL DAYS
5. BEING SO RESTLESS THAT IT IS HARD TO SIT STILL: MORE THAN HALF THE DAYS
2. NOT BEING ABLE TO STOP OR CONTROL WORRYING: MORE THAN HALF THE DAYS
7. FEELING AFRAID AS IF SOMETHING AWFUL MIGHT HAPPEN: NOT AT ALL
GAD7 TOTAL SCORE: 9

## 2025-06-24 ASSESSMENT — PATIENT HEALTH QUESTIONNAIRE - PHQ9: SUM OF ALL RESPONSES TO PHQ QUESTIONS 1-9: 12

## 2025-06-24 NOTE — PROGRESS NOTES
"[address]     Daily Encounter: Met with team, discussed patient progress, discharge plan and any impediments to discharge.    6/24/25: Pt has reported she is willing to attend IOP at [facility] 55+ program. Writer has completed update and sent referral. Pt encouraged to follow up with them after discharge to schedule an intake date.     Pt shares she would like to attend a dual diagnosis program, maybe with [facility]. Pt was referred and scheduled to get a [facility]'s 55+ plus program in the past and shared she never followed up because she had a planned \"sober camping trip.\" She also shared that she cannot be gone from her home much more than 3 hours a day due to her dog but is agreeable that she could attend from 9-12.Pt shares that there are not enough resources for \"older chronic alcoholics\" and won't attend a program if there are any \"20 yr olds.\" Pt also shared she would like to restart EMDR however due to barriers with medicare she has struggled to finds a fitting provider. Pt was encouraged to complete GIRMA paperwork.   Legal Status:  Voluntary        SUDs Assessment Status: Needs update for GIRMA treatment     ROIs on file: None     Living Situation: Lives alone in [place] [facility]     Current Providers, Supports & Collateral:    PCP: Dr. Carmela Srinivasan at [facility]  Psychiatrist: Dr. Olman Herrera   Therapist: CLAUDIA Castillo, [facility]     Current Plan/Referral Status: [facility] 55+      Safety Plan Status: reviewed and updated with patient      Lesley Cleary [facility]  [facility] Adult [facility]iatry [facility]           "

## 2025-06-24 NOTE — PLAN OF CARE
"Patient visible in milieu, social with peers, attended offered groups. Affect neutral, mood is anxious. Denies SI, SIB, HI, or hallucinations.   Continued to be monitored for alcohol withdrawal. MSSA score a 2, and patient is now \"out of detox\" from alcohol withdrawal.  VSS, appetite is good, hydrating adequately. Denies pain.   Patient is detox only and wants to discharge to home tomorrow.   PRNS: Nicotine gum.   Patient denies any additional concerns or unmet needs.   /82 (BP Location: Left arm, Patient Position: Sitting, Cuff Size: Adult Regular)   Pulse 63   Temp 98.2  F (36.8  C) (Oral)   Resp 16   Ht 1.6 m (5' 3\")   Wt 61.7 kg (136 lb)   SpO2 96%   BMI 24.09 kg/m           "

## 2025-06-24 NOTE — PLAN OF CARE
Problem: Alcohol Withdrawal  Goal: Alcohol Withdrawal Symptom Control  Outcome: Met   Goal Outcome Evaluation:       The patient slept through the night. Breathing was quiet and unlabored.  She had been out of detox. No safety or behavioral concerns were reported or observed. Staff will continue to monitor.

## 2025-06-24 NOTE — PLAN OF CARE
"Patient has not scored greater than an on MSSA monitoring for alcohol withdrawal and has not required Valium for alcohol withdrawal for greater than 24 hours. Patient no longer requires acute alcohol withdrawal monitoring. Is \"out of detox.\"          "

## 2025-06-24 NOTE — PLAN OF CARE
Problem: Alcohol Withdrawal  Goal: Optimal Neurologic Function  Outcome: Progressing   Goal Outcome Evaluation:         Patient is alert and oriented, blood pressure was a little high this morning, 154/83. Scheduled propanolol  given.  patient denied headache or feeling dizzy.  Patient is out of detox and leaving today. Went over discharge instructions with patient. Will  discharge medications at outside pharmacy.

## 2025-06-24 NOTE — DISCHARGE SUMMARY
Psychiatric Discharge Summary    Jolynn Rivera MRN# [id number]   Age: 65 year old YOB: 1960     Date of Admission:  6/21/2025  Date of Discharge:  6/24/2025  1:26 PM  Admitting Physician:  Scott Alex MD  Discharge Physician:  Darren Fermin MD          Events Leading to Hospitalization:      65 year old female     Patient states that she has been drinking since her teen years, but states that her drinking has been problematic for the past 20 years. She states that she has been in treatment numerous prior treatments. Her longest sobriety was for 5 years.     For the past 2 years she has had more difficulty maintaining sobriety, but has managed to have periods up to 2 months. Most recently she relapsed 2 weeks ago and has been drinking daily since then. She drinks over a liter of wine a day.     Patient has a history of blackouts and seizures, but no history DUI.     Patient states that she is diagnosed with bipolar 2 and PTSD. She is on a regimen of Gabapentin which she states is helpful, and she denies current problems with depression or manic symptoms.     Patient denies suicidal or homicidal thoughts and has no evidence of kim currently.      See Admission note for additional details.          Diagnoses:     Alcohol use disorder, severe  Alcohol withdrawal with unspecified complication   PTSD  History of MDD versus BAD  Nicotine dependence with current use     Clinically Significant Risk Factors                   # Hypertension: Noted on problem list                # Financial/Environmental Concerns:                  Labs:        Lab Results   Component Value Date     06/21/2025     09/03/2016    Lab Results   Component Value Date    CHLORIDE 96 06/21/2025    CHLORIDE 102 12/09/2022    CHLORIDE 104 09/03/2016    Lab Results   Component Value Date    BUN 9.2 06/21/2025    BUN 6 12/09/2022    BUN 7 09/03/2016      Lab Results   Component Value Date    POTASSIUM 3.9  06/21/2025    POTASSIUM 3.6 12/09/2022    POTASSIUM 3.8 09/03/2016    Lab Results   Component Value Date    CO2 19 06/21/2025    CO2 20 12/09/2022    CO2 25 09/03/2016    Lab Results   Component Value Date    CR 0.57 06/21/2025    CR 0.61 09/03/2016          Lab Results   Component Value Date    WBC 6.5 06/21/2025    HGB 16.2 (H) 06/21/2025    HCT 43.9 06/21/2025    MCV 93 06/21/2025     06/21/2025     Lab Results   Component Value Date    AST 53 (H) 06/21/2025    ALT 27 06/21/2025    GGT 28 02/19/2025    ALKPHOS 56 06/21/2025    BILITOTAL 0.4 06/21/2025     Lab Results   Component Value Date    TSH 1.78 04/06/2025            Consults:   Consultation during this admission received from internal medicine:  Jolynn Rivera is a 65 year old female with past medical history significant for alcohol use disorder, withdrawal seizures, HTN, COPD, pulmonary nodules, HCV s/p treatment, hypothyroidism, GERD, chronic back pain, bipolar disorder, PTSD, anxiety admitted to station 3A for alcohol withdrawal and detox.       # Alcohol withdrawal, hx of alcohol use disorder   MSSA 10 this shift.  Reports a hx of withdrawal seizures, unknown when last one occurred.  On gabapentin 1200mg TID, has been on this long term.   - Seizure precautions   - Continue MSSA   - Folvite, multi-vites, thiamine supplementation   - Further management per Psychiatry      # Elevated BPs   In setting of ETOH withdrawal.  161/87 this shift.  Documented history of HTN but appears she is on propranolol for anxiety, which can also help with elevated BPs.   - Continue PTA propranolol     - Allergy to clonidine, will not order this  - Monitor per unit routine      # Mild anion gap acidosis   Likely 2/2 dehydration in setting of ETOH use.  AG 20, CO2 19.  Expect resolution with improvement in PO intake and hydration.      # COPD   # Pulmonary nodules   Followed OP by [facility], last vist 3/3/25. Pt unable to tolerate steroids or any inhalers,  except prn levalbuterol that she states she needs infrequently. Most recent CT chest with stable pulm nodules compared to prior.  Currently symptoms are at baseline, reports rare use of rescue inhaler.    - Follow up with PET scan (not yet completed)  - Continue PRN levalbuterol      # Prediabetes   Hgb A1c 5.7 this admission.    - Follow up with PCP for further management      # HCV s/p treatment   Noted.  Mildly elevated AST 53 likely due to ETOH use.      # GERD   - Continue PTA PPI      # Hypothyroidism   Last TSH in 4/2025 wnl.    - Continue PTA levothyroxine      # Chronic back pain   # Sciatica   - Continue PTA gabapentin as above   - Continue lidocaine patches      # Bipolar disorder  # Anxiety   # PTSD  - Management per Psychiatry          Hospital Course:   Jolynn Rivera was admitted to Station 3A with attending Darren Fermin MD as a voluntary patient. The patient was placed under status 15 (15 minute checks) to ensure patient safety.   MSSA protocol was initiated due to the patient's history of alcohol abuse and concern for withdrawal symptoms.  CBC, BMP and utox obtained.    All outpatient medications were continued with the exception of Naltrexone.Patient open to trying Antabuse at least PRN.     Jolynn Rivera did participate in groups and was visible in the milieu.     The patient's symptoms of withdrawal improved.     Jolynn Rivera was released to home with IOP. At the time of discharge Jolynn Rivera was determined to not be a danger to herself or others. At the current time of discharge, the patient does not meet criteria for involuntary hospitalization. On the day of discharge, the patient reports that they do not have suicidal or homicidal ideation and would never hurt themselves or others. Steps taken to minimize risk include: assessing patient s behavior and thought process daily during hospital stay, discharging patient with adequate plan for follow up for mental and physical  health and discussing safety plan of returning to the hospital should the patient ever have thoughts of harming themselves or others. Therefore, based on all available evidence including the factors cited above, the patient does not appear to be at imminent risk for self-harm, and is appropriate for outpatient level of care.           Discharge Medications:     Current Discharge Medication List        START taking these medications    Details   disulfiram (ANTABUSE) 250 MG tablet Take 1 tablet (250 mg) by mouth daily.  Qty: 30 tablet, Refills: 3    Associated Diagnoses: Alcohol dependence with intoxication delirium (H)           CONTINUE these medications which have NOT CHANGED    Details   acetaminophen (TYLENOL) 500 MG tablet Take 500-1,000 mg by mouth every 6 hours as needed for mild pain.      folic acid (FOLVITE) 1 MG tablet Take 1 tablet (1 mg) by mouth daily.  Qty: 30 tablet, Refills: 1    Associated Diagnoses: Alcohol dependence with intoxication delirium (H)      gabapentin (NEURONTIN) 600 MG tablet Take 1,200 mg by mouth 3 times daily.      levalbuterol (XOPENEX HFA) 45 MCG/ACT inhaler Inhale 2 puffs into the lungs every 4 hours as needed for shortness of breath or wheezing.      levothyroxine (SYNTHROID/LEVOTHROID) 100 MCG tablet Take 1 tablet (100 mcg) by mouth every morning.    Associated Diagnoses: Hypothyroidism, unspecified type      Lidocaine (LIDOCARE) 4 % Patch Place 1 patch onto the skin daily as needed for moderate pain. To prevent lidocaine toxicity, patient should be patch free for 12 hrs daily.      loperamide (IMODIUM) 2 MG capsule Take 2 mg by mouth 4 times daily as needed for diarrhea.      melatonin 5 MG tablet Take 5 mg by mouth at bedtime. Takes 1 hour prior to Lunesta      multivitamin w/minerals (THERA-VIT-M) tablet Take 1 tablet by mouth daily.  Qty: 30 tablet, Refills: 1    Associated Diagnoses: Alcohol dependence with intoxication with complication (H)      nicotine polacrilex  (NICORETTE) 4 MG gum Place 1 each (4 mg) inside cheek every hour as needed for nicotine withdrawal symptoms.  Qty: 100 each, Refills: 1    Associated Diagnoses: Tobacco use disorder      omeprazole (PRILOSEC) 40 MG DR capsule Take 1 capsule (40 mg) by mouth every morning.    Associated Diagnoses: Gastroesophageal reflux disease without esophagitis      ondansetron (ZOFRAN ODT) 4 MG ODT tab Take 1 tablet (4 mg) by mouth every 8 hours as needed for vomiting or nausea.    Associated Diagnoses: Gastroesophageal reflux disease without esophagitis      propranolol (INDERAL) 20 MG tablet Take 1 tablet (20 mg) by mouth 3 times daily.    Associated Diagnoses: Generalized anxiety disorder      thiamine (B-1) 100 MG tablet Take 1 tablet (100 mg) by mouth daily.  Qty: 30 tablet, Refills: 0    Associated Diagnoses: Alcohol dependence with intoxication with complication (H)                  Psychiatric Examination:   Appearance:  awake, alert and adequately groomed  Attitude:  cooperative  Eye Contact:  good  Mood:  good  Affect:  mood congruent  Speech:  clear, coherent  Psychomotor Behavior:  no evidence of tardive dyskinesia, dystonia, or tics  Thought Process:  goal oriented  Associations:  no loose associations  Thought Content:  no evidence of suicidal ideation or homicidal ideation and no evidence of psychotic thought  Insight:  fair  Judgment:  fair  Oriented to:  time, person, and place  Attention Span and Concentration:  intact  Recent and Remote Memory:  fair  Language: Able to read and write  Fund of Knowledge: appropriate  Muscle Strength and Tone: normal  Gait and Station: Normal         Discharge Plan:   Continue medications as above.     Recommendation:  Please abstain from the use of all mood altering chemicals. Please follow up with [facility] and follow any and all recommendations.      [facility]  [facility]   [address]   [facility] [address]  [phone]     Major  Treatments, Procedures and Findings:  You were treated for alcohol detoxification using valium. As an outpatient you will be prescribed medication, please take this medication as prescribed until it is gone. You have met with a [facility] counselor to develop a treatment plan for discharge. You had labs drawn and those results were reviewed with you. Please take a copy of your lab work with you to your next primary care provider appointment.     Symptoms to Report:  If you experience more anxiety, confusion, sleeplessness, deep sadness or thoughts of suicide, notify your treatment team or notify your primary care provider. IF ANY OF THE SYMPTOMS YOU ARE EXPERIENCING ARE A MEDICAL EMERGENCY CALL 911 IMMEDIATELY.      Lifestyle Adjustment: Adjust your lifestyle to get enough sleep, relaxation, exercise and good nutrition. Continue to develop healthy coping skills to decrease stress and promote a sober living environment. Do not use mood altering substances including alcohol, illegal drugs or addictive medications other than what is currently prescribed.      Disposition: Home     Facts about COVID19 at www.cdc.gov/COVID19 and www.[place].gov/covid19     Keeping hands clean is one of the most important steps we can take to avoid getting sick and spreading germs to others.  Please wash your hands frequently and lather with soap for at least 20 seconds!     Follow-up Appointment:   Appointment Date/Time: 07/03/25    Psychiatrist/Primary Care Giver: Carmela Srinivasan MD    Address: [facility]       Attestation:    The patient was seen and evaluated by me. I spent more than 30 minutes on discharge day activities. Darren Fermin MD

## 2025-06-25 ENCOUNTER — PATIENT OUTREACH (OUTPATIENT)
Dept: CARE COORDINATION | Facility: CLINIC | Age: 65
End: 2025-06-25
Payer: MEDICARE

## 2025-06-25 DIAGNOSIS — Z09 HOSPITAL DISCHARGE FOLLOW-UP: ICD-10-CM

## 2025-06-25 NOTE — PROGRESS NOTES
[facility]: Transitions of Care Outreach  Chief Complaint   Patient presents with    Clinic Care Coordination - Post Hospital       Most Recent Admission Date: 6/21/2025   Most Recent Admission Diagnosis:      Most Recent Discharge Date: 6/24/2025   Most Recent Discharge Diagnosis: Alcohol withdrawal syndrome with complication (H) - F10.939  Alcohol dependence with intoxication delirium (H) - F10.221     Transitions of Care Assessment    Discharge Assessment  How are you doing now that you are home?: I am doing okay.  How are your symptoms? (Red Flag symptoms escalate to triage hotline per guidelines): Improved  Do you know how to contact your clinic care team if you have future questions or changes to your health status? : Yes  Does the patient have their discharge instructions? : No - Review discharge instructions  Does the patient have questions regarding their discharge instructions? : No  Were you started on any new medications or were there changes to any of your previous medications? : Yes  Does the patient have all of their medications?: Yes  Do you have questions regarding any of your medications? : No                  Follow up Plan     Discharge Follow-Up  Discharge follow up appointment scheduled in alignment with recommended follow up timeframe or Transitions of Risk Category? (Low = within 30 days; Moderate= within 14 days; High= within 7 days): Yes  Discharge Follow Up Appointment Date: 07/03/25  Discharge Follow Up Appointment Scheduled with?: Specialty Care Provider    No future appointments.    Outpatient Plan as outlined on AVS reviewed with patient.    For any urgent concerns, please contact our 24 hour nurse triage line: [phone] ([phone])       IRAM Pinon  [phone]  [facility]

## 2025-07-03 ENCOUNTER — TELEPHONE (OUTPATIENT)
Dept: BEHAVIORAL HEALTH | Facility: CLINIC | Age: 65
End: 2025-07-03
Payer: MEDICARE

## 2025-07-03 NOTE — TELEPHONE ENCOUNTER
----- Message from Albina Gaitan sent at 7/3/2025  9:17 AM CDT -----  Regarding: reschedule SI from today 7/3 to Friday 7/11  Scheduling Request:  reschedule SI from today 7/3 to Friday 7/11  at 11:00 , client hurt her ankle today so needed to cancel.        Patient Name:CHINTAN MEZA [[id number]]  Location of programming: [employer]  Start Date:    2025  Group: [id number] .    Attending Provider (MD):  Hali Manzo    Number of visits:   1      Length of appt: 60 minutes  Visit type:  in person      Billing Type: part of program    Thank You,  Flory Gaitan M.S., [facility], LPC  Lead Counselor  [phone]

## 2025-07-05 ENCOUNTER — TELEPHONE (OUTPATIENT)
Dept: BEHAVIORAL HEALTH | Facility: CLINIC | Age: 65
End: 2025-07-05

## 2025-07-05 ENCOUNTER — APPOINTMENT (OUTPATIENT)
Dept: CT IMAGING | Facility: CLINIC | Age: 65
DRG: 897 | End: 2025-07-05
Attending: STUDENT IN AN ORGANIZED HEALTH CARE EDUCATION/TRAINING PROGRAM
Payer: MEDICARE

## 2025-07-05 ENCOUNTER — HOSPITAL ENCOUNTER (INPATIENT)
Facility: CLINIC | Age: 65
LOS: 1 days | Discharge: HOME | DRG: 897 | End: 2025-07-07
Attending: STUDENT IN AN ORGANIZED HEALTH CARE EDUCATION/TRAINING PROGRAM | Admitting: PSYCHIATRY & NEUROLOGY
Payer: MEDICARE

## 2025-07-05 DIAGNOSIS — S05.12XA CONTUSION OF LEFT EYE, INITIAL ENCOUNTER: Primary | ICD-10-CM

## 2025-07-05 DIAGNOSIS — F10.929 ALCOHOLIC INTOXICATION WITH COMPLICATION: ICD-10-CM
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
## 2025-07-05 LAB
ALBUMIN SERPL BCG-MCNC: 4.5 G/DL (ref 3.5–5.2)
ALCOHOL BREATH TEST: 0.21 (ref 0–0.01)
ALP SERPL-CCNC: 51 U/L (ref 40–150)
ALT SERPL W P-5'-P-CCNC: 19 U/L (ref 0–50)
AMPHETAMINES UR QL SCN: ABNORMAL
ANION GAP SERPL CALCULATED.3IONS-SCNC: 20 MMOL/L (ref 7–15)
AST SERPL W P-5'-P-CCNC: 44 U/L (ref 0–45)
BARBITURATES UR QL SCN: ABNORMAL
BASOPHILS # BLD AUTO: 0 10E3/UL (ref 0–0.2)
BASOPHILS NFR BLD AUTO: 1 %
BENZODIAZ UR QL SCN: ABNORMAL
BILIRUB SERPL-MCNC: 0.4 MG/DL
BUN SERPL-MCNC: 9.3 MG/DL (ref 8–23)
BZE UR QL SCN: ABNORMAL
CALCIUM SERPL-MCNC: 8.8 MG/DL (ref 8.8–10.4)
CANNABINOIDS UR QL SCN: ABNORMAL
CHLORIDE SERPL-SCNC: 99 MMOL/L (ref 98–107)
CREAT SERPL-MCNC: 0.55 MG/DL (ref 0.51–0.95)
EGFRCR SERPLBLD CKD-EPI 2021: >90 ML/MIN/1.73M2
EOSINOPHIL # BLD AUTO: 0 10E3/UL (ref 0–0.7)
EOSINOPHIL NFR BLD AUTO: 0 %
ERYTHROCYTE [DISTWIDTH] IN BLOOD BY AUTOMATED COUNT: 12.2 % (ref 10–15)
FENTANYL UR QL: ABNORMAL
GLUCOSE SERPL-MCNC: 91 MG/DL (ref 70–99)
HCO3 SERPL-SCNC: 20 MMOL/L (ref 22–29)
HCT VFR BLD AUTO: 40.6 % (ref 35–47)
HGB BLD-MCNC: 14.8 G/DL (ref 11.7–15.7)
IMM GRANULOCYTES # BLD: 0 10E3/UL
IMM GRANULOCYTES NFR BLD: 0 %
LYMPHOCYTES # BLD AUTO: 3 10E3/UL (ref 0.8–5.3)
LYMPHOCYTES NFR BLD AUTO: 42 %
MAGNESIUM SERPL-MCNC: 1.7 MG/DL (ref 1.7–2.3)
MCH RBC QN AUTO: 34.5 PG (ref 26.5–33)
MCHC RBC AUTO-ENTMCNC: 36.5 G/DL (ref 31.5–36.5)
MCV RBC AUTO: 95 FL (ref 78–100)
MONOCYTES # BLD AUTO: 0.8 10E3/UL (ref 0–1.3)
MONOCYTES NFR BLD AUTO: 11 %
NEUTROPHILS # BLD AUTO: 3.3 10E3/UL (ref 1.6–8.3)
NEUTROPHILS NFR BLD AUTO: 46 %
NRBC # BLD AUTO: 0 10E3/UL
NRBC BLD AUTO-RTO: 0 /100
OPIATES UR QL SCN: ABNORMAL
PCP QUAL URINE (ROCHE): ABNORMAL
PLATELET # BLD AUTO: 245 10E3/UL (ref 150–450)
POTASSIUM SERPL-SCNC: 3.8 MMOL/L (ref 3.4–5.3)
PROT SERPL-MCNC: 7.8 G/DL (ref 6.4–8.3)
RBC # BLD AUTO: 4.29 10E6/UL (ref 3.8–5.2)
SODIUM SERPL-SCNC: 139 MMOL/L (ref 135–145)
WBC # BLD AUTO: 7.1 10E3/UL (ref 4–11)

## 2025-07-05 PROCEDURE — 85025 COMPLETE CBC W/AUTO DIFF WBC: CPT | Performed by: STUDENT IN AN ORGANIZED HEALTH CARE EDUCATION/TRAINING PROGRAM

## 2025-07-05 PROCEDURE — 82247 BILIRUBIN TOTAL: CPT | Performed by: STUDENT IN AN ORGANIZED HEALTH CARE EDUCATION/TRAINING PROGRAM

## 2025-07-05 PROCEDURE — 250N000013 HC RX MED GY IP 250 OP 250 PS 637: Performed by: STUDENT IN AN ORGANIZED HEALTH CARE EDUCATION/TRAINING PROGRAM

## 2025-07-05 PROCEDURE — 70450 CT HEAD/BRAIN W/O DYE: CPT

## 2025-07-05 PROCEDURE — 99207 PR NO CHARGE LOS: CPT | Performed by: NURSE PRACTITIONER

## 2025-07-05 PROCEDURE — 99285 EMERGENCY DEPT VISIT HI MDM: CPT | Performed by: STUDENT IN AN ORGANIZED HEALTH CARE EDUCATION/TRAINING PROGRAM

## 2025-07-05 PROCEDURE — 36415 COLL VENOUS BLD VENIPUNCTURE: CPT | Performed by: STUDENT IN AN ORGANIZED HEALTH CARE EDUCATION/TRAINING PROGRAM

## 2025-07-05 PROCEDURE — 83735 ASSAY OF MAGNESIUM: CPT | Performed by: STUDENT IN AN ORGANIZED HEALTH CARE EDUCATION/TRAINING PROGRAM

## 2025-07-05 PROCEDURE — 80307 DRUG TEST PRSMV CHEM ANLYZR: CPT | Performed by: STUDENT IN AN ORGANIZED HEALTH CARE EDUCATION/TRAINING PROGRAM

## 2025-07-05 PROCEDURE — 250N000011 HC RX IP 250 OP 636: Performed by: STUDENT IN AN ORGANIZED HEALTH CARE EDUCATION/TRAINING PROGRAM

## 2025-07-05 RX ORDER — PROPRANOLOL HCL 20 MG
20 TABLET ORAL 3 TIMES DAILY
Status: DISCONTINUED | OUTPATIENT
Start: 2025-07-06 | End: 2025-07-06

## 2025-07-05 RX ORDER — ONDANSETRON 8 MG/1
8 TABLET, ORALLY DISINTEGRATING ORAL ONCE
Status: COMPLETED | OUTPATIENT
Start: 2025-07-05 | End: 2025-07-05

## 2025-07-05 RX ORDER — DIAZEPAM 5 MG/1
5-20 TABLET ORAL EVERY 30 MIN PRN
Status: DISCONTINUED | OUTPATIENT
Start: 2025-07-05 | End: 2025-07-07 | Stop reason: HOSPADM

## 2025-07-05 RX ORDER — GABAPENTIN 300 MG/1
1200 CAPSULE ORAL ONCE
Status: COMPLETED | OUTPATIENT
Start: 2025-07-05 | End: 2025-07-06

## 2025-07-05 RX ORDER — FOLIC ACID 1 MG/1
1 TABLET ORAL DAILY
Status: DISCONTINUED | OUTPATIENT
Start: 2025-07-05 | End: 2025-07-06

## 2025-07-05 RX ORDER — MULTIPLE VITAMINS W/ MINERALS TAB 9MG-400MCG
1 TAB ORAL DAILY
Status: DISCONTINUED | OUTPATIENT
Start: 2025-07-05 | End: 2025-07-06

## 2025-07-05 RX ADMIN — DIAZEPAM 5 MG: 5 TABLET ORAL at 20:12

## 2025-07-05 RX ADMIN — THIAMINE HCL TAB 100 MG 100 MG: 100 TAB at 20:13

## 2025-07-05 RX ADMIN — ONDANSETRON 8 MG: 8 TABLET, ORALLY DISINTEGRATING ORAL at 23:56

## 2025-07-05 RX ADMIN — FOLIC ACID 1 MG: 1 TABLET ORAL at 20:13

## 2025-07-05 RX ADMIN — Medication 1 TABLET: at 20:12

## 2025-07-05 RX ADMIN — DIAZEPAM 10 MG: 5 TABLET ORAL at 21:59

## 2025-07-05 ASSESSMENT — COLUMBIA-SUICIDE SEVERITY RATING SCALE - C-SSRS
4. HAVE YOU HAD THESE THOUGHTS AND HAD SOME INTENTION OF ACTING ON THEM?: NO
5. HAVE YOU STARTED TO WORK OUT OR WORKED OUT THE DETAILS OF HOW TO KILL YOURSELF? DO YOU INTEND TO CARRY OUT THIS PLAN?: NO
6. HAVE YOU EVER DONE ANYTHING, STARTED TO DO ANYTHING, OR PREPARED TO DO ANYTHING TO END YOUR LIFE?: NO
2. HAVE YOU ACTUALLY HAD ANY THOUGHTS OF KILLING YOURSELF IN THE PAST MONTH?: YES
3. HAVE YOU BEEN THINKING ABOUT HOW YOU MIGHT KILL YOURSELF?: NO
1. IN THE PAST MONTH, HAVE YOU WISHED YOU WERE DEAD OR WISHED YOU COULD GO TO SLEEP AND NOT WAKE UP?: YES

## 2025-07-05 ASSESSMENT — ACTIVITIES OF DAILY LIVING (ADL)
ADLS_ACUITY_SCORE: 58

## 2025-07-06 ENCOUNTER — TELEPHONE (OUTPATIENT)
Dept: BEHAVIORAL HEALTH | Facility: CLINIC | Age: 65
End: 2025-07-06
Payer: MEDICARE

## 2025-07-06 PROBLEM — F10.929 ALCOHOLIC INTOXICATION WITH COMPLICATION: Status: ACTIVE | Noted: 2025-07-06

## 2025-07-06 LAB
ANION GAP SERPL CALCULATED.3IONS-SCNC: 19 MMOL/L (ref 7–15)
BUN SERPL-MCNC: 8.8 MG/DL (ref 8–23)
CALCIUM SERPL-MCNC: 8.7 MG/DL (ref 8.8–10.4)
CHLORIDE SERPL-SCNC: 97 MMOL/L (ref 98–107)
CHOLEST SERPL-MCNC: 164 MG/DL
CREAT SERPL-MCNC: 0.55 MG/DL (ref 0.51–0.95)
EGFRCR SERPLBLD CKD-EPI 2021: >90 ML/MIN/1.73M2
GLUCOSE SERPL-MCNC: 74 MG/DL (ref 70–99)
HCO3 SERPL-SCNC: 20 MMOL/L (ref 22–29)
HDLC SERPL-MCNC: 91 MG/DL
HOLD SPECIMEN: NORMAL
LDLC SERPL CALC-MCNC: 65 MG/DL
NONHDLC SERPL-MCNC: 73 MG/DL
POTASSIUM SERPL-SCNC: 3.9 MMOL/L (ref 3.4–5.3)
SODIUM SERPL-SCNC: 136 MMOL/L (ref 135–145)
TRIGL SERPL-MCNC: 40 MG/DL

## 2025-07-06 PROCEDURE — 36415 COLL VENOUS BLD VENIPUNCTURE: CPT | Performed by: PSYCHIATRY & NEUROLOGY

## 2025-07-06 PROCEDURE — 250N000013 HC RX MED GY IP 250 OP 250 PS 637: Performed by: PHYSICIAN ASSISTANT

## 2025-07-06 PROCEDURE — 80061 LIPID PANEL: CPT | Performed by: PSYCHIATRY & NEUROLOGY

## 2025-07-06 PROCEDURE — 128N000004 HC R&B CD ADULT

## 2025-07-06 PROCEDURE — 250N000013 HC RX MED GY IP 250 OP 250 PS 637: Performed by: PSYCHIATRY & NEUROLOGY

## 2025-07-06 PROCEDURE — 250N000013 HC RX MED GY IP 250 OP 250 PS 637: Performed by: STUDENT IN AN ORGANIZED HEALTH CARE EDUCATION/TRAINING PROGRAM

## 2025-07-06 PROCEDURE — 258N000001 HC RX 258: Performed by: PHYSICIAN ASSISTANT

## 2025-07-06 PROCEDURE — 99221 1ST HOSP IP/OBS SF/LOW 40: CPT | Performed by: PSYCHIATRY & NEUROLOGY

## 2025-07-06 PROCEDURE — 250N000011 HC RX IP 250 OP 636: Performed by: PHYSICIAN ASSISTANT

## 2025-07-06 PROCEDURE — 99222 1ST HOSP IP/OBS MODERATE 55: CPT | Performed by: PHYSICIAN ASSISTANT

## 2025-07-06 PROCEDURE — 82947 ASSAY GLUCOSE BLOOD QUANT: CPT | Performed by: PHYSICIAN ASSISTANT

## 2025-07-06 RX ORDER — LIDOCAINE 4 G/G
1 PATCH TOPICAL DAILY PRN
Status: DISCONTINUED | OUTPATIENT
Start: 2025-07-06 | End: 2025-07-07 | Stop reason: HOSPADM

## 2025-07-06 RX ORDER — MULTIPLE VITAMINS W/ MINERALS TAB 9MG-400MCG
1 TAB ORAL DAILY
Status: DISCONTINUED | OUTPATIENT
Start: 2025-07-07 | End: 2025-07-07 | Stop reason: HOSPADM

## 2025-07-06 RX ORDER — ACETAMINOPHEN 500 MG
500-1000 TABLET ORAL EVERY 6 HOURS PRN
Status: DISCONTINUED | OUTPATIENT
Start: 2025-07-06 | End: 2025-07-07 | Stop reason: HOSPADM

## 2025-07-06 RX ORDER — HYDROXYZINE HYDROCHLORIDE 25 MG/1
25 TABLET, FILM COATED ORAL EVERY 4 HOURS PRN
Status: DISCONTINUED | OUTPATIENT
Start: 2025-07-06 | End: 2025-07-07 | Stop reason: HOSPADM

## 2025-07-06 RX ORDER — DIAZEPAM 10 MG/1
10 TABLET ORAL ONCE
Status: COMPLETED | OUTPATIENT
Start: 2025-07-06 | End: 2025-07-06

## 2025-07-06 RX ORDER — MAGNESIUM HYDROXIDE/ALUMINUM HYDROXICE/SIMETHICONE 120; 1200; 1200 MG/30ML; MG/30ML; MG/30ML
30 SUSPENSION ORAL EVERY 4 HOURS PRN
Status: DISCONTINUED | OUTPATIENT
Start: 2025-07-06 | End: 2025-07-07 | Stop reason: HOSPADM

## 2025-07-06 RX ORDER — GABAPENTIN 600 MG/1
1200 TABLET ORAL 3 TIMES DAILY
Status: DISCONTINUED | OUTPATIENT
Start: 2025-07-06 | End: 2025-07-07 | Stop reason: HOSPADM

## 2025-07-06 RX ORDER — LORAZEPAM 1 MG/1
1-4 TABLET ORAL EVERY 30 MIN PRN
Status: DISCONTINUED | OUTPATIENT
Start: 2025-07-06 | End: 2025-07-06

## 2025-07-06 RX ORDER — FOLIC ACID 1 MG/1
1 TABLET ORAL DAILY
Status: DISCONTINUED | OUTPATIENT
Start: 2025-07-06 | End: 2025-07-06

## 2025-07-06 RX ORDER — OMEPRAZOLE 20 MG/1
40 CAPSULE, DELAYED RELEASE ORAL EVERY MORNING
Status: DISCONTINUED | OUTPATIENT
Start: 2025-07-06 | End: 2025-07-07 | Stop reason: HOSPADM

## 2025-07-06 RX ORDER — ONDANSETRON 4 MG/1
4 TABLET, ORALLY DISINTEGRATING ORAL EVERY 8 HOURS PRN
Status: DISCONTINUED | OUTPATIENT
Start: 2025-07-06 | End: 2025-07-07 | Stop reason: HOSPADM

## 2025-07-06 RX ORDER — LEVALBUTEROL TARTRATE 45 UG/1
2 AEROSOL, METERED ORAL EVERY 4 HOURS PRN
Status: DISCONTINUED | OUTPATIENT
Start: 2025-07-06 | End: 2025-07-07 | Stop reason: HOSPADM

## 2025-07-06 RX ORDER — ESZOPICLONE 3 MG/1
3 TABLET, FILM COATED ORAL AT BEDTIME
COMMUNITY
Start: 2025-06-24

## 2025-07-06 RX ORDER — EPINEPHRINE 0.3 MG/.3ML
0.3 INJECTION SUBCUTANEOUS PRN
COMMUNITY
Start: 2025-05-06

## 2025-07-06 RX ORDER — LOPERAMIDE HYDROCHLORIDE 2 MG/1
2 CAPSULE ORAL 4 TIMES DAILY PRN
Status: DISCONTINUED | OUTPATIENT
Start: 2025-07-06 | End: 2025-07-07 | Stop reason: HOSPADM

## 2025-07-06 RX ORDER — MULTIPLE VITAMINS W/ MINERALS TAB 9MG-400MCG
1 TAB ORAL DAILY
Status: DISCONTINUED | OUTPATIENT
Start: 2025-07-06 | End: 2025-07-06

## 2025-07-06 RX ORDER — OLANZAPINE 10 MG/1
10 TABLET, ORALLY DISINTEGRATING ORAL ONCE
Status: COMPLETED | OUTPATIENT
Start: 2025-07-06 | End: 2025-07-06

## 2025-07-06 RX ORDER — GABAPENTIN 600 MG/1
1200 TABLET ORAL 3 TIMES DAILY
Status: DISCONTINUED | OUTPATIENT
Start: 2025-07-06 | End: 2025-07-06

## 2025-07-06 RX ORDER — ATENOLOL 50 MG/1
50 TABLET ORAL DAILY PRN
Status: DISCONTINUED | OUTPATIENT
Start: 2025-07-06 | End: 2025-07-07 | Stop reason: HOSPADM

## 2025-07-06 RX ORDER — PROPRANOLOL HCL 20 MG
20 TABLET ORAL 3 TIMES DAILY
Status: DISCONTINUED | OUTPATIENT
Start: 2025-07-06 | End: 2025-07-07 | Stop reason: HOSPADM

## 2025-07-06 RX ORDER — WADDING
500 EACH MISCELLANEOUS DAILY
Status: DISCONTINUED | OUTPATIENT
Start: 2025-07-06 | End: 2025-07-07 | Stop reason: HOSPADM

## 2025-07-06 RX ORDER — FOLIC ACID 1 MG/1
1 TABLET ORAL DAILY
Status: DISCONTINUED | OUTPATIENT
Start: 2025-07-07 | End: 2025-07-07 | Stop reason: HOSPADM

## 2025-07-06 RX ORDER — LEVOTHYROXINE SODIUM 100 UG/1
100 TABLET ORAL EVERY MORNING
Status: DISCONTINUED | OUTPATIENT
Start: 2025-07-06 | End: 2025-07-07 | Stop reason: HOSPADM

## 2025-07-06 RX ADMIN — PROPRANOLOL HYDROCHLORIDE 20 MG: 20 TABLET ORAL at 14:11

## 2025-07-06 RX ADMIN — PROPRANOLOL HYDROCHLORIDE 20 MG: 20 TABLET ORAL at 01:18

## 2025-07-06 RX ADMIN — NICOTINE POLACRILEX 2 MG: 2 GUM, CHEWING BUCCAL at 08:42

## 2025-07-06 RX ADMIN — ACETAMINOPHEN 1000 MG: 500 TABLET ORAL at 09:14

## 2025-07-06 RX ADMIN — GABAPENTIN 1200 MG: 300 CAPSULE ORAL at 00:04

## 2025-07-06 RX ADMIN — NICOTINE POLACRILEX 2 MG: 2 GUM, CHEWING BUCCAL at 20:53

## 2025-07-06 RX ADMIN — GABAPENTIN 1200 MG: 600 TABLET, FILM COATED ORAL at 11:12

## 2025-07-06 RX ADMIN — PROPRANOLOL HYDROCHLORIDE 20 MG: 20 TABLET ORAL at 08:18

## 2025-07-06 RX ADMIN — NICOTINE POLACRILEX 2 MG: 2 GUM, CHEWING BUCCAL at 14:25

## 2025-07-06 RX ADMIN — DIAZEPAM 10 MG: 5 TABLET ORAL at 00:04

## 2025-07-06 RX ADMIN — NICOTINE POLACRILEX 2 MG: 2 GUM, CHEWING BUCCAL at 22:17

## 2025-07-06 RX ADMIN — Medication 500 ML: at 14:11

## 2025-07-06 RX ADMIN — NICOTINE POLACRILEX 2 MG: 2 GUM, CHEWING BUCCAL at 17:45

## 2025-07-06 RX ADMIN — THIAMINE HCL TAB 100 MG 100 MG: 100 TAB at 14:12

## 2025-07-06 RX ADMIN — PROPRANOLOL HYDROCHLORIDE 20 MG: 20 TABLET ORAL at 20:45

## 2025-07-06 RX ADMIN — ACETAMINOPHEN 1000 MG: 500 TABLET ORAL at 16:15

## 2025-07-06 RX ADMIN — GABAPENTIN 1200 MG: 600 TABLET, FILM COATED ORAL at 20:45

## 2025-07-06 RX ADMIN — OLANZAPINE 10 MG: 10 TABLET, ORALLY DISINTEGRATING ORAL at 00:22

## 2025-07-06 RX ADMIN — Medication 3 MG: at 20:45

## 2025-07-06 RX ADMIN — NICOTINE POLACRILEX 2 MG: 2 GUM, CHEWING BUCCAL at 19:26

## 2025-07-06 RX ADMIN — DIAZEPAM 10 MG: 5 TABLET ORAL at 08:18

## 2025-07-06 RX ADMIN — ONDANSETRON 4 MG: 4 TABLET, ORALLY DISINTEGRATING ORAL at 09:15

## 2025-07-06 RX ADMIN — LEVOTHYROXINE SODIUM 100 MCG: 0.1 TABLET ORAL at 09:11

## 2025-07-06 RX ADMIN — NICOTINE POLACRILEX 2 MG: 2 GUM, CHEWING BUCCAL at 11:53

## 2025-07-06 RX ADMIN — OMEPRAZOLE 40 MG: 20 CAPSULE, DELAYED RELEASE ORAL at 09:14

## 2025-07-06 RX ADMIN — DIAZEPAM 10 MG: 10 TABLET ORAL at 00:30

## 2025-07-06 RX ADMIN — NICOTINE POLACRILEX 2 MG: 2 GUM, CHEWING BUCCAL at 16:15

## 2025-07-06 RX ADMIN — Medication 1 TABLET: at 20:45

## 2025-07-06 RX ADMIN — Medication 1 TABLET: at 08:18

## 2025-07-06 RX ADMIN — NICOTINE POLACRILEX 2 MG: 2 GUM, CHEWING BUCCAL at 09:53

## 2025-07-06 RX ADMIN — GABAPENTIN 1200 MG: 600 TABLET, FILM COATED ORAL at 14:10

## 2025-07-06 RX ADMIN — Medication 1 TABLET: at 08:19

## 2025-07-06 RX ADMIN — ACETAMINOPHEN 1000 MG: 500 TABLET ORAL at 20:47

## 2025-07-06 RX ADMIN — FOLIC ACID 1 MG: 1 TABLET ORAL at 08:18

## 2025-07-06 RX ADMIN — Medication 5 MG: at 00:04

## 2025-07-06 ASSESSMENT — ACTIVITIES OF DAILY LIVING (ADL)
ADLS_ACUITY_SCORE: 44
ADLS_ACUITY_SCORE: 68
ADLS_ACUITY_SCORE: 58
ADLS_ACUITY_SCORE: 44
LAUNDRY: WITH SUPERVISION
ADLS_ACUITY_SCORE: 68
ADLS_ACUITY_SCORE: 44
ADLS_ACUITY_SCORE: 44
DRESS: INDEPENDENT
ADLS_ACUITY_SCORE: 44
HYGIENE/GROOMING: INDEPENDENT
ADLS_ACUITY_SCORE: 44
ADLS_ACUITY_SCORE: 44
ORAL_HYGIENE: INDEPENDENT
ADLS_ACUITY_SCORE: 44
ADLS_ACUITY_SCORE: 44
DRESS: INDEPENDENT
HYGIENE/GROOMING: INDEPENDENT
ADLS_ACUITY_SCORE: 44
ADLS_ACUITY_SCORE: 58
ADLS_ACUITY_SCORE: 44
ADLS_ACUITY_SCORE: 44
ORAL_HYGIENE: INDEPENDENT
ADLS_ACUITY_SCORE: 58
ADLS_ACUITY_SCORE: 44
ADLS_ACUITY_SCORE: 68

## 2025-07-06 ASSESSMENT — LIFESTYLE VARIABLES: SKIP TO QUESTIONS 9-10: 0

## 2025-07-06 NOTE — DISCHARGE INSTRUCTIONS
Behavioral Discharge Planning and Instructions  THANK YOU FOR CHOOSING [facility]  3AW  [phone]    Summary: You were admitted to Station 3A on 7/6/2025 for detoxification from Alcohol.  A medical exam was performed that included lab work. You have met with a [facility] Counselor and opted to attend outpatient treatment.  Please take care and make your recovery a daily priority, Jolynn!  It was a pleasure working with you and the entire treatment team here wishes you the very best in your recovery!     Recommendation:  You are recommended to abstain from the use of alcohol or other mood-altering chemicals. You are recommended to complete a residential substance use program at [facility] and follow their continuing care recommendations.     [facility] Outpatient  [address] [place] [id number]  Phone: [phone]    Main Diagnoses:    303.90 (F10.20) Alcohol Use Disorder Severe    Major Treatments, Procedures and Findings:  You were treated for *** detoxification using ***. As an outpatient you will be prescribed ***, please take this medication as prescribed until it is gone. You have met with a [facility] counselor to develop a treatment plan for discharge. You had labs drawn and those results were reviewed with you. Please take a copy of your lab work with you to your next primary care provider appointment.    Symptoms to Report:  If you experience more anxiety, confusion, sleeplessness, deep sadness or thoughts of suicide, notify your treatment team or notify your primary care provider. IF ANY OF THE SYMPTOMS YOU ARE EXPERIENCING ARE A MEDICAL EMERGENCY CALL 911 IMMEDIATELY.     Lifestyle Adjustment: Adjust your lifestyle to get enough sleep, relaxation, exercise and good nutrition. Continue to develop healthy coping skills to decrease stress and promote a sober living environment. Do not use mood altering substances including alcohol, illegal drugs or  addictive medications other than what is currently prescribed.     Disposition: Return home and outpatient intake.    Facts about COVID19 at www.cdc.gov/COVID19 and [place].gov/covid19    Keeping hands clean is one of the most important steps we can take to avoid getting sick and spreading germs to others.  Please wash your hands frequently and lather with soap for at least 20 seconds!    Follow-up Appointment:   Appointment Date/Time: Thursday, July 17th    Psychiatrist/Primary Care Giver: Primary MD Carmela Benavidez    Address: Clinic    Phone Number: [phone]      Follow-up Appointment: Intake appointment [facility]    Appointment Date/Time: 7/11/2025 11:00 AM       Intake with: Albina Gaitan LADC    Recovery apps for your phone for educational purposes and to locate in person and zoom recovery meetings  Everything AA -  mahnaz is a great resource  12 Step Toolkit - educational purpose learning about the 12 steps to recovery  [employer] - meeting mahnaz  AA  - meeting mahnaz  Meeting guide - meeting mahnaz  Quick NA meeting - meeting mahnaz  Estela- has various apps    Patient Navigation Hub   [facility] Navigators work to be your point-of-contact for trustworthy and compassionate care from Inpatient services to [facility] Programmatic Care. We will provide resources and communication to help guide you into programmatic care. Ultimately, our goal is to be the one-stop-shop of communication, coordination, and support for your journey to programmatic care.  Phone: [phone]    Resources:  AA/NA meetings have returned to in-person or a hybrid combination of zoom/in-person therefore please check online to verify*  Need Support Now? If you or someone you know is struggling or in crisis, help is available. Call or text 196 or chat Stupil.org  AA meetings search for them at: https://aa-intergroup.org (worldwide meeting listings)  AA meetings for [place] area can be found online at:  "https://a[place].org (click local online meetings listings)  NA meetings for [place] area can be found online at: https://www.nam[place].org  (click find a meeting)  AA and NA Sponsors are excellent resources for support and you can find one at any support group meeting.   Alcoholics Anonymous (https://aa.org/): for information 24 hours/day  AA Intergroup service office in [place] (http://www.aastpaul.org/) 773.765.2614  AA Intergroup service office in [facility]: [phone]. (http://www.aaminneaJawsome Dive Adventures.org/)  Narcotics Anonymous (www.LTG Exam Prep Platform.org) (408) 149-1301  https://aafairviewriverside.org/meetings  SMART Recovery - self management for addiction recovery:  www.[employer] Educational Technologyrecovery.org  Pathways ~ A Health Crisis Resource & Support Center:  674.201.6747.  https://prescribetoprevent.org/patient-education/videos/  http://www.HyperStealth Biotechnologyreduction.org  Crisis Text Line  Text 673261  You will be connected with a trained live crisis counselor to provide support. Por espanol, texto  JEANNETTE a 972946 o texto a 442-AYUDAME en WhatsApp  Sarles Hope Line  1.814.SUICIDE [0730150]  [facility] 447-750-4777  Support Group:  AA/NA and Sponsor/support.  Fast Tracker  Linking people to mental health and substance use disorder resources  [employer] Mitus.org   [place] Mental Health Warm Line  Peer to peer support  Monday thru Saturday, 12 pm to 10 pm  241.843.0797 or 3.313.439.9646  Text \"Support\" to 44695  National [place] on Mental Illness (MARITZA)  879.474.5864 or 1888.MARITZA.HELPS  Alcoholics Anonymous (www.alcoholics-anonymous.org): Check your phone book for your local chapter.  Suicide Awareness Voices of Education (SAVE) (www.save.org): 539-995-AIXZ (1946)  National Suicide Prevention Line (www.mentalhealthmn.org): 805-327-UGXN (4564)  Mental Health Consumer/Survivor Network of [place] (www.mhcsn.net): 769-573-1252 or 428-881-5415  Mental Health Association of [place] (www.mentalhealth.org): 295.366.9768 or " 457.271.1696   Substance Abuse and Mental Health Services (www.samhsa.gov)  [place] Opioid Prevention Coalition: www.opioidcoalition.org    [place] Recovery Connection ([facility])  [facility] connects people seeking recovery to resources that help foster and sustain long-term recovery.  Whether you are seeking resources for treatment, transportation, housing, job training, education, health care or other pathways to recovery, [facility] is a great place to start.  153.750.1488.  www.minnesotarecUGOBEy.Onaro    Great Pod casts for nutrition and wellness  Listen on Apple Podcasts  Dishing Up Nutrition   [employer]   Nutrition       Understand the connection between what you eat and how you feel. Hosted by licensed nutritionists and dietitians from [employer] we share practical, real-life solutions for healthier living through nutrition.     General Medication Instructions:   See your medication sheet(s) for instructions.   Take all medications as prescribed.  Make no changes unless your primary care provider suggests them.   Go to all your primary care provider visits.  Be sure to have all your required lab tests. This way, your medicines can be refilled on time.  Do not use any forms of alcohol.    Please Note: If you have any questions at anytime after you discharged please call [facility] detox unit 3A at [phone].    Here are a list of additional numbers you can call if you are wanting to resume services through [facility]:  [facility] Assessment Intake: [phone]  [facility] Adult [facility] Intensive Outpatient  call: [phone]  [facility] Admissions [phone]    Recovery Clinic call: [phone]  [facility]: [phone]  Medical Records call: [phone]  Billing Department call: [phone]    Please remember to take all of your behavioral discharge planning and lab paperwork to any follow up appointments, it  contains your lab results, diagnosis, medication list and discharge recommendations.      THANK YOU FOR CHOOSING [facility]

## 2025-07-06 NOTE — ED TRIAGE NOTES
Pt here for detox pt drinks about 2 bottles of wine daily. Pt has a seizure history. Last seizure was 1 month ago. Pt is restless in triage but cooperative. Pts last drink today was 2 hours ago.      Triage Assessment (Adult)       Row Name 07/05/25 1859          Triage Assessment    Airway WDL WDL        Respiratory WDL    Respiratory WDL WDL        Skin Circulation/Temperature WDL    Skin Circulation/Temperature WDL WDL        Cardiac WDL    Cardiac WDL WDL        Peripheral/Neurovascular WDL    Peripheral Neurovascular WDL WDL        Cognitive/Neuro/Behavioral WDL    Cognitive/Neuro/Behavioral WDL WDL;arousability;level of consciousness;mood/behavior;motor response;orientation;speech     Level of Consciousness alert     Arousal Level opens eyes spontaneously     Orientation oriented x 4     Speech clear;spontaneous     Mood/Behavior restless;cooperative        Deandra Coma Scale    Best Eye Response 4-->(E4) spontaneous     Best Motor Response 6-->(M6) obeys commands     Best Verbal Response 5-->(V5) oriented     Deandra Coma Scale Score 15        Motor Response    Motor Response general motor response     General Motor Response purposeful motor response     Left Motor Response purposeful motor response     Right Motor Response purposeful motor response     LUE Motor Response purposeful motor response     RUE Motor Response purposeful motor response     LLE Motor Response purposeful motor response     RLE Motor Response purposeful motor response

## 2025-07-06 NOTE — PLAN OF CARE
Problem: Sleep Disturbance  Goal: Adequate Sleep/Rest  Outcome: Progressing     Problem: Alcohol Withdrawal  Goal: Alcohol Withdrawal Symptom Control  Outcome: Progressing   Goal Outcome Evaluation:       Arely was admitted from [facility] ED due to alcohol abuse and dependence. She is seeking for detox.     Pt.was cooperative with admission search and interview. Denied SI/SIB/hallucinations. Endorsed history of withdrawal seizure. No DTs endorsed.     Started on MSSA with valium protocol. Scored 5 on MSSA. No prn medication administered. Staff initiated Q15 minutes safety rounds. No safety or behavioral concerns observed or reported. Will continue to monitor.

## 2025-07-06 NOTE — PLAN OF CARE
Problem: Alcohol Withdrawal  Goal: Alcohol Withdrawal Symptom Control  Outcome: Progressing   Goal Outcome Evaluation:    Plan of Care Reviewed With: patient       Pt agitated, frustrated and highly anxious this morning regarding her PTA meds, MSSA 10/6,  Valium 10mg given. Pt   Restarted on there home meds, c/o nausea, visual hallucinations,  & general discomfort.    Tylenol, Gabapentin and Pedialyte administered, tolerating oral fluids well, pt manages anxiety with Gabapentin 1200mg scheduled, nicotine gum as needed.    Pt spent time in the lounge socializing with peers, endorsing a good appetite.    Unsure of aftercare plans at this time. Pt states her pain is relieved. Lt eye bruising from fall while intoxicated, denies headache or dizziness. Zofran effective for nausea.

## 2025-07-06 NOTE — PROGRESS NOTES
"[facility]     Daily Encounter: Met with team, discussed patient progress, discharge plan and any impediments to discharge.    Pt shares that she that she still plan to attend [facility]  IOP program in [place] and is scheduled to complete her intake this upcoming Friday and will begin attending groups on Monday July 14th. Pt also reported she has agreed to begin taking a PRN of Antabuse. She shares that the medication \"scares her\" but plans to take it when she begins having urges or cravings to drink.     Insurance: Medicare/ [place]/ [employer]     Legal Status:  Vol     SUDs Assessment Status: Not needed.        ROIs on file: None     Living Situation: Lives alone with her dog in [facility]     Current Providers, Supports & Collateral:  PCP: Dr. Carmela Srinivasan at [facility]  Psychiatrist: Dr. Olman Herrera   Therapist: CLAUDIA Castillo, [facility]     Current Plan/Referral Status: Attend IOP at [facility] 55[facility]     Safety Plan Status: reviewed and updated    IP referral tracker complete: yes      BRITTNEE Stearns  [facility] - Adult Inpatient Addiction Psychiatry Unit             "

## 2025-07-06 NOTE — ED PROVIDER NOTES
"ED Provider Note  [facility] Emergency Department      History     Chief Complaint   Patient presents with    Alcohol Problem     HPI  Jolynn Rivera is a 65 year old female, with a history of COPD, chronic hepatitis C, alcohol use disorder, alcohol withdrawal seizures, type I bipolar, who presents to the emergency department for evaluation of alcohol intoxication.  Patient reports that she has been drinking about 2 bottles of wine per day.  Denies any other drug or alcohol use.  Knows that she has a problem, is coming to hospital for detox.  She is planning on going to outpatient treatment program starting next week, has intake scheduled in 6 days, program to start 3 days following that.  She does not remember any falls, denies any vision changes.  Does not know how she got the black eye.  Denies any SI or HI.  No medical complaints.  States she has gone through detox in the past, wants to get sober so that she can continue to take her dog on long walks.  Last drink was 2 hours before arrival.    Independent historian:  None    Chart review:  Reviewed hospital discharge summary from 6/21/2025, patient presented for alcohol detox.  Reviewed alcohol use history along with hospital course.    Physical Exam   BP: 130/86  Pulse: 82  Temp: 98.2  F (36.8  C)  Resp: 19  Height: 160 cm (5' 3\")  Weight: 63.5 kg (140 lb)  SpO2: 95 %  Physical Exam  Vitals and nursing note reviewed.   Constitutional:       General: She is not in acute distress.     Appearance: Normal appearance. She is not ill-appearing, toxic-appearing or diaphoretic.   HENT:      Head:      Comments: Bruising on the medial side of her left eye.  No swelling or tenderness.  Bony prominences are stable.  No tenderness or instability on facial structures.  No some conjunctival hemorrhage, extraocular eye movements intact.  No proptosis.     Nose: Nose normal.      Mouth/Throat:      Mouth: Mucous membranes are moist.      " Pharynx: Oropharynx is clear.   Eyes:      Extraocular Movements: Extraocular movements intact.      Pupils: Pupils are equal, round, and reactive to light.   Cardiovascular:      Rate and Rhythm: Normal rate and regular rhythm.      Pulses: Normal pulses.      Heart sounds: Normal heart sounds.   Pulmonary:      Effort: Pulmonary effort is normal.      Breath sounds: Normal breath sounds.   Abdominal:      General: Bowel sounds are normal.      Palpations: Abdomen is soft.   Musculoskeletal:         General: Normal range of motion.      Cervical back: Normal range of motion.      Right lower leg: No edema.      Left lower leg: No edema.   Skin:     General: Skin is warm and dry.   Neurological:      General: No focal deficit present.      Mental Status: She is alert and oriented to person, place, and time.   Psychiatric:         Mood and Affect: Mood normal.         Behavior: Behavior normal.           ED Course, Procedures, & Data      Procedures                 Results for orders placed or performed during the hospital encounter of 07/05/25   Head CT w/o contrast     Status: None    Narrative    EXAM: CT HEAD W/O CONTRAST  LOCATION: [facility]  DATE: 7/5/2025    INDICATION: fall, etoh  COMPARISON: 4/19/2025  TECHNIQUE: Routine CT Head without IV contrast. Multiplanar reformats. Dose reduction techniques were used.    FINDINGS:  INTRACRANIAL CONTENTS: No intracranial hemorrhage, extraaxial collection, or mass effect.  No CT evidence of acute infarct. Remote ischemic findings in the right parietal lobe. Mild global cortical volume loss with expected dilatation of the ventricular   system. Mild intracranial atheromatous disease. Mild chronic small vessel ischemic changes.     VISUALIZED ORBITS/SINUSES/MASTOIDS: No intraorbital abnormality. Mucosal sinus disease involves the right maxillary sinus. No middle ear or mastoid effusion.    BONES/SOFT TISSUES: No acute  abnormality.      Impression    IMPRESSION:  1.  No acute findings.   Magnesium     Status: Normal   Result Value Ref Range    Magnesium 1.7 1.7 - 2.3 mg/dL   Comprehensive metabolic panel     Status: Abnormal   Result Value Ref Range    Sodium 139 135 - 145 mmol/L    Potassium 3.8 3.4 - 5.3 mmol/L    Carbon Dioxide (CO2) 20 (L) 22 - 29 mmol/L    Anion Gap 20 (H) 7 - 15 mmol/L    Urea Nitrogen 9.3 8.0 - 23.0 mg/dL    Creatinine 0.55 0.51 - 0.95 mg/dL    GFR Estimate >90 >60 mL/min/1.73m2    Calcium 8.8 8.8 - 10.4 mg/dL    Chloride 99 98 - 107 mmol/L    Glucose 91 70 - 99 mg/dL    Alkaline Phosphatase 51 40 - 150 U/L    AST 44 0 - 45 U/L    ALT 19 0 - 50 U/L    Protein Total 7.8 6.4 - 8.3 g/dL    Albumin 4.5 3.5 - 5.2 g/dL    Bilirubin Total 0.4 <=1.2 mg/dL   CBC with platelets and differential     Status: Abnormal   Result Value Ref Range    WBC Count 7.1 4.0 - 11.0 10e3/uL    RBC Count 4.29 3.80 - 5.20 10e6/uL    Hemoglobin 14.8 11.7 - 15.7 g/dL    Hematocrit 40.6 35.0 - 47.0 %    MCV 95 78 - 100 fL    MCH 34.5 (H) 26.5 - 33.0 pg    MCHC 36.5 31.5 - 36.5 g/dL    RDW 12.2 10.0 - 15.0 %    Platelet Count 245 150 - 450 10e3/uL    % Neutrophils 46 %    % Lymphocytes 42 %    % Monocytes 11 %    % Eosinophils 0 %    % Basophils 1 %    % Immature Granulocytes 0 %    NRBCs per 100 WBC 0 <1 /100    Absolute Neutrophils 3.3 1.6 - 8.3 10e3/uL    Absolute Lymphocytes 3.0 0.8 - 5.3 10e3/uL    Absolute Monocytes 0.8 0.0 - 1.3 10e3/uL    Absolute Eosinophils 0.0 0.0 - 0.7 10e3/uL    Absolute Basophils 0.0 0.0 - 0.2 10e3/uL    Absolute Immature Granulocytes 0.0 <=0.4 10e3/uL    Absolute NRBCs 0.0 10e3/uL   Urine Drug Screen Panel     Status: Abnormal   Result Value Ref Range    Amphetamines Urine Screen Negative Screen Negative    Barbituates Urine Screen Negative Screen Negative    Benzodiazepine Urine Screen Positive (A) Screen Negative    Cannabinoids Urine Screen Negative Screen Negative    Cocaine Urine Screen Negative Screen  Negative    Fentanyl Qual Urine Screen Negative Screen Negative    Opiates Urine Screen Negative Screen Negative    PCP Urine Screen Negative Screen Negative   Alcohol breath test POCT     Status: Abnormal   Result Value Ref Range    Alcohol Breath Test 0.208 (A) 0.00 - 0.01   Urine Drug Screen     Status: Abnormal    Narrative    The following orders were created for panel order Urine Drug Screen.  Procedure                               Abnormality         Status                     ---------                               -----------         ------                     Urine Drug Screen Panel[[id number]]     Abnormal            Final result                 Please view results for these tests on the individual orders.   CBC with Platelets & Differential     Status: Abnormal    Narrative    The following orders were created for panel order CBC with Platelets & Differential.  Procedure                               Abnormality         Status                     ---------                               -----------         ------                     CBC with platelets and ...[[id number]]  Abnormal            Final result                 Please view results for these tests on the individual orders.     Medications   diazepam (VALIUM) tablet 5-20 mg (10 mg Oral $Given 7/6/25 0004)   multivitamin w/minerals (THERA-VIT-M) tablet 1 tablet (1 tablet Oral $Given 7/5/25 2012)   folic acid (FOLVITE) tablet 1 mg (1 mg Oral $Given 7/5/25 2013)   propranolol (INDERAL) tablet 20 mg (has no administration in time range)   thiamine (B-1) tablet 100 mg (100 mg Oral $Given 7/5/25 2013)   gabapentin (NEURONTIN) capsule 1,200 mg (1,200 mg Oral $Given 7/6/25 0004)   ondansetron (ZOFRAN ODT) ODT tab 8 mg (8 mg Oral $Given 7/5/25 2356)   melatonin tablet 5 mg (5 mg Oral $Given 7/6/25 0004)   diazepam (VALIUM) tablet 10 mg (10 mg Oral $Given 7/6/25 0030)   OLANZapine zydis (zyPREXA) ODT tab 10 mg (10 mg Oral $Given 7/6/25 0022)   diazepam  (VALIUM) tablet 10 mg (10 mg Oral $Given 7/6/25 0030)     Labs Ordered and Resulted from Time of ED Arrival to Time of ED Departure   COMPREHENSIVE METABOLIC PANEL - Abnormal       Result Value    Sodium 139      Potassium 3.8      Carbon Dioxide (CO2) 20 (*)     Anion Gap 20 (*)     Urea Nitrogen 9.3      Creatinine 0.55      GFR Estimate >90      Calcium 8.8      Chloride 99      Glucose 91      Alkaline Phosphatase 51      AST 44      ALT 19      Protein Total 7.8      Albumin 4.5      Bilirubin Total 0.4     CBC WITH PLATELETS AND DIFFERENTIAL - Abnormal    WBC Count 7.1      RBC Count 4.29      Hemoglobin 14.8      Hematocrit 40.6      MCV 95      MCH 34.5 (*)     MCHC 36.5      RDW 12.2      Platelet Count 245      % Neutrophils 46      % Lymphocytes 42      % Monocytes 11      % Eosinophils 0      % Basophils 1      % Immature Granulocytes 0      NRBCs per 100 WBC 0      Absolute Neutrophils 3.3      Absolute Lymphocytes 3.0      Absolute Monocytes 0.8      Absolute Eosinophils 0.0      Absolute Basophils 0.0      Absolute Immature Granulocytes 0.0      Absolute NRBCs 0.0     URINE DRUG SCREEN PANEL - Abnormal    Amphetamines Urine Screen Negative      Barbituates Urine Screen Negative      Benzodiazepine Urine Screen Positive (*)     Cannabinoids Urine Screen Negative      Cocaine Urine Screen Negative      Fentanyl Qual Urine Screen Negative      Opiates Urine Screen Negative      PCP Urine Screen Negative     ALCOHOL BREATH TEST POCT - Abnormal    Alcohol Breath Test 0.208 (*)    MAGNESIUM - Normal    Magnesium 1.7       Head CT w/o contrast   Final Result   IMPRESSION:   1.  No acute findings.             Independent interpretation: none    Discussion of management: Patient was discussed with the detox intake evaluator.  They agreed to accept the patient to detox.    Social determinants of health: Unknown    Assessment & Plan    Jolynn Rivera is a 65 year old female who presents to the emergency  department for evaluation of alcohol intoxication requesting alcohol detox.  Upon presentation patient alert and oriented, speaking in full sentences, afebrile, skin warm pink and dry.  A broad differential was considered including alcohol intoxication versus withdrawal, acute psychosis, head injury from fall.    Patient's history and physical exam is consistent with her history of alcohol use disorder and patient is requesting detox.  She appears motivated and is not having any SI or HI.  However she does have a bruise on her left eye that she does not remember how it occurred and does not remember if she had any falls.  Decided to proceed with a head CT to rule out intracranial bleed.  Head CT was negative.  Alcohol breath test was 0.208.  CBC did not show any significant abnormalities, metabolic panel did not show significant abnormalities, glucose normal.  LFTs within normal limits.  Urine tox screen was sent and positive for benzodiazepines.  On reassessment she had significant tremors and was treated with oral Valium and Zyprexa per the [facility] protocol.  I discussed the patient with the detox intake provider, they accepted the patient.  Patient is signed out to oncoming provider pending admission over to the detox unit.      Critical care was not performed.     Medical Decision Making  The patient's presentation was of moderate complexity (an undiagnosed new problem with uncertain prognosis).    The patient's evaluation involved:  review of external note(s) from 1 sources (see separate area of note for details)  ordering and/or review of 3+ test(s) in this encounter (see separate area of note for details)  discussion of management or test interpretation with another health professional (see separate area of note for details)    The patient's management necessitated high risk (a parenteral controlled substance).    New Prescriptions    No medications on file       Final diagnoses:   Alcoholic intoxication with  complication       Skyler Wong GAINES, CAQ-EM    [facility] EMERGENCY DEPARTMENT  7/5/2025     Skyler Back PA-C  07/06/25 0043       Skyler Back PA-C  07/06/25 0044    --    ED Attending Physician Attestation    I Aissatou Roberts MD, cared for this patient with the Advanced Practice Provider (MEGGAN). I personally provided a substantive portion of the care for this patient, including approving the care plan for the number and complexity of problems addressed and taking responsibility related to the risk of complications and/or morbidity or mortality of patient management. Please see the MEGGAN's documentation for full details.      Aissatou Roberts MD  Emergency Medicine          Aissatou Roberts MD  07/06/25 [id number]

## 2025-07-06 NOTE — PHARMACY-ADMISSION MEDICATION HISTORY
Pharmacist Admission Medication History    Admission medication history is complete. The information provided in this note is only as accurate as the sources available at the time of the update.    Information Source(s): Patient, Hospital records, and dispense report via phone    Pertinent Information: Talked to pt via phone on the floor. Pt has a prescription for melatonin 5 mg at bedtime but pt reports taking 20 mg at bedtime with no effect. Pt has prescription for disulfiram but has not taken it due to fear of the side effects. Pt is open to trying it in the hospital after detox with nurses present. Reports taking nicotine gum sparsely and uses lidocaine patches mostly only in the hospital when she can't walk around as much.    Changes made to PTA medication list:  Added: Calcium carbonate - vitamin D tablet, eszopiclone, epinephrine  Deleted: None  Changed: None    Allergies reviewed with patient and updates made in EHR: yes    Medication History Completed By: Rosemarie Bowden 7/6/2025 12:30 PM    PTA Med List   Medication Sig Note Last Dose/Taking    acetaminophen (TYLENOL) 500 MG tablet Take 500-1,000 mg by mouth every 6 hours as needed for mild pain.  More than a month    calcium carbonate-vitamin D (OSCAL) 500-5 MG-MCG tablet Take 1 tablet by mouth 2 times daily.  7/5/2025    disulfiram (ANTABUSE) 250 MG tablet Take 1 tablet (250 mg) by mouth daily. 7/6/2025: Pt has not started on this medication and would like medical supervision for her first dose Unknown    EPINEPHrine (ANY BX GENERIC EQUIV) 0.3 MG/0.3ML injection 2-pack Inject 0.3 mg into the muscle as needed for anaphylaxis.  Unknown    eszopiclone (LUNESTA) 3 MG tablet Take 3 mg by mouth at bedtime.  7/5/2025    folic acid (FOLVITE) 1 MG tablet Take 1 tablet (1 mg) by mouth daily.  7/5/2025    gabapentin (NEURONTIN) 600 MG tablet Take 1,200 mg by mouth 3 times daily.  7/5/2025    levalbuterol (XOPENEX HFA) 45 MCG/ACT inhaler Inhale 2 puffs into the  lungs every 4 hours as needed for shortness of breath or wheezing.  Taking As Needed    levothyroxine (SYNTHROID/LEVOTHROID) 100 MCG tablet Take 1 tablet (100 mcg) by mouth every morning.  7/5/2025    Lidocaine (LIDOCARE) 4 % Patch Place 1 patch onto the skin daily as needed for moderate pain. To prevent lidocaine toxicity, patient should be patch free for 12 hrs daily.  Unknown    loperamide (IMODIUM) 2 MG capsule Take 2 mg by mouth 4 times daily as needed for diarrhea.  7/5/2025    melatonin 5 MG tablet Take 5 mg by mouth at bedtime. Takes 1 hour prior to Lunesta  Unknown    multivitamin w/minerals (THERA-VIT-M) tablet Take 1 tablet by mouth daily.  7/5/2025    nicotine polacrilex (NICORETTE) 4 MG gum Place 1 each (4 mg) inside cheek every hour as needed for nicotine withdrawal symptoms.  Past Week    omeprazole (PRILOSEC) 40 MG DR capsule Take 1 capsule (40 mg) by mouth every morning.  7/5/2025    ondansetron (ZOFRAN ODT) 4 MG ODT tab Take 1 tablet (4 mg) by mouth every 8 hours as needed for vomiting or nausea.  Past Month    propranolol (INDERAL) 20 MG tablet Take 1 tablet (20 mg) by mouth 3 times daily.  7/5/2025    thiamine (B-1) 100 MG tablet Take 1 tablet (100 mg) by mouth daily.  7/5/2025

## 2025-07-06 NOTE — H&P
Reason for admission:     Patient was admitted due to alcohol dependence      HPI:   65 year old female     Patient states that she has been drinking since her teen years, but states that her drinking has been problematic for the past 20 years. She states that she has been in treatment numerous prior treatments. Her longest sobriety was for 5 years.     For the past 2 years she has had more difficulty maintaining sobriety, but has managed to have periods up to 2 months. Most recently she relapsed 4 weeks ago and has been drinking daily since then. She drinks over a liter of wine a day. She was underwent on this unit two weeks ago and was sober for 2 days following this but relapsed again and has been using daily for past week     Patient has a history of blackouts and seizures, but no history DUI.     Patient states that she is diagnosed with bipolar 2 and PTSD. She is on a regimen of Gabapentin which she states is helpful, and she denies current problems with depression or manic symptoms.     Patient denies suicidal or homicidal thoughts and has no evidence of kim currently.           According to ER report:    HPI  Jolynn Rivera is a 65 year old female, with a history of COPD, chronic hepatitis C, alcohol use disorder, alcohol withdrawal seizures, type I bipolar, who presents to the emergency department for evaluation of alcohol intoxication.  Patient reports that she has been drinking about 2 bottles of wine per day.  Denies any other drug or alcohol use.  Knows that she has a problem, is coming to hospital for detox.  She is planning on going to outpatient treatment program starting next week, has intake scheduled in 6 days, program to start 3 days following that.  She does not remember any falls, denies any vision changes.  Does not know how she got the black eye.  Denies any SI or HI.  No medical complaints.  States she has gone through detox in the past, wants to get sober so that she can continue  to take her dog on long walks.  Last drink was 2 hours before arrival.  Jolynn Rivera is a 65 year old female who presents to the emergency department for evaluation of alcohol intoxication requesting alcohol detox.  Upon presentation patient alert and oriented, speaking in full sentences, afebrile, skin warm pink and dry.  A broad differential was considered including alcohol intoxication versus withdrawal, acute psychosis, head injury from fall.     Patient's history and physical exam is consistent with her history of alcohol use disorder and patient is requesting detox.  She appears motivated and is not having any SI or HI.  However she does have a bruise on her left eye that she does not remember how it occurred and does not remember if she had any falls.  Decided to proceed with a head CT to rule out intracranial bleed.  Head CT was negative.  Alcohol breath test was 0.208.  CBC did not show any significant abnormalities, metabolic panel did not show significant abnormalities, glucose normal.  LFTs within normal limits.  Urine tox screen was sent and positive for benzodiazepines.  On reassessment she had significant tremors and was treated with oral Valium and Zyprexa per the [facility] protocol.  I discussed the patient with the detox intake provider, they accepted the patient.  Patient is signed out to oncoming provider pending admission over to the detox unit.     Skyler Back PA-C, [facility]     [facility] EMERGENCY DEPARTMENT  7/5/2025     Skyler Back PA-C  07/06/25 0043                Substance Use and History:     Remote heroin use. No recent drug use        Past Medical History:     PAST MEDICAL HISTORY:   Past Medical History:   Diagnosis Date    Anxiety     COPD (chronic obstructive pulmonary disease) (H)     COPD (chronic obstructive pulmonary disease) (H)     Hepatitis C     PTSD (post-traumatic stress disorder)     Seizures (H)     second to ETOH    Substance abuse (H)        PAST  SURGICAL HISTORY:   Past Surgical History:   Procedure Laterality Date    LAPAROSCOPIC TUBAL LIGATION      ORTHOPEDIC SURGERY      Left knee    TONSILLECTOMY               Family History:     FAMILY HISTORY:   Family History   Problem Relation Age of Onset    Anxiety Disorder Mother     Asthma Mother     Alcohol/Drug Father     Prostate Cancer Father     Arthritis Father     Alcohol/Drug Brother     Alcohol/Drug Brother     Hypertension Brother     Alcohol/Drug Brother     Depression Brother     Psychotic Disorder Brother            Social History:     Please see the full psychosocial profile from the clinical treatment coordinator.   SOCIAL HISTORY:   Social History     Tobacco Use    Smoking status: Every Day     Current packs/day: 0.50     Types: Cigarettes    Smokeless tobacco: Never    Tobacco comments:     Starting Chantix October 2014   Substance Use Topics    Alcohol use: Yes     Comment: 1.5 L wine per day       Patient lives in a rented apartment. She is unemployed and survives on disability. She has no current romantic involvement and no children. She has occasional contact with her father.          PTA Medications:     Medications Prior to Admission   Medication Sig Dispense Refill Last Dose/Taking    acetaminophen (TYLENOL) 500 MG tablet Take 500-1,000 mg by mouth every 6 hours as needed for mild pain.       disulfiram (ANTABUSE) 250 MG tablet Take 1 tablet (250 mg) by mouth daily. 30 tablet 3     folic acid (FOLVITE) 1 MG tablet Take 1 tablet (1 mg) by mouth daily. 30 tablet 1     gabapentin (NEURONTIN) 600 MG tablet Take 1,200 mg by mouth 3 times daily.       levalbuterol (XOPENEX HFA) 45 MCG/ACT inhaler Inhale 2 puffs into the lungs every 4 hours as needed for shortness of breath or wheezing.       levothyroxine (SYNTHROID/LEVOTHROID) 100 MCG tablet Take 1 tablet (100 mcg) by mouth every morning.       Lidocaine (LIDOCARE) 4 % Patch Place 1 patch onto the skin daily as needed for moderate pain. To  prevent lidocaine toxicity, patient should be patch free for 12 hrs daily.       loperamide (IMODIUM) 2 MG capsule Take 2 mg by mouth 4 times daily as needed for diarrhea.       melatonin 5 MG tablet Take 5 mg by mouth at bedtime. Takes 1 hour prior to Lunesta       multivitamin w/minerals (THERA-VIT-M) tablet Take 1 tablet by mouth daily. 30 tablet 1     nicotine polacrilex (NICORETTE) 4 MG gum Place 1 each (4 mg) inside cheek every hour as needed for nicotine withdrawal symptoms. 100 each 1     omeprazole (PRILOSEC) 40 MG DR capsule Take 1 capsule (40 mg) by mouth every morning.       ondansetron (ZOFRAN ODT) 4 MG ODT tab Take 1 tablet (4 mg) by mouth every 8 hours as needed for vomiting or nausea.       propranolol (INDERAL) 20 MG tablet Take 1 tablet (20 mg) by mouth 3 times daily.       thiamine (B-1) 100 MG tablet Take 1 tablet (100 mg) by mouth daily. 30 tablet 0             Current Medications:     Current Facility-Administered Medications   Medication Dose Route Frequency Provider Last Rate Last Admin    folic acid (FOLVITE) tablet 1 mg  1 mg Oral Daily Kinsey Cerda MD   1 mg at 07/06/25 0818    gabapentin (NEURONTIN) tablet 1,200 mg  1,200 mg Oral TID Scott Alex MD        levothyroxine (SYNTHROID/LEVOTHROID) tablet 100 mcg  100 mcg Oral Stephanie Wheeler PA-C   100 mcg at 07/06/25 0911    multivitamin w/minerals (THERA-VIT-M) tablet 1 tablet  1 tablet Oral Daily Kinsey Cerda MD   1 tablet at 07/06/25 0818    multivitamin w/minerals (THERA-VIT-M) tablet 1 tablet  1 tablet Oral Daily Skyler Back PA-C   1 tablet at 07/06/25 0819    omeprazole (PriLOSEC) CR capsule 40 mg  40 mg Oral Stephanie Wheeler PA-C   40 mg at 07/06/25 0914    propranolol (INDERAL) tablet 20 mg  20 mg Oral TID Skyler Back PA-C   20 mg at 07/06/25 0818     Current Facility-Administered Medications   Medication Dose Route Frequency Provider Last Rate Last Admin    acetaminophen (TYLENOL) tablet 500-1,000 mg   "500-1,000 mg Oral Q6H PRN Stephanie Ford PA-C   1,000 mg at 07/06/25 0914    alum & mag hydroxide-simethicone (MAALOX) suspension 30 mL  30 mL Oral Q4H PRN Kinsey Cerda MD        atenolol (TENORMIN) tablet 50 mg  50 mg Oral Daily PRN Kinsey Cerda MD        diazepam (VALIUM) tablet 5-20 mg  5-20 mg Oral Q30 Min PRN Skyler Back PA-C   10 mg at 07/06/25 0818    hydrOXYzine HCl (ATARAX) tablet 25 mg  25 mg Oral Q4H PRN Kinsey Cerda MD        loperamide (IMODIUM) capsule 2 mg  2 mg Oral 4x Daily PRN Stephanie Ford PA-C        LORazepam (ATIVAN) tablet 1-4 mg  1-4 mg Oral Q30 Min PRN Kinsey Cerda MD        melatonin tablet 3 mg  3 mg Oral At Bedtime PRN Kinsey Cerda MD        nicotine (NICORETTE) gum 2 mg  2 mg Buccal Q1H PRN Scott Alex MD   2 mg at 07/06/25 0842    ondansetron (ZOFRAN ODT) ODT tab 4 mg  4 mg Oral Q8H PRN Stephanie Ford PA-C   4 mg at 07/06/25 0915            Allergies:     Allergies   Allergen Reactions    Bee Venom Swelling    Wasps [Hornets] Swelling    Amlodipine      Nightmares    Antihistamines, Chlorpheniramine-Type [Antihistamines, Chlorpheniramine-Type]     Clonidine     Compazine      Lock jaw    Diphenhydramine Muscle Pain (Myalgia) and Cramps    Hydroxyzine Cramps     Lock jaw    Metoclopramide      Tardive Dyskinesia    Penicillins      Panic attack    Prednisone Anxiety     Panic attacks.      Prochlorperazine Muscle Pain (Myalgia) and Cramps    Trazodone Nausea and Vomiting and Confusion     \"I ended up falling and feeling like I was drunk\"    Umeclidinium Bromide      Mood swings + anger.            Labs:     Recent Results (from the past 72 hours)   Magnesium    Collection Time: 07/05/25  8:07 PM   Result Value Ref Range    Magnesium 1.7 1.7 - 2.3 mg/dL   Comprehensive metabolic panel    Collection Time: 07/05/25  8:07 PM   Result Value Ref Range    Sodium 139 135 - 145 mmol/L    Potassium 3.8 3.4 - 5.3 mmol/L    Carbon Dioxide (CO2) 20 (L) 22 - 29 mmol/L    Anion " Gap 20 (H) 7 - 15 mmol/L    Urea Nitrogen 9.3 8.0 - 23.0 mg/dL    Creatinine 0.55 0.51 - 0.95 mg/dL    GFR Estimate >90 >60 mL/min/1.73m2    Calcium 8.8 8.8 - 10.4 mg/dL    Chloride 99 98 - 107 mmol/L    Glucose 91 70 - 99 mg/dL    Alkaline Phosphatase 51 40 - 150 U/L    AST 44 0 - 45 U/L    ALT 19 0 - 50 U/L    Protein Total 7.8 6.4 - 8.3 g/dL    Albumin 4.5 3.5 - 5.2 g/dL    Bilirubin Total 0.4 <=1.2 mg/dL   CBC with platelets and differential    Collection Time: 07/05/25  8:07 PM   Result Value Ref Range    WBC Count 7.1 4.0 - 11.0 10e3/uL    RBC Count 4.29 3.80 - 5.20 10e6/uL    Hemoglobin 14.8 11.7 - 15.7 g/dL    Hematocrit 40.6 35.0 - 47.0 %    MCV 95 78 - 100 fL    MCH 34.5 (H) 26.5 - 33.0 pg    MCHC 36.5 31.5 - 36.5 g/dL    RDW 12.2 10.0 - 15.0 %    Platelet Count 245 150 - 450 10e3/uL    % Neutrophils 46 %    % Lymphocytes 42 %    % Monocytes 11 %    % Eosinophils 0 %    % Basophils 1 %    % Immature Granulocytes 0 %    NRBCs per 100 WBC 0 <1 /100    Absolute Neutrophils 3.3 1.6 - 8.3 10e3/uL    Absolute Lymphocytes 3.0 0.8 - 5.3 10e3/uL    Absolute Monocytes 0.8 0.0 - 1.3 10e3/uL    Absolute Eosinophils 0.0 0.0 - 0.7 10e3/uL    Absolute Basophils 0.0 0.0 - 0.2 10e3/uL    Absolute Immature Granulocytes 0.0 <=0.4 10e3/uL    Absolute NRBCs 0.0 10e3/uL   Alcohol breath test POCT    Collection Time: 07/05/25  8:08 PM   Result Value Ref Range    Alcohol Breath Test 0.208 (A) 0.00 - 0.01   Urine Drug Screen Panel    Collection Time: 07/05/25 10:22 PM   Result Value Ref Range    Amphetamines Urine Screen Negative Screen Negative    Barbituates Urine Screen Negative Screen Negative    Benzodiazepine Urine Screen Positive (A) Screen Negative    Cannabinoids Urine Screen Negative Screen Negative    Cocaine Urine Screen Negative Screen Negative    Fentanyl Qual Urine Screen Negative Screen Negative    Opiates Urine Screen Negative Screen Negative    PCP Urine Screen Negative Screen Negative   Lipid Profile     "Collection Time: 07/06/25  7:14 AM   Result Value Ref Range    Cholesterol 164 <200 mg/dL    Triglycerides 40 <150 mg/dL    Direct Measure HDL 91 >=50 mg/dL    LDL Cholesterol Calculated 65 <100 mg/dL    Non HDL Cholesterol 73 <130 mg/dL   Extra Purple Top Tube    Collection Time: 07/06/25  7:14 AM   Result Value Ref Range    Hold Specimen [facility]    Basic metabolic panel    Collection Time: 07/06/25  7:14 AM   Result Value Ref Range    Sodium 136 135 - 145 mmol/L    Potassium 3.9 3.4 - 5.3 mmol/L    Chloride 97 (L) 98 - 107 mmol/L    Carbon Dioxide (CO2) 20 (L) 22 - 29 mmol/L    Anion Gap 19 (H) 7 - 15 mmol/L    Urea Nitrogen 8.8 8.0 - 23.0 mg/dL    Creatinine 0.55 0.51 - 0.95 mg/dL    GFR Estimate >90 >60 mL/min/1.73m2    Calcium 8.7 (L) 8.8 - 10.4 mg/dL    Glucose 74 70 - 99 mg/dL            Physical Exam:     /80   Pulse 74   Temp 98.8  F (37.1  C) (Oral)   Resp 16   Ht 1.6 m (5' 3\")   Wt 63.5 kg (140 lb)   SpO2 95%   BMI 24.80 kg/m    Weight is 140 lbs 0 oz  Body mass index is 24.8 kg/m .    Physical Exam:  Gen: No acute distress  Skin: No diaphoresis or rash  Neuro: No abnormal movements         Physical ROS:     The patient endorsed some bruises and scratches on legs and face due to fall while intoxicated. The remainder of 10-point review of systems was negative except as noted in HPI.             Mental Status Exam:     Mental Status  Patient is casually dressed  Hygiene good  Speech fluent  Thought Process concrete  Thought Content:  No suicidal ideation,    No homicidal ideation,   No ideas of reference,    No loose associations,    No auditory hallucinations,     No visual hallucinations   No delusions  Psychomotor: No agitation or slowing  Cognition:  Alert and oriented to time place and person  Attention good  Concentration good  Memory normal including recent and remote memory  Mood:  euthymic  Affect: mood congruent  Judgement: limited  Eye contact good  Cooperation good  Language " normal  Fund of knowledge normal  Musculoskeletal normal gait with no abnormal movements         Diagnoses:     Alcoholic intoxication with complication    Patient Active Problem List   Diagnosis    Polysubstance abuse (H)    Chronic hepatitis C (H)    COPD (chronic obstructive pulmonary disease) (H)    GERD (gastroesophageal reflux disease)    Mood disorder    Chemical dependency (H)    Nicotine abuse    Alcohol withdrawal, uncomplicated (H)    Sinusitis, unspecified chronicity, unspecified location    Elevated LFTs    Drug withdrawal seizure with complication (H)    Alcohol dependence with withdrawal with complication (H)    Alcoholic ketoacidosis    Vomiting and diarrhea    Seizure (H)    Acute alcoholic intoxication in alcoholism with complication (H)    Elevated troponin    Acne rosacea    Acquired hypothyroidism    Atelectasis of right lung    Bee sting allergy    Bipolar 1 disorder, manic, moderate (H)    Chronic hip pain, right    History of hepatitis C    Left knee pain    Onychomycosis    Osteoarthritis of multiple joints    Primary hypertension    Psychophysiological insomnia    Tear of lateral meniscus of right knee    Tobacco use disorder    Alcohol use disorder, severe, dependence (H)    Bipolar disorder, most recent episode depressed (H)    Acute hypoxic respiratory failure (H)    Chronic obstructive pulmonary disease (H)    COPD with acute exacerbation (H)    Gastroesophageal reflux disease without esophagitis    Generalized anxiety disorder    PTSD (post-traumatic stress disorder)    Lactic acidosis    Alcohol withdrawal seizure with complication (H)    Alcohol abuse with withdrawal (H)    Suicidal ideation    History of alcohol withdrawal delirium    History of seizure due to alcohol withdrawal    Hypomagnesemia    Hyponatremia    Alcohol dependence with intoxication delirium (H)    Alcohol dependence (H)    Alcoholic intoxication with complication              Assessment:     Patient presents for  alcohol dependence           Plan:     Medication:  Valium detox protocols  Otherwise continue outpatient medication regimen    Consults: Hospitalist will be consulted       Multidisciplinary Interventions:  to gather collateral information, coordinate care with outpatient providers and begin follow up planning      Disposition: home with outpatient CD treatment        More than 40 minutes spent on this visit with more than 50% time spent on coordination of care with staff, reviewing medical record, psychoeducation, providing supportive therapy regarding coping with chronic mental illness, entering orders and preparing documentation for the visit    Scott Alex MD

## 2025-07-06 NOTE — TELEPHONE ENCOUNTER
1:04am - Per the last  note pt needing new BP. Writer called Ed to request this.    1:12am - BP reading 118/70. Writer will page [facility] for possible placement to Detox 3A    1:28am - Paged [facility] for review to Detox 3A    1:42am - Dr. Cerda accepts pt for Detox 3A    1:52am - Notified unit of pt in queue    1:54am - Notified ED of pt placement    Indicia Completed S.R    R: Patient Placement to Detox 3A/CD/Dr. Fermin

## 2025-07-06 NOTE — CONSULTS
Brief Psychiatry Note   Chart and labs reviewed. Patient did not have a BP recorded so could not complete the review.  Also needs a UDS.    Most Recent 2 LFT's:  Recent Labs   Lab Test 07/05/25 2007 06/21/25 1916   AST 44 53*   ALT 19 27   ALKPHOS 51 56   BILITOTAL 0.4 0.4       BILL Bustamante CNP

## 2025-07-06 NOTE — PLAN OF CARE
"  Problem: Alcohol Withdrawal  Goal: Alcohol Withdrawal Symptom Control  Outcome: Progressing   Goal Outcome Evaluation: ongoing    MSSA's 5 and 4, no valium administered this shift (pt's last valium was 0818 this AM).  Pt labile this shift.  Pt upset and irritable at first MSSA when parameters were not met to receive PRN valium, reasoning explained to pt.  Endorsed pain 'all over' rated 8/10, from a fall yesterday--states she is questioning if this was a fall or seizure--received PRN tylenol x2 this shift.  Rated her anxiety 7/10, depression 9/10.  Denied A/VH's.  No safety concerns reported or noted.  Pt has several bruises on her body, states from fall(s) PTA, including large bruise on Rt hip with abrasion/cut in center, no s/s infection noted.  Compliant w/ scheduled meds.  Requested/received PRN melatonin at HS.  Pt reports she is excited to be starting IOP at [facility]  Status 15.  Will continue to monitor and support plan of care.      @1600: /69 (BP Location: Left arm)   Pulse 59   Temp 97.5  F (36.4  C) (Oral)   Resp 16   Ht 1.6 m (5' 3\")   Wt 63.5 kg (140 lb)   SpO2 98%   BMI 24.80 kg/m      @1958: /73 (BP Location: Left arm, Patient Position: Sitting, Cuff Size: Adult Regular)   Pulse 65   Temp 98  F (36.7  C) (Oral)   Resp 16   Ht 1.6 m (5' 3\")   Wt 63.5 kg (140 lb)   SpO2 95%   BMI 24.80 kg/m      "

## 2025-07-06 NOTE — TELEPHONE ENCOUNTER
S: [facility] , Provider Skylerconnie Back calling at 8:59 PM with clinical on 65 year old/female presenting for alcohol detox.     B: Pt presents for ETOH detox.   Currently reports drinking 2 bottles of wine daily for several months.    Patient reports last use was 2 hrs prior to admission.  Pt LEOBARDO: .208  Pt  endorses hx of DT  Pt  endorses hx of seizures. Last seizure: 2 years ago  Pt endorsing the following symptoms of withdrawal: Tremulous  MSSA Score: given valium    Pt denies acute mental health or medical concerns.   Pt denies other drug use: None Amount/frequency: N/A    Does Pt have a detox care plan in [facility]? No  Does pt present with specific needs, assistive devices, or exclusionary criteria? None  Is the patient ambulating, eating and drinking in the ED? Yes    A: Pt meets criteria to be presented for IP detox admission. Patient is voluntary    COVID Symptoms: No  If yes, COVID test required   Utox: Ordered, not yet collected  Magnesium: WNL  CMP: Abnormalities: CO2 20, Anion Gap 20  CBC: Abnormalities: MCH 34.5  HCG: N/A     R: Patient cleared and ready for behavioral bed placement: Yes    Pt is meeting criteria for presentation to 3A/CD    Does Patient need a Transfer Center request created? No, Pt is located within [facility] ED, [facility] ED, or [place] ED     9:19 PM Intake paged provider for review to 3A/ Jeny.    9:33 PM Provider declined d/t; pt needs a BP and UDS. Must have a BP to review before can be accepted.

## 2025-07-06 NOTE — PLAN OF CARE
Behavioral Team Discussion: (7/6/2025)    Continued Stay Criteria/Rationale: Patient admitted for Alcohol Use Disorder.  Plan: The following services will be provided to the patient; psychiatric assessment, medication management, therapeutic milieu, individual and group support, and skills groups.   Participants: 3A Provider: Scott Alex; 3A RN: Yoko Alex ; 3A LADC: Marika Bhandari MS, [facility]   Summary/Recommendation: Providers will assess today for treatment recommendations, discharge planning, and aftercare plans.  LADC will meet with pt for discharge planning.   Medical/Physical: Patient denies any medical or physical concerns at this time.   Precautions:   Behavioral Orders   Procedures    Code 1 - Restrict to Unit    Routine Programming     As clinically indicated    Status 15     Every 15 minutes.     Rationale for change in precautions or plan: N/A  Progress: Initial.    ASAM Dimension Scale Ratings:  Dimension 1: 3 Client tolerates and rahul with withdrawal discomfort poorly. Client has severe intoxication, such that the client endangers self or others, or intoxication has not abated with less intensive levels of services. Client displays severe signs and symptoms; or risk of severe, but manageable withdrawal; or withdrawal worsening despite detox at less intensive level.  Dimension 2: 1 Client tolerates and rahul with physical discomfort and is able to get the services that the client needs.  Dimension 3: 2 Client has difficulty with impulse control and lacks coping skills. Client has thoughts of suicide or harm to others without means; however, the thoughts may interfere with participation in some treatment activities. Client has difficulty functioning in significant life areas. Client has moderate symptoms of emotional, behavioral, or cognitive problems. Client is able to participate in most treatment activities.  Dimension 4: 3 Client displays inconsistent compliance, minimal awareness of either the  client's addiction or mental disorder, and is minimally cooperative.  Dimension 5: 3 Client has poor recognition and understanding of relapse and recidivism issues and displays moderately high vulnerability for further substance use or mental health problems. Client has few coping skills and rarely applies coping skills.  Dimension 6: 3 Client is not engaged in structured, meaningful activity and the client's peers, family, significant other, and living environment are unsupportive, or there is significant criminal justice system involvement.

## 2025-07-06 NOTE — PROGRESS NOTES
07/06/25 2657   Patient Belongings   Did you bring any home meds/supplements to the hospital?  No   Patient Belongings other (see comments)   Patient Belongings Put in Hospital Secure Location (Security or Locker, etc.) other (see comments)   Belongings Search Yes   Clothing Search Yes   Second Staff Ada RN     Bin: shoes, clothes (strings), backpack (clothes)  Med room: cellphone, keys, wallet,   Security #[id number]: EBT, metro card, [employer] bank debit  Note: pt had no cash  A               Admission:  I am responsible for any personal items that are not sent to the safe or pharmacy.  [facility] is not responsible for loss, theft or damage of any property in my possession.    Signature:  _________________________________ Date: _______  Time: _____                                              Staff Signature:  ____________________________ Date: ________  Time: _____      2nd Staff person, if patient is unable/unwilling to sign:    Signature: ________________________________ Date: ________  Time: _____     Discharge:  [facility] has returned all of my personal belongings:    Signature: _________________________________ Date: ________  Time: _____                                          Staff Signature:  ____________________________ Date: ________  Time: _____

## 2025-07-06 NOTE — CONSULTS
"[facility]  Consult Note - Hospitalist Service  Date of Admission:  7/5/2025  Consult Requested by: Psychiatry   Reason for Consult: \"new admit\"    Assessment & Plan   Jolynn Rivera is a 65 year old female with a PMH significant for alcohol use disorder, withdrawal seizures, HTN, COPD, pulmonary nodules, HCV s/p treatment, hypothyroidsim, GERD, chronic back pain, bipolar disorder, PTSD, anxiety who was admitted on 7/5/2025 for acute alcohol withdrawal.     Medicine team was consulted for medical co-management.     Acute alcohol withdrawal   Alcohol use disorder   Hx of withdrawal seizures   Patient presented with alcohol breath test of 0.208. Typically drinks 2 bottles of wine daily. Last drink 07/05 at ~1730H. Suspect she has a withdrawal seizure prior to arrival to ED as she feels sore and has several bruises on her body and woke up on the ground. Denies hx of Dts.   -Management per Psychiatry   -MSSA/CIWA with valium per Psychiatry   -PTA disulfiram per Psychiatry   -Thiamine/Folate/MVI  -Seizure precautions    AGMA  Suspect in the setting of above. Recheck showed AGMA. Poor PO intake likely contributing.   -Start Pedialyte 500cc daily   -BMP in AM    Ecchymosis left eye   Ecchymosis scattered   Patient cannot recall falling PTA. CT Head negative for acute pathology. Moving extremities   -PRN acetaminophen     Hypothyroidism: TSH wnl on admission; continue PTA levothyroxine     Prediabetes   A1c elevated to 5.7 from recent admission.   -Follow up with PCP as OP   -Recommend lifestyle modification such as diet, exercise and alcohol cessation     COPD  Pulmonary nodules   Without acute exacerbation. Follows with Pulmonary ronald as OP, last ssen 03/03/2025. Patient unable to tolerate steroids or any inhaler except PRN levalbuterol that she states she need infrequently. Most recent Ct chest with stable nodules compared to prior. Currently denies any respiratory " "sxs.  -Continue PTA PRN levalbuterol   -Follow up with Pulmonary as OP   -Follow up with PET scan as OP     GERD: continue PTA PPI     Hepatitis C s/p treatment: LFTs wnl.      Chronic back pain   Sciatica   -Continue PTA gabapentin   -Lidocaine patches     Bipolar disorder, type 1  Anxiety   PTSD  -Management per Psychiatry     Medicine team will follow up on BMP in AM.      The patient's care was discussed with the Bedside Nurse, Patient, and Primary team via this note.    Clinically Significant Risk Factors Present on Admission              # Anion Gap Metabolic Acidosis: Highest Anion Gap = 20 mmol/L in last 2 days, will monitor and treat as appropriate      # Hypertension: Noted on problem list               # Financial/Environmental Concerns:           Stephanie Barker PA-C  Hospitalist Service  Securely message with "[employer] (more info)  Text page via [employer] Paging/Directory   ______________________________________________________________________    Chief Complaint   Withdrawal     History is obtained from the patient    History of Present Illness   Jolynn Rivera is a 65 year old female with a PMH significant for alcohol use disorder, withdrawal seizures, HTN, COPD, pulmonary nodules, HCV s/p treatment, hypothyroidsim, GERD, chronic back pain, bipolar disorder, PTSD, anxiety who was admitted on 7/5/2025 for acute alcohol withdrawal.     Medicine team was consulted for medical co-management.     Patient notes she has \"all the withdrawal symptoms\".  That her symptoms currently experienced with tremor, hallucinations (visual hallucinations which she describes as cartoon characters from the 1950s), sweats, hot cold flashes, nausea, no vomiting.  Notes she think she has seizure prior to coming to the hospital as she is covered in bruises.  She does not recall any trauma.  Notes she woke up on the ground and she has a scrape on her right thigh, which is healing.  Patient denies any redness or drainage from the area " of the cat.  Denies any pain in that area.  Notes her body is sore from the bruises.  Denies any difficulty with mobility or movement of her arms or legs.  No changes in vision reported. Asked about the scan from the CT of her head.  Discussed the results.  Reports multiple times that she lives that here, she really appreciates the staff's care.  Notes she has a dog at home which she would like to get back to.  Currently in the care of her neighbor.  Reviewed her medications with her, which she denies any missed doses, has been taking consistently.  Notes she is not needing to take her right lower albuterol here, denies any wheezing or chest tightness.  Denies other symptoms outside of withdrawal including chest pain, dyspnea, abdominal pain, urinary symptoms, swelling in bilateral lower extremities or new rashes outside of the bruises which were previously discussed.      Past Medical History    Past Medical History:   Diagnosis Date    Anxiety     COPD (chronic obstructive pulmonary disease) (H)     COPD (chronic obstructive pulmonary disease) (H)     Hepatitis C     PTSD (post-traumatic stress disorder)     Seizures (H)     second to ETOH    Substance abuse (H)        Past Surgical History   Past Surgical History:   Procedure Laterality Date    LAPAROSCOPIC TUBAL LIGATION      ORTHOPEDIC SURGERY      Left knee    TONSILLECTOMY         Medications   I have reviewed this patient's current medications       Review of Systems    The 10 point Review of Systems is negative other than noted in the HPI or here.     Social History   I have reviewed this patient's social history and updated it with pertinent information if needed.  Social History     Tobacco Use    Smoking status: Every Day     Current packs/day: 0.50     Types: Cigarettes    Smokeless tobacco: Never    Tobacco comments:     Starting Chantix October 2014   Substance Use Topics    Alcohol use: Yes     Comment: 1.5 L wine per day    Drug use: No     Comment:  "stopped 1986         Family History   I have reviewed this patient's family history and updated it with pertinent information if needed.  Family History   Problem Relation Age of Onset    Anxiety Disorder Mother     Asthma Mother     Alcohol/Drug Father     Prostate Cancer Father     Arthritis Father     Alcohol/Drug Brother     Alcohol/Drug Brother     Hypertension Brother     Alcohol/Drug Brother     Depression Brother     Psychotic Disorder Brother          Allergies   Allergies   Allergen Reactions    Bee Venom Swelling    Wasps [Hornets] Swelling    Amlodipine      Nightmares    Antihistamines, Chlorpheniramine-Type [Antihistamines, Chlorpheniramine-Type]     Clonidine     Compazine      Lock jaw    Diphenhydramine Muscle Pain (Myalgia) and Cramps    Hydroxyzine Cramps     Lock jaw    Metoclopramide      Tardive Dyskinesia    Penicillins      Panic attack    Prednisone Anxiety     Panic attacks.      Prochlorperazine Muscle Pain (Myalgia) and Cramps    Trazodone Nausea and Vomiting and Confusion     \"I ended up falling and feeling like I was drunk\"    Umeclidinium Bromide      Mood swings + anger.        Physical Exam   Vital Signs: Temp: 98.8  F (37.1  C) Temp src: Oral BP: 131/80 Pulse: 74   Resp: 16 SpO2: 95 % O2 Device: None (Room air)    Weight: 140 lbs 0 oz    General: sitting up on chair, alert, cooperative, awake, in no acute distress   HEENT: normocephalic, atraumatic, anicteric sclera, moist mucus membranes; ecchymosis appreciated on left eye   Respiratory: breathing comfortably on room air, clear to auscultation bilaterally without wheezing, crackles, or rhonchi appreciated  Cardiac: regular rate and rhythm with normal S1/S2 without murmurs, clicks, rubs or gallops, 2+ radial pulse on LUE, no signs of peripheral edema bilaterally  GI: soft, non-distended, normoactive bowel sounds, non-tender per palpation  Neuro: grossly non-focal, alert and oriented, normal speech, tremulous   MSK: no bony " deformities, moving all extremities independently, steady gait   Skin: scattered ecchymosis; ecchymosis of left eye; no rashes or lesions on uncovered surfaces, no jaundice      Medical Decision Making       60 MINUTES SPENT BY ME on the date of service doing chart review, history, exam, documentation & further activities per the note.      Data   NOTE: Data reviewed over the past 24 hrs contributes toward MDM complexity

## 2025-07-07 VITALS
HEART RATE: 59 BPM | BODY MASS INDEX: 24.8 KG/M2 | SYSTOLIC BLOOD PRESSURE: 160 MMHG | HEIGHT: 63 IN | RESPIRATION RATE: 16 BRPM | WEIGHT: 140 LBS | TEMPERATURE: 98.3 F | DIASTOLIC BLOOD PRESSURE: 83 MMHG | OXYGEN SATURATION: 99 %

## 2025-07-07 PROCEDURE — 250N000013 HC RX MED GY IP 250 OP 250 PS 637: Performed by: PSYCHIATRY & NEUROLOGY

## 2025-07-07 PROCEDURE — 250N000013 HC RX MED GY IP 250 OP 250 PS 637: Performed by: PHYSICIAN ASSISTANT

## 2025-07-07 PROCEDURE — 99239 HOSP IP/OBS DSCHRG MGMT >30: CPT | Performed by: PSYCHIATRY & NEUROLOGY

## 2025-07-07 RX ORDER — DISULFIRAM 250 MG/1
250 TABLET ORAL DAILY
Status: DISCONTINUED | OUTPATIENT
Start: 2025-07-07 | End: 2025-07-07 | Stop reason: HOSPADM

## 2025-07-07 RX ADMIN — NICOTINE POLACRILEX 2 MG: 2 GUM, CHEWING BUCCAL at 05:30

## 2025-07-07 RX ADMIN — NICOTINE POLACRILEX 2 MG: 2 GUM, CHEWING BUCCAL at 02:55

## 2025-07-07 RX ADMIN — NICOTINE POLACRILEX 2 MG: 2 GUM, CHEWING BUCCAL at 12:50

## 2025-07-07 RX ADMIN — Medication 1 TABLET: at 08:03

## 2025-07-07 RX ADMIN — NICOTINE POLACRILEX 2 MG: 2 GUM, CHEWING BUCCAL at 07:00

## 2025-07-07 RX ADMIN — LOPERAMIDE HYDROCHLORIDE 2 MG: 2 CAPSULE ORAL at 08:47

## 2025-07-07 RX ADMIN — GABAPENTIN 1200 MG: 600 TABLET, FILM COATED ORAL at 05:30

## 2025-07-07 RX ADMIN — OMEPRAZOLE 40 MG: 20 CAPSULE, DELAYED RELEASE ORAL at 08:03

## 2025-07-07 RX ADMIN — Medication 1 TABLET: at 12:49

## 2025-07-07 RX ADMIN — LEVOTHYROXINE SODIUM 100 MCG: 0.1 TABLET ORAL at 06:42

## 2025-07-07 RX ADMIN — GABAPENTIN 1200 MG: 600 TABLET, FILM COATED ORAL at 13:13

## 2025-07-07 RX ADMIN — NICOTINE POLACRILEX 2 MG: 2 GUM, CHEWING BUCCAL at 10:12

## 2025-07-07 RX ADMIN — THIAMINE HCL TAB 100 MG 100 MG: 100 TAB at 08:05

## 2025-07-07 RX ADMIN — DISULFIRAM 250 MG: 250 TABLET ORAL at 10:12

## 2025-07-07 RX ADMIN — PROPRANOLOL HYDROCHLORIDE 20 MG: 20 TABLET ORAL at 12:49

## 2025-07-07 RX ADMIN — NICOTINE POLACRILEX 2 MG: 2 GUM, CHEWING BUCCAL at 08:06

## 2025-07-07 RX ADMIN — LOPERAMIDE HYDROCHLORIDE 2 MG: 2 CAPSULE ORAL at 13:14

## 2025-07-07 RX ADMIN — FOLIC ACID 1 MG: 1 TABLET ORAL at 08:05

## 2025-07-07 RX ADMIN — Medication 500 ML: at 08:47

## 2025-07-07 RX ADMIN — PROPRANOLOL HYDROCHLORIDE 20 MG: 20 TABLET ORAL at 08:04

## 2025-07-07 ASSESSMENT — ACTIVITIES OF DAILY LIVING (ADL)
ORAL_HYGIENE: INDEPENDENT
ADLS_ACUITY_SCORE: 44
HYGIENE/GROOMING: INDEPENDENT
ADLS_ACUITY_SCORE: 44
LAUNDRY: WITH SUPERVISION
ADLS_ACUITY_SCORE: 44
DRESS: INDEPENDENT;STREET CLOTHES
ADLS_ACUITY_SCORE: 44

## 2025-07-07 NOTE — PROGRESS NOTES
[facility]     Daily Encounter: Met with team, discussed patient progress, discharge plan and any impediments to discharge.    The patient is scheduled for discharge home today and reports being aware of her intake appointment this Friday for the [address] outpatient program.    Insurance: Medicare/ [place]/ [facility]     Legal Status:  Vol     SUDs Assessment Status: Not needed.        ROIs on file: None     Living Situation: Lives alone with her dog in [facility]     Current Providers, Supports & Collateral:  PCP: Dr. Carmela Srinivasan at [facility]  Psychiatrist: Dr. Olman Herrera   [facility]     Current Plan/Referral Status: Attend OP at [address]     Safety Plan Status: reviewed and updated    IP referral tracker complete: yes      BRITTNEE Bell  [facility] - Adult Inpatient Addiction Psychiatry Unit

## 2025-07-07 NOTE — DISCHARGE SUMMARY
Psychiatric Discharge Summary    Jolynn Rivera MRN# [id number]   Age: 65 year old YOB: 1960     Date of Admission:  7/5/2025  Date of Discharge:  7/7/2025  2:25 PM  Admitting Physician:  Scott Alex MD  Discharge Physician:  Darren Fermin MD          Events Leading to Hospitalization:      65 year old female     Patient states that she has been drinking since her teen years, but states that her drinking has been problematic for the past 20 years. She states that she has been in treatment numerous prior treatments. Her longest sobriety was for 5 years.     For the past 2 years she has had more difficulty maintaining sobriety, but has managed to have periods up to 2 months. Most recently she relapsed 4 weeks ago and has been drinking daily since then. She drinks over a liter of wine a day. She was underwent on this unit two weeks ago and was sober for 2 days following this but relapsed again and has been using daily for past week     Patient has a history of blackouts and seizures, but no history DUI.     Patient states that she is diagnosed with bipolar 2 and PTSD. She is on a regimen of Gabapentin which she states is helpful, and she denies current problems with depression or manic symptoms.     Patient denies suicidal or homicidal thoughts and has no evidence of kim currently.      See Admission note for additional details.          Diagnoses:     Alcohol use disorder, severe  Alcohol withdrawal with unspecified complication   PTSD  History of bipolar disorder    Clinically Significant Risk Factors          # Hypochloremia: Lowest Cl = 97 mmol/L in last 2 days, will monitor as appropriate     # Anion Gap Metabolic Acidosis: Highest Anion Gap = 20 mmol/L in last 2 days, will monitor and treat as appropriate      # Hypertension: Noted on problem list                # Financial/Environmental Concerns:                  Labs:        Lab Results   Component Value Date     07/06/2025      09/03/2016    Lab Results   Component Value Date    CHLORIDE 97 07/06/2025    CHLORIDE 102 12/09/2022    CHLORIDE 104 09/03/2016    Lab Results   Component Value Date    BUN 8.8 07/06/2025    BUN 6 12/09/2022    BUN 7 09/03/2016      Lab Results   Component Value Date    POTASSIUM 3.9 07/06/2025    POTASSIUM 3.6 12/09/2022    POTASSIUM 3.8 09/03/2016    Lab Results   Component Value Date    CO2 20 07/06/2025    CO2 20 12/09/2022    CO2 25 09/03/2016    Lab Results   Component Value Date    CR 0.55 07/06/2025    CR 0.61 09/03/2016          Lab Results   Component Value Date    WBC 7.1 07/05/2025    HGB 14.8 07/05/2025    HCT 40.6 07/05/2025    MCV 95 07/05/2025     07/05/2025     Lab Results   Component Value Date    AST 44 07/05/2025    ALT 19 07/05/2025    GGT 28 02/19/2025    ALKPHOS 51 07/05/2025    BILITOTAL 0.4 07/05/2025     Lab Results   Component Value Date    TSH 1.78 04/06/2025            Consults:   Consultation during this admission received from internal medicine:  Jolynn Rivera is a 65 year old female with a PMH significant for alcohol use disorder, withdrawal seizures, HTN, COPD, pulmonary nodules, HCV s/p treatment, hypothyroidsim, GERD, chronic back pain, bipolar disorder, PTSD, anxiety who was admitted on 7/5/2025 for acute alcohol withdrawal.      Medicine team was consulted for medical co-management.      Acute alcohol withdrawal   Alcohol use disorder   Hx of withdrawal seizures   Patient presented with alcohol breath test of 0.208. Typically drinks 2 bottles of wine daily. Last drink 07/05 at ~1730H. Suspect she has a withdrawal seizure prior to arrival to ED as she feels sore and has several bruises on her body and woke up on the ground. Denies hx of Dts.   -Management per Psychiatry   -MSSA/CIWA with valium per Psychiatry   -PTA disulfiram per Psychiatry   -Thiamine/Folate/MVI  -Seizure precautions     AGMA  Suspect in the setting of above. Recheck showed AGMA. Poor PO  intake likely contributing.   -Start Pedialyte 500cc daily   -BMP in AM     Ecchymosis left eye   Ecchymosis scattered   Patient cannot recall falling PTA. CT Head negative for acute pathology. Moving extremities   -PRN acetaminophen      Hypothyroidism: TSH wnl on admission; continue PTA levothyroxine      Prediabetes   A1c elevated to 5.7 from recent admission.   -Follow up with PCP as OP   -Recommend lifestyle modification such as diet, exercise and alcohol cessation      COPD  Pulmonary nodules   Without acute exacerbation. Follows with Pulmonary ronald as OP, last ssen 03/03/2025. Patient unable to tolerate steroids or any inhaler except PRN levalbuterol that she states she need infrequently. Most recent Ct chest with stable nodules compared to prior. Currently denies any respiratory sxs.  -Continue PTA PRN levalbuterol   -Follow up with Pulmonary as OP   -Follow up with PET scan as OP      GERD: continue PTA PPI      Hepatitis C s/p treatment: LFTs wnl.       Chronic back pain   Sciatica   -Continue PTA gabapentin   -Lidocaine patches      Bipolar disorder, type 1  Anxiety   PTSD  -Management per Psychiatry          Hospital Course:   Jolynn Rivera was admitted to [facility] with attending Darren Fermin MD as a voluntary patient. The patient was placed under status 15 (15 minute checks) to ensure patient safety.   MSSA protocol was initiated due to the patient's history of alcohol abuse and concern for withdrawal symptoms.  CBC, BMP and utox obtained.    All outpatient medications were continued. Antabuse was started prior to discharge.     Jolynn Rivera did participate in groups and was visible in the milieu.     The patient's symptoms of withdrawal improved.     Jolynn Rivera was released to home with IOP. At the time of discharge Jolynn Rivera was determined to not be a danger to herself or others. At the current time of discharge, the patient does not meet criteria for involuntary  hospitalization. On the day of discharge, the patient reports that they do not have suicidal or homicidal ideation and would never hurt themselves or others. Steps taken to minimize risk include: assessing patient s behavior and thought process daily during hospital stay, discharging patient with adequate plan for follow up for mental and physical health and discussing safety plan of returning to the hospital should the patient ever have thoughts of harming themselves or others. Therefore, based on all available evidence including the factors cited above, the patient does not appear to be at imminent risk for self-harm, and is appropriate for outpatient level of care.           Discharge Medications:     Current Discharge Medication List        CONTINUE these medications which have NOT CHANGED    Details   acetaminophen (TYLENOL) 500 MG tablet Take 500-1,000 mg by mouth every 6 hours as needed for mild pain.      calcium carbonate-vitamin D (OSCAL) 500-5 MG-MCG tablet Take 1 tablet by mouth 2 times daily.      disulfiram (ANTABUSE) 250 MG tablet Take 1 tablet (250 mg) by mouth daily.  Qty: 30 tablet, Refills: 3    Associated Diagnoses: Alcohol dependence with intoxication delirium (H)      EPINEPHrine (ANY BX GENERIC EQUIV) 0.3 MG/0.3ML injection 2-pack Inject 0.3 mg into the muscle as needed for anaphylaxis.      eszopiclone (LUNESTA) 3 MG tablet Take 3 mg by mouth at bedtime.      folic acid (FOLVITE) 1 MG tablet Take 1 tablet (1 mg) by mouth daily.  Qty: 30 tablet, Refills: 1    Associated Diagnoses: Alcohol dependence with intoxication delirium (H)      gabapentin (NEURONTIN) 600 MG tablet Take 1,200 mg by mouth 3 times daily.      levalbuterol (XOPENEX HFA) 45 MCG/ACT inhaler Inhale 2 puffs into the lungs every 4 hours as needed for shortness of breath or wheezing.      levothyroxine (SYNTHROID/LEVOTHROID) 100 MCG tablet Take 1 tablet (100 mcg) by mouth every morning.    Associated Diagnoses: Hypothyroidism,  unspecified type      Lidocaine (LIDOCARE) 4 % Patch Place 1 patch onto the skin daily as needed for moderate pain. To prevent lidocaine toxicity, patient should be patch free for 12 hrs daily.      loperamide (IMODIUM) 2 MG capsule Take 2 mg by mouth 4 times daily as needed for diarrhea.      melatonin 5 MG tablet Take 5 mg by mouth at bedtime. Takes 1 hour prior to Lunesta      multivitamin w/minerals (THERA-VIT-M) tablet Take 1 tablet by mouth daily.  Qty: 30 tablet, Refills: 1    Associated Diagnoses: Alcohol dependence with intoxication with complication (H)      nicotine polacrilex (NICORETTE) 4 MG gum Place 1 each (4 mg) inside cheek every hour as needed for nicotine withdrawal symptoms.  Qty: 100 each, Refills: 1    Associated Diagnoses: Tobacco use disorder      omeprazole (PRILOSEC) 40 MG DR capsule Take 1 capsule (40 mg) by mouth every morning.    Associated Diagnoses: Gastroesophageal reflux disease without esophagitis      ondansetron (ZOFRAN ODT) 4 MG ODT tab Take 1 tablet (4 mg) by mouth every 8 hours as needed for vomiting or nausea.    Associated Diagnoses: Gastroesophageal reflux disease without esophagitis      propranolol (INDERAL) 20 MG tablet Take 1 tablet (20 mg) by mouth 3 times daily.    Associated Diagnoses: Generalized anxiety disorder      thiamine (B-1) 100 MG tablet Take 1 tablet (100 mg) by mouth daily.  Qty: 30 tablet, Refills: 0    Associated Diagnoses: Alcohol dependence with intoxication with complication (H)                  Psychiatric Examination:   Appearance:  awake, alert and adequately groomed  Attitude:  cooperative  Eye Contact:  good  Mood:  good  Affect:  mood congruent  Speech:  clear, coherent  Psychomotor Behavior:  no evidence of tardive dyskinesia, dystonia, or tics  Thought Process:  goal oriented  Associations:  no loose associations  Thought Content:  no evidence of suicidal ideation or homicidal ideation and no evidence of psychotic thought  Insight:   fair  Judgment:  fair  Oriented to:  time, person, and place  Attention Span and Concentration:  intact  Recent and Remote Memory:  fair  Language: Able to read and write  Fund of Knowledge: appropriate  Muscle Strength and Tone: normal  Gait and Station: Normal         Discharge Plan:   Continue medications as above.     Recommendation:  You are recommended to abstain from the use of alcohol or other mood-altering chemicals. You are recommended to complete a residential substance use program at [facility] and follow their continuing care recommendations.      [facility]  [address] [address]  Phone: [phone]     Symptoms to Report:  If you experience more anxiety, confusion, sleeplessness, deep sadness or thoughts of suicide, notify your treatment team or notify your primary care provider. IF ANY OF THE SYMPTOMS YOU ARE EXPERIENCING ARE A MEDICAL EMERGENCY CALL 911 IMMEDIATELY.      Lifestyle Adjustment: Adjust your lifestyle to get enough sleep, relaxation, exercise and good nutrition. Continue to develop healthy coping skills to decrease stress and promote a sober living environment. Do not use mood altering substances including alcohol, illegal drugs or addictive medications other than what is currently prescribed.      Disposition: Return home and outpatient intake.     Facts about COVID19 at www.cdc.gov/COVID19 and www.[place].gov/covid19     Keeping hands clean is one of the most important steps we can take to avoid getting sick and spreading germs to others.  Please wash your hands frequently and lather with soap for at least 20 seconds!     Follow-up Appointment:   Appointment Date/Time: Thursday, July 17th    Psychiatrist/Primary Care Giver: Primary MD Carmela Benavidez    Address: Clinic    Phone Number: [phone]       Follow-up Appointment: Intake appointment  Adult [facility]    Appointment Date/Time: 7/11/2025 11:00 AM        Intake with: Albina Gaitan [facility]    Attestation:    The patient was seen and evaluated by me. I spent more than 30 minutes on discharge day activities. Darren Fermin MD

## 2025-07-07 NOTE — PLAN OF CARE
Problem: Sleep Disturbance  Goal: Adequate Sleep/Rest  Outcome: Progressing     Problem: Alcohol Withdrawal  Goal: Alcohol Withdrawal Symptom Control  Outcome: Progressing   Goal Outcome Evaluation:    The Patient's MSSA scores were 5 and 6. She received no Valium for withdrawal symptoms. The Team conducted safety checks every 15 minutes, and no problems were reported. She last had Valium on 7/6 at 0818.

## 2025-07-07 NOTE — PLAN OF CARE
Goal Outcome Evaluation:    Plan of Care Reviewed With: patient        Provider spoke with Jolynn and she took today's dose of Antabuse this am to see how she tolerated it prior to discharging.  Pt has tolerated the Antabuse without issue.   Writer met with Pt to review discharge instructions and answer questions.      Jolynn received her belongings, was escorted to the [facility] drive up area and phoned a [employer] vehicle to transport home at 14:20

## 2025-07-08 DIAGNOSIS — Z09 HOSPITAL DISCHARGE FOLLOW-UP: ICD-10-CM

## 2025-07-09 ENCOUNTER — TELEPHONE (OUTPATIENT)
Dept: PHARMACY | Facility: OTHER | Age: 65
End: 2025-07-09
Payer: MEDICARE

## 2025-07-09 NOTE — TELEPHONE ENCOUNTER
MTM referral from: Transitions of Care (recent hospital discharge, TCU discharge, or ED visit)    MTM referral outreach attempt #2 on July 9, 2025 at 2:06 PM      Outcome: Spoke with patient states she receives services elsewhere    Use [employer]NEHA for the carrier/Plan on the flowsheet      Priya Caldera CMA  MTM

## 2025-07-14 ENCOUNTER — TELEPHONE (OUTPATIENT)
Dept: BEHAVIORAL HEALTH | Facility: CLINIC | Age: 65
End: 2025-07-14
Payer: MEDICARE

## 2025-07-14 NOTE — TELEPHONE ENCOUNTER
----- Message from Albina Gaitan sent at 7/14/2025  7:46 AM CDT -----  Regarding: start tx and add individual session for 7/15  Scheduling Request:  start tx and add individual session for 7/15      #reminder this is OP level of care from the start, meeting 6 hours per week#               #JUANY for insurance has been obtained from client    Patient Name:CHINTAN MEZA [[id number]]  Location of programming: [place]  Start Date:    2025  Group: [id number] on Monday, Tuesday, and Wednesday, at 12:00 p.m. to 2:00 p.m.    Attending Provider (MD): Hali Manzo    Number of visits to be scheduled:  24    Duration of Appointment in minutes: 120 minutes  Visit Type: in person      Individual Appointment      Date and Time: 7/15 at 11:00    Provider: Hali Manzo    Number of visits:   1      Length of appt: 60 minutes  Visit type:  in person      Billing Type: part of program    Thank You,  Flory Gaitan M.S., [facility] [place], [facility]  Lead Counselor  [phone]      Scheduling Request:        #reminder this is OP level of care from the start, meeting 6 hours per week#               #JUANY for insurance has been obtained from client    Patient Name:  Location of programming: [place]  Start Date:    2025  Group: [id number] on Monday, Tuesday, and Wednesday, at 12:00 p.m. to 2:00 p.m.    Attending Provider (MD): Dr. Moe Proctor Dr., Tamara Harvanko, Claire Maxey    Number of visits to be scheduled:  24    Duration of Appointment in minutes: 120 minutes  Visit Type: in person    Additional notes:           Additional notes    Patient name:   Location of program:  [place]  Group: [id number] on Monday, Tuesday, and Wednesday, at 12:00 p.m. to 2:00 p.m.  Individual Appointment      Date and Time:    Provider: Dr. Moe Proctor Dr., Tamara Harvanko, Claire Maxey     Number of visits:   1      Length of appt: 60 minutes  Visit type:  in person      Billing Type: part of program    Thank You,  Flory  JOSELYN Gaitan, [facility], [facility]  Lead Counselor  [phone]

## 2025-07-15 ENCOUNTER — HOSPITAL ENCOUNTER (OUTPATIENT)
Facility: CLINIC | Age: 65
Discharge: HOME | End: 2025-07-15
Attending: FAMILY MEDICINE
Payer: MEDICARE

## 2025-07-15 DIAGNOSIS — F10.20 ALCOHOL USE DISORDER, SEVERE, DEPENDENCE (H): Primary | ICD-10-CM

## 2025-07-15 PROCEDURE — H2035 A/D TX PROGRAM, PER HOUR: HCPCS | Mod: HQ

## 2025-07-15 PROCEDURE — H2035 A/D TX PROGRAM, PER HOUR: HCPCS

## 2025-07-15 NOTE — PROGRESS NOTES
"  Individual Session Summary    START TIME: 11:10: AM   END TIME: 11:55 AM   Duration: 45 min    Jolynn Rivera is a 65 year old female who is being evaluated via in person visit.      GIRMA Diagnosis:      Mental Health Diagnosis:        Data:  This writer met with the client on this date for an individual GIRMA-focused session. The session focused on client's thoughts and feelings regarding the treatment program, group, and maintaining sobriety.  Client discussed her goals and we worked on her Tx plan.  Client reported feeling frustrated that her day is full of things and she did not really want to come in to do this.  I encouraged client to consider why the structure is important as well as reminding her of deadlines in a program, including when tx plan needs to be done.  Client was encouraged to talk respectfully to counselor, after she used \"bitching\" in our discussion.    Client talked about having a LYFT waiver which helps her to get to tx.  Client states overall things are going  for her. Client denies any suicidal thoughts, plans, or intent today. Client also denies any thoughts or behaviors of self-harm today.    Intervention: Individual session with client. Postivity, group guidelines.Provided client with verbal interventions such as including validation, support, and psychoeducation. Provider used various therapeutic techniques such as CBT techniques, Solutions Focused Brief Therapy, Motivational Interviewing, DBT techniques, Strengths Based Approach, and trauma informed care (TIC).       Assessment: Client presents as frustrated and wanting to control what she does and does not want to do for the day. Client is self aware. Client reports taking proactive steps to be mindful about how they are feeling, including recognition of triggers and urges, and using healthy coping skills on a regular basis. Client continues to appear very open and willing to learn new skills, after discussion of what her frustrations " are.        2/17/2025     9:18 AM 5/14/2025    12:47 PM 6/24/2025    10:19 AM   PHQ-9 SCORE   PHQ-9 Total Score MyChart 9 (Mild depression) 9 (Mild depression)    PHQ-9 Total Score 9  9  12       Patient-reported         11/3/2014    12:00 PM 6/24/2025    10:19 AM 7/11/2025    11:00 AM   OMARI-7 SCORE   Total Score  9 11   Total Score BEH Adult 15          Data saved with a previous flowsheet row definition       Plan. Begin working on goals and assignments.  Client will continue taking proactive steps to be mindful about how they are feeling, including recognition of triggers and urges, and using healthy coping skills on a regular basis.    I have reviewed the note as documented above.  This accurately captures the substance of my visit with the client.    Attestation: Tanisha Rosa MD- Provides oversight and supervision of care.      Albina Gaitan [facility]

## 2025-07-15 NOTE — GROUP NOTE
"Group Therapy Documentation    PATIENT'S NAME: Jolynn Rivera  MRN:   [id number]  :   1960  ACCT. NUMBER: [id number]  DATE OF SERVICE: 7/15/25  START TIME: 12:00 PM  END TIME:  2:00 PM  FACILITATOR(S): Albina Gaitan LADC  TOPIC: BEH Group Therapy  Number of patients attending the group:  3  Group Length:  2 Hours    Dimensions addressed: 4 and 5    Summary of Group / Topics Discussed:  Group members did a brief check in and discussed handout on group, logistics, and what feedback is.  Group discussed \"Emotional Roller Coaster\" and symptoms of the mood swings that can come with quitting using. Clients also did assignment on Decisional balance, \"Reasons to Use vs Reasons to Stop using\"  Clients given an assignment on Change plan, due     Understanding Mental Health and Addiction: Facilitated a group on mental health and general overview of various mental health diagnosis, symptoms, and treatment. . Engaged clients in a discussion around their understand and experience around mental health and connecting their mental to their substance use.  Provide psychoeducation on diagnoses, etiology, cultural and environmental factors, and impact on functioning. Clients were provided various handouts and a educational video.      Group Objectives/Goals:    Clients will identify 2-3 early warning signs of their diagnosis.  Clients will be able to identify 2-3 specific symptoms as they relate to their diagnosis.   Clients will be able identify 2-3 neurotransmitters and their functions.               Group Attendance:  Attended group session    Patient's response to the group topic/interactions:  cooperative with task, expressed readiness to alter behaviors, and expressed understanding of topic    Patient appeared to be Actively participating.        Client specific details::  Client was able to name some symptoms of mood they have experienced since quitting drinking:Client told group she is adjusting to not " "drinking and using antabuse, and \"please be patient if I come off abrupt\"  Client discussed Reasons to Use vs Reasons not to: My biggest reason became to not go through withdrawal.   Reason to stay sober is I want to live long and have quality of life.   Client participated well in group discussion      7/15/2025     2:00 PM   Suicide Ideation Check In   Since last session, how often have you had suicidal thoughts? No thoughts of suicide            Albina Gaitan, MS, [facility]    Attestation:  Dr. Moe GILLESPIE - Provides oversight and supervision of care.     "
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
## 2025-07-21 ENCOUNTER — HOSPITAL ENCOUNTER (OUTPATIENT)
Facility: CLINIC | Age: 65
Discharge: HOME | End: 2025-07-21
Attending: FAMILY MEDICINE
Payer: MEDICARE

## 2025-07-21 ENCOUNTER — APPOINTMENT (OUTPATIENT)
Dept: LAB | Facility: CLINIC | Age: 65
End: 2025-07-21
Payer: MEDICARE

## 2025-07-21 DIAGNOSIS — F10.20 ALCOHOL USE DISORDER, SEVERE, DEPENDENCE (H): Primary | ICD-10-CM

## 2025-07-21 DIAGNOSIS — F10.20 ALCOHOL DEPENDENCE, UNCOMPLICATED (H): Primary | ICD-10-CM

## 2025-07-21 DIAGNOSIS — F10.20 ALCOHOL USE DISORDER, SEVERE, DEPENDENCE (H): ICD-10-CM

## 2025-07-21 PROCEDURE — H2035 A/D TX PROGRAM, PER HOUR: HCPCS | Mod: HQ

## 2025-07-21 PROCEDURE — 80321 ALCOHOLS BIOMARKERS 1OR 2: CPT

## 2025-07-21 NOTE — PROGRESS NOTES
UA    Writer obtained a UA on client on 7/21/2025 at 1:30 time. Client's UA appeared Negative for all substances. UA will be sent in to the lab for further processing.       Albina Gaitan, [facility]     Attestation: Tanisha Rosa MD- Provides oversight and supervision of care.

## 2025-07-21 NOTE — GROUP NOTE
Group Therapy Documentation    PATIENT'S NAME: Jolynn Rivera  MRN:   [id number]  :   1960  ACCT. NUMBER: [id number]  DATE OF SERVICE: 25  START TIME: 12:00 PM  END TIME:  2:00 PM  FACILITATOR(S): Albina Gaitan LADC  TOPIC: BEH Group Therapy  Number of patients attending the group:  5  Group Length:  2 Hours    Dimensions addressed: 1, 2, 3, 4, 5, and 6    Summary of Group / Topics Discussed:      Process Group: Facilitated a process group surrounding client's experiences with substance use, mental health, and recovery. Provided clients the space and structure to engage in the process and allow client's to engage with their peers. Provided client's with support and insight to and various skills to utilize to address their concerns and how to promote recovery. Provided clients space to process their various treatment assignments and reciveve feedback from their peers regarding their assignments.      Objective(s):   Process group allows clients who abuse substances and have mental health symptoms to share in their recovery with others.  Client's will have the opportunity to problem solve with peers and learn and implement valuable coping skills for relapse prevention and daily life issues.   Client will engage in a discussion to develop insight on their substance use, mental health, and recovery.  Client will have the opportunity to learn from their peers regarding their recovery and mental health journey.   Client will identify what they learned and gain insight into their use and mental health   Client will process their treatment assignments and process what they learned from the assignment.     Expected therapeutic outcomes:    Build recovery resilience against cravings, leading to resilience against relapse in early sobriety.   Continue to build motivation to be vulnerable, honest, and building trust.  Growth in developing optimal environment and in managing difficult emotions  Build recovery  "resilience against cravings, leading to resilience against relapse in early sobriety.        Group Attendance:  Attended group session  Patient's response to the group topic/interactions:  cooperative with task, expressed readiness to alter behaviors, and expressed understanding of topic  Patient appeared to be Actively participating    Client specific details: Client shared She is taking less antabuse due to her therapist and psychiatrist thinking it may have added to her escalating moods. \"I still want to take it because is fabi me from using\" . Reports no change in MAGDALENA Date.  Client discussed triggers and warning signs and helpful coping used.    Client was able to name Something she wants to work on this week: more time with mindfulness and meditation       7/21/2025     4:00 PM   Suicide Ideation Check In   Since last session, how often have you had suicidal thoughts? No thoughts of suicide             Albina Gaitan, MS, [facility],     Attestation: Dr. Rosa - Medical Dr. - Provides oversight and supervision of care.        "

## 2025-07-21 NOTE — ADDENDUM NOTE
Encounter addended by: Albina Gaitan LADC on: 7/21/2025 4:42 PM   Actions taken: Clinical Note Signed

## 2025-07-22 ENCOUNTER — HOSPITAL ENCOUNTER (OUTPATIENT)
Facility: CLINIC | Age: 65
Discharge: HOME | End: 2025-07-22
Attending: FAMILY MEDICINE
Payer: MEDICARE

## 2025-07-22 DIAGNOSIS — F19.90 SUBSTANCE USE DISORDER: Primary | ICD-10-CM

## 2025-07-22 DIAGNOSIS — F10.20 ALCOHOL USE DISORDER, SEVERE, DEPENDENCE (H): Primary | ICD-10-CM

## 2025-07-22 DIAGNOSIS — F10.20 UNCOMPLICATED ALCOHOL DEPENDENCE (H): ICD-10-CM

## 2025-07-22 PROCEDURE — H2035 A/D TX PROGRAM, PER HOUR: HCPCS | Mod: HQ

## 2025-07-22 NOTE — GROUP NOTE
"Group Therapy Documentation    PATIENT'S NAME: Jolynn Rivera  MRN:   [id number]  :   1960  ACCT. NUMBER: [id number]  DATE OF SERVICE: 25  START TIME: 12:00 PM  END TIME:  2:00 PM  FACILITATOR(S): Albina Gaitan LADC  TOPIC: BEH Group Therapy  Number of patients attending the group:  3  Group Length:  2 Hours    Dimensions addressed: 3, 4, 5, and 6    Summary of Group / Topics Discussed:   Group discussed daily reading.  Clients shared their change plan.  Group did a Reading on \"honoring your feelings\".  Group participated in psychoeducation on Behavioral change analysis    Behavioral Chain Analysis: Facilitated a group discussion on CBT focusing on Behavioral Analysis. Provided education on behavioral chain analysis and exploring how one s thoughts and feelings impact one s behavior. Provided clients with a worksheet that had them explore and process a certain behavior or a relapse. Explored with client s pattern s they may have noticed in their behaviors and discussed the benefits of using a behavioral chain analysis can assist with making modifications to those patterns and skills they can utilize.     Group Objective(s):    Client will demonstrate ability to work through the behavioral analysis chain by using a a problem behavior they have experienced and documenting through all steps listed on worksheet.    Client will identify at least one of each, about their identified problem behavior: External and internal circumstances, core belief thought, validating drug thought, automatic thought, and the action that resulted from the behavior.     Clients will be able to identify at least one strategy they can use to cope with similar behaviors in the future.          Expected therapeutic outcome(s):    Client will:   Gain self-awareness to help pave a way for personal growth and empower client to make more conscious and healthy choices.   Increase commitment to change.     Be able to analyze " "behavior and utilize coping strategies to help reinforce change.       Behavioral Chain Analysis: Facilitated a group discussion on CBT focusing on Behavioral Analysis. Provided education on behavioral chain analysis and exploring how one s thoughts and feelings impact one s behavior. Provided clients with a worksheet that had them explore and process a certain behavior or a relapse. Explored with client s pattern s they may have noticed in their behaviors and discussed the benefits of using a behavioral chain analysis can assist with making modifications to those patterns and skills they can utilize.     Group Objective(s):    Client will demonstrate ability to work through the behavioral analysis chain by using a a problem behavior they have experienced and documenting through all steps listed on worksheet.    Client will identify at least one of each, about their identified problem behavior: External and internal circumstances, core belief thought, validating drug thought, automatic thought, and the action that resulted from the behavior.     Clients will be able to identify at least one strategy they can use to cope with similar behaviors in the future.          Expected therapeutic outcome(s):    Client will:   Gain self-awareness to help pave a way for personal growth and empower client to make more conscious           Group Attendance:  Attended group session    Patient's response to the group topic/interactions:  cooperative with task, expressed readiness to alter behaviors, and expressed understanding of topic    Patient appeared to be Actively participating.        Client specific details:: Client shared change plan and was able to give reasons for wanting the change that can be motivators for change: I want to incorporate more yoga and meditation into my days\". She had measureable steps and said .  Client demonstrated the behavioral chain analysis tool, but using a personal experience.        7/22/2025    "  1:00 PM   Suicide Ideation Check In   Since last session, how often have you had suicidal thoughts? No thoughts of suicide             Albina Gaitan, MS, [facility]    Attestation:  Dr. Moe GILLESPIE - Provides oversight and supervision of care. d supervision of care.

## 2025-07-23 ENCOUNTER — HOSPITAL ENCOUNTER (OUTPATIENT)
Facility: CLINIC | Age: 65
Discharge: HOME | End: 2025-07-23
Attending: FAMILY MEDICINE
Payer: MEDICARE

## 2025-07-23 DIAGNOSIS — F10.20 ALCOHOL USE DISORDER, SEVERE, DEPENDENCE (H): Primary | ICD-10-CM

## 2025-07-23 PROCEDURE — H2035 A/D TX PROGRAM, PER HOUR: HCPCS | Mod: HQ

## 2025-07-23 NOTE — GROUP NOTE
Group Therapy Documentation    PATIENT'S NAME: Jolynn Rivera  MRN:   [id number]  :   1960  ACCT. NUMBER: [id number]  DATE OF SERVICE: 25  START TIME: 12:00 PM  END TIME:  2:00 PM  FACILITATOR(S): Albina Gaitan LADC  TOPIC: BEH Group Therapy  Number of patients attending the group:  6  Group Length:  2 Hours    Dimensions addressed: 3, 5, and 6    Summary of Group / Topics Discussed:   New client attended for first day, so group did introductions and shared a couple points about self with client and each other.  Discussed daily reading.  Small groups were formed and clients shared their anger assignments from last week as well as 8-10 things they will do for structure, recovery and fun over the next week.  Group did psychoeducation below    Positive Psychology: Provided a psychoeducation group on positive psychology and how practicing positive psychology can be utilized in various areas in one's recovery and MH. Facilitated a group discussion positive psychology and how disputing negative thoughts can affect feelings and actions.  Provided a reading as a point of opening discussion about positive vs negative view, emphasis.  Clients were provided a handout and worksheet on positive psychology.        Objective(s):   Client will learn strategies to reduce stress and increase overall health and wellness.   Client will identify a personal understanding of how attitudes affect outcomes.  Clients provided a reading on how it is read one way emphasizes positive and the other way focuses on negative      Expected therapeutic outcome(s):   Patient will:   Client will sustain improved health and wellness.   Client will use positive psychology and stress management tools into their daily life.   Client will name 2 negative beliefs they have and then turn them into something more positive.        Group Attendance:  Attended group session  Patient's response to the group topic/interactions:  cooperative with  task, expressed readiness to alter behaviors, and expressed understanding of topic  Patient appeared to be Actively participating    Client specific details:  Client Discussed 8-10 things for structure and fun for the next week including going to [employer] and working in her garden.    Client appeared listening and giving good feedback in small group and was welcoming to new client.  Shared Anger assignment which was given last week, including how they respond to anger, triggers for anger and strategies for anger.             7/23/2025     1:00 PM   Suicide Ideation Check In   Since last session, how often have you had suicidal thoughts? No thoughts of suicide             Albina Gaitan, MS, [facility],     Attestation: Dr. Rosa - Medical Dr. - Provides oversight and supervision of care.

## 2025-07-23 NOTE — PROGRESS NOTES
Addiction Outpatient Weekly Clinical Staffing     Jolynn Rivera was staffed on 7/22/2025 . Jolynn Rivera was staffed on recovery strengths, barriers and treatment progress.     Staff present: Chris Lima [facility] , Rosemarie Amaya [facility] , Teri Evangelista [facility], [facility] , Karen Osborn [facility], [facility] , and Yaa Langston [facility]     Date: 7/23/2025 Time: 4:51 PM    Staff Signature: Albina Gaitan [facility]

## 2025-07-24 LAB
ETHYL SULFATE UR CFM-MCNC: <100 NG/ML
Lab: <100 NG/ML

## 2025-07-28 ENCOUNTER — HOSPITAL ENCOUNTER (OUTPATIENT)
Facility: CLINIC | Age: 65
Discharge: HOME | End: 2025-07-28
Attending: FAMILY MEDICINE
Payer: MEDICARE

## 2025-07-28 ENCOUNTER — TELEPHONE (OUTPATIENT)
Dept: BEHAVIORAL HEALTH | Facility: CLINIC | Age: 65
End: 2025-07-28
Payer: MEDICARE

## 2025-07-28 DIAGNOSIS — F10.20 ALCOHOL USE DISORDER, SEVERE, DEPENDENCE (H): Primary | ICD-10-CM

## 2025-07-28 PROCEDURE — H2035 A/D TX PROGRAM, PER HOUR: HCPCS

## 2025-07-28 PROCEDURE — H2035 A/D TX PROGRAM, PER HOUR: HCPCS | Mod: HQ

## 2025-07-28 NOTE — PROGRESS NOTES
"  Individual Session Summary    START TIME: 11:50 AM   END TIME: 12:35: PM   Duration:  45 min    Jolynn Rivera is a 65 year old female who is being evaluated via in person visit.      GIRMA Diagnosis:  ubstance-Related & Addictive Disorders Alcohol Use Disorder 303.90 (F10.20) Severe In a controlled environment     Mental Health Diagnosis:   Pt shares previous MH dx's of Bipolar 2, anxiety, depression and PTSD       Data:  This writer met with the client on this date for an individual GIRMA-focused session, along with Yaa Langston, Clinical Director. The session focused on client's thoughts and feelings regarding the treatment program, group, and maintaining sobriety.  This writer, Yaa, and client discussed that this is OP GIRMA group, and to clarify that both [place]/Counselor and she need to determine if it is a good fit and best level of care.   Client talked about feeling taking Antabuse will be helpful to staying sober and that it made her behavior \"sarcastic, angry\" and \"I couldn't pause long enough to be appropriate on the day we met to do the Tx plan\".   Client also has a therapist and psychiatrist which she has not given information for, JUANY's yet, She said she will do this, toda, but then said she did not have that info in phone and will bring tomorrow.    She indicated she takes antabuse 1x every 8-10 days.  Client discussed \"loving the program\" and wanting to be able to continue.  She described not being sure she can work in individual sessions with this counselor, but discussed going forward, she wants to be respectful, cooperative, and said she would appreciated redirection of she is going off the subject, as well as reminder to pause and take a breath, to \"settle down\".  Client states overall things are going great in group, and that she feels her psychiatrist, physician and therapist are all helpful to her outside of the group situation.  Client denies any suicidal thoughts, plans, or intent " "today. Client also denies any thoughts or behaviors of self-harm today.    Intervention: Motivational interviewing, support, validation, encouragement.  Reminder of group guidelines and goal setting for group structure. Boundaries. Meet with .     Assessment: Client presents as positive about group experience but apprehensive about individual sessions. Client was redirected numerous times, but responded well and said is open to that, as \"sometimes my bipolar gets me talking too much\"  \"I think with the adjustment to my antabuse I won't be as irritable or stressed\". Client seems open to signing JUANY's, tomorrow        2/17/2025     9:18 AM 5/14/2025    12:47 PM 6/24/2025    10:19 AM   PHQ-9 SCORE   PHQ-9 Total Score MyChart 9 (Mild depression) 9 (Mild depression)    PHQ-9 Total Score 9  9  12       Patient-reported         11/3/2014    12:00 PM 6/24/2025    10:19 AM 7/11/2025    11:00 AM   OMARI-7 SCORE   Total Score  9 11   Total Score BEH Adult 15          Data saved with a previous flowsheet row definition         7/28/2025     1:00 PM   Suicide Ideation Check In   Since last session, how often have you had suicidal thoughts? No thoughts of suicide           Plan. Client will Bring information for JUANY's.  Counselor will redirect client, as requested when appropriate. Client will continue taking proactive steps to be mindful about how they are feeling, including recognition of triggers and urges, and using healthy coping skills on a regular basis. Client will be respectful to counselor and group members.  Client will direct communication to this writer, not to .    I have reviewed the note as documented above.  This accurately captures the substance of my visit with the client.    Attestation: Nataly Manzo, NP- Provides oversight and supervision of care.      BRITTNEE Perdue       "

## 2025-07-28 NOTE — GROUP NOTE
Group Therapy Documentation    Client attended 1 hour of group therapy today and will only be charged 1 hour, due to 45 min of time meeting for individual therapy    PATIENT'S NAME: Jolynn Rivera  MRN:   [id number]  :   1960  ACCT. NUMBER: [id number]  DATE OF SERVICE: 25  START TIME: 12:00 PM  END TIME:  2:00 PM  FACILITATOR(S): Albina Gaitan LADC  TOPIC: BEH Group Therapy  Number of patients attending the group:  6  Group Length:  2 Hours    Dimensions addressed: 1, 2, 3, 4, 5, and 6    Summary of Group / Topics Discussed:   Ravindra Langston facilitated first 45 minutes of group and check in.  This counselor continued check in and discussion.  Clients shared check in and discussed stigmas of substance abuse and also discussed something they are passionate about.      Process Group: Facilitated a process group surrounding client's experiences with substance use, mental health, and recovery. Provided clients the space and structure to engage in the process and allow client's to engage with their peers. Provided client's with support and insight to and various skills to utilize to address their concerns and how to promote recovery. Provided clients space to process their various treatment assignments and reciveve feedback from their peers regarding their assignments.      Objective(s):   Process group allows clients who abuse substances and have mental health symptoms to share in their recovery with others.  Client's will have the opportunity to problem solve with peers and learn and implement valuable coping skills for relapse prevention and daily life issues.   Client will engage in a discussion to develop insight on their substance use, mental health, and recovery.  Client will have the opportunity to learn from their peers regarding their recovery and mental health journey.   Client will identify what they learned and gain insight into their use and mental health   Client will process their  "treatment assignments and process what they learned from the assignment.     Expected therapeutic outcomes:    Build recovery resilience against cravings, leading to resilience against relapse in early sobriety.   Continue to build motivation to be vulnerable, honest, and building trust.  Growth in developing optimal environment and in managing difficult emotions  Build recovery resilience against cravings, leading to resilience against relapse in early sobriety.        Group Attendance:  Attended group session  Patient's response to the group topic/interactions:  cooperative with task, expressed readiness to alter behaviors, and expressed understanding of topic  Patient appeared to be Actively participating    Client specific details:  Client shared having some stigma about 'alcoholic\" but through the years \"I am who I am\".  Client reports no changes in MAGDALENA date, and discussed any triggers, warning signs and cravings over the last week.  Reports she continues to take antabuse about 1x every 8 days per her DrJuanito      7/28/2025     1:00 PM   Suicide Ideation Check In   Since last session, how often have you had suicidal thoughts? No thoughts of suicide             Albina Gaitan, MS, [facility],     Attestation: Dr. Rosa - Medical Dr. - Provides oversight and supervision of care.        "

## 2025-07-28 NOTE — TELEPHONE ENCOUNTER
----- Message from Albina Gaitan sent at 7/28/2025 10:14 AM CDT -----  Regarding: individual session  Scheduling Request:  individual session.  I know this is coming in late, but we just set it up. There is no hurry, I can document whenever the session gets entered.  THANK YOU        Patient Name:CHINTAN MEZA [[id number]]  Location of programming: [facility]  Group: [id number] on Monday, Tuesday, and Wednesday, at 12:00 p.m. to 2:00 p.m.  Individual Appointment      Date and Time:  7/28/ 11:15    Provider:  Hali Manzo,     Number of visits:   1      Length of appt: 60 minutes  Visit type:  in person      Billing Type: part of program    Thank You,  Flory Gaitan M.S., [facility], LPC  Lead Counselor  [phone]

## 2025-07-29 ENCOUNTER — HOSPITAL ENCOUNTER (OUTPATIENT)
Facility: CLINIC | Age: 65
Discharge: HOME | End: 2025-07-29
Attending: FAMILY MEDICINE
Payer: MEDICARE

## 2025-07-29 DIAGNOSIS — F10.20 ALCOHOL USE DISORDER, SEVERE, DEPENDENCE (H): Primary | ICD-10-CM

## 2025-07-29 PROCEDURE — H2035 A/D TX PROGRAM, PER HOUR: HCPCS | Mod: HQ

## 2025-07-29 NOTE — ADDENDUM NOTE
Encounter addended by: Albina Gaitan LADC on: 7/29/2025 10:40 AM   Actions taken: Clinical Note Signed

## 2025-07-29 NOTE — GROUP NOTE
"Group Therapy Documentation    PATIENT'S NAME: Jolynn Rivera  MRN:   [id number]  :   1960  ACCT. NUMBER: [id number]  DATE OF SERVICE: 25  START TIME: 12:00 PM  END TIME:  2:00 PM  FACILITATOR(S): Albina Gaitan LADC  TOPIC: BEH Group Therapy  Number of patients attending the group:  4  Group Length:  2 Hours    Dimensions addressed: 3, 4, and 5    Summary of Group / Topics Discussed:  Clients did a reading on Helpful coping when dealing with conflict.  Discussed handout/assignment on healthy coping for feelings. Clients participated in psychoeducation below    Identity: Facilitated a group discussion on Identity and addiction. Client's gained insight into how active addiction changes their identity and how with recovery processing their identify outside of addiction. Clients explored their identify when in active addiction and in their early stages of recovery.  Provided  who am I  handout and presentation on identity and addiction. .     Objective(s) and therapeutic outcomes:   Client will identify at least 3 characteristics describing their identity during active addiction   Client will identify at least 3 characteristics describing what kind of person they want to be.    Client will identify strategies they can implement promote the person they are or want to be.    Client will increase their motivation for change   Client will reduce ambivalence for change   Client will be mindful of the negative impact substance use has on who we are and how others see us.        Group Attendance:  Attended group session    Patient's response to the group topic/interactions:  cooperative with task, expressed readiness to alter behaviors, and expressed understanding of topic    Patient appeared to be Actively participating.        Client specific details:: Client shared assignment from last week on healthy coping for a variety of feelings.  \"I have done things like this so many times\".  This writer asked " client if she is noticing any ways she is coping better from the last time.  Client seems to be gaining insight into feelings as triggers and insight into healthier coping. Client reports what they are learning about feelings/emotions and recovery is:  I know myself pretty well, but I sometimes don't choose the best coping        7/29/2025     3:00 PM   Suicide Ideation Check In   Since last session, how often have you had suicidal thoughts? No thoughts of suicide             Albina Gaitan, MS, [facility]    Attestation:  Dr. Moe GILLESPIE - Provides oversight and supervision of care.

## 2025-08-05 ENCOUNTER — TELEPHONE (OUTPATIENT)
Dept: BEHAVIORAL HEALTH | Facility: CLINIC | Age: 65
End: 2025-08-05
Payer: MEDICARE

## 2025-08-09 ENCOUNTER — HOSPITAL ENCOUNTER (INPATIENT)
Facility: CLINIC | Age: 65
LOS: 2 days | Discharge: HOME | DRG: 897 | End: 2025-08-11
Attending: EMERGENCY MEDICINE | Admitting: PSYCHIATRY & NEUROLOGY
Payer: MEDICARE

## 2025-08-09 ENCOUNTER — TELEPHONE (OUTPATIENT)
Dept: BEHAVIORAL HEALTH | Facility: CLINIC | Age: 65
End: 2025-08-09

## 2025-08-09 DIAGNOSIS — F10.939 ALCOHOL WITHDRAWAL SYNDROME WITH COMPLICATION (H): Primary | ICD-10-CM

## 2025-08-09 DIAGNOSIS — F10.221 ALCOHOL DEPENDENCE WITH INTOXICATION DELIRIUM (H): ICD-10-CM

## 2025-08-09 PROBLEM — F10.10 ALCOHOL ABUSE: Status: ACTIVE | Noted: 2025-08-09

## 2025-08-09 LAB
ALBUMIN SERPL BCG-MCNC: 4.7 G/DL (ref 3.5–5.2)
ALCOHOL BREATH TEST: 0 (ref 0–0.01)
ALCOHOL BREATH TEST: 0.1 (ref 0–0.01)
ALP SERPL-CCNC: 57 U/L (ref 40–150)
ALT SERPL W P-5'-P-CCNC: 19 U/L (ref 0–50)
AMPHETAMINES UR QL SCN: NORMAL
ANION GAP SERPL CALCULATED.3IONS-SCNC: 20 MMOL/L (ref 7–15)
APTT PPP: 26 SECONDS (ref 22–38)
AST SERPL W P-5'-P-CCNC: 32 U/L (ref 0–45)
BARBITURATES UR QL SCN: NORMAL
BASOPHILS # BLD AUTO: 0 10E3/UL (ref 0–0.2)
BASOPHILS NFR BLD AUTO: 1 %
BENZODIAZ UR QL SCN: NORMAL
BILIRUB SERPL-MCNC: 0.3 MG/DL
BUN SERPL-MCNC: 7.3 MG/DL (ref 8–23)
BZE UR QL SCN: NORMAL
CALCIUM SERPL-MCNC: 9.4 MG/DL (ref 8.8–10.4)
CANNABINOIDS UR QL SCN: NORMAL
CHLORIDE SERPL-SCNC: 94 MMOL/L (ref 98–107)
CK SERPL-CCNC: 253 U/L (ref 26–192)
CREAT SERPL-MCNC: 0.52 MG/DL (ref 0.51–0.95)
EGFRCR SERPLBLD CKD-EPI 2021: >90 ML/MIN/1.73M2
EOSINOPHIL # BLD AUTO: 0 10E3/UL (ref 0–0.7)
EOSINOPHIL NFR BLD AUTO: 1 %
ERYTHROCYTE [DISTWIDTH] IN BLOOD BY AUTOMATED COUNT: 11.6 % (ref 10–15)
ETHANOL SERPL-MCNC: 0.25 G/DL
FENTANYL UR QL: NORMAL
GLUCOSE SERPL-MCNC: 126 MG/DL (ref 70–99)
HCO3 SERPL-SCNC: 19 MMOL/L (ref 22–29)
HCT VFR BLD AUTO: 44.9 % (ref 35–47)
HGB BLD-MCNC: 16.5 G/DL (ref 11.7–15.7)
IMM GRANULOCYTES # BLD: 0 10E3/UL
IMM GRANULOCYTES NFR BLD: 0 %
INR PPP: 0.86 (ref 0.85–1.15)
LIPASE SERPL-CCNC: 33 U/L (ref 13–60)
LYMPHOCYTES # BLD AUTO: 2.9 10E3/UL (ref 0.8–5.3)
LYMPHOCYTES NFR BLD AUTO: 33 %
MAGNESIUM SERPL-MCNC: 1.8 MG/DL (ref 1.7–2.3)
MCH RBC QN AUTO: 34.6 PG (ref 26.5–33)
MCHC RBC AUTO-ENTMCNC: 36.7 G/DL (ref 31.5–36.5)
MCV RBC AUTO: 94 FL (ref 78–100)
MONOCYTES # BLD AUTO: 0.7 10E3/UL (ref 0–1.3)
MONOCYTES NFR BLD AUTO: 8 %
NEUTROPHILS # BLD AUTO: 5.1 10E3/UL (ref 1.6–8.3)
NEUTROPHILS NFR BLD AUTO: 58 %
NRBC # BLD AUTO: 0 10E3/UL
NRBC BLD AUTO-RTO: 0 /100
OPIATES UR QL SCN: NORMAL
PCP QUAL URINE (ROCHE): NORMAL
PLATELET # BLD AUTO: 226 10E3/UL (ref 150–450)
POTASSIUM SERPL-SCNC: 4 MMOL/L (ref 3.4–5.3)
PROT SERPL-MCNC: 8.3 G/DL (ref 6.4–8.3)
PROTHROMBIN TIME: 12.1 SECONDS (ref 11.8–14.8)
RBC # BLD AUTO: 4.77 10E6/UL (ref 3.8–5.2)
SODIUM SERPL-SCNC: 133 MMOL/L (ref 135–145)
WBC # BLD AUTO: 8.8 10E3/UL (ref 4–11)

## 2025-08-09 PROCEDURE — 250N000013 HC RX MED GY IP 250 OP 250 PS 637: Performed by: NURSE PRACTITIONER

## 2025-08-09 PROCEDURE — 99285 EMERGENCY DEPT VISIT HI MDM: CPT | Performed by: EMERGENCY MEDICINE

## 2025-08-09 PROCEDURE — 85730 THROMBOPLASTIN TIME PARTIAL: CPT | Performed by: EMERGENCY MEDICINE

## 2025-08-09 PROCEDURE — 250N000013 HC RX MED GY IP 250 OP 250 PS 637: Performed by: EMERGENCY MEDICINE

## 2025-08-09 PROCEDURE — 82075 ASSAY OF BREATH ETHANOL: CPT | Performed by: EMERGENCY MEDICINE

## 2025-08-09 PROCEDURE — 85025 COMPLETE CBC W/AUTO DIFF WBC: CPT | Performed by: EMERGENCY MEDICINE

## 2025-08-09 PROCEDURE — 82247 BILIRUBIN TOTAL: CPT | Performed by: EMERGENCY MEDICINE

## 2025-08-09 PROCEDURE — 250N000013 HC RX MED GY IP 250 OP 250 PS 637: Performed by: PSYCHIATRY & NEUROLOGY

## 2025-08-09 PROCEDURE — 83735 ASSAY OF MAGNESIUM: CPT | Performed by: EMERGENCY MEDICINE

## 2025-08-09 PROCEDURE — 128N000004 HC R&B CD ADULT

## 2025-08-09 PROCEDURE — 80307 DRUG TEST PRSMV CHEM ANLYZR: CPT | Performed by: EMERGENCY MEDICINE

## 2025-08-09 PROCEDURE — 82077 ASSAY SPEC XCP UR&BREATH IA: CPT | Performed by: EMERGENCY MEDICINE

## 2025-08-09 PROCEDURE — 85610 PROTHROMBIN TIME: CPT | Performed by: EMERGENCY MEDICINE

## 2025-08-09 PROCEDURE — 250N000011 HC RX IP 250 OP 636: Performed by: EMERGENCY MEDICINE

## 2025-08-09 PROCEDURE — 82550 ASSAY OF CK (CPK): CPT | Performed by: EMERGENCY MEDICINE

## 2025-08-09 PROCEDURE — 83690 ASSAY OF LIPASE: CPT | Performed by: EMERGENCY MEDICINE

## 2025-08-09 RX ORDER — DIAZEPAM 5 MG/1
5 TABLET ORAL ONCE
Status: COMPLETED | OUTPATIENT
Start: 2025-08-09 | End: 2025-08-09

## 2025-08-09 RX ORDER — DIAZEPAM 10 MG/1
10 TABLET ORAL EVERY 30 MIN PRN
Status: DISCONTINUED | OUTPATIENT
Start: 2025-08-09 | End: 2025-08-11 | Stop reason: HOSPADM

## 2025-08-09 RX ORDER — ONDANSETRON 4 MG/1
4 TABLET, ORALLY DISINTEGRATING ORAL ONCE
Status: COMPLETED | OUTPATIENT
Start: 2025-08-09 | End: 2025-08-09

## 2025-08-09 RX ORDER — ONDANSETRON 4 MG/1
4 TABLET, ORALLY DISINTEGRATING ORAL EVERY 6 HOURS PRN
Status: DISCONTINUED | OUTPATIENT
Start: 2025-08-09 | End: 2025-08-11 | Stop reason: HOSPADM

## 2025-08-09 RX ORDER — OLANZAPINE 10 MG/1
10 TABLET, FILM COATED ORAL 3 TIMES DAILY PRN
Status: DISCONTINUED | OUTPATIENT
Start: 2025-08-09 | End: 2025-08-11 | Stop reason: HOSPADM

## 2025-08-09 RX ORDER — LOPERAMIDE HYDROCHLORIDE 2 MG/1
2 CAPSULE ORAL 4 TIMES DAILY PRN
Status: DISCONTINUED | OUTPATIENT
Start: 2025-08-09 | End: 2025-08-11 | Stop reason: HOSPADM

## 2025-08-09 RX ORDER — DISULFIRAM 250 MG/1
250 TABLET ORAL DAILY
Status: DISCONTINUED | OUTPATIENT
Start: 2025-08-10 | End: 2025-08-11

## 2025-08-09 RX ORDER — ACETAMINOPHEN 500 MG
500-1000 TABLET ORAL EVERY 6 HOURS PRN
Status: DISCONTINUED | OUTPATIENT
Start: 2025-08-09 | End: 2025-08-11 | Stop reason: HOSPADM

## 2025-08-09 RX ORDER — ACETAMINOPHEN 325 MG/1
650 TABLET ORAL EVERY 4 HOURS PRN
Status: DISCONTINUED | OUTPATIENT
Start: 2025-08-09 | End: 2025-08-09

## 2025-08-09 RX ORDER — OMEPRAZOLE 20 MG/1
40 CAPSULE, DELAYED RELEASE ORAL EVERY MORNING
Status: DISCONTINUED | OUTPATIENT
Start: 2025-08-10 | End: 2025-08-11 | Stop reason: HOSPADM

## 2025-08-09 RX ORDER — HYDROXYZINE HYDROCHLORIDE 25 MG/1
25 TABLET, FILM COATED ORAL EVERY 4 HOURS PRN
Status: DISCONTINUED | OUTPATIENT
Start: 2025-08-09 | End: 2025-08-11 | Stop reason: HOSPADM

## 2025-08-09 RX ORDER — FOLIC ACID 1 MG/1
1 TABLET ORAL DAILY
Status: DISCONTINUED | OUTPATIENT
Start: 2025-08-10 | End: 2025-08-11 | Stop reason: HOSPADM

## 2025-08-09 RX ORDER — MAGNESIUM HYDROXIDE/ALUMINUM HYDROXICE/SIMETHICONE 120; 1200; 1200 MG/30ML; MG/30ML; MG/30ML
30 SUSPENSION ORAL EVERY 4 HOURS PRN
Status: DISCONTINUED | OUTPATIENT
Start: 2025-08-09 | End: 2025-08-11 | Stop reason: HOSPADM

## 2025-08-09 RX ORDER — PROPRANOLOL HCL 20 MG
20 TABLET ORAL 3 TIMES DAILY
Status: DISCONTINUED | OUTPATIENT
Start: 2025-08-10 | End: 2025-08-09

## 2025-08-09 RX ORDER — NICOTINE 21 MG/24HR
1 PATCH, TRANSDERMAL 24 HOURS TRANSDERMAL DAILY PRN
Status: DISCONTINUED | OUTPATIENT
Start: 2025-08-09 | End: 2025-08-11 | Stop reason: HOSPADM

## 2025-08-09 RX ORDER — AMOXICILLIN 250 MG
1 CAPSULE ORAL 2 TIMES DAILY PRN
Status: DISCONTINUED | OUTPATIENT
Start: 2025-08-09 | End: 2025-08-11 | Stop reason: HOSPADM

## 2025-08-09 RX ORDER — EPINEPHRINE 0.3 MG/.3ML
0.3 INJECTION SUBCUTANEOUS
Status: DISCONTINUED | OUTPATIENT
Start: 2025-08-09 | End: 2025-08-11 | Stop reason: HOSPADM

## 2025-08-09 RX ORDER — GABAPENTIN 600 MG/1
1200 TABLET ORAL 3 TIMES DAILY
Status: DISCONTINUED | OUTPATIENT
Start: 2025-08-09 | End: 2025-08-11 | Stop reason: HOSPADM

## 2025-08-09 RX ORDER — ESZOPICLONE 3 MG/1
3 TABLET, FILM COATED ORAL AT BEDTIME
Status: DISCONTINUED | OUTPATIENT
Start: 2025-08-09 | End: 2025-08-11 | Stop reason: HOSPADM

## 2025-08-09 RX ORDER — MULTIPLE VITAMINS W/ MINERALS TAB 9MG-400MCG
1 TAB ORAL DAILY
Status: DISCONTINUED | OUTPATIENT
Start: 2025-08-10 | End: 2025-08-11 | Stop reason: HOSPADM

## 2025-08-09 RX ORDER — LEVOTHYROXINE SODIUM 100 UG/1
100 TABLET ORAL EVERY MORNING
Status: DISCONTINUED | OUTPATIENT
Start: 2025-08-10 | End: 2025-08-11 | Stop reason: HOSPADM

## 2025-08-09 RX ORDER — DIAZEPAM 10 MG/1
10 TABLET ORAL EVERY 30 MIN PRN
Status: DISCONTINUED | OUTPATIENT
Start: 2025-08-09 | End: 2025-08-09

## 2025-08-09 RX ORDER — LIDOCAINE 4 G/G
1 PATCH TOPICAL DAILY PRN
Status: DISCONTINUED | OUTPATIENT
Start: 2025-08-09 | End: 2025-08-11 | Stop reason: HOSPADM

## 2025-08-09 RX ORDER — ACETAMINOPHEN 500 MG
1000 TABLET ORAL ONCE
Status: COMPLETED | OUTPATIENT
Start: 2025-08-09 | End: 2025-08-09

## 2025-08-09 RX ORDER — OLANZAPINE 10 MG/2ML
10 INJECTION, POWDER, FOR SOLUTION INTRAMUSCULAR 3 TIMES DAILY PRN
Status: DISCONTINUED | OUTPATIENT
Start: 2025-08-09 | End: 2025-08-11 | Stop reason: HOSPADM

## 2025-08-09 RX ORDER — HYDROXYZINE HYDROCHLORIDE 50 MG/1
50 TABLET, FILM COATED ORAL ONCE
Status: COMPLETED | OUTPATIENT
Start: 2025-08-09 | End: 2025-08-09

## 2025-08-09 RX ORDER — FOLIC ACID 1 MG/1
1 TABLET ORAL DAILY
Status: DISCONTINUED | OUTPATIENT
Start: 2025-08-10 | End: 2025-08-09

## 2025-08-09 RX ORDER — PROPRANOLOL HCL 20 MG
20 TABLET ORAL 3 TIMES DAILY
Status: DISCONTINUED | OUTPATIENT
Start: 2025-08-09 | End: 2025-08-11 | Stop reason: HOSPADM

## 2025-08-09 RX ORDER — GABAPENTIN 600 MG/1
1200 TABLET ORAL 3 TIMES DAILY
Status: DISCONTINUED | OUTPATIENT
Start: 2025-08-10 | End: 2025-08-09

## 2025-08-09 RX ORDER — LEVALBUTEROL TARTRATE 45 UG/1
2 AEROSOL, METERED ORAL EVERY 4 HOURS PRN
Status: DISCONTINUED | OUTPATIENT
Start: 2025-08-09 | End: 2025-08-11 | Stop reason: HOSPADM

## 2025-08-09 RX ADMIN — NICOTINE POLACRILEX 2 MG: 2 GUM, CHEWING BUCCAL at 19:49

## 2025-08-09 RX ADMIN — GABAPENTIN 1200 MG: 600 TABLET, FILM COATED ORAL at 23:30

## 2025-08-09 RX ADMIN — NICOTINE POLACRILEX 2 MG: 2 GUM, CHEWING BUCCAL at 21:45

## 2025-08-09 RX ADMIN — NICOTINE POLACRILEX 2 MG: 2 GUM, CHEWING BUCCAL at 17:19

## 2025-08-09 RX ADMIN — DIAZEPAM 10 MG: 10 TABLET ORAL at 21:41

## 2025-08-09 RX ADMIN — ONDANSETRON 4 MG: 4 TABLET, ORALLY DISINTEGRATING ORAL at 19:57

## 2025-08-09 RX ADMIN — Medication 5 MG: at 21:45

## 2025-08-09 RX ADMIN — NICOTINE POLACRILEX 4 MG: 4 GUM, CHEWING BUCCAL at 23:03

## 2025-08-09 RX ADMIN — DIAZEPAM 5 MG: 5 TABLET ORAL at 15:07

## 2025-08-09 RX ADMIN — DIAZEPAM 10 MG: 10 TABLET ORAL at 19:30

## 2025-08-09 RX ADMIN — ACETAMINOPHEN 1000 MG: 500 TABLET ORAL at 19:50

## 2025-08-09 RX ADMIN — ONDANSETRON 4 MG: 4 TABLET, ORALLY DISINTEGRATING ORAL at 17:29

## 2025-08-09 RX ADMIN — PROPRANOLOL HYDROCHLORIDE 20 MG: 20 TABLET ORAL at 23:30

## 2025-08-09 ASSESSMENT — LIFESTYLE VARIABLES
PAROXYSMAL SWEATS: BARELY PERCEPTIBLE SWEATING, PALMS MOIST
ANXIETY: 3
ANXIETY: MODERATELY ANXIOUS, OR GUARDED, SO ANXIETY IS INFERRED
TOTAL SCORE: 9
HEADACHE, FULLNESS IN HEAD: MILD
VISUAL DISTURBANCES: NOT PRESENT
AUDITORY DISTURBANCES: NOT PRESENT
AUDITORY DISTURBANCES: NOT PRESENT
ANXIETY: 2
HEADACHE, FULLNESS IN HEAD: VERY MILD
TREMOR: NOT VISIBLE, BUT CAN BE FELT FINGERTIP TO FINGERTIP
PAROXYSMAL SWEATS: NO SWEAT VISIBLE
NAUSEA AND VOMITING: MILD NAUSEA WITH NO VOMITING
AGITATION: NORMAL ACTIVITY
PAROXYSMAL SWEATS: BARELY PERCEPTIBLE SWEATING, PALMS MOIST
ORIENTATION AND CLOUDING OF SENSORIUM: ORIENTED AND CAN DO SERIAL ADDITIONS
NAUSEA AND VOMITING: MILD NAUSEA WITH NO VOMITING
TOTAL SCORE: 9
INTOXICATION: 1
ORIENTATION AND CLOUDING OF SENSORIUM: ORIENTED AND CAN DO SERIAL ADDITIONS
VISUAL DISTURBANCES: MILD SENSITIVITY
TOTAL SCORE: 9
AGITATION: SOMEWHAT MORE THAN NORMAL ACTIVITY
TREMOR: NOT VISIBLE, BUT CAN BE FELT FINGERTIP TO FINGERTIP
AGITATION: SOMEWHAT MORE THAN NORMAL ACTIVITY
HEADACHE, FULLNESS IN HEAD: MILD
VISUAL DISTURBANCES: VERY MILD SENSITIVITY
TREMOR: NOT VISIBLE, BUT CAN BE FELT FINGERTIP TO FINGERTIP
ORIENTATION AND CLOUDING OF SENSORIUM: ORIENTED AND CAN DO SERIAL ADDITIONS
NAUSEA AND VOMITING: MILD NAUSEA WITH NO VOMITING
AUDITORY DISTURBANCES: NOT PRESENT

## 2025-08-09 ASSESSMENT — ACTIVITIES OF DAILY LIVING (ADL)
ADLS_ACUITY_SCORE: 58
HYGIENE/GROOMING: INDEPENDENT
ADLS_ACUITY_SCORE: 58
DRESS: SCRUBS (BEHAVIORAL HEALTH)
ORAL_HYGIENE: INDEPENDENT
ADLS_ACUITY_SCORE: 58
ADLS_ACUITY_SCORE: 47
ADLS_ACUITY_SCORE: 58
ADLS_ACUITY_SCORE: 58

## 2025-08-10 LAB
ATRIAL RATE - MUSE: 77 BPM
DIASTOLIC BLOOD PRESSURE - MUSE: NORMAL MMHG
INTERPRETATION ECG - MUSE: NORMAL
P AXIS - MUSE: 71 DEGREES
PR INTERVAL - MUSE: 164 MS
QRS DURATION - MUSE: 84 MS
QT - MUSE: 386 MS
QTC - MUSE: 436 MS
R AXIS - MUSE: -23 DEGREES
SYSTOLIC BLOOD PRESSURE - MUSE: NORMAL MMHG
T AXIS - MUSE: 62 DEGREES
VENTRICULAR RATE- MUSE: 77 BPM

## 2025-08-10 PROCEDURE — 99222 1ST HOSP IP/OBS MODERATE 55: CPT | Mod: AI | Performed by: PSYCHIATRY & NEUROLOGY

## 2025-08-10 PROCEDURE — 250N000011 HC RX IP 250 OP 636: Performed by: NURSE PRACTITIONER

## 2025-08-10 PROCEDURE — 250N000013 HC RX MED GY IP 250 OP 250 PS 637: Performed by: PSYCHIATRY & NEUROLOGY

## 2025-08-10 PROCEDURE — HZ2ZZZZ DETOXIFICATION SERVICES FOR SUBSTANCE ABUSE TREATMENT: ICD-10-PCS | Performed by: PSYCHIATRY & NEUROLOGY

## 2025-08-10 PROCEDURE — 99222 1ST HOSP IP/OBS MODERATE 55: CPT

## 2025-08-10 PROCEDURE — 250N000013 HC RX MED GY IP 250 OP 250 PS 637: Performed by: NURSE PRACTITIONER

## 2025-08-10 PROCEDURE — H2032 ACTIVITY THERAPY, PER 15 MIN: HCPCS

## 2025-08-10 PROCEDURE — 250N000013 HC RX MED GY IP 250 OP 250 PS 637: Performed by: EMERGENCY MEDICINE

## 2025-08-10 PROCEDURE — 128N000004 HC R&B CD ADULT

## 2025-08-10 RX ADMIN — Medication 1 TABLET: at 08:09

## 2025-08-10 RX ADMIN — NICOTINE POLACRILEX 4 MG: 4 GUM, CHEWING BUCCAL at 00:00

## 2025-08-10 RX ADMIN — NICOTINE POLACRILEX 4 MG: 4 GUM, CHEWING BUCCAL at 21:22

## 2025-08-10 RX ADMIN — NICOTINE POLACRILEX 4 MG: 4 GUM, CHEWING BUCCAL at 06:05

## 2025-08-10 RX ADMIN — GABAPENTIN 1200 MG: 600 TABLET, FILM COATED ORAL at 21:18

## 2025-08-10 RX ADMIN — Medication 1 TABLET: at 21:18

## 2025-08-10 RX ADMIN — NICOTINE POLACRILEX 4 MG: 4 GUM, CHEWING BUCCAL at 03:54

## 2025-08-10 RX ADMIN — ACETAMINOPHEN 1000 MG: 500 TABLET ORAL at 10:13

## 2025-08-10 RX ADMIN — NICOTINE POLACRILEX 4 MG: 4 GUM, CHEWING BUCCAL at 12:56

## 2025-08-10 RX ADMIN — NICOTINE POLACRILEX 4 MG: 4 GUM, CHEWING BUCCAL at 17:57

## 2025-08-10 RX ADMIN — NICOTINE POLACRILEX 4 MG: 4 GUM, CHEWING BUCCAL at 14:36

## 2025-08-10 RX ADMIN — ONDANSETRON 4 MG: 4 TABLET, ORALLY DISINTEGRATING ORAL at 06:05

## 2025-08-10 RX ADMIN — NICOTINE POLACRILEX 4 MG: 4 GUM, CHEWING BUCCAL at 10:14

## 2025-08-10 RX ADMIN — NICOTINE POLACRILEX 4 MG: 4 GUM, CHEWING BUCCAL at 20:23

## 2025-08-10 RX ADMIN — DIAZEPAM 10 MG: 10 TABLET ORAL at 06:05

## 2025-08-10 RX ADMIN — NICOTINE POLACRILEX 4 MG: 4 GUM, CHEWING BUCCAL at 01:56

## 2025-08-10 RX ADMIN — LEVOTHYROXINE SODIUM 100 MCG: 0.1 TABLET ORAL at 08:09

## 2025-08-10 RX ADMIN — PROPRANOLOL HYDROCHLORIDE 20 MG: 20 TABLET ORAL at 08:08

## 2025-08-10 RX ADMIN — PROPRANOLOL HYDROCHLORIDE 20 MG: 20 TABLET ORAL at 21:18

## 2025-08-10 RX ADMIN — NICOTINE POLACRILEX 4 MG: 4 GUM, CHEWING BUCCAL at 19:02

## 2025-08-10 RX ADMIN — GABAPENTIN 1200 MG: 600 TABLET, FILM COATED ORAL at 14:36

## 2025-08-10 RX ADMIN — NICOTINE POLACRILEX 4 MG: 4 GUM, CHEWING BUCCAL at 08:09

## 2025-08-10 RX ADMIN — GABAPENTIN 1200 MG: 600 TABLET, FILM COATED ORAL at 08:09

## 2025-08-10 RX ADMIN — DIAZEPAM 10 MG: 10 TABLET ORAL at 00:00

## 2025-08-10 RX ADMIN — PROPRANOLOL HYDROCHLORIDE 20 MG: 20 TABLET ORAL at 14:36

## 2025-08-10 RX ADMIN — DIAZEPAM 10 MG: 10 TABLET ORAL at 08:09

## 2025-08-10 RX ADMIN — FOLIC ACID 1 MG: 1 TABLET ORAL at 08:08

## 2025-08-10 RX ADMIN — OMEPRAZOLE 40 MG: 20 CAPSULE, DELAYED RELEASE ORAL at 08:09

## 2025-08-10 ASSESSMENT — ACTIVITIES OF DAILY LIVING (ADL)
DRESS: INDEPENDENT
HYGIENE/GROOMING: HANDWASHING;INDEPENDENT
ORAL_HYGIENE: INDEPENDENT
ADLS_ACUITY_SCORE: 47
LAUNDRY: UNABLE TO COMPLETE
ADLS_ACUITY_SCORE: 47
LAUNDRY: WITH SUPERVISION
ADLS_ACUITY_SCORE: 47
ORAL_HYGIENE: INDEPENDENT
ADLS_ACUITY_SCORE: 47
DRESS: INDEPENDENT
ADLS_ACUITY_SCORE: 47
HYGIENE/GROOMING: HANDWASHING;INDEPENDENT
ORAL_HYGIENE: INDEPENDENT
ADLS_ACUITY_SCORE: 47
DRESS: SCRUBS (BEHAVIORAL HEALTH)
ADLS_ACUITY_SCORE: 47
ADLS_ACUITY_SCORE: 47
HYGIENE/GROOMING: INDEPENDENT
ADLS_ACUITY_SCORE: 47

## 2025-08-11 VITALS
BODY MASS INDEX: 23.56 KG/M2 | DIASTOLIC BLOOD PRESSURE: 88 MMHG | RESPIRATION RATE: 16 BRPM | WEIGHT: 138 LBS | HEIGHT: 64 IN | HEART RATE: 60 BPM | SYSTOLIC BLOOD PRESSURE: 159 MMHG | TEMPERATURE: 97.7 F | OXYGEN SATURATION: 98 %

## 2025-08-11 PROCEDURE — 99239 HOSP IP/OBS DSCHRG MGMT >30: CPT | Performed by: PSYCHIATRY & NEUROLOGY

## 2025-08-11 PROCEDURE — 250N000013 HC RX MED GY IP 250 OP 250 PS 637: Performed by: EMERGENCY MEDICINE

## 2025-08-11 PROCEDURE — 250N000013 HC RX MED GY IP 250 OP 250 PS 637: Performed by: NURSE PRACTITIONER

## 2025-08-11 PROCEDURE — 250N000013 HC RX MED GY IP 250 OP 250 PS 637: Performed by: PSYCHIATRY & NEUROLOGY

## 2025-08-11 RX ORDER — DISULFIRAM 250 MG/1
250 TABLET ORAL DAILY
Status: SHIPPED
Start: 2025-08-11

## 2025-08-11 RX ORDER — DISULFIRAM 250 MG/1
125 TABLET ORAL DAILY
Status: DISCONTINUED | OUTPATIENT
Start: 2025-08-11 | End: 2025-08-11 | Stop reason: HOSPADM

## 2025-08-11 RX ADMIN — NICOTINE POLACRILEX 4 MG: 4 GUM, CHEWING BUCCAL at 09:14

## 2025-08-11 RX ADMIN — ACETAMINOPHEN 1000 MG: 500 TABLET ORAL at 05:12

## 2025-08-11 RX ADMIN — GABAPENTIN 1200 MG: 600 TABLET, FILM COATED ORAL at 07:46

## 2025-08-11 RX ADMIN — NICOTINE POLACRILEX 4 MG: 4 GUM, CHEWING BUCCAL at 07:46

## 2025-08-11 RX ADMIN — LOPERAMIDE HYDROCHLORIDE 2 MG: 2 CAPSULE ORAL at 10:26

## 2025-08-11 RX ADMIN — Medication 1 TABLET: at 07:45

## 2025-08-11 RX ADMIN — NICOTINE POLACRILEX 4 MG: 4 GUM, CHEWING BUCCAL at 04:19

## 2025-08-11 RX ADMIN — NICOTINE POLACRILEX 4 MG: 4 GUM, CHEWING BUCCAL at 06:08

## 2025-08-11 RX ADMIN — NICOTINE POLACRILEX 4 MG: 4 GUM, CHEWING BUCCAL at 10:26

## 2025-08-11 RX ADMIN — LEVOTHYROXINE SODIUM 100 MCG: 0.1 TABLET ORAL at 07:46

## 2025-08-11 RX ADMIN — PROPRANOLOL HYDROCHLORIDE 20 MG: 20 TABLET ORAL at 07:46

## 2025-08-11 RX ADMIN — FOLIC ACID 1 MG: 1 TABLET ORAL at 07:45

## 2025-08-11 RX ADMIN — Medication 1 TABLET: at 07:46

## 2025-08-11 RX ADMIN — OMEPRAZOLE 40 MG: 20 CAPSULE, DELAYED RELEASE ORAL at 07:46

## 2025-08-11 ASSESSMENT — ACTIVITIES OF DAILY LIVING (ADL)
ADLS_ACUITY_SCORE: 47

## 2025-08-12 DIAGNOSIS — Z09 HOSPITAL DISCHARGE FOLLOW-UP: ICD-10-CM

## 2025-08-13 ENCOUNTER — TELEPHONE (OUTPATIENT)
Dept: PHARMACY | Facility: OTHER | Age: 65
End: 2025-08-13
Payer: MEDICARE

## 2025-08-13 ENCOUNTER — PATIENT OUTREACH (OUTPATIENT)
Dept: CARE COORDINATION | Facility: CLINIC | Age: 65
End: 2025-08-13
Payer: MEDICARE

## 2025-08-20 ENCOUNTER — TELEPHONE (OUTPATIENT)
Dept: BEHAVIORAL HEALTH | Facility: CLINIC | Age: 65
End: 2025-08-20

## 2025-08-20 ENCOUNTER — APPOINTMENT (OUTPATIENT)
Dept: CT IMAGING | Facility: CLINIC | Age: 65
DRG: 897 | End: 2025-08-20
Attending: STUDENT IN AN ORGANIZED HEALTH CARE EDUCATION/TRAINING PROGRAM
Payer: MEDICARE

## 2025-08-20 ENCOUNTER — HOSPITAL ENCOUNTER (INPATIENT)
Facility: CLINIC | Age: 65
DRG: 897 | End: 2025-08-20
Attending: EMERGENCY MEDICINE | Admitting: PSYCHIATRY & NEUROLOGY
Payer: MEDICARE

## 2025-08-20 DIAGNOSIS — F10.920 ALCOHOLIC INTOXICATION WITHOUT COMPLICATION: ICD-10-CM

## 2025-08-20 DIAGNOSIS — F10.930 ALCOHOL WITHDRAWAL, UNCOMPLICATED (H): ICD-10-CM

## 2025-08-20 DIAGNOSIS — F10.90 ALCOHOL USE DISORDER: Primary | ICD-10-CM

## 2025-08-20 LAB
ALBUMIN SERPL BCG-MCNC: 4.7 G/DL (ref 3.5–5.2)
ALCOHOL BREATH TEST: 0.2 (ref 0–0.01)
ALP SERPL-CCNC: 64 U/L (ref 40–150)
ALT SERPL W P-5'-P-CCNC: 22 U/L (ref 0–50)
AMPHETAMINES UR QL SCN: ABNORMAL
ANION GAP SERPL CALCULATED.3IONS-SCNC: 15 MMOL/L (ref 7–15)
AST SERPL W P-5'-P-CCNC: 51 U/L (ref 0–45)
BARBITURATES UR QL SCN: ABNORMAL
BASOPHILS # BLD AUTO: 0.04 10E3/UL (ref 0–0.2)
BASOPHILS NFR BLD AUTO: 0.7 %
BENZODIAZ UR QL SCN: ABNORMAL
BILIRUB SERPL-MCNC: 0.2 MG/DL
BUN SERPL-MCNC: 6.7 MG/DL (ref 8–23)
BZE UR QL SCN: ABNORMAL
CALCIUM SERPL-MCNC: 8.9 MG/DL (ref 8.8–10.4)
CANNABINOIDS UR QL SCN: ABNORMAL
CHLORIDE SERPL-SCNC: 101 MMOL/L (ref 98–107)
CREAT SERPL-MCNC: 0.64 MG/DL (ref 0.51–0.95)
EGFRCR SERPLBLD CKD-EPI 2021: >90 ML/MIN/1.73M2
EOSINOPHIL # BLD AUTO: <0.03 10E3/UL (ref 0–0.7)
EOSINOPHIL NFR BLD AUTO: 0.4 %
ERYTHROCYTE [DISTWIDTH] IN BLOOD BY AUTOMATED COUNT: 11.8 % (ref 10–15)
FENTANYL UR QL: ABNORMAL
GLUCOSE SERPL-MCNC: 107 MG/DL (ref 70–99)
HCO3 SERPL-SCNC: 24 MMOL/L (ref 22–29)
HCT VFR BLD AUTO: 42 % (ref 35–47)
HGB BLD-MCNC: 15.4 G/DL (ref 11.7–15.7)
IMM GRANULOCYTES # BLD: <0.03 10E3/UL
IMM GRANULOCYTES NFR BLD: 0 %
LYMPHOCYTES # BLD AUTO: 3.22 10E3/UL (ref 0.8–5.3)
LYMPHOCYTES NFR BLD AUTO: 56.6 %
MAGNESIUM SERPL-MCNC: 2.1 MG/DL (ref 1.7–2.3)
MCH RBC QN AUTO: 34.7 PG (ref 26.5–33)
MCHC RBC AUTO-ENTMCNC: 36.7 G/DL (ref 31.5–36.5)
MCV RBC AUTO: 94.6 FL (ref 78–100)
MONOCYTES # BLD AUTO: 0.71 10E3/UL (ref 0–1.3)
MONOCYTES NFR BLD AUTO: 12.5 %
NEUTROPHILS # BLD AUTO: 1.7 10E3/UL (ref 1.6–8.3)
NEUTROPHILS NFR BLD AUTO: 29.8 %
NRBC # BLD AUTO: <0.03 10E3/UL
NRBC BLD AUTO-RTO: 0 /100
OPIATES UR QL SCN: ABNORMAL
PCP QUAL URINE (ROCHE): ABNORMAL
PLATELET # BLD AUTO: 258 10E3/UL (ref 150–450)
POTASSIUM SERPL-SCNC: 4.2 MMOL/L (ref 3.4–5.3)
PROT SERPL-MCNC: 8 G/DL (ref 6.4–8.3)
RBC # BLD AUTO: 4.44 10E6/UL (ref 3.8–5.2)
SODIUM SERPL-SCNC: 140 MMOL/L (ref 135–145)
WBC # BLD AUTO: 5.69 10E3/UL (ref 4–11)

## 2025-08-20 PROCEDURE — 80307 DRUG TEST PRSMV CHEM ANLYZR: CPT | Performed by: EMERGENCY MEDICINE

## 2025-08-20 PROCEDURE — 70450 CT HEAD/BRAIN W/O DYE: CPT

## 2025-08-20 PROCEDURE — 99285 EMERGENCY DEPT VISIT HI MDM: CPT | Performed by: EMERGENCY MEDICINE

## 2025-08-20 PROCEDURE — HZ2ZZZZ DETOXIFICATION SERVICES FOR SUBSTANCE ABUSE TREATMENT: ICD-10-PCS | Performed by: EMERGENCY MEDICINE

## 2025-08-20 PROCEDURE — 36415 COLL VENOUS BLD VENIPUNCTURE: CPT | Performed by: STUDENT IN AN ORGANIZED HEALTH CARE EDUCATION/TRAINING PROGRAM

## 2025-08-20 PROCEDURE — 85025 COMPLETE CBC W/AUTO DIFF WBC: CPT | Performed by: STUDENT IN AN ORGANIZED HEALTH CARE EDUCATION/TRAINING PROGRAM

## 2025-08-20 PROCEDURE — 83735 ASSAY OF MAGNESIUM: CPT | Performed by: STUDENT IN AN ORGANIZED HEALTH CARE EDUCATION/TRAINING PROGRAM

## 2025-08-20 PROCEDURE — 99285 EMERGENCY DEPT VISIT HI MDM: CPT | Mod: FS | Performed by: EMERGENCY MEDICINE

## 2025-08-20 PROCEDURE — 84155 ASSAY OF PROTEIN SERUM: CPT | Performed by: STUDENT IN AN ORGANIZED HEALTH CARE EDUCATION/TRAINING PROGRAM

## 2025-08-20 RX ORDER — DIAZEPAM 10 MG/1
10 TABLET ORAL EVERY 30 MIN PRN
Status: DISCONTINUED | OUTPATIENT
Start: 2025-08-20 | End: 2025-08-21

## 2025-08-20 ASSESSMENT — LIFESTYLE VARIABLES
AUDITORY DISTURBANCES: NOT PRESENT
TREMOR: 2
AGITATION: SOMEWHAT MORE THAN NORMAL ACTIVITY
PAROXYSMAL SWEATS: NO SWEAT VISIBLE
ANXIETY: MODERATELY ANXIOUS, OR GUARDED, SO ANXIETY IS INFERRED
HEADACHE, FULLNESS IN HEAD: MILD
TOTAL SCORE: 10
NAUSEA AND VOMITING: MILD NAUSEA WITH NO VOMITING
ORIENTATION AND CLOUDING OF SENSORIUM: ORIENTED AND CAN DO SERIAL ADDITIONS
VISUAL DISTURBANCES: NOT PRESENT

## 2025-08-20 ASSESSMENT — ACTIVITIES OF DAILY LIVING (ADL)
ADLS_ACUITY_SCORE: 58

## 2025-08-21 VITALS
HEIGHT: 63 IN | OXYGEN SATURATION: 98 % | DIASTOLIC BLOOD PRESSURE: 82 MMHG | SYSTOLIC BLOOD PRESSURE: 149 MMHG | TEMPERATURE: 97.4 F | BODY MASS INDEX: 24.27 KG/M2 | WEIGHT: 137 LBS | RESPIRATION RATE: 16 BRPM | HEART RATE: 62 BPM

## 2025-08-21 PROBLEM — F10.20 ALCOHOL USE DISORDER, SEVERE, DEPENDENCE (H): Status: ACTIVE | Noted: 2023-12-06

## 2025-08-21 LAB
EST. AVERAGE GLUCOSE BLD GHB EST-MCNC: 123 MG/DL
GGT SERPL-CCNC: 19 U/L (ref 5–36)
HBA1C MFR BLD: 5.9 %
TSH SERPL DL<=0.005 MIU/L-ACNC: 0.52 UIU/ML (ref 0.3–4.2)

## 2025-08-21 PROCEDURE — 250N000013 HC RX MED GY IP 250 OP 250 PS 637: Performed by: PSYCHIATRY & NEUROLOGY

## 2025-08-21 PROCEDURE — 250N000011 HC RX IP 250 OP 636: Performed by: STUDENT IN AN ORGANIZED HEALTH CARE EDUCATION/TRAINING PROGRAM

## 2025-08-21 PROCEDURE — 250N000013 HC RX MED GY IP 250 OP 250 PS 637: Performed by: EMERGENCY MEDICINE

## 2025-08-21 PROCEDURE — 83036 HEMOGLOBIN GLYCOSYLATED A1C: CPT | Performed by: PSYCHIATRY & NEUROLOGY

## 2025-08-21 PROCEDURE — 82977 ASSAY OF GGT: CPT | Performed by: PSYCHIATRY & NEUROLOGY

## 2025-08-21 PROCEDURE — 250N000013 HC RX MED GY IP 250 OP 250 PS 637: Performed by: STUDENT IN AN ORGANIZED HEALTH CARE EDUCATION/TRAINING PROGRAM

## 2025-08-21 PROCEDURE — 99222 1ST HOSP IP/OBS MODERATE 55: CPT | Mod: AI | Performed by: NURSE PRACTITIONER

## 2025-08-21 PROCEDURE — 250N000011 HC RX IP 250 OP 636: Performed by: PSYCHIATRY & NEUROLOGY

## 2025-08-21 PROCEDURE — 90853 GROUP PSYCHOTHERAPY: CPT

## 2025-08-21 PROCEDURE — 128N000004 HC R&B CD ADULT

## 2025-08-21 PROCEDURE — 84443 ASSAY THYROID STIM HORMONE: CPT | Performed by: PSYCHIATRY & NEUROLOGY

## 2025-08-21 PROCEDURE — 250N000013 HC RX MED GY IP 250 OP 250 PS 637: Performed by: NURSE PRACTITIONER

## 2025-08-21 PROCEDURE — 36415 COLL VENOUS BLD VENIPUNCTURE: CPT | Performed by: PSYCHIATRY & NEUROLOGY

## 2025-08-21 RX ORDER — ONDANSETRON 4 MG/1
4 TABLET, ORALLY DISINTEGRATING ORAL EVERY 6 HOURS PRN
Status: DISCONTINUED | OUTPATIENT
Start: 2025-08-21 | End: 2025-08-22 | Stop reason: HOSPADM

## 2025-08-21 RX ORDER — DIAZEPAM 10 MG/1
10 TABLET ORAL EVERY 30 MIN PRN
Status: DISCONTINUED | OUTPATIENT
Start: 2025-08-21 | End: 2025-08-22 | Stop reason: HOSPADM

## 2025-08-21 RX ORDER — LEVOTHYROXINE SODIUM 100 UG/1
100 TABLET ORAL EVERY MORNING
Status: DISCONTINUED | OUTPATIENT
Start: 2025-08-21 | End: 2025-08-22 | Stop reason: HOSPADM

## 2025-08-21 RX ORDER — ONDANSETRON 4 MG/1
4 TABLET, ORALLY DISINTEGRATING ORAL ONCE
Status: COMPLETED | OUTPATIENT
Start: 2025-08-21 | End: 2025-08-21

## 2025-08-21 RX ORDER — DISULFIRAM 250 MG/1
250 TABLET ORAL DAILY
Status: DISCONTINUED | OUTPATIENT
Start: 2025-08-21 | End: 2025-08-22

## 2025-08-21 RX ORDER — PROPRANOLOL HCL 20 MG
20 TABLET ORAL 3 TIMES DAILY
Status: DISCONTINUED | OUTPATIENT
Start: 2025-08-21 | End: 2025-08-22 | Stop reason: HOSPADM

## 2025-08-21 RX ORDER — GABAPENTIN 600 MG/1
1200 TABLET ORAL 3 TIMES DAILY
Status: DISCONTINUED | OUTPATIENT
Start: 2025-08-21 | End: 2025-08-22 | Stop reason: HOSPADM

## 2025-08-21 RX ORDER — GABAPENTIN 600 MG/1
1200 TABLET ORAL ONCE
Status: COMPLETED | OUTPATIENT
Start: 2025-08-21 | End: 2025-08-21

## 2025-08-21 RX ORDER — PROPRANOLOL HCL 20 MG
20 TABLET ORAL ONCE
Status: COMPLETED | OUTPATIENT
Start: 2025-08-21 | End: 2025-08-21

## 2025-08-21 RX ORDER — LIDOCAINE 4 G/G
1 PATCH TOPICAL DAILY PRN
Status: DISCONTINUED | OUTPATIENT
Start: 2025-08-21 | End: 2025-08-22 | Stop reason: HOSPADM

## 2025-08-21 RX ORDER — HYDROXYZINE HYDROCHLORIDE 25 MG/1
25 TABLET, FILM COATED ORAL EVERY 4 HOURS PRN
Status: DISCONTINUED | OUTPATIENT
Start: 2025-08-21 | End: 2025-08-21

## 2025-08-21 RX ORDER — FOLIC ACID 1 MG/1
1 TABLET ORAL DAILY
Status: DISCONTINUED | OUTPATIENT
Start: 2025-08-21 | End: 2025-08-22 | Stop reason: HOSPADM

## 2025-08-21 RX ORDER — ACETAMINOPHEN 325 MG/1
650 TABLET ORAL EVERY 4 HOURS PRN
Status: DISCONTINUED | OUTPATIENT
Start: 2025-08-21 | End: 2025-08-21

## 2025-08-21 RX ORDER — IBUPROFEN 200 MG
400 TABLET ORAL ONCE
Status: COMPLETED | OUTPATIENT
Start: 2025-08-21 | End: 2025-08-21

## 2025-08-21 RX ORDER — MAGNESIUM HYDROXIDE/ALUMINUM HYDROXICE/SIMETHICONE 120; 1200; 1200 MG/30ML; MG/30ML; MG/30ML
30 SUSPENSION ORAL EVERY 4 HOURS PRN
Status: DISCONTINUED | OUTPATIENT
Start: 2025-08-21 | End: 2025-08-22 | Stop reason: HOSPADM

## 2025-08-21 RX ORDER — ACETAMINOPHEN 500 MG
500-1000 TABLET ORAL EVERY 6 HOURS PRN
Status: DISCONTINUED | OUTPATIENT
Start: 2025-08-21 | End: 2025-08-22 | Stop reason: HOSPADM

## 2025-08-21 RX ORDER — PANTOPRAZOLE SODIUM 40 MG/1
40 TABLET, DELAYED RELEASE ORAL 2 TIMES DAILY
Status: DISCONTINUED | OUTPATIENT
Start: 2025-08-21 | End: 2025-08-22 | Stop reason: HOSPADM

## 2025-08-21 RX ORDER — LOPERAMIDE HYDROCHLORIDE 2 MG/1
2 CAPSULE ORAL 4 TIMES DAILY PRN
Status: DISCONTINUED | OUTPATIENT
Start: 2025-08-21 | End: 2025-08-22 | Stop reason: HOSPADM

## 2025-08-21 RX ORDER — AMOXICILLIN 250 MG
1 CAPSULE ORAL 2 TIMES DAILY PRN
Status: DISCONTINUED | OUTPATIENT
Start: 2025-08-21 | End: 2025-08-22 | Stop reason: HOSPADM

## 2025-08-21 RX ORDER — MULTIPLE VITAMINS W/ MINERALS TAB 9MG-400MCG
1 TAB ORAL DAILY
Status: DISCONTINUED | OUTPATIENT
Start: 2025-08-21 | End: 2025-08-22 | Stop reason: HOSPADM

## 2025-08-21 RX ADMIN — NICOTINE POLACRILEX 4 MG: 4 GUM, CHEWING BUCCAL at 06:58

## 2025-08-21 RX ADMIN — DIAZEPAM 10 MG: 10 TABLET ORAL at 00:07

## 2025-08-21 RX ADMIN — NICOTINE POLACRILEX 4 MG: 4 GUM, CHEWING BUCCAL at 20:35

## 2025-08-21 RX ADMIN — NICOTINE POLACRILEX 4 MG: 4 GUM, CHEWING BUCCAL at 18:14

## 2025-08-21 RX ADMIN — FOLIC ACID 1 MG: 1 TABLET ORAL at 08:25

## 2025-08-21 RX ADMIN — Medication 10 MG: at 21:49

## 2025-08-21 RX ADMIN — GABAPENTIN 1200 MG: 600 TABLET, FILM COATED ORAL at 21:49

## 2025-08-21 RX ADMIN — PANTOPRAZOLE SODIUM 40 MG: 40 TABLET, DELAYED RELEASE ORAL at 08:24

## 2025-08-21 RX ADMIN — THIAMINE HCL TAB 100 MG 100 MG: 100 TAB at 08:24

## 2025-08-21 RX ADMIN — NICOTINE POLACRILEX 4 MG: 4 GUM, CHEWING BUCCAL at 08:24

## 2025-08-21 RX ADMIN — PANTOPRAZOLE SODIUM 40 MG: 40 TABLET, DELAYED RELEASE ORAL at 20:35

## 2025-08-21 RX ADMIN — DIAZEPAM 10 MG: 10 TABLET ORAL at 12:36

## 2025-08-21 RX ADMIN — DIAZEPAM 10 MG: 10 TABLET ORAL at 16:26

## 2025-08-21 RX ADMIN — Medication 1 TABLET: at 08:24

## 2025-08-21 RX ADMIN — NICOTINE POLACRILEX 4 MG: 4 GUM, CHEWING BUCCAL at 13:52

## 2025-08-21 RX ADMIN — PROPRANOLOL HYDROCHLORIDE 20 MG: 20 TABLET ORAL at 13:52

## 2025-08-21 RX ADMIN — GABAPENTIN 1200 MG: 600 TABLET, FILM COATED ORAL at 00:43

## 2025-08-21 RX ADMIN — NICOTINE POLACRILEX 4 MG: 4 GUM, CHEWING BUCCAL at 15:04

## 2025-08-21 RX ADMIN — ONDANSETRON 4 MG: 4 TABLET, ORALLY DISINTEGRATING ORAL at 00:43

## 2025-08-21 RX ADMIN — GABAPENTIN 1200 MG: 600 TABLET, FILM COATED ORAL at 13:52

## 2025-08-21 RX ADMIN — ONDANSETRON 4 MG: 4 TABLET, ORALLY DISINTEGRATING ORAL at 08:24

## 2025-08-21 RX ADMIN — IBUPROFEN 400 MG: 200 TABLET, FILM COATED ORAL at 03:55

## 2025-08-21 RX ADMIN — DIAZEPAM 10 MG: 10 TABLET ORAL at 08:24

## 2025-08-21 RX ADMIN — NICOTINE POLACRILEX 4 MG: 4 GUM, CHEWING BUCCAL at 03:55

## 2025-08-21 RX ADMIN — NICOTINE POLACRILEX 4 MG: 4 GUM, CHEWING BUCCAL at 21:49

## 2025-08-21 RX ADMIN — Medication 5 MG: at 00:44

## 2025-08-21 RX ADMIN — NICOTINE POLACRILEX 4 MG: 4 GUM, CHEWING BUCCAL at 12:36

## 2025-08-21 RX ADMIN — NICOTINE POLACRILEX 4 MG: 4 GUM, CHEWING BUCCAL at 11:10

## 2025-08-21 RX ADMIN — LOPERAMIDE HYDROCHLORIDE 2 MG: 2 CAPSULE ORAL at 08:24

## 2025-08-21 RX ADMIN — Medication 1 TABLET: at 20:35

## 2025-08-21 RX ADMIN — PROPRANOLOL HYDROCHLORIDE 20 MG: 20 TABLET ORAL at 01:10

## 2025-08-21 RX ADMIN — DIAZEPAM 10 MG: 10 TABLET ORAL at 02:20

## 2025-08-21 RX ADMIN — DIAZEPAM 10 MG: 10 TABLET ORAL at 05:35

## 2025-08-21 RX ADMIN — LEVOTHYROXINE SODIUM 100 MCG: 0.1 TABLET ORAL at 08:24

## 2025-08-21 RX ADMIN — NICOTINE POLACRILEX 4 MG: 4 GUM, CHEWING BUCCAL at 02:51

## 2025-08-21 RX ADMIN — DIAZEPAM 10 MG: 10 TABLET ORAL at 04:26

## 2025-08-21 RX ADMIN — PROPRANOLOL HYDROCHLORIDE 20 MG: 20 TABLET ORAL at 20:35

## 2025-08-21 RX ADMIN — PROPRANOLOL HYDROCHLORIDE 20 MG: 20 TABLET ORAL at 08:24

## 2025-08-21 RX ADMIN — NICOTINE POLACRILEX 4 MG: 4 GUM, CHEWING BUCCAL at 16:26

## 2025-08-21 RX ADMIN — GABAPENTIN 1200 MG: 600 TABLET, FILM COATED ORAL at 08:24

## 2025-08-21 RX ADMIN — NICOTINE POLACRILEX 4 MG: 4 GUM, CHEWING BUCCAL at 05:10

## 2025-08-21 RX ADMIN — ALUMINUM HYDROXIDE, MAGNESIUM HYDROXIDE, AND SIMETHICONE 30 ML: 200; 200; 20 SUSPENSION ORAL at 12:36

## 2025-08-21 ASSESSMENT — ACTIVITIES OF DAILY LIVING (ADL)
ADLS_ACUITY_SCORE: 45
ADLS_ACUITY_SCORE: 68
ADLS_ACUITY_SCORE: 58
HYGIENE/GROOMING: INDEPENDENT
ADLS_ACUITY_SCORE: 58
ADLS_ACUITY_SCORE: 45
ADLS_ACUITY_SCORE: 68
ADLS_ACUITY_SCORE: 58
ADLS_ACUITY_SCORE: 45
LAUNDRY: WITH SUPERVISION
ADLS_ACUITY_SCORE: 45
ORAL_HYGIENE: INDEPENDENT
ADLS_ACUITY_SCORE: 45
ADLS_ACUITY_SCORE: 58
LAUNDRY: WITH SUPERVISION
ADLS_ACUITY_SCORE: 45
ADLS_ACUITY_SCORE: 58
ADLS_ACUITY_SCORE: 45
ORAL_HYGIENE: INDEPENDENT
ADLS_ACUITY_SCORE: 45
DRESS: SCRUBS (BEHAVIORAL HEALTH)
HYGIENE/GROOMING: INDEPENDENT
ADLS_ACUITY_SCORE: 45
DRESS: INDEPENDENT
ADLS_ACUITY_SCORE: 45

## 2025-08-21 ASSESSMENT — LIFESTYLE VARIABLES
VISUAL DISTURBANCES: VERY MILD SENSITIVITY
ANXIETY: MILDLY ANXIOUS
ORIENTATION AND CLOUDING OF SENSORIUM: ORIENTED AND CAN DO SERIAL ADDITIONS
VISUAL DISTURBANCES: NOT PRESENT
ORIENTATION AND CLOUDING OF SENSORIUM: ORIENTED AND CAN DO SERIAL ADDITIONS
NAUSEA AND VOMITING: MILD NAUSEA WITH NO VOMITING
TOTAL SCORE: 8
TOTAL SCORE: 9
AUDITORY DISTURBANCES: NOT PRESENT
AGITATION: 3
NAUSEA AND VOMITING: MILD NAUSEA WITH NO VOMITING
AGITATION: 2
TREMOR: NOT VISIBLE, BUT CAN BE FELT FINGERTIP TO FINGERTIP
PAROXYSMAL SWEATS: NO SWEAT VISIBLE
PAROXYSMAL SWEATS: NO SWEAT VISIBLE
HEADACHE, FULLNESS IN HEAD: NOT PRESENT
TREMOR: NOT VISIBLE, BUT CAN BE FELT FINGERTIP TO FINGERTIP
AUDITORY DISTURBANCES: NOT PRESENT
TACTILE DISTURBANCES: VERY MILD ITCHING, PINS AND NEEDLES, BURNING OR NUMBNESS
ANXIETY: 3
HEADACHE, FULLNESS IN HEAD: MILD

## 2025-08-22 VITALS
HEART RATE: 79 BPM | WEIGHT: 137 LBS | DIASTOLIC BLOOD PRESSURE: 77 MMHG | BODY MASS INDEX: 24.27 KG/M2 | RESPIRATION RATE: 16 BRPM | OXYGEN SATURATION: 98 % | HEIGHT: 63 IN | TEMPERATURE: 98.5 F | SYSTOLIC BLOOD PRESSURE: 128 MMHG

## 2025-08-22 PROCEDURE — 250N000013 HC RX MED GY IP 250 OP 250 PS 637: Performed by: PSYCHIATRY & NEUROLOGY

## 2025-08-22 PROCEDURE — 250N000013 HC RX MED GY IP 250 OP 250 PS 637: Performed by: NURSE PRACTITIONER

## 2025-08-22 PROCEDURE — 250N000011 HC RX IP 250 OP 636: Performed by: PSYCHIATRY & NEUROLOGY

## 2025-08-22 PROCEDURE — 99239 HOSP IP/OBS DSCHRG MGMT >30: CPT | Performed by: NURSE PRACTITIONER

## 2025-08-22 PROCEDURE — 250N000013 HC RX MED GY IP 250 OP 250 PS 637: Performed by: EMERGENCY MEDICINE

## 2025-08-22 RX ORDER — DISULFIRAM 250 MG/1
TABLET ORAL
Status: SHIPPED
Start: 2025-08-22

## 2025-08-22 RX ORDER — DISULFIRAM 250 MG/1
125 TABLET ORAL DAILY
Status: DISCONTINUED | OUTPATIENT
Start: 2025-08-22 | End: 2025-08-22 | Stop reason: HOSPADM

## 2025-08-22 RX ADMIN — NICOTINE POLACRILEX 4 MG: 4 GUM, CHEWING BUCCAL at 09:22

## 2025-08-22 RX ADMIN — PROPRANOLOL HYDROCHLORIDE 20 MG: 20 TABLET ORAL at 08:08

## 2025-08-22 RX ADMIN — PANTOPRAZOLE SODIUM 40 MG: 40 TABLET, DELAYED RELEASE ORAL at 08:08

## 2025-08-22 RX ADMIN — ACETAMINOPHEN 1000 MG: 500 TABLET ORAL at 05:35

## 2025-08-22 RX ADMIN — NICOTINE POLACRILEX 4 MG: 4 GUM, CHEWING BUCCAL at 08:12

## 2025-08-22 RX ADMIN — GABAPENTIN 1200 MG: 600 TABLET, FILM COATED ORAL at 08:08

## 2025-08-22 RX ADMIN — NICOTINE POLACRILEX 4 MG: 4 GUM, CHEWING BUCCAL at 16:06

## 2025-08-22 RX ADMIN — ONDANSETRON 4 MG: 4 TABLET, ORALLY DISINTEGRATING ORAL at 13:00

## 2025-08-22 RX ADMIN — THIAMINE HCL TAB 100 MG 100 MG: 100 TAB at 08:08

## 2025-08-22 RX ADMIN — LOPERAMIDE HYDROCHLORIDE 2 MG: 2 CAPSULE ORAL at 09:20

## 2025-08-22 RX ADMIN — NICOTINE POLACRILEX 4 MG: 4 GUM, CHEWING BUCCAL at 05:35

## 2025-08-22 RX ADMIN — NICOTINE POLACRILEX 4 MG: 4 GUM, CHEWING BUCCAL at 10:28

## 2025-08-22 RX ADMIN — Medication 1 TABLET: at 08:08

## 2025-08-22 RX ADMIN — NICOTINE POLACRILEX 4 MG: 4 GUM, CHEWING BUCCAL at 07:07

## 2025-08-22 RX ADMIN — NICOTINE POLACRILEX 4 MG: 4 GUM, CHEWING BUCCAL at 13:00

## 2025-08-22 RX ADMIN — LEVOTHYROXINE SODIUM 100 MCG: 0.1 TABLET ORAL at 08:08

## 2025-08-22 RX ADMIN — GABAPENTIN 1200 MG: 600 TABLET, FILM COATED ORAL at 13:00

## 2025-08-22 RX ADMIN — NICOTINE POLACRILEX 4 MG: 4 GUM, CHEWING BUCCAL at 12:05

## 2025-08-22 RX ADMIN — FOLIC ACID 1 MG: 1 TABLET ORAL at 08:08

## 2025-08-22 RX ADMIN — PROPRANOLOL HYDROCHLORIDE 20 MG: 20 TABLET ORAL at 13:00

## 2025-08-22 RX ADMIN — Medication 125 MG: at 16:06

## 2025-08-22 ASSESSMENT — ACTIVITIES OF DAILY LIVING (ADL)
ADLS_ACUITY_SCORE: 45
ORAL_HYGIENE: INDEPENDENT
DRESS: INDEPENDENT
LAUNDRY: WITH SUPERVISION
HYGIENE/GROOMING: INDEPENDENT
ADLS_ACUITY_SCORE: 45

## 2025-08-25 DIAGNOSIS — Z09 HOSPITAL DISCHARGE FOLLOW-UP: ICD-10-CM
